# Patient Record
Sex: FEMALE | Race: WHITE | NOT HISPANIC OR LATINO | Employment: FULL TIME | URBAN - NONMETROPOLITAN AREA
[De-identification: names, ages, dates, MRNs, and addresses within clinical notes are randomized per-mention and may not be internally consistent; named-entity substitution may affect disease eponyms.]

---

## 2017-07-28 ENCOUNTER — HOSPITAL ENCOUNTER (EMERGENCY)
Facility: HOSPITAL | Age: 57
Discharge: HOME | End: 2017-07-28
Admitting: FAMILY MEDICINE

## 2017-07-28 ENCOUNTER — APPOINTMENT (OUTPATIENT)
Dept: GENERAL RADIOLOGY | Facility: HOSPITAL | Age: 57
End: 2017-07-28

## 2017-07-28 VITALS
WEIGHT: 170 LBS | BODY MASS INDEX: 26.68 KG/M2 | HEIGHT: 67 IN | RESPIRATION RATE: 16 BRPM | DIASTOLIC BLOOD PRESSURE: 81 MMHG | TEMPERATURE: 97.6 F | HEART RATE: 64 BPM | OXYGEN SATURATION: 99 % | SYSTOLIC BLOOD PRESSURE: 131 MMHG

## 2017-07-28 DIAGNOSIS — S93.402A SPRAIN OF LEFT ANKLE, UNSPECIFIED LIGAMENT, INITIAL ENCOUNTER: Primary | ICD-10-CM

## 2017-07-28 PROCEDURE — 99283 EMERGENCY DEPT VISIT LOW MDM: CPT

## 2017-07-28 PROCEDURE — 73610 X-RAY EXAM OF ANKLE: CPT

## 2017-07-28 RX ORDER — IBUPROFEN 600 MG/1
600 TABLET ORAL EVERY 8 HOURS PRN
Qty: 30 TABLET | Refills: 0 | Status: SHIPPED | OUTPATIENT
Start: 2017-07-28 | End: 2018-03-02

## 2017-07-28 RX ORDER — OXYCODONE AND ACETAMINOPHEN 7.5; 325 MG/1; MG/1
1 TABLET ORAL EVERY 4 HOURS PRN
Qty: 12 TABLET | Refills: 0 | Status: SHIPPED | OUTPATIENT
Start: 2017-07-28 | End: 2017-12-13

## 2017-11-03 ENCOUNTER — TRANSCRIBE ORDERS (OUTPATIENT)
Dept: ADMINISTRATIVE | Facility: HOSPITAL | Age: 57
End: 2017-11-03

## 2017-11-03 DIAGNOSIS — Z13.9 ENCOUNTER FOR SCREENING: Primary | ICD-10-CM

## 2017-11-06 ENCOUNTER — APPOINTMENT (OUTPATIENT)
Dept: CARDIOLOGY | Facility: HOSPITAL | Age: 57
End: 2017-11-06

## 2017-11-16 ENCOUNTER — HOSPITAL ENCOUNTER (OUTPATIENT)
Dept: CARDIOLOGY | Facility: HOSPITAL | Age: 57
Discharge: HOME | End: 2017-11-16

## 2017-11-16 DIAGNOSIS — Z13.9 ENCOUNTER FOR SCREENING: ICD-10-CM

## 2017-11-16 PROCEDURE — 93017 CV STRESS TEST TRACING ONLY: CPT

## 2017-11-16 PROCEDURE — 93018 CV STRESS TEST I&R ONLY: CPT | Performed by: INTERNAL MEDICINE

## 2017-11-17 LAB
BH CV STRESS BP STAGE 1: NORMAL
BH CV STRESS BP STAGE 2: NORMAL
BH CV STRESS BP STAGE 3: NORMAL
BH CV STRESS BP STAGE 4: NORMAL
BH CV STRESS DURATION MIN STAGE 1: 3
BH CV STRESS DURATION MIN STAGE 2: 3
BH CV STRESS DURATION MIN STAGE 3: 3
BH CV STRESS DURATION MIN STAGE 4: 1
BH CV STRESS DURATION SEC STAGE 1: 0
BH CV STRESS DURATION SEC STAGE 2: 0
BH CV STRESS DURATION SEC STAGE 3: 0
BH CV STRESS DURATION SEC STAGE 4: 20
BH CV STRESS GRADE STAGE 1: 10
BH CV STRESS GRADE STAGE 2: 12
BH CV STRESS GRADE STAGE 3: 14
BH CV STRESS GRADE STAGE 4: 16
BH CV STRESS HR STAGE 1: 78
BH CV STRESS HR STAGE 2: 97
BH CV STRESS HR STAGE 3: 116
BH CV STRESS HR STAGE 4: 144
BH CV STRESS METS STAGE 1: 5
BH CV STRESS METS STAGE 2: 7.5
BH CV STRESS METS STAGE 3: 10
BH CV STRESS METS STAGE 4: 13.5
BH CV STRESS PROTOCOL 1: NORMAL
BH CV STRESS RECOVERY BP: NORMAL MMHG
BH CV STRESS RECOVERY HR: 73 BPM
BH CV STRESS SPEED STAGE 1: 1.7
BH CV STRESS SPEED STAGE 2: 2.5
BH CV STRESS SPEED STAGE 3: 3.4
BH CV STRESS SPEED STAGE 4: 4.2
BH CV STRESS STAGE 1: 1
BH CV STRESS STAGE 2: 2
BH CV STRESS STAGE 3: 3
BH CV STRESS STAGE 4: 4
MAXIMAL PREDICTED HEART RATE: 166 BPM
PERCENT MAX PREDICTED HR: 86.75 %
STRESS BASELINE BP: NORMAL MMHG
STRESS BASELINE HR: 68 BPM
STRESS PERCENT HR: 102 %
STRESS POST ESTIMATED WORKLOAD: 13.5 METS
STRESS POST EXERCISE DUR MIN: 10 MIN
STRESS POST EXERCISE DUR SEC: 20 SEC
STRESS POST PEAK BP: NORMAL MMHG
STRESS POST PEAK HR: 144 BPM
STRESS TARGET HR: 141 BPM

## 2017-12-13 ENCOUNTER — OFFICE VISIT (OUTPATIENT)
Dept: OBSTETRICS AND GYNECOLOGY | Facility: CLINIC | Age: 57
End: 2017-12-13

## 2017-12-13 VITALS
SYSTOLIC BLOOD PRESSURE: 110 MMHG | HEIGHT: 67 IN | DIASTOLIC BLOOD PRESSURE: 70 MMHG | BODY MASS INDEX: 25.43 KG/M2 | WEIGHT: 162 LBS

## 2017-12-13 DIAGNOSIS — Z01.419 ENCOUNTER FOR GYNECOLOGICAL EXAMINATION WITHOUT ABNORMAL FINDING: Primary | ICD-10-CM

## 2017-12-13 PROCEDURE — 99396 PREV VISIT EST AGE 40-64: CPT | Performed by: NURSE PRACTITIONER

## 2017-12-13 PROCEDURE — 87624 HPV HI-RISK TYP POOLED RSLT: CPT | Performed by: NURSE PRACTITIONER

## 2017-12-13 PROCEDURE — G0123 SCREEN CERV/VAG THIN LAYER: HCPCS | Performed by: NURSE PRACTITIONER

## 2017-12-13 RX ORDER — AMLODIPINE BESYLATE 5 MG/1
5 TABLET ORAL DAILY
COMMUNITY
End: 2020-03-12

## 2017-12-13 RX ORDER — LISINOPRIL 10 MG/1
10 TABLET ORAL DAILY
COMMUNITY
End: 2020-03-12

## 2017-12-13 RX ORDER — PRAVASTATIN SODIUM 40 MG
40 TABLET ORAL DAILY
COMMUNITY
End: 2020-03-12

## 2017-12-13 NOTE — PROGRESS NOTES
"Subjective   Tiffanie Smith is a 56 y.o. female.     HPI Comments: Annual exam.       The following portions of the patient's history were reviewed and updated as appropriate: allergies, current medications, past family history, past medical history, past social history, past surgical history and problem list.    /70 (BP Location: Left arm, Patient Position: Sitting, Cuff Size: Adult)  Ht 170.2 cm (67\")  Wt 73.5 kg (162 lb)  BMI 25.37 kg/m2    Review of Systems   Constitutional: Negative for activity change, appetite change, fatigue and fever.   HENT: Negative for congestion, sore throat and trouble swallowing.    Eyes: Negative for pain, discharge and visual disturbance.   Respiratory: Negative for apnea, shortness of breath and wheezing.    Cardiovascular: Negative for chest pain, palpitations and leg swelling.   Gastrointestinal: Negative for abdominal pain, constipation and diarrhea.   Genitourinary: Negative for frequency, pelvic pain, urgency and vaginal discharge.   Musculoskeletal: Negative for back pain and gait problem.   Skin: Negative for color change and rash.   Neurological: Negative for dizziness, weakness and numbness.   Psychiatric/Behavioral: Negative for confusion and sleep disturbance.       Objective   Physical Exam   Constitutional: She is oriented to person, place, and time. She appears well-developed and well-nourished. No distress.   HENT:   Head: Normocephalic.   Right Ear: External ear normal.   Left Ear: External ear normal.   Nose: Nose normal.   Mouth/Throat: Oropharynx is clear and moist.   Eyes: Conjunctivae are normal. Right eye exhibits no discharge. Left eye exhibits no discharge. No scleral icterus.   Neck: Normal range of motion. Neck supple. Carotid bruit is not present. No tracheal deviation present. No thyromegaly present.   Cardiovascular: Normal rate, regular rhythm, normal heart sounds and intact distal pulses.    No murmur heard.  Pulmonary/Chest: Effort normal " and breath sounds normal. No respiratory distress. She has no wheezes. Right breast exhibits no inverted nipple, no mass, no nipple discharge, no skin change and no tenderness. Left breast exhibits no inverted nipple, no mass, no nipple discharge, no skin change and no tenderness. Breasts are symmetrical. There is no breast swelling.   Abdominal: Soft. She exhibits no distension and no mass. There is no tenderness. There is no guarding. No hernia. Hernia confirmed negative in the right inguinal area and confirmed negative in the left inguinal area.   Genitourinary: Rectum normal, vagina normal and uterus normal. Rectal exam shows no mass. No breast tenderness, discharge or bleeding. Pelvic exam was performed with patient supine. There is no rash, tenderness, lesion or injury on the right labia. There is no rash, tenderness, lesion or injury on the left labia. Uterus is not enlarged, not fixed and not tender. Cervix exhibits no motion tenderness, no discharge and no friability. Right adnexum displays no mass, no tenderness and no fullness. Left adnexum displays no mass, no tenderness and no fullness. No erythema, tenderness or bleeding in the vagina. No foreign body in the vagina. No signs of injury around the vagina. No vaginal discharge found.   Genitourinary Comments:   BSU normal  Urethral meatus  Normal  Perineum  Normal   Musculoskeletal: Normal range of motion. She exhibits no edema or tenderness.   Lymphadenopathy:        Head (right side): No submental, no submandibular, no tonsillar, no preauricular, no posterior auricular and no occipital adenopathy present.        Head (left side): No submental, no submandibular, no tonsillar, no preauricular, no posterior auricular and no occipital adenopathy present.     She has no cervical adenopathy.        Right cervical: No superficial cervical, no deep cervical and no posterior cervical adenopathy present.       Left cervical: No superficial cervical, no deep  cervical and no posterior cervical adenopathy present.     She has no axillary adenopathy.        Right: No inguinal adenopathy present.        Left: No inguinal adenopathy present.   Neurological: She is alert and oriented to person, place, and time. Coordination normal.   Skin: Skin is warm and dry. No bruising and no rash noted. She is not diaphoretic. No erythema.   Psychiatric: She has a normal mood and affect. Her behavior is normal. Judgment and thought content normal.   Nursing note and vitals reviewed.      Assessment/Plan    Well woman exam. Pap collected. Mammogram order sent to [facility] per pt request, scheduled for January.   Annual labs followed by Dr. Higginbotham.   RV annual exam/prn    Tiffanie was seen today for gynecologic exam.    Diagnoses and all orders for this visit:    Encounter for gynecological examination without abnormal finding  -     Liquid-based Pap Smear, Screening - ThinPrep Vial, Cervix

## 2017-12-21 LAB
GEN CATEG CVX/VAG CYTO-IMP: NORMAL
LAB AP CASE REPORT: NORMAL
LAB AP GYN ADDITIONAL INFORMATION: NORMAL
LAB AP GYN OTHER FINDINGS: NORMAL
Lab: NORMAL
PATH INTERP SPEC-IMP: NORMAL
STAT OF ADQ CVX/VAG CYTO-IMP: NORMAL

## 2018-02-01 ENCOUNTER — HOSPITAL ENCOUNTER (INPATIENT)
Facility: HOSPITAL | Age: 58
LOS: 3 days | Discharge: HOME | End: 2018-02-04
Attending: EMERGENCY MEDICINE | Admitting: FAMILY MEDICINE

## 2018-02-01 DIAGNOSIS — K85.90 ACUTE PANCREATITIS, UNSPECIFIED COMPLICATION STATUS, UNSPECIFIED PANCREATITIS TYPE: Primary | ICD-10-CM

## 2018-02-01 LAB
ALBUMIN SERPL-MCNC: 4.2 G/DL (ref 3.5–5)
ALBUMIN/GLOB SERPL: 1.4 G/DL (ref 1.1–2.5)
ALP SERPL-CCNC: 120 U/L (ref 24–120)
ALT SERPL W P-5'-P-CCNC: 34 U/L (ref 0–54)
ANION GAP SERPL CALCULATED.3IONS-SCNC: 11 MMOL/L (ref 4–13)
AST SERPL-CCNC: 29 U/L (ref 7–45)
BACTERIA UR QL AUTO: ABNORMAL /HPF
BASOPHILS # BLD AUTO: 0.05 10*3/MM3 (ref 0–0.2)
BASOPHILS NFR BLD AUTO: 0.5 % (ref 0–2)
BILIRUB CONJ SERPL-MCNC: 0 MG/DL (ref 0–0.3)
BILIRUB INDIRECT SERPL-MCNC: 0 MG/DL (ref 0–1.1)
BILIRUB SERPL-MCNC: 0.1 MG/DL (ref 0.1–1)
BILIRUB SERPL-MCNC: <0.1 MG/DL (ref 0.1–1)
BILIRUB UR QL STRIP: NEGATIVE
BUN BLD-MCNC: 13 MG/DL (ref 5–21)
BUN/CREAT SERPL: 16.5 (ref 7–25)
CALCIUM SPEC-SCNC: 9.6 MG/DL (ref 8.4–10.4)
CHLORIDE SERPL-SCNC: 102 MMOL/L (ref 98–110)
CLARITY UR: CLEAR
CO2 SERPL-SCNC: 28 MMOL/L (ref 24–31)
COLOR UR: YELLOW
CREAT BLD-MCNC: 0.79 MG/DL (ref 0.5–1.4)
DEPRECATED RDW RBC AUTO: 42 FL (ref 40–54)
EOSINOPHIL # BLD AUTO: 0.45 10*3/MM3 (ref 0–0.7)
EOSINOPHIL NFR BLD AUTO: 4.3 % (ref 0–4)
ERYTHROCYTE [DISTWIDTH] IN BLOOD BY AUTOMATED COUNT: 12.2 % (ref 12–15)
GFR SERPL CREATININE-BSD FRML MDRD: 76 ML/MIN/1.73
GGT SERPL-CCNC: 27 U/L (ref 0–62)
GLOBULIN UR ELPH-MCNC: 3.1 GM/DL
GLUCOSE BLD-MCNC: 119 MG/DL (ref 70–100)
GLUCOSE UR STRIP-MCNC: NEGATIVE MG/DL
HCT VFR BLD AUTO: 36.4 % (ref 37–47)
HGB BLD-MCNC: 12.5 G/DL (ref 12–16)
HGB UR QL STRIP.AUTO: NEGATIVE
HYALINE CASTS UR QL AUTO: ABNORMAL /LPF
IMM GRANULOCYTES # BLD: 0.02 10*3/MM3 (ref 0–0.03)
IMM GRANULOCYTES NFR BLD: 0.2 % (ref 0–5)
KETONES UR QL STRIP: NEGATIVE
LEUKOCYTE ESTERASE UR QL STRIP.AUTO: ABNORMAL
LIPASE SERPL-CCNC: 1184 U/L (ref 23–203)
LYMPHOCYTES # BLD AUTO: 3.46 10*3/MM3 (ref 0.72–4.86)
LYMPHOCYTES NFR BLD AUTO: 32.7 % (ref 15–45)
MCH RBC QN AUTO: 31.7 PG (ref 28–32)
MCHC RBC AUTO-ENTMCNC: 34.3 G/DL (ref 33–36)
MCV RBC AUTO: 92.4 FL (ref 82–98)
MONOCYTES # BLD AUTO: 0.66 10*3/MM3 (ref 0.19–1.3)
MONOCYTES NFR BLD AUTO: 6.2 % (ref 4–12)
NEUTROPHILS # BLD AUTO: 5.94 10*3/MM3 (ref 1.87–8.4)
NEUTROPHILS NFR BLD AUTO: 56.1 % (ref 39–78)
NITRITE UR QL STRIP: NEGATIVE
NRBC BLD MANUAL-RTO: 0 /100 WBC (ref 0–0)
PH UR STRIP.AUTO: 7 [PH] (ref 5–8)
PLATELET # BLD AUTO: 220 10*3/MM3 (ref 130–400)
PMV BLD AUTO: 9 FL (ref 6–12)
POTASSIUM BLD-SCNC: 3.8 MMOL/L (ref 3.5–5.3)
PROT SERPL-MCNC: 7.3 G/DL (ref 6.3–8.7)
PROT UR QL STRIP: NEGATIVE
RBC # BLD AUTO: 3.94 10*6/MM3 (ref 4.2–5.4)
RBC # UR: ABNORMAL /HPF
REF LAB TEST METHOD: ABNORMAL
SODIUM BLD-SCNC: 141 MMOL/L (ref 135–145)
SP GR UR STRIP: 1.01 (ref 1–1.03)
SQUAMOUS #/AREA URNS HPF: ABNORMAL /HPF
UROBILINOGEN UR QL STRIP: ABNORMAL
WBC NRBC COR # BLD: 10.58 10*3/MM3 (ref 4.8–10.8)
WBC UR QL AUTO: ABNORMAL /HPF

## 2018-02-01 PROCEDURE — 25010000002 ENOXAPARIN PER 10 MG: Performed by: FAMILY MEDICINE

## 2018-02-01 PROCEDURE — 93005 ELECTROCARDIOGRAM TRACING: CPT | Performed by: EMERGENCY MEDICINE

## 2018-02-01 PROCEDURE — 82977 ASSAY OF GGT: CPT | Performed by: FAMILY MEDICINE

## 2018-02-01 PROCEDURE — 25010000002 KETOROLAC TROMETHAMINE PER 15 MG: Performed by: FAMILY MEDICINE

## 2018-02-01 PROCEDURE — 99284 EMERGENCY DEPT VISIT MOD MDM: CPT

## 2018-02-01 PROCEDURE — 85025 COMPLETE CBC W/AUTO DIFF WBC: CPT | Performed by: EMERGENCY MEDICINE

## 2018-02-01 PROCEDURE — 83690 ASSAY OF LIPASE: CPT | Performed by: EMERGENCY MEDICINE

## 2018-02-01 PROCEDURE — 80053 COMPREHEN METABOLIC PANEL: CPT | Performed by: EMERGENCY MEDICINE

## 2018-02-01 PROCEDURE — 82247 BILIRUBIN TOTAL: CPT | Performed by: FAMILY MEDICINE

## 2018-02-01 PROCEDURE — 82248 BILIRUBIN DIRECT: CPT | Performed by: FAMILY MEDICINE

## 2018-02-01 PROCEDURE — 93010 ELECTROCARDIOGRAM REPORT: CPT | Performed by: INTERNAL MEDICINE

## 2018-02-01 PROCEDURE — 25010000002 FENTANYL CITRATE (PF) 100 MCG/2ML SOLUTION: Performed by: EMERGENCY MEDICINE

## 2018-02-01 PROCEDURE — 25010000002 ONDANSETRON PER 1 MG: Performed by: EMERGENCY MEDICINE

## 2018-02-01 PROCEDURE — 81001 URINALYSIS AUTO W/SCOPE: CPT | Performed by: EMERGENCY MEDICINE

## 2018-02-01 PROCEDURE — 87086 URINE CULTURE/COLONY COUNT: CPT | Performed by: EMERGENCY MEDICINE

## 2018-02-01 RX ORDER — DICYCLOMINE HYDROCHLORIDE 10 MG/1
10 CAPSULE ORAL 3 TIMES DAILY PRN
COMMUNITY
End: 2018-03-02

## 2018-02-01 RX ORDER — SODIUM CHLORIDE 0.9 % (FLUSH) 0.9 %
1-10 SYRINGE (ML) INJECTION AS NEEDED
Status: DISCONTINUED | OUTPATIENT
Start: 2018-02-01 | End: 2018-02-04 | Stop reason: HOSPADM

## 2018-02-01 RX ORDER — KETOROLAC TROMETHAMINE 30 MG/ML
30 INJECTION, SOLUTION INTRAMUSCULAR; INTRAVENOUS ONCE
Status: COMPLETED | OUTPATIENT
Start: 2018-02-01 | End: 2018-02-01

## 2018-02-01 RX ORDER — AMLODIPINE BESYLATE 5 MG/1
5 TABLET ORAL DAILY
Status: DISCONTINUED | OUTPATIENT
Start: 2018-02-02 | End: 2018-02-04 | Stop reason: HOSPADM

## 2018-02-01 RX ORDER — SODIUM CHLORIDE 9 MG/ML
1000 INJECTION, SOLUTION INTRAVENOUS ONCE
Status: COMPLETED | OUTPATIENT
Start: 2018-02-01 | End: 2018-02-01

## 2018-02-01 RX ORDER — LISINOPRIL 10 MG/1
10 TABLET ORAL DAILY
Status: DISCONTINUED | OUTPATIENT
Start: 2018-02-02 | End: 2018-02-04 | Stop reason: HOSPADM

## 2018-02-01 RX ORDER — ATORVASTATIN CALCIUM 10 MG/1
10 TABLET, FILM COATED ORAL NIGHTLY
Status: DISCONTINUED | OUTPATIENT
Start: 2018-02-01 | End: 2018-02-04 | Stop reason: HOSPADM

## 2018-02-01 RX ORDER — FENTANYL CITRATE 50 UG/ML
50 INJECTION, SOLUTION INTRAMUSCULAR; INTRAVENOUS ONCE
Status: COMPLETED | OUTPATIENT
Start: 2018-02-01 | End: 2018-02-01

## 2018-02-01 RX ORDER — SODIUM CHLORIDE 9 MG/ML
100 INJECTION, SOLUTION INTRAVENOUS CONTINUOUS
Status: DISCONTINUED | OUTPATIENT
Start: 2018-02-01 | End: 2018-02-03

## 2018-02-01 RX ORDER — FENTANYL CITRATE 50 UG/ML
25 INJECTION, SOLUTION INTRAMUSCULAR; INTRAVENOUS
Status: DISCONTINUED | OUTPATIENT
Start: 2018-02-01 | End: 2018-02-01

## 2018-02-01 RX ORDER — DICYCLOMINE HYDROCHLORIDE 10 MG/1
10 CAPSULE ORAL 3 TIMES DAILY
Status: DISCONTINUED | OUTPATIENT
Start: 2018-02-01 | End: 2018-02-04 | Stop reason: HOSPADM

## 2018-02-01 RX ORDER — ONDANSETRON 2 MG/ML
4 INJECTION INTRAMUSCULAR; INTRAVENOUS ONCE
Status: COMPLETED | OUTPATIENT
Start: 2018-02-01 | End: 2018-02-01

## 2018-02-01 RX ORDER — FAMOTIDINE 20 MG/1
40 TABLET, FILM COATED ORAL DAILY
Status: DISCONTINUED | OUTPATIENT
Start: 2018-02-02 | End: 2018-02-04 | Stop reason: HOSPADM

## 2018-02-01 RX ORDER — SODIUM CHLORIDE 0.9 % (FLUSH) 0.9 %
10 SYRINGE (ML) INJECTION AS NEEDED
Status: DISCONTINUED | OUTPATIENT
Start: 2018-02-01 | End: 2018-02-04 | Stop reason: HOSPADM

## 2018-02-01 RX ADMIN — ONDANSETRON 4 MG: 2 INJECTION, SOLUTION INTRAMUSCULAR; INTRAVENOUS at 20:39

## 2018-02-01 RX ADMIN — SODIUM CHLORIDE 1000 ML: 9 INJECTION, SOLUTION INTRAVENOUS at 20:39

## 2018-02-01 RX ADMIN — SODIUM CHLORIDE 100 ML/HR: 9 INJECTION, SOLUTION INTRAVENOUS at 23:10

## 2018-02-01 RX ADMIN — ENOXAPARIN SODIUM 40 MG: 40 INJECTION SUBCUTANEOUS at 23:28

## 2018-02-01 RX ADMIN — KETOROLAC TROMETHAMINE 30 MG: 30 INJECTION, SOLUTION INTRAMUSCULAR at 23:28

## 2018-02-01 RX ADMIN — FENTANYL CITRATE 50 MCG: 50 INJECTION, SOLUTION INTRAMUSCULAR; INTRAVENOUS at 20:39

## 2018-02-02 ENCOUNTER — APPOINTMENT (OUTPATIENT)
Dept: CT IMAGING | Facility: HOSPITAL | Age: 58
End: 2018-02-02

## 2018-02-02 ENCOUNTER — TELEPHONE (OUTPATIENT)
Dept: GASTROENTEROLOGY | Age: 58
End: 2018-02-02

## 2018-02-02 ENCOUNTER — APPOINTMENT (OUTPATIENT)
Dept: ULTRASOUND IMAGING | Facility: HOSPITAL | Age: 58
End: 2018-02-02

## 2018-02-02 LAB
ALBUMIN SERPL-MCNC: 3.5 G/DL (ref 3.5–5)
ALBUMIN/GLOB SERPL: 1.3 G/DL (ref 1.1–2.5)
ALP SERPL-CCNC: 90 U/L (ref 24–120)
ALT SERPL W P-5'-P-CCNC: 30 U/L (ref 0–54)
AMYLASE SERPL-CCNC: 109 U/L (ref 30–110)
ANION GAP SERPL CALCULATED.3IONS-SCNC: 7 MMOL/L (ref 4–13)
ARTICHOKE IGE QN: 92 MG/DL (ref 0–99)
AST SERPL-CCNC: 25 U/L (ref 7–45)
BASOPHILS # BLD AUTO: 0.06 10*3/MM3 (ref 0–0.2)
BASOPHILS NFR BLD AUTO: 0.8 % (ref 0–2)
BILIRUB SERPL-MCNC: 0.2 MG/DL (ref 0.1–1)
BUN BLD-MCNC: 9 MG/DL (ref 5–21)
BUN/CREAT SERPL: 12 (ref 7–25)
CALCIUM SPEC-SCNC: 8.9 MG/DL (ref 8.4–10.4)
CHLORIDE SERPL-SCNC: 108 MMOL/L (ref 98–110)
CHOLEST SERPL-MCNC: 153 MG/DL (ref 130–200)
CO2 SERPL-SCNC: 28 MMOL/L (ref 24–31)
CREAT BLD-MCNC: 0.75 MG/DL (ref 0.5–1.4)
D-LACTATE SERPL-SCNC: 0.8 MMOL/L (ref 0.5–2)
DEPRECATED RDW RBC AUTO: 40.8 FL (ref 40–54)
EOSINOPHIL # BLD AUTO: 0.46 10*3/MM3 (ref 0–0.7)
EOSINOPHIL NFR BLD AUTO: 5.8 % (ref 0–4)
ERYTHROCYTE [DISTWIDTH] IN BLOOD BY AUTOMATED COUNT: 12.1 % (ref 12–15)
GFR SERPL CREATININE-BSD FRML MDRD: 80 ML/MIN/1.73
GLOBULIN UR ELPH-MCNC: 2.8 GM/DL
GLUCOSE BLD-MCNC: 111 MG/DL (ref 70–100)
HCT VFR BLD AUTO: 36.6 % (ref 37–47)
HDLC SERPL-MCNC: 42 MG/DL
HGB BLD-MCNC: 12.3 G/DL (ref 12–16)
IMM GRANULOCYTES # BLD: 0.02 10*3/MM3 (ref 0–0.03)
IMM GRANULOCYTES NFR BLD: 0.3 % (ref 0–5)
LDLC/HDLC SERPL: 2.07 {RATIO}
LIPASE SERPL-CCNC: 693 U/L (ref 23–203)
LYMPHOCYTES # BLD AUTO: 3.18 10*3/MM3 (ref 0.72–4.86)
LYMPHOCYTES NFR BLD AUTO: 39.8 % (ref 15–45)
MAGNESIUM SERPL-MCNC: 2.1 MG/DL (ref 1.4–2.2)
MCH RBC QN AUTO: 30.9 PG (ref 28–32)
MCHC RBC AUTO-ENTMCNC: 33.6 G/DL (ref 33–36)
MCV RBC AUTO: 92 FL (ref 82–98)
MONOCYTES # BLD AUTO: 0.52 10*3/MM3 (ref 0.19–1.3)
MONOCYTES NFR BLD AUTO: 6.5 % (ref 4–12)
NEUTROPHILS # BLD AUTO: 3.76 10*3/MM3 (ref 1.87–8.4)
NEUTROPHILS NFR BLD AUTO: 46.8 % (ref 39–78)
NRBC BLD MANUAL-RTO: 0 /100 WBC (ref 0–0)
PHOSPHATE SERPL-MCNC: 3.6 MG/DL (ref 2.5–4.5)
PLATELET # BLD AUTO: 206 10*3/MM3 (ref 130–400)
PMV BLD AUTO: 9.2 FL (ref 6–12)
POTASSIUM BLD-SCNC: 4.6 MMOL/L (ref 3.5–5.3)
PROT SERPL-MCNC: 6.3 G/DL (ref 6.3–8.7)
RBC # BLD AUTO: 3.98 10*6/MM3 (ref 4.2–5.4)
SODIUM BLD-SCNC: 143 MMOL/L (ref 135–145)
TRIGL SERPL-MCNC: 120 MG/DL (ref 0–149)
TROPONIN I SERPL-MCNC: <0.012 NG/ML (ref 0–0.03)
WBC NRBC COR # BLD: 8 10*3/MM3 (ref 4.8–10.8)

## 2018-02-02 PROCEDURE — 83690 ASSAY OF LIPASE: CPT | Performed by: FAMILY MEDICINE

## 2018-02-02 PROCEDURE — 80061 LIPID PANEL: CPT | Performed by: NURSE PRACTITIONER

## 2018-02-02 PROCEDURE — 83735 ASSAY OF MAGNESIUM: CPT | Performed by: FAMILY MEDICINE

## 2018-02-02 PROCEDURE — 76700 US EXAM ABDOM COMPLETE: CPT

## 2018-02-02 PROCEDURE — 84100 ASSAY OF PHOSPHORUS: CPT | Performed by: FAMILY MEDICINE

## 2018-02-02 PROCEDURE — 25010000002 KETOROLAC TROMETHAMINE PER 15 MG: Performed by: NURSE PRACTITIONER

## 2018-02-02 PROCEDURE — 86301 IMMUNOASSAY TUMOR CA 19-9: CPT | Performed by: NURSE PRACTITIONER

## 2018-02-02 PROCEDURE — 83605 ASSAY OF LACTIC ACID: CPT | Performed by: FAMILY MEDICINE

## 2018-02-02 PROCEDURE — 0 IOHEXOL 300 MG/ML SOLUTION: Performed by: NURSE PRACTITIONER

## 2018-02-02 PROCEDURE — 82150 ASSAY OF AMYLASE: CPT | Performed by: FAMILY MEDICINE

## 2018-02-02 PROCEDURE — 74177 CT ABD & PELVIS W/CONTRAST: CPT

## 2018-02-02 PROCEDURE — 84484 ASSAY OF TROPONIN QUANT: CPT | Performed by: FAMILY MEDICINE

## 2018-02-02 PROCEDURE — 80053 COMPREHEN METABOLIC PANEL: CPT | Performed by: FAMILY MEDICINE

## 2018-02-02 PROCEDURE — 85025 COMPLETE CBC W/AUTO DIFF WBC: CPT | Performed by: FAMILY MEDICINE

## 2018-02-02 PROCEDURE — 0 IOPAMIDOL 61 % SOLUTION: Performed by: FAMILY MEDICINE

## 2018-02-02 RX ORDER — KETOROLAC TROMETHAMINE 30 MG/ML
30 INJECTION, SOLUTION INTRAMUSCULAR; INTRAVENOUS ONCE
Status: COMPLETED | OUTPATIENT
Start: 2018-02-02 | End: 2018-02-02

## 2018-02-02 RX ORDER — OXYCODONE HYDROCHLORIDE AND ACETAMINOPHEN 5; 325 MG/1; MG/1
1 TABLET ORAL EVERY 4 HOURS PRN
Status: DISCONTINUED | OUTPATIENT
Start: 2018-02-02 | End: 2018-02-04 | Stop reason: HOSPADM

## 2018-02-02 RX ORDER — KETOROLAC TROMETHAMINE 30 MG/ML
30 INJECTION, SOLUTION INTRAMUSCULAR; INTRAVENOUS EVERY 6 HOURS PRN
Status: DISCONTINUED | OUTPATIENT
Start: 2018-02-02 | End: 2018-02-04 | Stop reason: HOSPADM

## 2018-02-02 RX ADMIN — AMLODIPINE BESYLATE 5 MG: 5 TABLET ORAL at 10:20

## 2018-02-02 RX ADMIN — IOPAMIDOL 100 ML: 612 INJECTION, SOLUTION INTRAVENOUS at 10:00

## 2018-02-02 RX ADMIN — LISINOPRIL 10 MG: 10 TABLET ORAL at 10:20

## 2018-02-02 RX ADMIN — FAMOTIDINE 40 MG: 20 TABLET, FILM COATED ORAL at 10:20

## 2018-02-02 RX ADMIN — IOHEXOL 50 ML: 300 INJECTION, SOLUTION INTRAVENOUS at 07:01

## 2018-02-02 RX ADMIN — OXYCODONE HYDROCHLORIDE AND ACETAMINOPHEN 1 TABLET: 5; 325 TABLET ORAL at 18:59

## 2018-02-02 RX ADMIN — SODIUM CHLORIDE 100 ML/HR: 9 INJECTION, SOLUTION INTRAVENOUS at 10:22

## 2018-02-02 RX ADMIN — ATORVASTATIN CALCIUM 10 MG: 10 TABLET, FILM COATED ORAL at 21:16

## 2018-02-02 RX ADMIN — KETOROLAC TROMETHAMINE 30 MG: 30 INJECTION, SOLUTION INTRAMUSCULAR at 08:02

## 2018-02-02 RX ADMIN — DICYCLOMINE HYDROCHLORIDE 10 MG: 10 CAPSULE ORAL at 21:16

## 2018-02-02 NOTE — PAYOR COMM NOTE
"Evert Smith (57 y.o. Female) [id number]      [facility] phone    Fax          Date of Birth Social Security Number Address Home Phone MRN    [id number]  [id number] [place] [id number] [phone] [id number]    Voodoo Marital Status          None Single       Admission Date Admission Type Admitting Provider Attending Provider Department, Room/Bed    2/1/18 Emergency Maribell Desai MD Ruxer, Jena Thomas, MD [facility] 3C, 378/1    Discharge Date Discharge Disposition Discharge Destination                      Attending Provider: Maribell Desai MD     Allergies:  Codeine, Dilaudid [Hydromorphone Hcl], Morphine And Related, Neosporin [Neomycin-bacitracin Zn-polymyx], Sulfa Antibiotics, Loperamide Hcl    Isolation:  None   Infection:  None   Code Status:  FULL    Ht:  167.6 cm (66\")   Wt:  73.2 kg (161 lb 4.8 oz)    Admission Cmt:  None   Principal Problem:  None                Active Insurance as of 2/1/2018     Primary Coverage     Payor Plan Insurance Group Employer/Plan Group    [employer] BLUE CROSS [employer] BLUE CROSS BLUE SHIELD PPO [id number]     Payor Plan Address Payor Plan Phone Number Effective From Effective To    [address] [phone] 1/1/2017     [place] [address]       Subscriber Name Subscriber Birth Date Member ID       EVERT SMITH 1960 [id number]                 Emergency Contacts      (Rel.) Home Phone Work Phone Mobile Phone    Ankur Chairez (Son) -- -- [phone]               History & Physical      Romulo Peck MD at 2/1/2018  9:53 PM              [facility] Medicine Services  HISTORY AND PHYSICAL    Date of Admission: 2/1/2018  Primary Care Physician: Darian Higginbotham MD    Subjective     Chief Complaint: Abdominal tenderness    History of Present Illness       57-year-old female with past medical history of hypothyroidism, " hyperlipidemia and hypertension comes into the ER with abdominal pain.  Patient states she has intermittent pains on the upper bilateral region of her abdomen.  Patient had lab works done as outpatient which according to heart showed elevated liver enzymes.  Patient has a GI appointment in 2 weeks.  Today patient states her pain has been progressively getting worse and she is intolerance of food or water.  So she decided to come to the ER for further evaluation.  In the ER patient's lipase was noted to be 1184.  Patient will be admitted for acute pancreatitis as well as by mouth intolerance.  Patient denies any fever, chills, nausea, vomiting, diarrhea, urinary symptoms or any other associated symptoms.        Review of Systems     Otherwise complete ROS reviewed and negative except as mentioned in the HPI.    Past Medical History:   Past Medical History:   Diagnosis Date   • Disease of thyroid gland    • Hyperlipidemia    • Hypertension      Past Surgical History:  Past Surgical History:   Procedure Laterality Date   •  SECTION      x3   • CHOLECYSTECTOMY     • ELBOW DEBRIDEMENT Right    • HAND SURGERY Right    • HERNIA REPAIR      x3     Social History:  reports that she has been smoking Cigarettes.  She has a 10.00 pack-year smoking history. She has never used smokeless tobacco. She reports that she drinks alcohol. She reports that she does not use illicit drugs.    Family History: family history includes Brain cancer in her maternal aunt; Breast cancer in her maternal aunt; Diabetes in her maternal grandmother; Hypertension in her mother. There is no history of Ovarian cancer, Uterine cancer, or Colon cancer.      Allergies:  Allergies   Allergen Reactions   • Codeine Shortness Of Breath   • Dilaudid [Hydromorphone Hcl] Anaphylaxis   • Morphine And Related GI Intolerance   • Neosporin [Neomycin-Bacitracin Zn-Polymyx] Rash   • Sulfa Antibiotics Hives   • Loperamide Hcl Other (See Comments)     severe abd.  "pain     Medications:  Prior to Admission medications    Medication Sig Start Date End Date Taking? Authorizing Provider   amLODIPine (NORVASC) 5 MG tablet Take 5 mg by mouth Daily.   Yes Historical Provider, MD   dicyclomine (BENTYL) 10 MG capsule Take 10 mg by mouth 3 (Three) Times a Day As Needed.   Yes Historical Provider, MD   lisinopril (PRINIVIL,ZESTRIL) 10 MG tablet Take 10 mg by mouth Daily.   Yes Historical Provider, MD   pravastatin (PRAVACHOL) 40 MG tablet Take 40 mg by mouth Daily.   Yes Historical Provider, MD   ibuprofen (ADVIL,MOTRIN) 600 MG tablet Take 1 tablet by mouth Every 8 (Eight) Hours As Needed for Mild Pain (1-3). 7/28/17   DEMIAN Tobin     Objective     Vital Signs: /88  Pulse 71  Temp 98.5 °F (36.9 °C)  Resp 14  Ht 167.6 cm (66\")  Wt 71.2 kg (157 lb)  SpO2 100%  BMI 25.34 kg/m2  Physical Exam   Constitutional: She is oriented to person, place, and time. She appears well-developed and well-nourished.   HENT:   Head: Normocephalic and atraumatic.   Eyes: EOM are normal. Pupils are equal, round, and reactive to light.   Neck: Normal range of motion. Neck supple.   Cardiovascular: Normal rate, regular rhythm and normal heart sounds.    Pulmonary/Chest: Effort normal and breath sounds normal.   Abdominal: Soft. Bowel sounds are normal. She exhibits no distension and no mass. There is tenderness. There is no rebound and no guarding. No hernia.   Musculoskeletal: Normal range of motion.   Neurological: She is alert and oriented to person, place, and time.   Skin: Skin is warm.   Psychiatric: She has a normal mood and affect. Her behavior is normal. Judgment and thought content normal.             Results Reviewed:  Lab Results (last 24 hours)     Procedure Component Value Units Date/Time    CBC & Differential [[id number]] Collected:  02/01/18 2043    Specimen:  Blood Updated:  02/01/18 2052    Narrative:       The following orders were created for panel order CBC & " Differential.  Procedure                               Abnormality         Status                     ---------                               -----------         ------                     CBC Auto Differential[[id number]]         Abnormal            Final result                 Please view results for these tests on the individual orders.    CBC Auto Differential [[id number]]  (Abnormal) Collected:  02/01/18 2043    Specimen:  Blood Updated:  02/01/18 2052     WBC 10.58 10*3/mm3      RBC 3.94 (L) 10*6/mm3      Hemoglobin 12.5 g/dL      Hematocrit 36.4 (L) %      MCV 92.4 fL      MCH 31.7 pg      MCHC 34.3 g/dL      RDW 12.2 %      RDW-SD 42.0 fl      MPV 9.0 fL      Platelets 220 10*3/mm3      Neutrophil % 56.1 %      Lymphocyte % 32.7 %      Monocyte % 6.2 %      Eosinophil % 4.3 (H) %      Basophil % 0.5 %      Immature Grans % 0.2 %      Neutrophils, Absolute 5.94 10*3/mm3      Lymphocytes, Absolute 3.46 10*3/mm3      Monocytes, Absolute 0.66 10*3/mm3      Eosinophils, Absolute 0.45 10*3/mm3      Basophils, Absolute 0.05 10*3/mm3      Immature Grans, Absolute 0.02 10*3/mm3      nRBC 0.0 /100 WBC     Urine Culture - Urine, Urine, Clean Catch [[id number]] Collected:  02/01/18 2043    Specimen:  Urine from Urine, Clean Catch Updated:  02/01/18 2053    Urinalysis With / Culture If Indicated - Urine, Clean Catch [[id number]]  (Abnormal) Collected:  02/01/18 2043    Specimen:  Urine from Urine, Clean Catch Updated:  02/01/18 2057     Color, UA Yellow     Appearance, UA Clear     pH, UA 7.0     Specific Gravity, UA 1.012     Glucose, UA Negative     Ketones, UA Negative     Bilirubin, UA Negative     Blood, UA Negative     Protein, UA Negative     Leuk Esterase, UA Small (1+) (A)     Nitrite, UA Negative     Urobilinogen, UA 0.2 E.U./dL    Urinalysis, Microscopic Only - Urine, Clean Catch [[id number]]  (Abnormal) Collected:  02/01/18 2043    Specimen:  Urine from Urine, Clean Catch Updated:  02/01/18 2057     RBC, UA 0-2 (A)  /HPF      WBC, UA 0-2 (A) /HPF      Bacteria, UA None Seen /HPF      Squamous Epithelial Cells, UA 0-2 /HPF      Hyaline Casts, UA None Seen /LPF      Methodology Automated Microscopy    Comprehensive Metabolic Panel [[id number]]  (Abnormal) Collected:  02/01/18 2043    Specimen:  Blood Updated:  02/01/18 2101     Glucose 119 (H) mg/dL      BUN 13 mg/dL      Creatinine 0.79 mg/dL      Sodium 141 mmol/L      Potassium 3.8 mmol/L      Chloride 102 mmol/L      CO2 28.0 mmol/L      Calcium 9.6 mg/dL      Total Protein 7.3 g/dL      Albumin 4.20 g/dL      ALT (SGPT) 34 U/L      AST (SGOT) 29 U/L      Alkaline Phosphatase 120 U/L      Total Bilirubin <0.1 (L) mg/dL      eGFR Non African Amer 76 mL/min/1.73      Globulin 3.1 gm/dL      A/G Ratio 1.4 g/dL      BUN/Creatinine Ratio 16.5     Anion Gap 11.0 mmol/L     Lipase [[id number]]  (Abnormal) Collected:  02/01/18 2043    Specimen:  Blood Updated:  02/01/18 2101     Lipase 1184 (H) U/L         Imaging Results (last 24 hours)     ** No results found for the last 24 hours. **        I have personally reviewed and interpreted the radiology studies and ECG obtained at time of admission.     Assessment / Plan     Assessment:   Hospital Problem List     Acute pancreatitis        1.  Acute pancreatitis  2.  Intractable pain  3.  By mouth intolerance     Plan:      -Admit for further evaluation  -Monitor vitals  -Lipase noted to be elevated  -Monitor renal panel  -Monitor electrolytes  -WBC noted to be within normal range  -No indication for antibiotic at the moment  -Pain management  -Continue IV fluid  -Keep nothing by mouth for now  -Consider GI consult if indicated  -GI prophylaxis  -DVT prophylaxis        Code Status: Full Code     I discussed the patients findings and my recommendations with the Patient's RN     Estimated length of stay 2-3 days    Romulo Peck MD   02/01/18   9:53 PM               Electronically signed by Romulo Peck MD at 2/1/2018 10:01 PM            Emergency Department Notes      Krissy Sharma RN at 2/1/2018  8:15 PM          Dr. Mcintosh at bedside     Krissy Sharma RN  02/01/18 2015       Electronically signed by Krissy Sharma RN at 2/1/2018  8:15 PM      Shobha Mcintosh MD at 2/1/2018  8:21 PM          Subjective patient is a 57-year-old female who presents to the ER with abdominal pain.  Patient states she has been having sharp bilateral upper quadrant and epigastric abdominal pain for the last 4 weeks, progressively worsening.  Patient states she usually wakes up in the morning and has no pain and then after she eats breakfast the pain starts and persists throughout the entire day, worsening with any meal.  Patient saw her PCP and she was called at home today and told her liver enzymes were elevated.  Patient made a follow-up appointment with GI in 2 weeks.  Patient states the pain was just unbearable so she decided to come here for evaluation.  Patient denies any fever, chest pain, shortness of air, nausea vomiting diarrhea, urinary changes, neurological changes.    History provided by:  Patient   used: No        Review of Systems   Constitutional: Negative.    HENT: Negative.    Eyes: Negative.    Respiratory: Negative.    Cardiovascular: Negative.    Gastrointestinal: Positive for abdominal pain.   Endocrine: Negative.    Genitourinary: Negative.    Musculoskeletal: Negative.    Skin: Negative.    Allergic/Immunologic: Negative.    Neurological: Negative.    Hematological: Negative.    Psychiatric/Behavioral: Negative.    All other systems reviewed and are negative.      Past Medical History:   Diagnosis Date   • Disease of thyroid gland    • Hyperlipidemia    • Hypertension        Allergies   Allergen Reactions   • Codeine Shortness Of Breath   • Dilaudid [Hydromorphone Hcl] Anaphylaxis   • Morphine And Related GI Intolerance   • Neosporin [Neomycin-Bacitracin Zn-Polymyx] Rash   • Sulfa Antibiotics Hives   • Loperamide Hcl  Other (See Comments)     severe abd. pain       Past Surgical History:   Procedure Laterality Date   •  SECTION      x3   • CHOLECYSTECTOMY     • ELBOW DEBRIDEMENT Right    • HAND SURGERY Right    • HERNIA REPAIR      x3       Family History   Problem Relation Age of Onset   • Hypertension Mother    • Diabetes Maternal Grandmother    • Breast cancer Maternal Aunt    • Brain cancer Maternal Aunt    • Ovarian cancer Neg Hx    • Uterine cancer Neg Hx    • Colon cancer Neg Hx        Social History     Social History   • Marital status: Single     Spouse name: N/A   • Number of children: N/A   • Years of education: N/A     Social History Main Topics   • Smoking status: Current Every Day Smoker     Packs/day: 1.00     Years: 10.00     Types: Cigarettes   • Smokeless tobacco: Never Used   • Alcohol use Yes      Comment: rarely   • Drug use: No   • Sexual activity: No     Other Topics Concern   • None     Social History Narrative           Objective   Physical Exam   Constitutional: She is oriented to person, place, and time. She appears well-developed and well-nourished.   HENT:   Head: Normocephalic and atraumatic.   Eyes: Conjunctivae are normal. Pupils are equal, round, and reactive to light.   Neck: Normal range of motion.   Cardiovascular: Normal rate, regular rhythm and normal heart sounds.    Pulmonary/Chest: Effort normal and breath sounds normal.   Abdominal: Soft. There is tenderness in the right upper quadrant, epigastric area and left upper quadrant. There is no rigidity, no rebound, no guarding and no CVA tenderness.   Musculoskeletal: Normal range of motion. She exhibits no edema or deformity.   Neurological: She is alert and oriented to person, place, and time. She has normal strength.   Skin: Skin is warm.   Psychiatric: She has a normal mood and affect. Her behavior is normal.   Nursing note and vitals reviewed.      Procedures        ED Course  ED Course      Patient was given IV fluids, fentanyl  and Zofran.  ECG:Sinus bradycardia with a rate of 57, no acute ischemia or infarction    Lab Results (last 24 hours)     Procedure Component Value Units Date/Time    CBC & Differential [[id number]] Collected:  02/01/18 2043    Specimen:  Blood Updated:  02/01/18 2052    Narrative:       The following orders were created for panel order CBC & Differential.  Procedure                               Abnormality         Status                     ---------                               -----------         ------                     CBC Auto Differential[[id number]]         Abnormal            Final result                 Please view results for these tests on the individual orders.    Comprehensive Metabolic Panel [[id number]]  (Abnormal) Collected:  02/01/18 2043    Specimen:  Blood Updated:  02/01/18 2101     Glucose 119 (H) mg/dL      BUN 13 mg/dL      Creatinine 0.79 mg/dL      Sodium 141 mmol/L      Potassium 3.8 mmol/L      Chloride 102 mmol/L      CO2 28.0 mmol/L      Calcium 9.6 mg/dL      Total Protein 7.3 g/dL      Albumin 4.20 g/dL      ALT (SGPT) 34 U/L      AST (SGOT) 29 U/L      Alkaline Phosphatase 120 U/L      Total Bilirubin <0.1 (L) mg/dL      eGFR Non African Amer 76 mL/min/1.73      Globulin 3.1 gm/dL      A/G Ratio 1.4 g/dL      BUN/Creatinine Ratio 16.5     Anion Gap 11.0 mmol/L     Lipase [[id number]]  (Abnormal) Collected:  02/01/18 2043    Specimen:  Blood Updated:  02/01/18 2101     Lipase 1184 (H) U/L     Urinalysis With / Culture If Indicated - Urine, Clean Catch [[id number]]  (Abnormal) Collected:  02/01/18 2043    Specimen:  Urine from Urine, Clean Catch Updated:  02/01/18 2057     Color, UA Yellow     Appearance, UA Clear     pH, UA 7.0     Specific Gravity, UA 1.012     Glucose, UA Negative     Ketones, UA Negative     Bilirubin, UA Negative     Blood, UA Negative     Protein, UA Negative     Leuk Esterase, UA Small (1+) (A)     Nitrite, UA Negative     Urobilinogen, UA 0.2 E.U./dL    CBC Auto  Differential [[id number]]  (Abnormal) Collected:  02/01/18 2043    Specimen:  Blood Updated:  02/01/18 2052     WBC 10.58 10*3/mm3      RBC 3.94 (L) 10*6/mm3      Hemoglobin 12.5 g/dL      Hematocrit 36.4 (L) %      MCV 92.4 fL      MCH 31.7 pg      MCHC 34.3 g/dL      RDW 12.2 %      RDW-SD 42.0 fl      MPV 9.0 fL      Platelets 220 10*3/mm3      Neutrophil % 56.1 %      Lymphocyte % 32.7 %      Monocyte % 6.2 %      Eosinophil % 4.3 (H) %      Basophil % 0.5 %      Immature Grans % 0.2 %      Neutrophils, Absolute 5.94 10*3/mm3      Lymphocytes, Absolute 3.46 10*3/mm3      Monocytes, Absolute 0.66 10*3/mm3      Eosinophils, Absolute 0.45 10*3/mm3      Basophils, Absolute 0.05 10*3/mm3      Immature Grans, Absolute 0.02 10*3/mm3      nRBC 0.0 /100 WBC     Urine Culture - Urine, Urine, Clean Catch [[id number]] Collected:  02/01/18 2043    Specimen:  Urine from Urine, Clean Catch Updated:  02/01/18 2053    Urinalysis, Microscopic Only - Urine, Clean Catch [[id number]]  (Abnormal) Collected:  02/01/18 2043    Specimen:  Urine from Urine, Clean Catch Updated:  02/01/18 2057     RBC, UA 0-2 (A) /HPF      WBC, UA 0-2 (A) /HPF      Bacteria, UA None Seen /HPF      Squamous Epithelial Cells, UA 0-2 /HPF      Hyaline Casts, UA None Seen /LPF      Methodology Automated Microscopy        Labs showed an elevated lipase.  Workup consistent with acute pancreatitis.  Patient was then admitted to the hospitalist service by Dr. Peck for further workup and treatment.            MDM  Number of Diagnoses or Management Options      Final diagnoses:   Acute pancreatitis, unspecified complication status, unspecified pancreatitis type            Shobha Mcintosh MD  02/01/18 2129       Electronically signed by Shobha Mcintosh MD at 2/1/2018  9:29 PM        Romulo Peck MD Physician Signed Medicine H&P Date of Service: 2/1/2018  9:53 PM      Expand All Collapse All    []Hide copied text  []Hover for attribution information       [facility]  [facility] Medicine Services  HISTORY AND PHYSICAL     Date of Admission: 2018  Primary Care Physician: Darian Higginbotham MD     Subjective      Chief Complaint: Abdominal tenderness     History of Present Illness         57-year-old female with past medical history of hypothyroidism, hyperlipidemia and hypertension comes into the ER with abdominal pain.  Patient states she has intermittent pains on the upper bilateral region of her abdomen.  Patient had lab works done as outpatient which according to heart showed elevated liver enzymes.  Patient has a GI appointment in 2 weeks.  Today patient states her pain has been progressively getting worse and she is intolerance of food or water.  So she decided to come to the ER for further evaluation.  In the ER patient's lipase was noted to be 1184.  Patient will be admitted for acute pancreatitis as well as by mouth intolerance.  Patient denies any fever, chills, nausea, vomiting, diarrhea, urinary symptoms or any other associated symptoms.           Review of Systems      Otherwise complete ROS reviewed and negative except as mentioned in the HPI.     Past Medical History:    Medical History         Past Medical History:   Diagnosis Date   • Disease of thyroid gland     • Hyperlipidemia     • Hypertension           Past Surgical History:   Surgical History          Past Surgical History:   Procedure Laterality Date   •  SECTION         x3   • CHOLECYSTECTOMY       • ELBOW DEBRIDEMENT Right     • HAND SURGERY Right     • HERNIA REPAIR         x3         Social History:  reports that she has been smoking Cigarettes.  She has a 10.00 pack-year smoking history. She has never used smokeless tobacco. She reports that she drinks alcohol. She reports that she does not use illicit drugs.     Family History: family history includes Brain cancer in her maternal aunt; Breast cancer in her maternal aunt; Diabetes in her maternal grandmother;  "Hypertension in her mother. There is no history of Ovarian cancer, Uterine cancer, or Colon cancer.       Allergies:        Allergies   Allergen Reactions   • Codeine Shortness Of Breath   • Dilaudid [Hydromorphone Hcl] Anaphylaxis   • Morphine And Related GI Intolerance   • Neosporin [Neomycin-Bacitracin Zn-Polymyx] Rash   • Sulfa Antibiotics Hives   • Loperamide Hcl Other (See Comments)       severe abd. pain      Medications:          Prior to Admission medications    Medication Sig Start Date End Date Taking? Authorizing Provider   amLODIPine (NORVASC) 5 MG tablet Take 5 mg by mouth Daily.     Yes Historical Provider, MD   dicyclomine (BENTYL) 10 MG capsule Take 10 mg by mouth 3 (Three) Times a Day As Needed.     Yes Historical Provider, MD   lisinopril (PRINIVIL,ZESTRIL) 10 MG tablet Take 10 mg by mouth Daily.     Yes Historical Provider, MD   pravastatin (PRAVACHOL) 40 MG tablet Take 40 mg by mouth Daily.     Yes Historical Provider, MD   ibuprofen (ADVIL,MOTRIN) 600 MG tablet Take 1 tablet by mouth Every 8 (Eight) Hours As Needed for Mild Pain (1-3). 7/28/17     Dayan Kimball, APRN      Objective      Vital Signs: /88  Pulse 71  Temp 98.5 °F (36.9 °C)  Resp 14  Ht 167.6 cm (66\")  Wt 71.2 kg (157 lb)  SpO2 100%  BMI 25.34 kg/m2  Physical Exam   Constitutional: She is oriented to person, place, and time. She appears well-developed and well-nourished.   HENT:   Head: Normocephalic and atraumatic.   Eyes: EOM are normal. Pupils are equal, round, and reactive to light.   Neck: Normal range of motion. Neck supple.   Cardiovascular: Normal rate, regular rhythm and normal heart sounds.    Pulmonary/Chest: Effort normal and breath sounds normal.   Abdominal: Soft. Bowel sounds are normal. She exhibits no distension and no mass. There is tenderness. There is no rebound and no guarding. No hernia.   Musculoskeletal: Normal range of motion.   Neurological: She is alert and oriented to person, place, " and time.   Skin: Skin is warm.   Psychiatric: She has a normal mood and affect. Her behavior is normal. Judgment and thought content normal.                  Results Reviewed:  Lab Results (last 24 hours)     Procedure Component Value Units Date/Time             CBC & Differential [[id number]] Collected:  02/01/18 2043     Specimen:  Blood Updated:  02/01/18 2052     Narrative:        The following orders were created for panel order CBC & Differential.  Procedure                               Abnormality         Status                     ---------                               -----------         ------                     CBC Auto Differential[[id number]]         Abnormal            Final result                  Please view results for these tests on the individual orders.     CBC Auto Differential [[id number]]  (Abnormal) Collected:  02/01/18 2043     Specimen:  Blood Updated:  02/01/18 2052       WBC 10.58 10*3/mm3         RBC 3.94 (L) 10*6/mm3         Hemoglobin 12.5 g/dL         Hematocrit 36.4 (L) %         MCV 92.4 fL         MCH 31.7 pg         MCHC 34.3 g/dL         RDW 12.2 %         RDW-SD 42.0 fl         MPV 9.0 fL         Platelets 220 10*3/mm3         Neutrophil % 56.1 %         Lymphocyte % 32.7 %         Monocyte % 6.2 %         Eosinophil % 4.3 (H) %         Basophil % 0.5 %         Immature Grans % 0.2 %         Neutrophils, Absolute 5.94 10*3/mm3         Lymphocytes, Absolute 3.46 10*3/mm3         Monocytes, Absolute 0.66 10*3/mm3         Eosinophils, Absolute 0.45 10*3/mm3         Basophils, Absolute 0.05 10*3/mm3         Immature Grans, Absolute 0.02 10*3/mm3         nRBC 0.0 /100 WBC       Urine Culture - Urine, Urine, Clean Catch [[id number]] Collected:  02/01/18 2043     Specimen:  Urine from Urine, Clean Catch Updated:  02/01/18 2053     Urinalysis With / Culture If Indicated - Urine, Clean Catch [[id number]]  (Abnormal) Collected:  02/01/18 2043     Specimen:  Urine from Urine, Clean Catch  Updated:  02/01/18 2057       Color, UA Yellow       Appearance, UA Clear       pH, UA 7.0       Specific Gravity, UA 1.012       Glucose, UA Negative       Ketones, UA Negative       Bilirubin, UA Negative       Blood, UA Negative       Protein, UA Negative       Leuk Esterase, UA Small (1+) (A)       Nitrite, UA Negative       Urobilinogen, UA 0.2 E.U./dL     Urinalysis, Microscopic Only - Urine, Clean Catch [[id number]]  (Abnormal) Collected:  02/01/18 2043     Specimen:  Urine from Urine, Clean Catch Updated:  02/01/18 2057       RBC, UA 0-2 (A) /HPF         WBC, UA 0-2 (A) /HPF         Bacteria, UA None Seen /HPF         Squamous Epithelial Cells, UA 0-2 /HPF         Hyaline Casts, UA None Seen /LPF         Methodology Automated Microscopy     Comprehensive Metabolic Panel [[id number]]  (Abnormal) Collected:  02/01/18 2043     Specimen:  Blood Updated:  02/01/18 2101       Glucose 119 (H) mg/dL         BUN 13 mg/dL         Creatinine 0.79 mg/dL         Sodium 141 mmol/L         Potassium 3.8 mmol/L         Chloride 102 mmol/L         CO2 28.0 mmol/L         Calcium 9.6 mg/dL         Total Protein 7.3 g/dL         Albumin 4.20 g/dL         ALT (SGPT) 34 U/L         AST (SGOT) 29 U/L         Alkaline Phosphatase 120 U/L         Total Bilirubin <0.1 (L) mg/dL         eGFR Non African Amer 76 mL/min/1.73         Globulin 3.1 gm/dL         A/G Ratio 1.4 g/dL         BUN/Creatinine Ratio 16.5       Anion Gap 11.0 mmol/L       Lipase [[id number]]  (Abnormal) Collected:  02/01/18 2043     Specimen:  Blood Updated:  02/01/18 2101       Lipase 1184 (H) U/L               Imaging Results (last 24 hours)      ** No results found for the last 24 hours. **          I have personally reviewed and interpreted the radiology studies and ECG obtained at time of admission.      Assessment / Plan      Assessment:       Hospital Problem List      Acute pancreatitis          1.  Acute pancreatitis  2.  Intractable pain  3.  By mouth  intolerance     Plan:       -Admit for further evaluation  -Monitor vitals  -Lipase noted to be elevated  -Monitor renal panel  -Monitor electrolytes  -WBC noted to be within normal range  -No indication for antibiotic at the moment  -Pain management  -Continue IV fluid  -Keep nothing by mouth for now  -Consider GI consult if indicated  -GI prophylaxis  -DVT prophylaxis           Code Status: Full Code      I discussed the patients findings and my recommendations with the Patient's RN                 Estimated length of stay 2-3 days     Romulo Peck MD   02/01/18   9:53 PM                         Routing History       Date/Time From To Method     2/1/2018 10:01 PM MD Darian Brown MD Fax                   Juliana Reyes LPN Licensed Nurse Signed  Plan of Care Date of Service: 2/2/2018  4:25 AM         Problem: Patient Care Overview (Adult)  Goal: Plan of Care Review  Outcome: Ongoing (interventions implemented as appropriate)    02/01/18 2317 02/01/18 2328 02/02/18 0419   Coping/Psychosocial Response Interventions   Plan Of Care Reviewed With --  patient --    Patient Care Overview   Progress no change --  --    Outcome Evaluation   Outcome Summary/Follow up Plan --  --  Pt c/o pain x 1 given PRN Toradol x 1 ordered as one time dose to see how patient tolerated it due to multiple allergies. Patient tolerated it well with no reaction. IVF continues as ordered. Will continue to monitor.      Goal: Adult Individualization and Mutuality  Outcome: Ongoing (interventions implemented as appropriate)    02/01/18 2317   Individualization   Patient Specific Preferences NONE IDENTIFIED AT THIS TIME   Patient Specific Goals NONE IDENTIFIED AT THIS TIME   Patient Specific Interventions NONE IDENTIFIED AT THIS TIME   Mutuality/Individual Preferences   What Anxieties, Fears or Concerns Do You Have About Your Health or Care? NONE IDENTIFIED AT THIS TIME   What Questions Do You Have About Your Health  or Care? NONE IDENTIFIED AT THIS TIME   What Information Would Help Us Give You More Personalized Care? NONE IDENTIFIED AT THIS TIME      Goal: Discharge Needs Assessment  Outcome: Ongoing (interventions implemented as appropriate)    02/01/18 2300 02/01/18 2317   Discharge Needs Assessment   Concerns To Be Addressed --  no discharge needs identified   Concerns Comments --  NA   Readmission Within The Last 30 Days --  no previous admission in last 30 days   Community Agency Name(S) --  NA   Equipment Needed After Discharge --  none   Discharge Facility/Level Of Care Needs --  other (see comments)  (NA)   Current Discharge Risk --  other (see comments)  (NA)   Discharge Disposition --  still a patient   Discharge Planning Comments --  NA   Current Health   Outpatient/Agency/Support Group Needs --  other (see comments)  (NA)   Anticipated Changes Related to Illness --  none   Self-Care   Equipment Currently Used at Home --  none   Living Environment   Transportation Available car;family or friend will provide --          Problem: Pancreatitis, Acute/Chronic (Adult)  Goal: Signs and Symptoms of Listed Potential Problems Will be Absent or Manageable (Pancreatitis, Acute/Chronic)  Outcome: Ongoing (interventions implemented as appropriate)    02/01/18 2317   Pancreatitis, Acute/Chronic   Problems Assessed (Pancreatitis) all   Problems Present (Pancreatitis) pain                                  Hospital Medications (active)       Dose Frequency Start End    amLODIPine (NORVASC) tablet 5 mg 5 mg Daily 2/2/2018     Sig - Route: Take 1 tablet by mouth Daily. - Oral    atorvastatin (LIPITOR) tablet 10 mg 10 mg Nightly 2/1/2018     Sig - Route: Take 1 tablet by mouth Every Night. - Oral    dicyclomine (BENTYL) capsule 10 mg 10 mg 3 Times Daily 2/1/2018     Sig - Route: Take 1 capsule by mouth 3 (Three) Times a Day. - Oral    enoxaparin (LOVENOX) syringe 40 mg 40 mg Every 24 Hours 2/1/2018     Sig - Route: Inject 0.4 mL under  "the skin Daily. - Subcutaneous    famotidine (PEPCID) tablet 40 mg 40 mg Daily 2/2/2018     Sig - Route: Take 2 tablets by mouth Daily. - Oral    fentaNYL citrate (PF) (SUBLIMAZE) injection 50 mcg 50 mcg Once 2/1/2018 2/1/2018    Sig - Route: Infuse 1 mL into a venous catheter 1 (One) Time. - Intravenous    iohexol (OMNIPAQUE) 300 MG/ML injection 50 mL 50 mL Once in Imaging 2/2/2018 2/2/2018    Sig - Route: Infuse 50 mL into a venous catheter Once. - Intravenous    ketorolac (TORADOL) injection 30 mg 30 mg Once 2/1/2018 2/1/2018    Sig - Route: Infuse 30 mg into a venous catheter 1 (One) Time. - Intravenous    ketorolac (TORADOL) injection 30 mg 30 mg Once 2/2/2018 2/2/2018    Sig - Route: Infuse 30 mg into a venous catheter 1 (One) Time. - Intravenous    lisinopril (PRINIVIL,ZESTRIL) tablet 10 mg 10 mg Daily 2/2/2018     Sig - Route: Take 1 tablet by mouth Daily. - Oral    ondansetron (ZOFRAN) injection 4 mg 4 mg Once 2/1/2018 2/1/2018    Sig - Route: Infuse 2 mL into a venous catheter 1 (One) Time. - Intravenous    sodium chloride 0.9 % flush 1-10 mL 1-10 mL As Needed 2/1/2018     Sig - Route: Infuse 1-10 mL into a venous catheter As Needed for Line Care. - Intravenous    sodium chloride 0.9 % flush 10 mL 10 mL As Needed 2/1/2018     Sig - Route: Infuse 10 mL into a venous catheter As Needed for Line Care. - Intravenous    Linked Group 1:  \"And\" Linked Group Details        sodium chloride 0.9 % infusion 1,000 mL 1,000 mL Once 2/1/2018 2/1/2018    Sig - Route: Infuse 1,000 mL into a venous catheter 1 (One) Time. - Intravenous    sodium chloride 0.9 % infusion 100 mL/hr Continuous 2/1/2018     Sig - Route: Infuse 100 mL/hr into a venous catheter Continuous. - Intravenous    fentaNYL citrate (PF) (SUBLIMAZE) injection 25 mcg (Discontinued) 25 mcg Every 1 Hour PRN 2/1/2018 2/1/2018    Sig - Route: Infuse 0.5 mL into a venous catheter Every 1 (One) Hour As Needed for Severe Pain . - Intravenous          "

## 2018-02-02 NOTE — H&P
[facility] Medicine Services  HISTORY AND PHYSICAL    Date of Admission: 2018  Primary Care Physician: Darian Higginbotham MD    Subjective     Chief Complaint: Abdominal tenderness    History of Present Illness       57-year-old female with past medical history of hypothyroidism, hyperlipidemia and hypertension comes into the ER with abdominal pain.  Patient states she has intermittent pains on the upper bilateral region of her abdomen.  Patient had lab works done as outpatient which according to heart showed elevated liver enzymes.  Patient has a GI appointment in 2 weeks.  Today patient states her pain has been progressively getting worse and she is intolerance of food or water.  So she decided to come to the ER for further evaluation.  In the ER patient's lipase was noted to be 1184.  Patient will be admitted for acute pancreatitis as well as by mouth intolerance.  Patient denies any fever, chills, nausea, vomiting, diarrhea, urinary symptoms or any other associated symptoms.        Review of Systems     Otherwise complete ROS reviewed and negative except as mentioned in the HPI.    Past Medical History:   Past Medical History:   Diagnosis Date   • Disease of thyroid gland    • Hyperlipidemia    • Hypertension      Past Surgical History:  Past Surgical History:   Procedure Laterality Date   •  SECTION      x3   • CHOLECYSTECTOMY     • ELBOW DEBRIDEMENT Right    • HAND SURGERY Right    • HERNIA REPAIR      x3     Social History:  reports that she has been smoking Cigarettes.  She has a 10.00 pack-year smoking history. She has never used smokeless tobacco. She reports that she drinks alcohol. She reports that she does not use illicit drugs.    Family History: family history includes Brain cancer in her maternal aunt; Breast cancer in her maternal aunt; Diabetes in her maternal grandmother; Hypertension in her mother. There is no history of Ovarian cancer, Uterine  "cancer, or Colon cancer.      Allergies:  Allergies   Allergen Reactions   • Codeine Shortness Of Breath   • Dilaudid [Hydromorphone Hcl] Anaphylaxis   • Morphine And Related GI Intolerance   • Neosporin [Neomycin-Bacitracin Zn-Polymyx] Rash   • Sulfa Antibiotics Hives   • Loperamide Hcl Other (See Comments)     severe abd. pain     Medications:  Prior to Admission medications    Medication Sig Start Date End Date Taking? Authorizing Provider   amLODIPine (NORVASC) 5 MG tablet Take 5 mg by mouth Daily.   Yes Historical Provider, MD   dicyclomine (BENTYL) 10 MG capsule Take 10 mg by mouth 3 (Three) Times a Day As Needed.   Yes Historical Provider, MD   lisinopril (PRINIVIL,ZESTRIL) 10 MG tablet Take 10 mg by mouth Daily.   Yes Historical Provider, MD   pravastatin (PRAVACHOL) 40 MG tablet Take 40 mg by mouth Daily.   Yes Historical Provider, MD   ibuprofen (ADVIL,MOTRIN) 600 MG tablet Take 1 tablet by mouth Every 8 (Eight) Hours As Needed for Mild Pain (1-3). 7/28/17   DEMIAN Tobin     Objective     Vital Signs: /88  Pulse 71  Temp 98.5 °F (36.9 °C)  Resp 14  Ht 167.6 cm (66\")  Wt 71.2 kg (157 lb)  SpO2 100%  BMI 25.34 kg/m2  Physical Exam   Constitutional: She is oriented to person, place, and time. She appears well-developed and well-nourished.   HENT:   Head: Normocephalic and atraumatic.   Eyes: EOM are normal. Pupils are equal, round, and reactive to light.   Neck: Normal range of motion. Neck supple.   Cardiovascular: Normal rate, regular rhythm and normal heart sounds.    Pulmonary/Chest: Effort normal and breath sounds normal.   Abdominal: Soft. Bowel sounds are normal. She exhibits no distension and no mass. There is tenderness. There is no rebound and no guarding. No hernia.   Musculoskeletal: Normal range of motion.   Neurological: She is alert and oriented to person, place, and time.   Skin: Skin is warm.   Psychiatric: She has a normal mood and affect. Her behavior is normal. " Judgment and thought content normal.             Results Reviewed:  Lab Results (last 24 hours)     Procedure Component Value Units Date/Time    CBC & Differential [[id number]] Collected:  02/01/18 2043    Specimen:  Blood Updated:  02/01/18 2052    Narrative:       The following orders were created for panel order CBC & Differential.  Procedure                               Abnormality         Status                     ---------                               -----------         ------                     CBC Auto Differential[[id number]]         Abnormal            Final result                 Please view results for these tests on the individual orders.    CBC Auto Differential [[id number]]  (Abnormal) Collected:  02/01/18 2043    Specimen:  Blood Updated:  02/01/18 2052     WBC 10.58 10*3/mm3      RBC 3.94 (L) 10*6/mm3      Hemoglobin 12.5 g/dL      Hematocrit 36.4 (L) %      MCV 92.4 fL      MCH 31.7 pg      MCHC 34.3 g/dL      RDW 12.2 %      RDW-SD 42.0 fl      MPV 9.0 fL      Platelets 220 10*3/mm3      Neutrophil % 56.1 %      Lymphocyte % 32.7 %      Monocyte % 6.2 %      Eosinophil % 4.3 (H) %      Basophil % 0.5 %      Immature Grans % 0.2 %      Neutrophils, Absolute 5.94 10*3/mm3      Lymphocytes, Absolute 3.46 10*3/mm3      Monocytes, Absolute 0.66 10*3/mm3      Eosinophils, Absolute 0.45 10*3/mm3      Basophils, Absolute 0.05 10*3/mm3      Immature Grans, Absolute 0.02 10*3/mm3      nRBC 0.0 /100 WBC     Urine Culture - Urine, Urine, Clean Catch [[id number]] Collected:  02/01/18 2043    Specimen:  Urine from Urine, Clean Catch Updated:  02/01/18 2053    Urinalysis With / Culture If Indicated - Urine, Clean Catch [[id number]]  (Abnormal) Collected:  02/01/18 2043    Specimen:  Urine from Urine, Clean Catch Updated:  02/01/18 2057     Color, UA Yellow     Appearance, UA Clear     pH, UA 7.0     Specific Gravity, UA 1.012     Glucose, UA Negative     Ketones, UA Negative     Bilirubin, UA Negative     Blood, UA  Negative     Protein, UA Negative     Leuk Esterase, UA Small (1+) (A)     Nitrite, UA Negative     Urobilinogen, UA 0.2 E.U./dL    Urinalysis, Microscopic Only - Urine, Clean Catch [[id number]]  (Abnormal) Collected:  02/01/18 2043    Specimen:  Urine from Urine, Clean Catch Updated:  02/01/18 2057     RBC, UA 0-2 (A) /HPF      WBC, UA 0-2 (A) /HPF      Bacteria, UA None Seen /HPF      Squamous Epithelial Cells, UA 0-2 /HPF      Hyaline Casts, UA None Seen /LPF      Methodology Automated Microscopy    Comprehensive Metabolic Panel [[id number]]  (Abnormal) Collected:  02/01/18 2043    Specimen:  Blood Updated:  02/01/18 2101     Glucose 119 (H) mg/dL      BUN 13 mg/dL      Creatinine 0.79 mg/dL      Sodium 141 mmol/L      Potassium 3.8 mmol/L      Chloride 102 mmol/L      CO2 28.0 mmol/L      Calcium 9.6 mg/dL      Total Protein 7.3 g/dL      Albumin 4.20 g/dL      ALT (SGPT) 34 U/L      AST (SGOT) 29 U/L      Alkaline Phosphatase 120 U/L      Total Bilirubin <0.1 (L) mg/dL      eGFR Non African Amer 76 mL/min/1.73      Globulin 3.1 gm/dL      A/G Ratio 1.4 g/dL      BUN/Creatinine Ratio 16.5     Anion Gap 11.0 mmol/L     Lipase [[id number]]  (Abnormal) Collected:  02/01/18 2043    Specimen:  Blood Updated:  02/01/18 2101     Lipase 1184 (H) U/L         Imaging Results (last 24 hours)     ** No results found for the last 24 hours. **        I have personally reviewed and interpreted the radiology studies and ECG obtained at time of admission.     Assessment / Plan     Assessment:   Hospital Problem List     Acute pancreatitis        1.  Acute pancreatitis  2.  Intractable pain  3.  By mouth intolerance     Plan:      -Admit for further evaluation  -Monitor vitals  -Lipase noted to be elevated  -Monitor renal panel  -Monitor electrolytes  -WBC noted to be within normal range  -No indication for antibiotic at the moment  -Pain management  -Continue IV fluid  -Keep nothing by mouth for now  -Consider GI consult if  indicated  -GI prophylaxis  -DVT prophylaxis        Code Status: Full Code     I discussed the patients findings and my recommendations with the Patient's RN     Estimated length of stay 2-3 days    Romulo Peck MD   02/01/18   9:53 PM

## 2018-02-02 NOTE — PLAN OF CARE
Problem: Patient Care Overview (Adult)  Goal: Plan of Care Review  Outcome: Ongoing (interventions implemented as appropriate)   02/01/18 2317 02/01/18 2328 02/02/18 0419   Coping/Psychosocial Response Interventions   Plan Of Care Reviewed With --  patient --    Patient Care Overview   Progress no change --  --    Outcome Evaluation   Outcome Summary/Follow up Plan --  --  Pt c/o pain x 1 given PRN Toradol x 1 ordered as one time dose to see how patient tolerated it due to multiple allergies. Patient tolerated it well with no reaction. IVF continues as ordered. Will continue to monitor.     Goal: Adult Individualization and Mutuality  Outcome: Ongoing (interventions implemented as appropriate)   02/01/18 2317   Individualization   Patient Specific Preferences NONE IDENTIFIED AT THIS TIME   Patient Specific Goals NONE IDENTIFIED AT THIS TIME   Patient Specific Interventions NONE IDENTIFIED AT THIS TIME   Mutuality/Individual Preferences   What Anxieties, Fears or Concerns Do You Have About Your Health or Care? NONE IDENTIFIED AT THIS TIME   What Questions Do You Have About Your Health or Care? NONE IDENTIFIED AT THIS TIME   What Information Would Help Us Give You More Personalized Care? NONE IDENTIFIED AT THIS TIME     Goal: Discharge Needs Assessment  Outcome: Ongoing (interventions implemented as appropriate)   02/01/18 2300 02/01/18 2317   Discharge Needs Assessment   Concerns To Be Addressed --  no discharge needs identified   Concerns Comments --  NA   Readmission Within The Last 30 Days --  no previous admission in last 30 days   Community Agency Name(S) --  NA   Equipment Needed After Discharge --  none   Discharge Facility/Level Of Care Needs --  other (see comments)  (NA)   Current Discharge Risk --  other (see comments)  (NA)   Discharge Disposition --  still a patient   Discharge Planning Comments --  NA   Current Health   Outpatient/Agency/Support Group Needs --  other (see comments)  (NA)   Anticipated  Changes Related to Illness --  none   Self-Care   Equipment Currently Used at Home --  none   Living Environment   Transportation Available car;family or friend will provide --        Problem: Pancreatitis, Acute/Chronic (Adult)  Goal: Signs and Symptoms of Listed Potential Problems Will be Absent or Manageable (Pancreatitis, Acute/Chronic)  Outcome: Ongoing (interventions implemented as appropriate)   02/01/18 9074   Pancreatitis, Acute/Chronic   Problems Assessed (Pancreatitis) all   Problems Present (Pancreatitis) pain

## 2018-02-02 NOTE — PROGRESS NOTES
[facility] Medicine Services  INPATIENT PROGRESS NOTE    Length of Stay: 1  Date of Admission: 2/1/2018  Primary Care Physician: Darian Higginbotham MD    Subjective   Chief Complaint: follow up pancreatitis  HPI   Pt admitted over night with complaints of upper abdominal pain. Denies any vomiting. I ordered CT of the abdomen/pelvis which reveals a 16 mm mass at junction of head and body of pancreas. Pt has tolerated coffee. Would like to try clear liquid diet for supper. Denies any changes in her bowel habits. States she has had a colonoscopy in the past. Had an appointment later this month with Dr. Gill.     Review of Systems   All pertinent negatives and positives are as above. All other systems have been reviewed and are negative unless otherwise stated.     Objective    Temp:  [97.6 °F (36.4 °C)-98.5 °F (36.9 °C)] 97.9 °F (36.6 °C)  Heart Rate:  [54-87] 56  Resp:  [14-18] 16  BP: (109-152)/(69-88) 109/82  Physical Exam   Constitutional: She is oriented to person, place, and time. She appears well-developed and well-nourished.   HENT:   Head: Normocephalic and atraumatic.   Eyes: Conjunctivae and EOM are normal. Pupils are equal, round, and reactive to light.   Neck: Neck supple. No JVD present. No thyromegaly present.   Cardiovascular: Normal rate, regular rhythm, normal heart sounds and intact distal pulses.  Exam reveals no gallop and no friction rub.    No murmur heard.  Pulmonary/Chest: Effort normal and breath sounds normal. No respiratory distress. She has no wheezes. She has no rales. She exhibits no tenderness.   Abdominal: Soft. Bowel sounds are normal. She exhibits no distension. There is tenderness (epigastric bilateral upp quadrant tenderness). There is no rebound and no guarding.   Musculoskeletal: Normal range of motion. She exhibits no edema, tenderness or deformity.   Lymphadenopathy:     She has no cervical adenopathy.   Neurological: She is alert and  oriented to person, place, and time. She displays normal reflexes. No cranial nerve deficit. She exhibits normal muscle tone.   Skin: Skin is warm and dry. No rash noted.   Psychiatric: She has a normal mood and affect. Her behavior is normal. Judgment and thought content normal.     Results Review:  I have reviewed the labs, radiology results, and diagnostic studies.    Laboratory Data:     Results from last 7 days  Lab Units 02/02/18  0512 02/01/18  2043   WBC 10*3/mm3 8.00 10.58   HEMOGLOBIN g/dL 12.3 12.5   HEMATOCRIT % 36.6* 36.4*   PLATELETS 10*3/mm3 206 220         Results from last 7 days  Lab Units 02/02/18  0512 02/01/18  2043   SODIUM mmol/L 143 141   POTASSIUM mmol/L 4.6 3.8   CHLORIDE mmol/L 108 102   CO2 mmol/L 28.0 28.0   BUN mg/dL 9 13   CREATININE mg/dL 0.75 0.79   CALCIUM mg/dL 8.9 9.6   BILIRUBIN mg/dL 0.2 0.1  <0.1*   ALK PHOS U/L 90 120   ALT (SGPT) U/L 30 34   AST (SGOT) U/L 25 29   GLUCOSE mg/dL 111* 119*     Culture Data:   Urine Culture   Date Value Ref Range Status   02/01/2018 No growth at 24 hours  Preliminary       Radiology Data:   Imaging Results (last 24 hours)     Procedure Component Value Units Date/Time    CT Abdomen Pelvis With Contrast [[id number]] Collected:  02/02/18 0954     Updated:  02/02/18 1006    Narrative:       CT ABDOMEN PELVIS W CONTRAST- 2/2/2018 9:29 AM CST     HISTORY: Abd pain, fever, abscess suspected       COMPARISON: None.      DOSE LENGTH PRODUCT: 320 mGy cm. Automated exposure control was also  utilized to decrease patient radiation dose.     TECHNIQUE: Following the oral ingestion and intravenous administration  of contrast, helical CT tomographic images of the abdomen and pelvis  were acquired. Multiplanar reformatted images were provided for review.      FINDINGS:   The lung bases and base of the heart are unremarkable.      LIVER: No focal liver lesion. The hepatic vasculature is patent.      BILIARY SYSTEM: The gallbladder is surgically absent..       PANCREAS: A low-density lesions noted in the pancreas. This is between  the body and head of the pancreas. Correlation with endoscopic  ultrasound is suggested. This is best visualized on axial image 24 and  coronal image 20..      SPLEEN: Unremarkable.      KIDNEYS AND ADRENALS: Bilateral kidneys and adrenal glands are  unremarkable. The ureters are decompressed and normal in appearance.     RETROPERITONEUM: No mass, lymphadenopathy or hemorrhage.      GI TRACT: No evidence of obstruction or bowel wall thickening.    .     OTHER: There is no mesenteric mass, lymphadenopathy or fluid collection.  The abdominopelvic vasculature is patent. The osseous structures and  soft tissues demonstrate no worrisome lesions.          PELVIS: The uterus is visualized.. The urinary bladder is normal in  appearance.       Impression:       1. There is a 16 mm low-density lesion in the pancreas at the junction  of the head and body. Neoplasm should be excluded..         This report was finalized on 02/02/2018 10:03 by Dr. Bladimir Chavez MD.          I have reviewed the patient current medications.     Assessment/Plan   Assessment:  1. Acute Pancreatitis  2. 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  Recent diagnosis of elevated liver function tests.   4.  Tobacco abuse-ongoing  5.  Upper abdominal pain    Plan:  1.  Will check Ca19-9   2.  Discussed findings of CT with patient. She ultimately needs an endoscopic ultrasound with biopsies. Nursing will attempt to secure appointment some time next week  3. Clear liquids for supper.   4. Repeat labs in am.     Discharge Planning: I expect the patient to be discharged to home in ? days.  Discussed above with NP and agree with plan as outlined.  MD Deanna Stapleton, APRN   02/02/18   12:28 PM

## 2018-02-02 NOTE — PLAN OF CARE
Problem: Patient Care Overview (Adult)  Goal: Plan of Care Review  Outcome: Ongoing (interventions implemented as appropriate)   02/01/18 2317   Coping/Psychosocial Response Interventions   Plan Of Care Reviewed With patient   Patient Care Overview   Progress no change   Outcome Evaluation   Outcome Summary/Follow up Plan PT ADMITTED WITH PANCREATITIS. MULTIPLE DRUG ALLERGIES. LIPASE 1184. NPO.     Goal: Adult Individualization and Mutuality  Outcome: Ongoing (interventions implemented as appropriate)   02/01/18 2317   Individualization   Patient Specific Preferences NONE IDENTIFIED AT THIS TIME   Patient Specific Goals NONE IDENTIFIED AT THIS TIME   Patient Specific Interventions NONE IDENTIFIED AT THIS TIME   Mutuality/Individual Preferences   What Anxieties, Fears or Concerns Do You Have About Your Health or Care? NONE IDENTIFIED AT THIS TIME   What Questions Do You Have About Your Health or Care? NONE IDENTIFIED AT THIS TIME   What Information Would Help Us Give You More Personalized Care? NONE IDENTIFIED AT THIS TIME     Goal: Discharge Needs Assessment  Outcome: Ongoing (interventions implemented as appropriate)   02/01/18 2300 02/01/18 2317   Discharge Needs Assessment   Concerns To Be Addressed --  no discharge needs identified   Concerns Comments --  NA   Readmission Within The Last 30 Days --  no previous admission in last 30 days   Community Agency Name(S) --  NA   Equipment Needed After Discharge --  none   Discharge Facility/Level Of Care Needs --  other (see comments)  (NA)   Current Discharge Risk --  other (see comments)  (NA)   Discharge Disposition --  still a patient   Discharge Planning Comments --  NA   Current Health   Outpatient/Agency/Support Group Needs --  other (see comments)  (NA)   Anticipated Changes Related to Illness --  none   Self-Care   Equipment Currently Used at Home --  none   Living Environment   Transportation Available car;family or friend will provide --        Problem:  Pancreatitis, Acute/Chronic (Adult)  Goal: Signs and Symptoms of Listed Potential Problems Will be Absent or Manageable (Pancreatitis, Acute/Chronic)  Outcome: Ongoing (interventions implemented as appropriate)   02/01/18 2598   Pancreatitis, Acute/Chronic   Problems Assessed (Pancreatitis) all   Problems Present (Pancreatitis) pain

## 2018-02-02 NOTE — PLAN OF CARE
Problem: Patient Care Overview (Adult)  Goal: Plan of Care Review  Outcome: Ongoing (interventions implemented as appropriate)   02/02/18 1627   Coping/Psychosocial Response Interventions   Plan Of Care Reviewed With patient   Patient Care Overview   Progress no change   Outcome Evaluation   Outcome Summary/Follow up Plan Pt c/o abdominal pain x1 and medicated with one time dose toradol. No other c/o pain. Lipase 693 today. Ct showed 16mm low density lesion in pancreas. EUS and biopsies needed; Angelica JUSTIN notified and will call patient on Monday with appointment. VSS. Continue to monitor.      Goal: Adult Individualization and Mutuality  Outcome: Ongoing (interventions implemented as appropriate)    Goal: Discharge Needs Assessment  Outcome: Ongoing (interventions implemented as appropriate)      Problem: Pancreatitis, Acute/Chronic (Adult)  Goal: Signs and Symptoms of Listed Potential Problems Will be Absent or Manageable (Pancreatitis, Acute/Chronic)  Outcome: Ongoing (interventions implemented as appropriate)

## 2018-02-02 NOTE — ED PROVIDER NOTES
Subjective patient is a 57-year-old female who presents to the ER with abdominal pain.  Patient states she has been having sharp bilateral upper quadrant and epigastric abdominal pain for the last 4 weeks, progressively worsening.  Patient states she usually wakes up in the morning and has no pain and then after she eats breakfast the pain starts and persists throughout the entire day, worsening with any meal.  Patient saw her PCP and she was called at home today and told her liver enzymes were elevated.  Patient made a follow-up appointment with GI in 2 weeks.  Patient states the pain was just unbearable so she decided to come here for evaluation.  Patient denies any fever, chest pain, shortness of air, nausea vomiting diarrhea, urinary changes, neurological changes.    History provided by:  Patient   used: No        Review of Systems   Constitutional: Negative.    HENT: Negative.    Eyes: Negative.    Respiratory: Negative.    Cardiovascular: Negative.    Gastrointestinal: Positive for abdominal pain.   Endocrine: Negative.    Genitourinary: Negative.    Musculoskeletal: Negative.    Skin: Negative.    Allergic/Immunologic: Negative.    Neurological: Negative.    Hematological: Negative.    Psychiatric/Behavioral: Negative.    All other systems reviewed and are negative.      Past Medical History:   Diagnosis Date   • Disease of thyroid gland    • Hyperlipidemia    • Hypertension        Allergies   Allergen Reactions   • Codeine Shortness Of Breath   • Dilaudid [Hydromorphone Hcl] Anaphylaxis   • Morphine And Related GI Intolerance   • Neosporin [Neomycin-Bacitracin Zn-Polymyx] Rash   • Sulfa Antibiotics Hives   • Loperamide Hcl Other (See Comments)     severe abd. pain       Past Surgical History:   Procedure Laterality Date   •  SECTION      x3   • CHOLECYSTECTOMY     • ELBOW DEBRIDEMENT Right    • HAND SURGERY Right    • HERNIA REPAIR      x3       Family History   Problem Relation  Age of Onset   • Hypertension Mother    • Diabetes Maternal Grandmother    • Breast cancer Maternal Aunt    • Brain cancer Maternal Aunt    • Ovarian cancer Neg Hx    • Uterine cancer Neg Hx    • Colon cancer Neg Hx        Social History     Social History   • Marital status: Single     Spouse name: N/A   • Number of children: N/A   • Years of education: N/A     Social History Main Topics   • Smoking status: Current Every Day Smoker     Packs/day: 1.00     Years: 10.00     Types: Cigarettes   • Smokeless tobacco: Never Used   • Alcohol use Yes      Comment: rarely   • Drug use: No   • Sexual activity: No     Other Topics Concern   • None     Social History Narrative           Objective   Physical Exam   Constitutional: She is oriented to person, place, and time. She appears well-developed and well-nourished.   HENT:   Head: Normocephalic and atraumatic.   Eyes: Conjunctivae are normal. Pupils are equal, round, and reactive to light.   Neck: Normal range of motion.   Cardiovascular: Normal rate, regular rhythm and normal heart sounds.    Pulmonary/Chest: Effort normal and breath sounds normal.   Abdominal: Soft. There is tenderness in the right upper quadrant, epigastric area and left upper quadrant. There is no rigidity, no rebound, no guarding and no CVA tenderness.   Musculoskeletal: Normal range of motion. She exhibits no edema or deformity.   Neurological: She is alert and oriented to person, place, and time. She has normal strength.   Skin: Skin is warm.   Psychiatric: She has a normal mood and affect. Her behavior is normal.   Nursing note and vitals reviewed.      Procedures         ED Course  ED Course      Patient was given IV fluids, fentanyl and Zofran.  ECG:Sinus bradycardia with a rate of 57, no acute ischemia or infarction    Lab Results (last 24 hours)     Procedure Component Value Units Date/Time    CBC & Differential [[id number]] Collected:  02/01/18 2043    Specimen:  Blood Updated:  02/01/18 2052     Narrative:       The following orders were created for panel order CBC & Differential.  Procedure                               Abnormality         Status                     ---------                               -----------         ------                     CBC Auto Differential[[id number]]         Abnormal            Final result                 Please view results for these tests on the individual orders.    Comprehensive Metabolic Panel [[id number]]  (Abnormal) Collected:  02/01/18 2043    Specimen:  Blood Updated:  02/01/18 2101     Glucose 119 (H) mg/dL      BUN 13 mg/dL      Creatinine 0.79 mg/dL      Sodium 141 mmol/L      Potassium 3.8 mmol/L      Chloride 102 mmol/L      CO2 28.0 mmol/L      Calcium 9.6 mg/dL      Total Protein 7.3 g/dL      Albumin 4.20 g/dL      ALT (SGPT) 34 U/L      AST (SGOT) 29 U/L      Alkaline Phosphatase 120 U/L      Total Bilirubin <0.1 (L) mg/dL      eGFR Non African Amer 76 mL/min/1.73      Globulin 3.1 gm/dL      A/G Ratio 1.4 g/dL      BUN/Creatinine Ratio 16.5     Anion Gap 11.0 mmol/L     Lipase [[id number]]  (Abnormal) Collected:  02/01/18 2043    Specimen:  Blood Updated:  02/01/18 2101     Lipase 1184 (H) U/L     Urinalysis With / Culture If Indicated - Urine, Clean Catch [[id number]]  (Abnormal) Collected:  02/01/18 2043    Specimen:  Urine from Urine, Clean Catch Updated:  02/01/18 2057     Color, UA Yellow     Appearance, UA Clear     pH, UA 7.0     Specific Gravity, UA 1.012     Glucose, UA Negative     Ketones, UA Negative     Bilirubin, UA Negative     Blood, UA Negative     Protein, UA Negative     Leuk Esterase, UA Small (1+) (A)     Nitrite, UA Negative     Urobilinogen, UA 0.2 E.U./dL    CBC Auto Differential [[id number]]  (Abnormal) Collected:  02/01/18 2043    Specimen:  Blood Updated:  02/01/18 2052     WBC 10.58 10*3/mm3      RBC 3.94 (L) 10*6/mm3      Hemoglobin 12.5 g/dL      Hematocrit 36.4 (L) %      MCV 92.4 fL      MCH 31.7 pg      MCHC 34.3 g/dL      RDW  12.2 %      RDW-SD 42.0 fl      MPV 9.0 fL      Platelets 220 10*3/mm3      Neutrophil % 56.1 %      Lymphocyte % 32.7 %      Monocyte % 6.2 %      Eosinophil % 4.3 (H) %      Basophil % 0.5 %      Immature Grans % 0.2 %      Neutrophils, Absolute 5.94 10*3/mm3      Lymphocytes, Absolute 3.46 10*3/mm3      Monocytes, Absolute 0.66 10*3/mm3      Eosinophils, Absolute 0.45 10*3/mm3      Basophils, Absolute 0.05 10*3/mm3      Immature Grans, Absolute 0.02 10*3/mm3      nRBC 0.0 /100 WBC     Urine Culture - Urine, Urine, Clean Catch [[id number]] Collected:  02/01/18 2043    Specimen:  Urine from Urine, Clean Catch Updated:  02/01/18 2053    Urinalysis, Microscopic Only - Urine, Clean Catch [[id number]]  (Abnormal) Collected:  02/01/18 2043    Specimen:  Urine from Urine, Clean Catch Updated:  02/01/18 2057     RBC, UA 0-2 (A) /HPF      WBC, UA 0-2 (A) /HPF      Bacteria, UA None Seen /HPF      Squamous Epithelial Cells, UA 0-2 /HPF      Hyaline Casts, UA None Seen /LPF      Methodology Automated Microscopy        Labs showed an elevated lipase.  Workup consistent with acute pancreatitis.  Patient was then admitted to the hospitalist service by Dr. Peck for further workup and treatment.            MDM  Number of Diagnoses or Management Options      Final diagnoses:   Acute pancreatitis, unspecified complication status, unspecified pancreatitis type            Shobha Mcintosh MD  02/01/18 0841

## 2018-02-03 LAB
AMYLASE SERPL-CCNC: 70 U/L (ref 30–110)
BACTERIA SPEC AEROBE CULT: ABNORMAL
BACTERIA SPEC AEROBE CULT: ABNORMAL
CANCER AG19-9 SERPL-ACNC: 134 U/ML (ref 0–35)
CRP SERPL-MCNC: <0.5 MG/DL (ref 0–0.99)
LIPASE SERPL-CCNC: 359 U/L (ref 23–203)

## 2018-02-03 PROCEDURE — 83690 ASSAY OF LIPASE: CPT | Performed by: NURSE PRACTITIONER

## 2018-02-03 PROCEDURE — 25010000002 ENOXAPARIN PER 10 MG: Performed by: FAMILY MEDICINE

## 2018-02-03 PROCEDURE — 86140 C-REACTIVE PROTEIN: CPT | Performed by: NURSE PRACTITIONER

## 2018-02-03 PROCEDURE — 82150 ASSAY OF AMYLASE: CPT | Performed by: NURSE PRACTITIONER

## 2018-02-03 RX ADMIN — OXYCODONE HYDROCHLORIDE AND ACETAMINOPHEN 1 TABLET: 5; 325 TABLET ORAL at 00:21

## 2018-02-03 RX ADMIN — DICYCLOMINE HYDROCHLORIDE 10 MG: 10 CAPSULE ORAL at 21:13

## 2018-02-03 RX ADMIN — ENOXAPARIN SODIUM 40 MG: 40 INJECTION SUBCUTANEOUS at 00:21

## 2018-02-03 RX ADMIN — DICYCLOMINE HYDROCHLORIDE 10 MG: 10 CAPSULE ORAL at 16:19

## 2018-02-03 RX ADMIN — SODIUM CHLORIDE 100 ML/HR: 9 INJECTION, SOLUTION INTRAVENOUS at 00:21

## 2018-02-03 RX ADMIN — OXYCODONE HYDROCHLORIDE AND ACETAMINOPHEN 1 TABLET: 5; 325 TABLET ORAL at 18:53

## 2018-02-03 RX ADMIN — FAMOTIDINE 40 MG: 20 TABLET, FILM COATED ORAL at 08:50

## 2018-02-03 RX ADMIN — AMLODIPINE BESYLATE 5 MG: 5 TABLET ORAL at 12:25

## 2018-02-03 RX ADMIN — LISINOPRIL 10 MG: 10 TABLET ORAL at 12:24

## 2018-02-03 RX ADMIN — ATORVASTATIN CALCIUM 10 MG: 10 TABLET, FILM COATED ORAL at 21:13

## 2018-02-03 RX ADMIN — SODIUM CHLORIDE 100 ML/HR: 9 INJECTION, SOLUTION INTRAVENOUS at 12:23

## 2018-02-03 RX ADMIN — DICYCLOMINE HYDROCHLORIDE 10 MG: 10 CAPSULE ORAL at 08:50

## 2018-02-03 NOTE — PLAN OF CARE
Problem: Patient Care Overview (Adult)  Goal: Plan of Care Review  Outcome: Ongoing (interventions implemented as appropriate)   02/03/18 1347   Coping/Psychosocial Response Interventions   Plan Of Care Reviewed With patient   Patient Care Overview   Progress improving   Outcome Evaluation   Outcome Summary/Follow up Plan amylase 70, lipase 359. advanced to full liquids. up ad ross.      Goal: Adult Individualization and Mutuality  Outcome: Ongoing (interventions implemented as appropriate)    Goal: Discharge Needs Assessment  Outcome: Ongoing (interventions implemented as appropriate)   02/03/18 1347   Discharge Needs Assessment   Discharge Disposition still a patient       Problem: Pancreatitis, Acute/Chronic (Adult)  Goal: Signs and Symptoms of Listed Potential Problems Will be Absent or Manageable (Pancreatitis, Acute/Chronic)  Outcome: Ongoing (interventions implemented as appropriate)   02/03/18 1347   Pancreatitis, Acute/Chronic   Problems Assessed (Pancreatitis) all   Problems Present (Pancreatitis) none

## 2018-02-03 NOTE — PAYOR COMM NOTE
"2/3/18 CLINICAL UPDATE  [id number]  UR PHONE    [phone]    Evert Smith (57 y.o. Female)     Date of Birth Social Security Number Address Home Phone MRN    1960  [address] [place] [id number] [phone] [id number]    Zoroastrian Marital Status          None Single       Admission Date Admission Type Admitting Provider Attending Provider Department, Room/Bed    2/1/18 Emergency Bautista Yanez DO [place], Ali Janell, DO [facility] 3C, 378/1    Discharge Date Discharge Disposition Discharge Destination                      Attending Provider: Bautista Yanez DO     Allergies:  Codeine, Dilaudid [Hydromorphone Hcl], Morphine And Related, Neosporin [Neomycin-bacitracin Zn-polymyx], Sulfa Antibiotics, Loperamide Hcl    Isolation:  None   Infection:  None   Code Status:  FULL    Ht:  167.6 cm (66\")   Wt:  73.2 kg (161 lb 4.8 oz)    Admission Cmt:  None   Principal Problem:  None                Active Insurance as of 2/1/2018     Primary Coverage     Payor Plan Insurance Group Employer/Plan Group    [employer] BLUE [employer] [facility] [employer] BLUE SHIELD PPO [id number]     Payor Plan Address Payor Plan Phone Number Effective From Effective To    [address] [phone] 1/1/2017     [place] [address]       Subscriber Name Subscriber Birth Date Member ID       EVERT SMITH 1960 [id number]                 Emergency Contacts      (Rel.) Home Phone Work Phone Mobile Phone    Ankur Chairez (Son) -- -- [phone]            Vital Signs (last 24 hours)       02/02 0700  -  02/03 0659 02/03 0700  -  02/03 1343   Most Recent    Temp (°F) 97.6 -  98.4    97.7 -  98.2     97.7 (36.5)    Heart Rate (!)48 -  58    51 -  56     56    Resp 16 -  18      16     16    BP 95/54 -  140/77    105/67 -  120/100     120/100    SpO2 (%) 97 -  100    99 -  100     100          Intake & Output (last day)       02/02 0701 - 02/03 0700 02/03 0701 - 02/04 0700 "    P.O. 840     I.V. (mL/kg) 2066.4 (28.2) 1129 (15.4)    Total Intake(mL/kg) 2906.4 (39.7) 1129 (15.4)    Urine (mL/kg/hr) 200 (0.1)     Total Output 200      Net +2706.4 +1129          Unmeasured Urine Occurrence 3 x         Lines, Drains & Airways    Active LDAs     Name:   Placement date:   Placement time:   Site:   Days:    Peripheral IV Line - Single Lumen 02/01/18 2037 median cubital vein (antecubital fossa), left 20 gauge;1 in length  02/01/18 2037      1                Hospital Medications (active)       Dose Frequency Start End    amLODIPine (NORVASC) tablet 5 mg 5 mg Daily 2/2/2018     Sig - Route: Take 1 tablet by mouth Daily. - Oral    atorvastatin (LIPITOR) tablet 10 mg 10 mg Nightly 2/1/2018     Sig - Route: Take 1 tablet by mouth Every Night. - Oral    dicyclomine (BENTYL) capsule 10 mg 10 mg 3 Times Daily 2/1/2018     Sig - Route: Take 1 capsule by mouth 3 (Three) Times a Day. - Oral    enoxaparin (LOVENOX) syringe 40 mg 40 mg Every 24 Hours 2/1/2018     Sig - Route: Inject 0.4 mL under the skin Daily. - Subcutaneous    famotidine (PEPCID) tablet 40 mg 40 mg Daily 2/2/2018     Sig - Route: Take 2 tablets by mouth Daily. - Oral    ketorolac (TORADOL) injection 30 mg 30 mg Every 6 Hours PRN 2/2/2018 2/5/2018    Sig - Route: Infuse 30 mg into a venous catheter Every 6 (Six) Hours As Needed for Severe Pain . - Intravenous    lisinopril (PRINIVIL,ZESTRIL) tablet 10 mg 10 mg Daily 2/2/2018     Sig - Route: Take 1 tablet by mouth Daily. - Oral    oxyCODONE-acetaminophen (PERCOCET) 5-325 MG per tablet 1 tablet 1 tablet Every 4 Hours PRN 2/2/2018 2/12/2018    Sig - Route: Take 1 tablet by mouth Every 4 (Four) Hours As Needed for Moderate Pain . - Oral    sodium chloride 0.9 % flush 1-10 mL 1-10 mL As Needed 2/1/2018     Sig - Route: Infuse 1-10 mL into a venous catheter As Needed for Line Care. - Intravenous    sodium chloride 0.9 % flush 10 mL 10 mL As Needed 2/1/2018     Sig - Route: Infuse 10 mL into a  "venous catheter As Needed for Line Care. - Intravenous    Linked Group 1:  \"And\" Linked Group Details        sodium chloride 0.9 % infusion 100 mL/hr Continuous 2/1/2018     Sig - Route: Infuse 100 mL/hr into a venous catheter Continuous. - Intravenous          Lab Results (last 24 hours)     Procedure Component Value Units Date/Time    Cancer Antigen 19-9 [[id number]]  (Abnormal) Collected:  02/02/18 1422    Specimen:  Blood Updated:  02/03/18 0415     CA 19-9 134 (H) U/mL       Roche ECLIA methodology       Narrative:       Performed at:  01 - [address]  [id number]  : Ruben Zamorano PhD, Phone:  [phone]    Lipase [[id number]]  (Abnormal) Collected:  02/03/18 0607    Specimen:  Blood Updated:  02/03/18 0708     Lipase 359 (H) U/L     C-reactive Protein [[id number]]  (Normal) Collected:  02/03/18 0607    Specimen:  Blood Updated:  02/03/18 0708     C-Reactive Protein <0.50 mg/dL     Amylase [[id number]]  (Normal) Collected:  02/03/18 0607    Specimen:  Blood Updated:  02/03/18 0708     Amylase 70 U/L     Urine Culture - Urine, Urine, Clean Catch [[id number]]  (Abnormal) Collected:  02/01/18 2043    Specimen:  Urine from Urine, Clean Catch Updated:  02/03/18 1158     Urine Culture --      <10,000 CFU/mL Mixed Gram Positive Rhonda (A)    Narrative:       Probable contaminant.        Imaging Results (last 24 hours)     Procedure Component Value Units Date/Time    US Abdomen Complete [[id number]] Collected:  02/02/18 1445     Updated:  02/02/18 1452    Narrative:       ULTRASOUND ABDOMEN COMPLETE 2/2/2018 1:29 PM CST     REASON FOR EXAM: Acute pancreatitis; K85.90-Acute pancreatitis without  necrosis or infection, unspecified       COMPARISON: CT dated 02/02/2018      TECHNIQUE: Multiple longitudinal and transverse real-time sonographic  images of the abdomen are obtained.      FINDINGS:       LIVER: Normal in size, smooth in contour, and homogeneous in  echogenicity. No focal " lesion is seen.     BILIARY: The gallbladder has been removed. The common bile duct measures  0.6 cm in diameter. There is no evidence of intrahepatic ductal  dilatation.       KIDNEYS: The right and left kidneys are normal in size, shape,  orientation, and position measuring 10.3 x 4.9 x 4.9 cm and 11.4 x 4.3 x  4.6 cm, respectively. The corticomedullary differentiation is  maintained. There is no evidence of nephrolithiasis, hydronephrosis or  perinephric fluid collection. No renal mass is seen. No hydroureter is  seen.     PANCREAS: The pancreatic duct is dilated at 0.5 cm. The lesion seen on  CT is not visualized by transabdominal ultrasound.      SPLEEN: Normal in size and appearance.      OTHER: No ascites is present. The visualized IVC and aorta are normal in  appearance.       Impression:       1. Pancreatic ductal dilation. The mass seen on the previous CT would be  better evaluated with endoscopic ultrasound, which is performed by  gastroenterologist. This lesion is highly concerning for adenocarcinoma  of the pancreas based on the CT findings. Focal pancreatitis could also  cause this appearance.  2. GI consultation is recommended.     This report was finalized on 02/02/2018 14:49 by Dr. Eric Melgar MD.        Orders (last 24 hrs)     Start     Ordered    02/04/18 0600  Lipase  Morning Draw      02/03/18 1040    02/04/18 0600  Amylase  Morning Draw      02/03/18 1040    02/03/18 0950  Diet Full Liquid  Diet Effective Now      02/03/18 0950    02/03/18 0600  Lipase  Morning Draw      02/02/18 1248    02/03/18 0600  C-reactive Protein  Morning Draw      02/02/18 1248    02/03/18 0600  Amylase  Morning Draw      02/02/18 1248    02/02/18 1304  oxyCODONE-acetaminophen (PERCOCET) 5-325 MG per tablet 1 tablet  Every 4 Hours PRN      02/02/18 1304    02/02/18 1244  ketorolac (TORADOL) injection 30 mg  Every 6 Hours PRN      02/02/18 1244    02/02/18 0900  amLODIPine (NORVASC) tablet 5 mg  Daily      02/01/18  2249 02/02/18 0900  lisinopril (PRINIVIL,ZESTRIL) tablet 10 mg  Daily      02/01/18 2249 02/02/18 0900  famotidine (PEPCID) tablet 40 mg  Daily      02/01/18 2251 02/01/18 2330  dicyclomine (BENTYL) capsule 10 mg  3 Times Daily      02/01/18 2249 02/01/18 2330  enoxaparin (LOVENOX) syringe 40 mg  Every 24 Hours      02/01/18 2251 02/01/18 2330  sodium chloride 0.9 % infusion  Continuous      02/01/18 2251 02/01/18 2300  atorvastatin (LIPITOR) tablet 10 mg  Nightly      02/01/18 2249 02/01/18 2250  sodium chloride 0.9 % flush 1-10 mL  As Needed      02/01/18 2251 02/01/18 2020  sodium chloride 0.9 % flush 10 mL  As Needed      02/01/18 2021    --  dicyclomine (BENTYL) 10 MG capsule  3 Times Daily PRN      02/01/18 1902    --  SCANNED EKG      02/02/18 0000             Physician Progress Notes (last 24 hours) (Notes from 2/2/2018  1:43 PM through 2/3/2018  1:43 PM)      DEMIAN Shaw at 2/3/2018  9:51 AM  Version 1 of 1             [facility] Medicine Services  INPATIENT PROGRESS NOTE    Length of Stay: 2  Date of Admission: 2/1/2018  Primary Care Physician: Darian Higginbotham MD    Subjective   Chief Complaint: follow up abdominal pain/pancreatic lesion  HPI   Pt states norco is controlling pain. Has tolerated clear liquids. No bowel movement but is passing flatus. Denies any chest pain.     Review of Systems   All pertinent negatives and positives are as above. All other systems have been reviewed and are negative unless otherwise stated.     Objective    Temp:  [97.6 °F (36.4 °C)-98.4 °F (36.9 °C)] 98.2 °F (36.8 °C)  Heart Rate:  [48-58] 51  Resp:  [16-18] 16  BP: ()/(51-82) 105/67  Physical Exam   Constitutional: She is oriented to person, place, and time. She appears well-developed and well-nourished.   HENT:   Head: Normocephalic and atraumatic.   Eyes: Conjunctivae and EOM are normal. Pupils are equal, round, and reactive to light.    Neck: Neck supple. No JVD present. No thyromegaly present.   Cardiovascular: Normal rate, regular rhythm, normal heart sounds and intact distal pulses.  Exam reveals no gallop and no friction rub.    No murmur heard.  Pulmonary/Chest: Effort normal and breath sounds normal. No respiratory distress. She has no wheezes. She has no rales. She exhibits no tenderness.   Abdominal: Soft. Bowel sounds are normal. She exhibits no distension. There is tenderness (mild tenderness epigatric region. ). There is no rebound and no guarding.   Musculoskeletal: Normal range of motion. She exhibits no edema, tenderness or deformity.   Lymphadenopathy:     She has no cervical adenopathy.   Neurological: She is alert and oriented to person, place, and time. She displays normal reflexes. No cranial nerve deficit. She exhibits normal muscle tone.   Skin: Skin is warm and dry. No rash noted.   Psychiatric: She has a normal mood and affect. Her behavior is normal. Judgment and thought content normal.     Results Review:  I have reviewed the labs, radiology results, and diagnostic studies.    Laboratory Data:     Results from last 7 days  Lab Units 02/02/18  0512 02/01/18  2043   WBC 10*3/mm3 8.00 10.58   HEMOGLOBIN g/dL 12.3 12.5   HEMATOCRIT % 36.6* 36.4*   PLATELETS 10*3/mm3 206 220       Results from last 7 days  Lab Units 02/02/18  0512 02/01/18  2043   SODIUM mmol/L 143 141   POTASSIUM mmol/L 4.6 3.8   CHLORIDE mmol/L 108 102   CO2 mmol/L 28.0 28.0   BUN mg/dL 9 13   CREATININE mg/dL 0.75 0.79   CALCIUM mg/dL 8.9 9.6   BILIRUBIN mg/dL 0.2 0.1  <0.1*   ALK PHOS U/L 90 120   ALT (SGPT) U/L 30 34   AST (SGOT) U/L 25 29   GLUCOSE mg/dL 111* 119*     Culture Data:   Urine Culture   Date Value Ref Range Status   02/01/2018 No growth at 24 hours  Preliminary       Radiology Data:   Imaging Results (last 24 hours)     Procedure Component Value Units Date/Time    CT Abdomen Pelvis With Contrast [[id number]] Collected:  02/02/18 0954      Updated:  02/02/18 1006    Narrative:       CT ABDOMEN PELVIS W CONTRAST- 2/2/2018 9:29 AM CST     HISTORY: Abd pain, fever, abscess suspected       COMPARISON: None.      DOSE LENGTH PRODUCT: 320 mGy cm. Automated exposure control was also  utilized to decrease patient radiation dose.     TECHNIQUE: Following the oral ingestion and intravenous administration  of contrast, helical CT tomographic images of the abdomen and pelvis  were acquired. Multiplanar reformatted images were provided for review.      FINDINGS:   The lung bases and base of the heart are unremarkable.      LIVER: No focal liver lesion. The hepatic vasculature is patent.      BILIARY SYSTEM: The gallbladder is surgically absent..      PANCREAS: A low-density lesions noted in the pancreas. This is between  the body and head of the pancreas. Correlation with endoscopic  ultrasound is suggested. This is best visualized on axial image 24 and  coronal image 20..      SPLEEN: Unremarkable.      KIDNEYS AND ADRENALS: Bilateral kidneys and adrenal glands are  unremarkable. The ureters are decompressed and normal in appearance.     RETROPERITONEUM: No mass, lymphadenopathy or hemorrhage.      GI TRACT: No evidence of obstruction or bowel wall thickening.    .     OTHER: There is no mesenteric mass, lymphadenopathy or fluid collection.  The abdominopelvic vasculature is patent. The osseous structures and  soft tissues demonstrate no worrisome lesions.          PELVIS: The uterus is visualized.. The urinary bladder is normal in  appearance.       Impression:       1. There is a 16 mm low-density lesion in the pancreas at the junction  of the head and body. Neoplasm should be excluded..         This report was finalized on 02/02/2018 10:03 by Dr. Bladimir Chavez MD.    US Abdomen Complete [[id number]] Collected:  02/02/18 1445     Updated:  02/02/18 1452    Narrative:       ULTRASOUND ABDOMEN COMPLETE 2/2/2018 1:29 PM CST     REASON FOR EXAM: Acute pancreatitis;  K85.90-Acute pancreatitis without  necrosis or infection, unspecified       COMPARISON: CT dated 02/02/2018      TECHNIQUE: Multiple longitudinal and transverse real-time sonographic  images of the abdomen are obtained.      FINDINGS:       LIVER: Normal in size, smooth in contour, and homogeneous in  echogenicity. No focal lesion is seen.     BILIARY: The gallbladder has been removed. The common bile duct measures  0.6 cm in diameter. There is no evidence of intrahepatic ductal  dilatation.       KIDNEYS: The right and left kidneys are normal in size, shape,  orientation, and position measuring 10.3 x 4.9 x 4.9 cm and 11.4 x 4.3 x  4.6 cm, respectively. The corticomedullary differentiation is  maintained. There is no evidence of nephrolithiasis, hydronephrosis or  perinephric fluid collection. No renal mass is seen. No hydroureter is  seen.     PANCREAS: The pancreatic duct is dilated at 0.5 cm. The lesion seen on  CT is not visualized by transabdominal ultrasound.      SPLEEN: Normal in size and appearance.      OTHER: No ascites is present. The visualized IVC and aorta are normal in  appearance.       Impression:       1. Pancreatic ductal dilation. The mass seen on the previous CT would be  better evaluated with endoscopic ultrasound, which is performed by  gastroenterologist. This lesion is highly concerning for adenocarcinoma  of the pancreas based on the CT findings. Focal pancreatitis could also  cause this appearance.  2. GI consultation is recommended.     This report was finalized on 02/02/2018 14:49 by Dr. Eric Melgar MD.          I have reviewed the patient current medications.     Assessment/Plan   Assessment:  1. Acute Pancreatitis-likely secondary to mass  2. 16 mm mass at junction of head and body of pancreas with associated pancreatic duct dilation  3.  Recent diagnosis of elevated liver function tests.   4.  Tobacco abuse-ongoing  5.  Upper abdominal pain    Plan:  1. Advance to full liquids.    2. Pt tells me she has appointment on Wednesday 2/7/2018 for EUS.   3. If she tolerates full liquids will advance to low fat diet for supper.   4. Repeat labs in am.    Discharge Planning: I expect the patient to be discharged to home in 1 days.    DEMIAN Shaw   02/03/18   9:51 AM     Electronically signed by DEMIAN Shaw at 2/3/2018 10:39 AM        Consult Notes (last 24 hours) (Notes from 2/2/2018  1:43 PM through 2/3/2018  1:43 PM)     No notes of this type exist for this encounter.        Juliana Reyes LPN Licensed Nurse Signed  Plan of Care Date of Service: 2/3/2018  4:28 AM         Problem: Patient Care Overview (Adult)  Goal: Plan of Care Review  Outcome: Ongoing (interventions implemented as appropriate)    02/02/18 1627 02/02/18 2000 02/03/18 0426   Coping/Psychosocial Response Interventions   Plan Of Care Reviewed With --  patient --    Patient Care Overview   Progress no change --  --    Outcome Evaluation   Outcome Summary/Follow up Plan --  --  C/o pain x 1 given PRN pain meds x 1 as ordered. IVF continues as ordered. Up ad ross. Voiding. Will continue to monitor.      Goal: Adult Individualization and Mutuality  Outcome: Ongoing (interventions implemented as appropriate)    02/02/18 1627   Individualization   Patient Specific Preferences Prefers door closed   Patient Specific Goals To be discharged   Patient Specific Interventions PRN toradol/pain pill   Mutuality/Individual Preferences   What Anxieties, Fears or Concerns Do You Have About Your Health or Care? None   What Questions Do You Have About Your Health or Care? None   What Information Would Help Us Give You More Personalized Care? None      Goal: Discharge Needs Assessment  Outcome: Ongoing (interventions implemented as appropriate)    02/01/18 2300 02/01/18 2317   Discharge Needs Assessment   Concerns To Be Addressed --  no discharge needs identified   Concerns Comments --  NA   Readmission Within The  Last 30 Days --  no previous admission in last 30 days   Community Agency Name(S) --  NA   Equipment Needed After Discharge --  none   Discharge Facility/Level Of Care Needs --  other (see comments)  (NA)   Current Discharge Risk --  other (see comments)  (NA)   Discharge Disposition --  still a patient   Discharge Planning Comments --  NA   Current Health   Outpatient/Agency/Support Group Needs --  other (see comments)  (NA)   Anticipated Changes Related to Illness --  none   Self-Care   Equipment Currently Used at Home --  none   Living Environment   Transportation Available car;family or friend will provide --          Problem: Pancreatitis, Acute/Chronic (Adult)  Goal: Signs and Symptoms of Listed Potential Problems Will be Absent or Manageable (Pancreatitis, Acute/Chronic)  Outcome: Ongoing (interventions implemented as appropriate)    02/01/18 1464   Pancreatitis, Acute/Chronic   Problems Assessed (Pancreatitis) all   Problems Present (Pancreatitis) pain

## 2018-02-03 NOTE — PROGRESS NOTES
[facility] Medicine Services  INPATIENT PROGRESS NOTE    Length of Stay: 2  Date of Admission: 2/1/2018  Primary Care Physician: Darian Higginbotham MD    Subjective   Chief Complaint: follow up abdominal pain/pancreatic lesion  HPI   Pt states norco is controlling pain. Has tolerated clear liquids. No bowel movement but is passing flatus. Denies any chest pain.     Review of Systems   All pertinent negatives and positives are as above. All other systems have been reviewed and are negative unless otherwise stated.     Objective    Temp:  [97.6 °F (36.4 °C)-98.4 °F (36.9 °C)] 98.2 °F (36.8 °C)  Heart Rate:  [48-58] 51  Resp:  [16-18] 16  BP: ()/(51-82) 105/67  Physical Exam   Constitutional: She is oriented to person, place, and time. She appears well-developed and well-nourished.   HENT:   Head: Normocephalic and atraumatic.   Eyes: Conjunctivae and EOM are normal. Pupils are equal, round, and reactive to light.   Neck: Neck supple. No JVD present. No thyromegaly present.   Cardiovascular: Normal rate, regular rhythm, normal heart sounds and intact distal pulses.  Exam reveals no gallop and no friction rub.    No murmur heard.  Pulmonary/Chest: Effort normal and breath sounds normal. No respiratory distress. She has no wheezes. She has no rales. She exhibits no tenderness.   Abdominal: Soft. Bowel sounds are normal. She exhibits no distension. There is tenderness (mild tenderness epigatric region. ). There is no rebound and no guarding.   Musculoskeletal: Normal range of motion. She exhibits no edema, tenderness or deformity.   Lymphadenopathy:     She has no cervical adenopathy.   Neurological: She is alert and oriented to person, place, and time. She displays normal reflexes. No cranial nerve deficit. She exhibits normal muscle tone.   Skin: Skin is warm and dry. No rash noted.   Psychiatric: She has a normal mood and affect. Her behavior is normal. Judgment and thought  content normal.     Results Review:  I have reviewed the labs, radiology results, and diagnostic studies.    Laboratory Data:     Results from last 7 days  Lab Units 02/02/18  0512 02/01/18  2043   WBC 10*3/mm3 8.00 10.58   HEMOGLOBIN g/dL 12.3 12.5   HEMATOCRIT % 36.6* 36.4*   PLATELETS 10*3/mm3 206 220       Results from last 7 days  Lab Units 02/02/18  0512 02/01/18  2043   SODIUM mmol/L 143 141   POTASSIUM mmol/L 4.6 3.8   CHLORIDE mmol/L 108 102   CO2 mmol/L 28.0 28.0   BUN mg/dL 9 13   CREATININE mg/dL 0.75 0.79   CALCIUM mg/dL 8.9 9.6   BILIRUBIN mg/dL 0.2 0.1  <0.1*   ALK PHOS U/L 90 120   ALT (SGPT) U/L 30 34   AST (SGOT) U/L 25 29   GLUCOSE mg/dL 111* 119*     Culture Data:   Urine Culture   Date Value Ref Range Status   02/01/2018 No growth at 24 hours  Preliminary       Radiology Data:   Imaging Results (last 24 hours)     Procedure Component Value Units Date/Time    CT Abdomen Pelvis With Contrast [[id number]] Collected:  02/02/18 0954     Updated:  02/02/18 1006    Narrative:       CT ABDOMEN PELVIS W CONTRAST- 2/2/2018 9:29 AM CST     HISTORY: Abd pain, fever, abscess suspected       COMPARISON: None.      DOSE LENGTH PRODUCT: 320 mGy cm. Automated exposure control was also  utilized to decrease patient radiation dose.     TECHNIQUE: Following the oral ingestion and intravenous administration  of contrast, helical CT tomographic images of the abdomen and pelvis  were acquired. Multiplanar reformatted images were provided for review.      FINDINGS:   The lung bases and base of the heart are unremarkable.      LIVER: No focal liver lesion. The hepatic vasculature is patent.      BILIARY SYSTEM: The gallbladder is surgically absent..      PANCREAS: A low-density lesions noted in the pancreas. This is between  the body and head of the pancreas. Correlation with endoscopic  ultrasound is suggested. This is best visualized on axial image 24 and  coronal image 20..      SPLEEN: Unremarkable.      KIDNEYS AND  ADRENALS: Bilateral kidneys and adrenal glands are  unremarkable. The ureters are decompressed and normal in appearance.     RETROPERITONEUM: No mass, lymphadenopathy or hemorrhage.      GI TRACT: No evidence of obstruction or bowel wall thickening.    .     OTHER: There is no mesenteric mass, lymphadenopathy or fluid collection.  The abdominopelvic vasculature is patent. The osseous structures and  soft tissues demonstrate no worrisome lesions.          PELVIS: The uterus is visualized.. The urinary bladder is normal in  appearance.       Impression:       1. There is a 16 mm low-density lesion in the pancreas at the junction  of the head and body. Neoplasm should be excluded..         This report was finalized on 02/02/2018 10:03 by Dr. Bladimir Chavez MD.    US Abdomen Complete [[id number]] Collected:  02/02/18 1445     Updated:  02/02/18 1452    Narrative:       ULTRASOUND ABDOMEN COMPLETE 2/2/2018 1:29 PM CST     REASON FOR EXAM: Acute pancreatitis; K85.90-Acute pancreatitis without  necrosis or infection, unspecified       COMPARISON: CT dated 02/02/2018      TECHNIQUE: Multiple longitudinal and transverse real-time sonographic  images of the abdomen are obtained.      FINDINGS:       LIVER: Normal in size, smooth in contour, and homogeneous in  echogenicity. No focal lesion is seen.     BILIARY: The gallbladder has been removed. The common bile duct measures  0.6 cm in diameter. There is no evidence of intrahepatic ductal  dilatation.       KIDNEYS: The right and left kidneys are normal in size, shape,  orientation, and position measuring 10.3 x 4.9 x 4.9 cm and 11.4 x 4.3 x  4.6 cm, respectively. The corticomedullary differentiation is  maintained. There is no evidence of nephrolithiasis, hydronephrosis or  perinephric fluid collection. No renal mass is seen. No hydroureter is  seen.     PANCREAS: The pancreatic duct is dilated at 0.5 cm. The lesion seen on  CT is not visualized by transabdominal ultrasound.       SPLEEN: Normal in size and appearance.      OTHER: No ascites is present. The visualized IVC and aorta are normal in  appearance.       Impression:       1. Pancreatic ductal dilation. The mass seen on the previous CT would be  better evaluated with endoscopic ultrasound, which is performed by  gastroenterologist. This lesion is highly concerning for adenocarcinoma  of the pancreas based on the CT findings. Focal pancreatitis could also  cause this appearance.  2. GI consultation is recommended.     This report was finalized on 02/02/2018 14:49 by Dr. Eric Melgar MD.          I have reviewed the patient current medications.     Assessment/Plan   Assessment:  1. Acute Pancreatitis-likely secondary to mass  2. 16 mm mass at junction of head and body of pancreas with associated pancreatic duct dilation  3.  Recent diagnosis of elevated liver function tests.   4.  Tobacco abuse-ongoing  5.  Upper abdominal pain    Plan:  1. Advance to full liquids.   2. Pt tells me she has appointment on Wednesday 2/7/2018 for EUS.   3. If she tolerates full liquids will advance to low fat diet for supper.   4. Repeat labs in am.    Discharge Planning: I expect the patient to be discharged to home in 1 days.    Patient seen and examined. Minimal abdominal pain. Wants to try and eat more advanced food. EUS scheduled for Wednesday. Hopeful to DC patient tomorrow if tolerating diet with close OP follow up.    DEMIAN Shaw   02/03/18   9:51 AM

## 2018-02-03 NOTE — PLAN OF CARE
Problem: Patient Care Overview (Adult)  Goal: Plan of Care Review  Outcome: Ongoing (interventions implemented as appropriate)   02/02/18 1627 02/02/18 2000 02/03/18 0426   Coping/Psychosocial Response Interventions   Plan Of Care Reviewed With --  patient --    Patient Care Overview   Progress no change --  --    Outcome Evaluation   Outcome Summary/Follow up Plan --  --  C/o pain x 1 given PRN pain meds x 1 as ordered. IVF continues as ordered. Up ad ross. Voiding. Will continue to monitor.     Goal: Adult Individualization and Mutuality  Outcome: Ongoing (interventions implemented as appropriate)   02/02/18 1627   Individualization   Patient Specific Preferences Prefers door closed   Patient Specific Goals To be discharged   Patient Specific Interventions PRN toradol/pain pill   Mutuality/Individual Preferences   What Anxieties, Fears or Concerns Do You Have About Your Health or Care? None   What Questions Do You Have About Your Health or Care? None   What Information Would Help Us Give You More Personalized Care? None     Goal: Discharge Needs Assessment  Outcome: Ongoing (interventions implemented as appropriate)   02/01/18 2300 02/01/18 2317   Discharge Needs Assessment   Concerns To Be Addressed --  no discharge needs identified   Concerns Comments --  NA   Readmission Within The Last 30 Days --  no previous admission in last 30 days   Community Agency Name(S) --  NA   Equipment Needed After Discharge --  none   Discharge Facility/Level Of Care Needs --  other (see comments)  (NA)   Current Discharge Risk --  other (see comments)  (NA)   Discharge Disposition --  still a patient   Discharge Planning Comments --  NA   Current Health   Outpatient/Agency/Support Group Needs --  other (see comments)  (NA)   Anticipated Changes Related to Illness --  none   Self-Care   Equipment Currently Used at Home --  none   Living Environment   Transportation Available car;family or friend will provide --        Problem:  Pancreatitis, Acute/Chronic (Adult)  Goal: Signs and Symptoms of Listed Potential Problems Will be Absent or Manageable (Pancreatitis, Acute/Chronic)  Outcome: Ongoing (interventions implemented as appropriate)   02/01/18 1713   Pancreatitis, Acute/Chronic   Problems Assessed (Pancreatitis) all   Problems Present (Pancreatitis) pain

## 2018-02-04 VITALS
SYSTOLIC BLOOD PRESSURE: 135 MMHG | WEIGHT: 161.3 LBS | OXYGEN SATURATION: 99 % | RESPIRATION RATE: 16 BRPM | HEART RATE: 60 BPM | HEIGHT: 66 IN | BODY MASS INDEX: 25.92 KG/M2 | DIASTOLIC BLOOD PRESSURE: 89 MMHG | TEMPERATURE: 97.9 F

## 2018-02-04 LAB
AMYLASE SERPL-CCNC: 114 U/L (ref 30–110)
LIPASE SERPL-CCNC: 695 U/L (ref 23–203)

## 2018-02-04 PROCEDURE — 25010000002 ENOXAPARIN PER 10 MG: Performed by: FAMILY MEDICINE

## 2018-02-04 PROCEDURE — 82150 ASSAY OF AMYLASE: CPT | Performed by: NURSE PRACTITIONER

## 2018-02-04 PROCEDURE — 83690 ASSAY OF LIPASE: CPT | Performed by: NURSE PRACTITIONER

## 2018-02-04 RX ORDER — OXYCODONE HYDROCHLORIDE AND ACETAMINOPHEN 5; 325 MG/1; MG/1
1 TABLET ORAL EVERY 6 HOURS PRN
Qty: 12 TABLET | Refills: 0 | Status: SHIPPED | OUTPATIENT
Start: 2018-02-04 | End: 2018-02-12

## 2018-02-04 RX ADMIN — FAMOTIDINE 40 MG: 20 TABLET, FILM COATED ORAL at 08:24

## 2018-02-04 RX ADMIN — DICYCLOMINE HYDROCHLORIDE 10 MG: 10 CAPSULE ORAL at 08:24

## 2018-02-04 RX ADMIN — ENOXAPARIN SODIUM 40 MG: 40 INJECTION SUBCUTANEOUS at 00:03

## 2018-02-04 RX ADMIN — AMLODIPINE BESYLATE 5 MG: 5 TABLET ORAL at 08:24

## 2018-02-04 RX ADMIN — LISINOPRIL 10 MG: 10 TABLET ORAL at 08:24

## 2018-02-04 NOTE — DISCHARGE SUMMARY
[facility] Medicine Services  DISCHARGE SUMMARY       Date of Admission: 2/1/2018  Date of Discharge:  2/4/2018  Primary Care Physician: Darian Higginbotham MD    Presenting Problem/History of Present Illness:  Abdominal pain    Final Discharge Diagnoses:  1. Acute Pancreatitis-likely secondary to mass  2. 16 mm mass at junction of head and body of pancreas with associated pancreatic duct dilation  3.  Recent diagnosis of elevated liver function tests.   4.  Tobacco abuse-ongoing  5.  Upper abdominal pain    Consults:   1. None    Procedures Performed: none    Pertinent Test Results:   Imaging Results (last 7 days)     Procedure Component Value Units Date/Time    CT Abdomen Pelvis With Contrast [[id number]] Collected:  02/02/18 0954     Updated:  02/02/18 1006    Narrative:       CT ABDOMEN PELVIS W CONTRAST- 2/2/2018 9:29 AM CST     HISTORY: Abd pain, fever, abscess suspected       COMPARISON: None.      DOSE LENGTH PRODUCT: 320 mGy cm. Automated exposure control was also  utilized to decrease patient radiation dose.     TECHNIQUE: Following the oral ingestion and intravenous administration  of contrast, helical CT tomographic images of the abdomen and pelvis  were acquired. Multiplanar reformatted images were provided for review.      FINDINGS:   The lung bases and base of the heart are unremarkable.      LIVER: No focal liver lesion. The hepatic vasculature is patent.      BILIARY SYSTEM: The gallbladder is surgically absent..      PANCREAS: A low-density lesions noted in the pancreas. This is between  the body and head of the pancreas. Correlation with endoscopic  ultrasound is suggested. This is best visualized on axial image 24 and  coronal image 20..      SPLEEN: Unremarkable.      KIDNEYS AND ADRENALS: Bilateral kidneys and adrenal glands are  unremarkable. The ureters are decompressed and normal in appearance.     RETROPERITONEUM: No mass, lymphadenopathy or hemorrhage.       GI TRACT: No evidence of obstruction or bowel wall thickening.    .     OTHER: There is no mesenteric mass, lymphadenopathy or fluid collection.  The abdominopelvic vasculature is patent. The osseous structures and  soft tissues demonstrate no worrisome lesions.          PELVIS: The uterus is visualized.. The urinary bladder is normal in  appearance.       Impression:       1. There is a 16 mm low-density lesion in the pancreas at the junction  of the head and body. Neoplasm should be excluded..         This report was finalized on 02/02/2018 10:03 by Dr. Bladimir Chavez MD.    US Abdomen Complete [[id number]] Collected:  02/02/18 1445     Updated:  02/02/18 1452    Narrative:       ULTRASOUND ABDOMEN COMPLETE 2/2/2018 1:29 PM CST     REASON FOR EXAM: Acute pancreatitis; K85.90-Acute pancreatitis without  necrosis or infection, unspecified       COMPARISON: CT dated 02/02/2018      TECHNIQUE: Multiple longitudinal and transverse real-time sonographic  images of the abdomen are obtained.      FINDINGS:       LIVER: Normal in size, smooth in contour, and homogeneous in  echogenicity. No focal lesion is seen.     BILIARY: The gallbladder has been removed. The common bile duct measures  0.6 cm in diameter. There is no evidence of intrahepatic ductal  dilatation.       KIDNEYS: The right and left kidneys are normal in size, shape,  orientation, and position measuring 10.3 x 4.9 x 4.9 cm and 11.4 x 4.3 x  4.6 cm, respectively. The corticomedullary differentiation is  maintained. There is no evidence of nephrolithiasis, hydronephrosis or  perinephric fluid collection. No renal mass is seen. No hydroureter is  seen.     PANCREAS: The pancreatic duct is dilated at 0.5 cm. The lesion seen on  CT is not visualized by transabdominal ultrasound.      SPLEEN: Normal in size and appearance.      OTHER: No ascites is present. The visualized IVC and aorta are normal in  appearance.       Impression:       1. Pancreatic ductal  dilation. The mass seen on the previous CT would be  better evaluated with endoscopic ultrasound, which is performed by  gastroenterologist. This lesion is highly concerning for adenocarcinoma  of the pancreas based on the CT findings. Focal pancreatitis could also  cause this appearance.  2. GI consultation is recommended.     This report was finalized on 02/02/2018 14:49 by Dr. Eric Melgar MD.        Lab Results (last 7 days)     Procedure Component Value Units Date/Time    CBC & Differential [[id number]] Collected:  02/01/18 2043    Specimen:  Blood Updated:  02/01/18 2052    Narrative:       The following orders were created for panel order CBC & Differential.  Procedure                               Abnormality         Status                     ---------                               -----------         ------                     CBC Auto Differential[[id number]]         Abnormal            Final result                 Please view results for these tests on the individual orders.    CBC Auto Differential [[id number]]  (Abnormal) Collected:  02/01/18 2043    Specimen:  Blood Updated:  02/01/18 2052     WBC 10.58 10*3/mm3      RBC 3.94 (L) 10*6/mm3      Hemoglobin 12.5 g/dL      Hematocrit 36.4 (L) %      MCV 92.4 fL      MCH 31.7 pg      MCHC 34.3 g/dL      RDW 12.2 %      RDW-SD 42.0 fl      MPV 9.0 fL      Platelets 220 10*3/mm3      Neutrophil % 56.1 %      Lymphocyte % 32.7 %      Monocyte % 6.2 %      Eosinophil % 4.3 (H) %      Basophil % 0.5 %      Immature Grans % 0.2 %      Neutrophils, Absolute 5.94 10*3/mm3      Lymphocytes, Absolute 3.46 10*3/mm3      Monocytes, Absolute 0.66 10*3/mm3      Eosinophils, Absolute 0.45 10*3/mm3      Basophils, Absolute 0.05 10*3/mm3      Immature Grans, Absolute 0.02 10*3/mm3      nRBC 0.0 /100 WBC     Urinalysis With / Culture If Indicated - Urine, Clean Catch [[id number]]  (Abnormal) Collected:  02/01/18 2043    Specimen:  Urine from Urine, Clean Catch Updated:  02/01/18  2057     Color, UA Yellow     Appearance, UA Clear     pH, UA 7.0     Specific Gravity, UA 1.012     Glucose, UA Negative     Ketones, UA Negative     Bilirubin, UA Negative     Blood, UA Negative     Protein, UA Negative     Leuk Esterase, UA Small (1+) (A)     Nitrite, UA Negative     Urobilinogen, UA 0.2 E.U./dL    Urinalysis, Microscopic Only - Urine, Clean Catch [[id number]]  (Abnormal) Collected:  02/01/18 2043    Specimen:  Urine from Urine, Clean Catch Updated:  02/01/18 2057     RBC, UA 0-2 (A) /HPF      WBC, UA 0-2 (A) /HPF      Bacteria, UA None Seen /HPF      Squamous Epithelial Cells, UA 0-2 /HPF      Hyaline Casts, UA None Seen /LPF      Methodology Automated Microscopy    Comprehensive Metabolic Panel [[id number]]  (Abnormal) Collected:  02/01/18 2043    Specimen:  Blood Updated:  02/01/18 2101     Glucose 119 (H) mg/dL      BUN 13 mg/dL      Creatinine 0.79 mg/dL      Sodium 141 mmol/L      Potassium 3.8 mmol/L      Chloride 102 mmol/L      CO2 28.0 mmol/L      Calcium 9.6 mg/dL      Total Protein 7.3 g/dL      Albumin 4.20 g/dL      ALT (SGPT) 34 U/L      AST (SGOT) 29 U/L      Alkaline Phosphatase 120 U/L      Total Bilirubin <0.1 (L) mg/dL      eGFR Non African Amer 76 mL/min/1.73      Globulin 3.1 gm/dL      A/G Ratio 1.4 g/dL      BUN/Creatinine Ratio 16.5     Anion Gap 11.0 mmol/L     Lipase [[id number]]  (Abnormal) Collected:  02/01/18 2043    Specimen:  Blood Updated:  02/01/18 2101     Lipase 1184 (H) U/L     Gamma GT [[id number]]  (Normal) Collected:  02/01/18 2043    Specimen:  Blood Updated:  02/01/18 2305     GGT 27 U/L     Bilirubin, Total & Direct [[id number]]  (Normal) Collected:  02/01/18 2043    Specimen:  Blood Updated:  02/01/18 2312     Total Bilirubin 0.1 mg/dL      Bilirubin, Direct 0.0 mg/dL      Bilirubin, Indirect 0.0 mg/dL     CBC Auto Differential [[id number]]  (Abnormal) Collected:  02/02/18 0512    Specimen:  Blood Updated:  02/02/18 0609     WBC 8.00 10*3/mm3      RBC 3.98 (L)  10*6/mm3      Hemoglobin 12.3 g/dL      Hematocrit 36.6 (L) %      MCV 92.0 fL      MCH 30.9 pg      MCHC 33.6 g/dL      RDW 12.1 %      RDW-SD 40.8 fl      MPV 9.2 fL      Platelets 206 10*3/mm3      Neutrophil % 46.8 %      Lymphocyte % 39.8 %      Monocyte % 6.5 %      Eosinophil % 5.8 (H) %      Basophil % 0.8 %      Immature Grans % 0.3 %      Neutrophils, Absolute 3.76 10*3/mm3      Lymphocytes, Absolute 3.18 10*3/mm3      Monocytes, Absolute 0.52 10*3/mm3      Eosinophils, Absolute 0.46 10*3/mm3      Basophils, Absolute 0.06 10*3/mm3      Immature Grans, Absolute 0.02 10*3/mm3      nRBC 0.0 /100 WBC     Comprehensive Metabolic Panel [[id number]]  (Abnormal) Collected:  02/02/18 0512    Specimen:  Blood Updated:  02/02/18 0629     Glucose 111 (H) mg/dL      BUN 9 mg/dL      Creatinine 0.75 mg/dL      Sodium 143 mmol/L      Potassium 4.6 mmol/L      Chloride 108 mmol/L      CO2 28.0 mmol/L      Calcium 8.9 mg/dL      Total Protein 6.3 g/dL      Albumin 3.50 g/dL      ALT (SGPT) 30 U/L      AST (SGOT) 25 U/L      Alkaline Phosphatase 90 U/L      Total Bilirubin 0.2 mg/dL      eGFR Non African Amer 80 mL/min/1.73      Globulin 2.8 gm/dL      A/G Ratio 1.3 g/dL      BUN/Creatinine Ratio 12.0     Anion Gap 7.0 mmol/L     Magnesium [[id number]]  (Normal) Collected:  02/02/18 0512    Specimen:  Blood Updated:  02/02/18 0629     Magnesium 2.1 mg/dL     Phosphorus [[id number]]  (Normal) Collected:  02/02/18 0512    Specimen:  Blood Updated:  02/02/18 0629     Phosphorus 3.6 mg/dL     Lipase [[id number]]  (Abnormal) Collected:  02/02/18 0512    Specimen:  Blood Updated:  02/02/18 0629     Lipase 693 (H) U/L     Amylase [[id number]]  (Normal) Collected:  02/02/18 0512    Specimen:  Blood Updated:  02/02/18 0629     Amylase 109 U/L     Troponin [[id number]]  (Normal) Collected:  02/02/18 0512    Specimen:  Blood Updated:  02/02/18 0634     Troponin I <0.012 ng/mL     Lactic Acid, Plasma [[id number]]  (Normal) Collected:   02/02/18 0626    Specimen:  Blood Updated:  02/02/18 0704     Lactate 0.8 mmol/L     Lipid Panel [[id number]]  (Abnormal) Collected:  02/02/18 0626    Specimen:  Blood Updated:  02/02/18 0714     Total Cholesterol 153 mg/dL      Triglycerides 120 mg/dL      HDL Cholesterol 42 (L) mg/dL      LDL Cholesterol  92 mg/dL      LDL/HDL Ratio 2.07    Cancer Antigen 19-9 [[id number]]  (Abnormal) Collected:  02/02/18 1422    Specimen:  Blood Updated:  02/03/18 0415     CA 19-9 134 (H) U/mL       Roche ECLIA methodology       Narrative:       Performed at:  [address] [place] [address]  [id number]  : Ruben Zamorano PhD, Phone:  [phone]    Lipase [[id number]]  (Abnormal) Collected:  02/03/18 0607    Specimen:  Blood Updated:  02/03/18 0708     Lipase 359 (H) U/L     C-reactive Protein [[id number]]  (Normal) Collected:  02/03/18 0607    Specimen:  Blood Updated:  02/03/18 0708     C-Reactive Protein <0.50 mg/dL     Amylase [[id number]]  (Normal) Collected:  02/03/18 0607    Specimen:  Blood Updated:  02/03/18 0708     Amylase 70 U/L     Urine Culture - Urine, Urine, Clean Catch [[id number]]  (Abnormal) Collected:  02/01/18 2043    Specimen:  Urine from Urine, Clean Catch Updated:  02/03/18 1158     Urine Culture --      <10,000 CFU/mL Mixed Gram Positive Rhonda (A)    Narrative:       Probable contaminant.    Lipase [[id number]]  (Abnormal) Collected:  02/04/18 0728    Specimen:  Blood Updated:  02/04/18 0809     Lipase 695 (H) U/L     Amylase [[id number]]  (Abnormal) Collected:  02/04/18 0728    Specimen:  Blood Updated:  02/04/18 0809     Amylase 114 (H) U/L         Hospital Course:  The patient is a 57 y.o. female who presented to [facility] with complaints of abdominal pain. States she has had progressive abdominal pain for about a month. Always  Worse post prandially. In the emergency department, she was found to have an elevated lipase of 1184. She was admitted to the hospitalist  "service for further evaluation and management. I first saw her on 2/2 and ordered CT of the abdomen which revealed a 16 mm mass at the junction of the body and head of the pancreas. She was given IV fluids and toradol for pain given her multiple allergies to pain medications. Her lipase decreased and her diet has been advanced to low fat. She has tolerated it with some pain but it taking minimal pain medications. I have secured her an appointment iwht Dr. Villa Singh at [facility] for endoscopy ultrasound with biopsies. We did a trial of percocet 5/325mg. She has tolerated this and I will give her 3 days of pain medications post discharge. Ultrasound of the abdomen did reveal pancreatic duct dilation. She may need ERCP but will leave that determination to Dr. Singh at time of EUS. Her pain has overall improved but not resolved. She is stable for discharge to have EUS on Wednesday by Dr. Villa Singh at [facility]. CA 19-9 was 134    Physical Exam on Discharge:  /89 (BP Location: Right arm, Patient Position: Sitting)  Pulse 60  Temp 97.9 °F (36.6 °C) (Oral)   Resp 16  Ht 167.6 cm (66\")  Wt 73.2 kg (161 lb 4.8 oz)  SpO2 99%  BMI 26.03 kg/m2  Physical Exam   Constitutional: She is oriented to person, place, and time. She appears well-developed and well-nourished.   HENT:   Head: Normocephalic and atraumatic.   Eyes: Conjunctivae and EOM are normal. Pupils are equal, round, and reactive to light.   Neck: Neck supple. No JVD present. No thyromegaly present.   Cardiovascular: Normal rate, regular rhythm, normal heart sounds and intact distal pulses.  Exam reveals no gallop and no friction rub.    No murmur heard.  Pulmonary/Chest: Effort normal and breath sounds normal. No respiratory distress. She has no wheezes. She has no rales. She exhibits no tenderness.   Abdominal: Soft. Bowel sounds are normal. She exhibits no distension. There is tenderness (epigatric tenderness). There is no rebound and no guarding. "   Musculoskeletal: Normal range of motion. She exhibits no edema, tenderness or deformity.   Lymphadenopathy:     She has no cervical adenopathy.   Neurological: She is alert and oriented to person, place, and time. She displays normal reflexes. No cranial nerve deficit. She exhibits normal muscle tone.   Skin: Skin is warm and dry. No rash noted.   Psychiatric: She has a normal mood and affect. Her behavior is normal. Judgment and thought content normal.     Condition on Discharge: stable    Discharge Disposition:  Home or Self Care    Discharge Medications:   Tiffanie Smith   Home Medication Instructions COCO:[id number]    Printed on:02/04/18 0843   Medication Information                      amLODIPine (NORVASC) 5 MG tablet  Take 5 mg by mouth Daily.             dicyclomine (BENTYL) 10 MG capsule  Take 10 mg by mouth 3 (Three) Times a Day As Needed.             ibuprofen (ADVIL,MOTRIN) 600 MG tablet  Take 1 tablet by mouth Every 8 (Eight) Hours As Needed for Mild Pain (1-3).             lisinopril (PRINIVIL,ZESTRIL) 10 MG tablet  Take 10 mg by mouth Daily.             oxyCODONE-acetaminophen (PERCOCET) 5-325 MG per tablet  Take 1 tablet by mouth Every 6 (Six) Hours As Needed for Moderate Pain  for up to 8 days.             pravastatin (PRAVACHOL) 40 MG tablet  Take 40 mg by mouth Daily.               Discharge Diet:   Diet Instructions     Diet: Cardiac; Thin       Discharge Diet:  Cardiac   Fluid Consistency:  Thin               Activity at Discharge:   Activity Instructions     Activity as Tolerated                   Follow-up Appointments:   Future Appointments  Date Time Provider Department Center   2/16/2018 8:45 AM DEMIAN Jalloh [facility] None   1. Dr. Villa [id number] at 1200      Test Results Pending at Discharge: none    DEMIAN Shaw  02/04/18  8:43 AM    Time: 25 minutes.     Patient seen and examined. Anxious about going home. Long discussion about plan and follow up.  Home with PO pain medications and OP follow up with EUS.

## 2018-02-04 NOTE — PLAN OF CARE
Problem: Patient Care Overview (Adult)  Goal: Plan of Care Review  Outcome: Ongoing (interventions implemented as appropriate)   02/04/18 0257   Coping/Psychosocial Response Interventions   Plan Of Care Reviewed With patient   Patient Care Overview   Progress no change   Outcome Evaluation   Outcome Summary/Follow up Plan Pt. has had no additional c/o pain so far this shift since medicated right before shift change; up ad ross; voiding; IID x1; will continue to monitor.     Goal: Adult Individualization and Mutuality  Outcome: Ongoing (interventions implemented as appropriate)    Goal: Discharge Needs Assessment  Outcome: Ongoing (interventions implemented as appropriate)      Problem: Pancreatitis, Acute/Chronic (Adult)  Goal: Signs and Symptoms of Listed Potential Problems Will be Absent or Manageable (Pancreatitis, Acute/Chronic)  Outcome: Ongoing (interventions implemented as appropriate)

## 2018-02-05 NOTE — PAYOR COMM NOTE
"[place] HOME 2-4-18  [id number]  UR PHONE    [phone]    Evert Smith (57 y.o. Female)     Date of Birth Social Security Number Address Home Phone MRN    1960  [address] [place] [id number] [phone] [id number]    Oriental orthodox Marital Status          None Single       Admission Date Admission Type Admitting Provider Attending Provider Department, Room/Bed    2/1/18 Emergency Bautista Yanez DO  [facility] 3C, 378/1    Discharge Date Discharge Disposition Discharge Destination        2/4/2018 Home or Self Care             Attending Provider: (none)    Allergies:  Codeine, Dilaudid [Hydromorphone Hcl], Morphine And Related, Neosporin [Neomycin-bacitracin Zn-polymyx], Sulfa Antibiotics, Loperamide Hcl    Isolation:  None   Infection:  None   Code Status:  Prior    Ht:  167.6 cm (66\")   Wt:  73.2 kg (161 lb 4.8 oz)    Admission Cmt:  None   Principal Problem:  None                Active Insurance as of 2/1/2018     Primary Coverage     Payor Plan Insurance Group Employer/Plan Group    [employer]HE[employer] BLUE CROSS AN[employer]M BLUE CROSS BLUE SHIELD PPO [id number]     Payor Plan Address Payor Plan Phone Number Effective From Effective To    [phone] 1/1/2017     [place] [address]       Subscriber Name Subscriber Birth Date Member ID       EVERT SMITH 1960 [id number]                 Emergency Contacts      (Rel.) Home Phone Work Phone Mobile Phone    Ankur Chairez (Son) -- -- [phone]               Discharge Summary      Bautista Yanez DO at 2/4/2018  8:43 AM                [facility] Medicine Services  DISCHARGE SUMMARY       Date of Admission: 2/1/2018  Date of Discharge:  2/4/2018  Primary Care Physician: Darian Higginbotham MD    Presenting Problem/History of Present Illness:  Abdominal pain    Final Discharge Diagnoses:  1. Acute Pancreatitis-likely secondary to mass  2. 16 mm mass at " junction of head and body of pancreas with associated pancreatic duct dilation  3.  Recent diagnosis of elevated liver function tests.   4.  Tobacco abuse-ongoing  5.  Upper abdominal pain    Consults:   1. None    Procedures Performed: none    Pertinent Test Results:   Imaging Results (last 7 days)     Procedure Component Value Units Date/Time    CT Abdomen Pelvis With Contrast [[id number]] Collected:  02/02/18 0954     Updated:  02/02/18 1006    Narrative:       CT ABDOMEN PELVIS W CONTRAST- 2/2/2018 9:29 AM CST     HISTORY: Abd pain, fever, abscess suspected       COMPARISON: None.      DOSE LENGTH PRODUCT: 320 mGy cm. Automated exposure control was also  utilized to decrease patient radiation dose.     TECHNIQUE: Following the oral ingestion and intravenous administration  of contrast, helical CT tomographic images of the abdomen and pelvis  were acquired. Multiplanar reformatted images were provided for review.      FINDINGS:   The lung bases and base of the heart are unremarkable.      LIVER: No focal liver lesion. The hepatic vasculature is patent.      BILIARY SYSTEM: The gallbladder is surgically absent..      PANCREAS: A low-density lesions noted in the pancreas. This is between  the body and head of the pancreas. Correlation with endoscopic  ultrasound is suggested. This is best visualized on axial image 24 and  coronal image 20..      SPLEEN: Unremarkable.      KIDNEYS AND ADRENALS: Bilateral kidneys and adrenal glands are  unremarkable. The ureters are decompressed and normal in appearance.     RETROPERITONEUM: No mass, lymphadenopathy or hemorrhage.      GI TRACT: No evidence of obstruction or bowel wall thickening.    .     OTHER: There is no mesenteric mass, lymphadenopathy or fluid collection.  The abdominopelvic vasculature is patent. The osseous structures and  soft tissues demonstrate no worrisome lesions.          PELVIS: The uterus is visualized.. The urinary bladder is normal in  appearance.        Impression:       1. There is a 16 mm low-density lesion in the pancreas at the junction  of the head and body. Neoplasm should be excluded..         This report was finalized on 02/02/2018 10:03 by Dr. Bladimir Chavez MD.    US Abdomen Complete [[id number]] Collected:  02/02/18 1445     Updated:  02/02/18 1452    Narrative:       ULTRASOUND ABDOMEN COMPLETE 2/2/2018 1:29 PM CST     REASON FOR EXAM: Acute pancreatitis; K85.90-Acute pancreatitis without  necrosis or infection, unspecified       COMPARISON: CT dated 02/02/2018      TECHNIQUE: Multiple longitudinal and transverse real-time sonographic  images of the abdomen are obtained.      FINDINGS:       LIVER: Normal in size, smooth in contour, and homogeneous in  echogenicity. No focal lesion is seen.     BILIARY: The gallbladder has been removed. The common bile duct measures  0.6 cm in diameter. There is no evidence of intrahepatic ductal  dilatation.       KIDNEYS: The right and left kidneys are normal in size, shape,  orientation, and position measuring 10.3 x 4.9 x 4.9 cm and 11.4 x 4.3 x  4.6 cm, respectively. The corticomedullary differentiation is  maintained. There is no evidence of nephrolithiasis, hydronephrosis or  perinephric fluid collection. No renal mass is seen. No hydroureter is  seen.     PANCREAS: The pancreatic duct is dilated at 0.5 cm. The lesion seen on  CT is not visualized by transabdominal ultrasound.      SPLEEN: Normal in size and appearance.      OTHER: No ascites is present. The visualized IVC and aorta are normal in  appearance.       Impression:       1. Pancreatic ductal dilation. The mass seen on the previous CT would be  better evaluated with endoscopic ultrasound, which is performed by  gastroenterologist. This lesion is highly concerning for adenocarcinoma  of the pancreas based on the CT findings. Focal pancreatitis could also  cause this appearance.  2. GI consultation is recommended.     This report was finalized on  02/02/2018 14:49 by Dr. Eric Melgar MD.        Lab Results (last 7 days)     Procedure Component Value Units Date/Time    CBC & Differential [[id number]] Collected:  02/01/18 2043    Specimen:  Blood Updated:  02/01/18 2052    Narrative:       The following orders were created for panel order CBC & Differential.  Procedure                               Abnormality         Status                     ---------                               -----------         ------                     CBC Auto Differential[[id number]]         Abnormal            Final result                 Please view results for these tests on the individual orders.    CBC Auto Differential [[id number]]  (Abnormal) Collected:  02/01/18 2043    Specimen:  Blood Updated:  02/01/18 2052     WBC 10.58 10*3/mm3      RBC 3.94 (L) 10*6/mm3      Hemoglobin 12.5 g/dL      Hematocrit 36.4 (L) %      MCV 92.4 fL      MCH 31.7 pg      MCHC 34.3 g/dL      RDW 12.2 %      RDW-SD 42.0 fl      MPV 9.0 fL      Platelets 220 10*3/mm3      Neutrophil % 56.1 %      Lymphocyte % 32.7 %      Monocyte % 6.2 %      Eosinophil % 4.3 (H) %      Basophil % 0.5 %      Immature Grans % 0.2 %      Neutrophils, Absolute 5.94 10*3/mm3      Lymphocytes, Absolute 3.46 10*3/mm3      Monocytes, Absolute 0.66 10*3/mm3      Eosinophils, Absolute 0.45 10*3/mm3      Basophils, Absolute 0.05 10*3/mm3      Immature Grans, Absolute 0.02 10*3/mm3      nRBC 0.0 /100 WBC     Urinalysis With / Culture If Indicated - Urine, Clean Catch [[id number]]  (Abnormal) Collected:  02/01/18 2043    Specimen:  Urine from Urine, Clean Catch Updated:  02/01/18 2057     Color, UA Yellow     Appearance, UA Clear     pH, UA 7.0     Specific Gravity, UA 1.012     Glucose, UA Negative     Ketones, UA Negative     Bilirubin, UA Negative     Blood, UA Negative     Protein, UA Negative     Leuk Esterase, UA Small (1+) (A)     Nitrite, UA Negative     Urobilinogen, UA 0.2 E.U./dL    Urinalysis, Microscopic Only - Urine,  Clean Catch [[id number]]  (Abnormal) Collected:  02/01/18 2043    Specimen:  Urine from Urine, Clean Catch Updated:  02/01/18 2057     RBC, UA 0-2 (A) /HPF      WBC, UA 0-2 (A) /HPF      Bacteria, UA None Seen /HPF      Squamous Epithelial Cells, UA 0-2 /HPF      Hyaline Casts, UA None Seen /LPF      Methodology Automated Microscopy    Comprehensive Metabolic Panel [[id number]]  (Abnormal) Collected:  02/01/18 2043    Specimen:  Blood Updated:  02/01/18 2101     Glucose 119 (H) mg/dL      BUN 13 mg/dL      Creatinine 0.79 mg/dL      Sodium 141 mmol/L      Potassium 3.8 mmol/L      Chloride 102 mmol/L      CO2 28.0 mmol/L      Calcium 9.6 mg/dL      Total Protein 7.3 g/dL      Albumin 4.20 g/dL      ALT (SGPT) 34 U/L      AST (SGOT) 29 U/L      Alkaline Phosphatase 120 U/L      Total Bilirubin <0.1 (L) mg/dL      eGFR Non African Amer 76 mL/min/1.73      Globulin 3.1 gm/dL      A/G Ratio 1.4 g/dL      BUN/Creatinine Ratio 16.5     Anion Gap 11.0 mmol/L     Lipase [[id number]]  (Abnormal) Collected:  02/01/18 2043    Specimen:  Blood Updated:  02/01/18 2101     Lipase 1184 (H) U/L     Gamma GT [[id number]]  (Normal) Collected:  02/01/18 2043    Specimen:  Blood Updated:  02/01/18 2305     GGT 27 U/L     Bilirubin, Total & Direct [[id number]]  (Normal) Collected:  02/01/18 2043    Specimen:  Blood Updated:  02/01/18 2312     Total Bilirubin 0.1 mg/dL      Bilirubin, Direct 0.0 mg/dL      Bilirubin, Indirect 0.0 mg/dL     CBC Auto Differential [[id number]]  (Abnormal) Collected:  02/02/18 0512    Specimen:  Blood Updated:  02/02/18 0609     WBC 8.00 10*3/mm3      RBC 3.98 (L) 10*6/mm3      Hemoglobin 12.3 g/dL      Hematocrit 36.6 (L) %      MCV 92.0 fL      MCH 30.9 pg      MCHC 33.6 g/dL      RDW 12.1 %      RDW-SD 40.8 fl      MPV 9.2 fL      Platelets 206 10*3/mm3      Neutrophil % 46.8 %      Lymphocyte % 39.8 %      Monocyte % 6.5 %      Eosinophil % 5.8 (H) %      Basophil % 0.8 %      Immature Grans % 0.3 %       Neutrophils, Absolute 3.76 10*3/mm3      Lymphocytes, Absolute 3.18 10*3/mm3      Monocytes, Absolute 0.52 10*3/mm3      Eosinophils, Absolute 0.46 10*3/mm3      Basophils, Absolute 0.06 10*3/mm3      Immature Grans, Absolute 0.02 10*3/mm3      nRBC 0.0 /100 WBC     Comprehensive Metabolic Panel [[id number]]  (Abnormal) Collected:  02/02/18 0512    Specimen:  Blood Updated:  02/02/18 0629     Glucose 111 (H) mg/dL      BUN 9 mg/dL      Creatinine 0.75 mg/dL      Sodium 143 mmol/L      Potassium 4.6 mmol/L      Chloride 108 mmol/L      CO2 28.0 mmol/L      Calcium 8.9 mg/dL      Total Protein 6.3 g/dL      Albumin 3.50 g/dL      ALT (SGPT) 30 U/L      AST (SGOT) 25 U/L      Alkaline Phosphatase 90 U/L      Total Bilirubin 0.2 mg/dL      eGFR Non African Amer 80 mL/min/1.73      Globulin 2.8 gm/dL      A/G Ratio 1.3 g/dL      BUN/Creatinine Ratio 12.0     Anion Gap 7.0 mmol/L     Magnesium [[id number]]  (Normal) Collected:  02/02/18 0512    Specimen:  Blood Updated:  02/02/18 0629     Magnesium 2.1 mg/dL     Phosphorus [[id number]]  (Normal) Collected:  02/02/18 0512    Specimen:  Blood Updated:  02/02/18 0629     Phosphorus 3.6 mg/dL     Lipase [[id number]]  (Abnormal) Collected:  02/02/18 0512    Specimen:  Blood Updated:  02/02/18 0629     Lipase 693 (H) U/L     Amylase [[id number]]  (Normal) Collected:  02/02/18 0512    Specimen:  Blood Updated:  02/02/18 0629     Amylase 109 U/L     Troponin [[id number]]  (Normal) Collected:  02/02/18 0512    Specimen:  Blood Updated:  02/02/18 0634     Troponin I <0.012 ng/mL     Lactic Acid, Plasma [[id number]]  (Normal) Collected:  02/02/18 0626    Specimen:  Blood Updated:  02/02/18 0704     Lactate 0.8 mmol/L     Lipid Panel [[id number]]  (Abnormal) Collected:  02/02/18 0626    Specimen:  Blood Updated:  02/02/18 0714     Total Cholesterol 153 mg/dL      Triglycerides 120 mg/dL      HDL Cholesterol 42 (L) mg/dL      LDL Cholesterol  92 mg/dL      LDL/HDL Ratio 2.07    Cancer Antigen  19-9 [[id number]]  (Abnormal) Collected:  02/02/18 1422    Specimen:  Blood Updated:  02/03/18 0415     CA 19-9 134 (H) U/mL       Roche ECLIA methodology       Narrative:       Performed at:  [address] [place] [address]  [id number]  : Ruben Zamoarno PhD, Phone:  [phone]    Lipase [[id number]]  (Abnormal) Collected:  02/03/18 0607    Specimen:  Blood Updated:  02/03/18 0708     Lipase 359 (H) U/L     C-reactive Protein [[id number]]  (Normal) Collected:  02/03/18 0607    Specimen:  Blood Updated:  02/03/18 0708     C-Reactive Protein <0.50 mg/dL     Amylase [[id number]]  (Normal) Collected:  02/03/18 0607    Specimen:  Blood Updated:  02/03/18 0708     Amylase 70 U/L     Urine Culture - Urine, Urine, Clean Catch [[id number]]  (Abnormal) Collected:  02/01/18 2043    Specimen:  Urine from Urine, Clean Catch Updated:  02/03/18 1158     Urine Culture --      <10,000 CFU/mL Mixed Gram Positive Rhonda (A)    Narrative:       Probable contaminant.    Lipase [[id number]]  (Abnormal) Collected:  02/04/18 0728    Specimen:  Blood Updated:  02/04/18 0809     Lipase 695 (H) U/L     Amylase [[id number]]  (Abnormal) Collected:  02/04/18 0728    Specimen:  Blood Updated:  02/04/18 0809     Amylase 114 (H) U/L         Hospital Course:  The patient is a 57 y.o. female who presented to [facility] with complaints of abdominal pain. States she has had progressive abdominal pain for about a month. Always  Worse post prandially. In the emergency department, she was found to have an elevated lipase of 1184. She was admitted to the hospitalist service for further evaluation and management. I first saw her on 2/2 and ordered CT of the abdomen which revealed a 16 mm mass at the junction of the body and head of the pancreas. She was given IV fluids and toradol for pain given her multiple allergies to pain medications. Her lipase decreased and her diet has been advanced to low fat. She has tolerated it  "with some pain but it taking minimal pain medications. I have secured her an appointment iwht Dr. Villa Singh at [facility] for endoscopy ultrasound with biopsies. We did a trial of percocet 5/325mg. She has tolerated this and I will give her 3 days of pain medications post discharge. Ultrasound of the abdomen did reveal pancreatic duct dilation. She may need ERCP but will leave that determination to Dr. Sinhg at time of EUS. Her pain has overall improved but not resolved. She is stable for discharge to have EUS on Wednesday by Dr. Villa Singh at [facility]. CA 19-9 was 134    Physical Exam on Discharge:  /89 (BP Location: Right arm, Patient Position: Sitting)  Pulse 60  Temp 97.9 °F (36.6 °C) (Oral)   Resp 16  Ht 167.6 cm (66\")  Wt 73.2 kg (161 lb 4.8 oz)  SpO2 99%  BMI 26.03 kg/m2  Physical Exam   Constitutional: She is oriented to person, place, and time. She appears well-developed and well-nourished.   HENT:   Head: Normocephalic and atraumatic.   Eyes: Conjunctivae and EOM are normal. Pupils are equal, round, and reactive to light.   Neck: Neck supple. No JVD present. No thyromegaly present.   Cardiovascular: Normal rate, regular rhythm, normal heart sounds and intact distal pulses.  Exam reveals no gallop and no friction rub.    No murmur heard.  Pulmonary/Chest: Effort normal and breath sounds normal. No respiratory distress. She has no wheezes. She has no rales. She exhibits no tenderness.   Abdominal: Soft. Bowel sounds are normal. She exhibits no distension. There is tenderness (epigatric tenderness). There is no rebound and no guarding.   Musculoskeletal: Normal range of motion. She exhibits no edema, tenderness or deformity.   Lymphadenopathy:     She has no cervical adenopathy.   Neurological: She is alert and oriented to person, place, and time. She displays normal reflexes. No cranial nerve deficit. She exhibits normal muscle tone.   Skin: Skin is warm and dry. No rash noted.   Psychiatric: She " has a normal mood and affect. Her behavior is normal. Judgment and thought content normal.     Condition on Discharge: stable    Discharge Disposition:  Home or Self Care    Discharge Medications:   Tifafnie Smith   Home Medication Instructions COCO:[id number]    Printed on:02/04/18 0843   Medication Information                      amLODIPine (NORVASC) 5 MG tablet  Take 5 mg by mouth Daily.             dicyclomine (BENTYL) 10 MG capsule  Take 10 mg by mouth 3 (Three) Times a Day As Needed.             ibuprofen (ADVIL,MOTRIN) 600 MG tablet  Take 1 tablet by mouth Every 8 (Eight) Hours As Needed for Mild Pain (1-3).             lisinopril (PRINIVIL,ZESTRIL) 10 MG tablet  Take 10 mg by mouth Daily.             oxyCODONE-acetaminophen (PERCOCET) 5-325 MG per tablet  Take 1 tablet by mouth Every 6 (Six) Hours As Needed for Moderate Pain  for up to 8 days.             pravastatin (PRAVACHOL) 40 MG tablet  Take 40 mg by mouth Daily.               Discharge Diet:   Diet Instructions     Diet: Cardiac; Thin       Discharge Diet:  Cardiac   Fluid Consistency:  Thin               Activity at Discharge:   Activity Instructions     Activity as Tolerated                   Follow-up Appointments:   Future Appointments  Date Time Provider Department Center   2/16/2018 8:45 AM DEMIAN Jalloh [facility] PAD None   1. Dr. Villa [id number] at 1200      Test Results Pending at Discharge: none    DEMIAN Shaw  02/04/18  8:43 AM    Time: 25 minutes.     Patient seen and examined. Anxious about going home. Long discussion about plan and follow up. Home with PO pain medications and OP follow up with EUS.      Electronically signed by Bautista Yanez DO at 2/4/2018  4:27 PM

## 2018-02-06 ENCOUNTER — ANESTHESIA EVENT (OUTPATIENT)
Dept: ENDOSCOPY | Age: 58
End: 2018-02-06
Payer: COMMERCIAL

## 2018-02-07 ENCOUNTER — HOSPITAL ENCOUNTER (OUTPATIENT)
Age: 58
Setting detail: OUTPATIENT SURGERY
Discharge: HOME | End: 2018-02-07
Attending: INTERNAL MEDICINE | Admitting: INTERNAL MEDICINE
Payer: COMMERCIAL

## 2018-02-07 ENCOUNTER — ANESTHESIA (OUTPATIENT)
Dept: ENDOSCOPY | Age: 58
End: 2018-02-07
Payer: COMMERCIAL

## 2018-02-07 VITALS
DIASTOLIC BLOOD PRESSURE: 78 MMHG | RESPIRATION RATE: 16 BRPM | WEIGHT: 154 LBS | HEIGHT: 66 IN | BODY MASS INDEX: 24.75 KG/M2 | HEART RATE: 61 BPM | TEMPERATURE: 98 F | OXYGEN SATURATION: 100 % | SYSTOLIC BLOOD PRESSURE: 128 MMHG

## 2018-02-07 VITALS
RESPIRATION RATE: 21 BRPM | OXYGEN SATURATION: 97 % | DIASTOLIC BLOOD PRESSURE: 75 MMHG | SYSTOLIC BLOOD PRESSURE: 134 MMHG

## 2018-02-07 LAB
CA 19-9: 150 U/ML (ref 0–35)
HCG(URINE) PREGNANCY TEST: NEGATIVE

## 2018-02-07 PROCEDURE — 3700000001 HC ADD 15 MINUTES (ANESTHESIA): Performed by: INTERNAL MEDICINE

## 2018-02-07 PROCEDURE — 2580000003 HC RX 258: Performed by: INTERNAL MEDICINE

## 2018-02-07 PROCEDURE — 81025 URINE PREGNANCY TEST: CPT

## 2018-02-07 PROCEDURE — 3609018500 HC EGD US SCOPE W/ADJACENT STRUCTURES: Performed by: INTERNAL MEDICINE

## 2018-02-07 PROCEDURE — 6370000000 HC RX 637 (ALT 250 FOR IP): Performed by: INTERNAL MEDICINE

## 2018-02-07 PROCEDURE — 82787 IGG 1 2 3 OR 4 EACH: CPT

## 2018-02-07 PROCEDURE — 88172 CYTP DX EVAL FNA 1ST EA SITE: CPT

## 2018-02-07 PROCEDURE — 2720000010 HC SURG SUPPLY STERILE: Performed by: INTERNAL MEDICINE

## 2018-02-07 PROCEDURE — 88177 CYTP FNA EVAL EA ADDL: CPT

## 2018-02-07 PROCEDURE — 7100000010 HC PHASE II RECOVERY - FIRST 15 MIN: Performed by: INTERNAL MEDICINE

## 2018-02-07 PROCEDURE — 86301 IMMUNOASSAY TUMOR CA 19-9: CPT

## 2018-02-07 PROCEDURE — 2720000001 HC MISC SURG SUPPLY STERILE $51-500: Performed by: INTERNAL MEDICINE

## 2018-02-07 PROCEDURE — 2500000003 HC RX 250 WO HCPCS: Performed by: INTERNAL MEDICINE

## 2018-02-07 PROCEDURE — 7100000011 HC PHASE II RECOVERY - ADDTL 15 MIN: Performed by: INTERNAL MEDICINE

## 2018-02-07 PROCEDURE — 43242 EGD US FINE NEEDLE BX/ASPIR: CPT | Performed by: INTERNAL MEDICINE

## 2018-02-07 PROCEDURE — 3700000000 HC ANESTHESIA ATTENDED CARE: Performed by: INTERNAL MEDICINE

## 2018-02-07 PROCEDURE — 6360000002 HC RX W HCPCS: Performed by: NURSE ANESTHETIST, CERTIFIED REGISTERED

## 2018-02-07 PROCEDURE — 88173 CYTOPATH EVAL FNA REPORT: CPT

## 2018-02-07 PROCEDURE — 36415 COLL VENOUS BLD VENIPUNCTURE: CPT

## 2018-02-07 PROCEDURE — 2500000003 HC RX 250 WO HCPCS: Performed by: NURSE ANESTHETIST, CERTIFIED REGISTERED

## 2018-02-07 RX ORDER — OXYCODONE HYDROCHLORIDE AND ACETAMINOPHEN 5; 325 MG/1; MG/1
1 TABLET ORAL ONCE
Status: COMPLETED | OUTPATIENT
Start: 2018-02-07 | End: 2018-02-07

## 2018-02-07 RX ORDER — LIDOCAINE HYDROCHLORIDE 10 MG/ML
INJECTION, SOLUTION EPIDURAL; INFILTRATION; INTRACAUDAL; PERINEURAL PRN
Status: DISCONTINUED | OUTPATIENT
Start: 2018-02-07 | End: 2018-02-07 | Stop reason: SDUPTHER

## 2018-02-07 RX ORDER — PRAVASTATIN SODIUM 40 MG
40 TABLET ORAL DAILY
COMMUNITY
End: 2018-06-08 | Stop reason: ALTCHOICE

## 2018-02-07 RX ORDER — OMEPRAZOLE 20 MG/1
20 CAPSULE, DELAYED RELEASE ORAL DAILY PRN
Status: ON HOLD | COMMUNITY
End: 2018-03-06 | Stop reason: ALTCHOICE

## 2018-02-07 RX ORDER — MIDAZOLAM HYDROCHLORIDE 1 MG/ML
INJECTION INTRAMUSCULAR; INTRAVENOUS PRN
Status: DISCONTINUED | OUTPATIENT
Start: 2018-02-07 | End: 2018-02-07 | Stop reason: SDUPTHER

## 2018-02-07 RX ORDER — OXYCODONE HYDROCHLORIDE AND ACETAMINOPHEN 5; 325 MG/1; MG/1
1 TABLET ORAL EVERY 4 HOURS PRN
COMMUNITY
End: 2018-06-08 | Stop reason: ALTCHOICE

## 2018-02-07 RX ORDER — SODIUM CHLORIDE, SODIUM LACTATE, POTASSIUM CHLORIDE, CALCIUM CHLORIDE 600; 310; 30; 20 MG/100ML; MG/100ML; MG/100ML; MG/100ML
INJECTION, SOLUTION INTRAVENOUS CONTINUOUS
Status: DISCONTINUED | OUTPATIENT
Start: 2018-02-07 | End: 2018-02-07 | Stop reason: HOSPADM

## 2018-02-07 RX ORDER — CIPROFLOXACIN 500 MG/1
500 TABLET, FILM COATED ORAL 2 TIMES DAILY
Qty: 6 TABLET | Refills: 0 | Status: SHIPPED | OUTPATIENT
Start: 2018-02-07 | End: 2018-02-10

## 2018-02-07 RX ORDER — AMLODIPINE BESYLATE 5 MG/1
5 TABLET ORAL DAILY
COMMUNITY
End: 2018-06-08 | Stop reason: ALTCHOICE

## 2018-02-07 RX ORDER — LISINOPRIL 10 MG/1
10 TABLET ORAL DAILY
COMMUNITY
End: 2018-06-08 | Stop reason: ALTCHOICE

## 2018-02-07 RX ORDER — PROPOFOL 10 MG/ML
INJECTION, EMULSION INTRAVENOUS PRN
Status: DISCONTINUED | OUTPATIENT
Start: 2018-02-07 | End: 2018-02-07 | Stop reason: SDUPTHER

## 2018-02-07 RX ORDER — FENTANYL CITRATE 50 UG/ML
INJECTION, SOLUTION INTRAMUSCULAR; INTRAVENOUS PRN
Status: DISCONTINUED | OUTPATIENT
Start: 2018-02-07 | End: 2018-02-07 | Stop reason: SDUPTHER

## 2018-02-07 RX ORDER — LIDOCAINE HYDROCHLORIDE 10 MG/ML
1 INJECTION, SOLUTION EPIDURAL; INFILTRATION; INTRACAUDAL; PERINEURAL ONCE
Status: COMPLETED | OUTPATIENT
Start: 2018-02-07 | End: 2018-02-07

## 2018-02-07 RX ADMIN — MIDAZOLAM HYDROCHLORIDE 2 MG: 1 INJECTION, SOLUTION INTRAMUSCULAR; INTRAVENOUS at 13:08

## 2018-02-07 RX ADMIN — SODIUM CHLORIDE, POTASSIUM CHLORIDE, SODIUM LACTATE AND CALCIUM CHLORIDE: 600; 310; 30; 20 INJECTION, SOLUTION INTRAVENOUS at 12:12

## 2018-02-07 RX ADMIN — PROPOFOL 540 MG: 10 INJECTION, EMULSION INTRAVENOUS at 13:08

## 2018-02-07 RX ADMIN — OXYCODONE HYDROCHLORIDE AND ACETAMINOPHEN 1 TABLET: 5; 325 TABLET ORAL at 15:44

## 2018-02-07 RX ADMIN — LIDOCAINE HYDROCHLORIDE 1 ML: 10 INJECTION, SOLUTION EPIDURAL; INFILTRATION; INTRACAUDAL; PERINEURAL at 12:13

## 2018-02-07 RX ADMIN — LIDOCAINE HYDROCHLORIDE 5 ML: 10 INJECTION, SOLUTION EPIDURAL; INFILTRATION; INTRACAUDAL; PERINEURAL at 13:08

## 2018-02-07 RX ADMIN — FENTANYL CITRATE 50 MCG: 50 INJECTION, SOLUTION INTRAMUSCULAR; INTRAVENOUS at 13:08

## 2018-02-07 ASSESSMENT — PAIN SCALES - GENERAL
PAINLEVEL_OUTOF10: 2
PAINLEVEL_OUTOF10: 2
PAINLEVEL_OUTOF10: 0
PAINLEVEL_OUTOF10: 0

## 2018-02-07 ASSESSMENT — PAIN - FUNCTIONAL ASSESSMENT: PAIN_FUNCTIONAL_ASSESSMENT: 0-10

## 2018-02-07 NOTE — ANESTHESIA POSTPROCEDURE EVALUATION
Department of Anesthesiology  Postprocedure Note    Patient: Antony Olivera  MRN: [id number]  YOB: 1960  Date of evaluation: 2/7/2018  Time:  1:58 PM     Procedure Summary     Date:  02/07/18 Room / Location:  [facility] ENDO 10 / [facility] Endoscopy    Anesthesia Start:  1244 Anesthesia Stop:      Procedure:  EGD ESOPHAGOGASTRODUODENOSCOPY ULTRASOUND  - LINEAR (N/A ) Diagnosis:  (pancreatic mass)    Surgeon:  Vaibhav Pierce MD Responsible Provider:  Hiro Vitale CRNA    Anesthesia Type:  general ASA Status:  2          Anesthesia Type: general    Nilsa Phase I:      Nilsa Phase II:      Last vitals: Reviewed and per EMR flowsheets.        Anesthesia Post Evaluation    Patient location during evaluation: bedside  Patient participation: complete - patient participated  Level of consciousness: sleepy but conscious  Pain score: 0  Airway patency: patent  Nausea & Vomiting: no nausea and no vomiting  Complications: no  Cardiovascular status: hemodynamically stable and blood pressure returned to baseline  Respiratory status: acceptable and nasal cannula  Hydration status: stable

## 2018-02-07 NOTE — ANESTHESIA PRE PROCEDURE
200 06/01/2012       CMP:   Lab Results   Component Value Date     06/01/2012    K 3.8 06/01/2012     06/01/2012    CO2 31 06/01/2012    BUN 18 06/01/2012    CREATININE 1.0 06/01/2012    LABGLOM 63 06/01/2012    GLUCOSE 138 06/01/2012    PROT 6.9 06/01/2012    CALCIUM 9.9 06/01/2012    ALKPHOS 97 06/01/2012    AST 20 06/01/2012    ALT 16 06/01/2012       POC Tests: No results for input(s): POCGLU, POCNA, POCK, POCCL, POCBUN, POCHEMO, POCHCT in the last 72 hours. Coags:   Lab Results   Component Value Date    PROTIME 11.9 06/01/2012    INR 0.91 06/01/2012       HCG (If Applicable): No results found for: PREGTESTUR, PREGSERUM, HCG, HCGQUANT     ABGs: No results found for: PHART, PO2ART, NTR3OOM, DUY9UZT, BEART, Y2JDXJDK     Type & Screen (If Applicable):  No results found for: LABABO, [address]    Anesthesia Evaluation  Patient summary reviewed and Nursing notes reviewed no history of anesthetic complications:   Airway: Mallampati: II  TM distance: >3 FB   Neck ROM: full  Mouth opening: > = 3 FB Dental: normal exam         Pulmonary:Negative Pulmonary ROS and normal exam                               Cardiovascular:    (+) hypertension: moderate, valvular problems/murmurs: MVP, dysrhythmias:,                   Neuro/Psych:   Negative Neuro/Psych ROS              GI/Hepatic/Renal:   (+) GERD: well controlled,           Endo/Other: Negative Endo/Other ROS             Pt had no PAT visit       Abdominal:           Vascular: negative vascular ROS. Anesthesia Plan      general     ASA 2       Induction: intravenous. Anesthetic plan and risks discussed with patient.                       Margot Avery CRNA   2/7/2018

## 2018-02-07 NOTE — H&P
esophagus     GERD (gastroesophageal reflux disease)     with Barretts    Hashimoto's thyroiditis     Heart murmur     Hypertension     Low blood sugar     h/o when overweight in the past    MVP (mitral valve prolapse)     Myalgia     Right flank pain     RUQ pain        Past Surgical History:  Past Surgical History:   Procedure Laterality Date     SECTION      x 3    CHOLECYSTECTOMY  1997    COLONOSCOPY  years ago    Dheeraj Lloyd per patient    COLONOSCOPY  14    Dr Tigre Ivan      right    HERNIA REPAIR      hiatal    HERNIA REPAIR      umbilical    UPPER GASTROINTESTINAL ENDOSCOPY  years ago    Dheeraj Baker    UPPER GASTROINTESTINAL ENDOSCOPY      Zuniga       Social History:  Social History   Substance Use Topics    Smoking status: Current Every Day Smoker     Packs/day: 1.00     Years: 10.00    Smokeless tobacco: Never Used    Alcohol use Yes       Vital Signs:   Vitals:    18 1201   Temp: 97.7 °F (36.5 °C)        Physical Exam:  Cardiac:  [x]WNL  []Comments:  Pulmonary:  [x]WNL   []Comments:  Neuro/Mental Status:  [x]WNL  []Comments:  Abdominal:  [x]WNL    []Comments:  Other:   []WNL  []Comments:    Informed Consent:  The risks and benefits of the procedure have been discussed with either the patient or if they cannot consent, their representative. Assessment:  Patient examined and appropriate for planned sedation and procedure. Plan:  Proceed with planned sedation and procedure as above.     Nisreen Lee MD

## 2018-02-09 LAB — IGG 4: 24 MG/DL (ref 1–123)

## 2018-02-18 ENCOUNTER — TELEPHONE (OUTPATIENT)
Dept: GASTROENTEROLOGY | Facility: CLINIC | Age: 58
End: 2018-02-18

## 2018-02-19 NOTE — TELEPHONE ENCOUNTER
Please let pt know that I received records from Dr. PUJA Singh that were concerning for pancreas.  I need to know if Dr. Singh has been in touch with her re: his plan.    Chelle Gill MD

## 2018-02-19 NOTE — TELEPHONE ENCOUNTER
Patient states that Dr. Singh told her he had to present everything to a panel of Dr to see what the next step needed to be. He said probably another CT and BX, but she had not heard back anything for sure. She said he wanted her to push back her next appt with you till he finished what he needed to do.

## 2018-02-19 NOTE — TELEPHONE ENCOUNTER
Agreed.  She does not even really need an appointment with me.  Her current problem is out of my area of expertise.  Dr. Singh is the correct doctor for this and he is who I would have sent her to.  If there are other GI issues unrelated to this one in the future, I would be more than happy to see her back.    Chelle Gill MD

## 2018-02-22 ENCOUNTER — TELEPHONE (OUTPATIENT)
Dept: GASTROENTEROLOGY | Age: 58
End: 2018-02-22

## 2018-02-22 DIAGNOSIS — Z87.19 HISTORY OF PANCREATITIS: ICD-10-CM

## 2018-02-22 DIAGNOSIS — R10.9 ABDOMINAL PAIN, UNSPECIFIED ABDOMINAL LOCATION: Primary | ICD-10-CM

## 2018-02-22 NOTE — TELEPHONE ENCOUNTER
Called patient to schedule her for a repeat EUS for a repeat biopsy due to previous biopsy coming back as inconclusive. She is scheduled for Wed. March 21 (worked best for her schedule)  While on the phone she said she is still having abd pain, (couldn't specify area, just abd pain)  She said that every time she eats it becomes more sever and is the same pain she had right before the EUS that was done on 2/7/18. The pain worsened after the EUS for several days and would worsen even more when she eats. She had to take a pain pill last night, from the pain being so bad after eating.   She has been on Chicken broth all day today

## 2018-02-22 NOTE — TELEPHONE ENCOUNTER
After discussing with Dr Anjelica Parekh, I contacted patient and asked that she complete a CMP/CBC/Lipase today. She lives in [place], [facility]. I faxed the orders and got confirmation. She is to call me if she has issues getting the labs completed today. I also stated that she may need a CT w/pancreas protocol next week. Also, her repeat procedure may be moved up to the week Dr Anjelica Parekh comes back to work (week of March 5th). Patient voiced understanding and appreciation. I will look for her labs tomorrow and advise Dr Anjelica Parekh since he won't be in the office.

## 2018-02-23 DIAGNOSIS — Z87.19 HISTORY OF PANCREATITIS: ICD-10-CM

## 2018-02-23 DIAGNOSIS — R10.9 ABDOMINAL PAIN, UNSPECIFIED ABDOMINAL LOCATION: Primary | ICD-10-CM

## 2018-02-23 DIAGNOSIS — R10.9 ABDOMINAL PAIN, UNSPECIFIED ABDOMINAL LOCATION: ICD-10-CM

## 2018-02-28 ENCOUNTER — HOSPITAL ENCOUNTER (OUTPATIENT)
Dept: CT IMAGING | Age: 58
Discharge: HOME | End: 2018-02-28
Payer: COMMERCIAL

## 2018-02-28 DIAGNOSIS — Z87.19 HISTORY OF PANCREATITIS: ICD-10-CM

## 2018-02-28 DIAGNOSIS — R10.9 ABDOMINAL PAIN, UNSPECIFIED ABDOMINAL LOCATION: ICD-10-CM

## 2018-02-28 LAB
GFR NON-AFRICAN AMERICAN: >60
PERFORMED ON: NORMAL
POC CREATININE: 0.9 MG/DL (ref 0.3–1.3)
POC SAMPLE TYPE: NORMAL

## 2018-02-28 PROCEDURE — 82565 ASSAY OF CREATININE: CPT

## 2018-02-28 PROCEDURE — 74177 CT ABD & PELVIS W/CONTRAST: CPT

## 2018-02-28 PROCEDURE — 6360000004 HC RX CONTRAST MEDICATION: Performed by: INTERNAL MEDICINE

## 2018-02-28 RX ADMIN — IOPAMIDOL 90 ML: 755 INJECTION, SOLUTION INTRAVENOUS at 10:22

## 2018-03-02 ENCOUNTER — OFFICE VISIT (OUTPATIENT)
Dept: GASTROENTEROLOGY | Facility: CLINIC | Age: 58
End: 2018-03-02

## 2018-03-02 VITALS
HEART RATE: 62 BPM | BODY MASS INDEX: 24.59 KG/M2 | HEIGHT: 66 IN | SYSTOLIC BLOOD PRESSURE: 112 MMHG | DIASTOLIC BLOOD PRESSURE: 68 MMHG | OXYGEN SATURATION: 99 % | WEIGHT: 153 LBS | TEMPERATURE: 96.9 F

## 2018-03-02 DIAGNOSIS — K86.89 PANCREATIC MASS: Primary | ICD-10-CM

## 2018-03-02 DIAGNOSIS — R10.84 GENERALIZED ABDOMINAL PAIN: ICD-10-CM

## 2018-03-02 DIAGNOSIS — R74.8 ELEVATED LIVER ENZYMES: ICD-10-CM

## 2018-03-02 DIAGNOSIS — Z87.19 HISTORY OF ACUTE PANCREATITIS: ICD-10-CM

## 2018-03-02 DIAGNOSIS — R93.89 ABNORMAL FINDING ON IMAGING: ICD-10-CM

## 2018-03-02 PROBLEM — R10.9 ABDOMINAL PAIN: Status: ACTIVE | Noted: 2018-03-02

## 2018-03-02 PROBLEM — K85.90 PANCREATITIS: Status: ACTIVE | Noted: 2018-03-02

## 2018-03-02 PROCEDURE — 99214 OFFICE O/P EST MOD 30 MIN: CPT | Performed by: NURSE PRACTITIONER

## 2018-03-02 NOTE — PATIENT INSTRUCTIONS
Low-Fat Diet for Pancreatitis or Gallbladder Conditions  A low-fat diet can be helpful if you have pancreatitis or a gallbladder condition. With these conditions, your pancreas and gallbladder have trouble digesting fats. A healthy eating plan with less fat will help rest your pancreas and gallbladder and reduce your symptoms.  What do I need to know about this diet?  · Eat a low-fat diet.  ¨ Reduce your fat intake to less than 20-30% of your total daily calories. This is less than 50-60 g of fat per day.  ¨ Remember that you need some fat in your diet. Ask your dietician what your daily goal should be.  ¨ Choose nonfat and low-fat healthy foods. Look for the words “nonfat,” “low fat,” or “fat free.”  ¨ As a guide, look on the label and choose foods with less than 3 g of fat per serving. Eat only one serving.  · Avoid alcohol.  · Do not smoke. If you need help quitting, talk with your health care provider.  · Eat small frequent meals instead of three large heavy meals.  What foods can I eat?  Grains   Include healthy grains and starches such as potatoes, wheat bread, fiber-rich cereal, and brown rice. Choose whole grain options whenever possible. In adults, whole grains should account for 45-65% of your daily calories.  Fruits and Vegetables   Eat plenty of fruits and vegetables. Fresh fruits and vegetables add fiber to your diet.  Meats and Other Protein Sources   Eat lean meat such as chicken and pork. Trim any fat off of meat before cooking it. Eggs, fish, and beans are other sources of protein. In adults, these foods should account for 10-35% of your daily calories.  Dairy   Choose low-fat milk and dairy options. Dairy includes fat and protein, as well as calcium.  Fats and Oils   Limit high-fat foods such as fried foods, sweets, baked goods, sugary drinks.  Other   Creamy sauces and condiments, such as mayonnaise, can add extra fat. Think about whether or not you need to use them, or use smaller amounts or low  fat options.  What foods are not recommended?  · High fat foods, such as:  ¨ Baked goods.  ¨ Ice cream.  ¨ Japanese toast.  ¨ Sweet rolls.  ¨ Pizza.  ¨ Cheese bread.  ¨ Foods covered with batter, butter, creamy sauces, or cheese.  ¨ Fried foods.  ¨ Sugary drinks and desserts.  · Foods that cause gas or bloating  This information is not intended to replace advice given to you by your health care provider. Make sure you discuss any questions you have with your health care provider.  Document Released: 12/23/2014 Document Revised: 05/25/2017 Document Reviewed: 12/01/2014  Elsevier Interactive Patient Education © 2017 Elsevier Inc.

## 2018-03-02 NOTE — PROGRESS NOTES
Chief Complaint:   Chief Complaint   Patient presents with   • Pancreatitis     Patient was in the hospital for acute pancreatitis. Dr. Villa Singh has done EGD and next Tuesday she has to go for another one. She also had elevated liver enzymes.         Patient ID: Tiffanie Smith is a 57 y.o. female     History of Present Illness: This is a very pleasant 57-year-old female who was admitted to [facility] on 2/1/18 with abdominal pain.  The patient was found to have acute pancreatitis likely secondary to mass according to records.  16 mm mass at junction of head and body of pancreas with associated pancreatic duct dilation.  The patient also had a recent diagnosis of elevated liver function tests at the time of hospitalization. The patient was referred to Dr. Villa Singh at [facility] for an EUS.  CA 19-9 was 134.      The patient was seen in the past on 3/1/16 for complaints of abdominal pain.  A previous EGD performed on 3/2/15 revealed minimal gastritis, minimal esophagitis done by Dr. Serrato path was negative for Zhao's.  The patient was scheduled for colonoscopy performed by Dr. Chelle Gill on 3/10/16 that was found to be normal.  She tells me that she had been seen in the past, several years ago she states by Dr. Gill for elevated liver enzymes however I cannot find those records at this time.    The patient underwent an EUS with needle aspiration biopsy per Dr. Villa Singh at [facility] on 2/7/18 with biopsies taken.  Cipro twice a day ×3 days was given status post procedure.  Labs for IgG4 was 24 and CEA 19-9 was rechecked and found to be 150.  Biopsy results noted below.    The patient placed a phone call to our office and stated that she was told by Dr. Singh to push back her appointment at our office and she was told that Dr. Gill agreed with that as Dr. Singh was going to place further orders.  The patient states that she has been somewhat confused and thought she was to be  followed up today for elevated liver enzymes. The patient underwent a CT of the abdomen and pelvis on 18 at [facility] noted below.According to [facility] records the patient is scheduled on 3/6/18 with Dr. Villa Singh for a repeat EUS.    She denies any nausea, vomiting, dysphagia, epigastric pain or hematemesis.  She states that she has generalized abdominal pain.  She denies any fever or chills.  She denies any melena or hematochezia.  She states that she has been trying to eat but her appetite has been decreased.  She states that she does feel quite a bit of fatigue as well.    [place], [place]  Result Narrative                                        [address] [place] [address]  Department of Pathology  FINAL CYTOLOGY REPORT  Patient Name:  EVERT LAND            Accession No:  [id number]   Age Sex:   1960   57 Y F          Pt Type: O     KLEND                                              Location:  Account No:    [id number]                  Collected:     2018  Med Rec No:    [id number]                     Received:      2018  Attend Phys:   VILLA SINGH                   Completed:     02/10/2018  Perform Phys:  VILLA SINGH            FINAL DIAGNOSIS:   Pancreatic head mass, endoscopic ultrasound-guided fine-needle  aspiration, smears 6) and ThinPrep preparation (1):  1.  Groups of markedly atypical glandular cells present, strongly  suspicious for adenocarcinoma.  2.  Groups of benign glandular cells and benign pancreatic acinar tissue.  3.  Blood.         CT Dual Phase Pancreatic Protocol2018  [facility], [place]  Result Impression   A solid appearing poorly enhancing nodule in the head of  the pancreas with partial obstruction to the pancreatic duct. This may  represent a pancreatic neoplasm.  A predominantly cystic nodule in the tail of the pancreas  which  displaces the pancreatic duct may represent a cyst or a pseudocyst of  the pancreas. The possibility of a cystic tumor (I P M N ) of pancreas  is not excluded.  A mild pancreatic duct dilatation.  Signed by Dr Brenda Santos on 2018 10:52 AM       Past Medical History:   Diagnosis Date   • Disease of thyroid gland    • Hyperlipidemia    • Hypertension        Past Surgical History:   Procedure Laterality Date   •  SECTION      x3   • CHOLECYSTECTOMY     • COLONOSCOPY  03/10/2016   • ELBOW DEBRIDEMENT Right    • HAND SURGERY Right    • HERNIA REPAIR      x3   • UPPER GASTROINTESTINAL ENDOSCOPY  2015         Current Outpatient Prescriptions:   •  amLODIPine (NORVASC) 5 MG tablet, Take 5 mg by mouth Daily., Disp: , Rfl:   •  lisinopril (PRINIVIL,ZESTRIL) 10 MG tablet, Take 10 mg by mouth Daily., Disp: , Rfl:   •  pravastatin (PRAVACHOL) 40 MG tablet, Take 40 mg by mouth Daily., Disp: , Rfl:     Allergies   Allergen Reactions   • Codeine Shortness Of Breath   • Dilaudid [Hydromorphone Hcl] Anaphylaxis   • Morphine And Related GI Intolerance   • Neosporin [Neomycin-Bacitracin Zn-Polymyx] Rash   • Sulfa Antibiotics Hives   • Loperamide Hcl Other (See Comments)     severe abd. pain       Social History     Social History   • Marital status: Single     Spouse name: N/A   • Number of children: N/A   • Years of education: N/A     Occupational History   • Not on file.     Social History Main Topics   • Smoking status: Current Every Day Smoker     Packs/day: 1.00     Years: 10.00     Types: Cigarettes   • Smokeless tobacco: Never Used   • Alcohol use Yes      Comment: rarely   • Drug use: No   • Sexual activity: No     Other Topics Concern   • Not on file     Social History Narrative       Family History   Problem Relation Age of Onset   • Hypertension Mother    • Diabetes Maternal Grandmother    • Breast cancer Maternal Aunt    • Brain cancer Maternal Aunt    • Ovarian cancer Neg Hx    • Uterine cancer  "Neg Hx    • Colon cancer Neg Hx        Vitals:    03/02/18 1324   BP: 112/68   Pulse: 62   Temp: 96.9 °F (36.1 °C)   SpO2: 99%   Weight: 69.4 kg (153 lb)   Height: 167.6 cm (66\")       Review of Systems:    General:    Present -feeling well   Skin:    Not Present-Rash   HEENT:     Not Present-Acute visual changes or Acute hearing changes   Neck :    Not Present- swollen glands   Genitourinary:      Not Present- burning, frequency, urgency hematuria, dysuria,   Cardiovascular:   Not Present-chest pain, palpitations, or pressure   Respiratory:   Not Present- shortness of breath or cough   Gastrointestinal:  Musculoskeletal:  Neurological:  Psychiatric:   Present as mentioned in the HP    Not Present. Recent gait disturbances.    Not Present-Seizures and weakness in extremities.    Not Present- Anxiety or Depression.       Physical Exam:    General Appearance:    Alert, cooperative, in no acute distress   Psych:    Mood appropriate    Eyes:          conjunctivae and sclerae normal, no   icterus, no pallor   ENMT:    Ears appear intact with no abnormalities noted oral mucosa moist   Neck:   No adenopathy, supple, trachea midline, no thyromegaly, no   carotid bruit, no JVD    Cardiovascular:    Regular rhythm and normal rate, normal S1 and S2, no            murmur, no gallop, no rub, no click   Gastrointestinal:     Inspection normal.  Normal bowel sounds, no masses, no organomegaly, soft round non-tender, non-distended, no guarding, no rebound or tenderness. No hepatosplenomegaly.   Skin:   No bleeding, bruising or rash   Neurologic:   nonfocal       Lab Results   Component Value Date    WBC 8.00 02/02/2018    WBC 10.58 02/01/2018    WBC 8.45 11/18/2016    HGB 12.3 02/02/2018    HGB 12.5 02/01/2018    HGB 13.9 11/18/2016    HCT 36.6 (L) 02/02/2018    HCT 36.4 (L) 02/01/2018    HCT 40.7 11/18/2016     02/02/2018     02/01/2018     11/18/2016        Lab Results   Component Value Date     " 02/02/2018     02/01/2018     11/18/2016    K 4.6 02/02/2018    K 3.8 02/01/2018    K 4.1 11/18/2016     02/02/2018     02/01/2018     11/18/2016    CO2 28.0 02/02/2018    CO2 28.0 02/01/2018    CO2 27.0 11/18/2016    BUN 9 02/02/2018    BUN 13 02/01/2018    BUN 14 11/18/2016    CREATININE 0.75 02/02/2018    CREATININE 0.79 02/01/2018    CREATININE 0.95 11/18/2016    BILITOT 0.2 02/02/2018    BILITOT 0.1 02/01/2018    BILITOT <0.1 (L) 02/01/2018    ALKPHOS 90 02/02/2018    ALKPHOS 120 02/01/2018    ALKPHOS 130 (H) 11/18/2016    ALT 30 02/02/2018    ALT 34 02/01/2018    ALT 61 (H) 11/18/2016    AST 25 02/02/2018    AST 29 02/01/2018    AST 50 (H) 11/18/2016    GLUCOSE 111 (H) 02/02/2018    GLUCOSE 119 (H) 02/01/2018    GLUCOSE 101 (H) 11/18/2016       No results found for: INR    Body mass index is 24.69 kg/(m^2).    Assessment and Plan:    Tiffanie was seen today for pancreatitis.    Diagnoses and all orders for this visit:    Pancreatic mass  Records from Dr. Singh were reviewed.  It is concerning that she has suspicious cells for adenocarcinoma.  She also has an additional lesion seen on CT imaging done 228 2018.  Results as follows below:  Result Impression   A solid appearing poorly enhancing nodule in the head of  the pancreas with partial obstruction to the pancreatic duct. This may  represent a pancreatic neoplasm.  A predominantly cystic nodule in the tail of the pancreas which  displaces the pancreatic duct may represent a cyst or a pseudocyst of  the pancreas. The possibility of a cystic tumor (I P M N ) of pancreas  is not excluded.  A mild pancreatic duct dilatation.  Signed by Dr Brenda Santos on 2/28/2018 10:52 AM     The importance of following up with Dr. Singh was emphasized and today's encounter.  I discussed this with Dr. Gill as well.  Records from [facility] was reviewed.  She does have an appointment set up for repeat endoscopic ultrasound on  03/06/2018.    Generalized abdominal pain  Likely related to pancreatic abnormalities.    Abnormal finding on imaging  See above regarding pancreatic imaging.    History of acute pancreatitis    Elevated liver enzymes    I have discussed the patient's case with Dr. Gill.  She has reviewed all of the findings and records from [facility] as well as Dr. Villa Singh.  She was advised by Dr. Gill to follow up with Dr. Singh since her current problems were out of her area of expertise.  Her elevated liver enzymes are most likely due to the obstruction of the pancreatic duct by the mass found on CT imaging.  Dr. Singh is the correct doctor for this and he is who the hospitalist sent the patient to for the EUS and further evaluation of the abnormal findings on the first CT scan.  If there are other GI issues unrelated to this one in the future we will be more than happy to see the patient back.         Patient Instructions   Low-Fat Diet for Pancreatitis or Gallbladder Conditions  A low-fat diet can be helpful if you have pancreatitis or a gallbladder condition. With these conditions, your pancreas and gallbladder have trouble digesting fats. A healthy eating plan with less fat will help rest your pancreas and gallbladder and reduce your symptoms.  What do I need to know about this diet?  · Eat a low-fat diet.  ¨ Reduce your fat intake to less than 20-30% of your total daily calories. This is less than 50-60 g of fat per day.  ¨ Remember that you need some fat in your diet. Ask your dietician what your daily goal should be.  ¨ Choose nonfat and low-fat healthy foods. Look for the words “nonfat,” “low fat,” or “fat free.”  ¨ As a guide, look on the label and choose foods with less than 3 g of fat per serving. Eat only one serving.  · Avoid alcohol.  · Do not smoke. If you need help quitting, talk with your health care provider.  · Eat small frequent meals instead of three large heavy meals.  What foods can I  eat?  Grains   Include healthy grains and starches such as potatoes, wheat bread, fiber-rich cereal, and brown rice. Choose whole grain options whenever possible. In adults, whole grains should account for 45-65% of your daily calories.  Fruits and Vegetables   Eat plenty of fruits and vegetables. Fresh fruits and vegetables add fiber to your diet.  Meats and Other Protein Sources   Eat lean meat such as chicken and pork. Trim any fat off of meat before cooking it. Eggs, fish, and beans are other sources of protein. In adults, these foods should account for 10-35% of your daily calories.  Dairy   Choose low-fat milk and dairy options. Dairy includes fat and protein, as well as calcium.  Fats and Oils   Limit high-fat foods such as fried foods, sweets, baked goods, sugary drinks.  Other   Creamy sauces and condiments, such as mayonnaise, can add extra fat. Think about whether or not you need to use them, or use smaller amounts or low fat options.  What foods are not recommended?  · High fat foods, such as:  ¨ Baked goods.  ¨ Ice cream.  ¨ Estonian toast.  ¨ Sweet rolls.  ¨ Pizza.  ¨ Cheese bread.  ¨ Foods covered with batter, butter, creamy sauces, or cheese.  ¨ Fried foods.  ¨ Sugary drinks and desserts.  · Foods that cause gas or bloating  This information is not intended to replace advice given to you by your health care provider. Make sure you discuss any questions you have with your health care provider.  Document Released: 12/23/2014 Document Revised: 05/25/2017 Document Reviewed: 12/01/2014  Elsevier Interactive Patient Education © 2017 Elsevier Inc.        Next follow-up appointment      EMR Dragon/Transcription disclaimer:  Much of this encounter note is an electronic transcription/translation of spoken language to printed text. The electronic translation of spoken language may permit erroneous, or at times, nonsensical words or phrases to be inadvertently transcribed; although I have reviewed the note for  such errors, some may still exist.

## 2018-03-05 ENCOUNTER — ANESTHESIA EVENT (OUTPATIENT)
Dept: ENDOSCOPY | Age: 58
End: 2018-03-05
Payer: COMMERCIAL

## 2018-03-06 ENCOUNTER — ANESTHESIA (OUTPATIENT)
Dept: ENDOSCOPY | Age: 58
End: 2018-03-06
Payer: COMMERCIAL

## 2018-03-06 ENCOUNTER — HOSPITAL ENCOUNTER (OUTPATIENT)
Age: 58
Setting detail: OUTPATIENT SURGERY
Discharge: HOME | End: 2018-03-06
Attending: INTERNAL MEDICINE | Admitting: INTERNAL MEDICINE
Payer: COMMERCIAL

## 2018-03-06 VITALS
BODY MASS INDEX: 23.07 KG/M2 | HEIGHT: 67 IN | OXYGEN SATURATION: 100 % | SYSTOLIC BLOOD PRESSURE: 111 MMHG | TEMPERATURE: 97.8 F | HEART RATE: 61 BPM | WEIGHT: 147 LBS | DIASTOLIC BLOOD PRESSURE: 68 MMHG | RESPIRATION RATE: 18 BRPM

## 2018-03-06 VITALS
SYSTOLIC BLOOD PRESSURE: 127 MMHG | DIASTOLIC BLOOD PRESSURE: 77 MMHG | OXYGEN SATURATION: 97 % | RESPIRATION RATE: 19 BRPM

## 2018-03-06 LAB — HCG(URINE) PREGNANCY TEST: NEGATIVE

## 2018-03-06 PROCEDURE — 43242 EGD US FINE NEEDLE BX/ASPIR: CPT | Performed by: INTERNAL MEDICINE

## 2018-03-06 PROCEDURE — 88177 CYTP FNA EVAL EA ADDL: CPT

## 2018-03-06 PROCEDURE — 7100000010 HC PHASE II RECOVERY - FIRST 15 MIN: Performed by: INTERNAL MEDICINE

## 2018-03-06 PROCEDURE — 2720000010 HC SURG SUPPLY STERILE: Performed by: INTERNAL MEDICINE

## 2018-03-06 PROCEDURE — 3700000001 HC ADD 15 MINUTES (ANESTHESIA): Performed by: INTERNAL MEDICINE

## 2018-03-06 PROCEDURE — 6360000002 HC RX W HCPCS: Performed by: NURSE ANESTHETIST, CERTIFIED REGISTERED

## 2018-03-06 PROCEDURE — 2500000003 HC RX 250 WO HCPCS: Performed by: NURSE ANESTHETIST, CERTIFIED REGISTERED

## 2018-03-06 PROCEDURE — 3609018500 HC EGD US SCOPE W/ADJACENT STRUCTURES: Performed by: INTERNAL MEDICINE

## 2018-03-06 PROCEDURE — 2580000003 HC RX 258: Performed by: INTERNAL MEDICINE

## 2018-03-06 PROCEDURE — 88173 CYTOPATH EVAL FNA REPORT: CPT

## 2018-03-06 PROCEDURE — 81025 URINE PREGNANCY TEST: CPT

## 2018-03-06 PROCEDURE — 88172 CYTP DX EVAL FNA 1ST EA SITE: CPT

## 2018-03-06 PROCEDURE — 3700000000 HC ANESTHESIA ATTENDED CARE: Performed by: INTERNAL MEDICINE

## 2018-03-06 PROCEDURE — 7100000011 HC PHASE II RECOVERY - ADDTL 15 MIN: Performed by: INTERNAL MEDICINE

## 2018-03-06 PROCEDURE — 6360000002 HC RX W HCPCS: Performed by: INTERNAL MEDICINE

## 2018-03-06 RX ORDER — MEPERIDINE HYDROCHLORIDE 50 MG/ML
25 INJECTION INTRAMUSCULAR; INTRAVENOUS; SUBCUTANEOUS ONCE
Status: COMPLETED | OUTPATIENT
Start: 2018-03-06 | End: 2018-03-06

## 2018-03-06 RX ORDER — SODIUM CHLORIDE, SODIUM LACTATE, POTASSIUM CHLORIDE, CALCIUM CHLORIDE 600; 310; 30; 20 MG/100ML; MG/100ML; MG/100ML; MG/100ML
INJECTION, SOLUTION INTRAVENOUS CONTINUOUS
Status: DISCONTINUED | OUTPATIENT
Start: 2018-03-06 | End: 2018-03-06 | Stop reason: HOSPADM

## 2018-03-06 RX ORDER — PROMETHAZINE HYDROCHLORIDE 25 MG/ML
6.25 INJECTION, SOLUTION INTRAMUSCULAR; INTRAVENOUS EVERY 6 HOURS PRN
Status: DISCONTINUED | OUTPATIENT
Start: 2018-03-06 | End: 2018-03-06

## 2018-03-06 RX ORDER — GLYCOPYRROLATE 0.2 MG/ML
INJECTION INTRAMUSCULAR; INTRAVENOUS PRN
Status: DISCONTINUED | OUTPATIENT
Start: 2018-03-06 | End: 2018-03-06 | Stop reason: SDUPTHER

## 2018-03-06 RX ORDER — MIDAZOLAM HYDROCHLORIDE 1 MG/ML
INJECTION INTRAMUSCULAR; INTRAVENOUS PRN
Status: DISCONTINUED | OUTPATIENT
Start: 2018-03-06 | End: 2018-03-06 | Stop reason: SDUPTHER

## 2018-03-06 RX ORDER — LIDOCAINE HYDROCHLORIDE 10 MG/ML
1 INJECTION, SOLUTION INFILTRATION; PERINEURAL ONCE
Status: DISCONTINUED | OUTPATIENT
Start: 2018-03-06 | End: 2018-03-06 | Stop reason: HOSPADM

## 2018-03-06 RX ORDER — CIPROFLOXACIN 500 MG/1
500 TABLET, FILM COATED ORAL 2 TIMES DAILY
Qty: 6 TABLET | Refills: 0 | Status: SHIPPED | OUTPATIENT
Start: 2018-03-06 | End: 2018-03-09

## 2018-03-06 RX ORDER — LIDOCAINE HYDROCHLORIDE 10 MG/ML
1 INJECTION, SOLUTION EPIDURAL; INFILTRATION; INTRACAUDAL; PERINEURAL ONCE
Status: DISCONTINUED | OUTPATIENT
Start: 2018-03-06 | End: 2018-03-06 | Stop reason: HOSPADM

## 2018-03-06 RX ORDER — FLUCONAZOLE 100 MG/1
100 TABLET ORAL DAILY
Qty: 5 TABLET | Refills: 0 | Status: SHIPPED | OUTPATIENT
Start: 2018-03-06 | End: 2018-03-11

## 2018-03-06 RX ORDER — PROPOFOL 10 MG/ML
INJECTION, EMULSION INTRAVENOUS CONTINUOUS PRN
Status: DISCONTINUED | OUTPATIENT
Start: 2018-03-06 | End: 2018-03-06 | Stop reason: SDUPTHER

## 2018-03-06 RX ORDER — LIDOCAINE HYDROCHLORIDE 10 MG/ML
INJECTION, SOLUTION INFILTRATION; PERINEURAL PRN
Status: DISCONTINUED | OUTPATIENT
Start: 2018-03-06 | End: 2018-03-06 | Stop reason: SDUPTHER

## 2018-03-06 RX ORDER — PROMETHAZINE HYDROCHLORIDE 25 MG/ML
6.25 INJECTION, SOLUTION INTRAMUSCULAR; INTRAVENOUS ONCE
Status: COMPLETED | OUTPATIENT
Start: 2018-03-06 | End: 2018-03-06

## 2018-03-06 RX ORDER — FENTANYL CITRATE 50 UG/ML
INJECTION, SOLUTION INTRAMUSCULAR; INTRAVENOUS PRN
Status: DISCONTINUED | OUTPATIENT
Start: 2018-03-06 | End: 2018-03-06 | Stop reason: SDUPTHER

## 2018-03-06 RX ADMIN — PROPOFOL 100 MCG/KG/MIN: 10 INJECTION, EMULSION INTRAVENOUS at 13:54

## 2018-03-06 RX ADMIN — MIDAZOLAM HYDROCHLORIDE 2 MG: 1 INJECTION, SOLUTION INTRAMUSCULAR; INTRAVENOUS at 13:54

## 2018-03-06 RX ADMIN — FENTANYL CITRATE 50 MCG: 50 INJECTION, SOLUTION INTRAMUSCULAR; INTRAVENOUS at 14:09

## 2018-03-06 RX ADMIN — SODIUM CHLORIDE, POTASSIUM CHLORIDE, SODIUM LACTATE AND CALCIUM CHLORIDE: 600; 310; 30; 20 INJECTION, SOLUTION INTRAVENOUS at 12:55

## 2018-03-06 RX ADMIN — GLYCOPYRROLATE 0.2 MG: 0.2 INJECTION, SOLUTION INTRAMUSCULAR; INTRAVENOUS at 13:54

## 2018-03-06 RX ADMIN — MEPERIDINE HYDROCHLORIDE 25 MG: 50 INJECTION INTRAMUSCULAR; INTRAVENOUS; SUBCUTANEOUS at 15:34

## 2018-03-06 RX ADMIN — FENTANYL CITRATE 50 MCG: 50 INJECTION, SOLUTION INTRAMUSCULAR; INTRAVENOUS at 13:54

## 2018-03-06 RX ADMIN — LIDOCAINE HYDROCHLORIDE 50 MG: 10 INJECTION, SOLUTION INFILTRATION; PERINEURAL at 13:54

## 2018-03-06 RX ADMIN — PROMETHAZINE HYDROCHLORIDE 6.25 MG: 25 INJECTION INTRAMUSCULAR; INTRAVENOUS at 15:29

## 2018-03-06 ASSESSMENT — LIFESTYLE VARIABLES: SMOKING_STATUS: 1

## 2018-03-06 ASSESSMENT — PAIN SCALES - GENERAL
PAINLEVEL_OUTOF10: 10
PAINLEVEL_OUTOF10: 0
PAINLEVEL_OUTOF10: 10
PAINLEVEL_OUTOF10: 0
PAINLEVEL_OUTOF10: 0

## 2018-03-06 ASSESSMENT — PAIN - FUNCTIONAL ASSESSMENT: PAIN_FUNCTIONAL_ASSESSMENT: 0-10

## 2018-03-06 NOTE — H&P
with either the patient or if they cannot consent, their representative. Assessment:  Patient examined and appropriate for planned sedation and procedure. Plan:  Proceed with planned sedation and procedure as above.     Yoanna Snyder MD

## 2018-03-09 DIAGNOSIS — C25.0 MALIGNANT NEOPLASM OF HEAD OF PANCREAS (HCC): Primary | ICD-10-CM

## 2018-03-12 NOTE — OP NOTE
Referring/Primary Care Provider: Dr. Naga Jarvis M.D., MD Yoni Fairbanks APRN    Date of Procedure: 03/06/18    Procedure:   1. EGD with Endoscopic Ultrasound with FNA of pancreatic mass    Indications:   55 yo famile with recent inpatient stay at [facility] for AP. Imaging at that time showed a questionable pancreatic mass. EUS FNA on 2/7/18 of pancreatic head mass \"strongly suspicious for malignancy\". Repeat imaging on 2/28/18 with CT pancreas confirmed suspicion for malignancy. Patient presents for plan repeat EUS and fine-needle aspiration of pancreatic head. Notably she does have an elevated CA-19-9 at 150    Anesthesia:  Sedation was administered by anesthesia who monitored the patient during the procedure. Procedure:   After reviewing the patient's chart, H&P, medications, obtaining informed consent, and discussing risks benefits and alternatives to the procedure the patient was placed in the left lateral decubitus position. A oblique viewing Olympus 140 Linear EUS scope was lubricated and inserted through the mouth into the oropharynx. Under indirect visualization, the upper esophagus was intubated. The scope was advanced to the level of the third portion of duodenum with limited views of the esophageal mucosa. Findings and maneuvers are listed in impression below. The patient tolerated the procedure well. There were no immediate complications. Findings:   Endoscopic Finding: Endoscopic evaluation of the esophagus, stomach and duodenum were largely unremarkable    Endosonographic Findings:  - The celiac axis and associated vascular structures was identified and examined. No concerning or malignant lymphadenopathy was identified.     - Limited views of the left lobe of the liver revealed no evidence of biliary dilatation or hepatic mass. No evidence of metastatic disease by EUS    - The EUS scope was advanced to the duodenal bulb.  From this position the pancreatic head was visualized. The pancreatic duct not appear dilated at the most distal point as it entered into the ampulla. However there was some pancreatic ductal dilatation up to the point of the area of the mass of concern. In the pancreatic head very irregular diffuse parenchymal changes were noted. This was hypoechoic in nature compared to rest the pancreatic tissue. This corresponded to area in the CT scan. Due to the continued residual changes of resolving pancreatitis, a well-defined mass was difficult to identify - however the area of hypoechoic changes was very irregular and measured 2.1 x 2.5 cm. A more focal lesion likely in the center of this was seen measuring smaller. It did appear to abut the portal vein, however did not appear to invade the portal vein. - Pancreas: From the gastric station the pancreatic parenchyma was identified. There was evidence of pancreatic ductal dilatation. It measured near 5 mm in its maximum diameter entering into the area of the mass. In the tail the pancreas an anechoic well formed cystic area was seen. It measured 1.68 x 1.50 cm in greatest dimension. This area appeared to be cystic and was new from her previous exam. Given her history this is likely consistent with a pseudocyst from recent pancreatitis. From the duodenal station. FNA was pursued of the pancreatic head lesion. Stylet was used as well as slow pull capillary suction technique. Total of 2 passes were performed with a 22-gauge Σ[place]ίδ[place] 233 acquire needle. Doppler imaging was used throughout the procedure to ensure lack of vascular structures in the needle path. A bedside cytologist was present. Initial findings were suspicious for malignancy, with final report still pending. Estimated Blood Loss: minimal    IMPRESSION:  1. Pancreatic Head Mass suspicious for pancreatic adenocarcinoma, staged [id number] by EUS. Given size on CT scan, this may likely be a T1 lesion.     2. Pancreatic pseudocyst in the tail the pancreas    RECOMMENDATIONS:   1. Await pathology results  2. Referral to Medical Oncology for discussions regarding Surgical referral or consideration of neoadjuvant chemotherapy/radiation. The results were discussed with the patient and family. A copy of the images obtained were given to the patient.      Tracy Multani MD  3/6/18

## 2018-03-16 ENCOUNTER — HOSPITAL ENCOUNTER (OUTPATIENT)
Dept: CT IMAGING | Age: 58
Discharge: HOME | End: 2018-03-16
Payer: COMMERCIAL

## 2018-03-16 DIAGNOSIS — C25.0 MALIGNANT NEOPLASM OF HEAD OF PANCREAS (HCC): ICD-10-CM

## 2018-03-16 PROCEDURE — 71260 CT THORAX DX C+: CPT

## 2018-03-16 PROCEDURE — 6360000004 HC RX CONTRAST MEDICATION: Performed by: INTERNAL MEDICINE

## 2018-03-16 RX ADMIN — IOPAMIDOL 90 ML: 755 INJECTION, SOLUTION INTRAVENOUS at 11:14

## 2018-03-30 PROBLEM — C25.9 PANCREATIC CANCER (HCC): Status: ACTIVE | Noted: 2018-03-30

## 2018-04-02 ENCOUNTER — APPOINTMENT (OUTPATIENT)
Dept: GENERAL RADIOLOGY | Facility: HOSPITAL | Age: 58
End: 2018-04-02

## 2018-04-02 ENCOUNTER — HOSPITAL ENCOUNTER (OUTPATIENT)
Facility: HOSPITAL | Age: 58
Setting detail: HOSPITAL OUTPATIENT SURGERY
Discharge: HOME | End: 2018-04-02
Attending: SPECIALIST | Admitting: SPECIALIST

## 2018-04-02 ENCOUNTER — ANESTHESIA (OUTPATIENT)
Dept: PERIOP | Facility: HOSPITAL | Age: 58
End: 2018-04-02

## 2018-04-02 ENCOUNTER — ANESTHESIA EVENT (OUTPATIENT)
Dept: PERIOP | Facility: HOSPITAL | Age: 58
End: 2018-04-02

## 2018-04-02 ENCOUNTER — HOSPITAL ENCOUNTER (OUTPATIENT)
Dept: GENERAL RADIOLOGY | Facility: HOSPITAL | Age: 58
Setting detail: HOSPITAL OUTPATIENT SURGERY
Discharge: HOME | End: 2018-04-02

## 2018-04-02 VITALS
TEMPERATURE: 97.7 F | OXYGEN SATURATION: 98 % | DIASTOLIC BLOOD PRESSURE: 68 MMHG | RESPIRATION RATE: 18 BRPM | WEIGHT: 145.5 LBS | HEART RATE: 57 BPM | SYSTOLIC BLOOD PRESSURE: 127 MMHG | HEIGHT: 67 IN | BODY MASS INDEX: 22.84 KG/M2

## 2018-04-02 DIAGNOSIS — C25.9 MALIGNANT NEOPLASM OF PANCREAS, UNSPECIFIED LOCATION OF MALIGNANCY (HCC): Primary | ICD-10-CM

## 2018-04-02 LAB
ALBUMIN SERPL-MCNC: 4.4 G/DL (ref 3.5–5)
ALBUMIN/GLOB SERPL: 1.4 G/DL (ref 1.1–2.5)
ALP SERPL-CCNC: 344 U/L (ref 24–120)
ALT SERPL W P-5'-P-CCNC: 615 U/L (ref 0–54)
ANION GAP SERPL CALCULATED.3IONS-SCNC: 11 MMOL/L (ref 4–13)
AST SERPL-CCNC: 315 U/L (ref 7–45)
BASOPHILS # BLD AUTO: 0.04 10*3/MM3 (ref 0–0.2)
BASOPHILS NFR BLD AUTO: 0.5 % (ref 0–2)
BILIRUB SERPL-MCNC: 0.5 MG/DL (ref 0.1–1)
BUN BLD-MCNC: 13 MG/DL (ref 5–21)
BUN/CREAT SERPL: 18.8 (ref 7–25)
CALCIUM SPEC-SCNC: 10 MG/DL (ref 8.4–10.4)
CHLORIDE SERPL-SCNC: 104 MMOL/L (ref 98–110)
CO2 SERPL-SCNC: 28 MMOL/L (ref 24–31)
CREAT BLD-MCNC: 0.69 MG/DL (ref 0.5–1.4)
DEPRECATED RDW RBC AUTO: 43.3 FL (ref 40–54)
EOSINOPHIL # BLD AUTO: 0.28 10*3/MM3 (ref 0–0.7)
EOSINOPHIL NFR BLD AUTO: 3.4 % (ref 0–4)
ERYTHROCYTE [DISTWIDTH] IN BLOOD BY AUTOMATED COUNT: 13.2 % (ref 12–15)
GFR SERPL CREATININE-BSD FRML MDRD: 88 ML/MIN/1.73
GLOBULIN UR ELPH-MCNC: 3.1 GM/DL
GLUCOSE BLD-MCNC: 99 MG/DL (ref 70–100)
HCG SERPL QL: NEGATIVE
HCT VFR BLD AUTO: 37.7 % (ref 37–47)
HGB BLD-MCNC: 12.9 G/DL (ref 12–16)
IMM GRANULOCYTES # BLD: 0.04 10*3/MM3 (ref 0–0.03)
IMM GRANULOCYTES NFR BLD: 0.5 % (ref 0–5)
LYMPHOCYTES # BLD AUTO: 2.03 10*3/MM3 (ref 0.72–4.86)
LYMPHOCYTES NFR BLD AUTO: 24.4 % (ref 15–45)
MCH RBC QN AUTO: 31.2 PG (ref 28–32)
MCHC RBC AUTO-ENTMCNC: 34.2 G/DL (ref 33–36)
MCV RBC AUTO: 91.1 FL (ref 82–98)
MONOCYTES # BLD AUTO: 0.56 10*3/MM3 (ref 0.19–1.3)
MONOCYTES NFR BLD AUTO: 6.7 % (ref 4–12)
NEUTROPHILS # BLD AUTO: 5.38 10*3/MM3 (ref 1.87–8.4)
NEUTROPHILS NFR BLD AUTO: 64.5 % (ref 39–78)
NRBC BLD MANUAL-RTO: 0 /100 WBC (ref 0–0)
PLATELET # BLD AUTO: 163 10*3/MM3 (ref 130–400)
PMV BLD AUTO: 9.5 FL (ref 6–12)
POTASSIUM BLD-SCNC: 4.5 MMOL/L (ref 3.5–5.3)
PROT SERPL-MCNC: 7.5 G/DL (ref 6.3–8.7)
RBC # BLD AUTO: 4.14 10*6/MM3 (ref 4.2–5.4)
SODIUM BLD-SCNC: 143 MMOL/L (ref 135–145)
WBC NRBC COR # BLD: 8.33 10*3/MM3 (ref 4.8–10.8)

## 2018-04-02 PROCEDURE — 25010000002 FENTANYL CITRATE (PF) 100 MCG/2ML SOLUTION: Performed by: NURSE ANESTHETIST, CERTIFIED REGISTERED

## 2018-04-02 PROCEDURE — 25010000002 VANCOMYCIN PER 500 MG: Performed by: SPECIALIST

## 2018-04-02 PROCEDURE — C1788 PORT, INDWELLING, IMP: HCPCS | Performed by: SPECIALIST

## 2018-04-02 PROCEDURE — 71045 X-RAY EXAM CHEST 1 VIEW: CPT

## 2018-04-02 PROCEDURE — 76000 FLUOROSCOPY <1 HR PHYS/QHP: CPT

## 2018-04-02 PROCEDURE — 25010000002 ONDANSETRON PER 1 MG: Performed by: NURSE ANESTHETIST, CERTIFIED REGISTERED

## 2018-04-02 PROCEDURE — 25010000002 MIDAZOLAM PER 1 MG: Performed by: NURSE ANESTHETIST, CERTIFIED REGISTERED

## 2018-04-02 PROCEDURE — 84703 CHORIONIC GONADOTROPIN ASSAY: CPT | Performed by: ANESTHESIOLOGY

## 2018-04-02 PROCEDURE — 25010000002 MIDAZOLAM PER 1 MG: Performed by: ANESTHESIOLOGY

## 2018-04-02 PROCEDURE — 80053 COMPREHEN METABOLIC PANEL: CPT | Performed by: SPECIALIST

## 2018-04-02 PROCEDURE — 25010000002 HEPARIN FLUSH (PORCINE) 100 UNIT/ML SOLUTION: Performed by: SPECIALIST

## 2018-04-02 PROCEDURE — 25010000002 PROPOFOL 10 MG/ML EMULSION: Performed by: NURSE ANESTHETIST, CERTIFIED REGISTERED

## 2018-04-02 PROCEDURE — 85025 COMPLETE CBC W/AUTO DIFF WBC: CPT | Performed by: SPECIALIST

## 2018-04-02 DEVICE — POWERPORT M.R.I. IMPLANTABLE PORT WITH ATTACHABLE 9.6F OPEN-ENDED SINGLE-LUMEN VENOUS CATHETER INTERMEDIATE KIT (WITH SUTURE PLUGS)
Type: IMPLANTABLE DEVICE | Status: FUNCTIONAL
Brand: POWERPORT M.R.I.

## 2018-04-02 RX ORDER — ONDANSETRON 2 MG/ML
INJECTION INTRAMUSCULAR; INTRAVENOUS AS NEEDED
Status: DISCONTINUED | OUTPATIENT
Start: 2018-04-02 | End: 2018-04-02 | Stop reason: SURG

## 2018-04-02 RX ORDER — IPRATROPIUM BROMIDE AND ALBUTEROL SULFATE 2.5; .5 MG/3ML; MG/3ML
3 SOLUTION RESPIRATORY (INHALATION) ONCE AS NEEDED
Status: CANCELLED | OUTPATIENT
Start: 2018-04-02

## 2018-04-02 RX ORDER — FENTANYL CITRATE 50 UG/ML
INJECTION, SOLUTION INTRAMUSCULAR; INTRAVENOUS AS NEEDED
Status: DISCONTINUED | OUTPATIENT
Start: 2018-04-02 | End: 2018-04-02 | Stop reason: SURG

## 2018-04-02 RX ORDER — MIDAZOLAM HYDROCHLORIDE 1 MG/ML
2 INJECTION INTRAMUSCULAR; INTRAVENOUS
Status: DISCONTINUED | OUTPATIENT
Start: 2018-04-02 | End: 2018-04-02 | Stop reason: HOSPADM

## 2018-04-02 RX ORDER — SODIUM CHLORIDE 0.9 % (FLUSH) 0.9 %
3 SYRINGE (ML) INJECTION AS NEEDED
Status: DISCONTINUED | OUTPATIENT
Start: 2018-04-02 | End: 2018-04-02 | Stop reason: HOSPADM

## 2018-04-02 RX ORDER — PROPOFOL 10 MG/ML
VIAL (ML) INTRAVENOUS AS NEEDED
Status: DISCONTINUED | OUTPATIENT
Start: 2018-04-02 | End: 2018-04-02 | Stop reason: SURG

## 2018-04-02 RX ORDER — MIDAZOLAM HYDROCHLORIDE 1 MG/ML
INJECTION INTRAMUSCULAR; INTRAVENOUS AS NEEDED
Status: DISCONTINUED | OUTPATIENT
Start: 2018-04-02 | End: 2018-04-02 | Stop reason: SURG

## 2018-04-02 RX ORDER — SODIUM CHLORIDE, SODIUM LACTATE, POTASSIUM CHLORIDE, CALCIUM CHLORIDE 600; 310; 30; 20 MG/100ML; MG/100ML; MG/100ML; MG/100ML
9 INJECTION, SOLUTION INTRAVENOUS CONTINUOUS
Status: DISCONTINUED | OUTPATIENT
Start: 2018-04-02 | End: 2018-04-02 | Stop reason: HOSPADM

## 2018-04-02 RX ORDER — OXYCODONE HYDROCHLORIDE AND ACETAMINOPHEN 5; 325 MG/1; MG/1
1 TABLET ORAL ONCE AS NEEDED
Status: DISCONTINUED | OUTPATIENT
Start: 2018-04-02 | End: 2018-04-02 | Stop reason: HOSPADM

## 2018-04-02 RX ORDER — NALOXONE HCL 0.4 MG/ML
0.4 VIAL (ML) INJECTION AS NEEDED
Status: CANCELLED | OUTPATIENT
Start: 2018-04-02

## 2018-04-02 RX ORDER — MIDAZOLAM HYDROCHLORIDE 1 MG/ML
1 INJECTION INTRAMUSCULAR; INTRAVENOUS
Status: DISCONTINUED | OUTPATIENT
Start: 2018-04-02 | End: 2018-04-02 | Stop reason: HOSPADM

## 2018-04-02 RX ORDER — SODIUM CHLORIDE 0.9 % (FLUSH) 0.9 %
1-10 SYRINGE (ML) INJECTION AS NEEDED
Status: DISCONTINUED | OUTPATIENT
Start: 2018-04-02 | End: 2018-04-02 | Stop reason: HOSPADM

## 2018-04-02 RX ORDER — FENTANYL CITRATE 50 UG/ML
25 INJECTION, SOLUTION INTRAMUSCULAR; INTRAVENOUS
Status: DISCONTINUED | OUTPATIENT
Start: 2018-04-02 | End: 2018-04-02 | Stop reason: HOSPADM

## 2018-04-02 RX ORDER — METOPROLOL TARTRATE 5 MG/5ML
2.5 INJECTION INTRAVENOUS
Status: CANCELLED | OUTPATIENT
Start: 2018-04-02

## 2018-04-02 RX ORDER — SODIUM CHLORIDE, SODIUM LACTATE, POTASSIUM CHLORIDE, CALCIUM CHLORIDE 600; 310; 30; 20 MG/100ML; MG/100ML; MG/100ML; MG/100ML
1000 INJECTION, SOLUTION INTRAVENOUS CONTINUOUS PRN
Status: DISCONTINUED | OUTPATIENT
Start: 2018-04-02 | End: 2018-04-02 | Stop reason: HOSPADM

## 2018-04-02 RX ORDER — OXYCODONE HYDROCHLORIDE AND ACETAMINOPHEN 5; 325 MG/1; MG/1
1-2 TABLET ORAL EVERY 4 HOURS PRN
Qty: 15 TABLET | Refills: 0 | Status: SHIPPED | OUTPATIENT
Start: 2018-04-02 | End: 2020-03-12

## 2018-04-02 RX ORDER — LABETALOL HYDROCHLORIDE 5 MG/ML
5 INJECTION, SOLUTION INTRAVENOUS
Status: CANCELLED | OUTPATIENT
Start: 2018-04-02

## 2018-04-02 RX ORDER — DEXTROSE MONOHYDRATE 25 G/50ML
12.5 INJECTION, SOLUTION INTRAVENOUS AS NEEDED
Status: DISCONTINUED | OUTPATIENT
Start: 2018-04-02 | End: 2018-04-02 | Stop reason: HOSPADM

## 2018-04-02 RX ORDER — DIPHENHYDRAMINE HYDROCHLORIDE 50 MG/ML
12.5 INJECTION INTRAMUSCULAR; INTRAVENOUS
Status: CANCELLED | OUTPATIENT
Start: 2018-04-02

## 2018-04-02 RX ORDER — LIDOCAINE HYDROCHLORIDE 10 MG/ML
INJECTION, SOLUTION INFILTRATION; PERINEURAL AS NEEDED
Status: DISCONTINUED | OUTPATIENT
Start: 2018-04-02 | End: 2018-04-02 | Stop reason: HOSPADM

## 2018-04-02 RX ORDER — HYDRALAZINE HYDROCHLORIDE 20 MG/ML
5 INJECTION INTRAMUSCULAR; INTRAVENOUS
Status: CANCELLED | OUTPATIENT
Start: 2018-04-02

## 2018-04-02 RX ORDER — ONDANSETRON 2 MG/ML
4 INJECTION INTRAMUSCULAR; INTRAVENOUS ONCE AS NEEDED
Status: CANCELLED | OUTPATIENT
Start: 2018-04-02

## 2018-04-02 RX ADMIN — ONDANSETRON HYDROCHLORIDE 4 MG: 2 SOLUTION INTRAMUSCULAR; INTRAVENOUS at 10:42

## 2018-04-02 RX ADMIN — FENTANYL CITRATE 25 MCG: 50 INJECTION, SOLUTION INTRAMUSCULAR; INTRAVENOUS at 10:30

## 2018-04-02 RX ADMIN — FENTANYL CITRATE 25 MCG: 50 INJECTION, SOLUTION INTRAMUSCULAR; INTRAVENOUS at 10:35

## 2018-04-02 RX ADMIN — MIDAZOLAM HYDROCHLORIDE 1 MG: 1 INJECTION, SOLUTION INTRAMUSCULAR; INTRAVENOUS at 10:23

## 2018-04-02 RX ADMIN — SODIUM CHLORIDE, POTASSIUM CHLORIDE, SODIUM LACTATE AND CALCIUM CHLORIDE 1000 ML: 600; 310; 30; 20 INJECTION, SOLUTION INTRAVENOUS at 09:41

## 2018-04-02 RX ADMIN — PROPOFOL 200 MG: 10 INJECTION, EMULSION INTRAVENOUS at 10:27

## 2018-04-02 RX ADMIN — FENTANYL CITRATE 25 MCG: 50 INJECTION, SOLUTION INTRAMUSCULAR; INTRAVENOUS at 10:25

## 2018-04-02 RX ADMIN — FENTANYL CITRATE 25 MCG: 50 INJECTION, SOLUTION INTRAMUSCULAR; INTRAVENOUS at 10:40

## 2018-04-02 RX ADMIN — Medication 1 G: at 10:32

## 2018-04-02 RX ADMIN — MIDAZOLAM HYDROCHLORIDE 2 MG: 1 INJECTION, SOLUTION INTRAMUSCULAR; INTRAVENOUS at 10:21

## 2018-04-02 NOTE — DISCHARGE INSTRUCTIONS
Moderate Conscious Sedation, Adult, Care After  Refer to this sheet in the next few weeks. These instructions provide you with information on caring for yourself after your procedure. Your health care provider may also give you more specific instructions. Your treatment has been planned according to current medical practices, but problems sometimes occur. Call your health care provider if you have any problems or questions after your procedure.  WHAT TO EXPECT AFTER THE PROCEDURE    After your procedure:  · You may feel sleepy, clumsy, and have poor balance for several hours.  · Vomiting may occur if you eat too soon after the procedure.  HOME CARE INSTRUCTIONS  · Do not participate in any activities where you could become injured for at least 24 hours. Do not:  ¨ Drive.  ¨ Swim.  ¨ Ride a bicycle.  ¨ Operate heavy machinery.  ¨ Cook.  ¨ Use power tools.  ¨ Climb ladders.  ¨ Work from a high place.  · Do not make important decisions or sign legal documents until you are improved.  · If you vomit, drink water, juice, or soup when you can drink without vomiting. Make sure you have little or no nausea before eating solid foods.  · Only take over-the-counter or prescription medicines for pain, discomfort, or fever as directed by your health care provider.  · Make sure you and your family fully understand everything about the medicines given to you, including what side effects may occur.  · You should not drink alcohol, take sleeping pills, or take medicines that cause drowsiness for at least 24 hours.  · If you smoke, do not smoke without supervision.  · If you are feeling better, you may resume normal activities 24 hours after you were sedated.  · Keep all appointments with your health care provider.  SEEK MEDICAL CARE IF:  · Your skin is pale or bluish in color.  · You continue to feel nauseous or vomit.  · Your pain is getting worse and is not helped by medicine.  · You have bleeding or swelling.  · You are still  sleepy or feeling clumsy after 24 hours.  SEEK IMMEDIATE MEDICAL CARE IF:  · You develop a rash.  · You have difficulty breathing.  · You develop any type of allergic problem.  · You have a fever.  MAKE SURE YOU:  · Understand these instructions.  · Will watch your condition.  · Will get help right away if you are not doing well or get worse.     This information is not intended to replace advice given to you by your health care provider. Make sure you discuss any questions you have with your health care provider.     Document Released: 10/08/2014 Document Revised: 01/08/2016 Document Reviewed: 10/08/2014  [employer] Interactive Patient Education ©2016 [employer]         CALL YOUR PHYSICIAN IF YOU EXPERIENCE  INCREASED PAIN NOT HELPED BY YOUR PAIN MEDICATION.        Fall Prevention in the Home      Falls can cause injuries. They can happen to people of all ages. There are many things you can do to make your home safe and to help prevent falls.    WHAT CAN I DO ON THE OUTSIDE OF MY HOME?  · Regularly fix the edges of walkways and driveways and fix any cracks.  · Remove anything that might make you trip as you walk through a door, such as a raised step or threshold.  · Trim any bushes or trees on the path to your home.  · Use bright outdoor lighting.  · Clear any walking paths of anything that might make someone trip, such as rocks or tools.  · Regularly check to see if handrails are loose or broken. Make sure that both sides of any steps have handrails.  · Any raised decks and porches should have guardrails on the edges.  · Have any leaves, snow, or ice cleared regularly.  · Use sand or salt on walking paths during winter.  · Clean up any spills in your garage right away. This includes oil or grease spills.  WHAT CAN I DO IN THE BATHROOM?    · Use night lights.  · Install grab bars by the toilet and in the tub and shower. Do not use towel bars as grab bars.  · Use non-skid mats or decals in the tub or shower.  · If  you need to sit down in the shower, use a plastic, non-slip stool.  · Keep the floor dry. Clean up any water that spills on the floor as soon as it happens.  · Remove soap buildup in the tub or shower regularly.  · Attach bath mats securely with double-sided non-slip rug tape.  · Do not have throw rugs and other things on the floor that can make you trip.  WHAT CAN I DO IN THE BEDROOM?  · Use night lights.  · Make sure that you have a light by your bed that is easy to reach.  · Do not use any sheets or blankets that are too big for your bed. They should not hang down onto the floor.  · Have a firm chair that has side arms. You can use this for support while you get dressed.  · Do not have throw rugs and other things on the floor that can make you trip.  WHAT CAN I DO IN THE KITCHEN?  · Clean up any spills right away.  · Avoid walking on wet floors.  · Keep items that you use a lot in easy-to-reach places.  · If you need to reach something above you, use a strong step stool that has a grab bar.  · Keep electrical cords out of the way.  · Do not use floor polish or wax that makes floors slippery. If you must use wax, use non-skid floor wax.  · Do not have throw rugs and other things on the floor that can make you trip.  WHAT CAN I DO WITH MY STAIRS?  · Do not leave any items on the stairs.  · Make sure that there are handrails on both sides of the stairs and use them. Fix handrails that are broken or loose. Make sure that handrails are as long as the stairways.  · Check any carpeting to make sure that it is firmly attached to the stairs. Fix any carpet that is loose or worn.  · Avoid having throw rugs at the top or bottom of the stairs. If you do have throw rugs, attach them to the floor with carpet tape.  · Make sure that you have a light switch at the top of the stairs and the bottom of the stairs. If you do not have them, ask someone to add them for you.  WHAT ELSE CAN I DO TO HELP PREVENT FALLS?  · Wear shoes  that:  ¨ Do not have high heels.  ¨ Have rubber bottoms.  ¨ Are comfortable and fit you well.  ¨ Are closed at the toe. Do not wear sandals.  · If you use a stepladder:  ¨ Make sure that it is fully opened. Do not climb a closed stepladder.  ¨ Make sure that both sides of the stepladder are locked into place.  ¨ Ask someone to hold it for you, if possible.  · Clearly anabel and make sure that you can see:  ¨ Any grab bars or handrails.  ¨ First and last steps.  ¨ Where the edge of each step is.  · Use tools that help you move around (mobility aids) if they are needed. These include:  ¨ Canes.  ¨ Walkers.  ¨ Scooters.  ¨ Crutches.  · Turn on the lights when you go into a dark area. Replace any light bulbs as soon as they burn out.  · Set up your furniture so you have a clear path. Avoid moving your furniture around.  · If any of your floors are uneven, fix them.  · If there are any pets around you, be aware of where they are.  · Review your medicines with your doctor. Some medicines can make you feel dizzy. This can increase your chance of falling.  Ask your doctor what other things that you can do to help prevent falls.     This information is not intended to replace advice given to you by your health care provider. Make sure you discuss any questions you have with your health care provider.     Document Released: 10/14/2010 Document Revised: 05/03/2016 Document Reviewed: 01/22/2016  [employer] Interactive Patient Education ©2016 [employer]     PATIENT/FAMILY/RESPONSIBLE PARTY VERBALIZES UNDERSTANDING OF ABOVE EDUCATION.  COPY OF PAIN SCALE GIVEN AND REVIEWED WITH VERBALIZED UNDERSTANDING.

## 2018-04-02 NOTE — OP NOTE
INSERTION VENOUS ACCESS DEVICE  Procedure Note    Tiffanie Smith  4/2/2018    Pre-op Diagnosis:   * No pre-op diagnosis entered *    Post-op Diagnosis:     same    Procedure/CPT® Codes:      Procedure(s):  INSERTION VENOUS ACCESS DEVICE    Surgeon(s):  Maggie Li MD    Anesthesia: Monitor Anesthesia Care    Staff:   Circulator: Jeff Camargo RN  Scrub Person: Saranya Live  Assistant: Antonieta Brewster    Estimated Blood Loss: minimal    Specimens:                None      Drains:      Findings:     Complications: none          Date: 4/2/2018  Time: 11:14 AM  The patient was brought to the operating room and placed in the supine position. After IV sedation and infusion of IV antibiotics, the patient was prepped and draped in the usual sterile fashion. Lidocaine 1% was used for local anesthesia. The vein was accessed, the wire passed. Fluoroscopy revealed it to be in good position. The pocket was incised, developed. The catheter was tunneled, cut to size, secured to the port, and the port sutured in place with 0 Prolene. Sheath passed, catheter was fed. Fluoroscopy revealed it to be in good position. Good flush and flow obtained. The wound was closed with 3-0 and 4-0 Vicryl sutures. Dressing was placed. The patient was awakened and transferred to the recovery room in stable condition, tolerated the procedure well. At the end of the procedure, all counts were correct.       Maggie Li MD

## 2018-04-02 NOTE — ANESTHESIA PREPROCEDURE EVALUATION
Anesthesia Evaluation     Patient summary reviewed   no history of anesthetic complications:  NPO Solid Status: > 8 hours             Airway   Mallampati: II  TM distance: >3 FB  Neck ROM: full  Dental      Pulmonary    (+) a smoker,   (-) COPD, asthma, sleep apnea  Cardiovascular   Exercise tolerance: good (4-7 METS)    (+) hypertension, hyperlipidemia,   (-) pacemaker, past MI, angina, cardiac stents      Neuro/Psych  (-) seizures, TIA, CVA  GI/Hepatic/Renal/Endo    (-) GERD, liver disease, no renal disease, diabetes    Musculoskeletal     Abdominal    Substance History      OB/GYN    (-)  Pregnant        Other      history of cancer (pancreatic)                    Anesthesia Plan    ASA 3     MAC     intravenous induction   Anesthetic plan and risks discussed with patient.

## 2018-04-02 NOTE — ANESTHESIA POSTPROCEDURE EVALUATION
"Patient: Tiffanie Smith    Procedure Summary     Date:  04/02/18 Room / Location:   [facility] /  [facility]    Anesthesia Start:  1023 Anesthesia Stop:      Procedure:  INSERTION VENOUS ACCESS DEVICE (N/A Chin to Nipples) Diagnosis:      Surgeon:  Maggie Li MD Provider:  Fatou Saeed CRNA    Anesthesia Type:  MAC ASA Status:  3          Anesthesia Type: MAC  Last vitals  BP   90/55 (04/02/18 1104)   Temp   97.7 °F (36.5 °C) (04/02/18 0916)   Pulse   58 (04/02/18 1100)   Resp   13 (04/02/18 1104)     SpO2   99 % (04/02/18 1100)     Post Anesthesia Care and Evaluation    PONV Status: none  Comments: Patient d/c from PACU prior to anes eval based on Brigid score.  Please see RN notes for details of d/c criteria.    Blood pressure 90/55, pulse 58, temperature 97.7 °F (36.5 °C), temperature source Temporal Artery , resp. rate 13, height 170 cm (66.93\"), weight 66 kg (145 lb 8.1 oz), SpO2 99 %.          "

## 2018-04-03 ENCOUNTER — HOSPITAL ENCOUNTER (OUTPATIENT)
Dept: INFUSION THERAPY | Age: 58
Setting detail: INFUSION SERIES
Discharge: HOME | End: 2018-04-03
Payer: COMMERCIAL

## 2018-04-03 DIAGNOSIS — C25.9 MALIGNANT NEOPLASM OF PANCREAS, UNSPECIFIED LOCATION OF MALIGNANCY (HCC): ICD-10-CM

## 2018-04-03 PROCEDURE — 2580000003 HC RX 258: Performed by: INTERNAL MEDICINE

## 2018-04-03 PROCEDURE — 96411 CHEMO IV PUSH ADDL DRUG: CPT

## 2018-04-03 PROCEDURE — G0498 CHEMO EXTEND IV INFUS W/PUMP: HCPCS

## 2018-04-03 PROCEDURE — 2580000003 HC RX 258: Performed by: NURSE PRACTITIONER

## 2018-04-03 PROCEDURE — 6360000002 HC RX W HCPCS: Performed by: NURSE PRACTITIONER

## 2018-04-03 PROCEDURE — 96366 THER/PROPH/DIAG IV INF ADDON: CPT

## 2018-04-03 PROCEDURE — 96417 CHEMO IV INFUS EACH ADDL SEQ: CPT

## 2018-04-03 PROCEDURE — 96415 CHEMO IV INFUSION ADDL HR: CPT

## 2018-04-03 PROCEDURE — 96413 CHEMO IV INFUSION 1 HR: CPT

## 2018-04-03 RX ORDER — DEXAMETHASONE SODIUM PHOSPHATE 10 MG/ML
10 INJECTION, SOLUTION INTRAMUSCULAR; INTRAVENOUS ONCE
Status: COMPLETED | OUTPATIENT
Start: 2018-04-03 | End: 2018-04-03

## 2018-04-03 RX ORDER — METHYLPREDNISOLONE SODIUM SUCCINATE 125 MG/2ML
125 INJECTION, POWDER, LYOPHILIZED, FOR SOLUTION INTRAMUSCULAR; INTRAVENOUS
Status: ACTIVE | OUTPATIENT
Start: 2018-04-03 | End: 2018-04-03

## 2018-04-03 RX ORDER — ATROPINE SULFATE 1 MG/ML
0.5 INJECTION, SOLUTION INTRAMUSCULAR; INTRAVENOUS; SUBCUTANEOUS ONCE
Status: COMPLETED | OUTPATIENT
Start: 2018-04-03 | End: 2018-04-03

## 2018-04-03 RX ORDER — ATROPINE SULFATE 0.4 MG/ML
0.5 AMPUL (ML) INJECTION ONCE
Status: COMPLETED | OUTPATIENT
Start: 2018-04-03 | End: 2018-04-03

## 2018-04-03 RX ORDER — SODIUM CHLORIDE 9 MG/ML
INJECTION, SOLUTION INTRAVENOUS CONTINUOUS
Status: DISCONTINUED | OUTPATIENT
Start: 2018-04-03 | End: 2018-04-05 | Stop reason: HOSPADM

## 2018-04-03 RX ORDER — FLUOROURACIL 50 MG/ML
720 INJECTION, SOLUTION INTRAVENOUS ONCE
Status: COMPLETED | OUTPATIENT
Start: 2018-04-03 | End: 2018-04-03

## 2018-04-03 RX ORDER — DIPHENHYDRAMINE HYDROCHLORIDE 50 MG/ML
50 INJECTION INTRAMUSCULAR; INTRAVENOUS
Status: ACTIVE | OUTPATIENT
Start: 2018-04-03 | End: 2018-04-03

## 2018-04-03 RX ORDER — PALONOSETRON 0.05 MG/ML
0.25 INJECTION, SOLUTION INTRAVENOUS ONCE
Status: COMPLETED | OUTPATIENT
Start: 2018-04-03 | End: 2018-04-03

## 2018-04-03 RX ADMIN — FLUOROURACIL 720 MG: 50 INJECTION, SOLUTION INTRAVENOUS at 17:05

## 2018-04-03 RX ADMIN — IRINOTECAN HYDROCHLORIDE 320 MG: 100 INJECTION, SOLUTION INTRAVENOUS at 13:47

## 2018-04-03 RX ADMIN — SODIUM CHLORIDE 150 MG: 900 INJECTION, SOLUTION INTRAVENOUS at 10:15

## 2018-04-03 RX ADMIN — OXALIPLATIN 150 MG: 5 INJECTION, SOLUTION INTRAVENOUS at 11:14

## 2018-04-03 RX ADMIN — DEXAMETHASONE SODIUM PHOSPHATE 10 MG: 10 INJECTION, SOLUTION INTRAMUSCULAR; INTRAVENOUS at 10:59

## 2018-04-03 RX ADMIN — ATROPINE SULFATE 0.5 MG: 1 INJECTION, SOLUTION INTRAMUSCULAR; INTRAVENOUS; SUBCUTANEOUS at 13:37

## 2018-04-03 RX ADMIN — SODIUM CHLORIDE: 9 INJECTION, SOLUTION INTRAVENOUS at 10:15

## 2018-04-03 RX ADMIN — ATROPINE SULFATE 0.5 MG: 0.4 INJECTION, SOLUTION INTRAMUSCULAR; INTRAVENOUS; SUBCUTANEOUS at 15:38

## 2018-04-03 RX ADMIN — FLUOROURACIL 4300 MG: 50 INJECTION, SOLUTION INTRAVENOUS at 17:06

## 2018-04-03 RX ADMIN — PALONOSETRON HYDROCHLORIDE 0.25 MG: 0.25 INJECTION INTRAVENOUS at 10:59

## 2018-04-03 RX ADMIN — LEUCOVORIN CALCIUM 700 MG: 350 INJECTION, POWDER, LYOPHILIZED, FOR SOLUTION INTRAMUSCULAR; INTRAVENOUS at 15:35

## 2018-04-05 ENCOUNTER — HOSPITAL ENCOUNTER (OUTPATIENT)
Dept: INFUSION THERAPY | Age: 58
Setting detail: INFUSION SERIES
Discharge: HOME | End: 2018-04-05
Payer: COMMERCIAL

## 2018-04-05 VITALS
SYSTOLIC BLOOD PRESSURE: 113 MMHG | TEMPERATURE: 98.4 F | DIASTOLIC BLOOD PRESSURE: 71 MMHG | HEART RATE: 60 BPM | RESPIRATION RATE: 16 BRPM

## 2018-04-05 DIAGNOSIS — C25.9 MALIGNANT NEOPLASM OF PANCREAS, UNSPECIFIED LOCATION OF MALIGNANCY (HCC): ICD-10-CM

## 2018-04-05 PROCEDURE — 6360000002 HC RX W HCPCS: Performed by: NURSE PRACTITIONER

## 2018-04-05 PROCEDURE — 96523 IRRIG DRUG DELIVERY DEVICE: CPT

## 2018-04-05 PROCEDURE — 96377 APPLICATON ON-BODY INJECTOR: CPT

## 2018-04-05 PROCEDURE — 2580000003 HC RX 258: Performed by: NURSE PRACTITIONER

## 2018-04-05 RX ORDER — SODIUM CHLORIDE 0.9 % (FLUSH) 0.9 %
20 SYRINGE (ML) INJECTION PRN
Status: DISCONTINUED | OUTPATIENT
Start: 2018-04-05 | End: 2018-04-07 | Stop reason: HOSPADM

## 2018-04-05 RX ORDER — HEPARIN SODIUM (PORCINE) LOCK FLUSH IV SOLN 100 UNIT/ML 100 UNIT/ML
300 SOLUTION INTRAVENOUS PRN
Status: DISCONTINUED | OUTPATIENT
Start: 2018-04-05 | End: 2018-04-07 | Stop reason: HOSPADM

## 2018-04-05 RX ADMIN — PEGFILGRASTIM 6 MG: KIT SUBCUTANEOUS at 15:02

## 2018-04-05 RX ADMIN — Medication 20 ML: at 15:02

## 2018-04-05 RX ADMIN — SODIUM CHLORIDE, PRESERVATIVE FREE 300 UNITS: 5 INJECTION INTRAVENOUS at 15:02

## 2018-04-10 ENCOUNTER — HOSPITAL ENCOUNTER (OUTPATIENT)
Dept: ULTRASOUND IMAGING | Age: 58
Discharge: HOME | End: 2018-04-10
Payer: COMMERCIAL

## 2018-04-10 ENCOUNTER — TELEPHONE (OUTPATIENT)
Dept: GASTROENTEROLOGY | Age: 58
End: 2018-04-10

## 2018-04-10 ENCOUNTER — HOSPITAL ENCOUNTER (OUTPATIENT)
Dept: LAB | Age: 58
Discharge: HOME | End: 2018-04-10
Payer: COMMERCIAL

## 2018-04-10 DIAGNOSIS — C25.0 ADENOCARCINOMA OF HEAD OF PANCREAS (HCC): ICD-10-CM

## 2018-04-10 DIAGNOSIS — K82.0 CONTRACTION, GALLBLADDER: ICD-10-CM

## 2018-04-10 LAB
ALBUMIN SERPL-MCNC: 4.3 G/DL (ref 3.5–5.2)
ALP BLD-CCNC: 291 U/L (ref 35–104)
ALT SERPL-CCNC: 116 U/L (ref 5–33)
ANION GAP SERPL CALCULATED.3IONS-SCNC: 10 MMOL/L (ref 7–19)
AST SERPL-CCNC: 23 U/L (ref 5–32)
BILIRUB SERPL-MCNC: 0.4 MG/DL (ref 0.2–1.2)
BUN BLDV-MCNC: 14 MG/DL (ref 6–20)
CALCIUM SERPL-MCNC: 9.5 MG/DL (ref 8.6–10)
CHLORIDE BLD-SCNC: 100 MMOL/L (ref 98–111)
CO2: 29 MMOL/L (ref 22–29)
CREAT SERPL-MCNC: 0.6 MG/DL (ref 0.5–0.9)
GFR NON-AFRICAN AMERICAN: >60
GLUCOSE BLD-MCNC: 108 MG/DL (ref 74–109)
POTASSIUM SERPL-SCNC: 4.8 MMOL/L (ref 3.5–5)
SODIUM BLD-SCNC: 139 MMOL/L (ref 136–145)
TOTAL PROTEIN: 7.1 G/DL (ref 6.6–8.7)

## 2018-04-10 PROCEDURE — 76705 ECHO EXAM OF ABDOMEN: CPT

## 2018-04-10 PROCEDURE — 36415 COLL VENOUS BLD VENIPUNCTURE: CPT

## 2018-04-10 PROCEDURE — 80053 COMPREHEN METABOLIC PANEL: CPT

## 2018-04-11 ENCOUNTER — HOSPITAL ENCOUNTER (OUTPATIENT)
Age: 58
Setting detail: OUTPATIENT SURGERY
Discharge: HOME | End: 2018-04-11
Attending: INTERNAL MEDICINE | Admitting: INTERNAL MEDICINE
Payer: COMMERCIAL

## 2018-04-11 ENCOUNTER — APPOINTMENT (OUTPATIENT)
Dept: GENERAL RADIOLOGY | Age: 58
End: 2018-04-11
Attending: INTERNAL MEDICINE
Payer: COMMERCIAL

## 2018-04-11 ENCOUNTER — ANESTHESIA (OUTPATIENT)
Dept: ENDOSCOPY | Age: 58
End: 2018-04-11
Payer: COMMERCIAL

## 2018-04-11 ENCOUNTER — ANESTHESIA EVENT (OUTPATIENT)
Dept: ENDOSCOPY | Age: 58
End: 2018-04-11
Payer: COMMERCIAL

## 2018-04-11 VITALS
OXYGEN SATURATION: 100 % | WEIGHT: 140 LBS | BODY MASS INDEX: 21.97 KG/M2 | RESPIRATION RATE: 18 BRPM | TEMPERATURE: 98.2 F | HEIGHT: 67 IN | SYSTOLIC BLOOD PRESSURE: 144 MMHG | HEART RATE: 62 BPM | DIASTOLIC BLOOD PRESSURE: 74 MMHG

## 2018-04-11 VITALS
OXYGEN SATURATION: 100 % | SYSTOLIC BLOOD PRESSURE: 139 MMHG | DIASTOLIC BLOOD PRESSURE: 100 MMHG | RESPIRATION RATE: 5 BRPM

## 2018-04-11 PROCEDURE — 6370000000 HC RX 637 (ALT 250 FOR IP)

## 2018-04-11 PROCEDURE — 6360000002 HC RX W HCPCS: Performed by: NURSE ANESTHETIST, CERTIFIED REGISTERED

## 2018-04-11 PROCEDURE — C1874 STENT, COATED/COV W/DEL SYS: HCPCS | Performed by: INTERNAL MEDICINE

## 2018-04-11 PROCEDURE — C1769 GUIDE WIRE: HCPCS | Performed by: INTERNAL MEDICINE

## 2018-04-11 PROCEDURE — 7100000001 HC PACU RECOVERY - ADDTL 15 MIN: Performed by: INTERNAL MEDICINE

## 2018-04-11 PROCEDURE — 3209999900 FLUORO FOR SURGICAL PROCEDURES

## 2018-04-11 PROCEDURE — 7100000000 HC PACU RECOVERY - FIRST 15 MIN: Performed by: INTERNAL MEDICINE

## 2018-04-11 PROCEDURE — 2720000010 HC SURG SUPPLY STERILE: Performed by: INTERNAL MEDICINE

## 2018-04-11 PROCEDURE — 74328 X-RAY BILE DUCT ENDOSCOPY: CPT

## 2018-04-11 PROCEDURE — 7100000011 HC PHASE II RECOVERY - ADDTL 15 MIN: Performed by: INTERNAL MEDICINE

## 2018-04-11 PROCEDURE — 3609014400 HC ERCP DIAGNOSTIC: Performed by: INTERNAL MEDICINE

## 2018-04-11 PROCEDURE — 3700000000 HC ANESTHESIA ATTENDED CARE: Performed by: INTERNAL MEDICINE

## 2018-04-11 PROCEDURE — 2580000003 HC RX 258: Performed by: INTERNAL MEDICINE

## 2018-04-11 PROCEDURE — 6360000002 HC RX W HCPCS: Performed by: INTERNAL MEDICINE

## 2018-04-11 PROCEDURE — 3700000001 HC ADD 15 MINUTES (ANESTHESIA): Performed by: INTERNAL MEDICINE

## 2018-04-11 PROCEDURE — 2500000003 HC RX 250 WO HCPCS: Performed by: NURSE ANESTHETIST, CERTIFIED REGISTERED

## 2018-04-11 PROCEDURE — 6370000000 HC RX 637 (ALT 250 FOR IP): Performed by: INTERNAL MEDICINE

## 2018-04-11 PROCEDURE — 7100000010 HC PHASE II RECOVERY - FIRST 15 MIN: Performed by: INTERNAL MEDICINE

## 2018-04-11 DEVICE — STENT SYSTEM RMV
Type: IMPLANTABLE DEVICE | Status: FUNCTIONAL
Brand: WALLFLEX BILIARY

## 2018-04-11 RX ORDER — ENALAPRILAT 2.5 MG/2ML
1.25 INJECTION INTRAVENOUS
Status: DISCONTINUED | OUTPATIENT
Start: 2018-04-11 | End: 2018-04-11 | Stop reason: HOSPADM

## 2018-04-11 RX ORDER — MIDAZOLAM HYDROCHLORIDE 1 MG/ML
INJECTION INTRAMUSCULAR; INTRAVENOUS PRN
Status: DISCONTINUED | OUTPATIENT
Start: 2018-04-11 | End: 2018-04-11 | Stop reason: SDUPTHER

## 2018-04-11 RX ORDER — LABETALOL HYDROCHLORIDE 5 MG/ML
5 INJECTION, SOLUTION INTRAVENOUS EVERY 10 MIN PRN
Status: DISCONTINUED | OUTPATIENT
Start: 2018-04-11 | End: 2018-04-11 | Stop reason: HOSPADM

## 2018-04-11 RX ORDER — ROCURONIUM BROMIDE 10 MG/ML
INJECTION, SOLUTION INTRAVENOUS PRN
Status: DISCONTINUED | OUTPATIENT
Start: 2018-04-11 | End: 2018-04-11 | Stop reason: SDUPTHER

## 2018-04-11 RX ORDER — SODIUM CHLORIDE, SODIUM LACTATE, POTASSIUM CHLORIDE, CALCIUM CHLORIDE 600; 310; 30; 20 MG/100ML; MG/100ML; MG/100ML; MG/100ML
INJECTION, SOLUTION INTRAVENOUS CONTINUOUS
Status: DISCONTINUED | OUTPATIENT
Start: 2018-04-11 | End: 2018-04-11 | Stop reason: HOSPADM

## 2018-04-11 RX ORDER — ONDANSETRON 2 MG/ML
INJECTION INTRAMUSCULAR; INTRAVENOUS PRN
Status: DISCONTINUED | OUTPATIENT
Start: 2018-04-11 | End: 2018-04-11 | Stop reason: SDUPTHER

## 2018-04-11 RX ORDER — MEPERIDINE HYDROCHLORIDE 50 MG/ML
25 INJECTION INTRAMUSCULAR; INTRAVENOUS; SUBCUTANEOUS
Status: DISCONTINUED | OUTPATIENT
Start: 2018-04-11 | End: 2018-04-11 | Stop reason: HOSPADM

## 2018-04-11 RX ORDER — OXYCODONE HYDROCHLORIDE AND ACETAMINOPHEN 5; 325 MG/1; MG/1
1 TABLET ORAL ONCE
Status: COMPLETED | OUTPATIENT
Start: 2018-04-11 | End: 2018-04-11

## 2018-04-11 RX ORDER — HYDRALAZINE HYDROCHLORIDE 20 MG/ML
5 INJECTION INTRAMUSCULAR; INTRAVENOUS EVERY 10 MIN PRN
Status: DISCONTINUED | OUTPATIENT
Start: 2018-04-11 | End: 2018-04-11 | Stop reason: HOSPADM

## 2018-04-11 RX ORDER — OXYCODONE HYDROCHLORIDE AND ACETAMINOPHEN 5; 325 MG/1; MG/1
TABLET ORAL
Status: COMPLETED
Start: 2018-04-11 | End: 2018-04-11

## 2018-04-11 RX ORDER — PROPOFOL 10 MG/ML
INJECTION, EMULSION INTRAVENOUS PRN
Status: DISCONTINUED | OUTPATIENT
Start: 2018-04-11 | End: 2018-04-11 | Stop reason: SDUPTHER

## 2018-04-11 RX ORDER — PROMETHAZINE HYDROCHLORIDE 25 MG/ML
25 INJECTION, SOLUTION INTRAMUSCULAR; INTRAVENOUS EVERY 6 HOURS PRN
Status: DISCONTINUED | OUTPATIENT
Start: 2018-04-11 | End: 2018-04-11 | Stop reason: HOSPADM

## 2018-04-11 RX ORDER — DIPHENHYDRAMINE HYDROCHLORIDE 50 MG/ML
12.5 INJECTION INTRAMUSCULAR; INTRAVENOUS
Status: DISCONTINUED | OUTPATIENT
Start: 2018-04-11 | End: 2018-04-11 | Stop reason: HOSPADM

## 2018-04-11 RX ORDER — MEPERIDINE HYDROCHLORIDE 50 MG/ML
12.5 INJECTION INTRAMUSCULAR; INTRAVENOUS; SUBCUTANEOUS EVERY 5 MIN PRN
Status: DISCONTINUED | OUTPATIENT
Start: 2018-04-11 | End: 2018-04-11 | Stop reason: HOSPADM

## 2018-04-11 RX ORDER — LIDOCAINE HYDROCHLORIDE 10 MG/ML
INJECTION, SOLUTION INFILTRATION; PERINEURAL PRN
Status: DISCONTINUED | OUTPATIENT
Start: 2018-04-11 | End: 2018-04-11 | Stop reason: SDUPTHER

## 2018-04-11 RX ORDER — LIDOCAINE HYDROCHLORIDE 10 MG/ML
1 INJECTION, SOLUTION INFILTRATION; PERINEURAL ONCE
Status: DISCONTINUED | OUTPATIENT
Start: 2018-04-11 | End: 2018-04-11 | Stop reason: HOSPADM

## 2018-04-11 RX ORDER — METOCLOPRAMIDE HYDROCHLORIDE 5 MG/ML
10 INJECTION INTRAMUSCULAR; INTRAVENOUS
Status: DISCONTINUED | OUTPATIENT
Start: 2018-04-11 | End: 2018-04-11 | Stop reason: HOSPADM

## 2018-04-11 RX ADMIN — MEPERIDINE HYDROCHLORIDE 25 MG: 50 INJECTION INTRAMUSCULAR; INTRAVENOUS; SUBCUTANEOUS at 10:43

## 2018-04-11 RX ADMIN — SUGAMMADEX 127 MG: 100 INJECTION, SOLUTION INTRAVENOUS at 08:59

## 2018-04-11 RX ADMIN — SODIUM CHLORIDE, SODIUM LACTATE, POTASSIUM CHLORIDE, AND CALCIUM CHLORIDE: 600; 310; 30; 20 INJECTION, SOLUTION INTRAVENOUS at 07:59

## 2018-04-11 RX ADMIN — MEPERIDINE HYDROCHLORIDE 12.5 MG: 50 INJECTION INTRAMUSCULAR; INTRAVENOUS; SUBCUTANEOUS at 09:39

## 2018-04-11 RX ADMIN — PROMETHAZINE HYDROCHLORIDE 12.5 MG: 25 INJECTION INTRAMUSCULAR; INTRAVENOUS at 10:40

## 2018-04-11 RX ADMIN — OXYCODONE HYDROCHLORIDE AND ACETAMINOPHEN 1 TABLET: 5; 325 TABLET ORAL at 12:41

## 2018-04-11 RX ADMIN — SODIUM CHLORIDE, SODIUM LACTATE, POTASSIUM CHLORIDE, AND CALCIUM CHLORIDE: 600; 310; 30; 20 INJECTION, SOLUTION INTRAVENOUS at 10:00

## 2018-04-11 RX ADMIN — ROCURONIUM BROMIDE 40 MG: 10 INJECTION INTRAVENOUS at 08:29

## 2018-04-11 RX ADMIN — MIDAZOLAM HYDROCHLORIDE 2 MG: 1 INJECTION, SOLUTION INTRAMUSCULAR; INTRAVENOUS at 08:19

## 2018-04-11 RX ADMIN — ONDANSETRON HYDROCHLORIDE 4 MG: 2 SOLUTION INTRAMUSCULAR; INTRAVENOUS at 08:48

## 2018-04-11 RX ADMIN — PROPOFOL 150 MG: 10 INJECTION, EMULSION INTRAVENOUS at 08:25

## 2018-04-11 RX ADMIN — LIDOCAINE HYDROCHLORIDE 50 MG: 10 INJECTION, SOLUTION INFILTRATION; PERINEURAL at 08:25

## 2018-04-11 ASSESSMENT — LIFESTYLE VARIABLES: SMOKING_STATUS: 1

## 2018-04-11 ASSESSMENT — PAIN DESCRIPTION - PROGRESSION
CLINICAL_PROGRESSION: GRADUALLY WORSENING
CLINICAL_PROGRESSION: GRADUALLY IMPROVING

## 2018-04-11 ASSESSMENT — PAIN SCALES - GENERAL
PAINLEVEL_OUTOF10: 8
PAINLEVEL_OUTOF10: 0
PAINLEVEL_OUTOF10: 8
PAINLEVEL_OUTOF10: 4
PAINLEVEL_OUTOF10: 8
PAINLEVEL_OUTOF10: 3
PAINLEVEL_OUTOF10: 0
PAINLEVEL_OUTOF10: 8

## 2018-04-11 ASSESSMENT — PAIN DESCRIPTION - LOCATION
LOCATION: ABDOMEN
LOCATION: ABDOMEN

## 2018-04-11 ASSESSMENT — PAIN DESCRIPTION - PAIN TYPE
TYPE: SURGICAL PAIN

## 2018-04-11 ASSESSMENT — PAIN DESCRIPTION - ONSET
ONSET: GRADUAL
ONSET: ON-GOING

## 2018-04-11 ASSESSMENT — PAIN DESCRIPTION - ORIENTATION
ORIENTATION: MID
ORIENTATION: MID

## 2018-04-11 ASSESSMENT — PAIN DESCRIPTION - DESCRIPTORS
DESCRIPTORS: DISCOMFORT
DESCRIPTORS: CONSTANT;DISCOMFORT

## 2018-04-11 ASSESSMENT — PAIN DESCRIPTION - FREQUENCY
FREQUENCY: CONTINUOUS
FREQUENCY: INTERMITTENT

## 2018-04-11 ASSESSMENT — PAIN - FUNCTIONAL ASSESSMENT: PAIN_FUNCTIONAL_ASSESSMENT: 0-10

## 2018-04-12 RX ORDER — BENZONATATE 100 MG/1
100 CAPSULE ORAL 3 TIMES DAILY PRN
Qty: 30 CAPSULE | Refills: 1 | Status: SHIPPED | OUTPATIENT
Start: 2018-04-12 | End: 2018-04-26

## 2018-04-17 ENCOUNTER — HOSPITAL ENCOUNTER (OUTPATIENT)
Dept: INFUSION THERAPY | Age: 58
Setting detail: INFUSION SERIES
Discharge: HOME | End: 2018-04-17
Payer: COMMERCIAL

## 2018-04-17 VITALS
SYSTOLIC BLOOD PRESSURE: 122 MMHG | TEMPERATURE: 97.6 F | DIASTOLIC BLOOD PRESSURE: 75 MMHG | HEART RATE: 55 BPM | RESPIRATION RATE: 17 BRPM

## 2018-04-17 VITALS
OXYGEN SATURATION: 98 % | WEIGHT: 146 LBS | DIASTOLIC BLOOD PRESSURE: 74 MMHG | TEMPERATURE: 97.8 F | RESPIRATION RATE: 17 BRPM | HEART RATE: 67 BPM | HEIGHT: 67 IN | SYSTOLIC BLOOD PRESSURE: 115 MMHG | BODY MASS INDEX: 22.91 KG/M2

## 2018-04-17 DIAGNOSIS — C25.9 MALIGNANT NEOPLASM OF PANCREAS, UNSPECIFIED LOCATION OF MALIGNANCY (HCC): ICD-10-CM

## 2018-04-17 PROCEDURE — G0498 CHEMO EXTEND IV INFUS W/PUMP: HCPCS

## 2018-04-17 PROCEDURE — 2580000003 HC RX 258: Performed by: INTERNAL MEDICINE

## 2018-04-17 PROCEDURE — 2580000003 HC RX 258: Performed by: NURSE PRACTITIONER

## 2018-04-17 PROCEDURE — 96415 CHEMO IV INFUSION ADDL HR: CPT

## 2018-04-17 PROCEDURE — 96417 CHEMO IV INFUS EACH ADDL SEQ: CPT

## 2018-04-17 PROCEDURE — 6360000002 HC RX W HCPCS: Performed by: INTERNAL MEDICINE

## 2018-04-17 PROCEDURE — 96366 THER/PROPH/DIAG IV INF ADDON: CPT

## 2018-04-17 PROCEDURE — 96413 CHEMO IV INFUSION 1 HR: CPT

## 2018-04-17 PROCEDURE — 96411 CHEMO IV PUSH ADDL DRUG: CPT

## 2018-04-17 RX ORDER — METHYLPREDNISOLONE SODIUM SUCCINATE 125 MG/2ML
125 INJECTION, POWDER, LYOPHILIZED, FOR SOLUTION INTRAMUSCULAR; INTRAVENOUS PRN
Status: DISCONTINUED | OUTPATIENT
Start: 2018-04-17 | End: 2018-04-19 | Stop reason: HOSPADM

## 2018-04-17 RX ORDER — FLUOROURACIL 50 MG/ML
720 INJECTION, SOLUTION INTRAVENOUS ONCE
Status: COMPLETED | OUTPATIENT
Start: 2018-04-17 | End: 2018-04-17

## 2018-04-17 RX ORDER — ATROPINE SULFATE 0.4 MG/ML
0.5 AMPUL (ML) INJECTION ONCE
Status: DISCONTINUED | OUTPATIENT
Start: 2018-04-17 | End: 2018-04-17

## 2018-04-17 RX ORDER — DEXAMETHASONE SODIUM PHOSPHATE 10 MG/ML
10 INJECTION, SOLUTION INTRAMUSCULAR; INTRAVENOUS ONCE
Status: COMPLETED | OUTPATIENT
Start: 2018-04-17 | End: 2018-04-17

## 2018-04-17 RX ORDER — DIPHENHYDRAMINE HYDROCHLORIDE 50 MG/ML
50 INJECTION INTRAMUSCULAR; INTRAVENOUS PRN
Status: DISCONTINUED | OUTPATIENT
Start: 2018-04-17 | End: 2018-04-19 | Stop reason: HOSPADM

## 2018-04-17 RX ORDER — PALONOSETRON 0.05 MG/ML
0.25 INJECTION, SOLUTION INTRAVENOUS ONCE
Status: COMPLETED | OUTPATIENT
Start: 2018-04-17 | End: 2018-04-17

## 2018-04-17 RX ORDER — ATROPINE SULFATE 1 MG/ML
0.5 INJECTION, SOLUTION INTRAMUSCULAR; INTRAVENOUS; SUBCUTANEOUS ONCE
Status: COMPLETED | OUTPATIENT
Start: 2018-04-17 | End: 2018-04-17

## 2018-04-17 RX ORDER — DEXTROSE MONOHYDRATE 50 MG/ML
1000 INJECTION, SOLUTION INTRAVENOUS CONTINUOUS
Status: ACTIVE | OUTPATIENT
Start: 2018-04-17 | End: 2018-04-17

## 2018-04-17 RX ORDER — 0.9 % SODIUM CHLORIDE 0.9 %
250 INTRAVENOUS SOLUTION INTRAVENOUS ONCE
Status: COMPLETED | OUTPATIENT
Start: 2018-04-17 | End: 2018-04-17

## 2018-04-17 RX ORDER — ATROPINE SULFATE 1 MG/ML
0.5 INJECTION, SOLUTION INTRAMUSCULAR; INTRAVENOUS; SUBCUTANEOUS ONCE
Status: DISCONTINUED | OUTPATIENT
Start: 2018-04-17 | End: 2018-04-17

## 2018-04-17 RX ADMIN — OXALIPLATIN 150 MG: 5 INJECTION, SOLUTION INTRAVENOUS at 11:11

## 2018-04-17 RX ADMIN — Medication 4296 MG: at 15:06

## 2018-04-17 RX ADMIN — LEUCOVORIN CALCIUM 700 MG: 350 INJECTION, POWDER, LYOPHILIZED, FOR SOLUTION INTRAMUSCULAR; INTRAVENOUS at 13:22

## 2018-04-17 RX ADMIN — ATROPINE SULFATE 0.5 MG: 1 INJECTION, SOLUTION INTRAMUSCULAR; INTRAVENOUS; SUBCUTANEOUS at 10:43

## 2018-04-17 RX ADMIN — DEXAMETHASONE SODIUM PHOSPHATE 10 MG: 10 INJECTION, SOLUTION INTRAMUSCULAR; INTRAVENOUS at 08:51

## 2018-04-17 RX ADMIN — DEXTROSE MONOHYDRATE 320 MG: 50 INJECTION, SOLUTION INTRAVENOUS at 09:04

## 2018-04-17 RX ADMIN — FLUOROURACIL 720 MG: 50 INJECTION, SOLUTION INTRAVENOUS at 15:05

## 2018-04-17 RX ADMIN — SODIUM CHLORIDE 250 ML: 9 INJECTION, SOLUTION INTRAVENOUS at 08:12

## 2018-04-17 RX ADMIN — ATROPINE SULFATE 0.5 MG: 1 INJECTION, SOLUTION INTRAMUSCULAR; INTRAVENOUS; SUBCUTANEOUS at 08:51

## 2018-04-17 RX ADMIN — PALONOSETRON HYDROCHLORIDE 0.25 MG: 0.25 INJECTION INTRAVENOUS at 08:50

## 2018-04-17 RX ADMIN — SODIUM CHLORIDE 150 MG: 900 INJECTION, SOLUTION INTRAVENOUS at 08:12

## 2018-04-17 RX ADMIN — DEXTROSE MONOHYDRATE 500 ML: 50 INJECTION, SOLUTION INTRAVENOUS at 10:59

## 2018-04-19 ENCOUNTER — HOSPITAL ENCOUNTER (OUTPATIENT)
Dept: INFUSION THERAPY | Age: 58
Setting detail: INFUSION SERIES
Discharge: HOME | End: 2018-04-19
Payer: COMMERCIAL

## 2018-04-19 VITALS
TEMPERATURE: 97.6 F | HEART RATE: 59 BPM | RESPIRATION RATE: 17 BRPM | SYSTOLIC BLOOD PRESSURE: 125 MMHG | DIASTOLIC BLOOD PRESSURE: 71 MMHG

## 2018-04-19 DIAGNOSIS — C25.9 MALIGNANT NEOPLASM OF PANCREAS, UNSPECIFIED LOCATION OF MALIGNANCY (HCC): ICD-10-CM

## 2018-04-19 PROCEDURE — 6360000002 HC RX W HCPCS: Performed by: INTERNAL MEDICINE

## 2018-04-19 PROCEDURE — 96523 IRRIG DRUG DELIVERY DEVICE: CPT

## 2018-04-19 PROCEDURE — 96377 APPLICATON ON-BODY INJECTOR: CPT

## 2018-04-19 PROCEDURE — 2580000003 HC RX 258: Performed by: INTERNAL MEDICINE

## 2018-04-19 RX ORDER — HEPARIN SODIUM (PORCINE) LOCK FLUSH IV SOLN 100 UNIT/ML 100 UNIT/ML
300 SOLUTION INTRAVENOUS PRN
Status: DISCONTINUED | OUTPATIENT
Start: 2018-04-19 | End: 2018-04-21 | Stop reason: HOSPADM

## 2018-04-19 RX ORDER — SODIUM CHLORIDE 0.9 % (FLUSH) 0.9 %
20 SYRINGE (ML) INJECTION PRN
Status: DISCONTINUED | OUTPATIENT
Start: 2018-04-19 | End: 2018-04-21 | Stop reason: HOSPADM

## 2018-04-19 RX ADMIN — PEGFILGRASTIM 6 MG: KIT SUBCUTANEOUS at 14:22

## 2018-04-19 RX ADMIN — SODIUM CHLORIDE, PRESERVATIVE FREE 300 UNITS: 5 INJECTION INTRAVENOUS at 13:35

## 2018-04-19 RX ADMIN — Medication 20 ML: at 13:35

## 2018-05-01 ENCOUNTER — HOSPITAL ENCOUNTER (OUTPATIENT)
Dept: INFUSION THERAPY | Age: 58
Setting detail: INFUSION SERIES
Discharge: HOME | End: 2018-05-01
Payer: COMMERCIAL

## 2018-05-01 VITALS
RESPIRATION RATE: 17 BRPM | TEMPERATURE: 97.2 F | SYSTOLIC BLOOD PRESSURE: 111 MMHG | HEART RATE: 61 BPM | DIASTOLIC BLOOD PRESSURE: 73 MMHG

## 2018-05-01 DIAGNOSIS — C25.9 MALIGNANT NEOPLASM OF PANCREAS, UNSPECIFIED LOCATION OF MALIGNANCY (HCC): ICD-10-CM

## 2018-05-01 PROCEDURE — G0498 CHEMO EXTEND IV INFUS W/PUMP: HCPCS

## 2018-05-01 PROCEDURE — 96413 CHEMO IV INFUSION 1 HR: CPT

## 2018-05-01 PROCEDURE — 6360000002 HC RX W HCPCS: Performed by: INTERNAL MEDICINE

## 2018-05-01 PROCEDURE — 96415 CHEMO IV INFUSION ADDL HR: CPT

## 2018-05-01 PROCEDURE — 96411 CHEMO IV PUSH ADDL DRUG: CPT

## 2018-05-01 PROCEDURE — 96417 CHEMO IV INFUS EACH ADDL SEQ: CPT

## 2018-05-01 PROCEDURE — 2580000003 HC RX 258: Performed by: INTERNAL MEDICINE

## 2018-05-01 PROCEDURE — 96366 THER/PROPH/DIAG IV INF ADDON: CPT

## 2018-05-01 RX ORDER — ATROPINE SULFATE 0.4 MG/ML
0.5 AMPUL (ML) INJECTION ONCE
Status: DISCONTINUED | OUTPATIENT
Start: 2018-05-01 | End: 2018-05-01

## 2018-05-01 RX ORDER — FLUOROURACIL 50 MG/ML
400 INJECTION, SOLUTION INTRAVENOUS ONCE
Status: COMPLETED | OUTPATIENT
Start: 2018-05-01 | End: 2018-05-01

## 2018-05-01 RX ORDER — DEXTROSE MONOHYDRATE 50 MG/ML
INJECTION, SOLUTION INTRAVENOUS CONTINUOUS
Status: DISCONTINUED | OUTPATIENT
Start: 2018-05-01 | End: 2018-05-03 | Stop reason: HOSPADM

## 2018-05-01 RX ORDER — DIPHENHYDRAMINE HYDROCHLORIDE 50 MG/ML
50 INJECTION INTRAMUSCULAR; INTRAVENOUS PRN
Status: DISCONTINUED | OUTPATIENT
Start: 2018-05-01 | End: 2018-05-03 | Stop reason: HOSPADM

## 2018-05-01 RX ORDER — PALONOSETRON 0.05 MG/ML
0.25 INJECTION, SOLUTION INTRAVENOUS ONCE
Status: COMPLETED | OUTPATIENT
Start: 2018-05-01 | End: 2018-05-01

## 2018-05-01 RX ORDER — DEXAMETHASONE SODIUM PHOSPHATE 10 MG/ML
10 INJECTION, SOLUTION INTRAMUSCULAR; INTRAVENOUS ONCE
Status: COMPLETED | OUTPATIENT
Start: 2018-05-01 | End: 2018-05-01

## 2018-05-01 RX ORDER — ATROPINE SULFATE 1 MG/ML
0.5 INJECTION, SOLUTION INTRAMUSCULAR; INTRAVENOUS; SUBCUTANEOUS ONCE
Status: COMPLETED | OUTPATIENT
Start: 2018-05-01 | End: 2018-05-01

## 2018-05-01 RX ORDER — METHYLPREDNISOLONE SODIUM SUCCINATE 125 MG/2ML
125 INJECTION, POWDER, LYOPHILIZED, FOR SOLUTION INTRAMUSCULAR; INTRAVENOUS PRN
Status: DISCONTINUED | OUTPATIENT
Start: 2018-05-01 | End: 2018-05-03 | Stop reason: HOSPADM

## 2018-05-01 RX ADMIN — SODIUM CHLORIDE 150 MG: 900 INJECTION, SOLUTION INTRAVENOUS at 08:04

## 2018-05-01 RX ADMIN — DEXTROSE MONOHYDRATE: 50 INJECTION, SOLUTION INTRAVENOUS at 08:40

## 2018-05-01 RX ADMIN — ATROPINE SULFATE 0.5 MG: 1 INJECTION, SOLUTION INTRAMUSCULAR; INTRAVENOUS; SUBCUTANEOUS at 08:57

## 2018-05-01 RX ADMIN — PALONOSETRON HYDROCHLORIDE 0.25 MG: 0.25 INJECTION INTRAVENOUS at 08:36

## 2018-05-01 RX ADMIN — DEXAMETHASONE SODIUM PHOSPHATE 10 MG: 10 INJECTION, SOLUTION INTRAMUSCULAR; INTRAVENOUS at 08:36

## 2018-05-01 RX ADMIN — OXALIPLATIN 150 MG: 5 INJECTION, SOLUTION INTRAVENOUS at 10:52

## 2018-05-01 RX ADMIN — Medication 4296 MG: at 15:09

## 2018-05-01 RX ADMIN — FLUOROURACIL 716 MG: 50 INJECTION, SOLUTION INTRAVENOUS at 14:58

## 2018-05-01 RX ADMIN — IRINOTECAN HYDROCHLORIDE 320 MG: 100 INJECTION, SOLUTION INTRAVENOUS at 09:00

## 2018-05-01 RX ADMIN — ATROPINE SULFATE 0.5 MG: 1 INJECTION, SOLUTION INTRAMUSCULAR; INTRAVENOUS; SUBCUTANEOUS at 10:39

## 2018-05-01 RX ADMIN — LEUCOVORIN CALCIUM 700 MG: 350 INJECTION, POWDER, LYOPHILIZED, FOR SOLUTION INTRAMUSCULAR; INTRAVENOUS at 13:15

## 2018-05-03 ENCOUNTER — HOSPITAL ENCOUNTER (OUTPATIENT)
Dept: INFUSION THERAPY | Age: 58
Setting detail: INFUSION SERIES
Discharge: HOME | End: 2018-05-03
Payer: COMMERCIAL

## 2018-05-03 DIAGNOSIS — C25.9 MALIGNANT NEOPLASM OF PANCREAS, UNSPECIFIED LOCATION OF MALIGNANCY (HCC): ICD-10-CM

## 2018-05-03 PROCEDURE — 96377 APPLICATON ON-BODY INJECTOR: CPT

## 2018-05-03 PROCEDURE — 6360000002 HC RX W HCPCS: Performed by: INTERNAL MEDICINE

## 2018-05-03 PROCEDURE — 96523 IRRIG DRUG DELIVERY DEVICE: CPT

## 2018-05-03 PROCEDURE — 2580000003 HC RX 258: Performed by: INTERNAL MEDICINE

## 2018-05-03 RX ORDER — SODIUM CHLORIDE 0.9 % (FLUSH) 0.9 %
20 SYRINGE (ML) INJECTION PRN
Status: DISCONTINUED | OUTPATIENT
Start: 2018-05-03 | End: 2018-05-05 | Stop reason: HOSPADM

## 2018-05-03 RX ORDER — HEPARIN SODIUM (PORCINE) LOCK FLUSH IV SOLN 100 UNIT/ML 100 UNIT/ML
300 SOLUTION INTRAVENOUS PRN
Status: DISCONTINUED | OUTPATIENT
Start: 2018-05-03 | End: 2018-05-05 | Stop reason: HOSPADM

## 2018-05-03 RX ADMIN — Medication 20 ML: at 13:04

## 2018-05-03 RX ADMIN — HEPARIN 300 UNITS: 100 SYRINGE at 13:04

## 2018-05-03 RX ADMIN — PEGFILGRASTIM 6 MG: KIT SUBCUTANEOUS at 13:46

## 2018-05-15 ENCOUNTER — HOSPITAL ENCOUNTER (OUTPATIENT)
Dept: INFUSION THERAPY | Age: 58
Setting detail: SPECIMEN
Discharge: HOME | End: 2018-05-15
Payer: COMMERCIAL

## 2018-05-15 VITALS
DIASTOLIC BLOOD PRESSURE: 72 MMHG | HEART RATE: 57 BPM | RESPIRATION RATE: 18 BRPM | SYSTOLIC BLOOD PRESSURE: 107 MMHG | TEMPERATURE: 97.5 F

## 2018-05-15 DIAGNOSIS — C25.9 MALIGNANT NEOPLASM OF PANCREAS, UNSPECIFIED LOCATION OF MALIGNANCY (HCC): ICD-10-CM

## 2018-05-15 PROCEDURE — 96367 TX/PROPH/DG ADDL SEQ IV INF: CPT

## 2018-05-15 PROCEDURE — 96375 TX/PRO/DX INJ NEW DRUG ADDON: CPT

## 2018-05-15 PROCEDURE — 96376 TX/PRO/DX INJ SAME DRUG ADON: CPT

## 2018-05-15 PROCEDURE — 2580000003 HC RX 258: Performed by: NURSE PRACTITIONER

## 2018-05-15 PROCEDURE — 6360000002 HC RX W HCPCS: Performed by: NURSE PRACTITIONER

## 2018-05-15 PROCEDURE — G0498 CHEMO EXTEND IV INFUS W/PUMP: HCPCS

## 2018-05-15 PROCEDURE — 96413 CHEMO IV INFUSION 1 HR: CPT

## 2018-05-15 PROCEDURE — 96415 CHEMO IV INFUSION ADDL HR: CPT

## 2018-05-15 PROCEDURE — 96416 CHEMO PROLONG INFUSE W/PUMP: CPT

## 2018-05-15 PROCEDURE — 96411 CHEMO IV PUSH ADDL DRUG: CPT

## 2018-05-15 PROCEDURE — 96409 CHEMO IV PUSH SNGL DRUG: CPT

## 2018-05-15 PROCEDURE — 96366 THER/PROPH/DIAG IV INF ADDON: CPT

## 2018-05-15 PROCEDURE — 96417 CHEMO IV INFUS EACH ADDL SEQ: CPT

## 2018-05-15 RX ORDER — SODIUM CHLORIDE 0.9 % (FLUSH) 0.9 %
20 SYRINGE (ML) INJECTION PRN
Status: DISCONTINUED | OUTPATIENT
Start: 2018-05-15 | End: 2018-05-17 | Stop reason: HOSPADM

## 2018-05-15 RX ORDER — DIPHENHYDRAMINE HYDROCHLORIDE 50 MG/ML
50 INJECTION INTRAMUSCULAR; INTRAVENOUS PRN
Status: DISCONTINUED | OUTPATIENT
Start: 2018-05-15 | End: 2018-05-17 | Stop reason: HOSPADM

## 2018-05-15 RX ORDER — ATROPINE SULFATE 0.4 MG/ML
0.5 AMPUL (ML) INJECTION ONCE
Status: DISCONTINUED | OUTPATIENT
Start: 2018-05-15 | End: 2018-05-15

## 2018-05-15 RX ORDER — PALONOSETRON 0.05 MG/ML
0.25 INJECTION, SOLUTION INTRAVENOUS ONCE
Status: COMPLETED | OUTPATIENT
Start: 2018-05-15 | End: 2018-05-15

## 2018-05-15 RX ORDER — ATROPINE SULFATE 1 MG/ML
0.5 INJECTION, SOLUTION INTRAMUSCULAR; INTRAVENOUS; SUBCUTANEOUS ONCE
Status: COMPLETED | OUTPATIENT
Start: 2018-05-15 | End: 2018-05-15

## 2018-05-15 RX ORDER — FLUOROURACIL 50 MG/ML
720 INJECTION, SOLUTION INTRAVENOUS ONCE
Status: COMPLETED | OUTPATIENT
Start: 2018-05-15 | End: 2018-05-15

## 2018-05-15 RX ORDER — METHYLPREDNISOLONE SODIUM SUCCINATE 125 MG/2ML
125 INJECTION, POWDER, LYOPHILIZED, FOR SOLUTION INTRAMUSCULAR; INTRAVENOUS PRN
Status: DISCONTINUED | OUTPATIENT
Start: 2018-05-15 | End: 2018-05-17 | Stop reason: HOSPADM

## 2018-05-15 RX ORDER — DEXAMETHASONE SODIUM PHOSPHATE 10 MG/ML
10 INJECTION, SOLUTION INTRAMUSCULAR; INTRAVENOUS ONCE
Status: COMPLETED | OUTPATIENT
Start: 2018-05-15 | End: 2018-05-15

## 2018-05-15 RX ADMIN — SODIUM CHLORIDE 150 MG: 900 INJECTION, SOLUTION INTRAVENOUS at 08:25

## 2018-05-15 RX ADMIN — OXALIPLATIN 150 MG: 5 INJECTION, SOLUTION INTRAVENOUS at 09:56

## 2018-05-15 RX ADMIN — LEUCOVORIN CALCIUM 700 MG: 350 INJECTION, POWDER, LYOPHILIZED, FOR SOLUTION INTRAMUSCULAR; INTRAVENOUS at 14:22

## 2018-05-15 RX ADMIN — IRINOTECAN HYDROCHLORIDE 320 MG: 20 INJECTION INTRAVENOUS at 12:09

## 2018-05-15 RX ADMIN — DEXAMETHASONE SODIUM PHOSPHATE 10 MG: 10 INJECTION, SOLUTION INTRAMUSCULAR; INTRAVENOUS at 09:45

## 2018-05-15 RX ADMIN — PALONOSETRON HYDROCHLORIDE 0.25 MG: 0.25 INJECTION INTRAVENOUS at 09:45

## 2018-05-15 RX ADMIN — ATROPINE SULFATE 0.5 MG: 1 INJECTION, SOLUTION INTRAMUSCULAR; INTRAVENOUS; SUBCUTANEOUS at 14:12

## 2018-05-15 RX ADMIN — FLUOROURACIL 720 MG: 50 INJECTION, SOLUTION INTRAVENOUS at 16:08

## 2018-05-15 RX ADMIN — Medication 4300 MG: at 16:15

## 2018-05-15 RX ADMIN — ATROPINE SULFATE 0.5 MG: 1 INJECTION, SOLUTION INTRAMUSCULAR; INTRAVENOUS; SUBCUTANEOUS at 12:09

## 2018-05-17 ENCOUNTER — HOSPITAL ENCOUNTER (OUTPATIENT)
Dept: INFUSION THERAPY | Age: 58
Setting detail: INFUSION SERIES
Discharge: HOME | End: 2018-05-17
Payer: COMMERCIAL

## 2018-05-17 DIAGNOSIS — C25.9 MALIGNANT NEOPLASM OF PANCREAS, UNSPECIFIED LOCATION OF MALIGNANCY (HCC): ICD-10-CM

## 2018-05-17 PROCEDURE — 2580000003 HC RX 258: Performed by: NURSE PRACTITIONER

## 2018-05-17 PROCEDURE — 6360000002 HC RX W HCPCS: Performed by: NURSE PRACTITIONER

## 2018-05-17 PROCEDURE — 96377 APPLICATON ON-BODY INJECTOR: CPT

## 2018-05-17 PROCEDURE — 96523 IRRIG DRUG DELIVERY DEVICE: CPT

## 2018-05-17 RX ORDER — HEPARIN SODIUM (PORCINE) LOCK FLUSH IV SOLN 100 UNIT/ML 100 UNIT/ML
300 SOLUTION INTRAVENOUS PRN
Status: DISCONTINUED | OUTPATIENT
Start: 2018-05-17 | End: 2018-05-19 | Stop reason: HOSPADM

## 2018-05-17 RX ORDER — SODIUM CHLORIDE 0.9 % (FLUSH) 0.9 %
20 SYRINGE (ML) INJECTION PRN
Status: DISCONTINUED | OUTPATIENT
Start: 2018-05-17 | End: 2018-05-19 | Stop reason: HOSPADM

## 2018-05-17 RX ADMIN — Medication 20 ML: at 13:51

## 2018-05-17 RX ADMIN — HEPARIN 300 UNITS: 100 SYRINGE at 13:51

## 2018-05-17 RX ADMIN — PEGFILGRASTIM 6 MG: KIT SUBCUTANEOUS at 14:18

## 2018-05-30 ENCOUNTER — HOSPITAL ENCOUNTER (OUTPATIENT)
Dept: INFUSION THERAPY | Age: 58
Setting detail: INFUSION SERIES
End: 2018-05-30
Payer: COMMERCIAL

## 2018-05-31 ENCOUNTER — APPOINTMENT (OUTPATIENT)
Dept: INFUSION THERAPY | Age: 58
End: 2018-05-31
Payer: COMMERCIAL

## 2018-06-01 ENCOUNTER — HOSPITAL ENCOUNTER (OUTPATIENT)
Dept: INFUSION THERAPY | Age: 58
Setting detail: INFUSION SERIES
Discharge: HOME | End: 2018-06-01
Payer: COMMERCIAL

## 2018-06-01 VITALS
OXYGEN SATURATION: 99 % | DIASTOLIC BLOOD PRESSURE: 67 MMHG | RESPIRATION RATE: 17 BRPM | HEART RATE: 76 BPM | SYSTOLIC BLOOD PRESSURE: 101 MMHG | TEMPERATURE: 97.4 F

## 2018-06-01 DIAGNOSIS — C25.9 MALIGNANT NEOPLASM OF PANCREAS, UNSPECIFIED LOCATION OF MALIGNANCY (HCC): ICD-10-CM

## 2018-06-01 PROCEDURE — 2580000003 HC RX 258: Performed by: NURSE PRACTITIONER

## 2018-06-01 PROCEDURE — 96417 CHEMO IV INFUS EACH ADDL SEQ: CPT

## 2018-06-01 PROCEDURE — 6360000002 HC RX W HCPCS: Performed by: NURSE PRACTITIONER

## 2018-06-01 PROCEDURE — 96411 CHEMO IV PUSH ADDL DRUG: CPT

## 2018-06-01 PROCEDURE — 96366 THER/PROPH/DIAG IV INF ADDON: CPT

## 2018-06-01 PROCEDURE — 96415 CHEMO IV INFUSION ADDL HR: CPT

## 2018-06-01 PROCEDURE — 96413 CHEMO IV INFUSION 1 HR: CPT

## 2018-06-01 PROCEDURE — 96367 TX/PROPH/DG ADDL SEQ IV INF: CPT

## 2018-06-01 PROCEDURE — 96376 TX/PRO/DX INJ SAME DRUG ADON: CPT

## 2018-06-01 PROCEDURE — G0498 CHEMO EXTEND IV INFUS W/PUMP: HCPCS

## 2018-06-01 PROCEDURE — 96375 TX/PRO/DX INJ NEW DRUG ADDON: CPT

## 2018-06-01 PROCEDURE — 96409 CHEMO IV PUSH SNGL DRUG: CPT

## 2018-06-01 RX ORDER — ATROPINE SULFATE 0.4 MG/ML
0.5 AMPUL (ML) INJECTION ONCE
Status: DISCONTINUED | OUTPATIENT
Start: 2018-06-01 | End: 2018-06-01

## 2018-06-01 RX ORDER — ATROPINE SULFATE 1 MG/ML
0.5 INJECTION, SOLUTION INTRAMUSCULAR; INTRAVENOUS; SUBCUTANEOUS ONCE
Status: COMPLETED | OUTPATIENT
Start: 2018-06-01 | End: 2018-06-01

## 2018-06-01 RX ORDER — FLUOROURACIL 50 MG/ML
400 INJECTION, SOLUTION INTRAVENOUS ONCE
Status: COMPLETED | OUTPATIENT
Start: 2018-06-01 | End: 2018-06-01

## 2018-06-01 RX ORDER — DEXAMETHASONE SODIUM PHOSPHATE 10 MG/ML
10 INJECTION, SOLUTION INTRAMUSCULAR; INTRAVENOUS ONCE
Status: COMPLETED | OUTPATIENT
Start: 2018-06-01 | End: 2018-06-01

## 2018-06-01 RX ORDER — DIPHENHYDRAMINE HYDROCHLORIDE 50 MG/ML
50 INJECTION INTRAMUSCULAR; INTRAVENOUS PRN
Status: DISCONTINUED | OUTPATIENT
Start: 2018-06-01 | End: 2018-06-03 | Stop reason: HOSPADM

## 2018-06-01 RX ORDER — METHYLPREDNISOLONE SODIUM SUCCINATE 125 MG/2ML
125 INJECTION, POWDER, LYOPHILIZED, FOR SOLUTION INTRAMUSCULAR; INTRAVENOUS PRN
Status: DISCONTINUED | OUTPATIENT
Start: 2018-06-01 | End: 2018-06-03 | Stop reason: HOSPADM

## 2018-06-01 RX ORDER — DEXTROSE MONOHYDRATE 50 MG/ML
INJECTION, SOLUTION INTRAVENOUS CONTINUOUS
Status: DISCONTINUED | OUTPATIENT
Start: 2018-06-01 | End: 2018-06-03 | Stop reason: HOSPADM

## 2018-06-01 RX ORDER — PALONOSETRON 0.05 MG/ML
0.25 INJECTION, SOLUTION INTRAVENOUS ONCE
Status: COMPLETED | OUTPATIENT
Start: 2018-06-01 | End: 2018-06-01

## 2018-06-01 RX ADMIN — DEXTROSE MONOHYDRATE: 50 INJECTION, SOLUTION INTRAVENOUS at 07:45

## 2018-06-01 RX ADMIN — DEXTROSE MONOHYDRATE 320 MG: 50 INJECTION, SOLUTION INTRAVENOUS at 10:55

## 2018-06-01 RX ADMIN — PALONOSETRON HYDROCHLORIDE 0.25 MG: 0.25 INJECTION INTRAVENOUS at 08:27

## 2018-06-01 RX ADMIN — DEXAMETHASONE SODIUM PHOSPHATE 10 MG: 10 INJECTION, SOLUTION INTRAMUSCULAR; INTRAVENOUS at 08:27

## 2018-06-01 RX ADMIN — LEUCOVORIN CALCIUM 700 MG: 350 INJECTION, POWDER, LYOPHILIZED, FOR SOLUTION INTRAMUSCULAR; INTRAVENOUS at 13:14

## 2018-06-01 RX ADMIN — ATROPINE SULFATE 0.5 MG: 1 INJECTION, SOLUTION INTRAMUSCULAR; INTRAVENOUS; SUBCUTANEOUS at 13:12

## 2018-06-01 RX ADMIN — FLUOROURACIL 716 MG: 50 INJECTION, SOLUTION INTRAVENOUS at 15:04

## 2018-06-01 RX ADMIN — ATROPINE SULFATE 0.5 MG: 1 INJECTION, SOLUTION INTRAMUSCULAR; INTRAVENOUS; SUBCUTANEOUS at 10:50

## 2018-06-01 RX ADMIN — SODIUM CHLORIDE 150 MG: 900 INJECTION, SOLUTION INTRAVENOUS at 07:45

## 2018-06-01 RX ADMIN — OXALIPLATIN 150 MG: 5 INJECTION, SOLUTION INTRAVENOUS at 08:33

## 2018-06-01 RX ADMIN — Medication 4300 MG: at 15:15

## 2018-06-04 ENCOUNTER — HOSPITAL ENCOUNTER (OUTPATIENT)
Dept: ULTRASOUND IMAGING | Age: 58
Discharge: HOME | End: 2018-06-04
Payer: COMMERCIAL

## 2018-06-04 ENCOUNTER — HOSPITAL ENCOUNTER (OUTPATIENT)
Dept: INFUSION THERAPY | Age: 58
Setting detail: INFUSION SERIES
Discharge: HOME | End: 2018-06-04
Payer: COMMERCIAL

## 2018-06-04 VITALS — HEART RATE: 54 BPM | RESPIRATION RATE: 18 BRPM | DIASTOLIC BLOOD PRESSURE: 70 MMHG | SYSTOLIC BLOOD PRESSURE: 120 MMHG

## 2018-06-04 DIAGNOSIS — C25.9 MALIGNANT NEOPLASM OF PANCREAS, UNSPECIFIED LOCATION OF MALIGNANCY (HCC): ICD-10-CM

## 2018-06-04 DIAGNOSIS — C25.0 MALIGNANT NEOPLASM OF HEAD OF PANCREAS (HCC): ICD-10-CM

## 2018-06-04 PROCEDURE — 6360000002 HC RX W HCPCS: Performed by: INTERNAL MEDICINE

## 2018-06-04 PROCEDURE — 96523 IRRIG DRUG DELIVERY DEVICE: CPT

## 2018-06-04 PROCEDURE — 96377 APPLICATON ON-BODY INJECTOR: CPT

## 2018-06-04 PROCEDURE — 2580000003 HC RX 258: Performed by: INTERNAL MEDICINE

## 2018-06-04 PROCEDURE — 76705 ECHO EXAM OF ABDOMEN: CPT

## 2018-06-04 RX ORDER — HEPARIN SODIUM (PORCINE) LOCK FLUSH IV SOLN 100 UNIT/ML 100 UNIT/ML
300 SOLUTION INTRAVENOUS PRN
Status: DISCONTINUED | OUTPATIENT
Start: 2018-06-04 | End: 2018-06-06 | Stop reason: HOSPADM

## 2018-06-04 RX ORDER — SODIUM CHLORIDE 0.9 % (FLUSH) 0.9 %
20 SYRINGE (ML) INJECTION PRN
Status: DISCONTINUED | OUTPATIENT
Start: 2018-06-04 | End: 2018-06-06 | Stop reason: HOSPADM

## 2018-06-04 RX ADMIN — PEGFILGRASTIM 6 MG: KIT SUBCUTANEOUS at 07:08

## 2018-06-04 RX ADMIN — SODIUM CHLORIDE, PRESERVATIVE FREE 300 UNITS: 5 INJECTION INTRAVENOUS at 07:08

## 2018-06-04 RX ADMIN — Medication 20 ML: at 07:08

## 2018-06-04 NOTE — FLOWSHEET NOTE
Pt herre for pump disconeect. punp disconnected , then port flushed with 20 cc saline, 300 u heparin flush, with good blood return. bandaid applied. Pt released with daughter in stable cond.

## 2018-06-08 ENCOUNTER — TELEPHONE (OUTPATIENT)
Dept: CARDIOLOGY | Age: 58
End: 2018-06-08

## 2018-06-08 ENCOUNTER — OFFICE VISIT (OUTPATIENT)
Dept: CARDIOLOGY | Age: 58
End: 2018-06-08
Payer: COMMERCIAL

## 2018-06-08 VITALS
HEIGHT: 67 IN | BODY MASS INDEX: 22.35 KG/M2 | SYSTOLIC BLOOD PRESSURE: 130 MMHG | WEIGHT: 142.4 LBS | DIASTOLIC BLOOD PRESSURE: 90 MMHG | HEART RATE: 73 BPM

## 2018-06-08 DIAGNOSIS — E78.5 DYSLIPIDEMIA: ICD-10-CM

## 2018-06-08 DIAGNOSIS — Z72.0 TOBACCO ABUSE: ICD-10-CM

## 2018-06-08 DIAGNOSIS — I10 ESSENTIAL HYPERTENSION: ICD-10-CM

## 2018-06-08 DIAGNOSIS — R00.2 PALPITATION: Primary | ICD-10-CM

## 2018-06-08 PROCEDURE — 93000 ELECTROCARDIOGRAM COMPLETE: CPT | Performed by: INTERNAL MEDICINE

## 2018-06-08 PROCEDURE — 99202 OFFICE O/P NEW SF 15 MIN: CPT | Performed by: INTERNAL MEDICINE

## 2018-06-08 RX ORDER — DEXTROMETHORPHAN HYDROBROMIDE AND PROMETHAZINE HYDROCHLORIDE 15; 6.25 MG/5ML; MG/5ML
5 SYRUP ORAL DAILY
COMMUNITY
Start: 2018-04-13 | End: 2018-06-08 | Stop reason: CLARIF

## 2018-06-08 RX ORDER — LISINOPRIL 10 MG/1
10 TABLET ORAL DAILY
COMMUNITY
End: 2018-09-24 | Stop reason: SDUPTHER

## 2018-06-08 RX ORDER — AMLODIPINE BESYLATE 5 MG/1
5 TABLET ORAL DAILY
COMMUNITY
End: 2018-09-24 | Stop reason: SDUPTHER

## 2018-06-08 RX ORDER — PRAVASTATIN SODIUM 40 MG
40 TABLET ORAL DAILY
COMMUNITY
End: 2018-09-24 | Stop reason: SDUPTHER

## 2018-06-08 ASSESSMENT — ENCOUNTER SYMPTOMS
CHOKING: 0
BACK PAIN: 0
ABDOMINAL DISTENTION: 0
CHEST TIGHTNESS: 0
STRIDOR: 0
SHORTNESS OF BREATH: 0
NAUSEA: 0
APNEA: 0
BLOOD IN STOOL: 0
WHEEZING: 0

## 2018-06-12 ENCOUNTER — HOSPITAL ENCOUNTER (OUTPATIENT)
Dept: INFUSION THERAPY | Age: 58
Setting detail: INFUSION SERIES
End: 2018-06-12
Payer: COMMERCIAL

## 2018-06-22 ENCOUNTER — HOSPITAL ENCOUNTER (OUTPATIENT)
Dept: INFUSION THERAPY | Age: 58
Setting detail: INFUSION SERIES
End: 2018-06-22
Payer: COMMERCIAL

## 2018-09-24 DIAGNOSIS — I10 ESSENTIAL HYPERTENSION: Primary | ICD-10-CM

## 2018-09-24 DIAGNOSIS — I25.118 CORONARY ARTERY DISEASE OF NATIVE HEART WITH STABLE ANGINA PECTORIS, UNSPECIFIED VESSEL OR LESION TYPE (HCC): ICD-10-CM

## 2018-09-25 RX ORDER — PRAVASTATIN SODIUM 40 MG
40 TABLET ORAL DAILY
Qty: 30 TABLET | Refills: 5 | Status: SHIPPED | OUTPATIENT
Start: 2018-09-25 | End: 2018-10-03

## 2018-09-25 RX ORDER — LISINOPRIL 10 MG/1
10 TABLET ORAL DAILY
Qty: 30 TABLET | Refills: 5 | Status: SHIPPED | OUTPATIENT
Start: 2018-09-25 | End: 2018-10-03

## 2018-09-25 RX ORDER — AMLODIPINE BESYLATE 5 MG/1
5 TABLET ORAL DAILY
Qty: 30 TABLET | Refills: 5 | Status: SHIPPED | OUTPATIENT
Start: 2018-09-25 | End: 2018-10-03

## 2018-10-03 ENCOUNTER — HOSPITAL ENCOUNTER (OUTPATIENT)
Dept: INFUSION THERAPY | Age: 58
Setting detail: INFUSION SERIES
Discharge: HOME | End: 2018-10-03
Payer: COMMERCIAL

## 2018-10-03 VITALS
BODY MASS INDEX: 21.97 KG/M2 | WEIGHT: 140 LBS | TEMPERATURE: 97.6 F | HEIGHT: 67 IN | DIASTOLIC BLOOD PRESSURE: 81 MMHG | SYSTOLIC BLOOD PRESSURE: 123 MMHG | HEART RATE: 66 BPM | RESPIRATION RATE: 17 BRPM

## 2018-10-03 DIAGNOSIS — C25.9 MALIGNANT NEOPLASM OF PANCREAS, UNSPECIFIED LOCATION OF MALIGNANCY (HCC): ICD-10-CM

## 2018-10-03 PROCEDURE — 96413 CHEMO IV INFUSION 1 HR: CPT

## 2018-10-03 PROCEDURE — 96417 CHEMO IV INFUS EACH ADDL SEQ: CPT

## 2018-10-03 PROCEDURE — G0498 CHEMO EXTEND IV INFUS W/PUMP: HCPCS

## 2018-10-03 PROCEDURE — 6360000002 HC RX W HCPCS: Performed by: INTERNAL MEDICINE

## 2018-10-03 PROCEDURE — 96415 CHEMO IV INFUSION ADDL HR: CPT

## 2018-10-03 PROCEDURE — 2580000003 HC RX 258: Performed by: INTERNAL MEDICINE

## 2018-10-03 RX ORDER — ATROPINE SULFATE 1 MG/ML
0.5 INJECTION, SOLUTION INTRAMUSCULAR; INTRAVENOUS; SUBCUTANEOUS ONCE
Status: COMPLETED | OUTPATIENT
Start: 2018-10-03 | End: 2018-10-03

## 2018-10-03 RX ORDER — METOCLOPRAMIDE 5 MG/1
5 TABLET ORAL 3 TIMES DAILY
COMMUNITY
End: 2019-09-03

## 2018-10-03 RX ORDER — HEPARIN SODIUM (PORCINE) LOCK FLUSH IV SOLN 100 UNIT/ML 100 UNIT/ML
500 SOLUTION INTRAVENOUS PRN
Status: ACTIVE | OUTPATIENT
Start: 2018-10-03 | End: 2018-10-04

## 2018-10-03 RX ORDER — TRAMADOL HYDROCHLORIDE 50 MG/1
50 TABLET ORAL EVERY 6 HOURS PRN
COMMUNITY
End: 2019-09-03

## 2018-10-03 RX ORDER — EPINEPHRINE 1 MG/ML
0.3 INJECTION, SOLUTION, CONCENTRATE INTRAVENOUS PRN
Status: DISCONTINUED | OUTPATIENT
Start: 2018-10-03 | End: 2018-10-05 | Stop reason: HOSPADM

## 2018-10-03 RX ORDER — METHYLPREDNISOLONE SODIUM SUCCINATE 125 MG/2ML
125 INJECTION, POWDER, LYOPHILIZED, FOR SOLUTION INTRAMUSCULAR; INTRAVENOUS PRN
Status: DISCONTINUED | OUTPATIENT
Start: 2018-10-03 | End: 2018-10-05 | Stop reason: HOSPADM

## 2018-10-03 RX ORDER — DIPHENHYDRAMINE HYDROCHLORIDE 50 MG/ML
50 INJECTION INTRAMUSCULAR; INTRAVENOUS PRN
Status: DISCONTINUED | OUTPATIENT
Start: 2018-10-03 | End: 2018-10-05 | Stop reason: HOSPADM

## 2018-10-03 RX ORDER — SODIUM CHLORIDE 0.9 % (FLUSH) 0.9 %
5 SYRINGE (ML) INJECTION PRN
Status: ACTIVE | OUTPATIENT
Start: 2018-10-03 | End: 2018-10-04

## 2018-10-03 RX ORDER — SODIUM CHLORIDE 9 MG/ML
INJECTION, SOLUTION INTRAVENOUS CONTINUOUS
Status: DISCONTINUED | OUTPATIENT
Start: 2018-10-03 | End: 2018-10-05 | Stop reason: HOSPADM

## 2018-10-03 RX ORDER — SODIUM CHLORIDE 0.9 % (FLUSH) 0.9 %
10 SYRINGE (ML) INJECTION PRN
Status: ACTIVE | OUTPATIENT
Start: 2018-10-03 | End: 2018-10-04

## 2018-10-03 RX ORDER — LANOLIN ALCOHOL/MO/W.PET/CERES
1000 CREAM (GRAM) TOPICAL DAILY
Status: ON HOLD | COMMUNITY
End: 2019-10-31

## 2018-10-03 RX ORDER — DEXAMETHASONE SODIUM PHOSPHATE 10 MG/ML
10 INJECTION, SOLUTION INTRAMUSCULAR; INTRAVENOUS ONCE
Status: COMPLETED | OUTPATIENT
Start: 2018-10-03 | End: 2018-10-03

## 2018-10-03 RX ORDER — SODIUM CHLORIDE 9 MG/ML
INJECTION, SOLUTION INTRAVENOUS ONCE
Status: DISCONTINUED | OUTPATIENT
Start: 2018-10-03 | End: 2018-10-05 | Stop reason: HOSPADM

## 2018-10-03 RX ORDER — PALONOSETRON 0.05 MG/ML
0.25 INJECTION, SOLUTION INTRAVENOUS ONCE
Status: COMPLETED | OUTPATIENT
Start: 2018-10-03 | End: 2018-10-03

## 2018-10-03 RX ORDER — DEXTROSE MONOHYDRATE 50 MG/ML
INJECTION, SOLUTION INTRAVENOUS ONCE
Status: COMPLETED | OUTPATIENT
Start: 2018-10-03 | End: 2018-10-03

## 2018-10-03 RX ADMIN — FLUOROURACIL 3130 MG: 50 INJECTION, SOLUTION INTRAVENOUS at 13:53

## 2018-10-03 RX ADMIN — ATROPINE SULFATE 0.5 MG: 1 INJECTION, SOLUTION INTRAMUSCULAR; INTRAVENOUS; SUBCUTANEOUS at 13:52

## 2018-10-03 RX ADMIN — PALONOSETRON HYDROCHLORIDE 0.25 MG: 0.25 INJECTION INTRAVENOUS at 09:34

## 2018-10-03 RX ADMIN — ATROPINE SULFATE 0.5 MG: 1 INJECTION, SOLUTION INTRAMUSCULAR; INTRAVENOUS; SUBCUTANEOUS at 11:43

## 2018-10-03 RX ADMIN — DEXTROSE MONOHYDRATE 240 MG: 50 INJECTION, SOLUTION INTRAVENOUS at 12:00

## 2018-10-03 RX ADMIN — SODIUM CHLORIDE 150 MG: 900 INJECTION, SOLUTION INTRAVENOUS at 08:40

## 2018-10-03 RX ADMIN — OXALIPLATIN 110 MG: 5 INJECTION, SOLUTION INTRAVENOUS at 09:36

## 2018-10-03 RX ADMIN — DEXTROSE MONOHYDRATE: 50 INJECTION, SOLUTION INTRAVENOUS at 08:40

## 2018-10-03 RX ADMIN — DEXAMETHASONE SODIUM PHOSPHATE 10 MG: 10 INJECTION, SOLUTION INTRAMUSCULAR; INTRAVENOUS at 09:34

## 2018-10-05 ENCOUNTER — HOSPITAL ENCOUNTER (OUTPATIENT)
Dept: INFUSION THERAPY | Age: 58
Setting detail: INFUSION SERIES
Discharge: HOME | End: 2018-10-05
Payer: COMMERCIAL

## 2018-10-05 PROCEDURE — 96523 IRRIG DRUG DELIVERY DEVICE: CPT

## 2018-10-05 PROCEDURE — 2580000003 HC RX 258: Performed by: INTERNAL MEDICINE

## 2018-10-05 PROCEDURE — 6360000002 HC RX W HCPCS: Performed by: INTERNAL MEDICINE

## 2018-10-05 RX ORDER — SODIUM CHLORIDE 0.9 % (FLUSH) 0.9 %
20 SYRINGE (ML) INJECTION PRN
Status: DISCONTINUED | OUTPATIENT
Start: 2018-10-05 | End: 2018-10-07 | Stop reason: HOSPADM

## 2018-10-05 RX ADMIN — SODIUM CHLORIDE, PRESERVATIVE FREE 300 UNITS: 5 INJECTION INTRAVENOUS at 11:52

## 2018-10-05 RX ADMIN — Medication 20 ML: at 11:52

## 2018-10-17 ENCOUNTER — HOSPITAL ENCOUNTER (OUTPATIENT)
Dept: INFUSION THERAPY | Age: 58
Setting detail: INFUSION SERIES
Discharge: HOME | End: 2018-10-17
Payer: COMMERCIAL

## 2018-10-17 VITALS
SYSTOLIC BLOOD PRESSURE: 123 MMHG | BODY MASS INDEX: 20.72 KG/M2 | RESPIRATION RATE: 18 BRPM | WEIGHT: 132 LBS | HEART RATE: 73 BPM | DIASTOLIC BLOOD PRESSURE: 84 MMHG | HEIGHT: 67 IN | TEMPERATURE: 97.7 F

## 2018-10-17 DIAGNOSIS — C25.9 MALIGNANT NEOPLASM OF PANCREAS, UNSPECIFIED LOCATION OF MALIGNANCY (HCC): ICD-10-CM

## 2018-10-17 PROCEDURE — 96417 CHEMO IV INFUS EACH ADDL SEQ: CPT

## 2018-10-17 PROCEDURE — 6360000002 HC RX W HCPCS: Performed by: INTERNAL MEDICINE

## 2018-10-17 PROCEDURE — 96415 CHEMO IV INFUSION ADDL HR: CPT

## 2018-10-17 PROCEDURE — 2580000003 HC RX 258: Performed by: INTERNAL MEDICINE

## 2018-10-17 PROCEDURE — 96413 CHEMO IV INFUSION 1 HR: CPT

## 2018-10-17 PROCEDURE — G0498 CHEMO EXTEND IV INFUS W/PUMP: HCPCS

## 2018-10-17 RX ORDER — ATROPINE SULFATE 1 MG/ML
0.5 INJECTION, SOLUTION INTRAMUSCULAR; INTRAVENOUS; SUBCUTANEOUS ONCE
Status: COMPLETED | OUTPATIENT
Start: 2018-10-21 | End: 2018-10-17

## 2018-10-17 RX ORDER — DIPHENHYDRAMINE HYDROCHLORIDE 50 MG/ML
50 INJECTION INTRAMUSCULAR; INTRAVENOUS PRN
Status: DISCONTINUED | OUTPATIENT
Start: 2018-10-17 | End: 2018-10-19 | Stop reason: HOSPADM

## 2018-10-17 RX ORDER — ATROPINE SULFATE 1 MG/ML
0.5 INJECTION, SOLUTION INTRAMUSCULAR; INTRAVENOUS; SUBCUTANEOUS ONCE
Status: COMPLETED | OUTPATIENT
Start: 2018-10-17 | End: 2018-10-17

## 2018-10-17 RX ORDER — METHYLPREDNISOLONE SODIUM SUCCINATE 125 MG/2ML
125 INJECTION, POWDER, LYOPHILIZED, FOR SOLUTION INTRAMUSCULAR; INTRAVENOUS PRN
Status: DISCONTINUED | OUTPATIENT
Start: 2018-10-17 | End: 2018-10-19 | Stop reason: HOSPADM

## 2018-10-17 RX ORDER — PALONOSETRON 0.05 MG/ML
0.25 INJECTION, SOLUTION INTRAVENOUS ONCE
Status: COMPLETED | OUTPATIENT
Start: 2018-10-17 | End: 2018-10-17

## 2018-10-17 RX ORDER — EPINEPHRINE 1 MG/ML
0.3 INJECTION, SOLUTION, CONCENTRATE INTRAVENOUS PRN
Status: DISCONTINUED | OUTPATIENT
Start: 2018-10-17 | End: 2018-10-19 | Stop reason: HOSPADM

## 2018-10-17 RX ORDER — DEXAMETHASONE SODIUM PHOSPHATE 10 MG/ML
10 INJECTION, SOLUTION INTRAMUSCULAR; INTRAVENOUS ONCE
Status: COMPLETED | OUTPATIENT
Start: 2018-10-17 | End: 2018-10-17

## 2018-10-17 RX ADMIN — SODIUM CHLORIDE 150 MG: 900 INJECTION, SOLUTION INTRAVENOUS at 08:16

## 2018-10-17 RX ADMIN — PALONOSETRON HYDROCHLORIDE 0.25 MG: 0.25 INJECTION INTRAVENOUS at 09:03

## 2018-10-17 RX ADMIN — DEXTROSE MONOHYDRATE 240 MG: 50 INJECTION, SOLUTION INTRAVENOUS at 11:14

## 2018-10-17 RX ADMIN — FLUOROURACIL 3130 MG: 5 INJECTION, SOLUTION INTRAVENOUS at 13:23

## 2018-10-17 RX ADMIN — OXALIPLATIN 110 MG: 5 INJECTION, SOLUTION INTRAVENOUS at 09:10

## 2018-10-17 RX ADMIN — ATROPINE SULFATE: 1 INJECTION, SOLUTION INTRAMUSCULAR; INTRAVENOUS; SUBCUTANEOUS at 11:00

## 2018-10-17 RX ADMIN — DEXAMETHASONE SODIUM PHOSPHATE 10 MG: 10 INJECTION, SOLUTION INTRAMUSCULAR; INTRAVENOUS at 09:03

## 2018-10-17 RX ADMIN — ATROPINE SULFATE 1 MG: 1 INJECTION, SOLUTION INTRAMUSCULAR; INTRAVENOUS; SUBCUTANEOUS at 13:14

## 2018-10-19 ENCOUNTER — HOSPITAL ENCOUNTER (OUTPATIENT)
Dept: INFUSION THERAPY | Age: 58
Setting detail: INFUSION SERIES
Discharge: HOME | End: 2018-10-19
Payer: COMMERCIAL

## 2018-10-19 PROCEDURE — 6360000002 HC RX W HCPCS: Performed by: INTERNAL MEDICINE

## 2018-10-19 PROCEDURE — 2580000003 HC RX 258: Performed by: INTERNAL MEDICINE

## 2018-10-19 PROCEDURE — 96523 IRRIG DRUG DELIVERY DEVICE: CPT

## 2018-10-19 RX ORDER — SODIUM CHLORIDE 0.9 % (FLUSH) 0.9 %
20 SYRINGE (ML) INJECTION ONCE
Status: COMPLETED | OUTPATIENT
Start: 2018-10-19 | End: 2018-10-19

## 2018-10-19 RX ORDER — HEPARIN SODIUM (PORCINE) LOCK FLUSH IV SOLN 100 UNIT/ML 100 UNIT/ML
300 SOLUTION INTRAVENOUS ONCE
Status: COMPLETED | OUTPATIENT
Start: 2018-10-19 | End: 2018-10-19

## 2018-10-19 RX ADMIN — Medication 20 ML: at 11:20

## 2018-10-19 RX ADMIN — SODIUM CHLORIDE, PRESERVATIVE FREE 300 UNITS: 5 INJECTION INTRAVENOUS at 11:20

## 2018-11-06 ENCOUNTER — HOSPITAL ENCOUNTER (OUTPATIENT)
Dept: INFUSION THERAPY | Age: 58
Setting detail: INFUSION SERIES
Discharge: HOME | End: 2018-11-06
Payer: COMMERCIAL

## 2018-11-06 VITALS
BODY MASS INDEX: 19.78 KG/M2 | WEIGHT: 126 LBS | HEIGHT: 67 IN | TEMPERATURE: 98.1 F | DIASTOLIC BLOOD PRESSURE: 83 MMHG | HEART RATE: 69 BPM | RESPIRATION RATE: 18 BRPM | SYSTOLIC BLOOD PRESSURE: 124 MMHG

## 2018-11-06 DIAGNOSIS — C25.9 MALIGNANT NEOPLASM OF PANCREAS, UNSPECIFIED LOCATION OF MALIGNANCY (HCC): ICD-10-CM

## 2018-11-06 PROCEDURE — 96415 CHEMO IV INFUSION ADDL HR: CPT

## 2018-11-06 PROCEDURE — 6360000002 HC RX W HCPCS: Performed by: INTERNAL MEDICINE

## 2018-11-06 PROCEDURE — G0498 CHEMO EXTEND IV INFUS W/PUMP: HCPCS

## 2018-11-06 PROCEDURE — 6370000000 HC RX 637 (ALT 250 FOR IP): Performed by: INTERNAL MEDICINE

## 2018-11-06 PROCEDURE — 2580000003 HC RX 258: Performed by: INTERNAL MEDICINE

## 2018-11-06 PROCEDURE — 96417 CHEMO IV INFUS EACH ADDL SEQ: CPT

## 2018-11-06 PROCEDURE — 96413 CHEMO IV INFUSION 1 HR: CPT

## 2018-11-06 RX ORDER — EPINEPHRINE 1 MG/ML
0.3 INJECTION, SOLUTION, CONCENTRATE INTRAVENOUS PRN
Status: DISCONTINUED | OUTPATIENT
Start: 2018-11-06 | End: 2018-11-08 | Stop reason: HOSPADM

## 2018-11-06 RX ORDER — DIPHENHYDRAMINE HYDROCHLORIDE 50 MG/ML
50 INJECTION INTRAMUSCULAR; INTRAVENOUS PRN
Status: DISCONTINUED | OUTPATIENT
Start: 2018-11-06 | End: 2018-11-08 | Stop reason: HOSPADM

## 2018-11-06 RX ORDER — ATROPINE SULFATE 1 MG/ML
0.5 INJECTION, SOLUTION INTRAMUSCULAR; INTRAVENOUS; SUBCUTANEOUS ONCE
Status: COMPLETED | OUTPATIENT
Start: 2018-11-06 | End: 2018-11-06

## 2018-11-06 RX ORDER — ONDANSETRON 2 MG/ML
8 INJECTION INTRAMUSCULAR; INTRAVENOUS EVERY 6 HOURS PRN
Status: DISCONTINUED | OUTPATIENT
Start: 2018-11-06 | End: 2018-11-08 | Stop reason: HOSPADM

## 2018-11-06 RX ORDER — SODIUM CHLORIDE 0.9 % (FLUSH) 0.9 %
5 SYRINGE (ML) INJECTION PRN
Status: ACTIVE | OUTPATIENT
Start: 2018-11-06 | End: 2018-11-07

## 2018-11-06 RX ORDER — HEPARIN SODIUM (PORCINE) LOCK FLUSH IV SOLN 100 UNIT/ML 100 UNIT/ML
300 SOLUTION INTRAVENOUS PRN
Status: ACTIVE | OUTPATIENT
Start: 2018-11-06 | End: 2018-11-07

## 2018-11-06 RX ORDER — METHYLPREDNISOLONE SODIUM SUCCINATE 125 MG/2ML
125 INJECTION, POWDER, LYOPHILIZED, FOR SOLUTION INTRAMUSCULAR; INTRAVENOUS PRN
Status: DISCONTINUED | OUTPATIENT
Start: 2018-11-06 | End: 2018-11-08 | Stop reason: HOSPADM

## 2018-11-06 RX ORDER — SODIUM CHLORIDE 0.9 % (FLUSH) 0.9 %
10 SYRINGE (ML) INJECTION PRN
Status: ACTIVE | OUTPATIENT
Start: 2018-11-06 | End: 2018-11-07

## 2018-11-06 RX ORDER — DEXTROSE MONOHYDRATE 50 MG/ML
INJECTION, SOLUTION INTRAVENOUS ONCE
Status: COMPLETED | OUTPATIENT
Start: 2018-11-06 | End: 2018-11-06

## 2018-11-06 RX ORDER — PALONOSETRON 0.05 MG/ML
0.25 INJECTION, SOLUTION INTRAVENOUS ONCE
Status: COMPLETED | OUTPATIENT
Start: 2018-11-06 | End: 2018-11-06

## 2018-11-06 RX ORDER — SODIUM CHLORIDE 9 MG/ML
INJECTION, SOLUTION INTRAVENOUS ONCE
Status: DISCONTINUED | OUTPATIENT
Start: 2018-11-06 | End: 2018-11-08 | Stop reason: HOSPADM

## 2018-11-06 RX ORDER — DEXAMETHASONE SODIUM PHOSPHATE 10 MG/ML
10 INJECTION, SOLUTION INTRAMUSCULAR; INTRAVENOUS ONCE
Status: COMPLETED | OUTPATIENT
Start: 2018-11-06 | End: 2018-11-06

## 2018-11-06 RX ORDER — ACETAMINOPHEN 500 MG
500 TABLET ORAL EVERY 4 HOURS PRN
Status: DISCONTINUED | OUTPATIENT
Start: 2018-11-06 | End: 2018-11-08 | Stop reason: HOSPADM

## 2018-11-06 RX ORDER — SODIUM CHLORIDE 9 MG/ML
INJECTION, SOLUTION INTRAVENOUS CONTINUOUS
Status: DISCONTINUED | OUTPATIENT
Start: 2018-11-06 | End: 2018-11-08 | Stop reason: HOSPADM

## 2018-11-06 RX ADMIN — ATROPINE SULFATE 0.5 MG: 1 INJECTION, SOLUTION INTRAMUSCULAR; INTRAVENOUS; SUBCUTANEOUS at 10:37

## 2018-11-06 RX ADMIN — IRINOTECAN HYDROCHLORIDE 240 MG: 20 INJECTION INTRAVENOUS at 10:56

## 2018-11-06 RX ADMIN — ATROPINE SULFATE 0.5 MG: 1 INJECTION, SOLUTION INTRAMUSCULAR; INTRAVENOUS; SUBCUTANEOUS at 13:00

## 2018-11-06 RX ADMIN — ONDANSETRON 8 MG: 2 INJECTION INTRAMUSCULAR; INTRAVENOUS at 12:11

## 2018-11-06 RX ADMIN — ACETAMINOPHEN 500 MG: 500 TABLET, FILM COATED ORAL at 12:10

## 2018-11-06 RX ADMIN — DEXTROSE MONOHYDRATE: 50 INJECTION, SOLUTION INTRAVENOUS at 07:37

## 2018-11-06 RX ADMIN — FLUOROURACIL 3130 MG: 50 INJECTION, SOLUTION INTRAVENOUS at 13:01

## 2018-11-06 RX ADMIN — SODIUM CHLORIDE 150 MG: 900 INJECTION, SOLUTION INTRAVENOUS at 07:37

## 2018-11-06 RX ADMIN — OXALIPLATIN 110 MG: 5 INJECTION, SOLUTION INTRAVENOUS at 08:24

## 2018-11-06 RX ADMIN — DEXAMETHASONE SODIUM PHOSPHATE 10 MG: 10 INJECTION, SOLUTION INTRAMUSCULAR; INTRAVENOUS at 08:12

## 2018-11-06 RX ADMIN — PALONOSETRON 0.25 MG: 0.05 INJECTION, SOLUTION INTRAVENOUS at 08:12

## 2018-11-06 ASSESSMENT — PAIN SCALES - GENERAL: PAINLEVEL_OUTOF10: 7

## 2018-11-08 ENCOUNTER — HOSPITAL ENCOUNTER (OUTPATIENT)
Dept: INFUSION THERAPY | Age: 58
Setting detail: INFUSION SERIES
Discharge: HOME | End: 2018-11-08
Payer: COMMERCIAL

## 2018-11-08 VITALS
OXYGEN SATURATION: 96 % | SYSTOLIC BLOOD PRESSURE: 138 MMHG | HEART RATE: 69 BPM | DIASTOLIC BLOOD PRESSURE: 87 MMHG | RESPIRATION RATE: 17 BRPM | TEMPERATURE: 98.8 F

## 2018-11-08 PROCEDURE — 2580000003 HC RX 258: Performed by: INTERNAL MEDICINE

## 2018-11-08 PROCEDURE — 6360000002 HC RX W HCPCS: Performed by: INTERNAL MEDICINE

## 2018-11-08 PROCEDURE — 96523 IRRIG DRUG DELIVERY DEVICE: CPT

## 2018-11-08 RX ORDER — SODIUM CHLORIDE 0.9 % (FLUSH) 0.9 %
20 SYRINGE (ML) INJECTION PRN
Status: DISCONTINUED | OUTPATIENT
Start: 2018-11-08 | End: 2018-11-10 | Stop reason: HOSPADM

## 2018-11-08 RX ADMIN — SODIUM CHLORIDE, PRESERVATIVE FREE 300 UNITS: 5 INJECTION INTRAVENOUS at 11:17

## 2018-11-08 RX ADMIN — Medication 20 ML: at 11:17

## 2018-11-27 ENCOUNTER — HOSPITAL ENCOUNTER (OUTPATIENT)
Dept: INFUSION THERAPY | Age: 58
Setting detail: INFUSION SERIES
End: 2018-11-27
Payer: COMMERCIAL

## 2018-12-04 ENCOUNTER — HOSPITAL ENCOUNTER (OUTPATIENT)
Dept: INFUSION THERAPY | Age: 58
Setting detail: INFUSION SERIES
Discharge: HOME | End: 2018-12-04
Payer: COMMERCIAL

## 2018-12-04 VITALS
TEMPERATURE: 98.2 F | SYSTOLIC BLOOD PRESSURE: 125 MMHG | DIASTOLIC BLOOD PRESSURE: 75 MMHG | BODY MASS INDEX: 18.79 KG/M2 | RESPIRATION RATE: 16 BRPM | HEART RATE: 60 BPM | WEIGHT: 120 LBS

## 2018-12-04 DIAGNOSIS — C25.9 MALIGNANT NEOPLASM OF PANCREAS, UNSPECIFIED LOCATION OF MALIGNANCY (HCC): ICD-10-CM

## 2018-12-04 PROCEDURE — 96417 CHEMO IV INFUS EACH ADDL SEQ: CPT

## 2018-12-04 PROCEDURE — 96415 CHEMO IV INFUSION ADDL HR: CPT

## 2018-12-04 PROCEDURE — 6360000002 HC RX W HCPCS: Performed by: INTERNAL MEDICINE

## 2018-12-04 PROCEDURE — 96413 CHEMO IV INFUSION 1 HR: CPT

## 2018-12-04 PROCEDURE — G0498 CHEMO EXTEND IV INFUS W/PUMP: HCPCS

## 2018-12-04 PROCEDURE — 2580000003 HC RX 258: Performed by: INTERNAL MEDICINE

## 2018-12-04 RX ORDER — DEXAMETHASONE SODIUM PHOSPHATE 10 MG/ML
10 INJECTION, SOLUTION INTRAMUSCULAR; INTRAVENOUS ONCE
Status: COMPLETED | OUTPATIENT
Start: 2018-12-04 | End: 2018-12-04

## 2018-12-04 RX ORDER — SODIUM CHLORIDE 0.9 % (FLUSH) 0.9 %
10 SYRINGE (ML) INJECTION PRN
Status: ACTIVE | OUTPATIENT
Start: 2018-12-04 | End: 2018-12-05

## 2018-12-04 RX ORDER — EPINEPHRINE 1 MG/ML
0.3 INJECTION, SOLUTION, CONCENTRATE INTRAVENOUS PRN
Status: DISCONTINUED | OUTPATIENT
Start: 2018-12-04 | End: 2018-12-06 | Stop reason: HOSPADM

## 2018-12-04 RX ORDER — PROMETHAZINE HYDROCHLORIDE 25 MG/1
25 TABLET ORAL EVERY 6 HOURS PRN
COMMUNITY
End: 2020-04-14 | Stop reason: SDUPTHER

## 2018-12-04 RX ORDER — ATROPINE SULFATE 1 MG/ML
0.5 INJECTION, SOLUTION INTRAMUSCULAR; INTRAVENOUS; SUBCUTANEOUS ONCE
Status: COMPLETED | OUTPATIENT
Start: 2018-12-04 | End: 2018-12-04

## 2018-12-04 RX ORDER — DIPHENHYDRAMINE HYDROCHLORIDE 50 MG/ML
50 INJECTION INTRAMUSCULAR; INTRAVENOUS PRN
Status: DISCONTINUED | OUTPATIENT
Start: 2018-12-04 | End: 2018-12-06 | Stop reason: HOSPADM

## 2018-12-04 RX ORDER — DEXTROSE MONOHYDRATE 50 MG/ML
INJECTION, SOLUTION INTRAVENOUS ONCE
Status: COMPLETED | OUTPATIENT
Start: 2018-12-04 | End: 2018-12-04

## 2018-12-04 RX ORDER — SODIUM CHLORIDE 0.9 % (FLUSH) 0.9 %
5 SYRINGE (ML) INJECTION PRN
Status: ACTIVE | OUTPATIENT
Start: 2018-12-04 | End: 2018-12-05

## 2018-12-04 RX ORDER — PALONOSETRON 0.05 MG/ML
0.25 INJECTION, SOLUTION INTRAVENOUS ONCE
Status: COMPLETED | OUTPATIENT
Start: 2018-12-04 | End: 2018-12-04

## 2018-12-04 RX ORDER — SODIUM CHLORIDE 9 MG/ML
INJECTION, SOLUTION INTRAVENOUS CONTINUOUS
Status: DISCONTINUED | OUTPATIENT
Start: 2018-12-04 | End: 2018-12-06 | Stop reason: HOSPADM

## 2018-12-04 RX ORDER — METHYLPREDNISOLONE SODIUM SUCCINATE 125 MG/2ML
125 INJECTION, POWDER, LYOPHILIZED, FOR SOLUTION INTRAMUSCULAR; INTRAVENOUS PRN
Status: DISCONTINUED | OUTPATIENT
Start: 2018-12-04 | End: 2018-12-06 | Stop reason: HOSPADM

## 2018-12-04 RX ORDER — HEPARIN SODIUM (PORCINE) LOCK FLUSH IV SOLN 100 UNIT/ML 100 UNIT/ML
500 SOLUTION INTRAVENOUS PRN
Status: ACTIVE | OUTPATIENT
Start: 2018-12-04 | End: 2018-12-05

## 2018-12-04 RX ORDER — SODIUM CHLORIDE 9 MG/ML
INJECTION, SOLUTION INTRAVENOUS ONCE
Status: COMPLETED | OUTPATIENT
Start: 2018-12-04 | End: 2018-12-04

## 2018-12-04 RX ADMIN — SODIUM CHLORIDE: 9 INJECTION, SOLUTION INTRAVENOUS at 07:41

## 2018-12-04 RX ADMIN — ATROPINE SULFATE 0.5 MG: 1 INJECTION, SOLUTION INTRAMUSCULAR; INTRAVENOUS; SUBCUTANEOUS at 12:27

## 2018-12-04 RX ADMIN — DEXTROSE MONOHYDRATE 240 MG: 50 INJECTION, SOLUTION INTRAVENOUS at 10:55

## 2018-12-04 RX ADMIN — DEXAMETHASONE SODIUM PHOSPHATE 10 MG: 10 INJECTION, SOLUTION INTRAMUSCULAR; INTRAVENOUS at 08:16

## 2018-12-04 RX ADMIN — ATROPINE SULFATE 0.5 MG: 1 INJECTION, SOLUTION INTRAMUSCULAR; INTRAVENOUS; SUBCUTANEOUS at 10:47

## 2018-12-04 RX ADMIN — OXALIPLATIN 88 MG: 5 INJECTION, SOLUTION INTRAVENOUS at 08:25

## 2018-12-04 RX ADMIN — SODIUM CHLORIDE 150 MG: 900 INJECTION, SOLUTION INTRAVENOUS at 07:41

## 2018-12-04 RX ADMIN — FLUOROURACIL 3130 MG: 50 INJECTION, SOLUTION INTRAVENOUS at 12:39

## 2018-12-04 RX ADMIN — PALONOSETRON 0.25 MG: 0.05 INJECTION, SOLUTION INTRAVENOUS at 08:16

## 2018-12-04 RX ADMIN — DEXTROSE MONOHYDRATE: 50 INJECTION, SOLUTION INTRAVENOUS at 08:24

## 2018-12-06 ENCOUNTER — HOSPITAL ENCOUNTER (OUTPATIENT)
Dept: INFUSION THERAPY | Age: 58
Setting detail: INFUSION SERIES
Discharge: HOME | End: 2018-12-06
Payer: COMMERCIAL

## 2018-12-06 PROCEDURE — 96523 IRRIG DRUG DELIVERY DEVICE: CPT

## 2018-12-06 PROCEDURE — 6360000002 HC RX W HCPCS: Performed by: INTERNAL MEDICINE

## 2018-12-06 PROCEDURE — 2580000003 HC RX 258: Performed by: INTERNAL MEDICINE

## 2018-12-06 RX ORDER — SODIUM CHLORIDE 0.9 % (FLUSH) 0.9 %
20 SYRINGE (ML) INJECTION PRN
Status: DISCONTINUED | OUTPATIENT
Start: 2018-12-06 | End: 2018-12-08 | Stop reason: HOSPADM

## 2018-12-06 RX ADMIN — Medication 20 ML: at 10:09

## 2018-12-06 RX ADMIN — SODIUM CHLORIDE, PRESERVATIVE FREE 300 UNITS: 5 INJECTION INTRAVENOUS at 10:09

## 2018-12-07 ENCOUNTER — OFFICE VISIT (OUTPATIENT)
Dept: CARDIOLOGY | Age: 58
End: 2018-12-07
Payer: COMMERCIAL

## 2018-12-07 VITALS
SYSTOLIC BLOOD PRESSURE: 142 MMHG | WEIGHT: 119.6 LBS | DIASTOLIC BLOOD PRESSURE: 90 MMHG | OXYGEN SATURATION: 98 % | BODY MASS INDEX: 18.77 KG/M2 | HEIGHT: 67 IN | HEART RATE: 65 BPM

## 2018-12-07 DIAGNOSIS — I10 ESSENTIAL HYPERTENSION: Primary | ICD-10-CM

## 2018-12-07 PROCEDURE — 99212 OFFICE O/P EST SF 10 MIN: CPT | Performed by: INTERNAL MEDICINE

## 2018-12-07 NOTE — PROGRESS NOTES
Mrs. Valeri Peraza returns for routine follow-up visit with history of dyslipidemia, hypertension, and pancreatic cancer. She has undergone previous partial pancreatectomy and has one more round of chemotherapy upcoming. Tells me she has lost 77 pounds since this ordeal began. Needless to say she has profound fatigue but denies chest discomfort. Her pressures have been acceptable, multiple occasions has been checked over the past few months. On exam she carries 119 pounds on a 5 foot 7 inch frame. Pressure is 142/90 pulse 65. Bitemporal wasting. No elevation Central venous pressure at 45° normal carotid upstrokes. Clear chest normal heart sounds. Painfully thin with no lower extremity edema. No EKG obtained. Based on multiple normal pressures it was seen that this value is out of the ordinary.   She can have her pressure checked several times over the next several weeks to confirm that

## 2018-12-18 ENCOUNTER — HOSPITAL ENCOUNTER (OUTPATIENT)
Dept: INFUSION THERAPY | Age: 58
Setting detail: INFUSION SERIES
End: 2018-12-18
Payer: COMMERCIAL

## 2018-12-26 ENCOUNTER — HOSPITAL ENCOUNTER (OUTPATIENT)
Dept: INFUSION THERAPY | Age: 58
Setting detail: INFUSION SERIES
Discharge: HOME | End: 2018-12-26
Payer: COMMERCIAL

## 2018-12-26 VITALS
WEIGHT: 112 LBS | SYSTOLIC BLOOD PRESSURE: 124 MMHG | HEART RATE: 60 BPM | BODY MASS INDEX: 17.58 KG/M2 | HEIGHT: 67 IN | RESPIRATION RATE: 17 BRPM | DIASTOLIC BLOOD PRESSURE: 79 MMHG | TEMPERATURE: 96.9 F

## 2018-12-26 DIAGNOSIS — C25.9 MALIGNANT NEOPLASM OF PANCREAS, UNSPECIFIED LOCATION OF MALIGNANCY (HCC): ICD-10-CM

## 2018-12-26 PROCEDURE — 6360000002 HC RX W HCPCS: Performed by: INTERNAL MEDICINE

## 2018-12-26 PROCEDURE — 96415 CHEMO IV INFUSION ADDL HR: CPT

## 2018-12-26 PROCEDURE — 96417 CHEMO IV INFUS EACH ADDL SEQ: CPT

## 2018-12-26 PROCEDURE — 96413 CHEMO IV INFUSION 1 HR: CPT

## 2018-12-26 PROCEDURE — G0498 CHEMO EXTEND IV INFUS W/PUMP: HCPCS

## 2018-12-26 PROCEDURE — 2580000003 HC RX 258: Performed by: INTERNAL MEDICINE

## 2018-12-26 RX ORDER — METHYLPREDNISOLONE SODIUM SUCCINATE 125 MG/2ML
125 INJECTION, POWDER, LYOPHILIZED, FOR SOLUTION INTRAMUSCULAR; INTRAVENOUS PRN
Status: DISCONTINUED | OUTPATIENT
Start: 2018-12-26 | End: 2018-12-28 | Stop reason: HOSPADM

## 2018-12-26 RX ORDER — DIPHENHYDRAMINE HYDROCHLORIDE 50 MG/ML
50 INJECTION INTRAMUSCULAR; INTRAVENOUS PRN
Status: DISCONTINUED | OUTPATIENT
Start: 2018-12-26 | End: 2018-12-28 | Stop reason: HOSPADM

## 2018-12-26 RX ORDER — ATROPINE SULFATE 1 MG/ML
0.5 INJECTION, SOLUTION INTRAMUSCULAR; INTRAVENOUS; SUBCUTANEOUS ONCE
Status: COMPLETED | OUTPATIENT
Start: 2018-12-26 | End: 2018-12-26

## 2018-12-26 RX ORDER — EPINEPHRINE 1 MG/ML
0.3 INJECTION, SOLUTION, CONCENTRATE INTRAVENOUS PRN
Status: DISCONTINUED | OUTPATIENT
Start: 2018-12-26 | End: 2018-12-28 | Stop reason: HOSPADM

## 2018-12-26 RX ORDER — DEXTROSE MONOHYDRATE 50 MG/ML
1000 INJECTION, SOLUTION INTRAVENOUS CONTINUOUS
Status: ACTIVE | OUTPATIENT
Start: 2018-12-26 | End: 2018-12-26

## 2018-12-26 RX ORDER — PALONOSETRON 0.05 MG/ML
0.25 INJECTION, SOLUTION INTRAVENOUS ONCE
Status: COMPLETED | OUTPATIENT
Start: 2018-12-26 | End: 2018-12-26

## 2018-12-26 RX ORDER — DEXAMETHASONE SODIUM PHOSPHATE 10 MG/ML
10 INJECTION, SOLUTION INTRAMUSCULAR; INTRAVENOUS ONCE
Status: COMPLETED | OUTPATIENT
Start: 2018-12-26 | End: 2018-12-26

## 2018-12-26 RX ADMIN — DEXTROSE MONOHYDRATE 240 MG: 50 INJECTION, SOLUTION INTRAVENOUS at 10:45

## 2018-12-26 RX ADMIN — PALONOSETRON 0.25 MG: 0.05 INJECTION, SOLUTION INTRAVENOUS at 08:03

## 2018-12-26 RX ADMIN — OXALIPLATIN 88 MG: 5 INJECTION, SOLUTION INTRAVENOUS at 08:15

## 2018-12-26 RX ADMIN — ATROPINE SULFATE 0.5 MG: 1 INJECTION, SOLUTION INTRAMUSCULAR; INTRAVENOUS; SUBCUTANEOUS at 10:23

## 2018-12-26 RX ADMIN — SODIUM CHLORIDE 150 MG: 900 INJECTION, SOLUTION INTRAVENOUS at 07:26

## 2018-12-26 RX ADMIN — FLUOROURACIL 3130 MG: 50 INJECTION, SOLUTION INTRAVENOUS at 12:42

## 2018-12-26 RX ADMIN — DEXAMETHASONE SODIUM PHOSPHATE 10 MG: 10 INJECTION, SOLUTION INTRAMUSCULAR; INTRAVENOUS at 08:03

## 2018-12-26 RX ADMIN — DEXTROSE MONOHYDRATE 250 ML: 50 INJECTION, SOLUTION INTRAVENOUS at 07:26

## 2018-12-26 RX ADMIN — ATROPINE SULFATE 0.5 MG: 1 INJECTION, SOLUTION INTRAMUSCULAR; INTRAVENOUS; SUBCUTANEOUS at 12:38

## 2018-12-28 ENCOUNTER — HOSPITAL ENCOUNTER (OUTPATIENT)
Dept: INFUSION THERAPY | Age: 58
Setting detail: INFUSION SERIES
Discharge: HOME | End: 2018-12-28
Payer: COMMERCIAL

## 2018-12-28 PROCEDURE — 96523 IRRIG DRUG DELIVERY DEVICE: CPT

## 2018-12-28 PROCEDURE — 6360000002 HC RX W HCPCS: Performed by: INTERNAL MEDICINE

## 2018-12-28 PROCEDURE — 2580000003 HC RX 258: Performed by: INTERNAL MEDICINE

## 2018-12-28 RX ORDER — HEPARIN SODIUM (PORCINE) LOCK FLUSH IV SOLN 100 UNIT/ML 100 UNIT/ML
300 SOLUTION INTRAVENOUS PRN
Status: DISCONTINUED | OUTPATIENT
Start: 2018-12-28 | End: 2018-12-30 | Stop reason: HOSPADM

## 2018-12-28 RX ORDER — SODIUM CHLORIDE 0.9 % (FLUSH) 0.9 %
20 SYRINGE (ML) INJECTION PRN
Status: DISCONTINUED | OUTPATIENT
Start: 2018-12-28 | End: 2018-12-30 | Stop reason: HOSPADM

## 2018-12-28 RX ADMIN — SODIUM CHLORIDE, PRESERVATIVE FREE 300 UNITS: 5 INJECTION INTRAVENOUS at 10:35

## 2018-12-28 RX ADMIN — Medication 20 ML: at 10:35

## 2019-02-13 ENCOUNTER — OFFICE VISIT (OUTPATIENT)
Dept: OBSTETRICS AND GYNECOLOGY | Facility: CLINIC | Age: 59
End: 2019-02-13

## 2019-02-13 VITALS
DIASTOLIC BLOOD PRESSURE: 90 MMHG | HEIGHT: 66 IN | SYSTOLIC BLOOD PRESSURE: 150 MMHG | BODY MASS INDEX: 18 KG/M2 | WEIGHT: 112 LBS

## 2019-02-13 DIAGNOSIS — Z01.419 ENCOUNTER FOR GYNECOLOGICAL EXAMINATION WITHOUT ABNORMAL FINDING: Primary | ICD-10-CM

## 2019-02-13 DIAGNOSIS — Z78.9 NON-SMOKER: ICD-10-CM

## 2019-02-13 PROBLEM — I10 HYPERTENSIVE DISORDER: Status: ACTIVE | Noted: 2017-05-22

## 2019-02-13 PROBLEM — A77.0 ROCKY MOUNTAIN SPOTTED FEVER: Status: ACTIVE | Noted: 2019-02-13

## 2019-02-13 PROBLEM — Z01.818 ENCOUNTER FOR OTHER PREPROCEDURAL EXAMINATION: Status: ACTIVE | Noted: 2018-06-20

## 2019-02-13 PROBLEM — Z98.891 HISTORY OF CESAREAN SECTION: Status: ACTIVE | Noted: 2019-02-13

## 2019-02-13 PROBLEM — E78.2 MIXED HYPERLIPIDEMIA: Status: ACTIVE | Noted: 2018-06-20

## 2019-02-13 PROBLEM — C25.9 PANCREATIC CANCER (HCC): Status: ACTIVE | Noted: 2018-03-30

## 2019-02-13 PROBLEM — Z72.0 TOBACCO ABUSE: Status: ACTIVE | Noted: 2018-06-08

## 2019-02-13 PROBLEM — E78.5 DYSLIPIDEMIA: Status: ACTIVE | Noted: 2018-06-08

## 2019-02-13 PROBLEM — E43 SEVERE PROTEIN-CALORIE MALNUTRITION (HCC): Status: ACTIVE | Noted: 2018-09-13

## 2019-02-13 PROBLEM — S59.909A ELBOW INJURY: Status: ACTIVE | Noted: 2019-02-13

## 2019-02-13 PROBLEM — Q79.2 EXOMPHALOS: Status: ACTIVE | Noted: 2019-02-13

## 2019-02-13 PROBLEM — K44.9 HIATAL HERNIA: Status: ACTIVE | Noted: 2019-02-13

## 2019-02-13 PROBLEM — T14.8XXA FRACTURE OF BONE: Status: ACTIVE | Noted: 2019-02-13

## 2019-02-13 PROCEDURE — G0123 SCREEN CERV/VAG THIN LAYER: HCPCS | Performed by: NURSE PRACTITIONER

## 2019-02-13 PROCEDURE — 87624 HPV HI-RISK TYP POOLED RSLT: CPT | Performed by: NURSE PRACTITIONER

## 2019-02-13 PROCEDURE — 99396 PREV VISIT EST AGE 40-64: CPT | Performed by: NURSE PRACTITIONER

## 2019-02-13 RX ORDER — METOCLOPRAMIDE 5 MG/1
5 TABLET ORAL
COMMUNITY

## 2019-02-13 RX ORDER — LANOLIN ALCOHOL/MO/W.PET/CERES
CREAM (GRAM) TOPICAL
COMMUNITY

## 2019-02-13 RX ORDER — EPINEPHRINE 0.3 MG/.3ML
INJECTION SUBCUTANEOUS
COMMUNITY

## 2019-02-13 NOTE — PROGRESS NOTES
"Subjective   Tiffanie Smith is a 58 y.o. female.     Annual exam.         The following portions of the patient's history were reviewed and updated as appropriate: allergies, current medications, past family history, past medical history, past social history, past surgical history and problem list.    /90 (BP Location: Right arm, Patient Position: Sitting, Cuff Size: Adult)   Ht 167.6 cm (66\")   Wt 50.8 kg (112 lb)   BMI 18.08 kg/m²     Review of Systems   Constitutional: Positive for fatigue. Negative for activity change, appetite change and fever.   HENT: Negative for congestion, sore throat and trouble swallowing.    Eyes: Negative for pain, discharge and visual disturbance.   Respiratory: Negative for apnea, shortness of breath and wheezing.    Cardiovascular: Negative for chest pain, palpitations and leg swelling.   Gastrointestinal: Positive for constipation (decreasing). Negative for abdominal pain and diarrhea.   Genitourinary: Negative for frequency, pelvic pain, urgency and vaginal discharge.   Musculoskeletal: Negative for back pain and gait problem.   Skin: Negative for color change and rash.   Neurological: Negative for dizziness, weakness and numbness.   Psychiatric/Behavioral: Negative for confusion and sleep disturbance.       Objective   Physical Exam   Constitutional: She is oriented to person, place, and time. She appears well-developed and well-nourished. No distress.   HENT:   Head: Normocephalic.   Right Ear: External ear normal.   Left Ear: External ear normal.   Nose: Nose normal.   Mouth/Throat: Oropharynx is clear and moist.   Eyes: Conjunctivae are normal. Right eye exhibits no discharge. Left eye exhibits no discharge. No scleral icterus.   Neck: Normal range of motion. Neck supple. Carotid bruit is not present. No tracheal deviation present. No thyromegaly present.   Cardiovascular: Normal rate, regular rhythm, normal heart sounds and intact distal pulses.   No murmur " heard.  Pulmonary/Chest: Effort normal and breath sounds normal. No respiratory distress. She has no wheezes. Right breast exhibits no inverted nipple, no mass, no nipple discharge, no skin change and no tenderness. Left breast exhibits no inverted nipple, no mass, no nipple discharge, no skin change and no tenderness. Breasts are symmetrical. There is no breast swelling.   Abdominal: Soft. She exhibits no distension and no mass. There is no tenderness. There is no guarding. No hernia. Hernia confirmed negative in the right inguinal area and confirmed negative in the left inguinal area.   Genitourinary: Vagina normal and uterus normal. Rectal exam shows external hemorrhoid (small, no erythema noted). Rectal exam shows no mass. No breast tenderness, discharge or bleeding. Pelvic exam was performed with patient supine. There is no rash, tenderness, lesion or injury on the right labia. There is no rash, tenderness, lesion or injury on the left labia. Uterus is not enlarged, not fixed and not tender. Cervix exhibits no motion tenderness, no discharge and no friability. Right adnexum displays no mass, no tenderness and no fullness. Left adnexum displays no mass, no tenderness and no fullness. No erythema, tenderness or bleeding in the vagina. No foreign body in the vagina. No signs of injury around the vagina. No vaginal discharge found.   Genitourinary Comments:   BSU normal  Urethral meatus  Normal  Perineum  Normal   Musculoskeletal: Normal range of motion. She exhibits no edema or tenderness.   Lymphadenopathy:        Head (right side): No submental, no submandibular, no tonsillar, no preauricular, no posterior auricular and no occipital adenopathy present.        Head (left side): No submental, no submandibular, no tonsillar, no preauricular, no posterior auricular and no occipital adenopathy present.     She has no cervical adenopathy.        Right cervical: No superficial cervical, no deep cervical and no  posterior cervical adenopathy present.       Left cervical: No superficial cervical, no deep cervical and no posterior cervical adenopathy present.     She has no axillary adenopathy.        Right: No inguinal adenopathy present.        Left: No inguinal adenopathy present.   Neurological: She is alert and oriented to person, place, and time. Coordination normal.   Skin: Skin is warm and dry. No bruising and no rash noted. She is not diaphoretic. No erythema.   Psychiatric: She has a normal mood and affect. Her behavior is normal. Judgment and thought content normal.   Nursing note and vitals reviewed.      Assessment/Plan    Well woman exam. Pap collected.   Mammogram normal 2/6/2019.    Discussed pt hemorrhoids and OTC products.   Anusol sent in for pt to use for flares.     Patient's Body mass index is 18.08 kg/m². BMI is below normal parameters. Recommendations include: pt is recovering from cancer and is slowly gaining wt. .    RV annual exam/prn.   Tiffanie was seen today for gynecologic exam.    Diagnoses and all orders for this visit:    Encounter for gynecological examination without abnormal finding  -     Liquid-based Pap Smear, Screening  -     HPV DNA Probe, Direct - ThinPrep Vial, Vagina; Future  -     Cancel: HPV DNA Probe, Direct - ThinPrep Vial, Cervix  -     HPV DNA Probe, Direct - ThinPrep Vial, Vagina; Future  -     HPV DNA Probe, Direct - ThinPrep Vial, Vagina    Body mass index (BMI) less than 19    Non-smoker    Other orders  -     hydrocortisone (ANUSOL-HC) 2.5 % rectal cream; Apply rectally 2 times daily

## 2019-02-18 LAB
GEN CATEG CVX/VAG CYTO-IMP: NORMAL
HPV I/H RISK 4 DNA CVX QL PROBE+SIG AMP: NOT DETECTED
LAB AP CASE REPORT: NORMAL
LAB AP GYN ADDITIONAL INFORMATION: NORMAL
LAB AP GYN OTHER FINDINGS: NORMAL
PATH INTERP SPEC-IMP: NORMAL
STAT OF ADQ CVX/VAG CYTO-IMP: NORMAL

## 2019-02-18 NOTE — PATIENT INSTRUCTIONS

## 2019-06-12 ENCOUNTER — HOSPITAL ENCOUNTER (OUTPATIENT)
Dept: INFUSION THERAPY | Age: 59
Discharge: HOME | End: 2019-06-12
Payer: COMMERCIAL

## 2019-06-12 PROCEDURE — 85025 COMPLETE CBC W/AUTO DIFF WBC: CPT

## 2019-06-12 PROCEDURE — 86301 IMMUNOASSAY TUMOR CA 19-9: CPT

## 2019-07-03 ENCOUNTER — HOSPITAL ENCOUNTER (OUTPATIENT)
Dept: INFUSION THERAPY | Age: 59
Discharge: HOME | End: 2019-07-03
Payer: COMMERCIAL

## 2019-07-03 PROCEDURE — 85025 COMPLETE CBC W/AUTO DIFF WBC: CPT

## 2019-07-09 ENCOUNTER — HOSPITAL ENCOUNTER (OUTPATIENT)
Dept: INFUSION THERAPY | Age: 59
Discharge: HOME | End: 2019-07-09
Payer: COMMERCIAL

## 2019-07-09 DIAGNOSIS — C25.9 MALIGNANT NEOPLASM OF PANCREAS, UNSPECIFIED LOCATION OF MALIGNANCY (HCC): Primary | ICD-10-CM

## 2019-07-09 PROCEDURE — 86301 IMMUNOASSAY TUMOR CA 19-9: CPT

## 2019-07-09 PROCEDURE — 96413 CHEMO IV INFUSION 1 HR: CPT

## 2019-07-09 PROCEDURE — 2580000003 HC RX 258: Performed by: INTERNAL MEDICINE

## 2019-07-09 PROCEDURE — 6360000002 HC RX W HCPCS: Performed by: INTERNAL MEDICINE

## 2019-07-09 PROCEDURE — 80053 COMPREHEN METABOLIC PANEL: CPT

## 2019-07-09 PROCEDURE — 85025 COMPLETE CBC W/AUTO DIFF WBC: CPT

## 2019-07-09 PROCEDURE — 96375 TX/PRO/DX INJ NEW DRUG ADDON: CPT

## 2019-07-09 PROCEDURE — 96417 CHEMO IV INFUS EACH ADDL SEQ: CPT

## 2019-07-09 RX ORDER — PACLITAXEL 100 MG/20ML
195 INJECTION, POWDER, LYOPHILIZED, FOR SUSPENSION INTRAVENOUS ONCE
Status: DISCONTINUED | OUTPATIENT
Start: 2019-07-09 | End: 2019-07-11 | Stop reason: HOSPADM

## 2019-07-09 RX ORDER — DIPHENHYDRAMINE HYDROCHLORIDE 50 MG/ML
50 INJECTION INTRAMUSCULAR; INTRAVENOUS PRN
Status: DISCONTINUED | OUTPATIENT
Start: 2019-07-09 | End: 2019-07-11 | Stop reason: HOSPADM

## 2019-07-09 RX ORDER — DEXAMETHASONE SODIUM PHOSPHATE 10 MG/ML
10 INJECTION, SOLUTION INTRAMUSCULAR; INTRAVENOUS ONCE
Status: DISCONTINUED | OUTPATIENT
Start: 2019-07-09 | End: 2019-07-11 | Stop reason: HOSPADM

## 2019-07-09 RX ORDER — PALONOSETRON 0.05 MG/ML
0.25 INJECTION, SOLUTION INTRAVENOUS ONCE
Status: DISCONTINUED | OUTPATIENT
Start: 2019-07-09 | End: 2019-07-11 | Stop reason: HOSPADM

## 2019-07-09 RX ORDER — METHYLPREDNISOLONE SODIUM SUCCINATE 125 MG/2ML
125 INJECTION, POWDER, LYOPHILIZED, FOR SOLUTION INTRAMUSCULAR; INTRAVENOUS PRN
Status: DISCONTINUED | OUTPATIENT
Start: 2019-07-09 | End: 2019-07-11 | Stop reason: HOSPADM

## 2019-07-09 RX ORDER — EPINEPHRINE 1 MG/ML
0.3 INJECTION, SOLUTION, CONCENTRATE INTRAVENOUS PRN
Status: DISCONTINUED | OUTPATIENT
Start: 2019-07-09 | End: 2019-07-11 | Stop reason: HOSPADM

## 2019-07-20 VITALS
DIASTOLIC BLOOD PRESSURE: 84 MMHG | HEIGHT: 67 IN | WEIGHT: 110 LBS | BODY MASS INDEX: 17.27 KG/M2 | HEART RATE: 63 BPM | SYSTOLIC BLOOD PRESSURE: 130 MMHG

## 2019-07-20 VITALS
WEIGHT: 109 LBS | HEART RATE: 75 BPM | BODY MASS INDEX: 14.76 KG/M2 | DIASTOLIC BLOOD PRESSURE: 70 MMHG | SYSTOLIC BLOOD PRESSURE: 124 MMHG | HEIGHT: 72 IN

## 2019-07-20 DIAGNOSIS — D64.9 ANEMIA, UNSPECIFIED TYPE: ICD-10-CM

## 2019-07-20 DIAGNOSIS — K82.0 OBSTRUCTION OF GALLBLADDER: ICD-10-CM

## 2019-07-20 RX ORDER — ONDANSETRON HYDROCHLORIDE 8 MG/1
8 TABLET, FILM COATED ORAL EVERY 8 HOURS PRN
COMMUNITY
End: 2019-12-16

## 2019-07-23 ENCOUNTER — HOSPITAL ENCOUNTER (OUTPATIENT)
Dept: INFUSION THERAPY | Age: 59
Discharge: HOME | End: 2019-07-23
Payer: COMMERCIAL

## 2019-07-23 ENCOUNTER — HOSPITAL ENCOUNTER (OUTPATIENT)
Dept: INFUSION THERAPY | Age: 59
Setting detail: INFUSION SERIES
Discharge: HOME | End: 2019-07-23
Payer: COMMERCIAL

## 2019-07-23 VITALS
BODY MASS INDEX: 17.11 KG/M2 | RESPIRATION RATE: 17 BRPM | HEART RATE: 45 BPM | DIASTOLIC BLOOD PRESSURE: 77 MMHG | TEMPERATURE: 98.6 F | SYSTOLIC BLOOD PRESSURE: 156 MMHG | WEIGHT: 109 LBS | HEIGHT: 67 IN

## 2019-07-23 DIAGNOSIS — C25.9 MALIGNANT NEOPLASM OF PANCREAS, UNSPECIFIED LOCATION OF MALIGNANCY (HCC): Primary | ICD-10-CM

## 2019-07-23 PROCEDURE — 96417 CHEMO IV INFUS EACH ADDL SEQ: CPT

## 2019-07-23 PROCEDURE — 2580000003 HC RX 258: Performed by: INTERNAL MEDICINE

## 2019-07-23 PROCEDURE — 6360000002 HC RX W HCPCS: Performed by: INTERNAL MEDICINE

## 2019-07-23 PROCEDURE — 85025 COMPLETE CBC W/AUTO DIFF WBC: CPT

## 2019-07-23 PROCEDURE — 96375 TX/PRO/DX INJ NEW DRUG ADDON: CPT

## 2019-07-23 PROCEDURE — 96413 CHEMO IV INFUSION 1 HR: CPT

## 2019-07-23 RX ORDER — PALONOSETRON 0.05 MG/ML
0.25 INJECTION, SOLUTION INTRAVENOUS ONCE
Status: COMPLETED | OUTPATIENT
Start: 2019-07-23 | End: 2019-07-23

## 2019-07-23 RX ORDER — PACLITAXEL 100 MG/20ML
195 INJECTION, POWDER, LYOPHILIZED, FOR SUSPENSION INTRAVENOUS ONCE
Status: COMPLETED | OUTPATIENT
Start: 2019-07-23 | End: 2019-07-23

## 2019-07-23 RX ORDER — METHYLPREDNISOLONE SODIUM SUCCINATE 125 MG/2ML
125 INJECTION, POWDER, LYOPHILIZED, FOR SOLUTION INTRAMUSCULAR; INTRAVENOUS PRN
Status: DISCONTINUED | OUTPATIENT
Start: 2019-07-23 | End: 2019-07-25 | Stop reason: HOSPADM

## 2019-07-23 RX ORDER — EPINEPHRINE 1 MG/ML
0.3 INJECTION, SOLUTION, CONCENTRATE INTRAVENOUS
Status: ACTIVE | OUTPATIENT
Start: 2019-07-23 | End: 2019-07-23

## 2019-07-23 RX ORDER — 0.9 % SODIUM CHLORIDE 0.9 %
250 INTRAVENOUS SOLUTION INTRAVENOUS ONCE
Status: COMPLETED | OUTPATIENT
Start: 2019-07-23 | End: 2019-07-23

## 2019-07-23 RX ORDER — PALONOSETRON 0.05 MG/ML
0.25 INJECTION, SOLUTION INTRAVENOUS ONCE
Status: CANCELLED
Start: 2019-07-23

## 2019-07-23 RX ORDER — DIPHENHYDRAMINE HYDROCHLORIDE 50 MG/ML
50 INJECTION INTRAMUSCULAR; INTRAVENOUS PRN
Status: DISCONTINUED | OUTPATIENT
Start: 2019-07-23 | End: 2019-07-25 | Stop reason: HOSPADM

## 2019-07-23 RX ORDER — DEXAMETHASONE SODIUM PHOSPHATE 10 MG/ML
10 INJECTION, SOLUTION INTRAMUSCULAR; INTRAVENOUS ONCE
Status: CANCELLED | OUTPATIENT
Start: 2019-07-23

## 2019-07-23 RX ORDER — DEXAMETHASONE SODIUM PHOSPHATE 10 MG/ML
10 INJECTION, SOLUTION INTRAMUSCULAR; INTRAVENOUS ONCE
Status: COMPLETED | OUTPATIENT
Start: 2019-07-23 | End: 2019-07-23

## 2019-07-23 RX ORDER — METHYLPREDNISOLONE SODIUM SUCCINATE 125 MG/2ML
125 INJECTION, POWDER, LYOPHILIZED, FOR SOLUTION INTRAMUSCULAR; INTRAVENOUS PRN
Status: CANCELLED | OUTPATIENT
Start: 2019-07-23

## 2019-07-23 RX ORDER — DIPHENHYDRAMINE HYDROCHLORIDE 50 MG/ML
50 INJECTION INTRAMUSCULAR; INTRAVENOUS PRN
Status: CANCELLED | OUTPATIENT
Start: 2019-07-23

## 2019-07-23 RX ORDER — EPINEPHRINE 1 MG/ML
0.3 INJECTION, SOLUTION, CONCENTRATE INTRAVENOUS PRN
Status: CANCELLED | OUTPATIENT
Start: 2019-07-23

## 2019-07-23 RX ORDER — PACLITAXEL 100 MG/20ML
195 INJECTION, POWDER, LYOPHILIZED, FOR SUSPENSION INTRAVENOUS ONCE
Status: CANCELLED | OUTPATIENT
Start: 2019-07-23

## 2019-07-23 RX ADMIN — PACLITAXEL 195 MG: 100 INJECTION, POWDER, LYOPHILIZED, FOR SUSPENSION INTRAVENOUS at 11:16

## 2019-07-23 RX ADMIN — DEXAMETHASONE SODIUM PHOSPHATE 10 MG: 10 INJECTION, SOLUTION INTRAMUSCULAR; INTRAVENOUS at 10:53

## 2019-07-23 RX ADMIN — SODIUM CHLORIDE 250 ML: 9 INJECTION, SOLUTION INTRAVENOUS at 10:55

## 2019-07-23 RX ADMIN — PALONOSETRON 0.25 MG: 0.05 INJECTION, SOLUTION INTRAVENOUS at 10:53

## 2019-07-23 RX ADMIN — SODIUM CHLORIDE, PRESERVATIVE FREE 300 UNITS: 5 INJECTION INTRAVENOUS at 12:50

## 2019-07-23 RX ADMIN — GEMCITABINE 940 MG: 1 INJECTION, POWDER, LYOPHILIZED, FOR SOLUTION INTRAVENOUS at 12:01

## 2019-08-06 ENCOUNTER — OFFICE VISIT (OUTPATIENT)
Dept: HEMATOLOGY | Age: 59
End: 2019-08-06
Payer: COMMERCIAL

## 2019-08-06 ENCOUNTER — HOSPITAL ENCOUNTER (OUTPATIENT)
Dept: INFUSION THERAPY | Age: 59
Discharge: HOME | End: 2019-08-06
Payer: COMMERCIAL

## 2019-08-06 VITALS
OXYGEN SATURATION: 99 % | SYSTOLIC BLOOD PRESSURE: 162 MMHG | HEART RATE: 50 BPM | BODY MASS INDEX: 17.4 KG/M2 | HEIGHT: 67 IN | TEMPERATURE: 97.5 F | DIASTOLIC BLOOD PRESSURE: 94 MMHG | WEIGHT: 110.9 LBS

## 2019-08-06 DIAGNOSIS — R53.0 NEOPLASTIC MALIGNANT RELATED FATIGUE: ICD-10-CM

## 2019-08-06 DIAGNOSIS — C25.9 MALIGNANT NEOPLASM OF PANCREAS, UNSPECIFIED LOCATION OF MALIGNANCY (HCC): ICD-10-CM

## 2019-08-06 DIAGNOSIS — R19.7 DIARRHEA FOLLOWING GASTROINTESTINAL SURGERY: ICD-10-CM

## 2019-08-06 DIAGNOSIS — T45.1X5A CHEMOTHERAPY-INDUCED THROMBOCYTOPENIA: ICD-10-CM

## 2019-08-06 DIAGNOSIS — T45.1X5A ANEMIA ASSOCIATED WITH CHEMOTHERAPY: Primary | ICD-10-CM

## 2019-08-06 DIAGNOSIS — G62.0 CHEMOTHERAPY-INDUCED PERIPHERAL NEUROPATHY (HCC): ICD-10-CM

## 2019-08-06 DIAGNOSIS — Z98.890 DIARRHEA FOLLOWING GASTROINTESTINAL SURGERY: ICD-10-CM

## 2019-08-06 DIAGNOSIS — C25.9 MALIGNANT NEOPLASM OF PANCREAS, UNSPECIFIED LOCATION OF MALIGNANCY (HCC): Primary | ICD-10-CM

## 2019-08-06 DIAGNOSIS — D64.81 ANEMIA ASSOCIATED WITH CHEMOTHERAPY: Primary | ICD-10-CM

## 2019-08-06 DIAGNOSIS — D69.59 CHEMOTHERAPY-INDUCED THROMBOCYTOPENIA: ICD-10-CM

## 2019-08-06 DIAGNOSIS — T45.1X5A CHEMOTHERAPY-INDUCED PERIPHERAL NEUROPATHY (HCC): ICD-10-CM

## 2019-08-06 PROBLEM — Q79.2 EXOMPHALOS: Status: ACTIVE | Noted: 2019-02-13

## 2019-08-06 PROBLEM — Z87.19 HISTORY OF ACUTE PANCREATITIS: Status: ACTIVE | Noted: 2018-03-02

## 2019-08-06 PROBLEM — A77.0 ROCKY MOUNTAIN SPOTTED FEVER: Status: ACTIVE | Noted: 2019-02-13

## 2019-08-06 PROBLEM — K44.9 HIATAL HERNIA: Status: ACTIVE | Noted: 2019-02-13

## 2019-08-06 PROBLEM — E78.2 MIXED HYPERLIPIDEMIA: Status: ACTIVE | Noted: 2018-06-20

## 2019-08-06 PROBLEM — E43 SEVERE PROTEIN-CALORIE MALNUTRITION (HCC): Status: ACTIVE | Noted: 2018-09-13

## 2019-08-06 PROBLEM — I10 HYPERTENSION: Status: ACTIVE | Noted: 2017-05-22

## 2019-08-06 PROBLEM — K82.0 OBSTRUCTION OF GALLBLADDER: Status: RESOLVED | Noted: 2019-07-20 | Resolved: 2019-08-06

## 2019-08-06 PROBLEM — D64.9 ANEMIA, UNSPECIFIED: Status: RESOLVED | Noted: 2019-07-20 | Resolved: 2019-08-06

## 2019-08-06 PROCEDURE — 96367 TX/PROPH/DG ADDL SEQ IV INF: CPT

## 2019-08-06 PROCEDURE — 96417 CHEMO IV INFUS EACH ADDL SEQ: CPT

## 2019-08-06 PROCEDURE — 6360000002 HC RX W HCPCS: Performed by: INTERNAL MEDICINE

## 2019-08-06 PROCEDURE — 99214 OFFICE O/P EST MOD 30 MIN: CPT | Performed by: NURSE PRACTITIONER

## 2019-08-06 PROCEDURE — 80053 COMPREHEN METABOLIC PANEL: CPT

## 2019-08-06 PROCEDURE — 2580000003 HC RX 258: Performed by: INTERNAL MEDICINE

## 2019-08-06 PROCEDURE — 96375 TX/PRO/DX INJ NEW DRUG ADDON: CPT

## 2019-08-06 PROCEDURE — 86301 IMMUNOASSAY TUMOR CA 19-9: CPT

## 2019-08-06 PROCEDURE — 85025 COMPLETE CBC W/AUTO DIFF WBC: CPT

## 2019-08-06 PROCEDURE — 96413 CHEMO IV INFUSION 1 HR: CPT

## 2019-08-06 RX ORDER — PACLITAXEL 100 MG/20ML
195 INJECTION, POWDER, LYOPHILIZED, FOR SUSPENSION INTRAVENOUS ONCE
Status: DISCONTINUED | OUTPATIENT
Start: 2019-08-06 | End: 2019-08-08 | Stop reason: HOSPADM

## 2019-08-06 RX ORDER — EPINEPHRINE 1 MG/ML
0.3 INJECTION, SOLUTION, CONCENTRATE INTRAVENOUS PRN
Status: DISCONTINUED | OUTPATIENT
Start: 2019-08-06 | End: 2019-08-08 | Stop reason: HOSPADM

## 2019-08-06 RX ORDER — METHYLPREDNISOLONE SODIUM SUCCINATE 125 MG/2ML
125 INJECTION, POWDER, LYOPHILIZED, FOR SOLUTION INTRAMUSCULAR; INTRAVENOUS PRN
Status: DISCONTINUED | OUTPATIENT
Start: 2019-08-06 | End: 2019-08-08 | Stop reason: HOSPADM

## 2019-08-06 RX ORDER — HEPARIN SODIUM (PORCINE) LOCK FLUSH IV SOLN 100 UNIT/ML 100 UNIT/ML
500 SOLUTION INTRAVENOUS ONCE
Status: DISCONTINUED | OUTPATIENT
Start: 2019-08-06 | End: 2019-08-08 | Stop reason: HOSPADM

## 2019-08-06 RX ORDER — PALONOSETRON 0.05 MG/ML
0.25 INJECTION, SOLUTION INTRAVENOUS ONCE
Status: DISCONTINUED | OUTPATIENT
Start: 2019-08-06 | End: 2019-08-08 | Stop reason: HOSPADM

## 2019-08-06 RX ORDER — DEXAMETHASONE SODIUM PHOSPHATE 10 MG/ML
10 INJECTION, SOLUTION INTRAMUSCULAR; INTRAVENOUS ONCE
Status: DISCONTINUED | OUTPATIENT
Start: 2019-08-06 | End: 2019-08-08 | Stop reason: HOSPADM

## 2019-08-06 RX ORDER — DIPHENHYDRAMINE HYDROCHLORIDE 50 MG/ML
50 INJECTION INTRAMUSCULAR; INTRAVENOUS PRN
Status: DISCONTINUED | OUTPATIENT
Start: 2019-08-06 | End: 2019-08-08 | Stop reason: HOSPADM

## 2019-08-06 ASSESSMENT — ENCOUNTER SYMPTOMS
EYE PAIN: 0
EYE DISCHARGE: 0
VOMITING: 0
SORE THROAT: 0
SHORTNESS OF BREATH: 0
BLOOD IN STOOL: 0
ABDOMINAL PAIN: 0
NAUSEA: 1
DIARRHEA: 0
CONSTIPATION: 0
WHEEZING: 0
BACK PAIN: 0
RESPIRATORY NEGATIVE: 1
COUGH: 0
EYES NEGATIVE: 1
EYE REDNESS: 0

## 2019-08-06 NOTE — PROGRESS NOTES
distant disease. Referral to Surgical oncology. CT chest to complete staging. CA-19-9 430.   3/16/2018-CT of the chest was unremarkable for intrathoracic metastatic disease   Surgery consultation at [facility] March 2018- with Dr Ander Luciano. She was not a surgical candidate upfront. Recommended neoadjuvant chemotherapy. 4/3/2018-started on neoadjuvant chemotherapy with FOLFORINOX.   4/11/2018-she developed CBD obstruction had a CBD stent placed by Dr. Rafia Sunshine. 4/3/2018 through 6/4/2018 Neoadjuvant chemotherapy FOLFORINOX ×5 Biweekly cycles   August 2018- XRT 30 Gy/Xeloda   08/30/3018- Whipple procedure at [facility] by Dr. Hernandez Orf   Recommended another 7 biweekly cycles of modified FOLFORINOX.   9/5/2018-CT of the chest abdomen pelvis was unremarkable for metastatic disease. 1/14/2019-CT abdomen and pelvis at [facility] showed no evidence of metastasis in the chest abdomen pelvis. 5/21/2019-[facility] showed no evidence of metastatic disease   5/21/20190124-WG-06-9 = 42   06/12/2019- CA-19-9 = 154.   7/1/2019-CT chest, abdomen, pelvis showed a soft tissue mass measuring 1.4 x 1.1 cm, not present on prior scan from January 2019, concerning for recurrence. Stable hypodense in the liver, too small to characterize. Subcentimeter ill-defined area of low attenuation liver may represent an early metastasis. 7/3/2019-recommended palliative chemotherapy with nab paclitaxel 125mg/m² IV over 30 minutes on day 1 and gemcitabine 600 mg/m² IV over 10mg/m2/min (fixed dose rate) on day 1  7/9/2019-CA 19-9= 225    Genetic assessment- [facility] on 7/3/2019 was negative with no pathogenic sequence variance or deletion/duplications identified.     Past Medical History:    Past Medical History:   Diagnosis Date    Acute pancreatitis     Adult BMI <19 kg/sq m     Anemia     Cat esophagus     Biliary obstruction     GERD (gastroesophageal reflux disease)     with Barretts    Hashimoto's thyroiditis     Heart murmur     Hypertension     Low blood sugar     h/o when overweight in the past    MVP (mitral valve prolapse)     Myalgia     Pancreatic adenocarcinoma ([facility] [address] 2018    Dr Rock Edge Pancreatic cancer ([facility] [address] 3/30/2018    Right flank pain     RUQ pain      Past Surgical History:    Past Surgical History:   Procedure Laterality Date    CARDIAC SURGERY       SECTION      x 3    CHOLECYSTECTOMY  1997    COLONOSCOPY  years ago    Steve-Dr Snehal Young per patient    COLONOSCOPY  14    Dr Cat Odell  03/10/2016    Dr Mcfarland-10 yr recall    ENDOMETRIAL ABLATION      HAND SURGERY Right     HERNIA REPAIR      hiatal    HERNIA REPAIR      umbilical    HERNIA REPAIR      PANCREAS SURGERY  2018    Whipple procedure-Dr Robert Tomas MD EGD [facility] NEEDLE ASPIR/BIOP ALTERED ANATOMY N/A 2018    Dr Mahi Hopson w/fna-Dilation of main pancreatic duct with diffuse change in the pancreatitis noted, area of concern in the neck of the pancreas-strongly suspicious for adenocarcinoma     MD EGD INTRMURAL NEEDLE ASPIR/BIOP ALTERED ANATOMY N/A 3/6/2018    Dr KVNG Duncan-Pancreatic cancer-Ductal adenocarcinoma-staged [id number] by EUS, pancreatic pseudocyst in the tail the pancreas    MD ERCP DX COLLECTION SPECIMEN BRUSHING/WASHING N/A 2018    ERCP-Dr KVNG Duncan-placement of a self-expanding fully covered 10 mm biliary stent    UPPER GASTROINTESTINAL ENDOSCOPY  years ago    Steve-Dr Landry [facility]    UPPER GASTROINTESTINAL ENDOSCOPY      Zuniga     Current Medications:    Current Outpatient Medications   Medication Sig Dispense Refill    ondansetron (ZOFRAN) 8 MG tablet Take 8 mg by mouth every 8 hours as needed for Nausea or Vomiting      granisetron (SANCUSO) 3.1 MG/24HR PT[facility] Place 3.1 mg onto the skin once a week      promethazine (PHENERGAN) 25 MG tablet Take 25 mg by mouth every 6 hours as needed for Nausea      lipase-protease-amylase ([facility]) [id number] person, place, and time. She appears well-developed. She appears cachectic. She appears ill. No distress. HENT:   Head: Normocephalic and atraumatic. Mouth/Throat: Oropharynx is clear and moist.   Eyes: Pupils are equal, round, and reactive to light. Conjunctivae and EOM are normal. Right eye exhibits no discharge. Left eye exhibits no discharge. No scleral icterus. Neck: Normal range of motion. Neck supple. No JVD present. No tracheal deviation present. Cardiovascular: Normal rate, regular rhythm, normal heart sounds and intact distal pulses. Exam reveals no gallop and no friction rub. No murmur heard. Pulmonary/Chest: Effort normal and breath sounds normal. No respiratory distress. She has no wheezes. She has no rales. She exhibits no tenderness. Abdominal: Soft. Bowel sounds are normal. She exhibits no distension and no mass. There is no tenderness. There is no rebound and no guarding. No hernia. Musculoskeletal: Normal range of motion. She exhibits no edema, tenderness or deformity. Neurological: She is alert and oriented to person, place, and time. No cranial nerve deficit. Coordination normal.   Skin: Skin is warm. No rash noted. She is not diaphoretic. No erythema. No pallor. Psychiatric: She has a normal mood and affect. Her behavior is normal. Thought content normal.   Vitals reviewed. BP (!) 162/94   Pulse 50   Temp 97.5 °F (36.4 °C)   Ht 5' 7\" (1.702 m)   Wt 110 lb 14.4 oz (50.3 kg)   SpO2 99%   BMI 17.37 kg/m²     Labs:  CBC today documented WBC 4.91, ANC 2.45, hemoglobin 10.8, .2 and a platelet count of 206,807      ASSESSMENT/PLAN:      1. Recurrent pancreatic adenocarcinoma, presently receiving palliative treatment with Gemzar and Abraxane. Etta Laboy is tolerating treatment overall fairly well with the exception of fatigue and occasional nausea. Genetic testing by [facility] was negative for any genetic variants.   - Cycle #3 day 1 of Gemzar and Abraxane delivered  - CBC Auto Differential  - Cancer Antigen 19-9; Future  - Comprehensive Metabolic Panel; Future    2. Anemia associated with chemotherapy. She denies any shortness of air, chest pain or bleeding tendencies. Hemoglobin has slowly began trending down. Will evaluate iron substrates upon return.  -Hemoglobin 10.8  -.2    3. Chemotherapy-induced thrombocytopenia. She denies any active bleeding. Count is stable for treatment. We will continue to monitor closely.  -Platelet count of 683,848    4. Neoplastic malignant related fatigue, Jade Leach indicates that overall she feels she is tolerating treatment well although she has noted some increased fatigue with each cycle. 5.Post Whipple diarrhea and cachectic-  She is presently not taking Creon as prescribed due to difficulty. Recommendation is for her to have a a dietitian consult at [facility]. Her weight is overall stable. 6.  Chemotherapy induced peripheral neuropathy, grade 1, secondary to oxaliplatin. Jade Leach reports that the neuropathy is overall stable and she denies any balance or fine motor skill difficulty. Follow-up:  1. Keep follow-up appointment at [facility] on 9/11/2019  2. Return in 2 weeks for cycle #3 day 15  3. Follow-up appointment given for 9/3/2019, cycle #4 day 1    I have seen, examined and reviewed this patient medication list, appropriate labs and imaging studies. I reviewed relevant medical records and others physicians notes. I discussed the plans of care with the patient.  I answered all the questions to the patients satisfaction      JORDYN Alva  4:25 PM  8/6/2019

## 2019-08-20 ENCOUNTER — HOSPITAL ENCOUNTER (OUTPATIENT)
Dept: INFUSION THERAPY | Age: 59
Discharge: HOME | End: 2019-08-20
Payer: COMMERCIAL

## 2019-08-20 DIAGNOSIS — C25.9 MALIGNANT NEOPLASM OF PANCREAS, UNSPECIFIED LOCATION OF MALIGNANCY (HCC): Primary | ICD-10-CM

## 2019-08-20 DIAGNOSIS — T45.1X5A ANEMIA ASSOCIATED WITH CHEMOTHERAPY: ICD-10-CM

## 2019-08-20 DIAGNOSIS — D64.81 ANEMIA ASSOCIATED WITH CHEMOTHERAPY: ICD-10-CM

## 2019-08-20 PROCEDURE — 80053 COMPREHEN METABOLIC PANEL: CPT

## 2019-08-20 PROCEDURE — 6360000002 HC RX W HCPCS: Performed by: PHYSICIAN ASSISTANT

## 2019-08-20 PROCEDURE — 96417 CHEMO IV INFUS EACH ADDL SEQ: CPT

## 2019-08-20 PROCEDURE — 2580000003 HC RX 258: Performed by: PHYSICIAN ASSISTANT

## 2019-08-20 PROCEDURE — 85025 COMPLETE CBC W/AUTO DIFF WBC: CPT

## 2019-08-20 PROCEDURE — 96413 CHEMO IV INFUSION 1 HR: CPT

## 2019-08-20 PROCEDURE — 96375 TX/PRO/DX INJ NEW DRUG ADDON: CPT

## 2019-08-20 RX ORDER — DIPHENHYDRAMINE HYDROCHLORIDE 50 MG/ML
50 INJECTION INTRAMUSCULAR; INTRAVENOUS PRN
Status: DISCONTINUED | OUTPATIENT
Start: 2019-08-20 | End: 2019-08-22 | Stop reason: HOSPADM

## 2019-08-20 RX ORDER — METHYLPREDNISOLONE SODIUM SUCCINATE 125 MG/2ML
125 INJECTION, POWDER, LYOPHILIZED, FOR SOLUTION INTRAMUSCULAR; INTRAVENOUS PRN
Status: DISCONTINUED | OUTPATIENT
Start: 2019-08-20 | End: 2019-08-22 | Stop reason: HOSPADM

## 2019-08-20 RX ORDER — PALONOSETRON 0.05 MG/ML
0.25 INJECTION, SOLUTION INTRAVENOUS ONCE
Status: DISCONTINUED | OUTPATIENT
Start: 2019-08-20 | End: 2019-08-22 | Stop reason: HOSPADM

## 2019-08-20 RX ORDER — PACLITAXEL 100 MG/20ML
195 INJECTION, POWDER, LYOPHILIZED, FOR SUSPENSION INTRAVENOUS ONCE
Status: DISCONTINUED | OUTPATIENT
Start: 2019-08-20 | End: 2019-08-22 | Stop reason: HOSPADM

## 2019-08-20 RX ORDER — EPINEPHRINE 1 MG/ML
0.3 INJECTION, SOLUTION, CONCENTRATE INTRAVENOUS PRN
Status: DISCONTINUED | OUTPATIENT
Start: 2019-08-20 | End: 2019-08-22 | Stop reason: HOSPADM

## 2019-08-20 RX ORDER — DEXAMETHASONE SODIUM PHOSPHATE 10 MG/ML
10 INJECTION, SOLUTION INTRAMUSCULAR; INTRAVENOUS ONCE
Status: DISCONTINUED | OUTPATIENT
Start: 2019-08-20 | End: 2019-08-22 | Stop reason: HOSPADM

## 2019-09-03 ENCOUNTER — OFFICE VISIT (OUTPATIENT)
Dept: HEMATOLOGY | Age: 59
End: 2019-09-03
Payer: COMMERCIAL

## 2019-09-03 ENCOUNTER — HOSPITAL ENCOUNTER (OUTPATIENT)
Dept: INFUSION THERAPY | Age: 59
Discharge: HOME | End: 2019-09-03
Payer: COMMERCIAL

## 2019-09-03 VITALS
SYSTOLIC BLOOD PRESSURE: 140 MMHG | BODY MASS INDEX: 17.25 KG/M2 | DIASTOLIC BLOOD PRESSURE: 90 MMHG | OXYGEN SATURATION: 98 % | WEIGHT: 109.9 LBS | HEIGHT: 67 IN | HEART RATE: 57 BPM

## 2019-09-03 DIAGNOSIS — Z51.11 CHEMOTHERAPY MANAGEMENT, ENCOUNTER FOR: ICD-10-CM

## 2019-09-03 DIAGNOSIS — C25.9 MALIGNANT NEOPLASM OF PANCREAS, UNSPECIFIED LOCATION OF MALIGNANCY (HCC): Primary | ICD-10-CM

## 2019-09-03 DIAGNOSIS — T45.1X5D ADVERSE EFFECT OF CHEMOTHERAPY, SUBSEQUENT ENCOUNTER: ICD-10-CM

## 2019-09-03 DIAGNOSIS — Z71.89 CARE PLAN DISCUSSED WITH PATIENT: ICD-10-CM

## 2019-09-03 PROCEDURE — 99214 OFFICE O/P EST MOD 30 MIN: CPT | Performed by: INTERNAL MEDICINE

## 2019-09-03 PROCEDURE — 96413 CHEMO IV INFUSION 1 HR: CPT

## 2019-09-03 PROCEDURE — 85025 COMPLETE CBC W/AUTO DIFF WBC: CPT

## 2019-09-03 PROCEDURE — 80053 COMPREHEN METABOLIC PANEL: CPT

## 2019-09-03 PROCEDURE — 86301 IMMUNOASSAY TUMOR CA 19-9: CPT

## 2019-09-03 PROCEDURE — 6360000002 HC RX W HCPCS: Performed by: INTERNAL MEDICINE

## 2019-09-03 PROCEDURE — 96375 TX/PRO/DX INJ NEW DRUG ADDON: CPT

## 2019-09-03 PROCEDURE — 2580000003 HC RX 258: Performed by: INTERNAL MEDICINE

## 2019-09-03 PROCEDURE — 96417 CHEMO IV INFUS EACH ADDL SEQ: CPT

## 2019-09-03 RX ORDER — HEPARIN SODIUM (PORCINE) LOCK FLUSH IV SOLN 100 UNIT/ML 100 UNIT/ML
500 SOLUTION INTRAVENOUS ONCE
Status: DISCONTINUED | OUTPATIENT
Start: 2019-09-03 | End: 2019-09-05 | Stop reason: HOSPADM

## 2019-09-03 RX ORDER — PACLITAXEL 100 MG/20ML
195 INJECTION, POWDER, LYOPHILIZED, FOR SUSPENSION INTRAVENOUS ONCE
Status: DISCONTINUED | OUTPATIENT
Start: 2019-09-03 | End: 2019-09-05 | Stop reason: HOSPADM

## 2019-09-03 RX ORDER — EPINEPHRINE 1 MG/ML
0.3 INJECTION, SOLUTION, CONCENTRATE INTRAVENOUS PRN
Status: DISCONTINUED | OUTPATIENT
Start: 2019-09-03 | End: 2019-09-05 | Stop reason: HOSPADM

## 2019-09-03 RX ORDER — PALONOSETRON 0.05 MG/ML
0.25 INJECTION, SOLUTION INTRAVENOUS ONCE
Status: DISCONTINUED | OUTPATIENT
Start: 2019-09-03 | End: 2019-09-05 | Stop reason: HOSPADM

## 2019-09-03 RX ORDER — METHYLPREDNISOLONE SODIUM SUCCINATE 125 MG/2ML
125 INJECTION, POWDER, LYOPHILIZED, FOR SOLUTION INTRAMUSCULAR; INTRAVENOUS PRN
Status: DISCONTINUED | OUTPATIENT
Start: 2019-09-03 | End: 2019-09-05 | Stop reason: HOSPADM

## 2019-09-03 RX ORDER — DEXAMETHASONE SODIUM PHOSPHATE 10 MG/ML
10 INJECTION, SOLUTION INTRAMUSCULAR; INTRAVENOUS ONCE
Status: DISCONTINUED | OUTPATIENT
Start: 2019-09-03 | End: 2019-09-05 | Stop reason: HOSPADM

## 2019-09-03 RX ORDER — DIPHENHYDRAMINE HYDROCHLORIDE 50 MG/ML
50 INJECTION INTRAMUSCULAR; INTRAVENOUS PRN
Status: DISCONTINUED | OUTPATIENT
Start: 2019-09-03 | End: 2019-09-05 | Stop reason: HOSPADM

## 2019-09-03 NOTE — PROGRESS NOTES
Bib Jones   1960  9/3/2019     Chief Complaint   Patient presents with    Pancreatic Cancer        INTERVAL HISTORY/HISTORY OF PRESENT ILLNESS:  Diagnosis  Pancreatic adenocarcinoma, January 2018   [id number]  7/6/2019-extended genetic panel-negative for a deleterious mutation  Treatment summary  March 2018-Surgical consultation at [facility] operable   4/3/2018 through 6/4/2018 Neoadjuvant chemotherapy FOLFORINOX ×5 Biweekly cycles   4/11/2018-Biliary stent   August 2018- XRT 30 Gy/Xeloda   08/30/3018- Whipple procedure @ [facility]   Recommended another 5 biweekly cycles of modified FOLFORINOX completed 12/26/2018 7/9/2019-Gemzar/Abraxane 125 mg/m2 q 14 days  Tumor marker  3/12/5361-ML-67-9->430->370.   4/30/2018 - CA 19- 9 of 157   5/25/20185462-MX-80-9->32 after 4 cycles. 08/02/2018- -> 8   10/01/2018- CA 19-9 -5   10/29/2018-CA 19-9- 7   12/3/4127-AB-27-9-7   04/11/2019-CA-19-9- 12   05/21/2019- CA 19-9- 41   7/2/2019-CA 19 9 = 114  7/9/2019- CA-19-9 = 225  8/6/2019- CA-19-9 = 171  9/3/4305-TK-42-9 = 198  Interval history  Ms Allie Osorio is s/p adjuvant FOLFORINOX 5 biweekly cycles followed by XRT/Xeloda and whipple procedure at [facility]. Unfortunately she was found to have intra-abdominal disease recurrence and was recommended second line treatment with Gemzar/Abraxane. She has been tolerating treatment well. She denies abdominal pain. Her weight has been stable at about 109 pounds. She denies any abdominal pain nausea vomiting. She denies any diarrhea. She is a scheduled for a consultation/scans at [facility] next week. Cancer history  Ms Rafael Cm was first seen by me on 3/12/2018 referred by Dr. Abby Neumann for a diagnosis of pancreatic adenocarcinoma. She was initially evaluated for acute pancreatitis at [facility] on February 2018. Further imaging revealed a pancreatic head mass. Lipase was elevated at 1184 and CA-19-9 elevated 134.  The symptoms were going on for of care. Counseling included but was not limited to time spent reviewing labs, imaging studies/ treatment plan and answering questions.

## 2019-09-17 ENCOUNTER — OFFICE VISIT (OUTPATIENT)
Dept: HEMATOLOGY | Age: 59
End: 2019-09-17
Payer: COMMERCIAL

## 2019-09-17 ENCOUNTER — HOSPITAL ENCOUNTER (OUTPATIENT)
Dept: INFUSION THERAPY | Age: 59
Discharge: HOME | End: 2019-09-17
Payer: COMMERCIAL

## 2019-09-17 VITALS
BODY MASS INDEX: 17.51 KG/M2 | TEMPERATURE: 96.3 F | HEART RATE: 54 BPM | OXYGEN SATURATION: 99 % | WEIGHT: 111.8 LBS | DIASTOLIC BLOOD PRESSURE: 76 MMHG | SYSTOLIC BLOOD PRESSURE: 138 MMHG

## 2019-09-17 DIAGNOSIS — G62.0 CHEMOTHERAPY-INDUCED PERIPHERAL NEUROPATHY (HCC): ICD-10-CM

## 2019-09-17 DIAGNOSIS — C25.9 MALIGNANT NEOPLASM OF PANCREAS, UNSPECIFIED LOCATION OF MALIGNANCY (HCC): Primary | ICD-10-CM

## 2019-09-17 DIAGNOSIS — T45.1X5A CHEMOTHERAPY-INDUCED PERIPHERAL NEUROPATHY (HCC): ICD-10-CM

## 2019-09-17 DIAGNOSIS — T45.1X5D ADVERSE EFFECT OF CHEMOTHERAPY, SUBSEQUENT ENCOUNTER: ICD-10-CM

## 2019-09-17 DIAGNOSIS — Z51.11 CHEMOTHERAPY MANAGEMENT, ENCOUNTER FOR: ICD-10-CM

## 2019-09-17 DIAGNOSIS — Z71.89 CARE PLAN DISCUSSED WITH PATIENT: ICD-10-CM

## 2019-09-17 PROCEDURE — 6360000002 HC RX W HCPCS: Performed by: INTERNAL MEDICINE

## 2019-09-17 PROCEDURE — 99214 OFFICE O/P EST MOD 30 MIN: CPT | Performed by: INTERNAL MEDICINE

## 2019-09-17 PROCEDURE — 85025 COMPLETE CBC W/AUTO DIFF WBC: CPT

## 2019-09-17 PROCEDURE — 96367 TX/PROPH/DG ADDL SEQ IV INF: CPT

## 2019-09-17 PROCEDURE — 96417 CHEMO IV INFUS EACH ADDL SEQ: CPT

## 2019-09-17 PROCEDURE — 96413 CHEMO IV INFUSION 1 HR: CPT

## 2019-09-17 PROCEDURE — 2580000003 HC RX 258: Performed by: INTERNAL MEDICINE

## 2019-09-17 RX ORDER — METHYLPREDNISOLONE SODIUM SUCCINATE 125 MG/2ML
125 INJECTION, POWDER, LYOPHILIZED, FOR SOLUTION INTRAMUSCULAR; INTRAVENOUS PRN
Status: DISCONTINUED | OUTPATIENT
Start: 2019-09-17 | End: 2019-09-19 | Stop reason: HOSPADM

## 2019-09-17 RX ORDER — EPINEPHRINE 1 MG/ML
0.3 INJECTION, SOLUTION, CONCENTRATE INTRAVENOUS PRN
Status: DISCONTINUED | OUTPATIENT
Start: 2019-09-17 | End: 2019-09-19 | Stop reason: HOSPADM

## 2019-09-17 RX ORDER — DIPHENHYDRAMINE HYDROCHLORIDE 50 MG/ML
50 INJECTION INTRAMUSCULAR; INTRAVENOUS PRN
Status: DISCONTINUED | OUTPATIENT
Start: 2019-09-17 | End: 2019-09-19 | Stop reason: HOSPADM

## 2019-09-17 RX ORDER — PACLITAXEL 100 MG/20ML
195 INJECTION, POWDER, LYOPHILIZED, FOR SUSPENSION INTRAVENOUS ONCE
Status: DISCONTINUED | OUTPATIENT
Start: 2019-09-17 | End: 2019-09-19 | Stop reason: HOSPADM

## 2019-09-17 RX ORDER — DEXAMETHASONE SODIUM PHOSPHATE 10 MG/ML
10 INJECTION, SOLUTION INTRAMUSCULAR; INTRAVENOUS ONCE
Status: DISCONTINUED | OUTPATIENT
Start: 2019-09-17 | End: 2019-09-19 | Stop reason: HOSPADM

## 2019-09-17 RX ORDER — PALONOSETRON 0.05 MG/ML
0.25 INJECTION, SOLUTION INTRAVENOUS ONCE
Status: DISCONTINUED | OUTPATIENT
Start: 2019-09-17 | End: 2019-09-19 | Stop reason: HOSPADM

## 2019-09-17 NOTE — PROGRESS NOTES
every 6 hours as needed for Nausea      vitamin B-12 (CYANOCOBALAMIN) 1000 MCG tablet Take 1,000 mcg by mouth daily       No current facility-administered medications for this visit. Facility-Administered Medications Ordered in Other Visits   Medication Dose Route Frequency Provider Last Rate Last Dose    palonosetron (ALOXI) injection 0.25 mg  0.25 mg Intravenous Once Susan Hernandez MD        dexamethasone (PF) (DECADRON) injection 10 mg  10 mg Intravenous Once Susan Hernandez MD        PACLitaxel-protein bound (ABRAXANE) chemo IVPB 195 mg  195 mg Intravenous Once Susan Hernadnez MD        gemcitabine (GEMZAR) 940 mg in sodium chloride 0.9 % 250 mL chemo IVPB  940 mg Intravenous Once Susan Hernandez MD        EPINEPHrine PF 1 MG/ML injection 0.3 mg  0.3 mg Intramuscular PRN Susan Hernandez MD        diphenhydrAMINE (BENADRYL) injection 50 mg  50 mg Intravenous PRN Susan Hernandez MD        hydrocortisone sodium succinate PF (SOLU-CORTEF) injection 100 mg  100 mg Intravenous PRN Susan Hernandez MD        methylPREDNISolone sodium (SOLU-MEDROL) injection 125 mg  125 mg Intravenous PRN Susan Hernandez MD            REVIEW OF SYSTEMS:    Constitutional: no fever, no night sweats, fatigue;   HEENT: no blurring of vision, no double vision, no hearing difficulty, no tinnitus,no ulceration, no dental caries, no dysphagia  Lungs: no cough, no shortness of breath, no wheeze;   CVS: no palpitation, no chest pain, no shortness of breath;  GI: no abdominal pain, no nausea , no vomiting, no constipation;   VIKRAM: no dysuria, frequency and urgency, no hematuria, no kidney stones;   Musculoskeletal: no joint pain, swelling , stiffness;   Endocrine: no polyuria, polydypsia, no cold or heat intolerence; Hematology/lymphatic: no easy brusing or bleeding, no hx of clotting disorder; no peripheral adenopathy.   Dermatology: no skin rash, no eczema, no pruritis;   Psychiatry: no Occurrences:   1     Standing Expiration Date:   9/17/2020    Comprehensive Metabolic Panel     Standing Status:   Future     Number of Occurrences:   1     Standing Expiration Date:   9/17/2020    CBC Auto Differential     5 part     Standing Status:   Future     Standing Expiration Date:   9/17/2020      Diagnoses and all orders for this visit:    Malignant neoplasm of pancreas, unspecified location of malignancy ([facility] [address]  -     Cancel: Comprehensive Metabolic Panel; Future  -     Cancer Antigen 19-9; Future  -     Comprehensive Metabolic Panel; Future  -     CBC Auto Differential; Future    Care plan discussed with patient    Chemotherapy-induced peripheral neuropathy [facility])    Chemotherapy management, encounter for    Adverse effect of chemotherapy, subsequent encounter         Pancreatic adenocarcinoma-recurrence  Last visit at [facility]. The scans consistent with interval response to treatment. CA-19-9 = 98  She was recommended to continue second line palliative chemotherapy Gemzar 600 mg/m² IV over 10mg/m2/min on day 1 q 2 weeks for another 2-month and follow-up [facility] for repeat scans. Will repeat CA-19-9 and CMP during next visit. Thrombocytopenia- 2.2 chemotherapy. Last CBC and platelet counts = 384,048. Post Whipple diarrhea- she is taking Creon 3 times a week only. She has significant difficulty and has requested a change the dose. I have recommended a dietitian consult at [facility]. during her consultation next week. Side effects- neuropathy grade 1. Improving., fatigue grade 1,   Cachexia-she was seen by a dietitian at [facility]. Encourage increased p.o. intake. Neuropathy-secondary to oxaliplatin. Genetic assessment- [facility] extended panel showed no deleterious mutation. Summary of plan:  1. RTC 4 weeks MD   2. CBC,CMP, CA-19-9 next visit    I have seen, examined and reviewed this patient medication list, appropriate labs and imaging studies.  I reviewed relevant

## 2019-09-29 ENCOUNTER — HOSPITAL ENCOUNTER (EMERGENCY)
Age: 59
Discharge: HOME | End: 2019-09-29
Attending: EMERGENCY MEDICINE
Payer: COMMERCIAL

## 2019-09-29 ENCOUNTER — APPOINTMENT (OUTPATIENT)
Dept: GENERAL RADIOLOGY | Age: 59
End: 2019-09-29
Payer: COMMERCIAL

## 2019-09-29 VITALS
WEIGHT: 110 LBS | RESPIRATION RATE: 19 BRPM | HEART RATE: 75 BPM | TEMPERATURE: 97.6 F | HEIGHT: 65 IN | BODY MASS INDEX: 18.33 KG/M2 | OXYGEN SATURATION: 100 % | DIASTOLIC BLOOD PRESSURE: 72 MMHG | SYSTOLIC BLOOD PRESSURE: 166 MMHG

## 2019-09-29 DIAGNOSIS — S91.112A LACERATION OF LEFT GREAT TOE WITHOUT FOREIGN BODY PRESENT OR DAMAGE TO NAIL, INITIAL ENCOUNTER: ICD-10-CM

## 2019-09-29 DIAGNOSIS — D84.821 IMMUNOSUPPRESSED DUE TO CHEMOTHERAPY (HCC): ICD-10-CM

## 2019-09-29 DIAGNOSIS — Z79.899 IMMUNOSUPPRESSED DUE TO CHEMOTHERAPY (HCC): ICD-10-CM

## 2019-09-29 DIAGNOSIS — T45.1X5A IMMUNOSUPPRESSED DUE TO CHEMOTHERAPY (HCC): ICD-10-CM

## 2019-09-29 DIAGNOSIS — S97.112A CRUSHING INJURY OF LEFT GREAT TOE, INITIAL ENCOUNTER: Primary | ICD-10-CM

## 2019-09-29 PROCEDURE — 99283 EMERGENCY DEPT VISIT LOW MDM: CPT

## 2019-09-29 PROCEDURE — 99999 PR OFFICE/OUTPT VISIT,PROCEDURE ONLY: CPT | Performed by: EMERGENCY MEDICINE

## 2019-09-29 PROCEDURE — 73630 X-RAY EXAM OF FOOT: CPT

## 2019-09-29 RX ORDER — CEPHALEXIN 500 MG/1
500 CAPSULE ORAL 3 TIMES DAILY
Qty: 21 CAPSULE | Refills: 0 | Status: SHIPPED | OUTPATIENT
Start: 2019-09-29 | End: 2019-10-06

## 2019-09-29 ASSESSMENT — PAIN SCALES - GENERAL: PAINLEVEL_OUTOF10: 5

## 2019-09-29 ASSESSMENT — ENCOUNTER SYMPTOMS
ABDOMINAL PAIN: 0
RHINORRHEA: 0
DIARRHEA: 0
COUGH: 0
EYE PAIN: 0
EYE REDNESS: 0
VOICE CHANGE: 0
VOMITING: 0
SHORTNESS OF BREATH: 0

## 2019-09-29 ASSESSMENT — PAIN DESCRIPTION - LOCATION: LOCATION: FOOT

## 2019-10-01 ENCOUNTER — HOSPITAL ENCOUNTER (OUTPATIENT)
Dept: INFUSION THERAPY | Age: 59
Discharge: HOME | End: 2019-10-01
Payer: COMMERCIAL

## 2019-10-01 DIAGNOSIS — C25.9 MALIGNANT NEOPLASM OF PANCREAS, UNSPECIFIED LOCATION OF MALIGNANCY (HCC): Primary | ICD-10-CM

## 2019-10-01 PROCEDURE — 96417 CHEMO IV INFUS EACH ADDL SEQ: CPT

## 2019-10-01 PROCEDURE — 6360000002 HC RX W HCPCS: Performed by: INTERNAL MEDICINE

## 2019-10-01 PROCEDURE — 85025 COMPLETE CBC W/AUTO DIFF WBC: CPT

## 2019-10-01 PROCEDURE — 2580000003 HC RX 258: Performed by: INTERNAL MEDICINE

## 2019-10-01 PROCEDURE — 96413 CHEMO IV INFUSION 1 HR: CPT

## 2019-10-01 PROCEDURE — 96375 TX/PRO/DX INJ NEW DRUG ADDON: CPT

## 2019-10-01 RX ORDER — PALONOSETRON 0.05 MG/ML
0.25 INJECTION, SOLUTION INTRAVENOUS ONCE
Status: DISCONTINUED | OUTPATIENT
Start: 2019-10-01 | End: 2019-10-03 | Stop reason: HOSPADM

## 2019-10-01 RX ORDER — METHYLPREDNISOLONE SODIUM SUCCINATE 125 MG/2ML
125 INJECTION, POWDER, LYOPHILIZED, FOR SOLUTION INTRAMUSCULAR; INTRAVENOUS PRN
Status: DISCONTINUED | OUTPATIENT
Start: 2019-10-01 | End: 2019-10-03 | Stop reason: HOSPADM

## 2019-10-01 RX ORDER — DIPHENHYDRAMINE HYDROCHLORIDE 50 MG/ML
50 INJECTION INTRAMUSCULAR; INTRAVENOUS PRN
Status: DISCONTINUED | OUTPATIENT
Start: 2019-10-01 | End: 2019-10-03 | Stop reason: HOSPADM

## 2019-10-01 RX ORDER — EPINEPHRINE 1 MG/ML
0.3 INJECTION, SOLUTION, CONCENTRATE INTRAVENOUS PRN
Status: DISCONTINUED | OUTPATIENT
Start: 2019-10-01 | End: 2019-10-03 | Stop reason: HOSPADM

## 2019-10-01 RX ORDER — PACLITAXEL 100 MG/20ML
195 INJECTION, POWDER, LYOPHILIZED, FOR SUSPENSION INTRAVENOUS ONCE
Status: DISCONTINUED | OUTPATIENT
Start: 2019-10-01 | End: 2019-10-03 | Stop reason: HOSPADM

## 2019-10-01 RX ORDER — DEXAMETHASONE SODIUM PHOSPHATE 10 MG/ML
10 INJECTION, SOLUTION INTRAMUSCULAR; INTRAVENOUS ONCE
Status: DISCONTINUED | OUTPATIENT
Start: 2019-10-01 | End: 2019-10-03 | Stop reason: HOSPADM

## 2019-10-15 ENCOUNTER — HOSPITAL ENCOUNTER (OUTPATIENT)
Dept: INFUSION THERAPY | Age: 59
Discharge: HOME | End: 2019-10-15
Payer: COMMERCIAL

## 2019-10-15 DIAGNOSIS — C25.9 MALIGNANT NEOPLASM OF PANCREAS, UNSPECIFIED LOCATION OF MALIGNANCY (HCC): Primary | ICD-10-CM

## 2019-10-15 PROCEDURE — 96413 CHEMO IV INFUSION 1 HR: CPT

## 2019-10-15 PROCEDURE — 2580000003 HC RX 258: Performed by: INTERNAL MEDICINE

## 2019-10-15 PROCEDURE — 96375 TX/PRO/DX INJ NEW DRUG ADDON: CPT

## 2019-10-15 PROCEDURE — 85025 COMPLETE CBC W/AUTO DIFF WBC: CPT

## 2019-10-15 PROCEDURE — 96417 CHEMO IV INFUS EACH ADDL SEQ: CPT

## 2019-10-15 PROCEDURE — 6360000002 HC RX W HCPCS: Performed by: INTERNAL MEDICINE

## 2019-10-15 RX ORDER — DEXAMETHASONE SODIUM PHOSPHATE 10 MG/ML
10 INJECTION, SOLUTION INTRAMUSCULAR; INTRAVENOUS ONCE
Status: DISCONTINUED | OUTPATIENT
Start: 2019-10-15 | End: 2019-10-17 | Stop reason: HOSPADM

## 2019-10-15 RX ORDER — PALONOSETRON 0.05 MG/ML
0.25 INJECTION, SOLUTION INTRAVENOUS ONCE
Status: DISCONTINUED | OUTPATIENT
Start: 2019-10-15 | End: 2019-10-17 | Stop reason: HOSPADM

## 2019-10-15 RX ORDER — PACLITAXEL 100 MG/20ML
195 INJECTION, POWDER, LYOPHILIZED, FOR SUSPENSION INTRAVENOUS ONCE
Status: DISCONTINUED | OUTPATIENT
Start: 2019-10-15 | End: 2019-10-17 | Stop reason: HOSPADM

## 2019-10-28 RX ORDER — METHYLPREDNISOLONE SODIUM SUCCINATE 125 MG/2ML
125 INJECTION, POWDER, LYOPHILIZED, FOR SOLUTION INTRAMUSCULAR; INTRAVENOUS PRN
Status: CANCELLED | OUTPATIENT
Start: 2019-10-29

## 2019-10-28 RX ORDER — EPINEPHRINE 1 MG/ML
0.3 INJECTION, SOLUTION, CONCENTRATE INTRAVENOUS PRN
Status: CANCELLED | OUTPATIENT
Start: 2019-10-29

## 2019-10-28 RX ORDER — DIPHENHYDRAMINE HYDROCHLORIDE 50 MG/ML
50 INJECTION INTRAMUSCULAR; INTRAVENOUS PRN
Status: CANCELLED | OUTPATIENT
Start: 2019-10-29

## 2019-10-28 RX ORDER — PALONOSETRON 0.05 MG/ML
0.25 INJECTION, SOLUTION INTRAVENOUS ONCE
Status: CANCELLED | OUTPATIENT
Start: 2019-10-29

## 2019-10-28 RX ORDER — PACLITAXEL 100 MG/20ML
195 INJECTION, POWDER, LYOPHILIZED, FOR SUSPENSION INTRAVENOUS ONCE
Status: CANCELLED | OUTPATIENT
Start: 2019-10-29

## 2019-10-29 ENCOUNTER — HOSPITAL ENCOUNTER (OUTPATIENT)
Dept: INFUSION THERAPY | Age: 59
Discharge: HOME | End: 2019-10-29
Payer: COMMERCIAL

## 2019-10-29 VITALS
TEMPERATURE: 98 F | RESPIRATION RATE: 18 BRPM | HEIGHT: 65 IN | WEIGHT: 109.3 LBS | BODY MASS INDEX: 18.21 KG/M2 | SYSTOLIC BLOOD PRESSURE: 122 MMHG | OXYGEN SATURATION: 99 % | DIASTOLIC BLOOD PRESSURE: 76 MMHG | HEART RATE: 63 BPM

## 2019-10-29 DIAGNOSIS — C25.9 MALIGNANT NEOPLASM OF PANCREAS, UNSPECIFIED LOCATION OF MALIGNANCY (HCC): Primary | ICD-10-CM

## 2019-10-29 PROCEDURE — 86301 IMMUNOASSAY TUMOR CA 19-9: CPT

## 2019-10-29 PROCEDURE — 96413 CHEMO IV INFUSION 1 HR: CPT

## 2019-10-29 PROCEDURE — 6360000002 HC RX W HCPCS: Performed by: INTERNAL MEDICINE

## 2019-10-29 PROCEDURE — 2580000003 HC RX 258: Performed by: INTERNAL MEDICINE

## 2019-10-29 PROCEDURE — 85025 COMPLETE CBC W/AUTO DIFF WBC: CPT

## 2019-10-29 PROCEDURE — 96417 CHEMO IV INFUS EACH ADDL SEQ: CPT

## 2019-10-29 PROCEDURE — 96375 TX/PRO/DX INJ NEW DRUG ADDON: CPT

## 2019-10-29 RX ORDER — PACLITAXEL 100 MG/20ML
195 INJECTION, POWDER, LYOPHILIZED, FOR SUSPENSION INTRAVENOUS ONCE
Status: COMPLETED | OUTPATIENT
Start: 2019-10-29 | End: 2019-10-29

## 2019-10-29 RX ORDER — PALONOSETRON 0.05 MG/ML
0.25 INJECTION, SOLUTION INTRAVENOUS ONCE
Status: COMPLETED | OUTPATIENT
Start: 2019-10-29 | End: 2019-10-29

## 2019-10-29 RX ORDER — DEXAMETHASONE SODIUM PHOSPHATE 10 MG/ML
10 INJECTION, SOLUTION INTRAMUSCULAR; INTRAVENOUS ONCE
Status: COMPLETED | OUTPATIENT
Start: 2019-10-29 | End: 2019-10-29

## 2019-10-29 RX ADMIN — PALONOSETRON HYDROCHLORIDE 0.25 MG: 0.25 INJECTION, SOLUTION INTRAVENOUS at 08:50

## 2019-10-29 RX ADMIN — DEXAMETHASONE SODIUM PHOSPHATE 10 MG: 10 INJECTION, SOLUTION INTRAMUSCULAR; INTRAVENOUS at 08:50

## 2019-10-29 RX ADMIN — GEMCITABINE HYDROCHLORIDE 940 MG: 1 INJECTION, SOLUTION INTRAVENOUS at 09:16

## 2019-10-29 RX ADMIN — PACLITAXEL 195 MG: 100 INJECTION, POWDER, LYOPHILIZED, FOR SUSPENSION INTRAVENOUS at 09:53

## 2019-10-29 ASSESSMENT — PAIN SCALES - GENERAL: PAINLEVEL_OUTOF10: 0

## 2019-10-31 ENCOUNTER — HOSPITAL ENCOUNTER (OUTPATIENT)
Age: 59
Setting detail: OBSERVATION
Discharge: HOME | End: 2019-11-01
Attending: EMERGENCY MEDICINE | Admitting: FAMILY MEDICINE
Payer: COMMERCIAL

## 2019-10-31 DIAGNOSIS — K92.2 GASTROINTESTINAL HEMORRHAGE, UNSPECIFIED GASTROINTESTINAL HEMORRHAGE TYPE: Primary | ICD-10-CM

## 2019-10-31 PROBLEM — K92.1 MELENA: Status: ACTIVE | Noted: 2019-10-31

## 2019-10-31 LAB
ABO/RH: NORMAL
ALBUMIN SERPL-MCNC: 4 G/DL (ref 3.5–5.2)
ALP BLD-CCNC: 105 U/L (ref 35–104)
ALT SERPL-CCNC: 56 U/L (ref 5–33)
ANION GAP SERPL CALCULATED.3IONS-SCNC: 11 MMOL/L (ref 7–19)
ANTIBODY SCREEN: NORMAL
APTT: 24.2 SEC (ref 26–36.2)
AST SERPL-CCNC: 41 U/L (ref 5–32)
BASOPHILS ABSOLUTE: 0 K/UL (ref 0–0.2)
BASOPHILS RELATIVE PERCENT: 0.6 % (ref 0–1)
BILIRUB SERPL-MCNC: 0.7 MG/DL (ref 0.2–1.2)
BUN BLDV-MCNC: 13 MG/DL (ref 6–20)
CALCIUM SERPL-MCNC: 9 MG/DL (ref 8.6–10)
CHLORIDE BLD-SCNC: 101 MMOL/L (ref 98–111)
CO2: 26 MMOL/L (ref 22–29)
CREAT SERPL-MCNC: 0.6 MG/DL (ref 0.5–0.9)
EOSINOPHILS ABSOLUTE: 0.1 K/UL (ref 0–0.6)
EOSINOPHILS RELATIVE PERCENT: 1.1 % (ref 0–5)
GFR NON-AFRICAN AMERICAN: >60
GLUCOSE BLD-MCNC: 123 MG/DL (ref 74–109)
HCT VFR BLD CALC: 23.8 % (ref 37–47)
HCT VFR BLD CALC: 24.8 % (ref 37–47)
HCT VFR BLD CALC: 29.7 % (ref 37–47)
HEMOGLOBIN: 7.5 G/DL (ref 12–16)
HEMOGLOBIN: 7.9 G/DL (ref 12–16)
HEMOGLOBIN: 9.5 G/DL (ref 12–16)
IMMATURE GRANULOCYTES #: 0 K/UL
INR BLD: 1.01 (ref 0.88–1.18)
LIPASE: 4 U/L (ref 13–60)
LYMPHOCYTES ABSOLUTE: 0.9 K/UL (ref 1.1–4.5)
LYMPHOCYTES RELATIVE PERCENT: 19 % (ref 20–40)
MCH RBC QN AUTO: 32.6 PG (ref 27–31)
MCH RBC QN AUTO: 32.8 PG (ref 27–31)
MCH RBC QN AUTO: 33.1 PG (ref 27–31)
MCHC RBC AUTO-ENTMCNC: 31.5 G/DL (ref 33–37)
MCHC RBC AUTO-ENTMCNC: 31.9 G/DL (ref 33–37)
MCHC RBC AUTO-ENTMCNC: 32 G/DL (ref 33–37)
MCV RBC AUTO: 102.5 FL (ref 81–99)
MCV RBC AUTO: 103.5 FL (ref 81–99)
MCV RBC AUTO: 103.9 FL (ref 81–99)
MONOCYTES ABSOLUTE: 0.2 K/UL (ref 0–0.9)
MONOCYTES RELATIVE PERCENT: 3.8 % (ref 0–10)
NEUTROPHILS ABSOLUTE: 3.5 K/UL (ref 1.5–7.5)
NEUTROPHILS RELATIVE PERCENT: 74.9 % (ref 50–65)
PDW BLD-RTO: 15.6 % (ref 11.5–14.5)
PDW BLD-RTO: 15.6 % (ref 11.5–14.5)
PDW BLD-RTO: 15.7 % (ref 11.5–14.5)
PLATELET # BLD: 114 K/UL (ref 130–400)
PLATELET # BLD: 124 K/UL (ref 130–400)
PLATELET # BLD: 143 K/UL (ref 130–400)
PMV BLD AUTO: 10.7 FL (ref 9.4–12.3)
PMV BLD AUTO: 10.9 FL (ref 9.4–12.3)
PMV BLD AUTO: 9.8 FL (ref 9.4–12.3)
POTASSIUM SERPL-SCNC: 4.1 MMOL/L (ref 3.5–5)
PROTHROMBIN TIME: 12.7 SEC (ref 12–14.6)
RBC # BLD: 2.29 M/UL (ref 4.2–5.4)
RBC # BLD: 2.42 M/UL (ref 4.2–5.4)
RBC # BLD: 2.87 M/UL (ref 4.2–5.4)
SODIUM BLD-SCNC: 138 MMOL/L (ref 136–145)
TOTAL PROTEIN: 6.7 G/DL (ref 6.6–8.7)
WBC # BLD: 3.3 K/UL (ref 4.8–10.8)
WBC # BLD: 4.2 K/UL (ref 4.8–10.8)
WBC # BLD: 4.7 K/UL (ref 4.8–10.8)

## 2019-10-31 PROCEDURE — G0378 HOSPITAL OBSERVATION PER HR: HCPCS

## 2019-10-31 PROCEDURE — 99243 OFF/OP CNSLTJ NEW/EST LOW 30: CPT | Performed by: INTERNAL MEDICINE

## 2019-10-31 PROCEDURE — 86850 RBC ANTIBODY SCREEN: CPT

## 2019-10-31 PROCEDURE — C9113 INJ PANTOPRAZOLE SODIUM, VIA: HCPCS | Performed by: PHYSICIAN ASSISTANT

## 2019-10-31 PROCEDURE — 85025 COMPLETE CBC W/AUTO DIFF WBC: CPT

## 2019-10-31 PROCEDURE — 85730 THROMBOPLASTIN TIME PARTIAL: CPT

## 2019-10-31 PROCEDURE — 83690 ASSAY OF LIPASE: CPT

## 2019-10-31 PROCEDURE — 2580000003 HC RX 258: Performed by: FAMILY MEDICINE

## 2019-10-31 PROCEDURE — 6360000002 HC RX W HCPCS: Performed by: PHYSICIAN ASSISTANT

## 2019-10-31 PROCEDURE — 86901 BLOOD TYPING SEROLOGIC RH(D): CPT

## 2019-10-31 PROCEDURE — 85610 PROTHROMBIN TIME: CPT

## 2019-10-31 PROCEDURE — 86900 BLOOD TYPING SEROLOGIC ABO: CPT

## 2019-10-31 PROCEDURE — 6360000002 HC RX W HCPCS: Performed by: EMERGENCY MEDICINE

## 2019-10-31 PROCEDURE — 80053 COMPREHEN METABOLIC PANEL: CPT

## 2019-10-31 PROCEDURE — 96374 THER/PROPH/DIAG INJ IV PUSH: CPT

## 2019-10-31 PROCEDURE — 36415 COLL VENOUS BLD VENIPUNCTURE: CPT

## 2019-10-31 PROCEDURE — 85027 COMPLETE CBC AUTOMATED: CPT

## 2019-10-31 PROCEDURE — C9113 INJ PANTOPRAZOLE SODIUM, VIA: HCPCS | Performed by: EMERGENCY MEDICINE

## 2019-10-31 PROCEDURE — 96376 TX/PRO/DX INJ SAME DRUG ADON: CPT

## 2019-10-31 PROCEDURE — 2580000003 HC RX 258: Performed by: PHYSICIAN ASSISTANT

## 2019-10-31 PROCEDURE — 99284 EMERGENCY DEPT VISIT MOD MDM: CPT

## 2019-10-31 RX ORDER — SODIUM CHLORIDE 0.9 % (FLUSH) 0.9 %
10 SYRINGE (ML) INJECTION PRN
Status: DISCONTINUED | OUTPATIENT
Start: 2019-10-31 | End: 2019-11-01 | Stop reason: HOSPADM

## 2019-10-31 RX ORDER — SODIUM CHLORIDE 9 MG/ML
10 INJECTION INTRAVENOUS EVERY 12 HOURS
Status: DISCONTINUED | OUTPATIENT
Start: 2019-10-31 | End: 2019-11-01 | Stop reason: HOSPADM

## 2019-10-31 RX ORDER — PANTOPRAZOLE SODIUM 40 MG/10ML
40 INJECTION, POWDER, LYOPHILIZED, FOR SOLUTION INTRAVENOUS EVERY 12 HOURS
Status: DISCONTINUED | OUTPATIENT
Start: 2019-10-31 | End: 2019-11-01 | Stop reason: HOSPADM

## 2019-10-31 RX ORDER — PANTOPRAZOLE SODIUM 40 MG/10ML
40 INJECTION, POWDER, LYOPHILIZED, FOR SOLUTION INTRAVENOUS ONCE
Status: COMPLETED | OUTPATIENT
Start: 2019-10-31 | End: 2019-10-31

## 2019-10-31 RX ORDER — ACETAMINOPHEN 325 MG/1
650 TABLET ORAL EVERY 4 HOURS PRN
Status: DISCONTINUED | OUTPATIENT
Start: 2019-10-31 | End: 2019-11-01 | Stop reason: HOSPADM

## 2019-10-31 RX ORDER — SODIUM CHLORIDE 9 MG/ML
INJECTION, SOLUTION INTRAVENOUS CONTINUOUS
Status: DISCONTINUED | OUTPATIENT
Start: 2019-10-31 | End: 2019-11-01 | Stop reason: HOSPADM

## 2019-10-31 RX ORDER — SODIUM CHLORIDE 0.9 % (FLUSH) 0.9 %
10 SYRINGE (ML) INJECTION EVERY 12 HOURS SCHEDULED
Status: DISCONTINUED | OUTPATIENT
Start: 2019-10-31 | End: 2019-11-01 | Stop reason: HOSPADM

## 2019-10-31 RX ORDER — M-VIT,TX,IRON,MINS/CALC/FOLIC 27MG-0.4MG
1 TABLET ORAL DAILY
COMMUNITY
End: 2021-03-03 | Stop reason: ALTCHOICE

## 2019-10-31 RX ORDER — ONDANSETRON 2 MG/ML
4 INJECTION INTRAMUSCULAR; INTRAVENOUS EVERY 6 HOURS PRN
Status: DISCONTINUED | OUTPATIENT
Start: 2019-10-31 | End: 2019-11-01 | Stop reason: HOSPADM

## 2019-10-31 RX ORDER — POLYETHYLENE GLYCOL 3350 17 G/17G
17 POWDER, FOR SOLUTION ORAL DAILY PRN
COMMUNITY
End: 2020-04-15

## 2019-10-31 RX ADMIN — SODIUM CHLORIDE: 9 INJECTION, SOLUTION INTRAVENOUS at 15:13

## 2019-10-31 RX ADMIN — PANTOPRAZOLE SODIUM 40 MG: 40 INJECTION, POWDER, FOR SOLUTION INTRAVENOUS at 15:13

## 2019-10-31 RX ADMIN — Medication 10 ML: at 15:13

## 2019-10-31 RX ADMIN — Medication 10 ML: at 22:18

## 2019-10-31 RX ADMIN — PANTOPRAZOLE SODIUM 40 MG: 40 INJECTION, POWDER, FOR SOLUTION INTRAVENOUS at 09:56

## 2019-10-31 ASSESSMENT — ENCOUNTER SYMPTOMS
BLOOD IN STOOL: 1
VOMITING: 0
SORE THROAT: 0
BACK PAIN: 0
COUGH: 0
NAUSEA: 0
SHORTNESS OF BREATH: 0
RHINORRHEA: 0
DIARRHEA: 0
ABDOMINAL PAIN: 0

## 2019-10-31 ASSESSMENT — PAIN SCALES - GENERAL: PAINLEVEL_OUTOF10: 0

## 2019-11-01 ENCOUNTER — TELEPHONE (OUTPATIENT)
Dept: GASTROENTEROLOGY | Age: 59
End: 2019-11-01

## 2019-11-01 ENCOUNTER — ANESTHESIA (OUTPATIENT)
Dept: ENDOSCOPY | Age: 59
End: 2019-11-01
Payer: COMMERCIAL

## 2019-11-01 ENCOUNTER — ANESTHESIA EVENT (OUTPATIENT)
Dept: ENDOSCOPY | Age: 59
End: 2019-11-01
Payer: COMMERCIAL

## 2019-11-01 VITALS
OXYGEN SATURATION: 99 % | SYSTOLIC BLOOD PRESSURE: 98 MMHG | DIASTOLIC BLOOD PRESSURE: 62 MMHG | RESPIRATION RATE: 26 BRPM

## 2019-11-01 VITALS
OXYGEN SATURATION: 94 % | TEMPERATURE: 98.3 F | BODY MASS INDEX: 16.17 KG/M2 | HEIGHT: 67 IN | HEART RATE: 59 BPM | SYSTOLIC BLOOD PRESSURE: 99 MMHG | WEIGHT: 103 LBS | DIASTOLIC BLOOD PRESSURE: 56 MMHG | RESPIRATION RATE: 20 BRPM

## 2019-11-01 LAB
ANION GAP SERPL CALCULATED.3IONS-SCNC: 8 MMOL/L (ref 7–19)
BUN BLDV-MCNC: 10 MG/DL (ref 6–20)
CALCIUM SERPL-MCNC: 8.4 MG/DL (ref 8.6–10)
CHLORIDE BLD-SCNC: 109 MMOL/L (ref 98–111)
CO2: 25 MMOL/L (ref 22–29)
CREAT SERPL-MCNC: 0.6 MG/DL (ref 0.5–0.9)
GFR NON-AFRICAN AMERICAN: >60
GLUCOSE BLD-MCNC: 94 MG/DL (ref 74–109)
HCT VFR BLD CALC: 23.9 % (ref 37–47)
HCT VFR BLD CALC: 24 % (ref 37–47)
HEMOGLOBIN: 7.6 G/DL (ref 12–16)
HEMOGLOBIN: 7.7 G/DL (ref 12–16)
MCH RBC QN AUTO: 33.3 PG (ref 27–31)
MCH RBC QN AUTO: 33.5 PG (ref 27–31)
MCHC RBC AUTO-ENTMCNC: 31.8 G/DL (ref 33–37)
MCHC RBC AUTO-ENTMCNC: 32.1 G/DL (ref 33–37)
MCV RBC AUTO: 103.9 FL (ref 81–99)
MCV RBC AUTO: 105.3 FL (ref 81–99)
PDW BLD-RTO: 15.8 % (ref 11.5–14.5)
PDW BLD-RTO: 15.8 % (ref 11.5–14.5)
PLATELET # BLD: 116 K/UL (ref 130–400)
PLATELET # BLD: 116 K/UL (ref 130–400)
PMV BLD AUTO: 10.7 FL (ref 9.4–12.3)
PMV BLD AUTO: 10.8 FL (ref 9.4–12.3)
POTASSIUM REFLEX MAGNESIUM: 4.4 MMOL/L (ref 3.5–5)
RBC # BLD: 2.27 M/UL (ref 4.2–5.4)
RBC # BLD: 2.31 M/UL (ref 4.2–5.4)
SODIUM BLD-SCNC: 142 MMOL/L (ref 136–145)
WBC # BLD: 3 K/UL (ref 4.8–10.8)
WBC # BLD: 3.1 K/UL (ref 4.8–10.8)

## 2019-11-01 PROCEDURE — 3609012400 HC EGD TRANSORAL BIOPSY SINGLE/MULTIPLE: Performed by: INTERNAL MEDICINE

## 2019-11-01 PROCEDURE — 2580000003 HC RX 258: Performed by: INTERNAL MEDICINE

## 2019-11-01 PROCEDURE — G0378 HOSPITAL OBSERVATION PER HR: HCPCS

## 2019-11-01 PROCEDURE — 7100000001 HC PACU RECOVERY - ADDTL 15 MIN: Performed by: INTERNAL MEDICINE

## 2019-11-01 PROCEDURE — 6360000002 HC RX W HCPCS: Performed by: INTERNAL MEDICINE

## 2019-11-01 PROCEDURE — 85027 COMPLETE CBC AUTOMATED: CPT

## 2019-11-01 PROCEDURE — 3700000000 HC ANESTHESIA ATTENDED CARE: Performed by: INTERNAL MEDICINE

## 2019-11-01 PROCEDURE — 6360000002 HC RX W HCPCS: Performed by: PHYSICIAN ASSISTANT

## 2019-11-01 PROCEDURE — 6360000002 HC RX W HCPCS: Performed by: NURSE ANESTHETIST, CERTIFIED REGISTERED

## 2019-11-01 PROCEDURE — C9113 INJ PANTOPRAZOLE SODIUM, VIA: HCPCS | Performed by: PHYSICIAN ASSISTANT

## 2019-11-01 PROCEDURE — 7100000000 HC PACU RECOVERY - FIRST 15 MIN: Performed by: INTERNAL MEDICINE

## 2019-11-01 PROCEDURE — 36415 COLL VENOUS BLD VENIPUNCTURE: CPT

## 2019-11-01 PROCEDURE — 2580000003 HC RX 258: Performed by: PHYSICIAN ASSISTANT

## 2019-11-01 PROCEDURE — 43235 EGD DIAGNOSTIC BRUSH WASH: CPT | Performed by: INTERNAL MEDICINE

## 2019-11-01 PROCEDURE — C9113 INJ PANTOPRAZOLE SODIUM, VIA: HCPCS | Performed by: INTERNAL MEDICINE

## 2019-11-01 PROCEDURE — 80048 BASIC METABOLIC PNL TOTAL CA: CPT

## 2019-11-01 PROCEDURE — 3700000001 HC ADD 15 MINUTES (ANESTHESIA): Performed by: INTERNAL MEDICINE

## 2019-11-01 PROCEDURE — 96376 TX/PRO/DX INJ SAME DRUG ADON: CPT

## 2019-11-01 PROCEDURE — 2500000003 HC RX 250 WO HCPCS: Performed by: NURSE ANESTHETIST, CERTIFIED REGISTERED

## 2019-11-01 RX ORDER — SUCRALFATE 1 G/1
1 TABLET ORAL 4 TIMES DAILY
Qty: 120 TABLET | Refills: 3 | Status: SHIPPED | OUTPATIENT
Start: 2019-11-01 | End: 2020-01-20 | Stop reason: SDUPTHER

## 2019-11-01 RX ORDER — PANTOPRAZOLE SODIUM 40 MG/1
40 TABLET, DELAYED RELEASE ORAL
Qty: 90 TABLET | Refills: 1 | Status: SHIPPED | OUTPATIENT
Start: 2019-11-01 | End: 2020-01-20 | Stop reason: SDUPTHER

## 2019-11-01 RX ORDER — SODIUM CHLORIDE, SODIUM LACTATE, POTASSIUM CHLORIDE, CALCIUM CHLORIDE 600; 310; 30; 20 MG/100ML; MG/100ML; MG/100ML; MG/100ML
INJECTION, SOLUTION INTRAVENOUS CONTINUOUS
Status: DISCONTINUED | OUTPATIENT
Start: 2019-11-01 | End: 2019-11-01

## 2019-11-01 RX ORDER — LIDOCAINE HYDROCHLORIDE 20 MG/ML
INJECTION, SOLUTION INFILTRATION; PERINEURAL PRN
Status: DISCONTINUED | OUTPATIENT
Start: 2019-11-01 | End: 2019-11-01 | Stop reason: SDUPTHER

## 2019-11-01 RX ORDER — PROPOFOL 10 MG/ML
INJECTION, EMULSION INTRAVENOUS PRN
Status: DISCONTINUED | OUTPATIENT
Start: 2019-11-01 | End: 2019-11-01 | Stop reason: SDUPTHER

## 2019-11-01 RX ADMIN — PANTOPRAZOLE SODIUM 40 MG: 40 INJECTION, POWDER, FOR SOLUTION INTRAVENOUS at 14:15

## 2019-11-01 RX ADMIN — PROPOFOL 150 MG: 10 INJECTION, EMULSION INTRAVENOUS at 12:33

## 2019-11-01 RX ADMIN — SODIUM CHLORIDE, POTASSIUM CHLORIDE, SODIUM LACTATE AND CALCIUM CHLORIDE: 600; 310; 30; 20 INJECTION, SOLUTION INTRAVENOUS at 12:08

## 2019-11-01 RX ADMIN — LIDOCAINE HYDROCHLORIDE 40 MG: 20 INJECTION, SOLUTION INFILTRATION; PERINEURAL at 12:33

## 2019-11-01 RX ADMIN — Medication 10 ML: at 14:15

## 2019-11-01 RX ADMIN — Medication 10 ML: at 01:27

## 2019-11-01 RX ADMIN — PANTOPRAZOLE SODIUM 40 MG: 40 INJECTION, POWDER, FOR SOLUTION INTRAVENOUS at 01:27

## 2019-11-01 ASSESSMENT — PAIN SCALES - GENERAL: PAINLEVEL_OUTOF10: 0

## 2019-11-01 ASSESSMENT — LIFESTYLE VARIABLES: SMOKING_STATUS: 0

## 2019-11-01 NOTE — TELEPHONE ENCOUNTER
Margy Hernández called to schedule a 2 wk HFU with possible CLN. Please be advised that the best time to call her to accommodate their needs is Anytime. Thank you.

## 2019-11-01 NOTE — TELEPHONE ENCOUNTER
Patient has been scheduled for HFU appt. Patient will be notified by mail of appt and all instructions prior to appt.

## 2019-11-12 ENCOUNTER — HOSPITAL ENCOUNTER (OUTPATIENT)
Dept: INFUSION THERAPY | Age: 59
Discharge: HOME | End: 2019-11-12
Payer: COMMERCIAL

## 2019-11-12 ENCOUNTER — OFFICE VISIT (OUTPATIENT)
Dept: HEMATOLOGY | Age: 59
End: 2019-11-12
Payer: COMMERCIAL

## 2019-11-12 VITALS
HEART RATE: 58 BPM | WEIGHT: 105.2 LBS | DIASTOLIC BLOOD PRESSURE: 78 MMHG | SYSTOLIC BLOOD PRESSURE: 158 MMHG | TEMPERATURE: 96.7 F | BODY MASS INDEX: 16.48 KG/M2

## 2019-11-12 DIAGNOSIS — Z51.11 CHEMOTHERAPY MANAGEMENT, ENCOUNTER FOR: ICD-10-CM

## 2019-11-12 DIAGNOSIS — R53.0 NEOPLASTIC MALIGNANT RELATED FATIGUE: ICD-10-CM

## 2019-11-12 DIAGNOSIS — C25.9 MALIGNANT NEOPLASM OF PANCREAS, UNSPECIFIED LOCATION OF MALIGNANCY (HCC): Primary | ICD-10-CM

## 2019-11-12 DIAGNOSIS — D69.59 CHEMOTHERAPY-INDUCED THROMBOCYTOPENIA: ICD-10-CM

## 2019-11-12 DIAGNOSIS — R19.7 DIARRHEA FOLLOWING GASTROINTESTINAL SURGERY: ICD-10-CM

## 2019-11-12 DIAGNOSIS — G62.0 CHEMOTHERAPY-INDUCED PERIPHERAL NEUROPATHY (HCC): ICD-10-CM

## 2019-11-12 DIAGNOSIS — D64.81 ANEMIA ASSOCIATED WITH CHEMOTHERAPY: ICD-10-CM

## 2019-11-12 DIAGNOSIS — T45.1X5A ANEMIA ASSOCIATED WITH CHEMOTHERAPY: ICD-10-CM

## 2019-11-12 DIAGNOSIS — Z98.890 DIARRHEA FOLLOWING GASTROINTESTINAL SURGERY: ICD-10-CM

## 2019-11-12 DIAGNOSIS — T45.1X5A CHEMOTHERAPY-INDUCED THROMBOCYTOPENIA: ICD-10-CM

## 2019-11-12 DIAGNOSIS — T45.1X5A CHEMOTHERAPY-INDUCED PERIPHERAL NEUROPATHY (HCC): ICD-10-CM

## 2019-11-12 PROCEDURE — 96367 TX/PROPH/DG ADDL SEQ IV INF: CPT

## 2019-11-12 PROCEDURE — 96365 THER/PROPH/DIAG IV INF INIT: CPT

## 2019-11-12 PROCEDURE — 85025 COMPLETE CBC W/AUTO DIFF WBC: CPT

## 2019-11-12 PROCEDURE — 2580000003 HC RX 258: Performed by: NURSE PRACTITIONER

## 2019-11-12 PROCEDURE — 96374 THER/PROPH/DIAG INJ IV PUSH: CPT

## 2019-11-12 PROCEDURE — 99214 OFFICE O/P EST MOD 30 MIN: CPT | Performed by: NURSE PRACTITIONER

## 2019-11-12 PROCEDURE — 96375 TX/PRO/DX INJ NEW DRUG ADDON: CPT

## 2019-11-12 PROCEDURE — 96417 CHEMO IV INFUS EACH ADDL SEQ: CPT

## 2019-11-12 PROCEDURE — 6360000002 HC RX W HCPCS: Performed by: NURSE PRACTITIONER

## 2019-11-12 PROCEDURE — 80053 COMPREHEN METABOLIC PANEL: CPT

## 2019-11-12 PROCEDURE — 96413 CHEMO IV INFUSION 1 HR: CPT

## 2019-11-12 RX ORDER — DIPHENHYDRAMINE HYDROCHLORIDE 50 MG/ML
50 INJECTION INTRAMUSCULAR; INTRAVENOUS PRN
Status: CANCELLED | OUTPATIENT
Start: 2019-11-12

## 2019-11-12 RX ORDER — PACLITAXEL 100 MG/20ML
195 INJECTION, POWDER, LYOPHILIZED, FOR SUSPENSION INTRAVENOUS ONCE
Status: COMPLETED | OUTPATIENT
Start: 2019-11-12 | End: 2019-11-12

## 2019-11-12 RX ORDER — EPINEPHRINE 1 MG/ML
0.3 INJECTION, SOLUTION, CONCENTRATE INTRAVENOUS PRN
Status: CANCELLED | OUTPATIENT
Start: 2019-11-12

## 2019-11-12 RX ORDER — PACLITAXEL 100 MG/20ML
195 INJECTION, POWDER, LYOPHILIZED, FOR SUSPENSION INTRAVENOUS ONCE
Status: CANCELLED | OUTPATIENT
Start: 2019-11-12

## 2019-11-12 RX ORDER — METHYLPREDNISOLONE SODIUM SUCCINATE 125 MG/2ML
125 INJECTION, POWDER, LYOPHILIZED, FOR SOLUTION INTRAMUSCULAR; INTRAVENOUS PRN
Status: CANCELLED | OUTPATIENT
Start: 2019-11-12

## 2019-11-12 RX ORDER — PALONOSETRON 0.05 MG/ML
0.25 INJECTION, SOLUTION INTRAVENOUS ONCE
Status: CANCELLED | OUTPATIENT
Start: 2019-11-12

## 2019-11-12 RX ORDER — PALONOSETRON 0.05 MG/ML
0.25 INJECTION, SOLUTION INTRAVENOUS ONCE
Status: COMPLETED | OUTPATIENT
Start: 2019-11-12 | End: 2019-11-12

## 2019-11-12 RX ORDER — DEXAMETHASONE SODIUM PHOSPHATE 10 MG/ML
10 INJECTION, SOLUTION INTRAMUSCULAR; INTRAVENOUS ONCE
Status: COMPLETED | OUTPATIENT
Start: 2019-11-12 | End: 2019-11-12

## 2019-11-12 RX ADMIN — DEXAMETHASONE SODIUM PHOSPHATE 10 MG: 10 INJECTION, SOLUTION INTRAMUSCULAR; INTRAVENOUS at 11:11

## 2019-11-12 RX ADMIN — PALONOSETRON HYDROCHLORIDE 0.25 MG: 0.25 INJECTION, SOLUTION INTRAVENOUS at 11:11

## 2019-11-12 RX ADMIN — PACLITAXEL 195 MG: 100 INJECTION, POWDER, LYOPHILIZED, FOR SUSPENSION INTRAVENOUS at 12:05

## 2019-11-12 RX ADMIN — GEMCITABINE HYDROCHLORIDE 940 MG: 1 INJECTION, SOLUTION INTRAVENOUS at 11:32

## 2019-11-15 ENCOUNTER — OFFICE VISIT (OUTPATIENT)
Dept: GASTROENTEROLOGY | Age: 59
End: 2019-11-15
Payer: COMMERCIAL

## 2019-11-15 ENCOUNTER — HOSPITAL ENCOUNTER (OUTPATIENT)
Dept: INFUSION THERAPY | Age: 59
Discharge: HOME | End: 2019-11-15
Payer: COMMERCIAL

## 2019-11-15 VITALS
HEART RATE: 72 BPM | HEIGHT: 67 IN | DIASTOLIC BLOOD PRESSURE: 70 MMHG | SYSTOLIC BLOOD PRESSURE: 136 MMHG | WEIGHT: 107.2 LBS | BODY MASS INDEX: 16.83 KG/M2

## 2019-11-15 DIAGNOSIS — D64.81 ANEMIA ASSOCIATED WITH CHEMOTHERAPY: ICD-10-CM

## 2019-11-15 DIAGNOSIS — C25.9 MALIGNANT NEOPLASM OF PANCREAS, UNSPECIFIED LOCATION OF MALIGNANCY (HCC): Primary | ICD-10-CM

## 2019-11-15 DIAGNOSIS — T45.1X5A ANEMIA ASSOCIATED WITH CHEMOTHERAPY: ICD-10-CM

## 2019-11-15 DIAGNOSIS — K62.5 RECTAL BLEEDING: Primary | ICD-10-CM

## 2019-11-15 DIAGNOSIS — C25.9 MALIGNANT NEOPLASM OF PANCREAS, UNSPECIFIED LOCATION OF MALIGNANCY (HCC): ICD-10-CM

## 2019-11-15 PROCEDURE — 85025 COMPLETE CBC W/AUTO DIFF WBC: CPT

## 2019-11-15 PROCEDURE — 99204 OFFICE O/P NEW MOD 45 MIN: CPT | Performed by: NURSE PRACTITIONER

## 2019-11-15 ASSESSMENT — ENCOUNTER SYMPTOMS
CONSTIPATION: 0
VOMITING: 0
DIARRHEA: 0
ANAL BLEEDING: 1
ABDOMINAL PAIN: 0
TROUBLE SWALLOWING: 0
COUGH: 0
BLOOD IN STOOL: 1
ABDOMINAL DISTENTION: 0
SHORTNESS OF BREATH: 0
RECTAL PAIN: 0
CHOKING: 0
NAUSEA: 0

## 2019-11-21 ENCOUNTER — HOSPITAL ENCOUNTER (OUTPATIENT)
Dept: INFUSION THERAPY | Age: 59
Setting detail: INFUSION SERIES
Discharge: HOME | End: 2019-11-21
Payer: COMMERCIAL

## 2019-11-21 ENCOUNTER — HOSPITAL ENCOUNTER (OUTPATIENT)
Dept: INFUSION THERAPY | Age: 59
Discharge: HOME | End: 2019-11-21
Payer: COMMERCIAL

## 2019-11-21 VITALS
DIASTOLIC BLOOD PRESSURE: 67 MMHG | RESPIRATION RATE: 18 BRPM | HEART RATE: 61 BPM | SYSTOLIC BLOOD PRESSURE: 111 MMHG | OXYGEN SATURATION: 100 % | TEMPERATURE: 98.9 F

## 2019-11-21 VITALS
DIASTOLIC BLOOD PRESSURE: 75 MMHG | TEMPERATURE: 97.7 F | HEART RATE: 50 BPM | RESPIRATION RATE: 17 BRPM | SYSTOLIC BLOOD PRESSURE: 137 MMHG | OXYGEN SATURATION: 97 %

## 2019-11-21 DIAGNOSIS — D64.81 ANEMIA ASSOCIATED WITH CHEMOTHERAPY: ICD-10-CM

## 2019-11-21 DIAGNOSIS — C25.9 MALIGNANT NEOPLASM OF PANCREAS, UNSPECIFIED LOCATION OF MALIGNANCY (HCC): Primary | ICD-10-CM

## 2019-11-21 DIAGNOSIS — C25.9 MALIGNANT NEOPLASM OF PANCREAS, UNSPECIFIED LOCATION OF MALIGNANCY (HCC): ICD-10-CM

## 2019-11-21 DIAGNOSIS — T45.1X5A ANEMIA ASSOCIATED WITH CHEMOTHERAPY: Primary | ICD-10-CM

## 2019-11-21 DIAGNOSIS — D64.81 ANEMIA ASSOCIATED WITH CHEMOTHERAPY: Primary | ICD-10-CM

## 2019-11-21 DIAGNOSIS — T45.1X5A ANEMIA ASSOCIATED WITH CHEMOTHERAPY: ICD-10-CM

## 2019-11-21 LAB
ABO/RH: NORMAL
ANTIBODY SCREEN: NORMAL
BLOOD BANK DISPENSE STATUS: NORMAL
BLOOD BANK DISPENSE STATUS: NORMAL
BLOOD BANK PRODUCT CODE: NORMAL
BLOOD BANK PRODUCT CODE: NORMAL
BPU ID: NORMAL
BPU ID: NORMAL
DESCRIPTION BLOOD BANK: NORMAL
DESCRIPTION BLOOD BANK: NORMAL

## 2019-11-21 PROCEDURE — 2580000003 HC RX 258: Performed by: INTERNAL MEDICINE

## 2019-11-21 PROCEDURE — 96375 TX/PRO/DX INJ NEW DRUG ADDON: CPT

## 2019-11-21 PROCEDURE — 6370000000 HC RX 637 (ALT 250 FOR IP): Performed by: INTERNAL MEDICINE

## 2019-11-21 PROCEDURE — 86923 COMPATIBILITY TEST ELECTRIC: CPT

## 2019-11-21 PROCEDURE — 86850 RBC ANTIBODY SCREEN: CPT

## 2019-11-21 PROCEDURE — 86901 BLOOD TYPING SEROLOGIC RH(D): CPT

## 2019-11-21 PROCEDURE — 85025 COMPLETE CBC W/AUTO DIFF WBC: CPT

## 2019-11-21 PROCEDURE — 6360000002 HC RX W HCPCS

## 2019-11-21 PROCEDURE — 6360000002 HC RX W HCPCS: Performed by: INTERNAL MEDICINE

## 2019-11-21 PROCEDURE — 86900 BLOOD TYPING SEROLOGIC ABO: CPT

## 2019-11-21 PROCEDURE — P9016 RBC LEUKOCYTES REDUCED: HCPCS

## 2019-11-21 PROCEDURE — 36430 TRANSFUSION BLD/BLD COMPNT: CPT

## 2019-11-21 PROCEDURE — 96374 THER/PROPH/DIAG INJ IV PUSH: CPT

## 2019-11-21 RX ORDER — 0.9 % SODIUM CHLORIDE 0.9 %
250 INTRAVENOUS SOLUTION INTRAVENOUS ONCE
Status: COMPLETED | OUTPATIENT
Start: 2019-11-21 | End: 2019-11-21

## 2019-11-21 RX ORDER — 0.9 % SODIUM CHLORIDE 0.9 %
250 INTRAVENOUS SOLUTION INTRAVENOUS ONCE
Status: CANCELLED | OUTPATIENT
Start: 2019-11-21

## 2019-11-21 RX ORDER — DIPHENHYDRAMINE HCL 25 MG
25 TABLET ORAL ONCE
Status: COMPLETED | OUTPATIENT
Start: 2019-11-21 | End: 2019-11-21

## 2019-11-21 RX ORDER — DIPHENHYDRAMINE HCL 25 MG
25 TABLET ORAL ONCE
Status: CANCELLED | OUTPATIENT
Start: 2019-11-21

## 2019-11-21 RX ORDER — HEPARIN SODIUM (PORCINE) LOCK FLUSH IV SOLN 100 UNIT/ML 100 UNIT/ML
300 SOLUTION INTRAVENOUS PRN
Status: DISCONTINUED | OUTPATIENT
Start: 2019-11-21 | End: 2019-11-23 | Stop reason: HOSPADM

## 2019-11-21 RX ORDER — SODIUM CHLORIDE 9 MG/ML
INJECTION, SOLUTION INTRAVENOUS CONTINUOUS
Status: CANCELLED | OUTPATIENT
Start: 2019-11-21

## 2019-11-21 RX ORDER — EPINEPHRINE 1 MG/ML
0.3 INJECTION, SOLUTION, CONCENTRATE INTRAVENOUS PRN
Status: CANCELLED | OUTPATIENT
Start: 2019-11-21

## 2019-11-21 RX ORDER — METHYLPREDNISOLONE SODIUM SUCCINATE 125 MG/2ML
125 INJECTION, POWDER, LYOPHILIZED, FOR SOLUTION INTRAMUSCULAR; INTRAVENOUS ONCE
Status: CANCELLED | OUTPATIENT
Start: 2019-11-21

## 2019-11-21 RX ORDER — ACETAMINOPHEN 325 MG/1
650 TABLET ORAL ONCE
Status: CANCELLED | OUTPATIENT
Start: 2019-11-21

## 2019-11-21 RX ORDER — FUROSEMIDE 10 MG/ML
20 INJECTION INTRAMUSCULAR; INTRAVENOUS ONCE
Status: CANCELLED | OUTPATIENT
Start: 2019-11-21

## 2019-11-21 RX ORDER — DIPHENHYDRAMINE HYDROCHLORIDE 50 MG/ML
50 INJECTION INTRAMUSCULAR; INTRAVENOUS ONCE
Status: CANCELLED | OUTPATIENT
Start: 2019-11-21

## 2019-11-21 RX ORDER — SODIUM CHLORIDE 0.9 % (FLUSH) 0.9 %
20 SYRINGE (ML) INJECTION PRN
Status: CANCELLED | OUTPATIENT
Start: 2019-11-21

## 2019-11-21 RX ORDER — ACETAMINOPHEN 325 MG/1
650 TABLET ORAL ONCE
Status: COMPLETED | OUTPATIENT
Start: 2019-11-21 | End: 2019-11-21

## 2019-11-21 RX ORDER — FUROSEMIDE 10 MG/ML
20 INJECTION INTRAMUSCULAR; INTRAVENOUS ONCE
Status: DISCONTINUED | OUTPATIENT
Start: 2019-11-21 | End: 2019-11-23 | Stop reason: HOSPADM

## 2019-11-21 RX ORDER — SODIUM CHLORIDE 0.9 % (FLUSH) 0.9 %
20 SYRINGE (ML) INJECTION PRN
Status: DISCONTINUED | OUTPATIENT
Start: 2019-11-21 | End: 2019-11-23 | Stop reason: HOSPADM

## 2019-11-21 RX ADMIN — HEPARIN SODIUM (PORCINE) LOCK FLUSH IV SOLN 100 UNIT/ML 300 UNITS: 100 SOLUTION at 15:52

## 2019-11-21 RX ADMIN — SODIUM CHLORIDE 250 ML: 9 INJECTION, SOLUTION INTRAVENOUS at 12:13

## 2019-11-21 RX ADMIN — DIPHENHYDRAMINE HCL 25 MG: 25 TABLET ORAL at 12:10

## 2019-11-21 RX ADMIN — HYDROCORTISONE SODIUM SUCCINATE 100 MG: 100 INJECTION, POWDER, FOR SOLUTION INTRAMUSCULAR; INTRAVENOUS at 12:10

## 2019-11-21 RX ADMIN — HEPARIN 300 UNITS: 100 SYRINGE at 15:52

## 2019-11-21 RX ADMIN — ACETAMINOPHEN 650 MG: 325 TABLET ORAL at 12:10

## 2019-11-21 ASSESSMENT — PAIN SCALES - GENERAL: PAINLEVEL_OUTOF10: 0

## 2019-11-22 RX ORDER — PROMETHAZINE HYDROCHLORIDE 25 MG/1
25 TABLET ORAL EVERY 6 HOURS PRN
Qty: 30 TABLET | Refills: 3 | Status: SHIPPED | OUTPATIENT
Start: 2019-11-22 | End: 2019-11-29

## 2019-11-22 RX ORDER — ONDANSETRON HYDROCHLORIDE 8 MG/1
8 TABLET, FILM COATED ORAL EVERY 8 HOURS PRN
Qty: 30 TABLET | Refills: 3 | Status: SHIPPED | OUTPATIENT
Start: 2019-11-22 | End: 2020-12-23 | Stop reason: SDUPTHER

## 2019-11-25 ENCOUNTER — HOSPITAL ENCOUNTER (OUTPATIENT)
Dept: INFUSION THERAPY | Age: 59
Discharge: HOME | End: 2019-11-25
Payer: COMMERCIAL

## 2019-11-25 ENCOUNTER — OFFICE VISIT (OUTPATIENT)
Dept: HEMATOLOGY | Age: 59
End: 2019-11-25
Payer: COMMERCIAL

## 2019-11-25 VITALS
HEART RATE: 55 BPM | WEIGHT: 112.1 LBS | HEIGHT: 67 IN | OXYGEN SATURATION: 99 % | SYSTOLIC BLOOD PRESSURE: 138 MMHG | BODY MASS INDEX: 17.6 KG/M2 | DIASTOLIC BLOOD PRESSURE: 82 MMHG

## 2019-11-25 DIAGNOSIS — T50.905S TOXICITY, LATE EFFECT, DUE TO DRUG, MEDICINE, OR BIOLOGICAL SUBSTANCE: ICD-10-CM

## 2019-11-25 DIAGNOSIS — T45.1X5D ADVERSE EFFECT OF CHEMOTHERAPY, SUBSEQUENT ENCOUNTER: ICD-10-CM

## 2019-11-25 DIAGNOSIS — Z71.89 CARE PLAN DISCUSSED WITH PATIENT: ICD-10-CM

## 2019-11-25 DIAGNOSIS — C25.9 MALIGNANT NEOPLASM OF PANCREAS, UNSPECIFIED LOCATION OF MALIGNANCY (HCC): Primary | ICD-10-CM

## 2019-11-25 DIAGNOSIS — Z51.11 CHEMOTHERAPY MANAGEMENT, ENCOUNTER FOR: ICD-10-CM

## 2019-11-25 DIAGNOSIS — C25.9 MALIGNANT NEOPLASM OF PANCREAS, UNSPECIFIED LOCATION OF MALIGNANCY (HCC): ICD-10-CM

## 2019-11-25 PROCEDURE — 99214 OFFICE O/P EST MOD 30 MIN: CPT | Performed by: INTERNAL MEDICINE

## 2019-11-25 PROCEDURE — 99211 OFF/OP EST MAY X REQ PHY/QHP: CPT

## 2019-11-25 PROCEDURE — 85025 COMPLETE CBC W/AUTO DIFF WBC: CPT

## 2019-11-25 RX ORDER — HEPARIN SODIUM (PORCINE) LOCK FLUSH IV SOLN 100 UNIT/ML 100 UNIT/ML
500 SOLUTION INTRAVENOUS PRN
Status: CANCELLED | OUTPATIENT
Start: 2019-11-26

## 2019-11-25 RX ORDER — SODIUM CHLORIDE 0.9 % (FLUSH) 0.9 %
10 SYRINGE (ML) INJECTION PRN
Status: CANCELLED | OUTPATIENT
Start: 2019-12-10

## 2019-11-25 RX ORDER — SODIUM CHLORIDE 0.9 % (FLUSH) 0.9 %
10 SYRINGE (ML) INJECTION PRN
Status: CANCELLED | OUTPATIENT
Start: 2019-11-26

## 2019-11-25 RX ORDER — METHYLPREDNISOLONE SODIUM SUCCINATE 125 MG/2ML
125 INJECTION, POWDER, LYOPHILIZED, FOR SOLUTION INTRAMUSCULAR; INTRAVENOUS ONCE
Status: CANCELLED | OUTPATIENT
Start: 2019-11-26

## 2019-11-25 RX ORDER — EPINEPHRINE 1 MG/ML
0.3 INJECTION, SOLUTION, CONCENTRATE INTRAVENOUS PRN
Status: CANCELLED | OUTPATIENT
Start: 2019-12-10

## 2019-11-25 RX ORDER — SODIUM CHLORIDE 0.9 % (FLUSH) 0.9 %
5 SYRINGE (ML) INJECTION PRN
Status: CANCELLED | OUTPATIENT
Start: 2019-12-10

## 2019-11-25 RX ORDER — PALONOSETRON 0.05 MG/ML
0.25 INJECTION, SOLUTION INTRAVENOUS ONCE
Status: CANCELLED
Start: 2019-12-10

## 2019-11-25 RX ORDER — SODIUM CHLORIDE 0.9 % (FLUSH) 0.9 %
5 SYRINGE (ML) INJECTION PRN
Status: CANCELLED | OUTPATIENT
Start: 2019-11-26

## 2019-11-25 RX ORDER — SODIUM CHLORIDE 9 MG/ML
20 INJECTION, SOLUTION INTRAVENOUS ONCE
Status: CANCELLED | OUTPATIENT
Start: 2019-12-10

## 2019-11-25 RX ORDER — PALONOSETRON 0.05 MG/ML
0.25 INJECTION, SOLUTION INTRAVENOUS ONCE
Status: CANCELLED
Start: 2019-11-26

## 2019-11-25 RX ORDER — SODIUM CHLORIDE 9 MG/ML
20 INJECTION, SOLUTION INTRAVENOUS ONCE
Status: CANCELLED | OUTPATIENT
Start: 2019-11-26

## 2019-11-25 RX ORDER — PACLITAXEL 100 MG/20ML
195 INJECTION, POWDER, LYOPHILIZED, FOR SUSPENSION INTRAVENOUS ONCE
Status: CANCELLED | OUTPATIENT
Start: 2019-12-10

## 2019-11-25 RX ORDER — EPINEPHRINE 1 MG/ML
0.3 INJECTION, SOLUTION, CONCENTRATE INTRAVENOUS PRN
Status: CANCELLED | OUTPATIENT
Start: 2019-11-26

## 2019-11-25 RX ORDER — SODIUM CHLORIDE 9 MG/ML
INJECTION, SOLUTION INTRAVENOUS CONTINUOUS
Status: CANCELLED | OUTPATIENT
Start: 2019-11-26

## 2019-11-25 RX ORDER — HEPARIN SODIUM (PORCINE) LOCK FLUSH IV SOLN 100 UNIT/ML 100 UNIT/ML
500 SOLUTION INTRAVENOUS PRN
Status: CANCELLED | OUTPATIENT
Start: 2019-12-10

## 2019-11-25 RX ORDER — METHYLPREDNISOLONE SODIUM SUCCINATE 125 MG/2ML
125 INJECTION, POWDER, LYOPHILIZED, FOR SOLUTION INTRAMUSCULAR; INTRAVENOUS ONCE
Status: CANCELLED | OUTPATIENT
Start: 2019-12-10

## 2019-11-25 RX ORDER — PACLITAXEL 100 MG/20ML
195 INJECTION, POWDER, LYOPHILIZED, FOR SUSPENSION INTRAVENOUS ONCE
Status: CANCELLED | OUTPATIENT
Start: 2019-11-26

## 2019-11-25 RX ORDER — SODIUM CHLORIDE 9 MG/ML
INJECTION, SOLUTION INTRAVENOUS CONTINUOUS
Status: CANCELLED | OUTPATIENT
Start: 2019-12-10

## 2019-11-25 RX ORDER — DIPHENHYDRAMINE HYDROCHLORIDE 50 MG/ML
50 INJECTION INTRAMUSCULAR; INTRAVENOUS ONCE
Status: CANCELLED | OUTPATIENT
Start: 2019-11-26

## 2019-11-25 RX ORDER — DIPHENHYDRAMINE HYDROCHLORIDE 50 MG/ML
50 INJECTION INTRAMUSCULAR; INTRAVENOUS ONCE
Status: CANCELLED | OUTPATIENT
Start: 2019-12-10

## 2019-11-26 ENCOUNTER — HOSPITAL ENCOUNTER (OUTPATIENT)
Dept: INFUSION THERAPY | Age: 59
Discharge: HOME | End: 2019-11-26
Payer: COMMERCIAL

## 2019-11-26 VITALS
OXYGEN SATURATION: 99 % | WEIGHT: 112 LBS | SYSTOLIC BLOOD PRESSURE: 112 MMHG | TEMPERATURE: 97.3 F | HEIGHT: 67 IN | DIASTOLIC BLOOD PRESSURE: 82 MMHG | HEART RATE: 54 BPM | BODY MASS INDEX: 17.58 KG/M2

## 2019-11-26 DIAGNOSIS — C25.9 MALIGNANT NEOPLASM OF PANCREAS, UNSPECIFIED LOCATION OF MALIGNANCY (HCC): Primary | ICD-10-CM

## 2019-11-26 PROCEDURE — 2580000003 HC RX 258: Performed by: INTERNAL MEDICINE

## 2019-11-26 PROCEDURE — 96413 CHEMO IV INFUSION 1 HR: CPT

## 2019-11-26 PROCEDURE — 96375 TX/PRO/DX INJ NEW DRUG ADDON: CPT

## 2019-11-26 PROCEDURE — 85025 COMPLETE CBC W/AUTO DIFF WBC: CPT

## 2019-11-26 PROCEDURE — 96417 CHEMO IV INFUS EACH ADDL SEQ: CPT

## 2019-11-26 PROCEDURE — 6360000002 HC RX W HCPCS: Performed by: INTERNAL MEDICINE

## 2019-11-26 RX ORDER — PACLITAXEL 100 MG/20ML
195 INJECTION, POWDER, LYOPHILIZED, FOR SUSPENSION INTRAVENOUS ONCE
Status: COMPLETED | OUTPATIENT
Start: 2019-11-26 | End: 2019-11-26

## 2019-11-26 RX ORDER — DEXAMETHASONE SODIUM PHOSPHATE 10 MG/ML
10 INJECTION, SOLUTION INTRAMUSCULAR; INTRAVENOUS ONCE
Status: COMPLETED | OUTPATIENT
Start: 2019-11-26 | End: 2019-11-26

## 2019-11-26 RX ORDER — SODIUM CHLORIDE 0.9 % (FLUSH) 0.9 %
5 SYRINGE (ML) INJECTION PRN
Status: DISCONTINUED | OUTPATIENT
Start: 2019-11-26 | End: 2019-11-27 | Stop reason: HOSPADM

## 2019-11-26 RX ORDER — HEPARIN SODIUM (PORCINE) LOCK FLUSH IV SOLN 100 UNIT/ML 100 UNIT/ML
500 SOLUTION INTRAVENOUS PRN
Status: DISCONTINUED | OUTPATIENT
Start: 2019-11-26 | End: 2019-11-27 | Stop reason: HOSPADM

## 2019-11-26 RX ORDER — PALONOSETRON 0.05 MG/ML
0.25 INJECTION, SOLUTION INTRAVENOUS ONCE
Status: COMPLETED | OUTPATIENT
Start: 2019-11-26 | End: 2019-11-26

## 2019-11-26 RX ORDER — SODIUM CHLORIDE 0.9 % (FLUSH) 0.9 %
10 SYRINGE (ML) INJECTION PRN
Status: DISCONTINUED | OUTPATIENT
Start: 2019-11-26 | End: 2019-11-27 | Stop reason: HOSPADM

## 2019-11-26 RX ADMIN — PALONOSETRON HYDROCHLORIDE 0.25 MG: 0.25 INJECTION, SOLUTION INTRAVENOUS at 10:26

## 2019-11-26 RX ADMIN — DEXAMETHASONE SODIUM PHOSPHATE 10 MG: 10 INJECTION, SOLUTION INTRAMUSCULAR; INTRAVENOUS at 10:26

## 2019-11-26 RX ADMIN — PACLITAXEL 195 MG: 100 INJECTION, POWDER, LYOPHILIZED, FOR SUSPENSION INTRAVENOUS at 11:29

## 2019-11-26 RX ADMIN — HEPARIN 500 UNITS: 100 SYRINGE at 12:11

## 2019-11-26 RX ADMIN — Medication 10 ML: at 12:11

## 2019-11-26 RX ADMIN — GEMCITABINE HYDROCHLORIDE 940 MG: 1 INJECTION, SOLUTION INTRAVENOUS at 10:54

## 2019-11-26 ASSESSMENT — PAIN SCALES - GENERAL: PAINLEVEL_OUTOF10: 0

## 2019-12-05 ENCOUNTER — HOSPITAL ENCOUNTER (OUTPATIENT)
Dept: INFUSION THERAPY | Age: 59
Discharge: HOME | End: 2019-12-05
Payer: COMMERCIAL

## 2019-12-05 VITALS
OXYGEN SATURATION: 99 % | HEIGHT: 67 IN | SYSTOLIC BLOOD PRESSURE: 142 MMHG | BODY MASS INDEX: 17.55 KG/M2 | DIASTOLIC BLOOD PRESSURE: 84 MMHG | WEIGHT: 111.8 LBS | HEART RATE: 70 BPM

## 2019-12-05 DIAGNOSIS — C25.9 MALIGNANT NEOPLASM OF PANCREAS, UNSPECIFIED LOCATION OF MALIGNANCY (HCC): ICD-10-CM

## 2019-12-05 DIAGNOSIS — C25.9 MALIGNANT NEOPLASM OF PANCREAS, UNSPECIFIED LOCATION OF MALIGNANCY (HCC): Primary | ICD-10-CM

## 2019-12-05 PROCEDURE — 85025 COMPLETE CBC W/AUTO DIFF WBC: CPT

## 2019-12-05 NOTE — PROGRESS NOTES
Pt called c/o dark blood from rectum. Her CBC is ok. She has an esophageal ulcer and has been prescribed Protonix and Carafate per Dr. Mayra Huber. She has not taken this this week because she was at her sisters for Thanksgiving and forgot. She has a c-scope scheduled for 12/9/19 and a f/u appt to see him 12/13/19. Dr. Oneal Atkinson is aware and she will go to the ER if needed before the c-scope. She will restart the Protonix and Carafate today.

## 2019-12-09 ENCOUNTER — ANESTHESIA EVENT (OUTPATIENT)
Dept: OPERATING ROOM | Age: 59
End: 2019-12-09

## 2019-12-09 ENCOUNTER — APPOINTMENT (OUTPATIENT)
Dept: OPERATING ROOM | Age: 59
End: 2019-12-09

## 2019-12-09 ENCOUNTER — ANESTHESIA (OUTPATIENT)
Dept: OPERATING ROOM | Age: 59
End: 2019-12-09

## 2019-12-09 ENCOUNTER — HOSPITAL ENCOUNTER (OUTPATIENT)
Age: 59
Setting detail: OUTPATIENT SURGERY
Discharge: HOME | End: 2019-12-09
Attending: INTERNAL MEDICINE | Admitting: INTERNAL MEDICINE
Payer: COMMERCIAL

## 2019-12-09 VITALS
BODY MASS INDEX: 16.79 KG/M2 | OXYGEN SATURATION: 100 % | WEIGHT: 107 LBS | RESPIRATION RATE: 18 BRPM | HEART RATE: 50 BPM | HEIGHT: 67 IN | DIASTOLIC BLOOD PRESSURE: 60 MMHG | SYSTOLIC BLOOD PRESSURE: 109 MMHG

## 2019-12-09 VITALS — DIASTOLIC BLOOD PRESSURE: 73 MMHG | OXYGEN SATURATION: 99 % | SYSTOLIC BLOOD PRESSURE: 121 MMHG

## 2019-12-09 PROCEDURE — 45378 DIAGNOSTIC COLONOSCOPY: CPT

## 2019-12-09 PROCEDURE — G8907 PT DOC NO EVENTS ON DISCHARG: HCPCS

## 2019-12-09 PROCEDURE — G8918 PT W/O PREOP ORDER IV AB PRO: HCPCS

## 2019-12-09 PROCEDURE — 45378 DIAGNOSTIC COLONOSCOPY: CPT | Performed by: INTERNAL MEDICINE

## 2019-12-09 RX ORDER — LIDOCAINE HYDROCHLORIDE 10 MG/ML
INJECTION, SOLUTION INFILTRATION; PERINEURAL PRN
Status: DISCONTINUED | OUTPATIENT
Start: 2019-12-09 | End: 2019-12-09 | Stop reason: SDUPTHER

## 2019-12-09 RX ORDER — SODIUM CHLORIDE 9 MG/ML
INJECTION, SOLUTION INTRAVENOUS CONTINUOUS
Status: DISCONTINUED | OUTPATIENT
Start: 2019-12-09 | End: 2019-12-09 | Stop reason: HOSPADM

## 2019-12-09 RX ORDER — SODIUM CHLORIDE 9 MG/ML
INJECTION, SOLUTION INTRAVENOUS CONTINUOUS
Status: CANCELLED | OUTPATIENT
Start: 2019-12-09

## 2019-12-09 RX ORDER — ONDANSETRON 2 MG/ML
4 INJECTION INTRAMUSCULAR; INTRAVENOUS
Status: DISCONTINUED | OUTPATIENT
Start: 2019-12-09 | End: 2019-12-09 | Stop reason: HOSPADM

## 2019-12-09 RX ORDER — DIPHENHYDRAMINE HYDROCHLORIDE 50 MG/ML
12.5 INJECTION INTRAMUSCULAR; INTRAVENOUS
Status: DISCONTINUED | OUTPATIENT
Start: 2019-12-09 | End: 2019-12-09 | Stop reason: HOSPADM

## 2019-12-09 RX ORDER — PROPOFOL 10 MG/ML
INJECTION, EMULSION INTRAVENOUS PRN
Status: DISCONTINUED | OUTPATIENT
Start: 2019-12-09 | End: 2019-12-09 | Stop reason: SDUPTHER

## 2019-12-09 RX ADMIN — SODIUM CHLORIDE: 9 INJECTION, SOLUTION INTRAVENOUS at 09:52

## 2019-12-09 RX ADMIN — LIDOCAINE HYDROCHLORIDE 5 ML: 10 INJECTION, SOLUTION INFILTRATION; PERINEURAL at 10:00

## 2019-12-09 RX ADMIN — PROPOFOL 20 MG: 10 INJECTION, EMULSION INTRAVENOUS at 10:00

## 2019-12-09 RX ADMIN — PROPOFOL 30 MG: 10 INJECTION, EMULSION INTRAVENOUS at 10:06

## 2019-12-09 RX ADMIN — PROPOFOL 20 MG: 10 INJECTION, EMULSION INTRAVENOUS at 10:12

## 2019-12-09 RX ADMIN — PROPOFOL 30 MG: 10 INJECTION, EMULSION INTRAVENOUS at 10:02

## 2019-12-09 ASSESSMENT — LIFESTYLE VARIABLES: SMOKING_STATUS: 0

## 2019-12-10 ENCOUNTER — OFFICE VISIT (OUTPATIENT)
Dept: HEMATOLOGY | Age: 59
End: 2019-12-10
Payer: COMMERCIAL

## 2019-12-10 ENCOUNTER — HOSPITAL ENCOUNTER (OUTPATIENT)
Dept: INFUSION THERAPY | Age: 59
Discharge: HOME | End: 2019-12-10
Payer: COMMERCIAL

## 2019-12-10 VITALS
TEMPERATURE: 97.9 F | SYSTOLIC BLOOD PRESSURE: 138 MMHG | BODY MASS INDEX: 16.76 KG/M2 | DIASTOLIC BLOOD PRESSURE: 70 MMHG | RESPIRATION RATE: 16 BRPM | HEART RATE: 54 BPM | WEIGHT: 107 LBS

## 2019-12-10 DIAGNOSIS — D64.81 ANEMIA ASSOCIATED WITH CHEMOTHERAPY: ICD-10-CM

## 2019-12-10 DIAGNOSIS — C25.9 MALIGNANT NEOPLASM OF PANCREAS, UNSPECIFIED LOCATION OF MALIGNANCY (HCC): Primary | ICD-10-CM

## 2019-12-10 DIAGNOSIS — T50.905S TOXICITY, LATE EFFECT, DUE TO DRUG, MEDICINE, OR BIOLOGICAL SUBSTANCE: ICD-10-CM

## 2019-12-10 DIAGNOSIS — T45.1X5A ANEMIA ASSOCIATED WITH CHEMOTHERAPY: ICD-10-CM

## 2019-12-10 DIAGNOSIS — Z71.89 CARE PLAN DISCUSSED WITH PATIENT: ICD-10-CM

## 2019-12-10 DIAGNOSIS — G62.0 CHEMOTHERAPY-INDUCED PERIPHERAL NEUROPATHY (HCC): ICD-10-CM

## 2019-12-10 DIAGNOSIS — Z51.11 CHEMOTHERAPY MANAGEMENT, ENCOUNTER FOR: ICD-10-CM

## 2019-12-10 DIAGNOSIS — T45.1X5D ADVERSE EFFECT OF CHEMOTHERAPY, SUBSEQUENT ENCOUNTER: ICD-10-CM

## 2019-12-10 DIAGNOSIS — T45.1X5A CHEMOTHERAPY-INDUCED PERIPHERAL NEUROPATHY (HCC): ICD-10-CM

## 2019-12-10 PROCEDURE — 96375 TX/PRO/DX INJ NEW DRUG ADDON: CPT

## 2019-12-10 PROCEDURE — 80053 COMPREHEN METABOLIC PANEL: CPT

## 2019-12-10 PROCEDURE — 36415 COLL VENOUS BLD VENIPUNCTURE: CPT

## 2019-12-10 PROCEDURE — 86301 IMMUNOASSAY TUMOR CA 19-9: CPT

## 2019-12-10 PROCEDURE — 2580000003 HC RX 258: Performed by: INTERNAL MEDICINE

## 2019-12-10 PROCEDURE — 96417 CHEMO IV INFUS EACH ADDL SEQ: CPT

## 2019-12-10 PROCEDURE — 96372 THER/PROPH/DIAG INJ SC/IM: CPT

## 2019-12-10 PROCEDURE — 85025 COMPLETE CBC W/AUTO DIFF WBC: CPT

## 2019-12-10 PROCEDURE — 99214 OFFICE O/P EST MOD 30 MIN: CPT | Performed by: INTERNAL MEDICINE

## 2019-12-10 PROCEDURE — 96413 CHEMO IV INFUSION 1 HR: CPT

## 2019-12-10 PROCEDURE — 6360000002 HC RX W HCPCS: Performed by: INTERNAL MEDICINE

## 2019-12-10 RX ORDER — SODIUM CHLORIDE 0.9 % (FLUSH) 0.9 %
5 SYRINGE (ML) INJECTION PRN
Status: DISCONTINUED | OUTPATIENT
Start: 2019-12-10 | End: 2019-12-11 | Stop reason: HOSPADM

## 2019-12-10 RX ORDER — SODIUM CHLORIDE 9 MG/ML
20 INJECTION, SOLUTION INTRAVENOUS ONCE
Status: COMPLETED | OUTPATIENT
Start: 2019-12-10 | End: 2019-12-11

## 2019-12-10 RX ORDER — PACLITAXEL 100 MG/20ML
195 INJECTION, POWDER, LYOPHILIZED, FOR SUSPENSION INTRAVENOUS ONCE
Status: COMPLETED | OUTPATIENT
Start: 2019-12-10 | End: 2019-12-10

## 2019-12-10 RX ORDER — HEPARIN SODIUM (PORCINE) LOCK FLUSH IV SOLN 100 UNIT/ML 100 UNIT/ML
500 SOLUTION INTRAVENOUS PRN
Status: DISCONTINUED | OUTPATIENT
Start: 2019-12-10 | End: 2019-12-11 | Stop reason: HOSPADM

## 2019-12-10 RX ORDER — DEXAMETHASONE SODIUM PHOSPHATE 10 MG/ML
10 INJECTION, SOLUTION INTRAMUSCULAR; INTRAVENOUS ONCE
Status: COMPLETED | OUTPATIENT
Start: 2019-12-10 | End: 2019-12-10

## 2019-12-10 RX ORDER — PALONOSETRON 0.05 MG/ML
0.25 INJECTION, SOLUTION INTRAVENOUS ONCE
Status: COMPLETED | OUTPATIENT
Start: 2019-12-10 | End: 2019-12-10

## 2019-12-10 RX ORDER — SODIUM CHLORIDE 0.9 % (FLUSH) 0.9 %
10 SYRINGE (ML) INJECTION PRN
Status: DISCONTINUED | OUTPATIENT
Start: 2019-12-10 | End: 2019-12-11 | Stop reason: HOSPADM

## 2019-12-10 RX ADMIN — SODIUM CHLORIDE 20 ML/HR: 9 INJECTION, SOLUTION INTRAVENOUS at 08:44

## 2019-12-10 RX ADMIN — PALONOSETRON HYDROCHLORIDE 0.25 MG: 0.25 INJECTION, SOLUTION INTRAVENOUS at 08:45

## 2019-12-10 RX ADMIN — Medication 10 ML: at 11:13

## 2019-12-10 RX ADMIN — DEXAMETHASONE SODIUM PHOSPHATE 10 MG: 10 INJECTION, SOLUTION INTRAMUSCULAR; INTRAVENOUS at 08:50

## 2019-12-10 RX ADMIN — HEPARIN 500 UNITS: 100 SYRINGE at 11:13

## 2019-12-10 RX ADMIN — PACLITAXEL 195 MG: 100 INJECTION, POWDER, LYOPHILIZED, FOR SUSPENSION INTRAVENOUS at 10:20

## 2019-12-10 RX ADMIN — GEMCITABINE HYDROCHLORIDE 940 MG: 1 INJECTION, SOLUTION INTRAVENOUS at 09:11

## 2019-12-11 ENCOUNTER — OFFICE VISIT (OUTPATIENT)
Dept: CARDIOLOGY | Age: 59
End: 2019-12-11
Payer: COMMERCIAL

## 2019-12-11 VITALS
DIASTOLIC BLOOD PRESSURE: 68 MMHG | WEIGHT: 112 LBS | BODY MASS INDEX: 17.58 KG/M2 | HEIGHT: 67 IN | HEART RATE: 62 BPM | SYSTOLIC BLOOD PRESSURE: 106 MMHG

## 2019-12-11 DIAGNOSIS — I10 ESSENTIAL HYPERTENSION: Primary | ICD-10-CM

## 2019-12-11 PROCEDURE — 93000 ELECTROCARDIOGRAM COMPLETE: CPT | Performed by: INTERNAL MEDICINE

## 2019-12-11 PROCEDURE — 99212 OFFICE O/P EST SF 10 MIN: CPT | Performed by: INTERNAL MEDICINE

## 2019-12-16 ENCOUNTER — OFFICE VISIT (OUTPATIENT)
Dept: GASTROENTEROLOGY | Age: 59
End: 2019-12-16
Payer: COMMERCIAL

## 2019-12-16 ENCOUNTER — HOSPITAL ENCOUNTER (OUTPATIENT)
Dept: INFUSION THERAPY | Age: 59
Discharge: HOME | End: 2019-12-16
Payer: COMMERCIAL

## 2019-12-16 ENCOUNTER — ANESTHESIA EVENT (OUTPATIENT)
Dept: ENDOSCOPY | Age: 59
End: 2019-12-16
Payer: COMMERCIAL

## 2019-12-16 VITALS
SYSTOLIC BLOOD PRESSURE: 130 MMHG | DIASTOLIC BLOOD PRESSURE: 80 MMHG | WEIGHT: 110 LBS | OXYGEN SATURATION: 99 % | HEART RATE: 58 BPM | BODY MASS INDEX: 17.27 KG/M2 | HEIGHT: 67 IN

## 2019-12-16 DIAGNOSIS — D64.9 ANEMIA, UNSPECIFIED TYPE: Primary | ICD-10-CM

## 2019-12-16 DIAGNOSIS — K25.9 GASTRIC ULCER, UNSPECIFIED CHRONICITY, UNSPECIFIED WHETHER GASTRIC ULCER HEMORRHAGE OR PERFORATION PRESENT: ICD-10-CM

## 2019-12-16 DIAGNOSIS — C25.9 MALIGNANT NEOPLASM OF PANCREAS, UNSPECIFIED LOCATION OF MALIGNANCY (HCC): ICD-10-CM

## 2019-12-16 DIAGNOSIS — C25.9 MALIGNANT NEOPLASM OF PANCREAS, UNSPECIFIED LOCATION OF MALIGNANCY (HCC): Primary | ICD-10-CM

## 2019-12-16 PROCEDURE — 99214 OFFICE O/P EST MOD 30 MIN: CPT | Performed by: NURSE PRACTITIONER

## 2019-12-16 PROCEDURE — 85025 COMPLETE CBC W/AUTO DIFF WBC: CPT

## 2019-12-16 ASSESSMENT — ENCOUNTER SYMPTOMS
ANAL BLEEDING: 0
ABDOMINAL DISTENTION: 0
CONSTIPATION: 0
COUGH: 0
CHOKING: 0
NAUSEA: 0
BLOOD IN STOOL: 0
DIARRHEA: 0
SHORTNESS OF BREATH: 0
ABDOMINAL PAIN: 0
RECTAL PAIN: 0
TROUBLE SWALLOWING: 0
VOMITING: 0

## 2019-12-17 ENCOUNTER — ANESTHESIA (OUTPATIENT)
Dept: ENDOSCOPY | Age: 59
End: 2019-12-17
Payer: COMMERCIAL

## 2019-12-17 ENCOUNTER — HOSPITAL ENCOUNTER (OUTPATIENT)
Age: 59
Setting detail: OUTPATIENT SURGERY
Discharge: HOME | End: 2019-12-17
Attending: INTERNAL MEDICINE | Admitting: INTERNAL MEDICINE
Payer: COMMERCIAL

## 2019-12-17 VITALS
SYSTOLIC BLOOD PRESSURE: 136 MMHG | DIASTOLIC BLOOD PRESSURE: 73 MMHG | OXYGEN SATURATION: 100 % | RESPIRATION RATE: 22 BRPM

## 2019-12-17 VITALS
BODY MASS INDEX: 16.48 KG/M2 | TEMPERATURE: 97.8 F | SYSTOLIC BLOOD PRESSURE: 124 MMHG | HEIGHT: 67 IN | HEART RATE: 53 BPM | DIASTOLIC BLOOD PRESSURE: 67 MMHG | WEIGHT: 105 LBS | RESPIRATION RATE: 18 BRPM | OXYGEN SATURATION: 100 %

## 2019-12-17 PROBLEM — Z51.11 CHEMOTHERAPY MANAGEMENT, ENCOUNTER FOR: Status: ACTIVE | Noted: 2019-12-17

## 2019-12-17 PROCEDURE — 2709999900 HC NON-CHARGEABLE SUPPLY: Performed by: INTERNAL MEDICINE

## 2019-12-17 PROCEDURE — 88342 IMHCHEM/IMCYTCHM 1ST ANTB: CPT

## 2019-12-17 PROCEDURE — 7100000010 HC PHASE II RECOVERY - FIRST 15 MIN: Performed by: INTERNAL MEDICINE

## 2019-12-17 PROCEDURE — 7100000011 HC PHASE II RECOVERY - ADDTL 15 MIN: Performed by: INTERNAL MEDICINE

## 2019-12-17 PROCEDURE — 6360000002 HC RX W HCPCS: Performed by: NURSE ANESTHETIST, CERTIFIED REGISTERED

## 2019-12-17 PROCEDURE — 2500000003 HC RX 250 WO HCPCS: Performed by: NURSE ANESTHETIST, CERTIFIED REGISTERED

## 2019-12-17 PROCEDURE — 88305 TISSUE EXAM BY PATHOLOGIST: CPT

## 2019-12-17 PROCEDURE — 3609012400 HC EGD TRANSORAL BIOPSY SINGLE/MULTIPLE: Performed by: INTERNAL MEDICINE

## 2019-12-17 PROCEDURE — 43239 EGD BIOPSY SINGLE/MULTIPLE: CPT | Performed by: INTERNAL MEDICINE

## 2019-12-17 PROCEDURE — 3700000000 HC ANESTHESIA ATTENDED CARE: Performed by: INTERNAL MEDICINE

## 2019-12-17 PROCEDURE — 3700000001 HC ADD 15 MINUTES (ANESTHESIA): Performed by: INTERNAL MEDICINE

## 2019-12-17 PROCEDURE — 2580000003 HC RX 258: Performed by: INTERNAL MEDICINE

## 2019-12-17 RX ORDER — SODIUM CHLORIDE, SODIUM LACTATE, POTASSIUM CHLORIDE, CALCIUM CHLORIDE 600; 310; 30; 20 MG/100ML; MG/100ML; MG/100ML; MG/100ML
INJECTION, SOLUTION INTRAVENOUS CONTINUOUS
Status: DISCONTINUED | OUTPATIENT
Start: 2019-12-17 | End: 2019-12-17 | Stop reason: HOSPADM

## 2019-12-17 RX ORDER — VITAMIN E 268 MG
400 CAPSULE ORAL DAILY
COMMUNITY
End: 2021-03-03 | Stop reason: ALTCHOICE

## 2019-12-17 RX ORDER — PROPOFOL 10 MG/ML
INJECTION, EMULSION INTRAVENOUS PRN
Status: DISCONTINUED | OUTPATIENT
Start: 2019-12-17 | End: 2019-12-17 | Stop reason: SDUPTHER

## 2019-12-17 RX ORDER — LIDOCAINE HYDROCHLORIDE 20 MG/ML
INJECTION, SOLUTION INFILTRATION; PERINEURAL PRN
Status: DISCONTINUED | OUTPATIENT
Start: 2019-12-17 | End: 2019-12-17 | Stop reason: SDUPTHER

## 2019-12-17 RX ADMIN — SODIUM CHLORIDE, POTASSIUM CHLORIDE, SODIUM LACTATE AND CALCIUM CHLORIDE: 600; 310; 30; 20 INJECTION, SOLUTION INTRAVENOUS at 11:28

## 2019-12-17 RX ADMIN — LIDOCAINE HYDROCHLORIDE 20 MG: 20 INJECTION, SOLUTION INFILTRATION; PERINEURAL at 11:55

## 2019-12-17 RX ADMIN — PROPOFOL 115 MG: 10 INJECTION, EMULSION INTRAVENOUS at 11:55

## 2019-12-17 ASSESSMENT — LIFESTYLE VARIABLES: SMOKING_STATUS: 0

## 2019-12-17 ASSESSMENT — PAIN - FUNCTIONAL ASSESSMENT: PAIN_FUNCTIONAL_ASSESSMENT: 0-10

## 2019-12-17 ASSESSMENT — ENCOUNTER SYMPTOMS
EYE REDNESS: 0
VOMITING: 0
NAUSEA: 0
CONSTIPATION: 0
DIARRHEA: 0
WHEEZING: 0
SORE THROAT: 0
SHORTNESS OF BREATH: 0
EYE DISCHARGE: 0
EYE PAIN: 0
COUGH: 0
BLOOD IN STOOL: 0
BACK PAIN: 0
EYES NEGATIVE: 1
RESPIRATORY NEGATIVE: 1
ABDOMINAL PAIN: 0
GASTROINTESTINAL NEGATIVE: 1

## 2019-12-23 DIAGNOSIS — C25.9 MALIGNANT NEOPLASM OF PANCREAS, UNSPECIFIED LOCATION OF MALIGNANCY (HCC): Primary | ICD-10-CM

## 2019-12-27 ENCOUNTER — HOSPITAL ENCOUNTER (OUTPATIENT)
Dept: INFUSION THERAPY | Age: 59
Discharge: HOME | End: 2019-12-27
Payer: COMMERCIAL

## 2019-12-27 VITALS
BODY MASS INDEX: 18.21 KG/M2 | SYSTOLIC BLOOD PRESSURE: 130 MMHG | HEART RATE: 53 BPM | HEIGHT: 67 IN | TEMPERATURE: 97.9 F | WEIGHT: 116 LBS | OXYGEN SATURATION: 100 % | DIASTOLIC BLOOD PRESSURE: 82 MMHG

## 2019-12-27 DIAGNOSIS — C25.9 MALIGNANT NEOPLASM OF PANCREAS, UNSPECIFIED LOCATION OF MALIGNANCY (HCC): Primary | ICD-10-CM

## 2019-12-27 PROCEDURE — 2580000003 HC RX 258: Performed by: INTERNAL MEDICINE

## 2019-12-27 PROCEDURE — 96375 TX/PRO/DX INJ NEW DRUG ADDON: CPT

## 2019-12-27 PROCEDURE — 96413 CHEMO IV INFUSION 1 HR: CPT

## 2019-12-27 PROCEDURE — 6360000002 HC RX W HCPCS: Performed by: INTERNAL MEDICINE

## 2019-12-27 PROCEDURE — 85025 COMPLETE CBC W/AUTO DIFF WBC: CPT

## 2019-12-27 PROCEDURE — 96417 CHEMO IV INFUS EACH ADDL SEQ: CPT

## 2019-12-27 RX ORDER — HEPARIN SODIUM (PORCINE) LOCK FLUSH IV SOLN 100 UNIT/ML 100 UNIT/ML
500 SOLUTION INTRAVENOUS PRN
Status: CANCELLED | OUTPATIENT
Start: 2019-12-27

## 2019-12-27 RX ORDER — METHYLPREDNISOLONE SODIUM SUCCINATE 125 MG/2ML
125 INJECTION, POWDER, LYOPHILIZED, FOR SOLUTION INTRAMUSCULAR; INTRAVENOUS ONCE
Status: CANCELLED | OUTPATIENT
Start: 2019-12-27

## 2019-12-27 RX ORDER — HEPARIN SODIUM (PORCINE) LOCK FLUSH IV SOLN 100 UNIT/ML 100 UNIT/ML
500 SOLUTION INTRAVENOUS PRN
Status: CANCELLED | OUTPATIENT
Start: 2020-01-09

## 2019-12-27 RX ORDER — PACLITAXEL 100 MG/20ML
195 INJECTION, POWDER, LYOPHILIZED, FOR SUSPENSION INTRAVENOUS ONCE
Status: CANCELLED | OUTPATIENT
Start: 2019-12-27

## 2019-12-27 RX ORDER — SODIUM CHLORIDE 9 MG/ML
INJECTION, SOLUTION INTRAVENOUS CONTINUOUS
Status: CANCELLED | OUTPATIENT
Start: 2019-12-27

## 2019-12-27 RX ORDER — SODIUM CHLORIDE 9 MG/ML
INJECTION, SOLUTION INTRAVENOUS CONTINUOUS
Status: CANCELLED | OUTPATIENT
Start: 2020-01-09

## 2019-12-27 RX ORDER — DIPHENHYDRAMINE HYDROCHLORIDE 50 MG/ML
50 INJECTION INTRAMUSCULAR; INTRAVENOUS ONCE
Status: CANCELLED | OUTPATIENT
Start: 2019-12-27

## 2019-12-27 RX ORDER — SODIUM CHLORIDE 9 MG/ML
20 INJECTION, SOLUTION INTRAVENOUS ONCE
Status: CANCELLED | OUTPATIENT
Start: 2020-01-09

## 2019-12-27 RX ORDER — EPINEPHRINE 1 MG/ML
0.3 INJECTION, SOLUTION, CONCENTRATE INTRAVENOUS PRN
Status: CANCELLED | OUTPATIENT
Start: 2019-12-27

## 2019-12-27 RX ORDER — DIPHENHYDRAMINE HYDROCHLORIDE 50 MG/ML
50 INJECTION INTRAMUSCULAR; INTRAVENOUS ONCE
Status: CANCELLED | OUTPATIENT
Start: 2020-01-09

## 2019-12-27 RX ORDER — PALONOSETRON 0.05 MG/ML
0.25 INJECTION, SOLUTION INTRAVENOUS ONCE
Status: CANCELLED
Start: 2019-12-27

## 2019-12-27 RX ORDER — PALONOSETRON 0.05 MG/ML
0.25 INJECTION, SOLUTION INTRAVENOUS ONCE
Status: COMPLETED | OUTPATIENT
Start: 2019-12-27 | End: 2019-12-27

## 2019-12-27 RX ORDER — EPINEPHRINE 1 MG/ML
0.3 INJECTION, SOLUTION, CONCENTRATE INTRAVENOUS PRN
Status: CANCELLED | OUTPATIENT
Start: 2020-01-09

## 2019-12-27 RX ORDER — HEPARIN SODIUM (PORCINE) LOCK FLUSH IV SOLN 100 UNIT/ML 100 UNIT/ML
300 SOLUTION INTRAVENOUS PRN
Status: DISCONTINUED | OUTPATIENT
Start: 2019-12-27 | End: 2019-12-28 | Stop reason: HOSPADM

## 2019-12-27 RX ORDER — SODIUM CHLORIDE 0.9 % (FLUSH) 0.9 %
5 SYRINGE (ML) INJECTION PRN
Status: CANCELLED | OUTPATIENT
Start: 2020-01-09

## 2019-12-27 RX ORDER — METHYLPREDNISOLONE SODIUM SUCCINATE 125 MG/2ML
125 INJECTION, POWDER, LYOPHILIZED, FOR SOLUTION INTRAMUSCULAR; INTRAVENOUS ONCE
Status: CANCELLED | OUTPATIENT
Start: 2020-01-09

## 2019-12-27 RX ORDER — SODIUM CHLORIDE 0.9 % (FLUSH) 0.9 %
10 SYRINGE (ML) INJECTION PRN
Status: DISCONTINUED | OUTPATIENT
Start: 2019-12-27 | End: 2019-12-28 | Stop reason: HOSPADM

## 2019-12-27 RX ORDER — PACLITAXEL 100 MG/20ML
195 INJECTION, POWDER, LYOPHILIZED, FOR SUSPENSION INTRAVENOUS ONCE
Status: CANCELLED | OUTPATIENT
Start: 2020-01-09

## 2019-12-27 RX ORDER — PALONOSETRON 0.05 MG/ML
0.25 INJECTION, SOLUTION INTRAVENOUS ONCE
Status: CANCELLED
Start: 2020-01-09

## 2019-12-27 RX ORDER — PACLITAXEL 100 MG/20ML
195 INJECTION, POWDER, LYOPHILIZED, FOR SUSPENSION INTRAVENOUS ONCE
Status: COMPLETED | OUTPATIENT
Start: 2019-12-27 | End: 2019-12-27

## 2019-12-27 RX ORDER — DEXAMETHASONE SODIUM PHOSPHATE 10 MG/ML
10 INJECTION, SOLUTION INTRAMUSCULAR; INTRAVENOUS ONCE
Status: COMPLETED | OUTPATIENT
Start: 2019-12-27 | End: 2019-12-27

## 2019-12-27 RX ORDER — SODIUM CHLORIDE 0.9 % (FLUSH) 0.9 %
10 SYRINGE (ML) INJECTION PRN
Status: CANCELLED | OUTPATIENT
Start: 2020-01-09

## 2019-12-27 RX ORDER — SODIUM CHLORIDE 0.9 % (FLUSH) 0.9 %
5 SYRINGE (ML) INJECTION PRN
Status: CANCELLED | OUTPATIENT
Start: 2019-12-27

## 2019-12-27 RX ORDER — SODIUM CHLORIDE 0.9 % (FLUSH) 0.9 %
10 SYRINGE (ML) INJECTION PRN
Status: CANCELLED | OUTPATIENT
Start: 2019-12-27

## 2019-12-27 RX ORDER — SODIUM CHLORIDE 9 MG/ML
20 INJECTION, SOLUTION INTRAVENOUS ONCE
Status: CANCELLED | OUTPATIENT
Start: 2019-12-27

## 2019-12-27 RX ADMIN — DEXAMETHASONE SODIUM PHOSPHATE 10 MG: 10 INJECTION, SOLUTION INTRAMUSCULAR; INTRAVENOUS at 08:47

## 2019-12-27 RX ADMIN — PACLITAXEL 195 MG: 100 INJECTION, POWDER, LYOPHILIZED, FOR SUSPENSION INTRAVENOUS at 09:18

## 2019-12-27 RX ADMIN — GEMCITABINE HYDROCHLORIDE 940 MG: 1 INJECTION, SOLUTION INTRAVENOUS at 09:50

## 2019-12-27 RX ADMIN — PALONOSETRON HYDROCHLORIDE 0.25 MG: 0.25 INJECTION, SOLUTION INTRAVENOUS at 08:47

## 2019-12-27 RX ADMIN — Medication 10 ML: at 10:51

## 2019-12-27 RX ADMIN — HEPARIN 300 UNITS: 100 SYRINGE at 10:50

## 2019-12-27 ASSESSMENT — PAIN SCALES - GENERAL: PAINLEVEL_OUTOF10: 0

## 2020-01-08 ENCOUNTER — HOSPITAL ENCOUNTER (OUTPATIENT)
Dept: INFUSION THERAPY | Age: 60
Discharge: HOME | End: 2020-01-08
Payer: COMMERCIAL

## 2020-01-08 VITALS — BODY MASS INDEX: 17.81 KG/M2 | WEIGHT: 113.7 LBS

## 2020-01-08 DIAGNOSIS — C25.9 MALIGNANT NEOPLASM OF PANCREAS, UNSPECIFIED LOCATION OF MALIGNANCY (HCC): Primary | ICD-10-CM

## 2020-01-08 PROCEDURE — 96417 CHEMO IV INFUS EACH ADDL SEQ: CPT

## 2020-01-08 PROCEDURE — 96413 CHEMO IV INFUSION 1 HR: CPT

## 2020-01-08 PROCEDURE — 80053 COMPREHEN METABOLIC PANEL: CPT

## 2020-01-08 PROCEDURE — 85025 COMPLETE CBC W/AUTO DIFF WBC: CPT

## 2020-01-08 PROCEDURE — 2580000003 HC RX 258: Performed by: INTERNAL MEDICINE

## 2020-01-08 PROCEDURE — 6360000002 HC RX W HCPCS: Performed by: INTERNAL MEDICINE

## 2020-01-08 PROCEDURE — 36415 COLL VENOUS BLD VENIPUNCTURE: CPT

## 2020-01-08 PROCEDURE — 96375 TX/PRO/DX INJ NEW DRUG ADDON: CPT

## 2020-01-08 RX ORDER — DEXAMETHASONE SODIUM PHOSPHATE 10 MG/ML
10 INJECTION, SOLUTION INTRAMUSCULAR; INTRAVENOUS ONCE
Status: COMPLETED | OUTPATIENT
Start: 2020-01-08 | End: 2020-01-08

## 2020-01-08 RX ORDER — HEPARIN SODIUM (PORCINE) LOCK FLUSH IV SOLN 100 UNIT/ML 100 UNIT/ML
500 SOLUTION INTRAVENOUS PRN
Status: DISCONTINUED | OUTPATIENT
Start: 2020-01-08 | End: 2020-01-09 | Stop reason: HOSPADM

## 2020-01-08 RX ORDER — SODIUM CHLORIDE 0.9 % (FLUSH) 0.9 %
10 SYRINGE (ML) INJECTION PRN
Status: DISCONTINUED | OUTPATIENT
Start: 2020-01-08 | End: 2020-01-09 | Stop reason: HOSPADM

## 2020-01-08 RX ORDER — PALONOSETRON 0.05 MG/ML
0.25 INJECTION, SOLUTION INTRAVENOUS ONCE
Status: COMPLETED | OUTPATIENT
Start: 2020-01-08 | End: 2020-01-08

## 2020-01-08 RX ORDER — SODIUM CHLORIDE 0.9 % (FLUSH) 0.9 %
5 SYRINGE (ML) INJECTION PRN
Status: DISCONTINUED | OUTPATIENT
Start: 2020-01-08 | End: 2020-01-09 | Stop reason: HOSPADM

## 2020-01-08 RX ORDER — PACLITAXEL 100 MG/20ML
195 INJECTION, POWDER, LYOPHILIZED, FOR SUSPENSION INTRAVENOUS ONCE
Status: COMPLETED | OUTPATIENT
Start: 2020-01-08 | End: 2020-01-08

## 2020-01-08 RX ADMIN — PALONOSETRON HYDROCHLORIDE 0.25 MG: 0.25 INJECTION, SOLUTION INTRAVENOUS at 09:27

## 2020-01-08 RX ADMIN — DEXAMETHASONE SODIUM PHOSPHATE 10 MG: 10 INJECTION, SOLUTION INTRAMUSCULAR; INTRAVENOUS at 09:28

## 2020-01-08 RX ADMIN — GEMCITABINE HYDROCHLORIDE 940 MG: 1 INJECTION, SOLUTION INTRAVENOUS at 10:20

## 2020-01-08 RX ADMIN — SODIUM CHLORIDE, PRESERVATIVE FREE 10 ML: 5 INJECTION INTRAVENOUS at 11:31

## 2020-01-08 RX ADMIN — HEPARIN 500 UNITS: 100 SYRINGE at 11:31

## 2020-01-08 RX ADMIN — PACLITAXEL 195 MG: 100 INJECTION, POWDER, LYOPHILIZED, FOR SUSPENSION INTRAVENOUS at 10:54

## 2020-01-08 NOTE — PROGRESS NOTES
Per Dr Romy Galaviz, proceed with tx as planned. Patient reports new Rx Prednisone 5mg tabs (2 tabs BID x 5 days) for allergy flareup per PCP. Today is day 2. Will get premed as ordered per DR Flores.

## 2020-01-20 ENCOUNTER — HOSPITAL ENCOUNTER (OUTPATIENT)
Dept: INFUSION THERAPY | Age: 60
Setting detail: INFUSION SERIES
Discharge: HOME | End: 2020-01-20
Payer: COMMERCIAL

## 2020-01-20 ENCOUNTER — HOSPITAL ENCOUNTER (OUTPATIENT)
Dept: INFUSION THERAPY | Age: 60
Discharge: HOME | End: 2020-01-20
Payer: COMMERCIAL

## 2020-01-20 VITALS
RESPIRATION RATE: 18 BRPM | TEMPERATURE: 98.4 F | HEART RATE: 66 BPM | SYSTOLIC BLOOD PRESSURE: 119 MMHG | DIASTOLIC BLOOD PRESSURE: 79 MMHG

## 2020-01-20 DIAGNOSIS — D64.81 ANEMIA ASSOCIATED WITH CHEMOTHERAPY: Primary | ICD-10-CM

## 2020-01-20 DIAGNOSIS — C25.9 MALIGNANT NEOPLASM OF PANCREAS, UNSPECIFIED LOCATION OF MALIGNANCY (HCC): ICD-10-CM

## 2020-01-20 DIAGNOSIS — T45.1X5A ANEMIA ASSOCIATED WITH CHEMOTHERAPY: Primary | ICD-10-CM

## 2020-01-20 LAB
ABO/RH: NORMAL
ANTIBODY SCREEN: NORMAL
BLOOD BANK DISPENSE STATUS: NORMAL
BLOOD BANK PRODUCT CODE: NORMAL
BPU ID: NORMAL
DESCRIPTION BLOOD BANK: NORMAL

## 2020-01-20 PROCEDURE — 86850 RBC ANTIBODY SCREEN: CPT

## 2020-01-20 PROCEDURE — 85025 COMPLETE CBC W/AUTO DIFF WBC: CPT

## 2020-01-20 PROCEDURE — P9016 RBC LEUKOCYTES REDUCED: HCPCS

## 2020-01-20 PROCEDURE — 86900 BLOOD TYPING SEROLOGIC ABO: CPT

## 2020-01-20 PROCEDURE — 6360000002 HC RX W HCPCS: Performed by: INTERNAL MEDICINE

## 2020-01-20 PROCEDURE — 86901 BLOOD TYPING SEROLOGIC RH(D): CPT

## 2020-01-20 PROCEDURE — 86923 COMPATIBILITY TEST ELECTRIC: CPT

## 2020-01-20 PROCEDURE — 2580000003 HC RX 258: Performed by: INTERNAL MEDICINE

## 2020-01-20 PROCEDURE — 36430 TRANSFUSION BLD/BLD COMPNT: CPT

## 2020-01-20 RX ORDER — SODIUM CHLORIDE 0.9 % (FLUSH) 0.9 %
20 SYRINGE (ML) INJECTION PRN
Status: DISCONTINUED | OUTPATIENT
Start: 2020-01-20 | End: 2020-01-22 | Stop reason: HOSPADM

## 2020-01-20 RX ORDER — SODIUM CHLORIDE 0.9 % (FLUSH) 0.9 %
20 SYRINGE (ML) INJECTION PRN
Status: CANCELLED | OUTPATIENT
Start: 2020-01-20

## 2020-01-20 RX ORDER — SODIUM CHLORIDE 9 MG/ML
INJECTION, SOLUTION INTRAVENOUS CONTINUOUS
Status: CANCELLED | OUTPATIENT
Start: 2020-01-20

## 2020-01-20 RX ORDER — FUROSEMIDE 10 MG/ML
20 INJECTION INTRAMUSCULAR; INTRAVENOUS ONCE
Status: CANCELLED | OUTPATIENT
Start: 2020-01-20

## 2020-01-20 RX ORDER — 0.9 % SODIUM CHLORIDE 0.9 %
250 INTRAVENOUS SOLUTION INTRAVENOUS ONCE
Status: COMPLETED | OUTPATIENT
Start: 2020-01-20 | End: 2020-01-20

## 2020-01-20 RX ORDER — PANTOPRAZOLE SODIUM 40 MG/1
40 TABLET, DELAYED RELEASE ORAL
Qty: 90 TABLET | Refills: 1 | Status: ON HOLD | OUTPATIENT
Start: 2020-01-20 | End: 2020-02-27 | Stop reason: SDUPTHER

## 2020-01-20 RX ORDER — DIPHENHYDRAMINE HYDROCHLORIDE 50 MG/ML
50 INJECTION INTRAMUSCULAR; INTRAVENOUS ONCE
Status: CANCELLED | OUTPATIENT
Start: 2020-01-20

## 2020-01-20 RX ORDER — 0.9 % SODIUM CHLORIDE 0.9 %
20 INTRAVENOUS SOLUTION INTRAVENOUS ONCE
Status: DISCONTINUED | OUTPATIENT
Start: 2020-01-20 | End: 2020-01-20

## 2020-01-20 RX ORDER — METHYLPREDNISOLONE SODIUM SUCCINATE 125 MG/2ML
125 INJECTION, POWDER, LYOPHILIZED, FOR SOLUTION INTRAMUSCULAR; INTRAVENOUS ONCE
Status: CANCELLED | OUTPATIENT
Start: 2020-01-20

## 2020-01-20 RX ORDER — FUROSEMIDE 10 MG/ML
20 INJECTION INTRAMUSCULAR; INTRAVENOUS ONCE
Status: DISCONTINUED | OUTPATIENT
Start: 2020-01-20 | End: 2020-01-22 | Stop reason: HOSPADM

## 2020-01-20 RX ORDER — 0.9 % SODIUM CHLORIDE 0.9 %
250 INTRAVENOUS SOLUTION INTRAVENOUS ONCE
Status: CANCELLED | OUTPATIENT
Start: 2020-01-20

## 2020-01-20 RX ORDER — SUCRALFATE 1 G/1
1 TABLET ORAL 4 TIMES DAILY
Qty: 360 TABLET | Refills: 0 | Status: SHIPPED | OUTPATIENT
Start: 2020-01-20 | End: 2020-04-13 | Stop reason: SDUPTHER

## 2020-01-20 RX ORDER — EPINEPHRINE 1 MG/ML
0.3 INJECTION, SOLUTION, CONCENTRATE INTRAVENOUS PRN
Status: CANCELLED | OUTPATIENT
Start: 2020-01-20

## 2020-01-20 RX ORDER — HEPARIN SODIUM (PORCINE) LOCK FLUSH IV SOLN 100 UNIT/ML 100 UNIT/ML
300 SOLUTION INTRAVENOUS PRN
Status: DISCONTINUED | OUTPATIENT
Start: 2020-01-20 | End: 2020-01-22 | Stop reason: HOSPADM

## 2020-01-20 RX ADMIN — Medication 20 ML: at 13:05

## 2020-01-20 RX ADMIN — SODIUM CHLORIDE 250 ML: 9 INJECTION, SOLUTION INTRAVENOUS at 11:20

## 2020-01-20 RX ADMIN — HEPARIN 300 UNITS: 100 SYRINGE at 13:05

## 2020-01-21 NOTE — PROGRESS NOTES
Gentry Head   1960 1/22/2020     Chief Complaint   Patient presents with    Pancreatic Cancer        INTERVAL HISTORY/HISTORY OF PRESENT ILLNESS:  Diagnosis  Pancreatic adenocarcinoma, January 2018   [id number]  7/6/2019-extended genetic panel-negative for a deleterious mutation  Treatment summary  March 2018-Surgical consultation at [facility] operable   4/3/2018 through 6/4/2018 Neoadjuvant chemotherapy FOLFORINOX ×5 Biweekly cycles   4/11/2018-Biliary stent   August 2018- XRT 30 Gy/Xeloda   08/30/3018- Whipple procedure @ [facility]   Recommended another 5 biweekly cycles of modified FOLFORINOX completed 12/26/2018 7/9/2019-Gemzar/Abraxane 125 mg/m2 q 14 days  Tumor marker  3/12/9603-QT-35-9->430->370.   4/30/2018 - CA 19- 9 of 157   5/25/20183194-KO-48-9->32 after 4 cycles. 08/02/2018- -> 8   10/01/2018- CA 19-9 -5   10/29/2018-CA 19-9- 7   12/3/4477-UI-01-9-7   04/11/2019-CA-19-9- 12   05/21/2019- CA 19-9- 41   7/2/2019-CA 19 9 = 114  7/9/2019- CA-19-9 = 225  8/6/2019- CA-19-9 = 171  9/3/8880-FF-72-9 = 198  9/13/20192919-NO-15-9 = 98 (at [facility])  10/29/1976-BW-77-9 =317  11/21/2019-CA-19-9 = 183  1/23/20201323-FV-45-9 = 520  Interval history  Ms Ifeoma Queen is s/p adjuvant FOLFORINOX 5 biweekly cycles followed by XRT/Xeloda and whipple procedure at [facility] for what was thought to be a localized pancreatic cancer. Unfortunately, she was found to have intra-abdominal disease recurrence and was recommended second line treatment with Gemzar/Abraxane. 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 She has been tolerating Gemzar/Abraxane with mild/moderate hematologic toxicity, fatigue and neuropathy. She has also had recurrent GI bleed secondary to ulcer at the anastomotic site of her prior Whipple procedure. She has required frequent transfusional support to correct her anemia. Overall, she feels well and quite optimistic about her chemotherapy treatment.   She has pelvis at [facility] showed a soft tissue thickening in the pancreatic bed with persistent narrowing of the portal SMV confluence. Superimposed tumor remains a possibility. The new low-density lesion in the periphery of the liver seen on the prior exam is no longer appreciated and likely represents treatment effect. Nodular enhancement may represent perfusion change in the region on image 187.  2018- she was recommended to continue current treatment with Gemzar/Abraxane  2019- she was hospitalized with GI bleed. 2019-upper endoscopy showed ulceration at the efferent loop of the Whipple's procedure  10/29/2003-IU-02-9 =317  2019-CA-19-9 = 183  2019- CT chest abdomen pelvis showed no evidence of gross disease progression. Improvement of the caliber of what may be a middle colic vein that drains back to the SMV. There is still persistent narrowing of the of the upper SMV at the juncture with the portal vein.  No definite evidence of liver metastases at this time. No definite evidence of pulmonary metastases.  However, question of slight increase in prominence of subtle soft tissue thickening within the right paracolic gutter could be indicative of metastatic disease to peritoneum.   2019- colonoscopy by GI was unremarkable. This was performed for complaints of maroon-colored stools.   20205203-NC-13-9 = 520    ECO    Treatment related toxicity: Fatigue, residual neuropathy, hematologic toxicity      PAST MEDICAL HISTORY:   Past Medical History:   Diagnosis Date    Acute pancreatitis     Adult BMI <19 kg/sq m     Anemia     Cat esophagus     Biliary obstruction     GERD (gastroesophageal reflux disease)     with Barretts    Hashimoto's thyroiditis     denies takes no med for    Heart murmur     Hypertension     pt denies nor longer takes med for    Low blood sugar     h/o when overweight in the past    MVP (mitral valve prolapse)     Myalgia     Pancreatic adenocarcinoma ([facility] [address] 2018    Dr Fidel Lim    Pancreatic cancer [facility]) 3/30/2018    Right flank pain     RUQ pain           PAST SURGICAL HISTORY:  Past Surgical History:   Procedure Laterality Date    CARDIAC SURGERY      heart cath     SECTION      x 3    CHOLECYSTECTOMY  1997    COLONOSCOPY  years ago    [facility]-Dr Agnes Lynos per patient    COLONOSCOPY  2014    Dr Sara Rey- Connie Rivas recall    COLONOSCOPY  03/10/2016    Dr Mcfarland-10 yr recall    COLONOSCOPY N/A 2019    Dr Teressa Padilla, 5 yr recall    ELBOW SURGERY Right     ENDOMETRIAL ABLATION      HAND SURGERY Right     HERNIA REPAIR      hiatal    HERNIA REPAIR      umbilical    HERNIA REPAIR      PANCREAS SURGERY  2018    Whipple procedure-Dr Suzanne Lemus [place] EGD [facility] NEEDLE ASPIR/BIOP ALTERED ANATOMY N/A 2018    Dr Ashley Meier w/fna-Dilation of main pancreatic duct with diffuse change in the pancreatitis noted, area of concern in the neck of the pancreas-strongly suspicious for adenocarcinoma     [place] EGD INTRMURAL NEEDLE ASPIR/BIOP ALTERED ANATOMY N/A 3/6/2018    Dr KVNG Duncan-Pancreatic cancer-Ductal adenocarcinoma-staged [id number] by EUS, pancreatic pseudocyst in the tail the pancreas    [place] ERCP DX COLLECTION SPECIMEN BRUSHING/WASHING N/A 2018    ERCP-Dr KVNG Duncan-placement of a self-expanding fully covered 10 mm biliary stent    UPPER GASTROINTESTINAL ENDOSCOPY  years ago    [facility]-Dr Jc Warner    UPPER GASTROINTESTINAL ENDOSCOPY      [facility]    UPPER GASTROINTESTINAL ENDOSCOPY N/A 2019    Dr Roxi Zhong post Whipple's procedure with efferent loop ulceration    UPPER GASTROINTESTINAL ENDOSCOPY  2019    Dr Andrea Duncan-Patent G-J Anastomosis, apparent healing ulceration        SOCIAL HISTORY:  Social History     Socioeconomic History    Marital status: Single     Spouse name: Not on file    Number of children: Not on file    Years of education: Not on file    Highest education plastic wrap one hour prior to use. 1 Tube 1    sucralfate (CARAFATE) 1 GM tablet Take 1 tablet by mouth 4 times daily 360 tablet 0    pantoprazole (PROTONIX) 40 MG tablet Take 1 tablet by mouth every morning (before breakfast) 90 tablet 1    lipase-protease-amylase (ZENPEP) 5000-83653 units CPEP delayed release capsule Take 1 capsule by mouth 4 times daily (with meals and nightly) 360 capsule 0    vitamin E 400 UNIT capsule Take 400 Units by mouth daily      NONFORMULARY daily Tumeric otc      ondansetron (ZOFRAN) 8 MG tablet Take 1 tablet by mouth every 8 hours as needed for Nausea or Vomiting 30 tablet 3    polyethylene glycol (GLYCOLAX) powder Take 17 g by mouth daily as needed      Multiple Vitamins-Minerals (THERAPEUTIC MULTIVITAMIN-MINERALS) tablet Take 1 tablet by mouth daily      lipase-protease-amylase (ZENPEP) 5000 units delayed release capsule Take 1 capsule by mouth 4 times daily (with meals and nightly) Take with meals and snacks for a total of 8 daily 720 capsule 3    promethazine (PHENERGAN) 25 MG tablet Take 25 mg by mouth every 6 hours as needed for Nausea       No current facility-administered medications for this visit. REVIEW OF SYSTEMS:    Constitutional: no fever, no night sweats, fatigue;   HEENT: no blurring of vision, no double vision, no hearing difficulty, no tinnitus,no ulceration, no dental caries, no dysphagia  Lungs: no cough, no shortness of breath, no wheeze;   CVS: no palpitation, no chest pain, no shortness of breath;  GI: no abdominal pain, no nausea , no vomiting, no constipation; GI bleed  VIKRAM: no dysuria, frequency and urgency, no hematuria, no kidney stones;   Musculoskeletal: no joint pain, swelling , stiffness;   Endocrine: no polyuria, polydypsia, no cold or heat intolerence; Hematology/lymphatic: no easy brusing or bleeding, no hx of clotting disorder; no peripheral adenopathy.   Dermatology: no skin rash, no eczema, no pruritis;   Psychiatry: no list, appropriate labs and imaging studies. I reviewed relevant medical records and others physicians notes. I discussed the plans of care with the patient. I answered all the questions to the patients satisfaction    Follow Up:     Return in about 3 weeks (around 2/12/2020) for Treatment Visit and see Dr. Sg Ackerman in [address] [place] [address]. Data Pankaj Bergman, am scribing for Rosanna Jones MD. Electronically signed by Toña Bergman on 2/2/2020 at 8:16 AM.   I, Dr Cherise Siddiqi, personally performed the services described in this documentation as scribed by Toña Bergman MA in my presence and is both accurate and complete. Over 50% of the total visit time of 25 minutes in face to face encounter with the patient, out of which more than 50% of the time was spent in counseling patient or family and coordination of care. Counseling included but was not limited to time spent reviewing labs, imaging studies/ treatment plan and answering questions.

## 2020-01-22 ENCOUNTER — HOSPITAL ENCOUNTER (OUTPATIENT)
Dept: INFUSION THERAPY | Age: 60
Discharge: HOME | End: 2020-01-22
Payer: COMMERCIAL

## 2020-01-22 ENCOUNTER — OFFICE VISIT (OUTPATIENT)
Dept: HEMATOLOGY | Age: 60
End: 2020-01-22
Payer: COMMERCIAL

## 2020-01-22 VITALS
DIASTOLIC BLOOD PRESSURE: 73 MMHG | TEMPERATURE: 97.7 F | RESPIRATION RATE: 18 BRPM | HEIGHT: 67 IN | BODY MASS INDEX: 18.52 KG/M2 | HEART RATE: 59 BPM | WEIGHT: 118 LBS | SYSTOLIC BLOOD PRESSURE: 123 MMHG

## 2020-01-22 DIAGNOSIS — C25.9 MALIGNANT NEOPLASM OF PANCREAS, UNSPECIFIED LOCATION OF MALIGNANCY (HCC): Primary | ICD-10-CM

## 2020-01-22 PROCEDURE — 96375 TX/PRO/DX INJ NEW DRUG ADDON: CPT

## 2020-01-22 PROCEDURE — 2580000003 HC RX 258: Performed by: INTERNAL MEDICINE

## 2020-01-22 PROCEDURE — 96417 CHEMO IV INFUS EACH ADDL SEQ: CPT

## 2020-01-22 PROCEDURE — 99214 OFFICE O/P EST MOD 30 MIN: CPT | Performed by: INTERNAL MEDICINE

## 2020-01-22 PROCEDURE — 85025 COMPLETE CBC W/AUTO DIFF WBC: CPT

## 2020-01-22 PROCEDURE — 86301 IMMUNOASSAY TUMOR CA 19-9: CPT

## 2020-01-22 PROCEDURE — 6360000002 HC RX W HCPCS: Performed by: INTERNAL MEDICINE

## 2020-01-22 PROCEDURE — 80053 COMPREHEN METABOLIC PANEL: CPT

## 2020-01-22 PROCEDURE — 36415 COLL VENOUS BLD VENIPUNCTURE: CPT

## 2020-01-22 PROCEDURE — 96413 CHEMO IV INFUSION 1 HR: CPT

## 2020-01-22 RX ORDER — SODIUM CHLORIDE 0.9 % (FLUSH) 0.9 %
10 SYRINGE (ML) INJECTION PRN
Status: CANCELLED | OUTPATIENT
Start: 2020-02-04

## 2020-01-22 RX ORDER — DIPHENHYDRAMINE HYDROCHLORIDE 50 MG/ML
50 INJECTION INTRAMUSCULAR; INTRAVENOUS ONCE
Status: CANCELLED | OUTPATIENT
Start: 2020-02-04

## 2020-01-22 RX ORDER — PALONOSETRON 0.05 MG/ML
0.25 INJECTION, SOLUTION INTRAVENOUS ONCE
Status: COMPLETED | OUTPATIENT
Start: 2020-01-22 | End: 2020-01-22

## 2020-01-22 RX ORDER — SODIUM CHLORIDE 9 MG/ML
INJECTION, SOLUTION INTRAVENOUS CONTINUOUS
Status: CANCELLED | OUTPATIENT
Start: 2020-01-22

## 2020-01-22 RX ORDER — DIPHENHYDRAMINE HYDROCHLORIDE 50 MG/ML
50 INJECTION INTRAMUSCULAR; INTRAVENOUS ONCE
Status: CANCELLED | OUTPATIENT
Start: 2020-01-22

## 2020-01-22 RX ORDER — HEPARIN SODIUM (PORCINE) LOCK FLUSH IV SOLN 100 UNIT/ML 100 UNIT/ML
300 SOLUTION INTRAVENOUS PRN
Status: DISCONTINUED | OUTPATIENT
Start: 2020-01-22 | End: 2020-01-23 | Stop reason: HOSPADM

## 2020-01-22 RX ORDER — PACLITAXEL 100 MG/20ML
195 INJECTION, POWDER, LYOPHILIZED, FOR SUSPENSION INTRAVENOUS ONCE
Status: COMPLETED | OUTPATIENT
Start: 2020-01-22 | End: 2020-01-22

## 2020-01-22 RX ORDER — HEPARIN SODIUM (PORCINE) LOCK FLUSH IV SOLN 100 UNIT/ML 100 UNIT/ML
300 SOLUTION INTRAVENOUS PRN
Status: CANCELLED | OUTPATIENT
Start: 2020-01-22

## 2020-01-22 RX ORDER — PALONOSETRON 0.05 MG/ML
0.25 INJECTION, SOLUTION INTRAVENOUS ONCE
Status: CANCELLED
Start: 2020-02-04

## 2020-01-22 RX ORDER — SODIUM CHLORIDE 0.9 % (FLUSH) 0.9 %
5 SYRINGE (ML) INJECTION PRN
Status: CANCELLED | OUTPATIENT
Start: 2020-01-22

## 2020-01-22 RX ORDER — SODIUM CHLORIDE 0.9 % (FLUSH) 0.9 %
5 SYRINGE (ML) INJECTION PRN
Status: CANCELLED | OUTPATIENT
Start: 2020-02-04

## 2020-01-22 RX ORDER — EPINEPHRINE 1 MG/ML
0.3 INJECTION, SOLUTION, CONCENTRATE INTRAVENOUS PRN
Status: CANCELLED | OUTPATIENT
Start: 2020-01-22

## 2020-01-22 RX ORDER — METHYLPREDNISOLONE SODIUM SUCCINATE 125 MG/2ML
125 INJECTION, POWDER, LYOPHILIZED, FOR SOLUTION INTRAMUSCULAR; INTRAVENOUS ONCE
Status: CANCELLED | OUTPATIENT
Start: 2020-02-04

## 2020-01-22 RX ORDER — PALONOSETRON 0.05 MG/ML
0.25 INJECTION, SOLUTION INTRAVENOUS ONCE
Status: CANCELLED
Start: 2020-01-22

## 2020-01-22 RX ORDER — HEPARIN SODIUM (PORCINE) LOCK FLUSH IV SOLN 100 UNIT/ML 100 UNIT/ML
300 SOLUTION INTRAVENOUS PRN
Status: CANCELLED | OUTPATIENT
Start: 2020-02-04

## 2020-01-22 RX ORDER — SODIUM CHLORIDE 9 MG/ML
20 INJECTION, SOLUTION INTRAVENOUS ONCE
Status: CANCELLED | OUTPATIENT
Start: 2020-02-04

## 2020-01-22 RX ORDER — DIPHENHYDRAMINE HYDROCHLORIDE 50 MG/ML
50 INJECTION INTRAMUSCULAR; INTRAVENOUS ONCE
Status: CANCELLED | OUTPATIENT
Start: 2020-01-24

## 2020-01-22 RX ORDER — EPINEPHRINE 1 MG/ML
0.3 INJECTION, SOLUTION, CONCENTRATE INTRAVENOUS PRN
Status: CANCELLED | OUTPATIENT
Start: 2020-01-24

## 2020-01-22 RX ORDER — METHYLPREDNISOLONE SODIUM SUCCINATE 125 MG/2ML
125 INJECTION, POWDER, LYOPHILIZED, FOR SOLUTION INTRAMUSCULAR; INTRAVENOUS ONCE
Status: CANCELLED | OUTPATIENT
Start: 2020-01-22

## 2020-01-22 RX ORDER — PACLITAXEL 100 MG/20ML
195 INJECTION, POWDER, LYOPHILIZED, FOR SUSPENSION INTRAVENOUS ONCE
Status: CANCELLED | OUTPATIENT
Start: 2020-01-22

## 2020-01-22 RX ORDER — PACLITAXEL 100 MG/20ML
195 INJECTION, POWDER, LYOPHILIZED, FOR SUSPENSION INTRAVENOUS ONCE
Status: CANCELLED | OUTPATIENT
Start: 2020-02-04

## 2020-01-22 RX ORDER — 0.9 % SODIUM CHLORIDE 0.9 %
250 INTRAVENOUS SOLUTION INTRAVENOUS ONCE
Status: CANCELLED | OUTPATIENT
Start: 2020-01-24

## 2020-01-22 RX ORDER — LIDOCAINE AND PRILOCAINE 25; 25 MG/G; MG/G
CREAM TOPICAL
Qty: 1 TUBE | Refills: 1 | Status: SHIPPED | OUTPATIENT
Start: 2020-01-22 | End: 2021-03-03 | Stop reason: ALTCHOICE

## 2020-01-22 RX ORDER — SODIUM CHLORIDE 0.9 % (FLUSH) 0.9 %
10 SYRINGE (ML) INJECTION PRN
Status: DISCONTINUED | OUTPATIENT
Start: 2020-01-22 | End: 2020-01-23 | Stop reason: HOSPADM

## 2020-01-22 RX ORDER — SODIUM CHLORIDE 0.9 % (FLUSH) 0.9 %
20 SYRINGE (ML) INJECTION PRN
Status: CANCELLED | OUTPATIENT
Start: 2020-01-24

## 2020-01-22 RX ORDER — SODIUM CHLORIDE 9 MG/ML
INJECTION, SOLUTION INTRAVENOUS CONTINUOUS
Status: CANCELLED | OUTPATIENT
Start: 2020-01-24

## 2020-01-22 RX ORDER — FUROSEMIDE 10 MG/ML
20 INJECTION INTRAMUSCULAR; INTRAVENOUS ONCE
Status: CANCELLED | OUTPATIENT
Start: 2020-01-24

## 2020-01-22 RX ORDER — SODIUM CHLORIDE 9 MG/ML
INJECTION, SOLUTION INTRAVENOUS CONTINUOUS
Status: CANCELLED | OUTPATIENT
Start: 2020-02-04

## 2020-01-22 RX ORDER — METHYLPREDNISOLONE SODIUM SUCCINATE 125 MG/2ML
125 INJECTION, POWDER, LYOPHILIZED, FOR SOLUTION INTRAMUSCULAR; INTRAVENOUS ONCE
Status: CANCELLED | OUTPATIENT
Start: 2020-01-24

## 2020-01-22 RX ORDER — SODIUM CHLORIDE 9 MG/ML
20 INJECTION, SOLUTION INTRAVENOUS ONCE
Status: CANCELLED | OUTPATIENT
Start: 2020-01-22

## 2020-01-22 RX ORDER — EPINEPHRINE 1 MG/ML
0.3 INJECTION, SOLUTION, CONCENTRATE INTRAVENOUS PRN
Status: CANCELLED | OUTPATIENT
Start: 2020-02-04

## 2020-01-22 RX ORDER — DEXAMETHASONE SODIUM PHOSPHATE 10 MG/ML
10 INJECTION, SOLUTION INTRAMUSCULAR; INTRAVENOUS ONCE
Status: COMPLETED | OUTPATIENT
Start: 2020-01-22 | End: 2020-01-22

## 2020-01-22 RX ORDER — SODIUM CHLORIDE 0.9 % (FLUSH) 0.9 %
10 SYRINGE (ML) INJECTION PRN
Status: CANCELLED | OUTPATIENT
Start: 2020-01-22

## 2020-01-22 RX ADMIN — PACLITAXEL 195 MG: 100 INJECTION, POWDER, LYOPHILIZED, FOR SUSPENSION INTRAVENOUS at 09:50

## 2020-01-22 RX ADMIN — HEPARIN 300 UNITS: 100 SYRINGE at 11:12

## 2020-01-22 RX ADMIN — PALONOSETRON 0.25 MG: 0.05 INJECTION, SOLUTION INTRAVENOUS at 09:02

## 2020-01-22 RX ADMIN — Medication 10 ML: at 11:12

## 2020-01-22 RX ADMIN — GEMCITABINE HYDROCHLORIDE 940 MG: 1 INJECTION, SOLUTION INTRAVENOUS at 10:40

## 2020-01-22 RX ADMIN — DEXAMETHASONE SODIUM PHOSPHATE 10 MG: 10 INJECTION, SOLUTION INTRAMUSCULAR; INTRAVENOUS at 09:02

## 2020-01-24 ENCOUNTER — HOSPITAL ENCOUNTER (OUTPATIENT)
Dept: INFUSION THERAPY | Age: 60
Setting detail: INFUSION SERIES
Discharge: HOME | End: 2020-01-24
Payer: COMMERCIAL

## 2020-01-24 VITALS
SYSTOLIC BLOOD PRESSURE: 121 MMHG | TEMPERATURE: 99 F | DIASTOLIC BLOOD PRESSURE: 73 MMHG | HEART RATE: 57 BPM | RESPIRATION RATE: 16 BRPM | OXYGEN SATURATION: 100 %

## 2020-01-24 DIAGNOSIS — D64.81 ANEMIA ASSOCIATED WITH CHEMOTHERAPY: Primary | ICD-10-CM

## 2020-01-24 DIAGNOSIS — T45.1X5A ANEMIA ASSOCIATED WITH CHEMOTHERAPY: Primary | ICD-10-CM

## 2020-01-24 PROCEDURE — 2580000003 HC RX 258

## 2020-01-24 PROCEDURE — 86901 BLOOD TYPING SEROLOGIC RH(D): CPT

## 2020-01-24 PROCEDURE — 36430 TRANSFUSION BLD/BLD COMPNT: CPT

## 2020-01-24 PROCEDURE — P9016 RBC LEUKOCYTES REDUCED: HCPCS

## 2020-01-24 PROCEDURE — 6360000002 HC RX W HCPCS: Performed by: INTERNAL MEDICINE

## 2020-01-24 PROCEDURE — 86900 BLOOD TYPING SEROLOGIC ABO: CPT

## 2020-01-24 PROCEDURE — 86923 COMPATIBILITY TEST ELECTRIC: CPT

## 2020-01-24 PROCEDURE — 86850 RBC ANTIBODY SCREEN: CPT

## 2020-01-24 RX ORDER — EPINEPHRINE 1 MG/ML
0.3 INJECTION, SOLUTION, CONCENTRATE INTRAVENOUS PRN
Status: CANCELLED | OUTPATIENT
Start: 2020-01-24

## 2020-01-24 RX ORDER — SODIUM CHLORIDE 0.9 % (FLUSH) 0.9 %
20 SYRINGE (ML) INJECTION PRN
Status: DISCONTINUED | OUTPATIENT
Start: 2020-01-24 | End: 2020-01-26 | Stop reason: HOSPADM

## 2020-01-24 RX ORDER — DIPHENHYDRAMINE HYDROCHLORIDE 50 MG/ML
50 INJECTION INTRAMUSCULAR; INTRAVENOUS ONCE
Status: CANCELLED | OUTPATIENT
Start: 2020-01-24

## 2020-01-24 RX ORDER — HEPARIN SODIUM (PORCINE) LOCK FLUSH IV SOLN 100 UNIT/ML 100 UNIT/ML
SOLUTION INTRAVENOUS
Status: DISCONTINUED
Start: 2020-01-24 | End: 2020-01-24

## 2020-01-24 RX ORDER — METHYLPREDNISOLONE SODIUM SUCCINATE 125 MG/2ML
125 INJECTION, POWDER, LYOPHILIZED, FOR SOLUTION INTRAMUSCULAR; INTRAVENOUS ONCE
Status: CANCELLED | OUTPATIENT
Start: 2020-01-24

## 2020-01-24 RX ORDER — HEPARIN SODIUM (PORCINE) LOCK FLUSH IV SOLN 100 UNIT/ML 100 UNIT/ML
300 SOLUTION INTRAVENOUS PRN
Status: DISCONTINUED | OUTPATIENT
Start: 2020-01-24 | End: 2020-01-26 | Stop reason: HOSPADM

## 2020-01-24 RX ORDER — 0.9 % SODIUM CHLORIDE 0.9 %
250 INTRAVENOUS SOLUTION INTRAVENOUS ONCE
Status: CANCELLED | OUTPATIENT
Start: 2020-01-24

## 2020-01-24 RX ORDER — SODIUM CHLORIDE 9 MG/ML
INJECTION, SOLUTION INTRAVENOUS CONTINUOUS
Status: CANCELLED | OUTPATIENT
Start: 2020-01-24

## 2020-01-24 RX ORDER — HEPARIN SODIUM (PORCINE) LOCK FLUSH IV SOLN 100 UNIT/ML 100 UNIT/ML
100 SOLUTION INTRAVENOUS PRN
Status: DISCONTINUED | OUTPATIENT
Start: 2020-01-24 | End: 2020-01-26 | Stop reason: HOSPADM

## 2020-01-24 RX ORDER — 0.9 % SODIUM CHLORIDE 0.9 %
250 INTRAVENOUS SOLUTION INTRAVENOUS ONCE
Status: COMPLETED | OUTPATIENT
Start: 2020-01-24 | End: 2020-01-24

## 2020-01-24 RX ORDER — SODIUM CHLORIDE 0.9 % (FLUSH) 0.9 %
20 SYRINGE (ML) INJECTION PRN
Status: CANCELLED | OUTPATIENT
Start: 2020-01-24

## 2020-01-24 RX ORDER — FUROSEMIDE 10 MG/ML
20 INJECTION INTRAMUSCULAR; INTRAVENOUS ONCE
Status: DISCONTINUED | OUTPATIENT
Start: 2020-01-24 | End: 2020-01-26 | Stop reason: HOSPADM

## 2020-01-24 RX ORDER — FUROSEMIDE 10 MG/ML
20 INJECTION INTRAMUSCULAR; INTRAVENOUS ONCE
Status: CANCELLED | OUTPATIENT
Start: 2020-01-24

## 2020-01-24 RX ADMIN — HEPARIN 300 UNITS: 100 SYRINGE at 11:11

## 2020-01-24 RX ADMIN — SODIUM CHLORIDE 250 ML: 9 INJECTION, SOLUTION INTRAVENOUS at 08:53

## 2020-01-24 RX ADMIN — Medication 20 ML: at 11:11

## 2020-02-04 ENCOUNTER — HOSPITAL ENCOUNTER (OUTPATIENT)
Dept: INFUSION THERAPY | Age: 60
End: 2020-02-04
Payer: COMMERCIAL

## 2020-02-11 NOTE — PROGRESS NOTES
was consistent with pancreatic adenocarcinoma. 3/12/2018-she was first seen by me. No evidence of distant disease. Referral to Surgical oncology. CT chest to complete staging. CA-19-9 430.   3/16/2018-CT of the chest was unremarkable for intrathoracic metastatic disease   Surgery consultation at [place] March 2018- with Dr Beryle Masker. She was not a surgical candidate upfront. Recommended neoadjuvant chemotherapy. 4/3/2018-started on neoadjuvant chemotherapy with FOLFORINOX.   4/11/2018-she developed CBD obstruction had a CBD stent placed by Dr. De Leon Asa. 4/3/2018 through 6/4/2018 Neoadjuvant chemotherapy FOLFORINOX ×5 Biweekly cycles   August 2018- XRT 30 Gy/Xeloda   08/30/3018- Whipple procedure at [facility] by Dr. Shiloh Valera   Recommended another 7 biweekly cycles of modified FOLFORINOX.   9/5/2018-CT of the chest abdomen pelvis was unremarkable for metastatic disease. 1/14/2019-CT abdomen and pelvis at [facility] showed no evidence of metastasis in the chest abdomen pelvis. 5/21/2019-CT chest, abdomen, pelvis at [facility] showed no evidence of metastatic disease   5/21/20196909-SY-12-9 = 42   06/12/2019- CA-19-9 = 154. Discussed with the patient. We'll also discuss with [facility]. 7/1/2019-CT chest, abdomen, pelvis showed a soft tissue mass measuring 1.4 x 1.1 cm, not present on prior scan from January 2019, concerning for recurrence. Stable hypodense in the liver, too small to characterize. Subcentimeter ill-defined area of low attenuation liver may represent an early metastasis.    4/0/3999-SOWYGCUQLLH palliative chemotherapy with nab paclitaxel 125mg/m² IV over 30 minutes on day 1 and gemcitabine 600 mg/m² IV over 10mg/m2/min (fixed dose rate) on day 1  7/2/2019-CA 19 9 = 114  7/6/2019- extended genetic panel negative for a deleterious mutation (invitae)  7/9/2019- CA-19-9 = 225  8/6/2019- CA-19-9 = 171  9/3/0296-XD-56-9 = 198   9/13/20196345-WE-86-9 = 98 at [facility]  9/13/2019-CT chest abdomen ulceration    UPPER GASTROINTESTINAL ENDOSCOPY  2019    Dr Cirilo Duncan-Patent G-J Anastomosis, apparent healing ulceration        SOCIAL HISTORY:  Social History     Socioeconomic History    Marital status: Single     Spouse name: Not on file    Number of children: Not on file    Years of education: Not on file    Highest education level: Not on file   Occupational History    Not on file   Social Needs    Financial resource strain: Not on file    Food insecurity:     Worry: Not on file     Inability: Not on file    Transportation needs:     Medical: Not on file     Non-medical: Not on file   Tobacco Use    Smoking status: Former Smoker     Packs/day: 0.50     Years: 10.00     Pack years: 5.00     Types: Cigarettes     Last attempt to quit: 2019     Years since quittin.2    Smokeless tobacco: Never Used   Substance and Sexual Activity    Alcohol use: Not Currently    Drug use: No    Sexual activity: Not on file   Lifestyle    Physical activity:     Days per week: Not on file     Minutes per session: Not on file    Stress: Not on file   Relationships    Social connections:     Talks on phone: Not on file     Gets together: Not on file     Attends Holiness service: Not on file     Active member of club or organization: Not on file     Attends meetings of clubs or organizations: Not on file     Relationship status: Not on file    Intimate partner violence:     Fear of current or ex partner: Not on file     Emotionally abused: Not on file     Physically abused: Not on file     Forced sexual activity: Not on file   Other Topics Concern    Not on file   Social History Narrative    Not on file        FAMILY HISTORY:  Family History   Problem Relation Age of Onset    Heart Attack Mother 46        x 2-had bypass    Hypertension Mother     COPD Father     Liver Cancer Paternal Aunt     Lung Cancer Paternal Aunt     Breast Cancer Maternal Aunt         but  of brain cancer    Diabetes Maternal Uncle     Diabetes Maternal Grandmother     Colon Cancer Neg Hx     Colon Polyps Neg Hx     Esophageal Cancer Neg Hx     Rectal Cancer Neg Hx     Stomach Cancer Neg Hx         Current Outpatient Medications   Medication Sig Dispense Refill    cyanocobalamin 1000 MCG tablet Take 1,000 mcg by mouth daily      sucralfate (CARAFATE) 1 GM tablet Take 1 tablet by mouth 4 times daily 360 tablet 0    pantoprazole (PROTONIX) 40 MG tablet Take 1 tablet by mouth every morning (before breakfast) 90 tablet 1    lipase-protease-amylase (ZENPEP) 5000-47747 units CPEP delayed release capsule Take 1 capsule by mouth 4 times daily (with meals and nightly) 360 capsule 0    vitamin E 400 UNIT capsule Take 400 Units by mouth daily      NONFORMULARY daily Tumeric otc      ondansetron (ZOFRAN) 8 MG tablet Take 1 tablet by mouth every 8 hours as needed for Nausea or Vomiting 30 tablet 3    Multiple Vitamins-Minerals (THERAPEUTIC MULTIVITAMIN-MINERALS) tablet Take 1 tablet by mouth daily      promethazine (PHENERGAN) 25 MG tablet Take 25 mg by mouth every 6 hours as needed for Nausea      lidocaine-prilocaine (EMLA) 2.5-2.5 % cream Apply to port area and cover with plastic wrap one hour prior to use. (Patient not taking: Reported on 2/12/2020) 1 Tube 1    polyethylene glycol (GLYCOLAX) powder Take 17 g by mouth daily as needed      lipase-protease-amylase (ZENPEP) 5000 units delayed release capsule Take 1 capsule by mouth 4 times daily (with meals and nightly) Take with meals and snacks for a total of 8 daily 720 capsule 3     No current facility-administered medications for this visit.          REVIEW OF SYSTEMS:    Constitutional: no fever, no night sweats, fatigue;   HEENT: no blurring of vision, no double vision, no hearing difficulty, no tinnitus,no ulceration, no dental caries, no dysphagia  Lungs: no cough, no shortness of breath, no wheeze;   CVS: no palpitation, no chest pain, no shortness of breath;  GI:

## 2020-02-12 ENCOUNTER — HOSPITAL ENCOUNTER (OUTPATIENT)
Dept: INFUSION THERAPY | Age: 60
Discharge: HOME | End: 2020-02-12
Payer: COMMERCIAL

## 2020-02-12 ENCOUNTER — OFFICE VISIT (OUTPATIENT)
Dept: HEMATOLOGY | Age: 60
End: 2020-02-12
Payer: COMMERCIAL

## 2020-02-12 VITALS
HEIGHT: 67 IN | OXYGEN SATURATION: 98 % | SYSTOLIC BLOOD PRESSURE: 138 MMHG | WEIGHT: 114.9 LBS | DIASTOLIC BLOOD PRESSURE: 82 MMHG | HEART RATE: 68 BPM | BODY MASS INDEX: 18.03 KG/M2

## 2020-02-12 DIAGNOSIS — C25.9 MALIGNANT NEOPLASM OF PANCREAS, UNSPECIFIED LOCATION OF MALIGNANCY (HCC): ICD-10-CM

## 2020-02-12 PROCEDURE — 99214 OFFICE O/P EST MOD 30 MIN: CPT | Performed by: INTERNAL MEDICINE

## 2020-02-12 PROCEDURE — 85025 COMPLETE CBC W/AUTO DIFF WBC: CPT

## 2020-02-12 PROCEDURE — 99212 OFFICE O/P EST SF 10 MIN: CPT

## 2020-02-24 ENCOUNTER — APPOINTMENT (OUTPATIENT)
Dept: CT IMAGING | Age: 60
DRG: 378 | End: 2020-02-24
Payer: COMMERCIAL

## 2020-02-24 ENCOUNTER — HOSPITAL ENCOUNTER (INPATIENT)
Age: 60
LOS: 3 days | Discharge: HOME | DRG: 378 | End: 2020-02-27
Attending: EMERGENCY MEDICINE | Admitting: HOSPITALIST
Payer: COMMERCIAL

## 2020-02-24 PROBLEM — K92.2 ACUTE GI BLEEDING: Status: ACTIVE | Noted: 2020-02-24

## 2020-02-24 LAB
ABO/RH: NORMAL
ALBUMIN SERPL-MCNC: 3 G/DL (ref 3.5–5.2)
ALP BLD-CCNC: 96 U/L (ref 35–104)
ALT SERPL-CCNC: 27 U/L (ref 5–33)
ANION GAP SERPL CALCULATED.3IONS-SCNC: 9 MMOL/L (ref 7–19)
ANTIBODY SCREEN: NORMAL
APTT: 36.9 SEC (ref 26–36.2)
AST SERPL-CCNC: 33 U/L (ref 5–32)
BASOPHILS ABSOLUTE: 0 K/UL (ref 0–0.2)
BASOPHILS RELATIVE PERCENT: 0.5 % (ref 0–1)
BILIRUB SERPL-MCNC: <0.2 MG/DL (ref 0.2–1.2)
BUN BLDV-MCNC: 8 MG/DL (ref 6–20)
CALCIUM SERPL-MCNC: 7.9 MG/DL (ref 8.6–10)
CHLORIDE BLD-SCNC: 106 MMOL/L (ref 98–111)
CO2: 22 MMOL/L (ref 22–29)
CREAT SERPL-MCNC: 0.7 MG/DL (ref 0.5–0.9)
EOSINOPHILS ABSOLUTE: 0.4 K/UL (ref 0–0.6)
EOSINOPHILS RELATIVE PERCENT: 4 % (ref 0–5)
GFR NON-AFRICAN AMERICAN: >60
GLUCOSE BLD-MCNC: 187 MG/DL (ref 74–109)
HCT VFR BLD CALC: 20.7 % (ref 37–47)
HEMOGLOBIN: 6.3 G/DL (ref 12–16)
IMMATURE GRANULOCYTES #: 0.1 K/UL
INR BLD: 1.12 (ref 0.88–1.18)
LIPASE: 4 U/L (ref 13–60)
LYMPHOCYTES ABSOLUTE: 2.2 K/UL (ref 1.1–4.5)
LYMPHOCYTES RELATIVE PERCENT: 24.7 % (ref 20–40)
MCH RBC QN AUTO: 30.1 PG (ref 27–31)
MCHC RBC AUTO-ENTMCNC: 30.4 G/DL (ref 33–37)
MCV RBC AUTO: 99 FL (ref 81–99)
MONOCYTES ABSOLUTE: 0.8 K/UL (ref 0–0.9)
MONOCYTES RELATIVE PERCENT: 9 % (ref 0–10)
NEUTROPHILS ABSOLUTE: 5.4 K/UL (ref 1.5–7.5)
NEUTROPHILS RELATIVE PERCENT: 61.2 % (ref 50–65)
PDW BLD-RTO: 17.8 % (ref 11.5–14.5)
PLATELET # BLD: 156 K/UL (ref 130–400)
PMV BLD AUTO: 10.6 FL (ref 9.4–12.3)
POTASSIUM SERPL-SCNC: 4.2 MMOL/L (ref 3.5–5)
PROTHROMBIN TIME: 14.4 SEC (ref 12–14.6)
RBC # BLD: 2.09 M/UL (ref 4.2–5.4)
SODIUM BLD-SCNC: 137 MMOL/L (ref 136–145)
TOTAL PROTEIN: 4.8 G/DL (ref 6.6–8.7)
WBC # BLD: 8.8 K/UL (ref 4.8–10.8)

## 2020-02-24 PROCEDURE — 86901 BLOOD TYPING SEROLOGIC RH(D): CPT

## 2020-02-24 PROCEDURE — C9113 INJ PANTOPRAZOLE SODIUM, VIA: HCPCS | Performed by: EMERGENCY MEDICINE

## 2020-02-24 PROCEDURE — 2000000000 HC ICU R&B

## 2020-02-24 PROCEDURE — 6360000002 HC RX W HCPCS: Performed by: EMERGENCY MEDICINE

## 2020-02-24 PROCEDURE — 85025 COMPLETE CBC W/AUTO DIFF WBC: CPT

## 2020-02-24 PROCEDURE — 99285 EMERGENCY DEPT VISIT HI MDM: CPT

## 2020-02-24 PROCEDURE — 6360000004 HC RX CONTRAST MEDICATION: Performed by: EMERGENCY MEDICINE

## 2020-02-24 PROCEDURE — 80053 COMPREHEN METABOLIC PANEL: CPT

## 2020-02-24 PROCEDURE — 74177 CT ABD & PELVIS W/CONTRAST: CPT

## 2020-02-24 PROCEDURE — 36430 TRANSFUSION BLD/BLD COMPNT: CPT

## 2020-02-24 PROCEDURE — 86850 RBC ANTIBODY SCREEN: CPT

## 2020-02-24 PROCEDURE — 85730 THROMBOPLASTIN TIME PARTIAL: CPT

## 2020-02-24 PROCEDURE — 83690 ASSAY OF LIPASE: CPT

## 2020-02-24 PROCEDURE — 2580000003 HC RX 258: Performed by: EMERGENCY MEDICINE

## 2020-02-24 PROCEDURE — 36415 COLL VENOUS BLD VENIPUNCTURE: CPT

## 2020-02-24 PROCEDURE — 85610 PROTHROMBIN TIME: CPT

## 2020-02-24 PROCEDURE — 86923 COMPATIBILITY TEST ELECTRIC: CPT

## 2020-02-24 PROCEDURE — 86900 BLOOD TYPING SEROLOGIC ABO: CPT

## 2020-02-24 PROCEDURE — P9016 RBC LEUKOCYTES REDUCED: HCPCS

## 2020-02-24 PROCEDURE — 96374 THER/PROPH/DIAG INJ IV PUSH: CPT

## 2020-02-24 RX ORDER — ACETAMINOPHEN 325 MG/1
650 TABLET ORAL EVERY 6 HOURS PRN
Status: DISCONTINUED | OUTPATIENT
Start: 2020-02-24 | End: 2020-02-27 | Stop reason: HOSPADM

## 2020-02-24 RX ORDER — 0.9 % SODIUM CHLORIDE 0.9 %
20 INTRAVENOUS SOLUTION INTRAVENOUS ONCE
Status: DISCONTINUED | OUTPATIENT
Start: 2020-02-24 | End: 2020-02-27 | Stop reason: HOSPADM

## 2020-02-24 RX ORDER — PROMETHAZINE HYDROCHLORIDE 25 MG/1
12.5 TABLET ORAL EVERY 6 HOURS PRN
Status: DISCONTINUED | OUTPATIENT
Start: 2020-02-24 | End: 2020-02-27 | Stop reason: HOSPADM

## 2020-02-24 RX ORDER — BISACODYL 10 MG
10 SUPPOSITORY, RECTAL RECTAL DAILY PRN
Status: DISCONTINUED | OUTPATIENT
Start: 2020-02-24 | End: 2020-02-27 | Stop reason: HOSPADM

## 2020-02-24 RX ORDER — ACETAMINOPHEN 650 MG/1
650 SUPPOSITORY RECTAL EVERY 6 HOURS PRN
Status: DISCONTINUED | OUTPATIENT
Start: 2020-02-24 | End: 2020-02-27 | Stop reason: HOSPADM

## 2020-02-24 RX ORDER — ONDANSETRON 2 MG/ML
4 INJECTION INTRAMUSCULAR; INTRAVENOUS EVERY 6 HOURS PRN
Status: DISCONTINUED | OUTPATIENT
Start: 2020-02-24 | End: 2020-02-27 | Stop reason: HOSPADM

## 2020-02-24 RX ORDER — 0.9 % SODIUM CHLORIDE 0.9 %
1000 INTRAVENOUS SOLUTION INTRAVENOUS ONCE
Status: COMPLETED | OUTPATIENT
Start: 2020-02-24 | End: 2020-02-24

## 2020-02-24 RX ORDER — SODIUM CHLORIDE 0.9 % (FLUSH) 0.9 %
10 SYRINGE (ML) INJECTION PRN
Status: DISCONTINUED | OUTPATIENT
Start: 2020-02-24 | End: 2020-02-26 | Stop reason: SDUPTHER

## 2020-02-24 RX ORDER — ONDANSETRON 2 MG/ML
INJECTION INTRAMUSCULAR; INTRAVENOUS
Status: DISPENSED
Start: 2020-02-24 | End: 2020-02-25

## 2020-02-24 RX ORDER — SODIUM CHLORIDE 0.9 % (FLUSH) 0.9 %
10 SYRINGE (ML) INJECTION EVERY 12 HOURS SCHEDULED
Status: DISCONTINUED | OUTPATIENT
Start: 2020-02-24 | End: 2020-02-26 | Stop reason: SDUPTHER

## 2020-02-24 RX ORDER — OCTREOTIDE ACETATE 50 UG/ML
50 INJECTION, SOLUTION INTRAVENOUS; SUBCUTANEOUS ONCE
Status: DISCONTINUED | OUTPATIENT
Start: 2020-02-24 | End: 2020-02-27

## 2020-02-24 RX ORDER — PANTOPRAZOLE SODIUM 40 MG/10ML
80 INJECTION, POWDER, LYOPHILIZED, FOR SOLUTION INTRAVENOUS ONCE
Status: COMPLETED | OUTPATIENT
Start: 2020-02-24 | End: 2020-02-24

## 2020-02-24 RX ADMIN — IOPAMIDOL 90 ML: 755 INJECTION, SOLUTION INTRAVENOUS at 22:02

## 2020-02-24 RX ADMIN — SODIUM CHLORIDE 8 MG/HR: 9 INJECTION, SOLUTION INTRAVENOUS at 21:47

## 2020-02-24 RX ADMIN — OCTREOTIDE ACETATE 50 MCG/HR: 500 INJECTION, SOLUTION INTRAVENOUS; SUBCUTANEOUS at 23:14

## 2020-02-24 RX ADMIN — SODIUM CHLORIDE 1000 ML: 9 INJECTION, SOLUTION INTRAVENOUS at 21:35

## 2020-02-24 RX ADMIN — PANTOPRAZOLE SODIUM 80 MG: 40 INJECTION, POWDER, FOR SOLUTION INTRAVENOUS at 21:36

## 2020-02-24 ASSESSMENT — ENCOUNTER SYMPTOMS
NAUSEA: 1
BACK PAIN: 0
DIARRHEA: 0
VOMITING: 1
ABDOMINAL PAIN: 1
SORE THROAT: 0
SHORTNESS OF BREATH: 0
BLOOD IN STOOL: 0
COUGH: 0
RHINORRHEA: 0

## 2020-02-24 ASSESSMENT — PAIN DESCRIPTION - LOCATION: LOCATION: ABDOMEN

## 2020-02-24 ASSESSMENT — PAIN SCALES - GENERAL: PAINLEVEL_OUTOF10: 7

## 2020-02-25 ENCOUNTER — ANESTHESIA (OUTPATIENT)
Dept: ENDOSCOPY | Age: 60
DRG: 378 | End: 2020-02-25
Payer: COMMERCIAL

## 2020-02-25 ENCOUNTER — ANESTHESIA EVENT (OUTPATIENT)
Dept: ENDOSCOPY | Age: 60
DRG: 378 | End: 2020-02-25
Payer: COMMERCIAL

## 2020-02-25 VITALS
RESPIRATION RATE: 16 BRPM | DIASTOLIC BLOOD PRESSURE: 59 MMHG | OXYGEN SATURATION: 97 % | SYSTOLIC BLOOD PRESSURE: 100 MMHG

## 2020-02-25 LAB
ANION GAP SERPL CALCULATED.3IONS-SCNC: 7 MMOL/L (ref 7–19)
BLOOD BANK DISPENSE STATUS: NORMAL
BLOOD BANK DISPENSE STATUS: NORMAL
BLOOD BANK PRODUCT CODE: NORMAL
BLOOD BANK PRODUCT CODE: NORMAL
BPU ID: NORMAL
BPU ID: NORMAL
BUN BLDV-MCNC: 8 MG/DL (ref 6–20)
CALCIUM SERPL-MCNC: 7.7 MG/DL (ref 8.6–10)
CHLORIDE BLD-SCNC: 108 MMOL/L (ref 98–111)
CO2: 25 MMOL/L (ref 22–29)
CREAT SERPL-MCNC: 0.6 MG/DL (ref 0.5–0.9)
DESCRIPTION BLOOD BANK: NORMAL
DESCRIPTION BLOOD BANK: NORMAL
GFR NON-AFRICAN AMERICAN: >60
GLUCOSE BLD-MCNC: 97 MG/DL (ref 74–109)
HCT VFR BLD CALC: 24.1 % (ref 37–47)
HCT VFR BLD CALC: 24.3 % (ref 37–47)
HCT VFR BLD CALC: 26.3 % (ref 37–47)
HEMOGLOBIN: 7.8 G/DL (ref 12–16)
HEMOGLOBIN: 7.9 G/DL (ref 12–16)
HEMOGLOBIN: 8.5 G/DL (ref 12–16)
POTASSIUM REFLEX MAGNESIUM: 4.3 MMOL/L (ref 3.5–5)
SODIUM BLD-SCNC: 140 MMOL/L (ref 136–145)

## 2020-02-25 PROCEDURE — 99254 IP/OBS CNSLTJ NEW/EST MOD 60: CPT | Performed by: INTERNAL MEDICINE

## 2020-02-25 PROCEDURE — P9016 RBC LEUKOCYTES REDUCED: HCPCS

## 2020-02-25 PROCEDURE — 36591 DRAW BLOOD OFF VENOUS DEVICE: CPT

## 2020-02-25 PROCEDURE — 2580000003 HC RX 258: Performed by: INTERNAL MEDICINE

## 2020-02-25 PROCEDURE — 2000000000 HC ICU R&B

## 2020-02-25 PROCEDURE — C9113 INJ PANTOPRAZOLE SODIUM, VIA: HCPCS | Performed by: EMERGENCY MEDICINE

## 2020-02-25 PROCEDURE — 36430 TRANSFUSION BLD/BLD COMPNT: CPT

## 2020-02-25 PROCEDURE — 85018 HEMOGLOBIN: CPT

## 2020-02-25 PROCEDURE — 85014 HEMATOCRIT: CPT

## 2020-02-25 PROCEDURE — 3700000000 HC ANESTHESIA ATTENDED CARE: Performed by: INTERNAL MEDICINE

## 2020-02-25 PROCEDURE — 2580000003 HC RX 258: Performed by: EMERGENCY MEDICINE

## 2020-02-25 PROCEDURE — 2580000003 HC RX 258: Performed by: HOSPITALIST

## 2020-02-25 PROCEDURE — 6360000002 HC RX W HCPCS: Performed by: EMERGENCY MEDICINE

## 2020-02-25 PROCEDURE — 80048 BASIC METABOLIC PNL TOTAL CA: CPT

## 2020-02-25 PROCEDURE — 6360000002 HC RX W HCPCS: Performed by: INTERNAL MEDICINE

## 2020-02-25 PROCEDURE — 2500000003 HC RX 250 WO HCPCS: Performed by: NURSE ANESTHETIST, CERTIFIED REGISTERED

## 2020-02-25 PROCEDURE — 6360000002 HC RX W HCPCS: Performed by: NURSE ANESTHETIST, CERTIFIED REGISTERED

## 2020-02-25 PROCEDURE — 3609017100 HC EGD: Performed by: INTERNAL MEDICINE

## 2020-02-25 PROCEDURE — 0DJ08ZZ INSPECTION OF UPPER INTESTINAL TRACT, VIA NATURAL OR ARTIFICIAL OPENING ENDOSCOPIC: ICD-10-PCS | Performed by: INTERNAL MEDICINE

## 2020-02-25 PROCEDURE — 87081 CULTURE SCREEN ONLY: CPT

## 2020-02-25 PROCEDURE — 99222 1ST HOSP IP/OBS MODERATE 55: CPT | Performed by: INTERNAL MEDICINE

## 2020-02-25 RX ORDER — SODIUM CHLORIDE 0.9 % (FLUSH) 0.9 %
10 SYRINGE (ML) INJECTION EVERY 12 HOURS SCHEDULED
Status: DISCONTINUED | OUTPATIENT
Start: 2020-02-25 | End: 2020-02-26 | Stop reason: SDUPTHER

## 2020-02-25 RX ORDER — PROPOFOL 10 MG/ML
INJECTION, EMULSION INTRAVENOUS PRN
Status: DISCONTINUED | OUTPATIENT
Start: 2020-02-25 | End: 2020-02-25 | Stop reason: SDUPTHER

## 2020-02-25 RX ORDER — LIDOCAINE HYDROCHLORIDE 20 MG/ML
INJECTION, SOLUTION INFILTRATION; PERINEURAL PRN
Status: DISCONTINUED | OUTPATIENT
Start: 2020-02-25 | End: 2020-02-25 | Stop reason: SDUPTHER

## 2020-02-25 RX ORDER — SODIUM CHLORIDE 9 MG/ML
INJECTION, SOLUTION INTRAVENOUS CONTINUOUS
Status: DISCONTINUED | OUTPATIENT
Start: 2020-02-25 | End: 2020-02-27 | Stop reason: HOSPADM

## 2020-02-25 RX ORDER — SODIUM CHLORIDE 0.9 % (FLUSH) 0.9 %
10 SYRINGE (ML) INJECTION PRN
Status: DISCONTINUED | OUTPATIENT
Start: 2020-02-25 | End: 2020-02-26 | Stop reason: SDUPTHER

## 2020-02-25 RX ADMIN — PROPOFOL 80 MG: 10 INJECTION, EMULSION INTRAVENOUS at 14:00

## 2020-02-25 RX ADMIN — OCTREOTIDE ACETATE 50 MCG/HR: 500 INJECTION, SOLUTION INTRAVENOUS; SUBCUTANEOUS at 23:42

## 2020-02-25 RX ADMIN — SODIUM CHLORIDE 8 MG/HR: 9 INJECTION, SOLUTION INTRAVENOUS at 19:52

## 2020-02-25 RX ADMIN — SODIUM CHLORIDE, PRESERVATIVE FREE 10 ML: 5 INJECTION INTRAVENOUS at 20:34

## 2020-02-25 RX ADMIN — SODIUM CHLORIDE: 9 INJECTION, SOLUTION INTRAVENOUS at 20:34

## 2020-02-25 RX ADMIN — SODIUM CHLORIDE: 9 INJECTION, SOLUTION INTRAVENOUS at 01:25

## 2020-02-25 RX ADMIN — LIDOCAINE HYDROCHLORIDE 40 MG: 20 INJECTION, SOLUTION INFILTRATION; PERINEURAL at 14:00

## 2020-02-25 RX ADMIN — SODIUM CHLORIDE 8 MG/HR: 9 INJECTION, SOLUTION INTRAVENOUS at 05:58

## 2020-02-25 RX ADMIN — SODIUM CHLORIDE: 9 INJECTION, SOLUTION INTRAVENOUS at 11:24

## 2020-02-25 ASSESSMENT — ENCOUNTER SYMPTOMS
DIARRHEA: 0
BACK PAIN: 0
CONSTIPATION: 0
NAUSEA: 1
VOMITING: 1
SHORTNESS OF BREATH: 0
COUGH: 0

## 2020-02-25 ASSESSMENT — PAIN SCALES - GENERAL
PAINLEVEL_OUTOF10: 0
PAINLEVEL_OUTOF10: 0

## 2020-02-25 ASSESSMENT — LIFESTYLE VARIABLES: SMOKING_STATUS: 0

## 2020-02-25 NOTE — PROGRESS NOTES
Consent form obtained for EGD if needed. Signed by patient and witnessed by nursing staff. In patient's chart. Pt also NPO at this time in case of procedure.      Electronically signed by Blake Jenkins RN on 2/25/2020 at 2:01 AM

## 2020-02-25 NOTE — PROGRESS NOTES
Dr Nicole Liao came assessed the patient and ordered EGD for this am.  Consent signed and on chart. Patient has been npo. Family at bedside. Patient states she has had no vomiting since admit to ICU. Vitals stable.

## 2020-02-25 NOTE — PROGRESS NOTES
Pt had no complaints of N/V throughout the night. VSS. Dr. Amina Wolfe called and updated on patient's status this morning.      Electronically signed by Gypsy Will RN on 2/25/2020 at 6:46 AM

## 2020-02-25 NOTE — OP NOTE
Food retention  2. Hiatal hernia     RECOMMENDATIONS:    1. Pt had a significant amount of food retention in her stomach. The content was down in color with surrounding bile. Would recommend observation and repeat EGD in 1-2 days to allow clearance of material as patient did have an ulcer found during an EGD in 12/2019  2. [id number] for water today  3. Trend H/H and monitor closely for signs of bleeding  4. Continue Protonix and octreotide drip at this time      The results were discussed with the patient and/or family.      Quincy Palm DO  2/25/2020  2:04 PM

## 2020-02-25 NOTE — H&P
Frequency  0.5 packs/day for 10 years (5 pk yrs) Smoking Tobacco Type  Cigarettes    Smokeless Tobacco Use  Never Used          Alcohol History     Alcohol Use Status  Not Currently          Drug Use     Drug Use Status  No          Sexual Activity     Sexually Active  Not Asked                Family History     Family History   Problem Relation Age of Onset    Heart Attack Mother 46        x 2-had bypass    Hypertension Mother     COPD Father    [facility] Liver Cancer Paternal Aunt     Lung Cancer Paternal Aunt     Breast Cancer Maternal Aunt         but  of brain cancer    Diabetes Maternal Uncle     Diabetes Maternal Grandmother     Colon Cancer Neg Hx     Colon Polyps Neg Hx     Esophageal Cancer Neg Hx     Rectal Cancer Neg Hx     Stomach Cancer Neg Hx        Review of Systems   Review of Systems   Patient denies any headaches no dizziness no blurry vision no ear pain no sore throat no cough no congestion no fever no chills no abdominal pain she does report having nausea reports vomiting reports vomiting blood possibly denies any constipation no diarrhea no dysuria no joint pain no frequency or urgency. Physical Exam   BP (!) 104/50   Pulse 56   Temp 97.6 °F (36.4 °C) (Oral)   Resp 16   Wt 107 lb (48.5 kg)   SpO2 99%   BMI 16.76 kg/m²     Constitutional:       Appearance: She is underweight. She is ill-appearing. She is not diaphoretic. Comments: Chronically ill appearing , patient appears cachectic. HENT:      Head: Normocephalic and atraumatic. Throat: clear     Right Ear: External ear normal.      Left Ear: External ear normal.   Eyes:      Conjunctiva/sclera: Conjunctivae normal.   Neck:      Musculoskeletal: Normal range of motion. Trachea: No tracheal deviation. No cervical lymphadenopathy palpated  Cardiovascular:      Rate and Rhythm: Normal rate and regular rhythm. Heart sounds: Normal heart sounds. No murmur.    Pulmonary:      Effort: No respiratory distress. Breath sounds: Normal breath sounds. No wheezing or rales. Patient has a Mediport in place it does not look infected  Abdominal:      Palpations: Abdomen is soft. There is no mass. Tenderness: There is no abdominal tenderness. There is no right CVA tenderness, left CVA tenderness, guarding or rebound. Musculoskeletal: Normal range of motion. Skin:     General: Skin is warm and dry. Neurological:      Mental Status: She is alert and oriented to person, place, and time.      Labs      Recent Results (from the past 24 hour(s))   APTT    Collection Time: 02/24/20  9:30 PM   Result Value Ref Range    aPTT 36.9 (H) 26.0 - 36.2 sec   CBC Auto Differential    Collection Time: 02/24/20  9:30 PM   Result Value Ref Range    WBC 8.8 4.8 - 10.8 K/uL    RBC 2.09 (L) 4.20 - 5.40 M/uL    Hemoglobin 6.3 (L) 12.0 - 16.0 g/dL    Hematocrit 20.7 (L) 37.0 - 47.0 %    MCV 99.0 81.0 - 99.0 fL    MCH 30.1 27.0 - 31.0 pg    MCHC 30.4 (L) 33.0 - 37.0 g/dL    RDW 17.8 (H) 11.5 - 14.5 %    Platelets 439 370 - 053 K/uL    MPV 10.6 9.4 - 12.3 fL    Neutrophils % 61.2 50.0 - 65.0 %    Lymphocytes % 24.7 20.0 - 40.0 %    Monocytes % 9.0 0.0 - 10.0 %    Eosinophils % 4.0 0.0 - 5.0 %    Basophils % 0.5 0.0 - 1.0 %    Neutrophils Absolute 5.4 1.5 - 7.5 K/uL    Immature Granulocytes # 0.1 K/uL    Lymphocytes Absolute 2.2 1.1 - 4.5 K/uL    Monocytes Absolute 0.80 0.00 - 0.90 K/uL    Eosinophils Absolute 0.40 0.00 - 0.60 K/uL    Basophils Absolute 0.00 0.00 - 0.20 K/uL   Comprehensive Metabolic Panel    Collection Time: 02/24/20  9:30 PM   Result Value Ref Range    Sodium 137 136 - 145 mmol/L    Potassium 4.2 3.5 - 5.0 mmol/L    Chloride 106 98 - 111 mmol/L    CO2 22 22 - 29 mmol/L    Anion Gap 9 7 - 19 mmol/L    Glucose 187 (H) 74 - 109 mg/dL    BUN 8 6 - 20 mg/dL    CREATININE 0.7 0.5 - 0.9 mg/dL    GFR Non-African American >60 >60    Calcium 7.9 (L) 8.6 - 10.0 mg/dL    Total Protein 4.8 (L) 6.6 - 8.7 g/dL    Alb 3.0 (L)

## 2020-02-25 NOTE — ANESTHESIA POSTPROCEDURE EVALUATION
Department of Anesthesiology  Postprocedure Note    Patient: Tereso Agee  MRN: [id number]  [place]: 1960  Date of evaluation: 2/25/2020  Time:  2:10 PM     Procedure Summary     Date:  02/25/20 Room / Location:  [address]    Anesthesia Start:  1355 Anesthesia Stop:      Procedure:  EGD DIAGNOSTIC ONLY 2-25-20 Dr. Hayes Situ Coffee ground emesis, anemia (N/A ) Diagnosis:  (Coffee ground emesis, anemia)    Surgeon:  Kay Red DO Responsible Provider:  JORDYN Wisdom CRNA    Anesthesia Type:  general ASA Status:  3          Anesthesia Type: general    Nilsa Phase I:      Nilsa Phase II:      Last vitals: Reviewed and per EMR flowsheets.        Anesthesia Post Evaluation    Patient location during evaluation: bedside  Patient participation: complete - patient participated  Level of consciousness: sleepy but conscious  Pain score: 0  Airway patency: patent  Nausea & Vomiting: no nausea and no vomiting  Complications: no  Cardiovascular status: hemodynamically stable and blood pressure returned to baseline  Respiratory status: acceptable and nasal cannula  Hydration status: stable
IV intact/x2

## 2020-02-25 NOTE — PROGRESS NOTES
Hospitalist Progress Note    Patient:  Segundo Pablo  YOB: 1960  Date of Service: 2/25/2020  MRN: [id number]   Acct: [de-identified]   Primary Care Physician: Paulette Villatoro MD  Advance Directive: Full Code  Admit Date: 2/24/2020       Hospital Day: 1  Referring Provider: Slade Anand DO    Patient Seen, Chart, Consults, Notes, Labs, Radiology studies reviewed. Subjective:  Segundo Pablo is a 64 y.o. female  whom we are following for nausea, vomiting, history of pancreatic carcinoma. She is feeling better this morning. No further vomiting. She was transfused overnight. Tentatively scheduled for EGD later on today. Allergies:  Codeine; Morphine; Morphine and related; Sulfa antibiotics; Neomycin-bacitracin zn-polymyx; Clarithromycin; Dilaudid [hydromorphone hcl];  Hydromorphone; Loperamide hcl; Loperamide hcl; and Neosporin [neomycin-polymyx-gramicid]    Medicines:  Current Facility-Administered Medications   Medication Dose Route Frequency Provider Last Rate Last Dose    0.9 % sodium chloride infusion   Intravenous Continuous Álvaro Wheeler  mL/hr at 02/25/20 1124      sodium chloride flush 0.9 % injection 10 mL  10 mL Intravenous 2 times per day Mark Hutchinson, DO        sodium chloride flush 0.9 % injection 10 mL  10 mL Intravenous PRN Cb Jung DO        pantoprazole (PROTONIX) 80 mg in sodium chloride 0.9 % 100 mL infusion  8 mg/hr Intravenous Continuous Cele Almanzar MD 10 mL/hr at 02/25/20 0558 8 mg/hr at 02/25/20 0558    0.9 % sodium chloride bolus  20 mL Intravenous Once Cele Almanzar MD        octreotide (SANDOSTATIN) injection 50 mcg  50 mcg Intravenous Once Cele Almanzar MD        octreotide (SANDOSTATIN) 500 mcg in sodium chloride 0.9 % 100 mL infusion  50 mcg/hr Intravenous Continuous Cele Almanzar MD 10 mL/hr at 02/25/20 0530 50 mcg/hr at 02/25/20 0530    sodium chloride flush 0.9 % injection 10 mL  10 mL Intravenous 2 times per day MD Silvestre Prieto quittin.3    Smokeless tobacco: Never Used   Substance and Sexual Activity    Alcohol use: Not Currently    Drug use: No    Sexual activity: Not on file   Lifestyle    Physical activity:     Days per week: Not on file     Minutes per session: Not on file    Stress: Not on file   Relationships    Social connections:     Talks on phone: Not on file     Gets together: Not on file     Attends Taoism service: Not on file     Active member of club or organization: Not on file     Attends meetings of clubs or organizations: Not on file     Relationship status: Not on file    Intimate partner violence:     Fear of current or ex partner: Not on file     Emotionally abused: Not on file     Physically abused: Not on file     Forced sexual activity: Not on file   Other Topics Concern    Not on file   Social History Narrative    Not on file         Review of Systems:    Review of Systems   Constitutional: Positive for activity change. Negative for fatigue. Respiratory: Negative for cough and shortness of breath. Cardiovascular: Negative for chest pain and leg swelling. Gastrointestinal: Positive for nausea and vomiting. Negative for constipation and diarrhea. Genitourinary: Negative for difficulty urinating and dysuria. Musculoskeletal: Negative for arthralgias and back pain. Neurological: Positive for weakness. Negative for dizziness and headaches. Objective:  Blood pressure 106/62, pulse 54, temperature 98.8 °F (37.1 °C), temperature source Temporal, resp. rate 14, height 5' 7\" (1.702 m), weight 112 lb 8 oz (51 kg), SpO2 97 %. Intake/Output Summary (Last 24 hours) at 2020 1235  Last data filed at 2020 0800  Gross per 24 hour   Intake 2236.33 ml   Output 400 ml   Net 1836.33 ml       Physical Exam  Vitals signs reviewed. Constitutional:       Appearance: She is well-developed. She is ill-appearing. HENT:      Head: Normocephalic and atraumatic.    Eyes: Conjunctiva/sclera: Conjunctivae normal.      Pupils: Pupils are equal, round, and reactive to light. Cardiovascular:      Rate and Rhythm: Normal rate and regular rhythm. Heart sounds: Normal heart sounds. Pulmonary:      Effort: Pulmonary effort is normal.      Breath sounds: Normal breath sounds. Abdominal:      General: Abdomen is flat. Palpations: Abdomen is soft. Musculoskeletal: Normal range of motion. Skin:     General: Skin is warm and dry. Neurological:      Mental Status: She is alert and oriented to person, place, and time. Labs:  BMP:   Recent Labs     02/24/20 2130 02/25/20  0605    140   K 4.2 4.3    108   CO2 22 25   BUN 8 8   CREATININE 0.7 0.6   CALCIUM 7.9* 7.7*     CBC:   Recent Labs     02/24/20 2130 02/25/20  0605   WBC 8.8  --    HGB 6.3* 8.5*   HCT 20.7* 26.3*   MCV 99.0  --      --      LIVER PROFILE:   Recent Labs     02/24/20 2130   AST 33*   ALT 27   LIPASE 4*   BILITOT <0.2   ALKPHOS 96     PT/INR:   Recent Labs     02/24/20 2130   PROTIME 14.4   INR 1.12     APTT:   Recent Labs     02/24/20 2130   APTT 36.9*     BNP:  No results for input(s): BNP in the last 72 hours. Ionized Calcium:No results for input(s): IONCA in the last 72 hours. Magnesium:No results for input(s): MG in the last 72 hours. Phosphorus:No results for input(s): PHOS in the last 72 hours. HgbA1C: No results for input(s): LABA1C in the last 72 hours. Hepatic:   Recent Labs     02/24/20 2130   ALKPHOS 96   ALT 27   AST 33*   PROT 4.8*   BILITOT <0.2   LABALBU 3.0*     Lactic Acid: No results for input(s): LACTA in the last 72 hours. Troponin: No results for input(s): CKTOTAL, CKMB, TROPONINT in the last 72 hours. ABGs: No results for input(s): PH, PCO2, PO2, HCO3, O2SAT in the last 72 hours. CRP:  No results for input(s): CRP in the last 72 hours. Sed Rate:  No results for input(s): SEDRATE in the last 72 hours.     Cultures:   No results for input(s): CULTURE in the last 72 hours. No results for input(s): BC, [place] Callow in the last 72 hours. No results for input(s): CXSURG in the last 72 hours. Radiology reports as per the Radiologist  Radiology: Ct Abdomen Pelvis W Iv Contrast Additional Contrast? None    Result Date: 2/25/2020  Examination. CT ABDOMEN PELVIS W IV CONTRAST 2/24/2020 8:45 PM History: Abdominal pain and hematemesis. DLP: 764 mGycm. The CT scan of the abdomen and pelvis is performed after intravenous contrast enhancement. The images are acquired in axial plane with subsequent reconstruction in coronal and sagittal planes. The comparison is made with the previous study dated 2/28/2018. The lung bases included in the study appears unremarkable. The limited visualized cardiomediastinal structures are normal. There is evidence of pneumobilia suggesting reflux from incompetent sphincter on a prior surgical diversion. There is geographic fatty infiltration more pronounced in the lower arterial. There is a small area of focal enhancement located anteriorly in the dome of the liver, image #9 and 10 in axial plane and image #14 and 15 in coronal plane. The etiology is not certain. The spleen appears normal. The left of the pancreas is visualized without evidence of ductal dilatation. The body and head are not optimally visualized due to due to previous surgical resection (history of pancreaticoduodenectomy/Whipple procedure.) Anterior glands bilaterally appear normal. Kidneys bilaterally are normal. The ureters are not optimally visualized due to diffuse infiltration of the peritoneum. The urinary bladder is decompressed and may not be optimally evaluated. No intrinsic abnormality. Normal size uterus is seen. There are tortuous dilated pelvic vessels around the uterus with prominent bilateral median veins. No finding to suggest obstruction.  The stomach is distended with fluid and ingested material. The small bowel is nondistended but appears moderately hyperemic thickening of the walls. No finding to suggest appendicitis. Moderate gas and stool are seen in the colon. No finding to suggest obstruction. There is small amount of fluid in the lower abdomen and pelvis. There is diffuse infiltration of the mesentery/omentum suggesting edema. There is a tiny fluid in the paracolic gutter bilaterally. Atheromatous changes of the abdominal aorta and iliac arteries are seen. No aneurysmal dilatation. There is a filling defect in the right common iliac vein which may represent a thrombus. There is poor opacification of the inferior vena cava which is decompressed and flattened. There is poor opacification of the superior mesenteric vein. The portal vein and branches appears normal. There is no evidence of abdominal or pelvic lymphadenopathy. Chronic degenerative changes of the lumbar spine are seen with a mild levoscoliosis. No focal bony lesion or bone destruction. Postsurgical changes of the pancreas, the proximal duodenum and the common bile duct (pancreaticoduodenectomy/Whipple procedure). Diffuse infiltration of the mesentery/omentum and a small amount of fluid in the paracolic gutter, the abdomen and pelvis probably represent small ascites. Pneumobilia. Moderate diffuse edema along the course of the small bowel may suggest enteritis. Suspected thrombosis of the common iliac vein. This may also represent a flow phenomena. Further follow-up may be obtained. A small ill-defined area of focal enhancement in the dome of the liver may represent a small hemangioma. The above study was initially reviewed and reported by stat rads. No critical discrepancies was noted. A significant discrepancy mentioned above was reported to the care provider immediately. Signed by Dr Ema Sweeney on 2/25/2020 8:59 AM       Assessment     Coffee-ground emesis. EGD later today. Blood loss anemia. Status post transfusion. History of pancreatic carcinoma.   Status post Whipple

## 2020-02-25 NOTE — CONSULTS
MEDICAL ONCOLOGY CONSULTATION    Pt Name: Dalia Staton  MRN: [id number]  YOB: 1960  Date of evaluation: 2/25/2020    REASON FOR CONSULTATION: Pancreatic cancer and GI bleed  REQUESTING PHYSICIAN: Hospitalist    History Obtained From:    patient, electronic medical record    HISTORY OF PRESENT ILLNESS:      The patient is a pleasant 64years old female who is established in clinic with us. She has a diagnosis of pancreatic adenocarcinoma. She initially underwent neoadjuvant chemotherapy followed by surgery at [facility]. She has been receiving palliative chemotherapy for recurrent disease. The patient has had issues with GI bleed due to an ulcer in the anastomosis site. She has been seen by Dr. Cecily Colon and had upper EGDs on several occasions. She presented yesterday to the emergency department with complaints of fatigue and hematemesis. Apparently, she also has lower GI bleed. She had several episodes of coffee-ground emesis. GI has been consulted. Last EGD on 12/17/2019 showed healing ulceration at the G-J anastomosis.        INTERVAL HISTORY/HISTORY OF PRESENT ILLNESS:  Diagnosis  · Pancreatic adenocarcinoma, January 2018   · [id number]  · 7/6/2019-extended genetic panel-negative for a deleterious mutation  Treatment summary  · March 2018-Surgical consultation at [address] [place] [address] Road operable   · 4/3/2018 through 6/4/2018 Neoadjuvant chemotherapy FOLFORINOX ×5 Biweekly cycles   · 4/11/2018-Biliary stent   · August 2018- XRT 30 Gy/Xeloda   · 08/30/3018- Whipple procedure @ [facility]   · Recommended another 5 biweekly cycles of modified FOLFORINOX completed 12/26/2018   · 7/9/2019-Gemzar/Abraxane 125 mg/m2 q 14 days  Tumor marker  · 3/12/9544-NN-54-9->430->370. · 4/30/2018 - CA 19- 9 of 157   · 5/25/20185548-UK-99-9->32 after 4 cycles.    · 08/02/2018- -> 8   · 10/01/2018- CA 19-9 -5   · 10/29/2018-CA 19-9- 7   · 12/3/6155-GM-48-9-7   · 04/11/2019-CA-19-9- 12   · 05/21/2019- CA 19-9- 41   · 7/2/2019-CA 19 9 = 114  · 7/9/2019- CA-19-9 = 225  · 8/6/2019- CA-19-9 = 171  · 9/3/6727-SW-16-9 = 198  · 9/13/20193098-ZM-40-9 = 98 (at [facility])  · 10/29/5721-DC-77-9 =317  · 11/21/2019-CA-19-9 = 183  · 1/23/20206281-WE-56-9 = 520        Interval history  Ms Flores Cancer is s/p adjuvant FOLFORINOX 5 biweekly cycles followed by XRT/Xeloda and whipple procedure at [facility] for what was thought to be a localized pancreatic cancer. Unfortunately, she was found to have intra-abdominal disease recurrence and was recommended second line treatment with Gemzar/Abraxane. 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 She has been tolerating Gemzar/Abraxane with mild/moderate hematologic toxicity, fatigue and neuropathy. She has also had recurrent GI bleed secondary to ulcer at the anastomotic site of her prior Whipple procedure. She was seen at [facility] recently and had CT scans that showed disease progression. At [facility] they are looking at a possibility of clinical trials. The patient overall feels well. Her weight has been stable. She denies any pain. She denies any new episodes of melena or hematochezia. Her hemoglobin has stabilized at 10.6. Her last blood transfusion was 3-4 weeks ago.        Cancer history  Ms Lilly Rodriguez was first seen by me on 3/12/2018 referred by Dr. Tim Chu for a diagnosis of pancreatic adenocarcinoma. She was initially evaluated for acute pancreatitis at [facility] on February 2018. Further imaging revealed a pancreatic head mass. Lipase was elevated at 1184 and CA-19-9 elevated 134. The symptoms were going on for several weeks. Weight loss of 10-12 pounds over the last 6 months. · October 2017-CT abdomen without contrast was unremarkable. · 2/2/2018-ultrasound of abdomen showed a pancreatic duct dilation   · 2/02/2018-CT abdomen showed 16 mm low-density lesion in the pancreas at the junction of the head of the body.  No and pelvis at [facility] showed no evidence of metastasis in the chest abdomen pelvis. · 5/21/2019-CT chest, abdomen, pelvis at [facility] showed no evidence of metastatic disease   · 5/21/20195685-KF-41-9 = 42   · 06/12/2019- CA-19-9 = 154. Discussed with the patient. We'll also discuss with [facility]. · 7/1/2019-CT chest, abdomen, pelvis showed a soft tissue mass measuring 1.4 x 1.1 cm, not present on prior scan from January 2019, concerning for recurrence. Stable hypodense in the liver, too small to characterize. Subcentimeter ill-defined area of low attenuation liver may represent an early metastasis. · 7/3/2019-recommended palliative chemotherapy with nab paclitaxel 125mg/m² IV over 30 minutes on day 1 and gemcitabine 600 mg/m² IV over 10mg/m2/min (fixed dose rate) on day 1  · 7/2/2019-CA 19 9 = 114  · 7/6/2019- extended genetic panel negative for a deleterious mutation (invitae)  · 7/9/2019- CA-19-9 = 225  · 8/6/2019- CA-19-9 = 171  · 9/3/3660-XR-74-9 = 198   · 9/13/20198639-AR-57-9 = 98 at [facility]  · 9/13/2019-CT chest abdomen pelvis at [facility] showed a soft tissue thickening in the pancreatic bed with persistent narrowing of the portal SMV confluence. Superimposed tumor remains a possibility. The new low-density lesion in the periphery of the liver seen on the prior exam is no longer appreciated and likely represents treatment effect. Nodular enhancement may represent perfusion change in the region on image 187. · 9/13/2018- she was recommended to continue current treatment with Gemzar/Abraxane  · 11/1/2019- she was hospitalized with GI bleed. · 11/1/2019-upper endoscopy showed ulceration at the efferent loop of the Whipple's procedure  · 10/29/4363-VD-86-9 =317  · 11/21/2019-CA-19-9 = 183  · 11/19/2019- CT chest abdomen pelvis showed no evidence of gross disease progression. Improvement of the caliber of what may be a middle colic vein that drains back to the SMV.  There is still Right     HERNIA REPAIR      hiatal    HERNIA REPAIR      umbilical    HERNIA REPAIR      PANCREAS SURGERY  2018    Whipple procedure-Dr Fanta Coats [place] EGD [facility] NEEDLE ASPIR/BIOP ALTERED ANATOMY N/A 2018    Dr Cj Villafana w/fna-Dilation of main pancreatic duct with diffuse change in the pancreatitis noted, area of concern in the neck of the pancreas-strongly suspicious for adenocarcinoma     [place] EGD INTRM[place] NEEDLE ASPIR/BIOP ALTERED ANATOMY N/A 3/6/2018    Dr KVNG Duncan-Pancreatic cancer-Ductal adenocarcinoma-staged [id number] by EUS, pancreatic pseudocyst in the tail the pancreas    [place] ERCP DX COLLECTION SPECIMEN BRUSHING/WASHING N/A 2018    ERCP-Dr KVNG Duncan-placement of a self-expanding fully covered 10 mm biliary stent    UPPER GASTROINTESTINAL ENDOSCOPY  years ago    [facility]-Dr Donell Epley    UPPER GASTROINTESTINAL ENDOSCOPY      Zuniga    UPPER GASTROINTESTINAL ENDOSCOPY N/A 2019    Dr Danny Edwards post Whipple's procedure with efferent loop ulceration    UPPER GASTROINTESTINAL ENDOSCOPY  2019    Dr Mariama Duncan-Patent G-J Anastomosis, apparent healing ulceration       Social History:    Social History     Socioeconomic History    Marital status: Single     Spouse name: Not on file    Number of children: Not on file    Years of education: Not on file    Highest education level: Not on file   Occupational History    Not on file   Social Needs    Financial resource strain: Not on file    Food insecurity:     Worry: Not on file     Inability: Not on file    Transportation needs:     Medical: Not on file     Non-medical: Not on file   Tobacco Use    Smoking status: Former Smoker     Packs/day: 0.50     Years: 10.00     Pack years: 5.00     Types: Cigarettes     Last attempt to quit: 2019     Years since quittin.3    Smokeless tobacco: Never Used   Substance and Sexual Activity    Alcohol use: Not Currently    Drug use: No    Sexual activity: Not on file Lifestyle    Physical activity:     Days per week: Not on file     Minutes per session: Not on file    Stress: Not on file   Relationships    Social connections:     Talks on phone: Not on file     Gets together: Not on file     Attends Jew service: Not on file     Active member of club or organization: Not on file     Attends meetings of clubs or organizations: Not on file     Relationship status: Not on file    Intimate partner violence:     Fear of current or ex partner: Not on file     Emotionally abused: Not on file     Physically abused: Not on file     Forced sexual activity: Not on file   Other Topics Concern    Not on file   Social History Narrative    Not on file       Family History:   Family History   Problem Relation Age of Onset    Heart Attack Mother 46        x 2-had bypass    Hypertension Mother     COPD Father     Liver Cancer Paternal Aunt     Lung Cancer Paternal Aunt     Breast Cancer Maternal Aunt         but  of brain cancer    Diabetes Maternal Uncle     Diabetes Maternal Grandmother     Colon Cancer Neg Hx     Colon Polyps Neg Hx     Esophageal Cancer Neg Hx     Rectal Cancer Neg Hx     Stomach Cancer Neg Hx        Current Hospital Medications:    Current Facility-Administered Medications: 0.9 % sodium chloride infusion, , Intravenous, Continuous  ondansetron (ZOFRAN) 4 MG/2ML injection, , ,   pantoprazole (PROTONIX) 80 mg in sodium chloride 0.9 % 100 mL infusion, 8 mg/hr, Intravenous, Continuous  0.9 % sodium chloride bolus, 20 mL, Intravenous, Once  octreotide (SANDOSTATIN) injection 50 mcg, 50 mcg, Intravenous, Once  octreotide (SANDOSTATIN) 500 mcg in sodium chloride 0.9 % 100 mL infusion, 50 mcg/hr, Intravenous, Continuous  sodium chloride flush 0.9 % injection 10 mL, 10 mL, Intravenous, 2 times per day  sodium chloride flush 0.9 % injection 10 mL, 10 mL, Intravenous, PRN  acetaminophen (TYLENOL) tablet 650 mg, 650 mg, Oral, Q6H PRN  acetaminophen care  Continue H/H monitoring  Transfuse for hemoglobin below 7    I have seen, examined and reviewed this patient medication list, appropriate labs and imaging studies. I reviewed relevant medical records and others physicians notes. I discussed the plans of care with the patient. I answered all the questions to the patients satisfaction.     (Please note that portions of this note were completed with a voice recognition program. Efforts were made to edit the dictations but occasionally words are mis-transcribed.)        Tyree Wolfe MD    02/25/20  7:02 AM

## 2020-02-25 NOTE — ED PROVIDER NOTES
 Adult BMI <19 kg/sq m     Anemia     Cat esophagus     Biliary obstruction     GERD (gastroesophageal reflux disease)     with Barretts    Hashimoto's thyroiditis     denies takes no med for    Heart murmur     Hypertension     pt denies nor longer takes med for    Low blood sugar     h/o when overweight in the past    MVP (mitral valve prolapse)     Myalgia     Pancreatic adenocarcinoma ([address] 2018    Dr Tiana Jansen    Pancreatic cancer ([facility] [address] 3/30/2018    Right flank pain     RUQ pain          SURGICAL HISTORY       Past Surgical History:   Procedure Laterality Date    CARDIAC SURGERY      heart cath     SECTION      x 3    CHOLECYSTECTOMY  1997    COLONOSCOPY  years ago    [facility]-Dr Max Villalobos per patient    COLONOSCOPY  2014    Dr Dez Diane- Santino Duncan recall    COLONOSCOPY  03/10/2016    Dr Mcfarland-10 yr recall    COLONOSCOPY N/A 2019    Dr Chu Melendez, 5 yr recall    ELBOW SURGERY Right     ENDOMETRIAL ABLATION      HAND SURGERY Right     HERNIA REPAIR      hiatal    HERNIA REPAIR      umbilical    HERNIA REPAIR      PANCREAS SURGERY  2018    Whipple procedure-Dr Je Abarca [place] EGD [facility] NEEDLE ASPIR/BIOP ALTERED ANATOMY N/A 2018    Dr Amber Narayanan w/fna-Dilation of main pancreatic duct with diffuse change in the pancreatitis noted, area of concern in the neck of the pancreas-strongly suspicious for adenocarcinoma     [place] EGD INTRMURAL NEEDLE ASPIR/BIOP ALTERED ANATOMY N/A 3/6/2018    Dr KVNG Duncan-Pancreatic cancer-Ductal adenocarcinoma-staged [id number] by EUS, pancreatic pseudocyst in the tail the pancreas    [place] ERCP DX COLLECTION SPECIMEN BRUSHING/WASHING N/A 2018    ERCP-Dr KVNG Duncan-placement of a self-expanding fully covered 10 mm biliary stent    UPPER GASTROINTESTINAL ENDOSCOPY  years ago    [facility] ENDOSCOPY  2013    Zuniga    UPPER GASTROINTESTINAL ENDOSCOPY N/A 2019     16 100 % -- 107 lb (48.5 kg)       Physical Exam  Vitals signs reviewed. Constitutional:       Appearance: She is underweight. She is ill-appearing. She is not diaphoretic. Comments: Chronically ill appearing   HENT:      Head: Normocephalic and atraumatic. Right Ear: External ear normal.      Left Ear: External ear normal.   Eyes:      Conjunctiva/sclera: Conjunctivae normal.   Neck:      Musculoskeletal: Normal range of motion. Trachea: No tracheal deviation. Cardiovascular:      Rate and Rhythm: Normal rate and regular rhythm. Heart sounds: Normal heart sounds. No murmur. Pulmonary:      Effort: No respiratory distress. Breath sounds: Normal breath sounds. No wheezing or rales. Abdominal:      Palpations: Abdomen is soft. There is no mass. Tenderness: There is no abdominal tenderness. There is no right CVA tenderness, left CVA tenderness, guarding or rebound. Musculoskeletal: Normal range of motion. Skin:     General: Skin is warm and dry. Neurological:      Mental Status: She is alert and oriented to person, place, and time. GCS: GCS eye subscore is 4. GCS verbal subscore is 5. GCS motor subscore is 6.          DIAGNOSTIC RESULTS         RADIOLOGY:  Non-plain film images such as CT, Ultrasound and MRI are read by the radiologist. Plain radiographic images are visualized and preliminarily interpreted bythe emergency physician with the below findings:      CT ABDOMEN PELVIS W IV CONTRAST Additional Contrast? None    (Results Pending)           LABS:  Labs Reviewed   APTT - Abnormal; Notable for the following components:       Result Value    aPTT 36.9 (*)     All other components within normal limits   CBC WITH AUTO DIFFERENTIAL - Abnormal; Notable for the following components:    RBC 2.09 (*)     Hemoglobin 6.3 (*)     Hematocrit 20.7 (*)     MCHC 30.4 (*)     RDW 17.8 (*)     All other components within normal limits   COMPREHENSIVE METABOLIC PANEL - Abnormal; Notable for the following components:    Glucose 187 (*)     Calcium 7.9 (*)     Total Protein 4.8 (*)     Alb 3.0 (*)     AST 33 (*)     All other components within normal limits   LIPASE - Abnormal; Notable for the following components:    Lipase 4 (*)     All other components within normal limits   PROTIME-INR   BASIC METABOLIC PANEL W/ REFLEX TO MG FOR LOW K   HEMOGLOBIN AND HEMATOCRIT, BLOOD   HEMOGLOBIN AND HEMATOCRIT, BLOOD   TYPE AND SCREEN   PREPARE RBC (CROSSMATCH)       All other labs were within normal range or not returned as of this dictation.     EMERGENCY DEPARTMENT COURSE and DIFFERENTIAL DIAGNOSIS/MDM:   Vitals:    Vitals:    02/24/20 2324 02/24/20 2330 02/24/20 2336 02/25/20 0000   BP: (!) 96/54 110/67 107/67 102/62   Pulse: 62 52 52 61   Resp: 16 17 15 12   Temp: 97.6 °F (36.4 °C)   97.4 °F (36.3 °C)   TempSrc:    Temporal   SpO2: 99%   94%   Weight:    112 lb 8 oz (51 kg)   Height:    5' 7\" (1.702 m)       MDM  Number of Diagnoses or Management Options  Acute blood loss anemia:   Acute GI bleeding:   Malignant neoplasm of pancreas, unspecified location of malignancy [facility]):      Amount and/or Complexity of Data Reviewed  Clinical lab tests: ordered and reviewed  Tests in the radiology section of CPT®: ordered and reviewed  Discuss the patient with other providers: yes  Independent visualization of images, tracings, or specimens: yes    Critical Care  Total time providing critical care: 30-74 minutes      Coffee ground and bright red emesis at home, gcs 15 here, hx of pancreatic CA, followed by Sandra last chemo early January, hx of barrets and ulcers, no hx of varices that she or family are aware of, large episode of coffee ground emesis here filled blue bag 3/4 full, vitals are stable but she appears ill, full code, have placed on PPI gtt, drop in hgb, due to likely somewhat active bleed likely has not fully equilibrated so have ordered to transfuse 2 units prbc, d/w Dr. Yolis Baker, d/w  Beck for admission to ICU    CRITICAL CARE TIME   Total Critical Care time was 40 minutes, excluding separately reportable procedures. There was a high probability of clinically significant/life threatening deterioration in the patient's condition which required my urgent intervention. CONSULTS:  IP CONSULT TO GI    PROCEDURES:  Unless otherwise noted below, none     Procedures    FINAL IMPRESSION      1. Acute GI bleeding    2. Acute blood loss anemia    3.  Malignant neoplasm of pancreas, unspecified location of malignancy [facility])          DISPOSITION/PLAN   DISPOSITION        PATIENT REFERRED TO:  Andrea Moncada MD  [address] [place] [address] [phone]            DISCHARGE MEDICATIONS:  Current Discharge Medication List             (Please note that portions of this note were completed with a voice recognition program.  Efforts were made to edit thedictations but occasionally words are mis-transcribed.)    Ashley Noble MD (electronically signed)  Attending Emergency Physician        Manjula Cruz MD  02/25/20 [id number]

## 2020-02-25 NOTE — PROGRESS NOTES
4 Eyes Skin Assessment    Dalia Staton is being assessed upon: Admission    I agree that Billy Lane, along with Isaias Jhaveri RN (either 2 RN's or 1 LPN and 1 RN) have performed a thorough Head to Toe Skin Assessment on the patient. ALL assessment sites listed below have been assessed. Areas assessed by both nurses:     [x]   Head, Face, and Ears   [x]   Shoulders, Back, and Chest  [x]   Arms, Elbows, and Hands   [x]   Coccyx, Sacrum, and Ischium  [x]   Legs, Feet, and Heels    Does the Patient have Skin Breakdown?  No    Chris Prevention initiated: NA  Wound Care Orders initiated: NA    [facility] nurse consulted for Pressure Injury (Stage 3,4, Unstageable, DTI, NWPT, and Complex wounds) and New or Established Ostomies: NA        Primary Nurse eSignature: Aurelia Muller RN on 2/25/2020 at 1:40 AM      Co-Signer eSignature: Electronically signed by Isaias Jhaveri RN on 2/25/20 at 1:41 AM

## 2020-02-25 NOTE — CONSULTS
GI Consult Note    Pt Name: Daniella Baumann  MRN: [id number]  [id number]  YOB: 1960  Admit Date: 2020  8:57 PM  Date of evaluation: 2020  Primary Care Physician: Antoinette Vega MD   [id number]       CC: Coffee ground emesis, evaluation for GI bleed    HPI: 59yr female with PMHx pancreatitis, Cat's esophagus, GERD, HTN, pancreatic adenocarcinoma (s/p whipple), and PUD (last EGD in 2019) presented to the hospital with coffee ground emesis and a low Hgb. Prior to coming to [facility], she had intractable vomiting that did not improve and she noticed the coffee ground material, though never gross blood. She denies hematemesis, melena, BRBPR, and hematochezia. In 2019, she did have an EGD performed in which a healing ulcer was present. She is currently working undergoing  chemotherapy for her pancreatic cancer which may be a contributing factor in her lowered Hgb. . Pt seen in  this morning after receiving PRBC transfusion and she did not have any obvious signs of bleeding overnight. Her BM was watery without melena.        Past Medical History:        Diagnosis Date    Acute pancreatitis     Adult BMI <19 kg/sq m     Anemia     Cat esophagus     Biliary obstruction     GERD (gastroesophageal reflux disease)     with Barretts    Hashimoto's thyroiditis     denies takes no med for    Heart murmur     Hypertension     pt denies nor longer takes med for    Low blood sugar     h/o when overweight in the past    MVP (mitral valve prolapse)     Myalgia     Pancreatic adenocarcinoma ([facility] [address] 2018    Dr Wang Sanchez    Pancreatic cancer ([facility] [address] 3/30/2018    Right flank pain     RUQ pain      Past Surgical History:        Procedure Laterality Date    CARDIAC SURGERY      heart cath     SECTION      x 3    CHOLECYSTECTOMY      COLONOSCOPY  years ago    [facility]-Dr Lilian Aguilar per patient    COLONOSCOPY  2014    Dr Jacobo Cough- Normal 10yr recall    COLONOSCOPY  03/10/2016    Dr Mcfarland-10 yr recall    COLONOSCOPY N/A 2019    Dr Roselia Schaefer, 5 yr recall    ELBOW SURGERY Right     ENDOMETRIAL ABLATION      HAND SURGERY Right     HERNIA REPAIR      hiatal    HERNIA REPAIR      umbilical    HERNIA REPAIR      PANCREAS SURGERY  2018    Whipple procedure-Dr Jl Lemus DC EGD [facility] NEEDLE ASPIR/BIOP ALTERED ANATOMY N/A 2018    Dr Vi Win w/fna-Dilation of main pancreatic duct with diffuse change in the pancreatitis noted, area of concern in the neck of the pancreas-strongly suspicious for adenocarcinoma     DC EGD INTRMURAL NEEDLE ASPIR/BIOP ALTERED ANATOMY N/A 3/6/2018    Dr Ketan Whitaker cancer-Ductal adenocarcinoma-staged [id number] by EUS, pancreatic pseudocyst in the tail the pancreas    DC ERCP DX COLLECTION SPECIMEN BRUSHING/WASHING N/A 2018    ERCP-Dr KVNG Duncan-placement of a self-expanding fully covered 10 mm biliary stent    UPPER GASTROINTESTINAL ENDOSCOPY  years ago    [place]-Dr Jewels Diaz    UPPER GASTROINTESTINAL ENDOSCOPY      Zuniga    UPPER GASTROINTESTINAL ENDOSCOPY N/A 2019    Dr Adolph Love post Whipple's procedure with efferent loop ulceration    UPPER GASTROINTESTINAL ENDOSCOPY  2019    Dr Rafael Cushing Jones-Patent G-J Anastomosis, apparent healing ulceration     Social History:   Social History     Tobacco Use    Smoking status: Former Smoker     Packs/day: 0.50     Years: 10.00     Pack years: 5.00     Types: Cigarettes     Last attempt to quit: 2019     Years since quittin.3    Smokeless tobacco: Never Used   Substance Use Topics    Alcohol use: Not Currently     Family History:   Family History   Problem Relation Age of Onset    Heart Attack Mother 46        x 2-had bypass    Hypertension Mother     COPD Father     Liver Cancer Paternal Aunt     Lung Cancer Paternal Aunt     Breast Cancer Maternal Aunt         but  of brain cancer    Diabetes Maternal Uncle     Diabetes zn-polymyx; Clarithromycin; Dilaudid [hydromorphone hcl];  Hydromorphone; Loperamide hcl; Loperamide hcl; and Neosporin [neomycin-polymyx-gramicid]      Current Meds:      sodium chloride flush  10 mL Intravenous 2 times per day    sodium chloride  20 mL Intravenous Once    octreotide  50 mcg Intravenous Once    sodium chloride flush  10 mL Intravenous 2 times per day        sodium chloride 100 mL/hr at 02/25/20 0125    pantoprozole (PROTONIX) infusion 8 mg/hr (02/25/20 0558)    octreotide (SANDOSTATIN) infusion 50 mcg/hr (02/25/20 0530)       PRN Meds:  sodium chloride flush, sodium chloride flush, acetaminophen, acetaminophen, promethazine, ondansetron, bisacodyl        ROS:  ROS NEGATIVE EXCEPT THOSE MARKED WITH AN \"X\"    GENERAL: [] Fevers, [] chills, [x] generalized weakness, [] weight loss, []weight gain, [] anorexia  Skin/Breast: [] jaundice, [] new rashes, [] itching   Eyes/Ears/Nose/Mouth/Throat: [] change in vision, [] double vision, [] light headiness, [] vertigo  CARDIOVASCULAR: [] chest pain, [] palpitations, [] syncope, [] dyspnea on exertion, [] orthopnea  RESPIRATORY: [] SOB, [] cough, [] wheezing, [] hemoptysis  GI: [] dark stools, [] bloody stools, [] BRBPR, [] abdominal pain, [x] GERD like symptoms, [x] nausea, [x] vomiting, [] hematemesis, [] jaundice, [] constipation, [] diarrhea, [] hemorrhoids, [] change in bowel habits, [] bowel incontinence  : [] Dysuria, [] urgency, [] frequency, [] change in urine color, [] discharge  MUSCULOSKELETAL: [] muscle pain, [] muscle swelling, [] joint pain, [] muscle weakness  Neurological/Psychiatric: [] Sensory disturbances, [] motor disturbances, [] difficulty with speech, [] paresthesias, [] paralysis, [] depression, [] anxiety   Allergy/Immunological/Lymphatic/Endocrine: [x] anemia, [] rashes, [] polyuria, [] polydypsia      Physical Exam:  Vitals:    02/25/20 0530 02/25/20 0700 02/25/20 0730 02/25/20 0800   BP: 119/64 101/60 99/60 106/62   Pulse: 65 55 55 54   Resp: 20 14 14 14   Temp:       TempSrc:       SpO2: 98% 98% 98% 97%   Weight:       Height:           Constitutional: [x] NAD, [x] of stated age, [x] well nourished  Eyes: [x] conjunctiva clear, [x] Non inflamed irises, [x] no scleral icterus   ENT/Mouth: [x]  Nares patent with pink mucosa, [x] oropharynx clear without exudates or erythema, [x] hearing grossly normal  Head/Neck: [x] symmetrical, [x] supple  Lungs: [x] respirations non labored with good effort, [x] CTA bilaterally, [x] no respiratory distress  Heart: [x] Bradycardia, [x] pedal pulses preserved 2/4 bilaterally, [x] no LE edema  Abdomen: [x] +BSx4, [x] NTND, [x] soft, [x] no guarding  Muscuoskeletal:  [x]  Normal nails bilaterally, [x] normal digits bilaterally, [x] no muscle atrophy  Skin/SubQ: [x] No jaundice, [x] warm, dry skin, [x] no rashes on inspection   Neurologic: [x]  Sensation grossly intact, [x] no slurred speech, [x]  No focal deficits  Psychiatric: [x]  Orientated to person, place, and time; [x] mood and affect unremarkable, [x] memory recent and remote intact      Labs:     Recent Labs     02/24/20 2130 02/25/20  0605   WBC 8.8  --    RBC 2.09*  --    HGB 6.3* 8.5*   HCT 20.7* 26.3*   MCV 99.0  --    MCH 30.1  --    MCHC 30.4*  --      --      Recent Labs     02/24/20 2130 02/25/20  0605    140   K 4.2 4.3   ANIONGAP 9 7    108   CO2 22 25   BUN 8 8   CREATININE 0.7 0.6   GLUCOSE 187* 97   CALCIUM 7.9* 7.7*     No results for input(s): MG, PHOS in the last 72 hours. Recent Labs     02/24/20 2130   AST 33*   ALT 27   BILITOT <0.2   ALKPHOS 96     HgBA1c:  No components found for: HGBA1C  FLP:  No results found for: TRIG, HDL, LDLCALC, LDLDIRECT, LABVLDL  TSH:  No results found for: TSH  Troponin T: No results for input(s): TROPONINI in the last 72 hours.   INR:   Recent Labs     02/24/20  2130   INR 1.12       Recent Labs     02/24/20  2130   LIPASE 4*       Radiology:  Ct Abdomen Pelvis W Iv Contrast

## 2020-02-25 NOTE — ANESTHESIA PRE PROCEDURE
Provider, MD       Current medications:    No current facility-administered medications for this visit. No current outpatient medications on file. Facility-Administered Medications Ordered in Other Visits   Medication Dose Route Frequency Provider Last Rate Last Dose    0.9 % sodium chloride infusion   Intravenous Continuous Alex Interiano  mL/hr at 02/25/20 1124      sodium chloride flush 0.9 % injection 10 mL  10 mL Intravenous 2 times per day Annie Shelby, DO        sodium chloride flush 0.9 % injection 10 mL  10 mL Intravenous PRN Arianna Jung, DO        pantoprazole (PROTONIX) 80 mg in sodium chloride 0.9 % 100 mL infusion  8 mg/hr Intravenous Continuous Evonne Gordon MD 10 mL/hr at 02/25/20 0558 8 mg/hr at 02/25/20 0558    0.9 % sodium chloride bolus  20 mL Intravenous Once Evonne Gordon MD        octreotide (SANDOSTATIN) injection 50 mcg  50 mcg Intravenous Once Evonne Gordon MD        octreotide (SANDOSTATIN) 500 mcg in sodium chloride 0.9 % 100 mL infusion  50 mcg/hr Intravenous Continuous Evonne Gordon MD 10 mL/hr at 02/25/20 0530 50 mcg/hr at 02/25/20 0530    sodium chloride flush 0.9 % injection 10 mL  10 mL Intravenous 2 times per day Alex Interiano MD        sodium chloride flush 0.9 % injection 10 mL  10 mL Intravenous PRN Alex Interiano MD        acetaminophen (TYLENOL) tablet 650 mg  650 mg Oral Q6H PRN Alex Interiano MD        acetaminophen (TYLENOL) suppository 650 mg  650 mg Rectal Q6H PRN Alex Interiano MD        promethazine (PHENERGAN) tablet 12.5 mg  12.5 mg Oral Q6H PRN Alex Interiano MD        ondansetron [facility]) injection 4 mg  4 mg Intravenous Q6H PRN Alex Interiano MD        bisacodyl (DULCOLAX) suppository 10 mg  10 mg Rectal Daily PRN Alex Interiano MD           Allergies:     Allergies   Allergen Reactions    Codeine Shortness Of Breath     SOB and rash    Morphine Other (See Comments)     Other reaction(s): abd pain  Severe abd. pain    Morphine And Related Nausea Only    Sulfa Antibiotics Shortness Of Breath and Hives     SOB, rash and itching    Neomycin-Bacitracin Zn-Polymyx Rash    Clarithromycin     Dilaudid [Hydromorphone Hcl] Other (See Comments)     \"completely shut me down\"    Hydromorphone     Loperamide Hcl Other (See Comments)     severe abd. pain    Loperamide Hcl Hives     severe abd. pain    Neosporin [Neomycin-Polymyx-Gramicid] Other (See Comments)     Causes boil       Problem List:    Patient Active Problem List   Diagnosis Code    Nausea R11.0    Right sided abdominal pain R10.9    Malignant neoplasm of pancreas (HCC) C25.9    Tobacco abuse Z72.0    Hypertension I10    Dyslipidemia E78.5    Anemia associated with chemotherapy D64.81, T45.1X5A    Chemotherapy-induced thrombocytopenia D69.59, T45.1X5A    Neoplastic malignant related fatigue R53.0    Chemotherapy-induced peripheral neuropathy (HCC) G62.0, T45.1X5A    Diarrhea following gastrointestinal surgery R19.7, Z98.890    Exomphalos Q79.2    Hiatal hernia K44.9    History of acute pancreatitis Z87.19    Mixed hyperlipidemia E78.2    [place] spotted fever A77.0    Severe protein-calorie malnutrition (HCC) E43    Melena K92.1    Pancreatic cancer ([facility] [address] C25.9    Chemotherapy management, encounter for Z51.11    Acute GI bleeding K92.2       Past Medical History:        Diagnosis Date    Acute pancreatitis     Adult BMI <19 kg/sq m     Anemia     Cat esophagus     Biliary obstruction     GERD (gastroesophageal reflux disease)     with Barretts    Hashimoto's thyroiditis     denies takes no med for    Heart murmur     Hypertension     pt denies nor longer takes med for    Low blood sugar     h/o when overweight in the past    MVP (mitral valve prolapse)     Myalgia     Pancreatic adenocarcinoma ([facility] [address] 01/2018    Dr Fritz Oro    Pancreatic cancer ([facility] [address] 3/30/2018    Right flank pain     RUQ pain        Past Surgical History: Procedure Laterality Date    CARDIAC SURGERY      heart cath     SECTION      x 3    CHOLECYSTECTOMY  1997    COLONOSCOPY  years ago    [facility]-Dr Richi Maki per patient    COLONOSCOPY  2014    Dr Laly Grossman- Elena Irene recall    COLONOSCOPY  03/10/2016    Dr Mcfarland-10 yr recall    COLONOSCOPY N/A 2019    Dr Niru Mueller, 5 yr recall    ELBOW SURGERY Right     ENDOMETRIAL ABLATION      HAND SURGERY Right     HERNIA REPAIR      hiatal    HERNIA REPAIR      umbilical    HERNIA REPAIR      PANCREAS SURGERY  2018    Whipple procedure-Dr Sari Chew [place] EGD [facility] NEEDLE ASPIR/BIOP ALTERED ANATOMY N/A 2018    Dr Ana Maria Allen w/fna-Dilation of main pancreatic duct with diffuse change in the pancreatitis noted, area of concern in the neck of the pancreas-strongly suspicious for adenocarcinoma     [place] EGD INTRMURAL NEEDLE ASPIR/BIOP ALTERED ANATOMY N/A 3/6/2018    Dr KVNG Duncan-Pancreatic cancer-Ductal adenocarcinoma-staged [id number] by EUS, pancreatic pseudocyst in the tail the pancreas    [place] ERCP DX COLLECTION SPECIMEN BRUSHING/WASHING N/A 2018    ERCP-Dr KVNG Duncan-placement of a self-expanding fully covered 10 mm biliary stent    UPPER GASTROINTESTINAL ENDOSCOPY  years ago    [facility]-Dr Paul Woo    UPPER GASTROINTESTINAL ENDOSCOPY  2013    Zuniga    UPPER GASTROINTESTINAL ENDOSCOPY N/A 2019    Dr Meme Simms post Whipple's procedure with efferent loop ulceration    UPPER GASTROINTESTINAL ENDOSCOPY  2019    Dr Soraya Duncan-Patent G-J Anastomosis, apparent healing ulceration       Social History:    Social History     Tobacco Use    Smoking status: Former Smoker     Packs/day: 0.50     Years: 10.00     Pack years: 5.00     Types: Cigarettes     Last attempt to quit: 2019     Years since quittin.3    Smokeless tobacco: Never Used   Substance Use Topics    Alcohol use: Not Currently                                Counseling given: Not Answered      Vital Signs (Current): There were no vitals filed for this visit. BP Readings from Last 3 Encounters:   02/25/20 106/62   02/12/20 138/82   01/24/20 121/73       NPO Status:                                                                                 BMI:   Wt Readings from Last 3 Encounters:   02/25/20 112 lb 8 oz (51 kg)   02/12/20 114 lb 14.4 oz (52.1 kg)   01/22/20 118 lb (53.5 kg)     There is no height or weight on file to calculate BMI.    CBC:   Lab Results   Component Value Date    WBC 8.8 02/24/2020    RBC 2.09 02/24/2020    HGB 7.9 02/25/2020    HCT 24.3 02/25/2020    HCT 40.0 06/01/2012    MCV 99.0 02/24/2020    RDW 17.8 02/24/2020     02/24/2020     06/01/2012       CMP:   Lab Results   Component Value Date     02/25/2020     06/01/2012    K 4.3 02/25/2020    K 3.8 06/01/2012     02/25/2020     06/01/2012    CO2 25 02/25/2020    BUN 8 02/25/2020    CREATININE 0.6 02/25/2020    CREATININE 1.0 06/01/2012    GFRAA 98 11/21/2019    AGRATIO 2.3 11/21/2019    LABGLOM >60 02/25/2020    GLUCOSE 97 02/25/2020    PROT 4.8 02/24/2020    PROT 6.9 06/01/2012    CALCIUM 7.7 02/25/2020    BILITOT <0.2 02/24/2020    ALKPHOS 96 02/24/2020    ALKPHOS 97 06/01/2012    AST 33 02/24/2020    ALT 27 02/24/2020       POC Tests: No results for input(s): POCGLU, POCNA, POCK, POCCL, POCBUN, POCHEMO, POCHCT in the last 72 hours.     Coags:   Lab Results   Component Value Date    PROTIME 14.4 02/24/2020    PROTIME 11.9 06/01/2012    INR 1.12 02/24/2020    APTT 36.9 02/24/2020       HCG (If Applicable):   Lab Results   Component Value Date    PREGTESTUR Negative 03/06/2018        ABGs: No results found for: PHART, PO2ART, VEQ4DEU, XPH7TYR, BEART, E7XTNFPN     Type & Screen (If Applicable):  No results found for: CHAPIN [facility]    Anesthesia Evaluation  Patient summary reviewed and Nursing notes reviewed no history of anesthetic complications:   Airway: Mallampati:

## 2020-02-26 LAB
HCT VFR BLD CALC: 24.2 % (ref 37–47)
HCT VFR BLD CALC: 24.5 % (ref 37–47)
HCT VFR BLD CALC: 24.7 % (ref 37–47)
HEMOGLOBIN: 7.8 G/DL (ref 12–16)
HEMOGLOBIN: 7.9 G/DL (ref 12–16)
HEMOGLOBIN: 8 G/DL (ref 12–16)
MRSA CULTURE ONLY: NORMAL

## 2020-02-26 PROCEDURE — 2580000003 HC RX 258: Performed by: INTERNAL MEDICINE

## 2020-02-26 PROCEDURE — C9113 INJ PANTOPRAZOLE SODIUM, VIA: HCPCS | Performed by: INTERNAL MEDICINE

## 2020-02-26 PROCEDURE — 85014 HEMATOCRIT: CPT

## 2020-02-26 PROCEDURE — 99232 SBSQ HOSP IP/OBS MODERATE 35: CPT | Performed by: INTERNAL MEDICINE

## 2020-02-26 PROCEDURE — 6360000002 HC RX W HCPCS: Performed by: INTERNAL MEDICINE

## 2020-02-26 PROCEDURE — 85018 HEMOGLOBIN: CPT

## 2020-02-26 PROCEDURE — 1210000000 HC MED SURG R&B

## 2020-02-26 PROCEDURE — 99231 SBSQ HOSP IP/OBS SF/LOW 25: CPT | Performed by: INTERNAL MEDICINE

## 2020-02-26 RX ORDER — SODIUM CHLORIDE 0.9 % (FLUSH) 0.9 %
10 SYRINGE (ML) INJECTION EVERY 12 HOURS SCHEDULED
Status: DISCONTINUED | OUTPATIENT
Start: 2020-02-26 | End: 2020-02-27 | Stop reason: HOSPADM

## 2020-02-26 RX ORDER — SODIUM CHLORIDE 0.9 % (FLUSH) 0.9 %
10 SYRINGE (ML) INJECTION PRN
Status: DISCONTINUED | OUTPATIENT
Start: 2020-02-26 | End: 2020-02-27 | Stop reason: HOSPADM

## 2020-02-26 RX ADMIN — SODIUM CHLORIDE: 9 INJECTION, SOLUTION INTRAVENOUS at 08:24

## 2020-02-26 RX ADMIN — OCTREOTIDE ACETATE 50 MCG/HR: 500 INJECTION, SOLUTION INTRAVENOUS; SUBCUTANEOUS at 22:57

## 2020-02-26 RX ADMIN — SODIUM CHLORIDE 8 MG/HR: 9 INJECTION, SOLUTION INTRAVENOUS at 04:04

## 2020-02-26 RX ADMIN — OCTREOTIDE ACETATE 50 MCG/HR: 500 INJECTION, SOLUTION INTRAVENOUS; SUBCUTANEOUS at 10:28

## 2020-02-26 RX ADMIN — SODIUM CHLORIDE 8 MG/HR: 9 INJECTION, SOLUTION INTRAVENOUS at 13:43

## 2020-02-26 RX ADMIN — SODIUM CHLORIDE 8 MG/HR: 9 INJECTION, SOLUTION INTRAVENOUS at 23:08

## 2020-02-26 RX ADMIN — SODIUM CHLORIDE, PRESERVATIVE FREE 10 ML: 5 INJECTION INTRAVENOUS at 20:16

## 2020-02-26 ASSESSMENT — ENCOUNTER SYMPTOMS
VOMITING: 1
COUGH: 0
SHORTNESS OF BREATH: 0
DIARRHEA: 0
BACK PAIN: 0
CONSTIPATION: 0
NAUSEA: 1

## 2020-02-26 ASSESSMENT — PAIN SCALES - GENERAL: PAINLEVEL_OUTOF10: 0

## 2020-02-26 NOTE — PROGRESS NOTES
°C), temperature source Temporal, resp. rate 14, height 5' 7\" (1.702 m), weight 120 lb 12.8 oz (54.8 kg), SpO2 97 %. Intake/Output Summary (Last 24 hours) at 2/26/2020 0844  Last data filed at 2/26/2020 0500  Gross per 24 hour   Intake 2470 ml   Output --   Net 2470 ml       Physical Exam  Vitals signs reviewed. Constitutional:       Appearance: She is well-developed. She is ill-appearing. HENT:      Head: Normocephalic and atraumatic. Eyes:      Conjunctiva/sclera: Conjunctivae normal.      Pupils: Pupils are equal, round, and reactive to light. Cardiovascular:      Rate and Rhythm: Normal rate and regular rhythm. Heart sounds: Normal heart sounds. Pulmonary:      Effort: Pulmonary effort is normal.      Breath sounds: Normal breath sounds. Abdominal:      General: Abdomen is flat. Palpations: Abdomen is soft. Musculoskeletal: Normal range of motion. Skin:     General: Skin is warm and dry. Neurological:      Mental Status: She is alert and oriented to person, place, and time. Labs:  BMP:   Recent Labs     02/24/20 2130 02/25/20  0605    140   K 4.2 4.3    108   CO2 22 25   BUN 8 8   CREATININE 0.7 0.6   CALCIUM 7.9* 7.7*     CBC:   Recent Labs     02/24/20 2130 02/25/20  1255 02/25/20  1830 02/26/20  0210   WBC 8.8  --   --   --   --    HGB 6.3*   < > 7.9* 7.8* 7.9*   HCT 20.7*   < > 24.3* 24.1* 24.7*   MCV 99.0  --   --   --   --      --   --   --   --     < > = values in this interval not displayed. LIVER PROFILE:   Recent Labs     02/24/20 2130   AST 33*   ALT 27   LIPASE 4*   BILITOT <0.2   ALKPHOS 96     PT/INR:   Recent Labs     02/24/20 2130   PROTIME 14.4   INR 1.12     APTT:   Recent Labs     02/24/20 2130   APTT 36.9*     BNP:  No results for input(s): BNP in the last 72 hours. Ionized Calcium:No results for input(s): IONCA in the last 72 hours. Magnesium:No results for input(s): MG in the last 72 hours.   Phosphorus:No results for input(s): PHOS in the last 72 hours. HgbA1C: No results for input(s): LABA1C in the last 72 hours. Hepatic:   Recent Labs     02/24/20  2130   ALKPHOS 96   ALT 27   AST 33*   PROT 4.8*   BILITOT <0.2   LABALBU 3.0*     Lactic Acid: No results for input(s): LACTA in the last 72 hours. Troponin: No results for input(s): CKTOTAL, CKMB, TROPONINT in the last 72 hours. ABGs: No results for input(s): PH, PCO2, PO2, HCO3, O2SAT in the last 72 hours. CRP:  No results for input(s): CRP in the last 72 hours. Sed Rate:  No results for input(s): SEDRATE in the last 72 hours. Cultures:   No results for input(s): CULTURE in the last 72 hours. No results for input(s): BC, Raymona Blind in the last 72 hours. No results for input(s): CXSURG in the last 72 hours. Radiology reports as per the Radiologist  Radiology: Ct Abdomen Pelvis W Iv Contrast Additional Contrast? None    Result Date: 2/25/2020  Examination. CT ABDOMEN PELVIS W IV CONTRAST 2/24/2020 8:45 PM History: Abdominal pain and hematemesis. DLP: 764 mGycm. The CT scan of the abdomen and pelvis is performed after intravenous contrast enhancement. The images are acquired in axial plane with subsequent reconstruction in coronal and sagittal planes. The comparison is made with the previous study dated 2/28/2018. The lung bases included in the study appears unremarkable. The limited visualized cardiomediastinal structures are normal. There is evidence of pneumobilia suggesting reflux from incompetent sphincter on a prior surgical diversion. There is geographic fatty infiltration more pronounced in the lower arterial. There is a small area of focal enhancement located anteriorly in the dome of the liver, image #9 and 10 in axial plane and image #14 and 15 in coronal plane. The etiology is not certain. The spleen appears normal. The left of the pancreas is visualized without evidence of ductal dilatation.  The body and head are not optimally visualized due to due to the common iliac vein. This may also represent a flow phenomena. Further follow-up may be obtained. A small ill-defined area of focal enhancement in the dome of the liver may represent a small hemangioma. The above study was initially reviewed and reported by stat rad[facility]. No critical discrepancies was noted. A significant discrepancy mentioned above was reported to the care provider immediately. Signed by Dr Sam Barakat on 2/25/2020 8:59 AM       Assessment     Coffee-ground emesis. EGD noted will repeat remains on protonix gtt and octreaotide     Blood loss anemia. Status post transfusion, stable     History of pancreatic carcinoma.   Status post Whipple procedure, oncology on board     Transfer to floor if ok with GI     SCDs  Fani Wynn MD

## 2020-02-26 NOTE — PROGRESS NOTES
114  · 7/9/2019- CA-19-9 = 225  · 8/6/2019- CA-19-9 = 171  · 9/3/3960-FA-85-9 = 198  · 9/13/20190667-FL-72-9 = 98 (at [facility])  · 10/29/2033-QH-53-9 =317  · 11/21/2019-CA-19-9 = 183  · 1/23/20208058-WD-35-9 = 520        Interval history  Ms Monae Pierre is s/p adjuvant FOLFORINOX 5 biweekly cycles followed by XRT/Xeloda and whipple procedure at [facility] for what was thought to be a localized pancreatic cancer.  Unfortunately, she was found to have intra-abdominal disease recurrence and was recommended second line treatment with Gemzar/Abraxane. 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  She has been tolerating Gemzar/Abraxane with mild/moderate hematologic toxicity, fatigue and neuropathy.  She has also had recurrent GI bleed secondary to ulcer at the anastomotic site of her prior Whipple procedure.  She was seen at [facility] recently and had CT scans that showed disease progression.  At [facility] they are looking at a possibility of clinical trials. The patient overall feels well. Brittany Price weight has been stable.  She denies any pain.  She denies any new episodes of melena or hematochezia.  Her hemoglobin has stabilized at 10.6. Brittany Price last blood transfusion was 3-4 weeks ago.        Cancer history  Ms Chrissie Griffiths was first seen by me on 3/12/2018 referred by Dr. Denisha Garcia for a diagnosis of pancreatic adenocarcinoma. She was initially evaluated for acute pancreatitis at [facility] on February 2018. Further imaging revealed a pancreatic head mass. Lipase was elevated at 1184 and CA-19-9 elevated 134. The symptoms were going on for several weeks. Weight loss of 10-12 pounds over the last 6 months. · October 2017-CT abdomen without contrast was unremarkable. · 2/2/2018-ultrasound of abdomen showed a pancreatic duct dilation   · 2/02/2018-CT abdomen showed 16 mm low-density lesion in the pancreas at the junction of the head of the body. No intra-abdominal adenopathy.  No liver metastasis. · 2/7/2018-the patient underwent EGD/EUS and FNA biopsy of a pancreatic mass by Dr. Chaim Dodge at [facility] . EUS showed inflammatory changes consistent with a recent history of pancreatitis. Main pancreatic duct was dilated. No concerning peripancreatic adenopathy. No definite mass was seen by a more diffuse hypoechoic area measuring 1.8 x 2.2 cm in the head of the pancreas. Pathology was consistent with a group of markedly atypical cells strongly suspicious for adenocarcinoma. · 2/28/2018-CT pancreatic protocol showed a poorly defined area of decreased enhancement located in the head of the pancreas measuring 1.8 x 1.4 x 1.7 cm with moderate meditation of the pancreatic duct. Well defined sharply marginated low density nodule located in the tail of the pancreas measuring 1.5 x 1.3 x 1.5 cm (IPMN?). · 3/6/2018-CA 19-9 was elevated at 150. Repeat EGD was performed by Dr. Kinza Acevedo due to the inconclusive FNA resulted on 2/7/2018. Pathology FNA was consistent with pancreatic adenocarcinoma. · 3/12/2018-she was first seen by me. No evidence of distant disease. Referral to Surgical oncology. CT chest to complete staging. CA-19-9 430. · 3/16/2018-CT of the chest was unremarkable for intrathoracic metastatic disease   · Surgery consultation at [facility] March 2018- with Dr Saadia Pantoja. She was not a surgical candidate upfront. Recommended neoadjuvant chemotherapy. · 4/3/2018-started on neoadjuvant chemotherapy with FOLFORINOX. · 4/11/2018-she developed CBD obstruction had a CBD stent placed by Dr. Chaim Dodge. · 4/3/2018 through 6/4/2018 Neoadjuvant chemotherapy FOLFORINOX ×5 Biweekly cycles   · August 2018- XRT 30 Gy/Xeloda   · 08/30/3018- Whipple procedure at [facility] [place] [place] Dr. Lorretta Hatchet  · Recommended another 7 biweekly cycles of modified FOLFORINOX. · 9/5/2018-CT of the chest abdomen pelvis was unremarkable for metastatic disease.    · 1/14/2019-CT abdomen and pelvis at [facility] showed no evidence of metastasis in the chest abdomen pelvis. · 5/21/2019-CT chest, abdomen, pelvis at [facility] showed no evidence of metastatic disease   · 5/21/20195996-ZL-14-9 = 42   · 06/12/2019- CA-19-9 = 154. Discussed with the patient. We'll also discuss with [facility]. · 7/1/2019-CT chest, abdomen, pelvis showed a soft tissue mass measuring 1.4 x 1.1 cm, not present on prior scan from January 2019, concerning for recurrence. Stable hypodense in the liver, too small to characterize. Subcentimeter ill-defined area of low attenuation liver may represent an early metastasis. · 7/3/2019-recommended palliative chemotherapy with nab paclitaxel 125mg/m² IV over 30 minutes on day 1 and gemcitabine 600 mg/m² IV over 10mg/m2/min (fixed dose rate) on day 1  · 7/2/2019-CA 19 9 = 114  · 7/6/2019- extended genetic panel negative for a deleterious mutation (invitae)  · 7/9/2019- CA-19-9 = 225  · 8/6/2019- CA-19-9 = 171  · 9/3/8721-MR-81-9 = 198   · 9/13/20199475-AR-43-9 = 98 at [facility]  · 9/13/2019-CT chest abdomen pelvis at [facility] showed a soft tissue thickening in the pancreatic bed with persistent narrowing of the portal SMV confluence.  Superimposed tumor remains a possibility.  The new low-density lesion in the periphery of the liver seen on the prior exam is no longer appreciated and likely represents treatment effect.  Nodular enhancement may represent perfusion change in the region on image 187. · 9/13/2018- she was recommended to continue current treatment with Gemzar/Abraxane  · 11/1/2019- she was hospitalized with GI bleed. · 11/1/2019-upper endoscopy showed ulceration at the efferent loop of the Whipple's procedure  · 10/29/0295-FA-92-9 =317  · 11/21/2019-CA-19-9 = 183  · 11/19/2019- CT chest abdomen pelvis showed no evidence of gross disease progression. Improvement of the caliber of what may be a middle colic vein that drains back to the SMV.  There is still persistent narrowing of the of the upper SMV kg/m²     PHYSICAL EXAM:  CONSTITUTIONAL: Alert, appropriate, no acute distress  EYES: Non icteric, EOM intact, pupils equal round   ENT: Mucus membranes moist, no oral pharyngeal lesions, external inspection of ears and nose are normal  NECK: Supple, no masses. No palpable thyroid mass  CHEST/LUNGS: CTA bilaterally, normal respiratory effort   CARDIOVASCULAR: RRR, no murmurs. No lower extremity edema  ABDOMEN: soft non-tender, active bowel sounds, no HSM. No palpable masses  EXTREMITIES: warm, full ROM in all 4 extremities, no focal weakness. SKIN: warm, dry with no rashes or lesions  LYMPH: No cervical, clavicular, axillary, or inguinal lymphadenopathy  NEUROLOGIC: follows commands, non focal   PSYCH: mood and affect appropriate. Alert and oriented to time, place, person        LABORATORY RESULTS REVIEWED BY ME:  Recent Labs     02/26/20  0210 02/25/20  1830 02/25/20  1255  02/24/20  2130   WBC  --   --   --   --  8.8   HGB 7.9* 7.8* 7.9*   < > 6.3*   HCT 24.7* 24.1* 24.3*   < > 20.7*   MCV  --   --   --   --  99.0   PLT  --   --   --   --  156    < > = values in this interval not displayed. Lab Results   Component Value Date     02/25/2020    K 4.3 02/25/2020     02/25/2020    CO2 25 02/25/2020    BUN 8 02/25/2020    CREATININE 0.6 02/25/2020    GLUCOSE 97 02/25/2020    CALCIUM 7.7 (L) 02/25/2020    PROT 4.8 (L) 02/24/2020    LABALBU 3.0 (L) 02/24/2020    BILITOT <0.2 02/24/2020    ALKPHOS 96 02/24/2020    AST 33 (H) 02/24/2020    ALT 27 02/24/2020    LABGLOM >60 02/25/2020    GFRAA 98 11/21/2019    AGRATIO 2.3 (H) 11/21/2019    GLOB 1.7 11/21/2019       Lab Results   Component Value Date    INR 1.12 02/24/2020    INR 1.01 10/31/2019    INR 0.91 06/01/2012    PROTIME 14.4 02/24/2020    PROTIME 12.7 10/31/2019    PROTIME 11.9 06/01/2012       RADIOLOGY STUDIES REVIEWED BY ME:  Ct Abdomen Pelvis W Iv Contrast Additional Contrast? None    Result Date: 2/25/2020  Examination.  CT ABDOMEN PELVIS paracolic gutter bilaterally. Atheromatous changes of the abdominal aorta and iliac arteries are seen. No aneurysmal dilatation. There is a filling defect in the right common iliac vein which may represent a thrombus. There is poor opacification of the inferior vena cava which is decompressed and flattened. There is poor opacification of the superior mesenteric vein. The portal vein and branches appears normal. There is no evidence of abdominal or pelvic lymphadenopathy. Chronic degenerative changes of the lumbar spine are seen with a mild levoscoliosis. No focal bony lesion or bone destruction. Postsurgical changes of the pancreas, the proximal duodenum and the common bile duct (pancreaticoduodenectomy/Whipple procedure). Diffuse infiltration of the mesentery/omentum and a small amount of fluid in the paracolic gutter, the abdomen and pelvis probably represent small ascites. Pneumobilia. Moderate diffuse edema along the course of the small bowel may suggest enteritis. Suspected thrombosis of the common iliac vein. This may also represent a flow phenomena. Further follow-up may be obtained. A small ill-defined area of focal enhancement in the dome of the liver may represent a small hemangioma. The above study was initially reviewed and reported by stat rads. No critical discrepancies was noted. A significant discrepancy mentioned above was reported to the care provider immediately. Signed by Dr Kera Miranda on 2/25/2020 8:59 AM       ASSESSMENT:  #Acute anemia- secondary to GI bleed. She had a baseline anemia secondary to chemotherapy as well. Hemoglobin 6.5 at admission. She received 2 units PRBCs. Hemoglobin 8.9 this morning. Unsuccessful EGD yesterday due to residual food in the stomach. Planning repeat EGD 24-48 hours    #Pancreatic adenocarcinoma- status post progression of 2 chemotherapy lines.   She was recently seen at [facility] and recommended 5-FU/liposomal irinotecan  #GI bleed- no ulceration in the efferent limb of the G-J anastomosis. Last EGD December 2019.     PLAN:  Awaiting repeat EGD  Continue supportive care  Continue H/H monitoring  Transfuse for hemoglobin below 7     I have seen, examined and reviewed this patient medication list, appropriate labs and imaging studies. I reviewed relevant medical records and others physicians notes. I discussed the plans of care with the patient.  I answered all the questions to the patients satisfaction.     (Please note that portions of this note were completed with a voice recognition program. Efforts were made to edit the dictations but occasionally words are mis-transcribed.)    Veronica Quintanilla MD    02/26/20  6:58 AM

## 2020-02-26 NOTE — PROGRESS NOTES
GI  - PROGRESS NOTE    Subjective:   Admit Date: 2/24/2020  PCP: Cora Hernandez MD    CC: Coffee ground emesis, evaluation for GI bleed    Pt seen and examined. Pt is hemodynamically stable in the ICU without melena, BRBPR, and abdominal pain. Medications:  Scheduled Meds:   sodium chloride flush  10 mL Intravenous 2 times per day    sodium chloride flush  10 mL Intravenous 2 times per day    sodium chloride  20 mL Intravenous Once    octreotide  50 mcg Intravenous Once    sodium chloride flush  10 mL Intravenous 2 times per day       Continuous Infusions:   sodium chloride 100 mL/hr at 02/26/20 0824    pantoprozole (PROTONIX) infusion 8 mg/hr (02/26/20 1343)    octreotide (SANDOSTATIN) infusion 50 mcg/hr (02/26/20 1028)       PRN Meds:.sodium chloride flush, sodium chloride flush, sodium chloride flush, acetaminophen, acetaminophen, promethazine, ondansetron, bisacodyl    Allergies: Codeine; Morphine; Morphine and related; Sulfa antibiotics; Neomycin-bacitracin zn-polymyx; Clarithromycin; Dilaudid [hydromorphone hcl]; Hydromorphone; Loperamide hcl; Loperamide hcl; and Neosporin [neomycin-polymyx-gramicid]    Labs:     Recent Labs     02/24/20 2130 02/25/20  1830 02/26/20  0210 02/26/20  1235   WBC 8.8  --   --   --   --    RBC 2.09*  --   --   --   --    HGB 6.3*   < > 7.8* 7.9* 7.8*   HCT 20.7*   < > 24.1* 24.7* 24.2*   MCV 99.0  --   --   --   --    MCH 30.1  --   --   --   --    MCHC 30.4*  --   --   --   --      --   --   --   --     < > = values in this interval not displayed. Recent Labs     02/24/20 2130 02/25/20  0605    140   K 4.2 4.3   ANIONGAP 9 7    108   CO2 22 25   BUN 8 8   CREATININE 0.7 0.6   GLUCOSE 187* 97   CALCIUM 7.9* 7.7*     No results for input(s): MG, PHOS in the last 72 hours.   Recent Labs     02/24/20  2130   AST 33*   ALT 27   BILITOT <0.2   ALKPHOS 96     HgBA1c:  No components found for: HGBA1C  FLP:  No results found for: TRIG, HDL, LDLCALC, LDLDIRECT, LABVLDL  TSH:  No results found for: TSH  Troponin T: No results for input(s): TROPONINI in the last 72 hours. INR:   Recent Labs     02/24/20 2130   INR 1.12       Recent Labs     02/24/20 2130   LIPASE 4*     -----------------------------------------------------------------  RAD:   Ct Abdomen Pelvis W Iv Contrast Additional Contrast? None    Result Date: 2/25/2020  Examination. CT ABDOMEN PELVIS W IV CONTRAST 2/24/2020 8:45 PM History: Abdominal pain and hematemesis. DLP: 764 mGycm. The CT scan of the abdomen and pelvis is performed after intravenous contrast enhancement. The images are acquired in axial plane with subsequent reconstruction in coronal and sagittal planes. The comparison is made with the previous study dated 2/28/2018. The lung bases included in the study appears unremarkable. The limited visualized cardiomediastinal structures are normal. There is evidence of pneumobilia suggesting reflux from incompetent sphincter on a prior surgical diversion. There is geographic fatty infiltration more pronounced in the lower arterial. There is a small area of focal enhancement located anteriorly in the dome of the liver, image #9 and 10 in axial plane and image #14 and 15 in coronal plane. The etiology is not certain. The spleen appears normal. The left of the pancreas is visualized without evidence of ductal dilatation. The body and head are not optimally visualized due to due to previous surgical resection (history of pancreaticoduodenectomy/Whipple procedure.) Anterior glands bilaterally appear normal. Kidneys bilaterally are normal. The ureters are not optimally visualized due to diffuse infiltration of the peritoneum. The urinary bladder is decompressed and may not be optimally evaluated. No intrinsic abnormality. Normal size uterus is seen. There are tortuous dilated pelvic vessels around the uterus with prominent bilateral median veins. No finding to suggest obstruction.  The stomach is distended with fluid and ingested material. The small bowel is nondistended but appears moderately hyperemic thickening of the walls. No finding to suggest appendicitis. Moderate gas and stool are seen in the colon. No finding to suggest obstruction. There is small amount of fluid in the lower abdomen and pelvis. There is diffuse infiltration of the mesentery/omentum suggesting edema. There is a tiny fluid in the paracolic gutter bilaterally. Atheromatous changes of the abdominal aorta and iliac arteries are seen. No aneurysmal dilatation. There is a filling defect in the right common iliac vein which may represent a thrombus. There is poor opacification of the inferior vena cava which is decompressed and flattened. There is poor opacification of the superior mesenteric vein. The portal vein and branches appears normal. There is no evidence of abdominal or pelvic lymphadenopathy. Chronic degenerative changes of the lumbar spine are seen with a mild levoscoliosis. No focal bony lesion or bone destruction. Postsurgical changes of the pancreas, the proximal duodenum and the common bile duct (pancreaticoduodenectomy/Whipple procedure). Diffuse infiltration of the mesentery/omentum and a small amount of fluid in the paracolic gutter, the abdomen and pelvis probably represent small ascites. Pneumobilia. Moderate diffuse edema along the course of the small bowel may suggest enteritis. Suspected thrombosis of the common iliac vein. This may also represent a flow phenomena. Further follow-up may be obtained. A small ill-defined area of focal enhancement in the dome of the liver may represent a small hemangioma. The above study was initially reviewed and reported by stat rads. No critical discrepancies was noted. A significant discrepancy mentioned above was reported to the care provider immediately.  Signed by Dr Vanessa Smith on 2/25/2020 8:59 AM      Physical Exam:     Vitals:    02/26/20 1100 02/26/20 1200

## 2020-02-27 ENCOUNTER — ANESTHESIA EVENT (OUTPATIENT)
Dept: ENDOSCOPY | Age: 60
DRG: 378 | End: 2020-02-27
Payer: COMMERCIAL

## 2020-02-27 ENCOUNTER — ANESTHESIA (OUTPATIENT)
Dept: ENDOSCOPY | Age: 60
DRG: 378 | End: 2020-02-27
Payer: COMMERCIAL

## 2020-02-27 ENCOUNTER — CLINICAL DOCUMENTATION (OUTPATIENT)
Dept: HEMATOLOGY | Age: 60
End: 2020-02-27

## 2020-02-27 VITALS
SYSTOLIC BLOOD PRESSURE: 127 MMHG | HEART RATE: 47 BPM | TEMPERATURE: 98 F | RESPIRATION RATE: 14 BRPM | DIASTOLIC BLOOD PRESSURE: 72 MMHG | WEIGHT: 120.8 LBS | HEIGHT: 67 IN | BODY MASS INDEX: 18.96 KG/M2 | OXYGEN SATURATION: 98 %

## 2020-02-27 VITALS
RESPIRATION RATE: 18 BRPM | DIASTOLIC BLOOD PRESSURE: 53 MMHG | SYSTOLIC BLOOD PRESSURE: 103 MMHG | OXYGEN SATURATION: 100 %

## 2020-02-27 LAB
HCT VFR BLD CALC: 23.7 % (ref 37–47)
HCT VFR BLD CALC: 25.1 % (ref 37–47)
HEMOGLOBIN: 7.6 G/DL (ref 12–16)
HEMOGLOBIN: 8.2 G/DL (ref 12–16)

## 2020-02-27 PROCEDURE — 3609017100 HC EGD: Performed by: INTERNAL MEDICINE

## 2020-02-27 PROCEDURE — 85014 HEMATOCRIT: CPT

## 2020-02-27 PROCEDURE — 6360000002 HC RX W HCPCS: Performed by: NURSE ANESTHETIST, CERTIFIED REGISTERED

## 2020-02-27 PROCEDURE — 43235 EGD DIAGNOSTIC BRUSH WASH: CPT | Performed by: INTERNAL MEDICINE

## 2020-02-27 PROCEDURE — 0DJ08ZZ INSPECTION OF UPPER INTESTINAL TRACT, VIA NATURAL OR ARTIFICIAL OPENING ENDOSCOPIC: ICD-10-PCS | Performed by: INTERNAL MEDICINE

## 2020-02-27 PROCEDURE — 85018 HEMOGLOBIN: CPT

## 2020-02-27 PROCEDURE — 3700000000 HC ANESTHESIA ATTENDED CARE: Performed by: INTERNAL MEDICINE

## 2020-02-27 PROCEDURE — 99231 SBSQ HOSP IP/OBS SF/LOW 25: CPT | Performed by: INTERNAL MEDICINE

## 2020-02-27 PROCEDURE — 2580000003 HC RX 258: Performed by: ANESTHESIOLOGY

## 2020-02-27 PROCEDURE — 3700000001 HC ADD 15 MINUTES (ANESTHESIA): Performed by: INTERNAL MEDICINE

## 2020-02-27 PROCEDURE — 2580000003 HC RX 258: Performed by: INTERNAL MEDICINE

## 2020-02-27 PROCEDURE — C9113 INJ PANTOPRAZOLE SODIUM, VIA: HCPCS | Performed by: INTERNAL MEDICINE

## 2020-02-27 PROCEDURE — 6370000000 HC RX 637 (ALT 250 FOR IP): Performed by: INTERNAL MEDICINE

## 2020-02-27 PROCEDURE — 6360000002 HC RX W HCPCS: Performed by: HOSPITALIST

## 2020-02-27 PROCEDURE — 7100000000 HC PACU RECOVERY - FIRST 15 MIN: Performed by: INTERNAL MEDICINE

## 2020-02-27 PROCEDURE — 7100000001 HC PACU RECOVERY - ADDTL 15 MIN: Performed by: INTERNAL MEDICINE

## 2020-02-27 PROCEDURE — 6360000002 HC RX W HCPCS: Performed by: INTERNAL MEDICINE

## 2020-02-27 PROCEDURE — 2500000003 HC RX 250 WO HCPCS: Performed by: NURSE ANESTHETIST, CERTIFIED REGISTERED

## 2020-02-27 RX ORDER — SUCRALFATE 1 G/1
1 TABLET ORAL EVERY 6 HOURS SCHEDULED
Status: DISCONTINUED | OUTPATIENT
Start: 2020-02-27 | End: 2020-02-27 | Stop reason: HOSPADM

## 2020-02-27 RX ORDER — HEPARIN SODIUM (PORCINE) LOCK FLUSH IV SOLN 100 UNIT/ML 100 UNIT/ML
300 SOLUTION INTRAVENOUS ONCE
Status: COMPLETED | OUTPATIENT
Start: 2020-02-27 | End: 2020-02-27

## 2020-02-27 RX ORDER — LIDOCAINE HYDROCHLORIDE 20 MG/ML
INJECTION, SOLUTION INFILTRATION; PERINEURAL PRN
Status: DISCONTINUED | OUTPATIENT
Start: 2020-02-27 | End: 2020-02-27 | Stop reason: SDUPTHER

## 2020-02-27 RX ORDER — SODIUM CHLORIDE, SODIUM LACTATE, POTASSIUM CHLORIDE, CALCIUM CHLORIDE 600; 310; 30; 20 MG/100ML; MG/100ML; MG/100ML; MG/100ML
INJECTION, SOLUTION INTRAVENOUS CONTINUOUS
Status: DISCONTINUED | OUTPATIENT
Start: 2020-02-27 | End: 2020-02-27 | Stop reason: HOSPADM

## 2020-02-27 RX ORDER — PANTOPRAZOLE SODIUM 40 MG/1
40 TABLET, DELAYED RELEASE ORAL 2 TIMES DAILY
Qty: 60 TABLET | Refills: 3 | Status: SHIPPED | OUTPATIENT
Start: 2020-02-27 | End: 2020-04-22 | Stop reason: SDUPTHER

## 2020-02-27 RX ORDER — PROPOFOL 10 MG/ML
INJECTION, EMULSION INTRAVENOUS PRN
Status: DISCONTINUED | OUTPATIENT
Start: 2020-02-27 | End: 2020-02-27 | Stop reason: SDUPTHER

## 2020-02-27 RX ADMIN — PROPOFOL 100 MG: 10 INJECTION, EMULSION INTRAVENOUS at 13:00

## 2020-02-27 RX ADMIN — SODIUM CHLORIDE, POTASSIUM CHLORIDE, SODIUM LACTATE AND CALCIUM CHLORIDE: 600; 310; 30; 20 INJECTION, SOLUTION INTRAVENOUS at 12:13

## 2020-02-27 RX ADMIN — OCTREOTIDE ACETATE 50 MCG/HR: 500 INJECTION, SOLUTION INTRAVENOUS; SUBCUTANEOUS at 09:19

## 2020-02-27 RX ADMIN — HEPARIN 300 UNITS: 100 SYRINGE at 17:00

## 2020-02-27 RX ADMIN — SODIUM CHLORIDE 8 MG/HR: 9 INJECTION, SOLUTION INTRAVENOUS at 09:19

## 2020-02-27 RX ADMIN — SUCRALFATE 1 G: 1 TABLET ORAL at 14:36

## 2020-02-27 RX ADMIN — SODIUM CHLORIDE, PRESERVATIVE FREE 10 ML: 5 INJECTION INTRAVENOUS at 08:27

## 2020-02-27 RX ADMIN — LIDOCAINE HYDROCHLORIDE 40 MG: 20 INJECTION, SOLUTION INFILTRATION; PERINEURAL at 13:00

## 2020-02-27 ASSESSMENT — ENCOUNTER SYMPTOMS
COUGH: 0
VOMITING: 1
NAUSEA: 1
BACK PAIN: 0
CONSTIPATION: 0
DIARRHEA: 0
SHORTNESS OF BREATH: 0

## 2020-02-27 ASSESSMENT — LIFESTYLE VARIABLES: SMOKING_STATUS: 0

## 2020-02-27 NOTE — PROGRESS NOTES
24 hour chart check complete    Electronically signed by Bruno Talamantes RN on 2/26/2020 at 11:30 PM

## 2020-02-27 NOTE — PROGRESS NOTES
PROGRESS NOTE    Pt Name: Segundo Pablo  MRN: [id number]  YOB: 1960  Date of evaluation: 2/27/2020    Subjective  Denies any further episodes of hematemesis. Up and ambulating in nguyen. Anticipating repeat EGD today. HISTORY OF PRESENT ILLNESS:       The patient is a pleasant 64years old female who is established in clinic with us. She has a diagnosis of pancreatic adenocarcinoma. She initially underwent neoadjuvant chemotherapy followed by surgery at [facility]. She has been receiving palliative chemotherapy for recurrent disease. The patient has had issues with GI bleed due to an ulcer in the anastomosis site. She has been seen by Dr. Tarah Jarrett and had upper EGDs on several occasions. She presented yesterday to the emergency department with complaints of fatigue and hematemesis. Apparently, she also has lower GI bleed. She had several episodes of coffee-ground emesis. GI has been consulted. Last EGD on 12/17/2019 showed healing ulceration at the G-J anastomosis.       INTERVAL HISTORY/HISTORY OF PRESENT ILLNESS:  Diagnosis  · Pancreatic adenocarcinoma, January 2018   · [id number]  · 7/6/2019-extended genetic panel-negative for a deleterious mutation  Treatment summary  · March 2018-Surgical consultation at [address] [place] [address] Road operable   · 4/3/2018 through 6/4/2018 Neoadjuvant chemotherapy FOLFORINOX ×5 Biweekly cycles   · 4/11/2018-Biliary stent   · August 2018- XRT 30 Gy/Xeloda   · 08/30/3018- [facility]ipple procedure @ [facility]   · Recommended another 5 biweekly cycles of modified FOLFORINOX completed 12/26/2018   · 7/9/2019-Gemzar/Abraxane 125 mg/m2 q 14 days  Tumor marker  · 3/12/7083-JS-27-9->430->370. · 4/30/2018 - CA 19- 9 of 157   · 5/25/20180786-MA-34-9->32 after 4 cycles.    · 08/02/2018- -> 8   · 10/01/2018- CA 19-9 -5   · 10/29/2018-CA 19-9- 7   · 12/3/4545-XU-13-9-7   · 04/11/2019-CA-19-9- 12   · 05/21/2019- CA 19-9- 41   · 7/2/2019-CA 19 9 = 114  · 7/9/2019- CA-19-9 = 225  · 8/6/2019- CA-19-9 = 171  · 9/3/2768-YB-25-9 = 198  · 9/13/20191810-DD-89-9 = 98 (at [facility])  · 10/29/9125-GV-68-9 =317  · 11/21/2019-CA-19-9 = 183  · 1/23/20205475-WW-24-9 = 520        Interval history  Ms Sterling Wolff is s/p adjuvant FOLFORINOX 5 biweekly cycles followed by XRT/Xeloda and whipple procedure at [facility] for what was thought to be a localized pancreatic cancer.  Unfortunately, she was found to have intra-abdominal disease recurrence and was recommended second line treatment with Gemzar/Abraxane. Claudia Agosto has significant neuropathy from prior FOLFIRINOX regimen.  She has been tolerating Gemzar/Abraxane with mild/moderate hematologic toxicity, fatigue and neuropathy.  She has also had recurrent GI bleed secondary to ulcer at the anastomotic site of her prior Whipple procedure.  She was seen at [facility] recently and had CT scans that showed disease progression.  At [facility] they are looking at a possibility of clinical trials. The patient overall feels well. Diallo Scott weight has been stable.  She denies any pain.  She denies any new episodes of melena or hematochezia.  Her hemoglobin has stabilized at 10.6. Diallo Scott last blood transfusion was 3-4 weeks ago.        Cancer history  Ms Cesar Tran was first seen by me on 3/12/2018 referred by Dr. Alena Woody for a diagnosis of pancreatic adenocarcinoma. She was initially evaluated for acute pancreatitis at [facility] on February 2018. Further imaging revealed a pancreatic head mass. Lipase was elevated at 1184 and CA-19-9 elevated 134. The symptoms were going on for several weeks. Weight loss of 10-12 pounds over the last 6 months. · October 2017-CT abdomen without contrast was unremarkable. · 2/2/2018-ultrasound of abdomen showed a pancreatic duct dilation   · 2/02/2018-CT abdomen showed 16 mm low-density lesion in the pancreas at the junction of the head of the body. No intra-abdominal adenopathy. No liver metastasis.    · 2/7/2018-the portal vein.  No definite evidence of liver metastases at this time. No definite evidence of pulmonary metastases.  However, question of slight increase in prominence of subtle soft tissue thickening within the right paracolic gutter could be indicative of metastatic disease to peritoneum.   · 12/9/2019- colonoscopy by GI was unremarkable.  This was performed for complaints of maroon-colored stools.   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  This is consistent with disease progression.         REVIEW OF SYSTEMS:   CONSTITUTIONAL: no fever, no night sweats, no fatigue;  HEENT: no blurring of vision, no double vision, no hearing difficulty, no tinnitus, no ulceration, no dysplasia, no epistaxis;  LUNGS: no cough, no hemoptysis, no wheeze,  no shortness of breath;  CARDIOVASCULAR: no palpitation, no chest pain, no shortness of breath;  GI: no abdominal pain, no nausea, no vomiting, no diarrhea, no constipation;  VIKRAM: no dysuria, no hematuria, no frequency or urgency, no nephrolithiasis;  MUSCULOSKELETAL: no joint pain, no swelling, no stiffness;  ENDOCRINE: no polyuria, no polydipsia, no cold or heat intolerance;  HEMATOLOGY: no easy bruising or bleeding, no history of clotting disorder;  DERMATOLOGY: no skin rash, no eczema, no pruritus;  PSYCHIATRY: no depression, no anxiety, no panic attacks, no suicidal ideation, no homicidal ideation;  NEUROLOGY: no syncope, no seizures, no numbness or tingling of hands, no numbness or tingling of feet, no paresis;    Objective   BP (!) 96/52   Pulse 54   Temp 99 °F (37.2 °C) (Temporal)   Resp 14   Ht 5' 7\" (1.702 m)   Wt 120 lb 12.8 oz (54.8 kg)   SpO2 95%   BMI 18.92 kg/m²     PHYSICAL

## 2020-02-27 NOTE — ANESTHESIA PRE PROCEDURE
Provider, MD       Current medications:    No current facility-administered medications for this visit. No current outpatient medications on file. Facility-Administered Medications Ordered in Other Visits   Medication Dose Route Frequency Provider Last Rate Last Dose    sodium chloride flush 0.9 % injection 10 mL  10 mL Intravenous 2 times per day Yu Savannah, DO   10 mL at 02/27/20 0827    sodium chloride flush 0.9 % injection 10 mL  10 mL Intravenous PRN Luretha Dine Jung, DO        0.9 % sodium chloride infusion   Intravenous Continuous Luretha Dine Jung,  mL/hr at 02/26/20 8320      pantoprazole (PROTONIX) 80 mg in sodium chloride 0.9 % 100 mL infusion  8 mg/hr Intravenous Continuous Luretha Dine Jung, DO 10 mL/hr at 02/27/20 0919 8 mg/hr at 02/27/20 0919    0.9 % sodium chloride bolus  20 mL Intravenous Once Luretha Dine Jung, DO        octreotide (SANDOSTATIN) injection 50 mcg  50 mcg Intravenous Once Luretha Dine Jung, DO        octreotide (SANDOSTATIN) 500 mcg in sodium chloride 0.9 % 100 mL infusion  50 mcg/hr Intravenous Continuous Luretha Dine Jung, DO 10 mL/hr at 02/27/20 0919 50 mcg/hr at 02/27/20 0919    acetaminophen (TYLENOL) tablet 650 mg  650 mg Oral Q6H PRN Yu Savannah, DO        acetaminophen (TYLENOL) suppository 650 mg  650 mg Rectal Q6H PRN Yu Savannah, DO        promethazine (PHENERGAN) tablet 12.5 mg  12.5 mg Oral Q6H PRN Luretha Dine Jung, DO        [facility]dansetron [facility] injection 4 mg  4 mg Intravenous Q6H PRN Luretha Dine Jung, DO        bisacodyl (DULCOLAX) suppository 10 mg  10 mg Rectal Daily PRN Yu Savannah, DO           Allergies:     Allergies   Allergen Reactions    Codeine Shortness Of Breath     SOB and rash    Morphine Other (See Comments)     Other reaction(s): abd pain  Severe abd. pain    Morphine And Related Nausea Only    Sulfa Antibiotics Shortness Of Breath and Hives     SOB, rash and itching    Neomycin-Bacitracin Zn-Polymyx Rash    Clarithromycin     Dilaudid [Hydromorphone Hcl] Other (See Comments)     \"completely shut me down\"    Hydromorphone     Loperamide Hcl Other (See Comments)     severe abd. pain    Loperamide Hcl Hives     severe abd. pain    Neosporin [Neomycin-Polymyx-Gramicid] Other (See Comments)     Causes boil       Problem List:    Patient Active Problem List   Diagnosis Code    Nausea R11.0    Right sided abdominal pain R10.9    Malignant neoplasm of pancreas ([facility] [address] C25.9    Tobacco abuse Z72.0    Hypertension I10    Dyslipidemia E78.5    Anemia associated with chemotherapy D64.81, T45.1X5A    Chemotherapy-induced thrombocytopenia D69.59, T45.1X5A    Neoplastic malignant related fatigue R53.0    Chemotherapy-induced peripheral neuropathy (HCC) G62.0, T45.1X5A    Diarrhea following gastrointestinal surgery R19.7, Z98.890    Exomphalos Q79.2    Hiatal hernia K44.9    History of acute pancreatitis Z87.19    Mixed hyperlipidemia E78.2    [place] spotted fever A77.0    Severe protein-calorie malnutrition ([facility] [address] E43    Melena K92.1    Pancreatic cancer ([facility] [address] C25.9    Chemotherapy management, encounter for Z51.11    Acute GI bleeding K92.2       Past Medical History:        Diagnosis Date    Acute pancreatitis     Adult BMI <19 kg/sq m     Anemia     Cat esophagus     Biliary obstruction     GERD (gastroesophageal reflux disease)     with Barretts    Hashimoto's thyroiditis     denies takes no med for    Heart murmur     Hypertension     pt denies nor longer takes med for    Low blood sugar     h/o when overweight in the past    MVP (mitral valve prolapse)     Myalgia     Pancreatic adenocarcinoma ([facility] [address] 2018    Dr Kristi Tan    Pancreatic cancer ([facility] [address] 3/30/2018    Right flank pain     RUQ pain        Past Surgical History:        Procedure Laterality Date    CARDIAC SURGERY      heart cath     SECTION      x 3    CHOLECYSTECTOMY      COLONOSCOPY  years ago    Steve- Ezequiel-normal per patient    COLONOSCOPY  2014    Dr Matthew Wilkins- [facility] recall    COLONOSCOPY  03/10/2016    Dr Mcfarland-10 yr recall    COLONOSCOPY N/A 2019    Dr Anshu Comer, 5 yr recall    ELBOW SURGERY Right     ENDOMETRIAL ABLATION      HAND SURGERY Right     HERNIA REPAIR      hiatal    HERNIA REPAIR      umbilical    HERNIA REPAIR      PANCREAS SURGERY  2018    Whipple procedure-Dr Fanta Coats [place] EGD [facility] NEEDLE ASPIR/BIOP ALTERED ANATOMY N/A 2018    Dr Cj Villafana w/fna-Dilation of main pancreatic duct with diffuse change in the pancreatitis noted, area of concern in the neck of the pancreas-strongly suspicious for adenocarcinoma     [place] EGD INTRMURAL NEEDLE ASPIR/BIOP ALTERED ANATOMY N/A 3/6/2018    Dr KVNG Duncan-Pancreatic cancer-Ductal adenocarcinoma-staged [id number] by EUS, pancreatic pseudocyst in the tail the pancreas    [place] ERCP DX COLLECTION SPECIMEN BRUSHING/WASHING N/A 2018    ERCP-Dr KVNG Duncan-placement of a self-expanding fully covered 10 mm biliary stent    UPPER GASTROINTESTINAL ENDOSCOPY  years ago    Steve-Dr Donell Epley    UPPER GASTROINTESTINAL ENDOSCOPY      Zuniga    UPPER GASTROINTESTINAL ENDOSCOPY N/A 2019    Dr Danny Edwards post Whipple's procedure with efferent loop ulceration    UPPER GASTROINTESTINAL ENDOSCOPY  2019    Dr Mariama Duncan-Patent G-J Anastomosis, apparent healing ulceration    UPPER GASTROINTESTINAL ENDOSCOPY N/A 2020    Dr Sami Shaw amount of food retention in the stomach with bile, hiatal hernia, will need repeat       Social History:    Social History     Tobacco Use    Smoking status: Former Smoker     Packs/day: 0.50     Years: 10.00     Pack years: 5.00     Types: Cigarettes     Last attempt to quit: 2019     Years since quittin.3    Smokeless tobacco: Never Used   Substance Use Topics    Alcohol use: Not Currently                                Counseling given: Not Answered      Vital Signs Mallampati: III  TM distance: >3 FB   Neck ROM: full  Mouth opening: < 3 FB Dental: normal exam         Pulmonary:Negative Pulmonary ROS and normal exam        (-) not a current smoker                           Cardiovascular:    (+) hypertension: mild, valvular problems/murmurs: MVP, hyperlipidemia        Rhythm: regular             Beta Blocker:  Not on Beta Blocker         Neuro/Psych:      (-) seizures and CVA            ROS comment: Peripheral neuropathy GI/Hepatic/Renal:   (+) hiatal hernia, GERD: well controlled,          ROS comment: Pancreatic cancer s/p Whipple 2018. Endo/Other:    (+) hypothyroidism, blood dyscrasia: anemia:., malignancy/cancer (pancreatic. On Chemo since June). (-) diabetes mellitus        Pt had PAT visit. Abdominal:       Abdomen: soft. Vascular: negative vascular ROS. Anesthesia Plan      general     ASA 3       Induction: intravenous. Anesthetic plan and risks discussed with patient.                       Hilda Navarro MD   2/27/2020

## 2020-02-27 NOTE — OP NOTE
Endoscopic Procedure Note    Patient: Max Zuniga : 1960  Med Rec#: [id number] Acc#: [id number]     Primary Care Provider Clarita Evangelista MD  Referring Provider: Nathaly Lenz MD    Endoscopist: Abad Leiva DO    Date of Procedure:  2020    Procedure:   1. EGD     Indications:   1. GI bleed    Anesthesia:  Sedation was administered by anesthesia who monitored the patient during the procedure. Estimated Blood Loss: none    Informed consent: Informed consent was obtained from patient/patient's proxy after risk benefits were explained to patient/patient's proxy including but not limited to bleeding, infection, perforation, and need for surgery. Patient/patient's proxy was given opportunity to ask questions and elected to proceed with the procedure. Description of procedure: Patient was monitored continuously on oximetry, blood pressure, pulse monitoring; the above medications were given for sedation; prior to sedation, a timeout was taken to identify patient's name, procedure site and ID badge. Universal precaution measures were followed throughout procedure. Patient was placed in left lateral position; a bite block was placed between the incisors; Gastroscope was then passed through and advanced to the second portion of the duodenum. Hypopharynx and pharynx were grossly normal. The esophagus was grossly normal. GE at 34cm antonio. Careful examination of the stomach revealed post whipple surgical changes. There was an area of erosion and ulceration at the suture site consistent with previous EGD findings in 2019. No visible vessel or active bleeding. At that point we slowly and carefully withdrew the gastroscope. Findings:   Esophagus:   GE at 34cm    Stomach:    Post surgical changes consistent with whipple  Linear erosion/ulceration at suture line. No visible vessel, no active bleeding      Small bowel:   Grossly normal mucosa    IMPRESSION:  1.  Linear erosion/ulceration at the suture line  2. Post whipple surgical changes     RECOMMENDATIONS:    1. Due to the lack of active bleeding or visible vessel, no intervention indicated. Suspect patient had previous bleeding from that site. Recommend Carafate QID. Protonix 40mg PO BID x 8 weeks  2. If any new evidence of bleeding, then recommend return to the hospital. Since the patient is undergoing treatment for her cancer, she will remain at risk for further complications and will have to manage symptoms as they occur. 3. Full liquid diet today and tomorrow. Then soft diet for 5 days  4. Ok to discharge today from GI stand point    The results were discussed with the patient and/or family.      Mark Hutchinson DO  2/27/2020  1:07 PM

## 2020-02-27 NOTE — DISCHARGE SUMMARY
Hospitalist   Discharge Summary    Jean-Pierre Garcia  :  1960  MRN:  [id number]    Admit date:  2020  Discharge date:  2020    Admitting Physician:  Jose Huddleston MD    Advance Directive: Full Code    Consults: GI, oncology     Primary Care Physician:  Carin Queen MD    Discharge Diagnoses: Active Problems:    Acute GI bleeding  Resolved Problems:    * No resolved hospital problems. *      Significant Diagnostic Studies:   Ct Abdomen Pelvis W Iv Contrast Additional Contrast? None    Result Date: 2020  Examination. CT ABDOMEN PELVIS W IV CONTRAST 2020 8:45 PM History: Abdominal pain and hematemesis. DLP: 764 mGycm. The CT scan of the abdomen and pelvis is performed after intravenous contrast enhancement. The images are acquired in axial plane with subsequent reconstruction in coronal and sagittal planes. The comparison is made with the previous study dated 2018. The lung bases included in the study appears unremarkable. The limited visualized cardiomediastinal structures are normal. There is evidence of pneumobilia suggesting reflux from incompetent sphincter on a prior surgical diversion. There is geographic fatty infiltration more pronounced in the lower arterial. There is a small area of focal enhancement located anteriorly in the dome of the liver, image #9 and 10 in axial plane and image #14 and 15 in coronal plane. The etiology is not certain. The spleen appears normal. The left of the pancreas is visualized without evidence of ductal dilatation. The body and head are not optimally visualized due to due to previous surgical resection (history of pancreaticoduodenectomy/Whipple procedure.) Anterior glands bilaterally appear normal. Kidneys bilaterally are normal. The ureters are not optimally visualized due to diffuse infiltration of the peritoneum. The urinary bladder is decompressed and may not be optimally evaluated. No intrinsic abnormality. Normal size uterus is seen. care provider immediately. Signed by Dr Vanessa Smith on 2/25/2020 8:59 AM      Labs:    CBC:   Recent Labs     02/24/20 2130 02/26/20  1745 02/27/20  0245 02/27/20  0934   WBC 8.8  --   --   --   --    HGB 6.3*   < > 8.0* 7.6* 8.2*     --   --   --   --     < > = values in this interval not displayed. BMP:    Recent Labs     02/24/20 2130 02/25/20  0605    140   K 4.2 4.3    108   CO2 22 25   BUN 8 8   CREATININE 0.7 0.6   GLUCOSE 187* 97     Hepatic:   Recent Labs     02/24/20 2130   AST 33*   ALT 27   BILITOT <0.2   ALKPHOS 96     Troponin: No results for input(s): TROPONINI in the last 72 hours. BNP: No results for input(s): BNP in the last 72 hours. Lipids: No results for input(s): CHOL, HDL in the last 72 hours. Invalid input(s): LDLCALCU  INR:   Recent Labs     02/24/20 2130   INR 1.12     ABG: No results for input(s): PHART, LER8ZPS, PO2ART, RUA1GTA, B6RWWGHH, BEART in the last 72 hours. 30 Day lookback of cultures:    Blood Culture Recent: No results for input(s): BC in the last 720 hours. Gram Stain Recent: No results for input(s): LABGRAM in the last 720 hours. Resp Culture Recent: No results for input(s): CULTRESP in the last 720 hours. Body Fluid Recent : No results for input(s): BFCX in the last 720 hours. MRSA Recent :   Recent Labs     02/25/20  0100   MRSAC No MRSA detected on culture      Urine Culture Recent : No results for input(s): LABURIN in the last 720 hours. Organism Recent : No results for input(s): ORG in the last 720 hours. Hospital Course:   Kandis Mayfield is a 64 y.o. female  who was admitted with hematemesis  nausea, vomiting, history of pancreatic carcinoma currently on chemo , HB stable around 7.9, after transfusion, EGD impression was food retention and hiatal hernia,per GI  repeat EGD Protonix and octreotide drip , oncology on board   today 2/27 revealed post whipple surgical changes.  There was an area of erosion and ulceration at (GLYCOLAX) powder  Take 17 g by mouth daily as needed             promethazine (PHENERGAN) 25 MG tablet  Take 25 mg by mouth every 6 hours as needed for Nausea             sucralfate (CARAFATE) 1 GM tablet  Take 1 tablet by mouth 4 times daily             vitamin E 400 UNIT capsule  Take 400 Units by mouth daily                 Discharge Instructions: Follow up with Rey Mcgowan MD in 7 days. Take medications as directed. Resume activity as tolerated. Diet: Full liquid diet today and tomorrow. Then soft diet for 5 days    Disposition: Patient is medically stable and will be discharged home     Time spent on discharge 45  minutes. Signed:   Consuelo Elizondo MD  Hospitalist service  2/27/2020  1:26 PM

## 2020-02-27 NOTE — PROGRESS NOTES
(TYLENOL) tablet 650 mg  650 mg Oral Q6H PRN Mallissa Silvius, DO        acetaminophen (TYLENOL) suppository 650 mg  650 mg Rectal Q6H PRN Mallissa Silvius, DO        promethazine (PHENERGAN) tablet 12.5 mg  12.5 mg Oral Q6H PRN Mallissa Silvius, DO        ondansetron [facility]) injection 4 mg  4 mg Intravenous Q6H PRN Mallissa Silvius, DO        bisacodyl (DULCOLAX) suppository 10 mg  10 mg Rectal Daily PRN Mallissa Silvius, DO           Past Medical History:  Past Medical History:   Diagnosis Date    Acute pancreatitis     Adult BMI <19 kg/sq m     Anemia     Cat esophagus     Biliary obstruction     GERD (gastroesophageal reflux disease)     with Barretts    Hashimoto's thyroiditis     denies takes no med for    Heart murmur     Hypertension     pt denies nor longer takes med for    Low blood sugar     h/o when overweight in the past    MVP (mitral valve prolapse)     Myalgia     Pancreatic adenocarcinoma ([address] 2018    Dr Darleen Munoz    Pancreatic cancer ([facility] [address] 3/30/2018    Right flank pain     RUQ pain        Past Surgical History:  Past Surgical History:   Procedure Laterality Date    CARDIAC SURGERY      heart cath     SECTION      x 3    CHOLECYSTECTOMY      COLONOSCOPY  years ago    Steve-Dr Melani Pena per patient    COLONOSCOPY  2014    Dr Bereket Magaña- [facility] recall    COLONOSCOPY  03/10/2016    Dr Mcfarland-10 yr recall    COLONOSCOPY N/A 2019    Dr Anjelica Davis, 5 yr recall    ELBOW SURGERY Right     ENDOMETRIAL ABLATION      HAND SURGERY Right     HERNIA REPAIR      hiatal    HERNIA REPAIR      umbilical    HERNIA REPAIR      PANCREAS SURGERY  2018    Whipple procedure-Dr Latrell Law [place] EGD [facility] NEEDLE ASPIR/BIOP ALTERED ANATOMY N/A 2018    Dr San Males w/fna-Dilation of main pancreatic duct with diffuse change in the pancreatitis noted, area of concern in the neck of the pancreas-strongly suspicious for adenocarcinoma     [place] EGD Last attempt to quit: 2019     Years since quittin.3    Smokeless tobacco: Never Used   Substance and Sexual Activity    Alcohol use: Not Currently    Drug use: No    Sexual activity: Not on file   Lifestyle    Physical activity:     Days per week: Not on file     Minutes per session: Not on file    Stress: Not on file   Relationships    Social connections:     Talks on phone: Not on file     Gets together: Not on file     Attends [employer] service: Not on file     Active member of club or organization: Not on file     Attends meetings of clubs or organizations: Not on file     Relationship status: Not on file    Intimate partner violence:     Fear of current or ex partner: Not on file     Emotionally abused: Not on file     Physically abused: Not on file     Forced sexual activity: Not on file   Other Topics Concern    Not on file   Social History Narrative    Not on file         Review of Systems:    Review of Systems   Constitutional: Positive for activity change. Negative for fatigue. Respiratory: Negative for cough and shortness of breath. Cardiovascular: Negative for chest pain and leg swelling. Gastrointestinal: Positive for nausea and vomiting. Negative for constipation and diarrhea. Genitourinary: Negative for difficulty urinating and dysuria. Musculoskeletal: Negative for arthralgias and back pain. Neurological: Positive for weakness. Negative for dizziness and headaches. Objective:  Blood pressure (!) 96/52, pulse 54, temperature 99 °F (37.2 °C), temperature source Temporal, resp. rate 14, height 5' 7\" (1.702 m), weight 120 lb 12.8 oz (54.8 kg), SpO2 95 %. Intake/Output Summary (Last 24 hours) at 2020 0825  Last data filed at 2020 1906  Gross per 24 hour   Intake 1910 ml   Output 1200 ml   Net 710 ml       Physical Exam  Vitals signs reviewed. Constitutional:       Appearance: She is well-developed. She is ill-appearing.    HENT:      Head: HCO3, O2SAT in the last 72 hours. CRP:  No results for input(s): CRP in the last 72 hours. Sed Rate:  No results for input(s): SEDRATE in the last 72 hours. Cultures:   No results for input(s): CULTURE in the last 72 hours. No results for input(s): BC, Mihir Deckeron in the last 72 hours. No results for input(s): CXSURG in the last 72 hours. Radiology reports as per the Radiologist  Radiology: Ct Abdomen Pelvis W Iv Contrast Additional Contrast? None    Result Date: 2/25/2020  Examination. CT ABDOMEN PELVIS W IV CONTRAST 2/24/2020 8:45 PM History: Abdominal pain and hematemesis. DLP: 764 mGycm. The CT scan of the abdomen and pelvis is performed after intravenous contrast enhancement. The images are acquired in axial plane with subsequent reconstruction in coronal and sagittal planes. The comparison is made with the previous study dated 2/28/2018. The lung bases included in the study appears unremarkable. The limited visualized cardiomediastinal structures are normal. There is evidence of pneumobilia suggesting reflux from incompetent sphincter on a prior surgical diversion. There is geographic fatty infiltration more pronounced in the lower arterial. There is a small area of focal enhancement located anteriorly in the dome of the liver, image #9 and 10 in axial plane and image #14 and 15 in coronal plane. The etiology is not certain. The spleen appears normal. The left of the pancreas is visualized without evidence of ductal dilatation. The body and head are not optimally visualized due to due to previous surgical resection (history of pancreaticoduodenectomy/Whipple procedure.) Anterior glands bilaterally appear normal. Kidneys bilaterally are normal. The ureters are not optimally visualized due to diffuse infiltration of the peritoneum. The urinary bladder is decompressed and may not be optimally evaluated. No intrinsic abnormality. Normal size uterus is seen.  There are tortuous dilated pelvic vessels around Anwer on 2/25/2020 8:59 AM       Assessment     Coffee-ground emesis. EGD noted will repeat  2/27 remains on protonix gtt and octreaotide     Blood loss anemia. Status post transfusion, stable     History of pancreatic carcinoma.   Status post Whipple procedure, oncology on board         Elaina Leyva MD

## 2020-02-27 NOTE — ANESTHESIA POSTPROCEDURE EVALUATION
Department of Anesthesiology  Postprocedure Note    Patient: Petra Galdamez  MRN: [id number]  [place]: 1960  Date of evaluation: 2/27/2020  Time:  1:17 PM     Procedure Summary     Date:  02/27/20 Room / Location:  [address]    Anesthesia Start:  3666 Anesthesia Stop:      Procedure:  EGD DIAGNOSTIC ONLY 2-27-20 Dr. Beverly Mayo Anemia, Coffee ground emesis, reevaluation due to retained food on previous EGD (N/A ) Diagnosis:  (Anemia, Coffee ground emesis, reevaluation due to retained food on previous EGD)    Surgeon:  Matthew Dimas DO Responsible Provider:  JORDYN Olivera CRNA    Anesthesia Type:  general ASA Status:  3          Anesthesia Type: general    Nilsa Phase I: Nilsa Score: 10    Nilsa Phase II:      Last vitals: Reviewed and per EMR flowsheets.        Anesthesia Post Evaluation    Patient location during evaluation: bedside  Patient participation: complete - patient participated  Level of consciousness: sleepy but conscious  Pain score: 0  Airway patency: patent  Nausea & Vomiting: no nausea and no vomiting  Complications: no  Cardiovascular status: hemodynamically stable and blood pressure returned to baseline  Respiratory status: acceptable and nasal cannula  Hydration status: stable

## 2020-02-28 ENCOUNTER — TELEPHONE (OUTPATIENT)
Dept: INFUSION THERAPY | Age: 60
End: 2020-02-28

## 2020-03-03 ENCOUNTER — OFFICE VISIT (OUTPATIENT)
Dept: HEMATOLOGY | Age: 60
End: 2020-03-03
Payer: COMMERCIAL

## 2020-03-03 ENCOUNTER — HOSPITAL ENCOUNTER (OUTPATIENT)
Dept: INFUSION THERAPY | Age: 60
Discharge: HOME | End: 2020-03-03
Payer: COMMERCIAL

## 2020-03-03 VITALS
HEART RATE: 100 BPM | BODY MASS INDEX: 18.52 KG/M2 | DIASTOLIC BLOOD PRESSURE: 70 MMHG | HEIGHT: 67 IN | SYSTOLIC BLOOD PRESSURE: 104 MMHG | TEMPERATURE: 98.8 F | WEIGHT: 118 LBS

## 2020-03-03 DIAGNOSIS — C25.9 MALIGNANT NEOPLASM OF PANCREAS, UNSPECIFIED LOCATION OF MALIGNANCY (HCC): Primary | ICD-10-CM

## 2020-03-03 PROCEDURE — 85025 COMPLETE CBC W/AUTO DIFF WBC: CPT

## 2020-03-03 PROCEDURE — 36415 COLL VENOUS BLD VENIPUNCTURE: CPT

## 2020-03-03 PROCEDURE — 80053 COMPREHEN METABOLIC PANEL: CPT

## 2020-03-03 PROCEDURE — 99214 OFFICE O/P EST MOD 30 MIN: CPT | Performed by: NURSE PRACTITIONER

## 2020-03-03 PROCEDURE — 99211 OFF/OP EST MAY X REQ PHY/QHP: CPT

## 2020-03-03 RX ORDER — DIPHENHYDRAMINE HYDROCHLORIDE 50 MG/ML
50 INJECTION INTRAMUSCULAR; INTRAVENOUS ONCE
Status: CANCELLED | OUTPATIENT
Start: 2020-03-03

## 2020-03-03 RX ORDER — EPINEPHRINE 1 MG/ML
0.3 INJECTION, SOLUTION, CONCENTRATE INTRAVENOUS PRN
Status: CANCELLED | OUTPATIENT
Start: 2020-03-03

## 2020-03-03 RX ORDER — SODIUM CHLORIDE 9 MG/ML
INJECTION, SOLUTION INTRAVENOUS CONTINUOUS
Status: CANCELLED | OUTPATIENT
Start: 2020-03-03

## 2020-03-03 RX ORDER — DEXAMETHASONE SODIUM PHOSPHATE 10 MG/ML
10 INJECTION, SOLUTION INTRAMUSCULAR; INTRAVENOUS ONCE
Status: DISCONTINUED | OUTPATIENT
Start: 2020-03-03 | End: 2020-03-03

## 2020-03-03 RX ORDER — SODIUM CHLORIDE 0.9 % (FLUSH) 0.9 %
10 SYRINGE (ML) INJECTION PRN
Status: CANCELLED | OUTPATIENT
Start: 2020-03-03

## 2020-03-03 RX ORDER — SODIUM CHLORIDE 0.9 % (FLUSH) 0.9 %
10 SYRINGE (ML) INJECTION PRN
Status: DISCONTINUED | OUTPATIENT
Start: 2020-03-03 | End: 2020-03-03

## 2020-03-03 RX ORDER — SODIUM CHLORIDE 9 MG/ML
20 INJECTION, SOLUTION INTRAVENOUS ONCE
Status: CANCELLED | OUTPATIENT
Start: 2020-03-03

## 2020-03-03 RX ORDER — METHYLPREDNISOLONE SODIUM SUCCINATE 125 MG/2ML
125 INJECTION, POWDER, LYOPHILIZED, FOR SOLUTION INTRAMUSCULAR; INTRAVENOUS ONCE
Status: CANCELLED | OUTPATIENT
Start: 2020-03-03

## 2020-03-03 RX ORDER — SODIUM CHLORIDE 0.9 % (FLUSH) 0.9 %
5 SYRINGE (ML) INJECTION PRN
Status: CANCELLED | OUTPATIENT
Start: 2020-03-03

## 2020-03-03 RX ORDER — PALONOSETRON 0.05 MG/ML
0.25 INJECTION, SOLUTION INTRAVENOUS ONCE
Status: CANCELLED | OUTPATIENT
Start: 2020-03-03

## 2020-03-03 RX ORDER — PALONOSETRON 0.05 MG/ML
0.25 INJECTION, SOLUTION INTRAVENOUS ONCE
Status: DISCONTINUED | OUTPATIENT
Start: 2020-03-03 | End: 2020-03-03

## 2020-03-03 RX ORDER — HEPARIN SODIUM (PORCINE) LOCK FLUSH IV SOLN 100 UNIT/ML 100 UNIT/ML
500 SOLUTION INTRAVENOUS PRN
Status: DISCONTINUED | OUTPATIENT
Start: 2020-03-03 | End: 2020-03-03

## 2020-03-03 RX ORDER — HEPARIN SODIUM (PORCINE) LOCK FLUSH IV SOLN 100 UNIT/ML 100 UNIT/ML
500 SOLUTION INTRAVENOUS PRN
Status: CANCELLED | OUTPATIENT
Start: 2020-03-03

## 2020-03-03 ASSESSMENT — ENCOUNTER SYMPTOMS
GASTROINTESTINAL NEGATIVE: 1
SORE THROAT: 0
VOMITING: 0
WHEEZING: 0
EYES NEGATIVE: 1
NAUSEA: 0
BLOOD IN STOOL: 0
RESPIRATORY NEGATIVE: 1
DIARRHEA: 0
BACK PAIN: 0
COUGH: 0
ABDOMINAL PAIN: 0
EYE PAIN: 0
SHORTNESS OF BREATH: 0
EYE DISCHARGE: 0
CONSTIPATION: 0
EYE REDNESS: 0

## 2020-03-03 ASSESSMENT — PAIN SCALES - GENERAL: PAINLEVEL_OUTOF10: 0

## 2020-03-03 NOTE — PROGRESS NOTES
hemoglobin of 8.8 with MCV of 90.3 and a platelet count of 894,139. ONCOLOGIC HISTORY:   Diagnosis  · Pancreatic adenocarcinoma, January 2018   · [id number]  · 7/6/2019-extended genetic panel-negative for a deleterious mutation    Treatment summary  · March 2018-Surgical consultation at [address] [place] [address] Road operable   · 4/3/2018 through 6/4/2018 Neoadjuvant chemotherapy FOLFORINOX ×5 Biweekly cycles   · 4/11/2018-Biliary stent   · August 2018- XRT 30 Gy/Xeloda   · 08/30/3018- Whipple procedure @ [facility]   · Recommended another 5 biweekly cycles of modified FOLFORINOX completed 12/26/2018   · 7/9/2019- 1/22/2020 Gemzar/Abraxane 125 mg/m2 q 14 days, discontinued due to progression of disease  · Anticipate on 3/4/2020- Irinotecan liposome 70 mg/m² with leucovorin 400 mg/m² and 5-FU 2400 mg/m² over 46 hours every 2 weeks. Tumor marker  · 3/12/2622-ST-03-9->430->370. · 4/30/2018 - CA 19- 9 of 157   · 5/25/20183218-UJ-84-9->32 after 4 cycles. · 08/02/2018- -> 8   · 10/01/2018- CA 19-9 -5   · 10/29/2018-CA 19-9- 7   · 12/3/0520-IO-47-9-7   · 04/11/2019-CA-19-9- 12   · 05/21/2019- CA 19-9- 41   · 7/2/2019-CA 19 9 = 114  · 7/9/2019- CA-19-9 = 225  · 8/6/2019- CA-19-9 = 171  · 9/3/4751-NC-95-9 = 198  · 9/13/20198387-ES-13-9 = 98 (at [facility])  · 10/29/2256-OX-28-9 =317  · 11/21/2019-CA-19-9 = 183  · 1/23/20209296-JL-17-9 = 520       Cancer history  Ms Rina Barragan was seen in initial oncology consultation on 3/12/2018 referred by Dr. Romel Oliva for a diagnosis of pancreatic adenocarcinoma. She was initially evaluated for acute pancreatitis at [facility] on February 2018. Further imaging revealed a pancreatic head mass. Lipase was elevated at 1184 and CA-19-9 elevated 134. The symptoms were going on for several weeks. Weight loss of 10-12 pounds over the last 6 months. · October 2017-CT abdomen without contrast was unremarkable.    · 2/2/2018-ultrasound of abdomen showed a pancreatic duct dilation FOLFORINOX. · 9/5/2018-CT of the chest abdomen pelvis was unremarkable for metastatic disease. · 1/14/2019-CT abdomen and pelvis at [facility] showed no evidence of metastasis in the chest abdomen pelvis. · 5/21/2019-CT chest, abdomen, pelvis at [facility] showed no evidence of metastatic disease   · 5/21/20197404-GH-31-9 = 42   · 06/12/2019- CA-19-9 = 154. Discussed with the patient. We'll also discuss with [facility]. · 7/1/2019-CT chest, abdomen, pelvis showed a soft tissue mass measuring 1.4 x 1.1 cm, not present on prior scan from January 2019, concerning for recurrence. Stable hypodense in the liver, too small to characterize. Subcentimeter ill-defined area of low attenuation liver may represent an early metastasis. · 7/3/2019-recommended palliative chemotherapy with nab paclitaxel 125mg/m² IV over 30 minutes on day 1 and gemcitabine 600 mg/m² IV over 10mg/m2/min (fixed dose rate) on day 1  · 7/2/2019-CA 19 9 = 114  · 7/6/2019- extended genetic panel negative for a deleterious mutation (invitae)  · 7/9/2019- CA-19-9 = 225  · 8/6/2019- CA-19-9 = 171  · 9/3/2354-MA-84-9 = 198   · 9/13/20197452-HC-67-9 = 98 at [facility]  · 9/13/2019-CT chest abdomen pelvis at [facility] showed a soft tissue thickening in the pancreatic bed with persistent narrowing of the portal SMV confluence. Superimposed tumor remains a possibility. The new low-density lesion in the periphery of the liver seen on the prior exam is no longer appreciated and likely represents treatment effect. Nodular enhancement may represent perfusion change in the region on image 187. · 9/13/2018- she was recommended to continue current treatment with Gemzar/Abraxane  · 11/1/2019- she was hospitalized with GI bleed.   An upper endoscopy showed ulceration at the efferent loop of the Whipple's procedure  · 10/29/2406-OY-53-9 =317  · 11/21/2019-CA-19-9 = 183  · 11/19/2019- CT chest abdomen pelvis showed no evidence of gross disease Types: Cigarettes     Last attempt to quit: 2019     Years since quittin.3    Smokeless tobacco: Never Used   Substance Use Topics    Alcohol use: Not Currently    Drug use: No       Family History:   Family History   Problem Relation Age of Onset    Heart Attack Mother 46        x 2-had bypass    Hypertension Mother     COPD Father     Liver Cancer Paternal Aunt     Lung Cancer Paternal Aunt     Breast Cancer Maternal Aunt         but  of brain cancer    Diabetes Maternal Uncle     Diabetes Maternal Grandmother     Colon Cancer Neg Hx     Colon Polyps Neg Hx     Esophageal Cancer Neg Hx     Rectal Cancer Neg Hx     Stomach Cancer Neg Hx        Vitals:  Vitals:    20 1030   BP: 104/70   Pulse: 100   Temp: 98.8 °F (37.1 °C)   TempSrc: Oral   Weight: 118 lb (53.5 kg)   Height: 5' 7\" (1.702 m)        Subjective   REVIEW OF SYSTEMS:   Review of Systems   Constitutional: Positive for appetite change and fatigue. Negative for chills, diaphoresis and fever. HENT: Negative. Negative for congestion, ear pain, hearing loss, nosebleeds, sore throat and tinnitus. Eyes: Negative. Negative for pain, discharge and redness. Respiratory: Negative. Negative for cough, shortness of breath and wheezing. Cardiovascular: Negative. Negative for chest pain, palpitations and leg swelling. Gastrointestinal: Negative. Negative for abdominal pain, blood in stool, constipation, diarrhea, nausea and vomiting. Endocrine: Negative for polydipsia. Genitourinary: Negative for dysuria, flank pain, frequency, hematuria and urgency. Musculoskeletal: Negative. Negative for back pain, myalgias and neck pain. Skin: Negative. Negative for rash. Neurological: Negative. Negative for dizziness, tremors, seizures, weakness and headaches. Hematological: Does not bruise/bleed easily. Psychiatric/Behavioral: Negative. The patient is not nervous/anxious.         Objective   PHYSICAL EXAM:  Physical Exam  Vitals signs reviewed. Constitutional:       General: She is not in acute distress. Appearance: She is well-developed. She is not diaphoretic. HENT:      Head: Normocephalic and atraumatic. Comments: Alopecia     Mouth/Throat:      Pharynx: Uvula midline. Tonsils: No tonsillar exudate. Eyes:      General: Lids are normal. No scleral icterus. Right eye: No discharge. Left eye: No discharge. Conjunctiva/sclera: Conjunctivae normal.      Pupils: Pupils are equal, round, and reactive to light. Neck:      Musculoskeletal: Normal range of motion and neck supple. Thyroid: No thyroid mass or thyromegaly. Vascular: No JVD. Trachea: Trachea normal. No tracheal deviation. Cardiovascular:      Rate and Rhythm: Normal rate and regular rhythm. Heart sounds: Normal heart sounds. No murmur. No friction rub. No gallop. Pulmonary:      Effort: Pulmonary effort is normal. No respiratory distress. Breath sounds: Normal breath sounds. No wheezing or rales. Chest:      Chest wall: No tenderness. Abdominal:      General: Bowel sounds are normal. There is no distension. Palpations: Abdomen is soft. There is no mass. Tenderness: There is no abdominal tenderness. There is no guarding or rebound. Hernia: No hernia is present. Musculoskeletal:         General: No tenderness or deformity. Comments: Range of motion within normal limits x4 extremities   Skin:     General: Skin is warm. Coloration: Skin is not pale. Findings: No erythema or rash. Neurological:      Mental Status: She is alert and oriented to person, place, and time. Cranial Nerves: No cranial nerve deficit. Coordination: Coordination normal.   Psychiatric:         Behavior: Behavior normal.         Thought Content:  Thought content normal.         Labs: WBC 5.87, ANC 4.5, hemoglobin 8.8, MCV 90.3 and a platelet count of 672,819  CBC: No

## 2020-03-04 PROBLEM — T45.1X5A CHEMOTHERAPY ADVERSE REACTION: Status: ACTIVE | Noted: 2020-03-04

## 2020-03-04 RX ORDER — EPINEPHRINE 1 MG/ML
0.3 INJECTION, SOLUTION, CONCENTRATE INTRAVENOUS PRN
Status: CANCELLED | OUTPATIENT
Start: 2020-03-11

## 2020-03-04 RX ORDER — DIPHENHYDRAMINE HYDROCHLORIDE 50 MG/ML
50 INJECTION INTRAMUSCULAR; INTRAVENOUS ONCE
Status: CANCELLED | OUTPATIENT
Start: 2020-03-11

## 2020-03-04 RX ORDER — METHYLPREDNISOLONE SODIUM SUCCINATE 125 MG/2ML
125 INJECTION, POWDER, LYOPHILIZED, FOR SOLUTION INTRAMUSCULAR; INTRAVENOUS ONCE
Status: CANCELLED | OUTPATIENT
Start: 2020-03-11

## 2020-03-04 RX ORDER — SODIUM CHLORIDE 0.9 % (FLUSH) 0.9 %
5 SYRINGE (ML) INJECTION PRN
Status: CANCELLED | OUTPATIENT
Start: 2020-03-11

## 2020-03-04 RX ORDER — PALONOSETRON 0.05 MG/ML
0.25 INJECTION, SOLUTION INTRAVENOUS ONCE
Status: CANCELLED | OUTPATIENT
Start: 2020-03-11

## 2020-03-04 RX ORDER — HEPARIN SODIUM (PORCINE) LOCK FLUSH IV SOLN 100 UNIT/ML 100 UNIT/ML
500 SOLUTION INTRAVENOUS PRN
Status: CANCELLED | OUTPATIENT
Start: 2020-03-11

## 2020-03-04 RX ORDER — SODIUM CHLORIDE 0.9 % (FLUSH) 0.9 %
10 SYRINGE (ML) INJECTION PRN
Status: CANCELLED | OUTPATIENT
Start: 2020-03-11

## 2020-03-04 RX ORDER — SODIUM CHLORIDE 9 MG/ML
20 INJECTION, SOLUTION INTRAVENOUS ONCE
Status: CANCELLED | OUTPATIENT
Start: 2020-03-11

## 2020-03-04 RX ORDER — SODIUM CHLORIDE 9 MG/ML
INJECTION, SOLUTION INTRAVENOUS CONTINUOUS
Status: CANCELLED | OUTPATIENT
Start: 2020-03-11

## 2020-03-06 ENCOUNTER — HOSPITAL ENCOUNTER (INPATIENT)
Age: 60
LOS: 3 days | Discharge: HOME | DRG: 378 | End: 2020-03-09
Attending: EMERGENCY MEDICINE | Admitting: FAMILY MEDICINE
Payer: COMMERCIAL

## 2020-03-06 PROBLEM — K92.2 UPPER GI BLEED: Status: ACTIVE | Noted: 2020-03-06

## 2020-03-06 LAB
ABO/RH: NORMAL
ALBUMIN SERPL-MCNC: 3.4 G/DL (ref 3.5–5.2)
ALP BLD-CCNC: 112 U/L (ref 35–104)
ALT SERPL-CCNC: 39 U/L (ref 5–33)
ANION GAP SERPL CALCULATED.3IONS-SCNC: 11 MMOL/L (ref 7–19)
ANTIBODY SCREEN: NORMAL
AST SERPL-CCNC: 53 U/L (ref 5–32)
BASOPHILS ABSOLUTE: 0 K/UL (ref 0–0.2)
BASOPHILS RELATIVE PERCENT: 0.6 % (ref 0–1)
BILIRUB SERPL-MCNC: <0.2 MG/DL (ref 0.2–1.2)
BUN BLDV-MCNC: 9 MG/DL (ref 6–20)
CALCIUM SERPL-MCNC: 8.2 MG/DL (ref 8.6–10)
CHLORIDE BLD-SCNC: 104 MMOL/L (ref 98–111)
CO2: 23 MMOL/L (ref 22–29)
CREAT SERPL-MCNC: 0.7 MG/DL (ref 0.5–0.9)
EOSINOPHILS ABSOLUTE: 0.1 K/UL (ref 0–0.6)
EOSINOPHILS RELATIVE PERCENT: 2.2 % (ref 0–5)
GFR NON-AFRICAN AMERICAN: >60
GLUCOSE BLD-MCNC: 171 MG/DL (ref 74–109)
HCT VFR BLD CALC: 25.7 % (ref 37–47)
HEMOGLOBIN: 8 G/DL (ref 12–16)
IMMATURE GRANULOCYTES #: 0 K/UL
LYMPHOCYTES ABSOLUTE: 0.7 K/UL (ref 1.1–4.5)
LYMPHOCYTES RELATIVE PERCENT: 11.5 % (ref 20–40)
MCH RBC QN AUTO: 28.6 PG (ref 27–31)
MCHC RBC AUTO-ENTMCNC: 31.1 G/DL (ref 33–37)
MCV RBC AUTO: 91.8 FL (ref 81–99)
MONOCYTES ABSOLUTE: 0.4 K/UL (ref 0–0.9)
MONOCYTES RELATIVE PERCENT: 6.5 % (ref 0–10)
NEUTROPHILS ABSOLUTE: 5.1 K/UL (ref 1.5–7.5)
NEUTROPHILS RELATIVE PERCENT: 78.7 % (ref 50–65)
PDW BLD-RTO: 17.3 % (ref 11.5–14.5)
PLATELET # BLD: 142 K/UL (ref 130–400)
PMV BLD AUTO: 11.1 FL (ref 9.4–12.3)
POTASSIUM REFLEX MAGNESIUM: 4.2 MMOL/L (ref 3.5–5)
RBC # BLD: 2.8 M/UL (ref 4.2–5.4)
SODIUM BLD-SCNC: 138 MMOL/L (ref 136–145)
TOTAL PROTEIN: 6 G/DL (ref 6.6–8.7)
WBC # BLD: 6.4 K/UL (ref 4.8–10.8)

## 2020-03-06 PROCEDURE — 2580000003 HC RX 258: Performed by: EMERGENCY MEDICINE

## 2020-03-06 PROCEDURE — P9016 RBC LEUKOCYTES REDUCED: HCPCS

## 2020-03-06 PROCEDURE — 86923 COMPATIBILITY TEST ELECTRIC: CPT

## 2020-03-06 PROCEDURE — 2580000003 HC RX 258: Performed by: INTERNAL MEDICINE

## 2020-03-06 PROCEDURE — 86900 BLOOD TYPING SEROLOGIC ABO: CPT

## 2020-03-06 PROCEDURE — 6360000002 HC RX W HCPCS: Performed by: INTERNAL MEDICINE

## 2020-03-06 PROCEDURE — 86901 BLOOD TYPING SEROLOGIC RH(D): CPT

## 2020-03-06 PROCEDURE — 2000000000 HC ICU R&B

## 2020-03-06 PROCEDURE — 80053 COMPREHEN METABOLIC PANEL: CPT

## 2020-03-06 PROCEDURE — 36415 COLL VENOUS BLD VENIPUNCTURE: CPT

## 2020-03-06 PROCEDURE — C9113 INJ PANTOPRAZOLE SODIUM, VIA: HCPCS | Performed by: INTERNAL MEDICINE

## 2020-03-06 PROCEDURE — 85025 COMPLETE CBC W/AUTO DIFF WBC: CPT

## 2020-03-06 PROCEDURE — 86850 RBC ANTIBODY SCREEN: CPT

## 2020-03-06 PROCEDURE — 6370000000 HC RX 637 (ALT 250 FOR IP): Performed by: INTERNAL MEDICINE

## 2020-03-06 PROCEDURE — 99285 EMERGENCY DEPT VISIT HI MDM: CPT

## 2020-03-06 RX ORDER — SODIUM CHLORIDE, SODIUM LACTATE, POTASSIUM CHLORIDE, CALCIUM CHLORIDE 600; 310; 30; 20 MG/100ML; MG/100ML; MG/100ML; MG/100ML
1000 INJECTION, SOLUTION INTRAVENOUS ONCE
Status: COMPLETED | OUTPATIENT
Start: 2020-03-06 | End: 2020-03-06

## 2020-03-06 RX ORDER — CHOLECALCIFEROL (VITAMIN D3) 125 MCG
1000 CAPSULE ORAL DAILY
Status: DISCONTINUED | OUTPATIENT
Start: 2020-03-06 | End: 2020-03-09 | Stop reason: HOSPADM

## 2020-03-06 RX ORDER — SODIUM CHLORIDE 9 MG/ML
10 INJECTION INTRAVENOUS DAILY
Status: DISCONTINUED | OUTPATIENT
Start: 2020-03-06 | End: 2020-03-09 | Stop reason: HOSPADM

## 2020-03-06 RX ORDER — SODIUM CHLORIDE 9 MG/ML
INJECTION, SOLUTION INTRAVENOUS CONTINUOUS
Status: DISCONTINUED | OUTPATIENT
Start: 2020-03-06 | End: 2020-03-09 | Stop reason: HOSPADM

## 2020-03-06 RX ORDER — ONDANSETRON 4 MG/1
8 TABLET, FILM COATED ORAL EVERY 8 HOURS PRN
Status: DISCONTINUED | OUTPATIENT
Start: 2020-03-06 | End: 2020-03-09 | Stop reason: HOSPADM

## 2020-03-06 RX ORDER — SODIUM CHLORIDE 0.9 % (FLUSH) 0.9 %
10 SYRINGE (ML) INJECTION EVERY 12 HOURS SCHEDULED
Status: DISCONTINUED | OUTPATIENT
Start: 2020-03-06 | End: 2020-03-09 | Stop reason: HOSPADM

## 2020-03-06 RX ORDER — ONDANSETRON 2 MG/ML
4 INJECTION INTRAMUSCULAR; INTRAVENOUS EVERY 6 HOURS PRN
Status: DISCONTINUED | OUTPATIENT
Start: 2020-03-06 | End: 2020-03-09 | Stop reason: HOSPADM

## 2020-03-06 RX ORDER — ONDANSETRON 2 MG/ML
4 INJECTION INTRAMUSCULAR; INTRAVENOUS ONCE
Status: DISCONTINUED | OUTPATIENT
Start: 2020-03-06 | End: 2020-03-09 | Stop reason: HOSPADM

## 2020-03-06 RX ORDER — SODIUM CHLORIDE 0.9 % (FLUSH) 0.9 %
10 SYRINGE (ML) INJECTION PRN
Status: DISCONTINUED | OUTPATIENT
Start: 2020-03-06 | End: 2020-03-09 | Stop reason: HOSPADM

## 2020-03-06 RX ORDER — SUCRALFATE 1 G/1
1 TABLET ORAL 4 TIMES DAILY
Status: DISCONTINUED | OUTPATIENT
Start: 2020-03-06 | End: 2020-03-09 | Stop reason: HOSPADM

## 2020-03-06 RX ORDER — PROMETHAZINE HYDROCHLORIDE 12.5 MG/1
12.5 TABLET ORAL EVERY 6 HOURS PRN
Status: DISCONTINUED | OUTPATIENT
Start: 2020-03-06 | End: 2020-03-09 | Stop reason: HOSPADM

## 2020-03-06 RX ORDER — ACETAMINOPHEN 325 MG/1
650 TABLET ORAL EVERY 4 HOURS PRN
Status: DISCONTINUED | OUTPATIENT
Start: 2020-03-06 | End: 2020-03-09 | Stop reason: HOSPADM

## 2020-03-06 RX ORDER — PANTOPRAZOLE SODIUM 40 MG/10ML
80 INJECTION, POWDER, LYOPHILIZED, FOR SOLUTION INTRAVENOUS DAILY
Status: DISCONTINUED | OUTPATIENT
Start: 2020-03-06 | End: 2020-03-09 | Stop reason: HOSPADM

## 2020-03-06 RX ADMIN — PANTOPRAZOLE SODIUM 80 MG: 40 INJECTION, POWDER, FOR SOLUTION INTRAVENOUS at 18:08

## 2020-03-06 RX ADMIN — SODIUM CHLORIDE, PRESERVATIVE FREE 10 ML: 5 INJECTION INTRAVENOUS at 18:09

## 2020-03-06 RX ADMIN — SODIUM CHLORIDE: 9 INJECTION, SOLUTION INTRAVENOUS at 19:17

## 2020-03-06 RX ADMIN — SODIUM CHLORIDE 8 MG/HR: 9 INJECTION, SOLUTION INTRAVENOUS at 18:08

## 2020-03-06 RX ADMIN — SODIUM CHLORIDE, SODIUM LACTATE, POTASSIUM CHLORIDE, AND CALCIUM CHLORIDE 1000 ML: 600; 310; 30; 20 INJECTION, SOLUTION INTRAVENOUS at 16:42

## 2020-03-06 RX ADMIN — SUCRALFATE 1 G: 1 TABLET ORAL at 20:13

## 2020-03-06 RX ADMIN — SODIUM CHLORIDE, PRESERVATIVE FREE 10 ML: 5 INJECTION INTRAVENOUS at 20:13

## 2020-03-06 RX ADMIN — PHYTONADIONE 5 MG: 10 INJECTION, EMULSION INTRAMUSCULAR; INTRAVENOUS; SUBCUTANEOUS at 18:09

## 2020-03-06 ASSESSMENT — ENCOUNTER SYMPTOMS
DIARRHEA: 0
HEMATEMESIS: 1
CONSTIPATION: 0
NAUSEA: 1
VOMITING: 1
SHORTNESS OF BREATH: 0
COUGH: 0
ABDOMINAL PAIN: 0
BACK PAIN: 0

## 2020-03-06 NOTE — ED PROVIDER NOTES
General: She is not in acute distress. Appearance: Normal appearance. She is ill-appearing (chronically). She is not diaphoretic. HENT:      Head:      Comments: Blood on corner of mouth     Nose: Nose normal.      Mouth/Throat:      Mouth: Mucous membranes are moist.   Eyes:      Comments: Pale conjunctivae   Neck:      Musculoskeletal: Normal range of motion and neck supple. Cardiovascular:      Rate and Rhythm: Normal rate and regular rhythm. Heart sounds: Normal heart sounds. Pulmonary:      Effort: Pulmonary effort is normal.      Breath sounds: Normal breath sounds. Abdominal:      Tenderness: There is no abdominal tenderness. Skin:     Coloration: Skin is pale. Neurological:      General: No focal deficit present. Mental Status: She is alert and oriented to person, place, and time.    Psychiatric:         Mood and Affect: Mood normal.         DIAGNOSTIC RESULTS     EKG: All EKG's are interpreted by the Emergency Department Physician who either signs or Co-signsthis chart in the absence of a cardiologist.        RADIOLOGY:   [place] such as CT, Ultrasound and MRI are read by the radiologist. Plain radiographic images are visualized and preliminarily interpreted by the emergency physician with the below findings:        Interpretation per the Radiologist below, if available at the time ofthis note:    No orders to display         ED BEDSIDE ULTRASOUND:   Performed by ED Physician - none    LABS:  Labs Reviewed   CBC WITH AUTO DIFFERENTIAL - Abnormal; Notable for the following components:       Result Value    RBC 2.80 (*)     Hemoglobin 8.0 (*)     Hematocrit 25.7 (*)     MCHC 31.1 (*)     RDW 17.3 (*)     Neutrophils % 78.7 (*)     Lymphocytes % 11.5 (*)     Lymphocytes Absolute 0.7 (*)     All other components within normal limits   COMPREHENSIVE METABOLIC PANEL W/ REFLEX TO MG FOR LOW K - Abnormal; Notable for the following components:    Glucose 171 (*)     Calcium 8.2

## 2020-03-06 NOTE — PROGRESS NOTES
Segundo Pablo arrived to room # 143. Presented with: upper GI bleed  Mental Status: Patient is oriented, alert, coherent and logical.   Vitals:    03/06/20 1702   BP: 102/72   Pulse: 59   Resp: 14   Temp:    SpO2: 97%     Patient safety contract and falls prevention contract reviewed with patient Yes. Oriented Patient to room. Call light within reach. Yes.   Needs, issues or concerns expressed at this time: no.      Electronically signed by Santhosh Garcia RN on 3/6/2020 at 5:04 PM

## 2020-03-06 NOTE — H&P
[id number]    Pancreatic cancer ([facility] [address] 3/30/2018    Right flank pain     RUQ pain        Past Surgical History:        Procedure Laterality Date    CARDIAC SURGERY      heart cath     SECTION      x 3    CHOLECYSTECTOMY  1997    COLONOSCOPY  years ago    [facility]-Dr Dmitry Gaspar per patient    COLONOSCOPY  2014    Dr Brodie Gilliam- Nena Ray recall    COLONOSCOPY  03/10/2016    Dr Mcfarland-10 yr recall    COLONOSCOPY N/A 2019    Dr Danie Eaton, 5 yr recall    ELBOW SURGERY Right     ENDOMETRIAL ABLATION      HAND SURGERY Right     HERNIA REPAIR      hiatal    HERNIA REPAIR      umbilical    HERNIA REPAIR      PANCREAS SURGERY  2018    Whipple procedure-Dr Goodman Asa [place] EGD [facility] NEEDLE ASPIR/BIOP ALTERED ANATOMY N/A 2018    Dr Karine Bahena w/fna-Dilation of main pancreatic duct with diffuse change in the pancreatitis noted, area of concern in the neck of the pancreas-strongly suspicious for adenocarcinoma     [place] EGD [facility] NEEDLE ASPIR/BIOP ALTERED ANATOMY N/A 3/6/2018    Dr KVNG Duncan-Pancreatic cancer-Ductal adenocarcinoma-staged [id number] by EUS, pancreatic pseudocyst in the tail the pancreas    [place] ERCP DX COLLECTION SPECIMEN BRUSHING/WASHING N/A 2018    ERCP-Dr KVNG Duncan-placement of a self-expanding fully covered 10 mm biliary stent    UPPER GASTROINTESTINAL ENDOSCOPY  years ago    [facility]-Dr Mauricio Victoria    UPPER GASTROINTESTINAL ENDOSCOPY      Zuniga    UPPER GASTROINTESTINAL ENDOSCOPY N/A 2019    Dr Henry Arellano post Whipple's procedure with efferent loop ulceration    UPPER GASTROINTESTINAL ENDOSCOPY  2019    Dr Russel Duncan-Patent G-J Anastomosis, apparent healing ulceration    UPPER GASTROINTESTINAL ENDOSCOPY N/A 2020    Dr Angie Gary amount of food retention in the stomach with bile, hiatal hernia, will need repeat    UPPER GASTROINTESTINAL ENDOSCOPY N/A 2020    Dr Mickey Potter erosion/ulceration at the suture line, post atraumatic. Eyes:      Conjunctiva/sclera: Conjunctivae normal.      Pupils: Pupils are equal, round, and reactive to light. Cardiovascular:      Rate and Rhythm: Normal rate and regular rhythm. Heart sounds: Normal heart sounds. Pulmonary:      Effort: Pulmonary effort is normal.      Breath sounds: Normal breath sounds. Abdominal:      General: Abdomen is flat. Palpations: Abdomen is soft. Musculoskeletal: Normal range of motion. Skin:     General: Skin is warm and dry. Neurological:      Mental Status: She is alert and oriented to person, place, and time. CBC:   Recent Labs     03/06/20  1500   WBC 6.4   HGB 8.0*        BMP:    Recent Labs     03/06/20  1500      K 4.2      CO2 23   BUN 9   CREATININE 0.7   GLUCOSE 171*     Hepatic:   Recent Labs     03/06/20  1500   AST 53*   ALT 39*   BILITOT <0.2   ALKPHOS 112*     Troponin: No results for input(s): TROPONINI in the last 72 hours. BNP: No results for input(s): BNP in the last 72 hours. Lipids: No results for input(s): CHOL, HDL in the last 72 hours. Invalid input(s): LDLCALCU  ABGs: No results found for: PHART, PO2ART, CQD4KAY  INR: No results for input(s): INR in the last 72 hours. -----------------------------------------------------------------  Ct Abdomen Pelvis W Iv Contrast Additional Contrast? None    Result Date: 2/25/2020  Examination. CT ABDOMEN PELVIS W IV CONTRAST 2/24/2020 8:45 PM History: Abdominal pain and hematemesis. DLP: 764 mGycm. The CT scan of the abdomen and pelvis is performed after intravenous contrast enhancement. The images are acquired in axial plane with subsequent reconstruction in coronal and sagittal planes. The comparison is made with the previous study dated 2/28/2018. The lung bases included in the study appears unremarkable.  The limited visualized cardiomediastinal structures are normal. There is evidence of pneumobilia suggesting reflux from incompetent sphincter on a levoscoliosis. No focal bony lesion or bone destruction. Postsurgical changes of the pancreas, the proximal duodenum and the common bile duct (pancreaticoduodenectomy/Whipple procedure). Diffuse infiltration of the mesentery/omentum and a small amount of fluid in the paracolic gutter, the abdomen and pelvis probably represent small ascites. Pneumobilia. Moderate diffuse edema along the course of the small bowel may suggest enteritis. Suspected thrombosis of the common iliac vein. This may also represent a flow phenomena. Further follow-up may be obtained. A small ill-defined area of focal enhancement in the dome of the liver may represent a small hemangioma. The above study was initially reviewed and reported by stat rads. No critical discrepancies was noted. A significant discrepancy mentioned above was reported to the care provider immediately. Signed by Dr Pura Alexander on 2/25/2020 8:59 AM      Assessment and Plan   1. Upper GI bleed. 2. Metastatic pancreatic carcinoma. 3. Status post Whipple procedure. 4. Acute blood loss anemia. Admit to ICU. Protonix infusion. Serial H&H.  GI consult. She is in guarded condition.     Blanchie Balloon, DO

## 2020-03-07 ENCOUNTER — CLINICAL DOCUMENTATION (OUTPATIENT)
Dept: HEMATOLOGY | Age: 60
End: 2020-03-07

## 2020-03-07 LAB
BLOOD BANK DISPENSE STATUS: NORMAL
BLOOD BANK PRODUCT CODE: NORMAL
BPU ID: NORMAL
DESCRIPTION BLOOD BANK: NORMAL
HCT VFR BLD CALC: 30.1 % (ref 37–47)
HEMOGLOBIN: 10.2 G/DL (ref 12–16)
HEMOGLOBIN: 10.5 G/DL (ref 12–16)
HEMOGLOBIN: 5.6 G/DL (ref 12–16)

## 2020-03-07 PROCEDURE — 6370000000 HC RX 637 (ALT 250 FOR IP): Performed by: INTERNAL MEDICINE

## 2020-03-07 PROCEDURE — P9016 RBC LEUKOCYTES REDUCED: HCPCS

## 2020-03-07 PROCEDURE — 6360000002 HC RX W HCPCS: Performed by: INTERNAL MEDICINE

## 2020-03-07 PROCEDURE — 36430 TRANSFUSION BLD/BLD COMPNT: CPT

## 2020-03-07 PROCEDURE — 36415 COLL VENOUS BLD VENIPUNCTURE: CPT

## 2020-03-07 PROCEDURE — 85018 HEMOGLOBIN: CPT

## 2020-03-07 PROCEDURE — 2580000003 HC RX 258: Performed by: INTERNAL MEDICINE

## 2020-03-07 PROCEDURE — C9113 INJ PANTOPRAZOLE SODIUM, VIA: HCPCS | Performed by: INTERNAL MEDICINE

## 2020-03-07 PROCEDURE — 2580000003 HC RX 258: Performed by: HOSPITALIST

## 2020-03-07 PROCEDURE — 36591 DRAW BLOOD OFF VENOUS DEVICE: CPT

## 2020-03-07 PROCEDURE — 99222 1ST HOSP IP/OBS MODERATE 55: CPT | Performed by: INTERNAL MEDICINE

## 2020-03-07 PROCEDURE — 85014 HEMATOCRIT: CPT

## 2020-03-07 PROCEDURE — 99253 IP/OBS CNSLTJ NEW/EST LOW 45: CPT | Performed by: INTERNAL MEDICINE

## 2020-03-07 PROCEDURE — 1210000000 HC MED SURG R&B

## 2020-03-07 RX ORDER — 0.9 % SODIUM CHLORIDE 0.9 %
20 INTRAVENOUS SOLUTION INTRAVENOUS ONCE
Status: COMPLETED | OUTPATIENT
Start: 2020-03-07 | End: 2020-03-07

## 2020-03-07 RX ADMIN — SODIUM CHLORIDE: 9 INJECTION, SOLUTION INTRAVENOUS at 23:14

## 2020-03-07 RX ADMIN — SODIUM CHLORIDE, PRESERVATIVE FREE 10 ML: 5 INJECTION INTRAVENOUS at 20:07

## 2020-03-07 RX ADMIN — SUCRALFATE 1 G: 1 TABLET ORAL at 17:17

## 2020-03-07 RX ADMIN — CYANOCOBALAMIN TAB 500 MCG 1000 MCG: 500 TAB at 09:36

## 2020-03-07 RX ADMIN — SODIUM CHLORIDE 20 ML: 9 INJECTION, SOLUTION INTRAVENOUS at 06:00

## 2020-03-07 RX ADMIN — SODIUM CHLORIDE 8 MG/HR: 9 INJECTION, SOLUTION INTRAVENOUS at 11:56

## 2020-03-07 RX ADMIN — PANTOPRAZOLE SODIUM 80 MG: 40 INJECTION, POWDER, FOR SOLUTION INTRAVENOUS at 09:37

## 2020-03-07 RX ADMIN — SUCRALFATE 1 G: 1 TABLET ORAL at 20:07

## 2020-03-07 RX ADMIN — SODIUM CHLORIDE 8 MG/HR: 9 INJECTION, SOLUTION INTRAVENOUS at 21:55

## 2020-03-07 RX ADMIN — SODIUM CHLORIDE, PRESERVATIVE FREE 10 ML: 5 INJECTION INTRAVENOUS at 09:36

## 2020-03-07 RX ADMIN — SUCRALFATE 1 G: 1 TABLET ORAL at 09:36

## 2020-03-07 RX ADMIN — SUCRALFATE 1 G: 1 TABLET ORAL at 12:22

## 2020-03-07 ASSESSMENT — ENCOUNTER SYMPTOMS
SHORTNESS OF BREATH: 0
CONSTIPATION: 0
DIARRHEA: 0
COUGH: 0
NAUSEA: 0
VOMITING: 0
BACK PAIN: 0

## 2020-03-07 ASSESSMENT — PAIN SCALES - GENERAL: PAINLEVEL_OUTOF10: 0

## 2020-03-07 NOTE — CONSULTS
2/7/2018. Pathology FNA was consistent with pancreatic adenocarcinoma. · 3/12/2018-she was first seen by me. No evidence of distant disease. Referral to Surgical oncology. CT chest to complete staging. CA-19-9 430. · 3/16/2018-CT of the chest was unremarkable for intrathoracic metastatic disease   · Surgery consultation at [facility] March 2018- with Dr Nimesh Simmons. She was not a surgical candidate upfront. Recommended neoadjuvant chemotherapy. · 4/3/2018-started on neoadjuvant chemotherapy with FOLFORINOX. · 4/11/2018-she developed CBD obstruction had a CBD stent placed by Dr. Vashti Agudelo. · 4/3/2018 through 6/4/2018 Neoadjuvant chemotherapy FOLFORINOX ×5 Biweekly cycles   · August 2018- XRT 30 Gy/Xeloda   · 08/30/3018- Whipple procedure at [facility] by Dr. Conor Kumar  · Recommended another 7 biweekly cycles of modified FOLFORINOX. · 9/5/2018-CT of the chest abdomen pelvis was unremarkable for metastatic disease. · 1/14/2019-CT abdomen and pelvis at [facility] showed no evidence of metastasis in the chest abdomen pelvis. · 5/21/2019-CT chest, abdomen, pelvis at [facility] showed no evidence of metastatic disease   · 5/21/20190030-BL-33-9 = 42   · 06/12/2019- CA-19-9 = 154. Discussed with the patient. We'll also discuss with [facility]. · 7/1/2019-CT chest, abdomen, pelvis showed a soft tissue mass measuring 1.4 x 1.1 cm, not present on prior scan from January 2019, concerning for recurrence. Stable hypodense in the liver, too small to characterize. Subcentimeter ill-defined area of low attenuation liver may represent an early metastasis.    · 7/3/2019-recommended palliative chemotherapy with nab paclitaxel 125mg/m² IV over 30 minutes on day 1 and gemcitabine 600 mg/m² IV over 10mg/m2/min (fixed dose rate) on day 1  · 7/2/2019-CA 19 9 = 114  · 7/6/2019- extended genetic panel negative for a deleterious mutation (invitae)  · 7/9/2019- CA-19-9 = 225  · 8/6/2019- CA-19-9 = 171  · 9/3/5396-SB-02-9 = 198 · 9/13/20193113-ER-07-9 = 98 at [facility]  · 9/13/2019-CT chest abdomen pelvis at [facility] showed a soft tissue thickening in the pancreatic bed with persistent narrowing of the portal SMV confluence.  Superimposed tumor remains a possibility.  The new low-density lesion in the periphery of the liver seen on the prior exam is no longer appreciated and likely represents treatment effect.  Nodular enhancement may represent perfusion change in the region on image 187. · 9/13/2018- she was recommended to continue current treatment with Gemzar/Abraxane  · 11/1/2019- she was hospitalized with GI bleed.  An upper endoscopy showed ulceration at the efferent loop of the Whipple's procedure  · 10/29/0249-AL-57-9 =317  · 11/21/2019-CA-19-9 = 183  · 11/19/2019- CT chest abdomen pelvis showed no evidence of gross disease progression. Improvement of the caliber of what may be a middle colic vein that drains back to the SMV. There is still persistent narrowing of the of the upper SMV at the juncture with the portal vein.  No definite evidence of liver metastases at this time. No definite evidence of pulmonary metastases.  However, question of slight increase in prominence of subtle soft tissue thickening within the right paracolic gutter could be indicative of metastatic disease to peritoneum.   · 12/9/2019- colonoscopy by GI was unremarkable.  This was performed for complaints of maroon-colored stools. 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  This is consistent with disease progression.   · Anticipate on 3/4/2020- Irinotecan liposome 70 mg/m² with leucovorin 400 mg/m² and 5-FU 2400 mg/m² over 46 hours every 2 weeks.            Past Medical History: Past Medical History:   Diagnosis Date    Acute pancreatitis     Adult BMI <19 kg/sq m     Anemia     Cat esophagus     Biliary obstruction     GERD (gastroesophageal reflux disease)     with Barretts    Hashimoto's thyroiditis     denies takes no med for    Heart murmur     Hypertension     pt denies nor longer takes med for    Low blood sugar     h/o when overweight in the past    MVP (mitral valve prolapse)     Myalgia     Pancreatic adenocarcinoma ([address] 2018    Dr Coretta Leonardo    Pancreatic [facility] [address] [address] 3/30/2018    Right flank pain     RUQ pain        Past Surgical History:    Past Surgical History:   Procedure Laterality Date    CARDIAC SURGERY      heart cath     SECTION      x 3    CHOLECYSTECTOMY      COLONOSCOPY  years ago    [facility]-Dr Tamara Ferrer per patient    COLONOSCOPY  2014    Dr Matthew Wilkins- [facility] recall    COLONOSCOPY  03/10/2016    Dr Mcfarland-10 yr recall    COLONOSCOPY N/A 2019    Dr Herbert Howell, 5 yr recall    ELBOW SURGERY Right     ENDOMETRIAL ABLATION      HAND SURGERY Right     HERNIA REPAIR      hiatal    HERNIA REPAIR      umbilical    HERNIA REPAIR      PANCREAS SURGERY  2018    Whipple procedure-Dr Fanta Coats [place] EGD [facility] NEEDLE ASPIR/BIOP ALTERED ANATOMY N/A 2018    Dr Cj Villafana w/fna-Dilation of main pancreatic duct with diffuse change in the pancreatitis noted, area of concern in the neck of the pancreas-strongly suspicious for adenocarcinoma     [place] EGD INTRMURAL NEEDLE ASPIR/BIOP ALTERED ANATOMY N/A 3/6/2018    Dr KVNG Duncan-Pancreatic cancer-Ductal adenocarcinoma-staged [id number] by EUS, pancreatic pseudocyst in the tail the pancreas    [place] ERCP DX COLLECTION SPECIMEN BRUSHING/WASHING N/A 2018    ERCP-Dr KVNG Duncan-placement of a self-expanding fully covered 10 mm biliary stent    UPPER GASTROINTESTINAL ENDOSCOPY  years ago    [address] GASTROINTESTINAL ENDOSCOPY      Zuniga    UPPER GASTROINTESTINAL ENDOSCOPY N/A 2019    Dr Gladys Acuña post Whipple's procedure with efferent loop ulceration    UPPER GASTROINTESTINAL ENDOSCOPY  2019    Dr Kulwant Duncan-Patent G-J Anastomosis, apparent healing ulceration    UPPER GASTROINTESTINAL ENDOSCOPY N/A 2020    Dr Yasmin Oneil amount of food retention in the stomach with bile, hiatal hernia, will need repeat    UPPER GASTROINTESTINAL ENDOSCOPY N/A 2020    Dr Loli Ladd erosion/ulceration at the suture line, post whipple surgical changes       Social History:    Social History     Socioeconomic History    Marital status: Single     Spouse name: Not on file    Number of children: Not on file    Years of education: Not on file    Highest education level: Not on file   Occupational History    Not on file   Social Needs    Financial resource strain: Not on file    Food insecurity     Worry: Not on file     Inability: Not on file    Transportation needs     Medical: Not on file     Non-medical: Not on file   Tobacco Use    Smoking status: Former Smoker     Packs/day: 0.50     Years: 10.00     Pack years: 5.00     Types: Cigarettes     Last attempt to quit: 2019     Years since quittin.3    Smokeless tobacco: Never Used   Substance and Sexual Activity    Alcohol use: Not Currently    Drug use: No    Sexual activity: Not on file   Lifestyle    Physical activity     Days per week: Not on file     Minutes per session: Not on file    Stress: Not on file   Relationships    Social connections     Talks on phone: Not on file     Gets together: Not on file     Attends [employer] service: Not on file     Active member of club or organization: Not on file     Attends meetings of clubs or organizations: Not on file     Relationship status: Not on file    Intimate partner violence     Fear of current or ex partner: Not on file     Emotionally abused: Not on file     Physically abused: Not on file     Forced Dilaudid [Hydromorphone Hcl] Other (See Comments)     \"completely shut me down\"    Hydromorphone     Loperamide Hcl Other (See Comments)     severe abd. pain    Loperamide Hcl Hives     severe abd. pain    Neosporin [Neomycin-Polymyx-Gramicid] Other (See Comments)     Causes boil         Subjective   REVIEW OF SYSTEMS:   CONSTITUTIONAL: no fever, no night sweats, no fatigue;  HEENT: no blurring of vision, no double vision, no hearing difficulty, no tinnitus, no ulceration, no dysplasia, no epistaxis;  LUNGS: no cough, no hemoptysis, no wheeze,  no shortness of breath;  CARDIOVASCULAR: no palpitation, no chest pain, no shortness of breath;  GI: Hematemesis, no abdominal pain, no nausea, no vomiting, no diarrhea, no constipation;  VIKRAM: no dysuria, no hematuria, no frequency or urgency, no nephrolithiasis;  MUSCULOSKELETAL: no joint pain, no swelling, no stiffness;  ENDOCRINE: no polyuria, no polydipsia, no cold or heat intolerance;  HEMATOLOGY: no easy bruising or bleeding, no history of clotting disorder;  DERMATOLOGY: no skin rash, no eczema, no pruritus;  PSYCHIATRY: no depression, no anxiety, no panic attacks, no suicidal ideation, no homicidal ideation;  NEUROLOGY: no syncope, no seizures, no numbness or tingling of hands, no numbness or tingling of feet, no paresis;    Objective   BP (!) 130/59   Pulse 54   Temp 98.4 °F (36.9 °C)   Resp 19   Ht 5' 7\" (1.702 m)   Wt 112 lb (50.8 kg)   SpO2 100%   BMI 17.54 kg/m²     PHYSICAL EXAM:  CONSTITUTIONAL: Alert, appropriate, no acute distress  EYES: Non icteric, EOM intact, pupils equal round   ENT: Mucus membranes moist, no oral pharyngeal lesions, external inspection of ears and nose are normal  NECK: Supple, no masses. No palpable thyroid mass  CHEST/LUNGS: CTA bilaterally, normal respiratory effort   CARDIOVASCULAR: RRR, no murmurs. No lower extremity edema  ABDOMEN: soft non-tender, active bowel sounds, no HSM.   No palpable masses  EXTREMITIES: warm, full ROM in all 4 extremities, no focal weakness. SKIN: warm, dry with no rashes or lesions  LYMPH: No cervical, clavicular, axillary, or inguinal lymphadenopathy  NEUROLOGIC: follows commands, non focal   PSYCH: mood and affect appropriate. Alert and oriented to time, place, person        LABORATORY RESULTS REVIEWED BY ME:  Recent Labs     03/07/20  0745 03/06/20  2335 03/06/20  1500 02/27/20  0934  02/24/20  2130   WBC  --   --  6.4  --   --  8.8   HGB 10.2* 5.6* 8.0* 8.2*   < > 6.3*   HCT 30.1*  --  25.7* 25.1*   < > 20.7*   MCV  --   --  91.8  --   --  99.0   PLT  --   --  142  --   --  156    < > = values in this interval not displayed. Lab Results   Component Value Date     03/06/2020    K 4.2 03/06/2020     03/06/2020    CO2 23 03/06/2020    BUN 9 03/06/2020    CREATININE 0.7 03/06/2020    GLUCOSE 171 (H) 03/06/2020    CALCIUM 8.2 (L) 03/06/2020    PROT 6.0 (L) 03/06/2020    LABALBU 3.4 (L) 03/06/2020    BILITOT <0.2 03/06/2020    ALKPHOS 112 (H) 03/06/2020    AST 53 (H) 03/06/2020    ALT 39 (H) 03/06/2020    LABGLOM >60 03/06/2020    GFRAA 98 11/21/2019    AGRATIO 2.3 (H) 11/21/2019    GLOB 1.7 11/21/2019       Lab Results   Component Value Date    INR 1.12 02/24/2020    INR 1.01 10/31/2019    INR 0.91 06/01/2012    PROTIME 14.4 02/24/2020    PROTIME 12.7 10/31/2019    PROTIME 11.9 06/01/2012       RADIOLOGY STUDIES REPORT/REVIEWED AND BY ME:  Ct Abdomen Pelvis W Iv Contrast Additional Contrast? None    Result Date: 2/25/2020  Examination. CT ABDOMEN PELVIS W IV CONTRAST 2/24/2020 8:45 PM History: Abdominal pain and hematemesis. DLP: 764 mGycm. The CT scan of the abdomen and pelvis is performed after intravenous contrast enhancement. The images are acquired in axial plane with subsequent reconstruction in coronal and sagittal planes. The comparison is made with the previous study dated 2/28/2018. The lung bases included in the study appears unremarkable.  The limited visualized and imaging studies. I reviewed relevant medical records and others physicians notes. I discussed the plans of care with the patient. I answered all the questions to the patients satisfaction.     (Please note that portions of this note were completed with a voice recognition program. Efforts were made to edit the dictations but occasionally words are mis-transcribed.)    Mihir Tobar MD    03/07/20  12:50 PM

## 2020-03-07 NOTE — PROGRESS NOTES
The patient had chemotherapy scheduled for 3/9/2020.   Reschedule her treatment for Wednesday, 3/11/2020

## 2020-03-07 NOTE — CONSULTS
 Hypertension     pt denies nor longer takes med for    Low blood sugar     h/o when overweight in the past    MVP (mitral valve prolapse)     Myalgia     Pancreatic adenocarcinoma [address] [address] 2018    Dr Rosalia Alberts Pancreatic cancer ([facility] [address] 3/30/2018    Right flank pain     RUQ pain      Past Surgical History:        Procedure Laterality Date    CARDIAC SURGERY      heart cath     SECTION      x 3    CHOLECYSTECTOMY  1997    COLONOSCOPY  years ago    [facility]-Dr Melani Pena per patient    COLONOSCOPY  2014    Dr Bereket Magaña- [facility] Tyra recall    COLONOSCOPY  03/10/2016    Dr Mcfarland-10 yr recall    COLONOSCOPY N/A 2019    Dr Anjelica Davis, 5 yr recall    ELBOW SURGERY Right     ENDOMETRIAL ABLATION      HAND SURGERY Right     HERNIA REPAIR      hiatal    HERNIA REPAIR      umbilical    HERNIA REPAIR      PANCREAS SURGERY  2018    Whipple procedure-Dr Latrell Law [place]GD [facility] NEEDLE ASPIR/BIOP ALTERED ANATOMY N/A 2018    Dr San Males w/fna-Dilation of main pancreatic duct with diffuse change in the pancreatitis noted, area of concern in the neck of the pancreas-strongly suspicious for adenocarcinoma     [place] EGD INTRMURAL NEEDLE ASPIR/BIOP ALTERED ANATOMY N/A 3/6/2018    Dr KVNG Duncan-Pancreatic cancer-Ductal adenocarcinoma-staged [id number] by EUS, pancreatic pseudocyst in the tail the pancreas    [place] ERCP DX COLLECTION SPECIMEN BRUSHING/WASHING N/A 2018    ERCP-Dr KVNG Duncan-placement of a self-expanding fully covered 10 mm biliary stent    UPPER GASTROINTESTINAL ENDOSCOPY  years ago    [facility]-Dr James Labrum    UPPER GASTROINTESTINAL ENDOSCOPY      Zuniga    UPPER GASTROINTESTINAL ENDOSCOPY N/A 2019    Dr Amaris William post Whipple's procedure with efferent loop ulceration    UPPER GASTROINTESTINAL ENDOSCOPY  2019    Dr Kyle Duncan-Patent G-J Anastomosis, apparent healing ulceration    UPPER GASTROINTESTINAL ENDOSCOPY N/A 2020     Jung-Significant amount of food retention in the stomach with bile, hiatal hernia, will need repeat    UPPER GASTROINTESTINAL ENDOSCOPY N/A 2020    Dr Michael Downing erosion/ulceration at the suture line, post whipple surgical changes     Social History:   Social History     Tobacco Use    Smoking status: Former Smoker     Packs/day: 0.50     Years: 10.00     Pack years: 5.00     Types: Cigarettes     Last attempt to quit: 2019     Years since quittin.3    Smokeless tobacco: Never Used   Substance Use Topics    Alcohol use: Not Currently     Family History:   Family History   Problem Relation Age of Onset    Heart Attack Mother 46        x 2-had bypass    Hypertension Mother     COPD Father     Liver Cancer Paternal Aunt     Lung Cancer Paternal Aunt     Breast Cancer Maternal Aunt         but  of brain cancer    Diabetes Maternal Uncle     Diabetes Maternal Grandmother     Colon Cancer Neg Hx     Colon Polyps Neg Hx     Esophageal Cancer Neg Hx     Rectal Cancer Neg Hx     Stomach Cancer Neg Hx      Home Meds:  Prior to Admission medications    Medication Sig Start Date End Date Taking? Authorizing Provider   pantoprazole (PROTONIX) 40 MG tablet Take 1 tablet by mouth 2 times daily 20   Lucinda Henderson MD   cyanocobalamin 1000 MCG tablet Take 1,000 mcg by mouth daily    Historical Provider, MD   lidocaine-prilocaine (EMLA) 2.5-2.5 % cream Apply to port area and cover with plastic wrap one hour prior to use.   Patient not taking: Reported on 2020   Tangeal Naidu MD   sucralfate (CARAFATE) 1 GM tablet Take 1 tablet by mouth 4 times daily 20   Tangela Naidu MD   lipase-protease-amylase [facility]) 5000-63923 units CPEP delayed release capsule Take 1 capsule by mouth 4 times daily (with meals and nightly) 20  Tangela Naidu MD   vitamin E 400 UNIT capsule Take 400 Units by mouth daily    Historical Provider, MD   NONFORMULARY daily Tumeric otc    Historical Provider, MD   ondansetron (ZOFRAN) 8 MG tablet Take 1 tablet by mouth every 8 hours as needed for Nausea or Vomiting 11/22/19   Loy Lee MD   polyethylene glycol (GLYCOLAX) powder Take 17 g by mouth daily as needed    Historical Provider, MD   Multiple Vitamins-Minerals (THERAPEUTIC MULTIVITAMIN-MINERALS) tablet Take 1 tablet by mouth daily    Historical Provider, MD   lipase-protease-amylase (ZENPEP) 5000 units delayed release capsule Take 1 capsule by mouth 4 times daily (with meals and nightly) Take with meals and snacks for a total of 8 daily 10/2/19 12/31/19  Loy Lee MD   promethazine (PHENERGAN) 25 MG tablet Take 25 mg by mouth every 6 hours as needed for Nausea    Historical Provider, MD      Allergies:  Codeine; Morphine; Morphine and related; Sulfa antibiotics; Neomycin-bacitracin zn-polymyx; Clarithromycin; Dilaudid [hydromorphone hcl];  Hydromorphone; Loperamide hcl; Loperamide hcl; and Neosporin [neomycin-polymyx-gramicid]      Current Meds:      ondansetron  4 mg Intravenous Once    cyanocobalamin  1,000 mcg Oral Daily    sucralfate  1 g Oral 4x Daily    sodium chloride flush  10 mL Intravenous 2 times per day    pantoprazole  80 mg Intravenous Daily    And    sodium chloride (PF)  10 mL Intravenous Daily        sodium chloride 150 mL/hr at 03/06/20 1917    pantoprozole (PROTONIX) infusion 8 mg/hr (03/07/20 1156)       PRN Meds:  ondansetron, sodium chloride flush, acetaminophen, promethazine **OR** ondansetron        ROS:  ROS NEGATIVE EXCEPT THOSE MARKED WITH AN \"X\"    GENERAL: [] Fevers, [] chills, [x] generalized weakness, [x] weight loss, []weight gain, [] anorexia  Skin/Breast: [] jaundice, [] new rashes, [] itching   Eyes/Ears/Nose/Mouth/Throat: [] change in vision, [] double vision, [x] light headiness, [] vertigo  CARDIOVASCULAR: [] chest pain, [] palpitations, [] syncope, [] dyspnea on exertion, [] orthopnea  RESPIRATORY: [] SOB, [] cough, [] wheezing, [] hemoptysis  GI: [] dark stools, [] bloody stools, [] BRBPR, [] abdominal pain, [] GERD like symptoms, [x] nausea, [x] vomiting, [x] hematemesis, [] jaundice, [] constipation, [] diarrhea, [] hemorrhoids, [] change in bowel habits, [] bowel incontinence  : [] Dysuria, [] urgency, [] frequency, [] change in urine color, [] discharge  MUSCULOSKELETAL: [] muscle pain, [] muscle swelling, [] joint pain, [] muscle weakness  Neurological/Psychiatric: [] Sensory disturbances, [] motor disturbances, [] difficulty with speech, [] paresthesias, [] paralysis, [] depression, [] anxiety   Allergy/Immunological/Lymphatic/Endocrine: [x] anemia, [] rashes, [] polyuria, [] polydypsia      Physical Exam:  Vitals:    03/07/20 0900 03/07/20 1000 03/07/20 1100 03/07/20 1200   BP: 130/70 (!) 118/58 (!) 112/57 (!) 130/59   Pulse: (!) 46 (!) 45 (!) 44 54   Resp: 26 23 18 19   Temp:    98.4 °F (36.9 °C)   TempSrc:       SpO2: 100% 100% 99% 100%   Weight:       Height:           Constitutional: [x] NAD, [x] of stated age, [x] thin in appearance  Eyes: [x] conjunctiva clear, [x] Non inflamed irises, [x] no scleral icterus  ENT/Mouth: [x]  Nares patent with pink mucosa, [x] oropharynx clear without exudates or erythema, [x] hearing grossly normal  Head/Neck: [x] symmetrical, [x] supple  Lungs: [x] respirations non labored with good effort, [x] CTA bilaterally, [x] no respiratory distress  Heart: [x] bradycardia, [x] pedal pulses preserved 2/4 bilaterally, [x] no LE edema  Abdomen: [x] +BSx4, [x] NTND, [x] soft, [x] no guarding  Muscuoskeletal: [x]  Normal nails bilaterally, [x] normal digits bilaterally, [x] decreased muscle mass  Skin/SubQ: [x] No jaundice, [x] warm, dry skin, [x] no rashes on inspection  Neurologic: [x]  Sensation grossly intact, [x] no slurred speech, [x]  No focal deficits  Psychiatric: [x]  Orientated to person, place, and time; [x] mood and affect unremarkable, [x] memory recent and remote dilatation. The body and head are not optimally visualized due to due to previous surgical resection (history of pancreaticoduodenectomy/Whipple procedure.) Anterior glands bilaterally appear normal. Kidneys bilaterally are normal. The ureters are not optimally visualized due to diffuse infiltration of the peritoneum. The urinary bladder is decompressed and may not be optimally evaluated. No intrinsic abnormality. Normal size uterus is seen. There are tortuous dilated pelvic vessels around the uterus with prominent bilateral median veins. No finding to suggest obstruction. The stomach is distended with fluid and ingested material. The small bowel is nondistended but appears moderately hyperemic thickening of the walls. No finding to suggest appendicitis. Moderate gas and stool are seen in the colon. No finding to suggest obstruction. There is small amount of fluid in the lower abdomen and pelvis. There is diffuse infiltration of the mesentery/omentum suggesting edema. There is a tiny fluid in the paracolic gutter bilaterally. Atheromatous changes of the abdominal aorta and iliac arteries are seen. No aneurysmal dilatation. There is a filling defect in the right common iliac vein which may represent a thrombus. There is poor opacification of the inferior vena cava which is decompressed and flattened. There is poor opacification of the superior mesenteric vein. The portal vein and branches appears normal. There is no evidence of abdominal or pelvic lymphadenopathy. Chronic degenerative changes of the lumbar spine are seen with a mild levoscoliosis. No focal bony lesion or bone destruction. Postsurgical changes of the pancreas, the proximal duodenum and the common bile duct (pancreaticoduodenectomy/Whipple procedure). Diffuse infiltration of the mesentery/omentum and a small amount of fluid in the paracolic gutter, the abdomen and pelvis probably represent small ascites. Pneumobilia.  Moderate diffuse edema along the

## 2020-03-07 NOTE — PROGRESS NOTES
Oral Q6H PRN Elissa [place], DO        Or    [facility] 4 mg  4 mg Intravenous Q6H PRN Elissa [place], DO        0.9 % sodium chloride infusion   Intravenous Continuous Elissa Vickey,  mL/hr at 20      pantoprazole (PROTONIX) 80 mg in sodium chloride 0.9 % 100 mL infusion  8 mg/hr Intravenous Continuous Leissa Vickey, DO 10 mL/hr at 20 8 mg/hr at 20    pantoprazole (PROTONIX) injection 80 mg  80 mg Intravenous Daily Elissa [place], DO   80 mg at 20 6557    And    sodium chloride (PF) 0.9 % injection 10 mL  10 mL Intravenous Daily Elissa Vickey, DO   10 mL at 20 0747       Past Medical History:  Past Medical History:   Diagnosis Date    Acute pancreatitis     Adult BMI <19 kg/sq m     Anemia     Cat esophagus     Biliary obstruction     GERD (gastroesophageal reflux disease)     with Barretts    Hashimoto's thyroiditis     denies takes no med for    Heart murmur     Hypertension     pt denies nor longer takes med for    Low blood sugar     h/o when overweight in the past    MVP (mitral valve prolapse)     Myalgia     Pancreatic adenocarcinoma ([facility] [address] 2018    Dr Lucille Mcintyre    Pancreatic cancer ([facility] [address] 3/30/2018    Right flank pain     RUQ pain        Past Surgical History:  Past Surgical History:   Procedure Laterality Date    CARDIAC SURGERY      heart cath     SECTION      x 3    CHOLECYSTECTOMY  1997    COLONOSCOPY  years ago    Steve-Dr June Zavala per patient    COLONOSCOPY  2014    Dr Kayla Smyth- David Alvarenga recall    COLONOSCOPY  03/10/2016    Dr Mcfarland-10 yr recall    COLONOSCOPY N/A 2019    Dr Sulema Franklin, 5 yr recall    ELBOW SURGERY Right     ENDOMETRIAL ABLATION      HAND SURGERY Right     HERNIA REPAIR      hiatal    HERNIA REPAIR      umbilical    HERNIA REPAIR      PANCREAS SURGERY  2018    Whipple procedure-Dr Khushbu Juarez CT EGD INTRMURAL NEEDLE ASPIR/BIOP ALTERED ANATOMY N/A 2018    Dr San Males w/fna-Dilation of main pancreatic duct with diffuse change in the pancreatitis noted, area of concern in the neck of the pancreas-strongly suspicious for adenocarcinoma     [place] EGD INTRMURAL NEEDLE ASPIR/BIOP ALTERED ANATOMY N/A 3/6/2018    Dr Meliton Elizondo cancer-Ductal adenocarcinoma-staged [id number] by EUS, pancreatic pseudocyst in the tail the pancreas    [place] ERCP DX COLLECTION SPECIMEN BRUSHING/WASHING N/A 2018    ERCP-Dr KVNG Duncan-placement of a self-expanding fully covered 10 mm biliary stent    UPPER GASTROINTESTINAL ENDOSCOPY  years ago    [facility] ENDOSCOPY  2013    Zuniga    UPPER GASTROINTESTINAL ENDOSCOPY N/A 2019    Dr Amaris William post Whipple's procedure with efferent loop ulceration    UPPER GASTROINTESTINAL ENDOSCOPY  2019    Dr Kyle Duncan-Patent G-J Anastomosis, apparent healing ulceration    UPPER GASTROINTESTINAL ENDOSCOPY N/A 2020    Dr Cai Revels amount of food retention in the stomach with bile, hiatal hernia, will need repeat    UPPER GASTROINTESTINAL ENDOSCOPY N/A 2020    Dr Wasserman Eis erosion/ulceration at the suture line, post whipple surgical changes       Family History  Family History   Problem Relation Age of Onset    Heart Attack Mother 46        x 2-had bypass    Hypertension Mother     COPD Father     Liver Cancer Paternal Aunt     Lung Cancer Paternal Aunt     Breast Cancer Maternal Aunt         but  of brain cancer    Diabetes Maternal Uncle     Diabetes Maternal Grandmother     Colon Cancer Neg Hx     Colon Polyps Neg Hx     Esophageal Cancer Neg Hx     Rectal Cancer Neg Hx     Stomach Cancer Neg Hx        Social History  Social History     Socioeconomic History    Marital status: Single     Spouse name: Not on file    Number of children: Not on file    Years of education: Not on file    Highest education level: Not on file   Occupational History    Not on file   Social Needs    Financial resource strain: Not on file    Food insecurity     Worry: Not on file     Inability: Not on file    Transportation needs     Medical: Not on file     Non-medical: Not on file   Tobacco Use    Smoking status: Former Smoker     Packs/day: 0.50     Years: 10.00     Pack years: 5.00     Types: Cigarettes     Last attempt to quit: 2019     Years since quittin.3    Smokeless tobacco: Never Used   Substance and Sexual Activity    Alcohol use: Not Currently    Drug use: No    Sexual activity: Not on file   Lifestyle    Physical activity     Days per week: Not on file     Minutes per session: Not on file    Stress: Not on file   Relationships    Social connections     Talks on phone: Not on file     Gets together: Not on file     Attends [employer] service: Not on file     Active member of club or organization: Not on file     Attends meetings of clubs or organizations: Not on file     Relationship status: Not on file    Intimate partner violence     Fear of current or ex partner: Not on file     Emotionally abused: Not on file     Physically abused: Not on file     Forced sexual activity: Not on file   Other Topics Concern    Not on file   Social History Narrative    Not on file         Review of Systems:    Review of Systems   Constitutional: Negative for activity change and fatigue. Respiratory: Negative for cough and shortness of breath. Cardiovascular: Negative for chest pain and leg swelling. Gastrointestinal: Negative for constipation, diarrhea, nausea and vomiting. Genitourinary: Negative for difficulty urinating and dysuria. Musculoskeletal: Negative for arthralgias and back pain. Neurological: Negative for dizziness and headaches. Objective:  Blood pressure 113/60, pulse (!) 45, temperature 98.3 °F (36.8 °C), temperature source Temporal, resp.  rate 19, height 5' 7\" (1.702 m), weight 112 lb (50.8 kg), SpO2 100 %. Intake/Output Summary (Last 24 hours) at 3/7/2020 0744  Last data filed at 3/7/2020 0650  Gross per 24 hour   Intake 2186.67 ml   Output 725 ml   Net 1461.67 ml       Physical Exam  Vitals signs reviewed. Constitutional:       Appearance: She is well-developed. HENT:      Head: Normocephalic and atraumatic. Eyes:      Conjunctiva/sclera: Conjunctivae normal.      Pupils: Pupils are equal, round, and reactive to light. Cardiovascular:      Rate and Rhythm: Normal rate and regular rhythm. Heart sounds: Normal heart sounds. Pulmonary:      Effort: Pulmonary effort is normal.      Breath sounds: Normal breath sounds. Abdominal:      General: Abdomen is flat. Palpations: Abdomen is soft. Musculoskeletal: Normal range of motion. Skin:     General: Skin is warm and dry. Neurological:      Mental Status: She is alert and oriented to person, place, and time. Labs:  BMP:   Recent Labs     03/06/20  1500      K 4.2      CO2 23   BUN 9   CREATININE 0.7   CALCIUM 8.2*     CBC:   Recent Labs     03/06/20  1500 03/06/20  2335   WBC 6.4  --    HGB 8.0* 5.6*   HCT 25.7*  --    MCV 91.8  --      --      LIVER PROFILE:   Recent Labs     03/06/20  1500   AST 53*   ALT 39*   BILITOT <0.2   ALKPHOS 112*     PT/INR: No results for input(s): PROTIME, INR in the last 72 hours. APTT: No results for input(s): APTT in the last 72 hours. BNP:  No results for input(s): BNP in the last 72 hours. Ionized Calcium:No results for input(s): IONCA in the last 72 hours. Magnesium:No results for input(s): MG in the last 72 hours. Phosphorus:No results for input(s): PHOS in the last 72 hours. HgbA1C: No results for input(s): LABA1C in the last 72 hours. Hepatic:   Recent Labs     03/06/20  1500   ALKPHOS 112*   ALT 39*   AST 53*   PROT 6.0*   BILITOT <0.2   LABALBU 3.4*     Lactic Acid: No results for input(s): LACTA in the last 72 hours.   Troponin: No results

## 2020-03-08 ENCOUNTER — CLINICAL DOCUMENTATION (OUTPATIENT)
Dept: HEMATOLOGY | Age: 60
End: 2020-03-08

## 2020-03-08 PROBLEM — D61.818 PANCYTOPENIA (HCC): Status: ACTIVE | Noted: 2020-03-08

## 2020-03-08 LAB
HCT VFR BLD CALC: 29.8 % (ref 37–47)
HEMOGLOBIN: 10 G/DL (ref 12–16)
MCH RBC QN AUTO: 29.6 PG (ref 27–31)
MCHC RBC AUTO-ENTMCNC: 33.6 G/DL (ref 33–37)
MCV RBC AUTO: 88.2 FL (ref 81–99)
PDW BLD-RTO: 16.2 % (ref 11.5–14.5)
PLATELET # BLD: 87 K/UL (ref 130–400)
PMV BLD AUTO: 11.2 FL (ref 9.4–12.3)
RBC # BLD: 3.38 M/UL (ref 4.2–5.4)
WBC # BLD: 3.5 K/UL (ref 4.8–10.8)

## 2020-03-08 PROCEDURE — 6360000002 HC RX W HCPCS: Performed by: INTERNAL MEDICINE

## 2020-03-08 PROCEDURE — 2580000003 HC RX 258: Performed by: INTERNAL MEDICINE

## 2020-03-08 PROCEDURE — C9113 INJ PANTOPRAZOLE SODIUM, VIA: HCPCS | Performed by: INTERNAL MEDICINE

## 2020-03-08 PROCEDURE — 99231 SBSQ HOSP IP/OBS SF/LOW 25: CPT | Performed by: INTERNAL MEDICINE

## 2020-03-08 PROCEDURE — 99233 SBSQ HOSP IP/OBS HIGH 50: CPT | Performed by: INTERNAL MEDICINE

## 2020-03-08 PROCEDURE — 6370000000 HC RX 637 (ALT 250 FOR IP): Performed by: INTERNAL MEDICINE

## 2020-03-08 PROCEDURE — 1210000000 HC MED SURG R&B

## 2020-03-08 PROCEDURE — 36591 DRAW BLOOD OFF VENOUS DEVICE: CPT

## 2020-03-08 PROCEDURE — 85027 COMPLETE CBC AUTOMATED: CPT

## 2020-03-08 RX ORDER — SODIUM CHLORIDE 0.9 % (FLUSH) 0.9 %
10 SYRINGE (ML) INJECTION PRN
Status: DISCONTINUED | OUTPATIENT
Start: 2020-03-08 | End: 2020-03-08 | Stop reason: SDUPTHER

## 2020-03-08 RX ORDER — SODIUM CHLORIDE 0.9 % (FLUSH) 0.9 %
10 SYRINGE (ML) INJECTION EVERY 12 HOURS SCHEDULED
Status: DISCONTINUED | OUTPATIENT
Start: 2020-03-08 | End: 2020-03-08 | Stop reason: SDUPTHER

## 2020-03-08 RX ORDER — MAGNESIUM HYDROXIDE/ALUMINUM HYDROXICE/SIMETHICONE 120; 1200; 1200 MG/30ML; MG/30ML; MG/30ML
30 SUSPENSION ORAL EVERY 6 HOURS PRN
Status: DISCONTINUED | OUTPATIENT
Start: 2020-03-08 | End: 2020-03-09 | Stop reason: HOSPADM

## 2020-03-08 RX ADMIN — SUCRALFATE 1 G: 1 TABLET ORAL at 08:16

## 2020-03-08 RX ADMIN — SUCRALFATE 1 G: 1 TABLET ORAL at 21:29

## 2020-03-08 RX ADMIN — SODIUM CHLORIDE 8 MG/HR: 9 INJECTION, SOLUTION INTRAVENOUS at 22:38

## 2020-03-08 RX ADMIN — CYANOCOBALAMIN TAB 500 MCG 1000 MCG: 500 TAB at 08:16

## 2020-03-08 RX ADMIN — SODIUM CHLORIDE, PRESERVATIVE FREE 10 ML: 5 INJECTION INTRAVENOUS at 08:16

## 2020-03-08 RX ADMIN — SODIUM CHLORIDE: 9 INJECTION, SOLUTION INTRAVENOUS at 21:29

## 2020-03-08 RX ADMIN — SODIUM CHLORIDE, PRESERVATIVE FREE 10 ML: 5 INJECTION INTRAVENOUS at 08:19

## 2020-03-08 RX ADMIN — SUCRALFATE 1 G: 1 TABLET ORAL at 11:31

## 2020-03-08 RX ADMIN — SODIUM CHLORIDE 8 MG/HR: 9 INJECTION, SOLUTION INTRAVENOUS at 09:10

## 2020-03-08 RX ADMIN — SUCRALFATE 1 G: 1 TABLET ORAL at 17:31

## 2020-03-08 RX ADMIN — PANTOPRAZOLE SODIUM 80 MG: 40 INJECTION, POWDER, FOR SOLUTION INTRAVENOUS at 08:16

## 2020-03-08 ASSESSMENT — ENCOUNTER SYMPTOMS
TROUBLE SWALLOWING: 0
SHORTNESS OF BREATH: 0
WHEEZING: 0
NAUSEA: 0
COUGH: 0
PHOTOPHOBIA: 0
DIARRHEA: 0
EYE REDNESS: 0
CONSTIPATION: 0
EYE ITCHING: 0
EYE DISCHARGE: 0
SORE THROAT: 0
ABDOMINAL PAIN: 0
VOMITING: 1

## 2020-03-08 ASSESSMENT — PAIN SCALES - GENERAL: PAINLEVEL_OUTOF10: 0

## 2020-03-08 NOTE — PROGRESS NOTES
TROPONINI in the last 72 hours. INR: No results for input(s): INR in the last 72 hours. No results for input(s): LIPASE in the last 72 hours. -----------------------------------------------------------------  RAD:   Ct Abdomen Pelvis W Iv Contrast Additional Contrast? None    Result Date: 2/25/2020  Examination. CT ABDOMEN PELVIS W IV CONTRAST 2/24/2020 8:45 PM History: Abdominal pain and hematemesis. DLP: 764 mGycm. The CT scan of the abdomen and pelvis is performed after intravenous contrast enhancement. The images are acquired in axial plane with subsequent reconstruction in coronal and sagittal planes. The comparison is made with the previous study dated 2/28/2018. The lung bases included in the study appears unremarkable. The limited visualized cardiomediastinal structures are normal. There is evidence of pneumobilia suggesting reflux from incompetent sphincter on a prior surgical diversion. There is geographic fatty infiltration more pronounced in the lower arterial. There is a small area of focal enhancement located anteriorly in the dome of the liver, image #9 and 10 in axial plane and image #14 and 15 in coronal plane. The etiology is not certain. The spleen appears normal. The left of the pancreas is visualized without evidence of ductal dilatation. The body and head are not optimally visualized due to due to previous surgical resection (history of pancreaticoduodenectomy/Whipple procedure.) Anterior glands bilaterally appear normal. Kidneys bilaterally are normal. The ureters are not optimally visualized due to diffuse infiltration of the peritoneum. The urinary bladder is decompressed and may not be optimally evaluated. No intrinsic abnormality. Normal size uterus is seen. There are tortuous dilated pelvic vessels around the uterus with prominent bilateral median veins. No finding to suggest obstruction.  The stomach is distended with fluid and ingested material. The small bowel is nondistended but

## 2020-03-08 NOTE — PROGRESS NOTES
Hospitalist Progress Note  3/8/2020 10:48 AM  Subjective:   Admit Date: 3/6/2020  PCP: Andrea Moncada MD    Chief Complaint: hematemesis    Subjective: Patient is feeling well, at her baseline. No further bleeding, no new weakness. Tolerating clear liquid diet. Scheduled for EGD 3-9. History is otherwise unchanged. Cumulative Hospital History:   3-6: Patient with history of metastatic pancreatic adenocarcinoma treated with Whipple procedure and ongoing chemotherapy, with history of bleeding from anastomosis site presents to ED with presyncope and weakness followed by vomiting of bloody fluids. Hemoglobin initially checked at 8 g with stable VS. Admitted to ICU on pantoprazole drip. On recheck overnight, hemoglobin had dropped to 5.2 g.  3-7: 3 units PRBCs transfused overnight. GI consulted, agrees to EGD 3-9. Oncology consulted, will reschedule chemotherapy treatment from 3-9 to 3-11. Transferred to med/surg. 3-8: Hemoglobin 10 g this morning. Patient is feeling well, at her baseline. EGD tomorrow, remains on pantoprazole drip. ROS: 14 point review of systems is negative except as specifically addressed above.     DIET CLEAR LIQUID;  Diet NPO, After Midnight Exceptions are: Ice Chips    Intake/Output Summary (Last 24 hours) at 3/8/2020 1048  Last data filed at 3/8/2020 1031  Gross per 24 hour   Intake 1109 ml   Output 2500 ml   Net -1391 ml     Medications:   sodium chloride 150 mL/hr at 03/07/20 2314    pantoprozole (PROTONIX) infusion 8 mg/hr (03/08/20 0910)     Current Facility-Administered Medications   Medication Dose Route Frequency Provider Last Rate Last Dose    ondansetron (ZOFRAN) injection 4 mg  4 mg Intravenous Once Estella Cooper DO   Stopped at 03/06/20 1525    vitamin B-12 (CYANOCOBALAMIN) tablet 1,000 mcg  1,000 mcg Oral Daily Estella Cooper DO   1,000 mcg at 03/08/20 0816    ondansetron (ZOFRAN) tablet 8 mg  8 mg Oral Q8H PRN Estella Cooper DO        sucralfate (CARAFATE) tablet 1 g  1 g Oral 4x Daily Estella Brash, DO   1 g at 03/08/20 1397    sodium chloride flush 0.9 % injection 10 mL  10 mL Intravenous 2 times per day Estella Brash, DO   10 mL at 03/08/20 8800    sodium chloride flush 0.9 % injection 10 mL  10 mL Intravenous PRN Estella Brash, DO        acetaminophen (TYLENOL) tablet 650 mg  650 mg Oral Q4H PRN Estella Brash, DO        promethazine (PHENERGAN) tablet 12.5 mg  12.5 mg Oral Q6H PRN Estella Brash, DO        Or    ondansetron [facility]) injection 4 mg  4 mg Intravenous Q6H PRN Estella Brash, DO        0.9 % sodium chloride infusion   Intravenous Continuous Estella Brash,  mL/hr at 03/07/20 2314      pantoprazole (PROTONIX) 80 mg in sodium chloride 0.9 % 100 mL infusion  8 mg/hr Intravenous Continuous Estella Brash, DO 10 mL/hr at 03/08/20 0910 8 mg/hr at 03/08/20 0910    pantoprazole (PROTONIX) injection 80 mg  80 mg Intravenous Daily Estella Brash, DO   80 mg at 03/08/20 4103    And    sodium chloride (PF) 0.9 % injection 10 mL  10 mL Intravenous Daily Estella Brash, DO   10 mL at 03/08/20 2502        Labs:     Recent Labs     03/06/20  1500  03/07/20  0745 03/07/20  1323 03/08/20  0423   WBC 6.4  --   --   --  3.5*   RBC 2.80*  --   --   --  3.38*   HGB 8.0*   < > 10.2* 10.5* 10.0*   HCT 25.7*  --  30.1*  --  29.8*   MCV 91.8  --   --   --  88.2   MCH 28.6  --   --   --  29.6   MCHC 31.1*  --   --   --  33.6     --   --   --  87*    < > = values in this interval not displayed. Recent Labs     03/06/20  1500      K 4.2   ANIONGAP 11      CO2 23   BUN 9   CREATININE 0.7   GLUCOSE 171*   CALCIUM 8.2*     No results for input(s): MG, PHOS in the last 72 hours. Recent Labs     03/06/20  1500   AST 53*   ALT 39*   BILITOT <0.2   ALKPHOS 112*     ABGs:No results for input(s): PH, PO2, PCO2, HCO3, BE, O2SAT in the last 72 hours.   Troponin T: No results for input(s): TROPONINI in the last 72 hours. INR: No results for input(s): INR in the last 72 hours. Lactic Acid: No results for input(s): LACTA in the last 72 hours. Objective:   Vitals: /60   Pulse (!) 49   Temp 97.3 °F (36.3 °C) (Temporal)   Resp 15   Ht 5' 7\" (1.702 m)   Wt 112 lb (50.8 kg)   SpO2 100%   BMI 17.54 kg/m²   24HR INTAKE/OUTPUT:      Intake/Output Summary (Last 24 hours) at 3/8/2020 1048  Last data filed at 3/8/2020 1031  Gross per 24 hour   Intake 1109 ml   Output 2500 ml   Net -1391 ml     General appearance: alert and cooperative with exam  HEENT: atraumatic, eyes with clear conjunctiva and normal lids, pupils and irises normal, external ears and nose are normal, lips normal  Neck without masses, lympadenopathy, bruit, thyroid normal  Lungs: no increased work of breathing, \"clear to auscultation bilaterally\" without rales, rhonchi or wheezes  Heart: regular rate and rhythm, S1, S2 normal, no murmur, click, rub or gallop  Abdomen: soft, non-tender; bowel sounds normal; no masses,  no organomegaly  Extremities: extremities normal, atraumatic, no cyanosis or edema  Neurologic: no focal neurologic deficits, normal sensation, alert and oriented, affect and mood appropriate  Skin: no rashes, nodules    Assessment and Plan:   Principal Problem:    Upper GI bleed  Active Problems:    Malignant neoplasm of pancreas (HCC)    Anemia associated with chemotherapy    Chemotherapy-induced thrombocytopenia    Neoplastic malignant related fatigue    Chemotherapy-induced peripheral neuropathy (HCC)    Severe protein-calorie malnutrition (HCC)    Pancytopenia (HCC)  Resolved Problems:    * No resolved hospital problems. *    Continue pantoprazole drip, serial H&H, transfuse per protocol. GI and oncology consulted, appreciate input. Plan is for EGD 3-9, so chemo rescheduled for 3-11.     Advance Directive: Full Code    DVT prophylaxis: SCDs    Discharge planning: TBD      [facility] [place], DO  Rounding

## 2020-03-08 NOTE — PROGRESS NOTES
Progress Note    Patient name: Raven Davenport  Patient : 1960  MR #[id number]  Room: 431    Subjective: Presented with hematemesis, none further since admit. Plans for EGD 3/9/2020. Tolerating clear liquid. She has been very active. She denies any melena. HISTORY OF PRESENT ILLNESS:  Vertis Spurling a 64 y.o.  female with pancreatic adenocarcinoma with intra-abdominal recurrent disease and now with further progression of disease.    Second line therapy with Gemzar and Abraxane has been discontinued, last treatment was on 2020 with cycle number 3-day 1Tia Sterling is also followed by MD Sanders, presently looking for possible clinical trial, in the meantime she will be initiated on third line regimen as per MD Sanders's recommendation with Irinotecan liposome 70 mg/m² with leucovorin 400 mg/m² and 5-FU 2400 mg/m² over 46 hours every 2 weeks.      Vickie Matta was recently hospitalized from 2020-2020 for acute GI bleed.  She presented with several episodes of coffee-ground emesis and a hemoglobin of 6.3.  During her hospitalization she required 2 units of PRBCs and was evaluated by Dr. Elissa Chin with GI. Tish Choudhury was placed on a Protonix and octreotide drip.  Unsuccessful EGD on 2020 due to residual food in the stomach.  Repeat endoscopy on 2020 revealed postsurgical changes consistent with Whipple.  Linear erosion/ulceration at Whipple site suture line.  No visible vessel or active bleeding.  Recommendation was to continue Carafate 4 times daily and Protonix 40 mg p.o. twice daily x8 weeks. Hemoglobin was 8.8 with MCV of 90.3 and a platelet count of 996,263 at follow-up on 3/3/2020.      Unfortunately, Vickie Matta was admitted with upper GI bleed again 3/6/2020. She had a drop on her hemoglobin to 5.6. She has received PRBCs transfusion. Post-transfusion Hemoglobin was 10.2 on 3/7/2020.     Vickie Matta has been seen by GI 3/7/2020 with plans for an upper endoscopy Monday, 3/9/2020.     Oncology is consulted for continuity of care.     ONCOLOGIC HISTORY:   Diagnosis  · Pancreatic adenocarcinoma, January 2018   · [id number]  · 7/6/2019-extended genetic panel-negative for a deleterious mutation     Treatment summary  · March 2018-Surgical consultation at [address] [place] [address] Road operable   · 4/3/2018 through 6/4/2018 Neoadjuvant chemotherapy FOLFORINOX ×5 Biweekly cycles   · 4/11/2018-Biliary stent   · August 2018- XRT 30 Gy/Xeloda   · 08/30/3018- Whipple procedure @ [facility]   · Recommended another 5 biweekly cycles of modified FOLFORINOX completed 12/26/2018   · 7/9/2019- 1/22/2020 Gemzar/Abraxane 125 mg/m2 q 14 days, discontinued due to progression of disease  · Anticipate on 3/4/2020- Irinotecan liposome 70 mg/m² with leucovorin 400 mg/m² and 5-FU 2400 mg/m² over 46 hours every 2 weeks.     Tumor marker  · 3/12/5203-VV-50-9->430->370. · 4/30/2018 - CA 19- 9 of 157   · 5/25/20181564-TD-61-9->32 after 4 cycles. · 08/02/2018- -> 8   · 10/01/2018- CA 19-9 -5   · 10/29/2018-CA 19-9- 7   · 12/3/8332-BU-30-9-7   · 04/11/2019-CA-19-9- 12   · 05/21/2019- CA 19-9- 41   · 7/2/2019-CA 19 9 = 114  · 7/9/2019- CA-19-9 = 225  · 8/6/2019- CA-19-9 = 171  · 9/3/2900-DO-57-9 = 198  · 9/13/20197336-RA-01-9 = 98 (at [facility])  · 10/29/1745-HB-47-9 =317  · 11/21/2019-CA-19-9 = 183  · 1/23/20203822-IT-88-9 = 520        Cancer history  Ms Ori Mcmullen was seen in initial oncology consultation on 3/12/2018 referred by Dr. Ayaka Rouse for a diagnosis of pancreatic adenocarcinoma. She was initially evaluated for acute pancreatitis at [facility] on February 2018. Further imaging revealed a pancreatic head mass. Lipase was elevated at 1184 and CA-19-9 elevated 134. The symptoms were going on for several weeks. Weight loss of 10-12 pounds over the last 6 months. · October 2017-CT abdomen without contrast was unremarkable.    · 2/2/2018-ultrasound of abdomen showed a pancreatic duct dilation   · 2/02/2018-CT abdomen showed 16 mm low-density lesion in the pancreas at the junction of the head of the body. No intra-abdominal adenopathy. No liver metastasis. · 2/7/2018-the patient underwent EGD/EUS and FNA biopsy of a pancreatic mass by Dr. Sarah Linares at [facility] . EUS showed inflammatory changes consistent with a recent history of pancreatitis. Main pancreatic duct was dilated. No concerning peripancreatic adenopathy. No definite mass was seen by a more diffuse hypoechoic area measuring 1.8 x 2.2 cm in the head of the pancreas. Pathology was consistent with a group of markedly atypical cells strongly suspicious for adenocarcinoma. · 2/28/2018-CT pancreatic protocol showed a poorly defined area of decreased enhancement located in the head of the pancreas measuring 1.8 x 1.4 x 1.7 cm with moderate meditation of the pancreatic duct. Well defined sharply marginated low density nodule located in the tail of the pancreas measuring 1.5 x 1.3 x 1.5 cm (IPMN?). · 3/6/2018-CA 19-9 was elevated at 150. Repeat EGD was performed by Dr. Jamin Ryder due to the inconclusive FNA resulted on 2/7/2018. Pathology FNA was consistent with pancreatic adenocarcinoma. · 3/12/2018-she was first seen by me. No evidence of distant disease. Referral to Surgical oncology. CT chest to complete staging. CA-19-9 430. · 3/16/2018-CT of the chest was unremarkable for intrathoracic metastatic disease   · Surgery consultation at [facility] March 2018- with Dr Jaqueline Hickman. She was not a surgical candidate upfront. Recommended neoadjuvant chemotherapy. · 4/3/2018-started on neoadjuvant chemotherapy with FOLFORINOX. · 4/11/2018-she developed CBD obstruction had a CBD stent placed by Dr. Sarah Linares.    · 4/3/2018 through 6/4/2018 Neoadjuvant chemotherapy FOLFORINOX ×5 Biweekly cycles   · August 2018- XRT 30 Gy/Xeloda   · 08/30/3018- Whipple procedure at [facility] by Dr. Neymar Justice  · Recommended another 7 biweekly cycles of adenopathy. Left cervical: No superficial cervical adenopathy. Upper Body:      Right upper body: No supraclavicular adenopathy. Left upper body: No supraclavicular adenopathy. Comments: No bulky palpable cervical, clavicular, axillary or inguinal adenopathies on the left or right. Skin:     General: Skin is warm and dry. Findings: No rash. Neurological:      Mental Status: She is alert and oriented to person, place, and time. Comments: follows commands, non-focal   Psychiatric:         Behavior: Behavior normal. Behavior is cooperative. Thought Content: Thought content normal.         Judgment: Judgment normal.      Comments: Alert and oriented to person, place and time. Vital Signs  /60   Pulse (!) 49   Temp 97.3 °F (36.3 °C) (Temporal)   Resp 15   Ht 5' 7\" (1.702 m)   Wt 112 lb (50.8 kg)   SpO2 100%   BMI 17.54 kg/m²     Intake/Output Summary (Last 24 hours) at 3/8/2020 0823  Last data filed at 3/8/2020 0429  Gross per 24 hour   Intake 1429 ml   Output 1600 ml   Net -171 ml       Labs:  CBC:   Recent Labs     03/06/20  1500  03/07/20  0745 03/07/20  1323 03/08/20  0423   WBC 6.4  --   --   --  3.5*   HGB 8.0*   < > 10.2* 10.5* 10.0*     --   --   --  87*    < > = values in this interval not displayed. CMP:   Recent Labs     03/06/20  1500   GLUCOSE 171*   BUN 9   CREATININE 0.7   CO2 23   CALCIUM 8.2*   ALKPHOS 112*   AST 53*   ALT 39*     Hepatic:     Recent Labs     02/25/20  0100   MRSAC No MRSA detected on culture      Urine Culture Recent : No results for input(s): LABURIN in the last 720 hours. Organism Recent : No results for input(s): ORG in the last 720 hours. Ct Abdomen Pelvis W Iv Contrast Additional Contrast? None  Result Date: 2/25/2020  Postsurgical changes of the pancreas, the proximal duodenum and the common bile duct (pancreaticoduodenectomy/Whipple procedure).    Diffuse infiltration of the mesentery/omentum and a small Genesis Caceres. I have reviewed relevant medical information/data to include but not limited to medication list, relevant appropriate labs and imaging when applicable. I reviewed other physician's notes, ancillary services and nurses assessment. I have reviewed the above documentation completed by the Nurse Practitioner or Physician Assistant. Please see my additional contributions to the history of present illness, physical examination, and assessment/medical decision-making that reflect my findings and impressions. I discussed essential elements of the care plan with the NP or PA and the patient as well. I answered all the questions to the patient's satisfaction. I concur with above stated. Subjective-feeling well this morning. No hematemesis or melena. Objective- physical exam is unchanged. Assessment/plan:  Acute anemia secondary to GI bleed-hemoglobin is stable. Plan for upper EGD tomorrow. Pancreatic cancer-we will schedule treatment for Wednesday.     Nita Agarwal MD

## 2020-03-09 ENCOUNTER — ANESTHESIA EVENT (OUTPATIENT)
Dept: ENDOSCOPY | Age: 60
DRG: 378 | End: 2020-03-09
Payer: COMMERCIAL

## 2020-03-09 ENCOUNTER — HOSPITAL ENCOUNTER (OUTPATIENT)
Dept: INFUSION THERAPY | Age: 60
End: 2020-03-09
Payer: COMMERCIAL

## 2020-03-09 ENCOUNTER — ANESTHESIA (OUTPATIENT)
Dept: ENDOSCOPY | Age: 60
DRG: 378 | End: 2020-03-09
Payer: COMMERCIAL

## 2020-03-09 VITALS
HEART RATE: 41 BPM | OXYGEN SATURATION: 98 % | RESPIRATION RATE: 15 BRPM | TEMPERATURE: 97.9 F | SYSTOLIC BLOOD PRESSURE: 137 MMHG | BODY MASS INDEX: 17.58 KG/M2 | WEIGHT: 112 LBS | DIASTOLIC BLOOD PRESSURE: 68 MMHG | HEIGHT: 67 IN

## 2020-03-09 VITALS — DIASTOLIC BLOOD PRESSURE: 73 MMHG | OXYGEN SATURATION: 100 % | SYSTOLIC BLOOD PRESSURE: 119 MMHG

## 2020-03-09 LAB
ANISOCYTOSIS: ABNORMAL
BASOPHILS ABSOLUTE: 0 K/UL (ref 0–0.2)
BASOPHILS RELATIVE PERCENT: 0 % (ref 0–1)
EOSINOPHILS ABSOLUTE: 0 K/UL (ref 0–0.6)
EOSINOPHILS RELATIVE PERCENT: 0 % (ref 0–5)
HCT VFR BLD CALC: 29.7 % (ref 37–47)
HEMOGLOBIN: 9.9 G/DL (ref 12–16)
IMMATURE GRANULOCYTES #: 0 K/UL
LYMPHOCYTES ABSOLUTE: 1.1 K/UL (ref 1.1–4.5)
LYMPHOCYTES RELATIVE PERCENT: 32 % (ref 20–40)
MCH RBC QN AUTO: 29.4 PG (ref 27–31)
MCHC RBC AUTO-ENTMCNC: 33.3 G/DL (ref 33–37)
MCV RBC AUTO: 88.1 FL (ref 81–99)
MONOCYTES ABSOLUTE: 0.2 K/UL (ref 0–0.9)
MONOCYTES RELATIVE PERCENT: 6 % (ref 0–10)
NEUTROPHILS ABSOLUTE: 2 K/UL (ref 1.5–7.5)
NEUTROPHILS RELATIVE PERCENT: 62 % (ref 50–65)
PDW BLD-RTO: 16.1 % (ref 11.5–14.5)
PLATELET # BLD: 91 K/UL (ref 130–400)
PLATELET SLIDE REVIEW: ABNORMAL
PMV BLD AUTO: 10.1 FL (ref 9.4–12.3)
RBC # BLD: 3.37 M/UL (ref 4.2–5.4)
WBC # BLD: 3.3 K/UL (ref 4.8–10.8)

## 2020-03-09 PROCEDURE — 0DJ08ZZ INSPECTION OF UPPER INTESTINAL TRACT, VIA NATURAL OR ARTIFICIAL OPENING ENDOSCOPIC: ICD-10-PCS | Performed by: INTERNAL MEDICINE

## 2020-03-09 PROCEDURE — 99231 SBSQ HOSP IP/OBS SF/LOW 25: CPT | Performed by: INTERNAL MEDICINE

## 2020-03-09 PROCEDURE — 7100000000 HC PACU RECOVERY - FIRST 15 MIN: Performed by: INTERNAL MEDICINE

## 2020-03-09 PROCEDURE — 7100000001 HC PACU RECOVERY - ADDTL 15 MIN: Performed by: INTERNAL MEDICINE

## 2020-03-09 PROCEDURE — 6360000002 HC RX W HCPCS: Performed by: NURSE ANESTHETIST, CERTIFIED REGISTERED

## 2020-03-09 PROCEDURE — 3609017100 HC EGD: Performed by: INTERNAL MEDICINE

## 2020-03-09 PROCEDURE — 36591 DRAW BLOOD OFF VENOUS DEVICE: CPT

## 2020-03-09 PROCEDURE — 2580000003 HC RX 258: Performed by: ANESTHESIOLOGY

## 2020-03-09 PROCEDURE — 3700000000 HC ANESTHESIA ATTENDED CARE: Performed by: INTERNAL MEDICINE

## 2020-03-09 PROCEDURE — 2500000003 HC RX 250 WO HCPCS: Performed by: NURSE ANESTHETIST, CERTIFIED REGISTERED

## 2020-03-09 PROCEDURE — 6360000002 HC RX W HCPCS: Performed by: INTERNAL MEDICINE

## 2020-03-09 PROCEDURE — 43235 EGD DIAGNOSTIC BRUSH WASH: CPT | Performed by: INTERNAL MEDICINE

## 2020-03-09 PROCEDURE — 6370000000 HC RX 637 (ALT 250 FOR IP): Performed by: INTERNAL MEDICINE

## 2020-03-09 PROCEDURE — 2580000003 HC RX 258: Performed by: INTERNAL MEDICINE

## 2020-03-09 PROCEDURE — C9113 INJ PANTOPRAZOLE SODIUM, VIA: HCPCS | Performed by: INTERNAL MEDICINE

## 2020-03-09 PROCEDURE — 3700000001 HC ADD 15 MINUTES (ANESTHESIA): Performed by: INTERNAL MEDICINE

## 2020-03-09 PROCEDURE — 85025 COMPLETE CBC W/AUTO DIFF WBC: CPT

## 2020-03-09 RX ORDER — ONDANSETRON 2 MG/ML
4 INJECTION INTRAMUSCULAR; INTRAVENOUS
Status: DISCONTINUED | OUTPATIENT
Start: 2020-03-09 | End: 2020-03-09 | Stop reason: HOSPADM

## 2020-03-09 RX ORDER — SODIUM CHLORIDE, SODIUM LACTATE, POTASSIUM CHLORIDE, CALCIUM CHLORIDE 600; 310; 30; 20 MG/100ML; MG/100ML; MG/100ML; MG/100ML
INJECTION, SOLUTION INTRAVENOUS CONTINUOUS
Status: DISCONTINUED | OUTPATIENT
Start: 2020-03-09 | End: 2020-03-09 | Stop reason: HOSPADM

## 2020-03-09 RX ORDER — PROMETHAZINE HYDROCHLORIDE 25 MG/ML
6.25 INJECTION, SOLUTION INTRAMUSCULAR; INTRAVENOUS
Status: DISCONTINUED | OUTPATIENT
Start: 2020-03-09 | End: 2020-03-09 | Stop reason: HOSPADM

## 2020-03-09 RX ORDER — HEPARIN SODIUM (PORCINE) LOCK FLUSH IV SOLN 100 UNIT/ML 100 UNIT/ML
300 SOLUTION INTRAVENOUS ONCE
Status: DISCONTINUED | OUTPATIENT
Start: 2020-03-09 | End: 2020-03-09 | Stop reason: HOSPADM

## 2020-03-09 RX ORDER — PROPOFOL 10 MG/ML
INJECTION, EMULSION INTRAVENOUS PRN
Status: DISCONTINUED | OUTPATIENT
Start: 2020-03-09 | End: 2020-03-09 | Stop reason: SDUPTHER

## 2020-03-09 RX ORDER — DIPHENHYDRAMINE HYDROCHLORIDE 50 MG/ML
12.5 INJECTION INTRAMUSCULAR; INTRAVENOUS
Status: DISCONTINUED | OUTPATIENT
Start: 2020-03-09 | End: 2020-03-09 | Stop reason: HOSPADM

## 2020-03-09 RX ORDER — LIDOCAINE HYDROCHLORIDE 10 MG/ML
INJECTION, SOLUTION INFILTRATION; PERINEURAL PRN
Status: DISCONTINUED | OUTPATIENT
Start: 2020-03-09 | End: 2020-03-09 | Stop reason: SDUPTHER

## 2020-03-09 RX ADMIN — SODIUM CHLORIDE: 9 INJECTION, SOLUTION INTRAVENOUS at 04:23

## 2020-03-09 RX ADMIN — CYANOCOBALAMIN TAB 500 MCG 1000 MCG: 500 TAB at 08:03

## 2020-03-09 RX ADMIN — SODIUM CHLORIDE 8 MG/HR: 9 INJECTION, SOLUTION INTRAVENOUS at 08:03

## 2020-03-09 RX ADMIN — LIDOCAINE HYDROCHLORIDE 50 MG: 10 INJECTION, SOLUTION INFILTRATION; PERINEURAL at 15:19

## 2020-03-09 RX ADMIN — PANTOPRAZOLE SODIUM 80 MG: 40 INJECTION, POWDER, FOR SOLUTION INTRAVENOUS at 08:04

## 2020-03-09 RX ADMIN — PROPOFOL 120 MG: 10 INJECTION, EMULSION INTRAVENOUS at 15:19

## 2020-03-09 RX ADMIN — SODIUM CHLORIDE, SODIUM LACTATE, POTASSIUM CHLORIDE, AND CALCIUM CHLORIDE: 600; 310; 30; 20 INJECTION, SOLUTION INTRAVENOUS at 14:40

## 2020-03-09 RX ADMIN — SUCRALFATE 1 G: 1 TABLET ORAL at 08:03

## 2020-03-09 RX ADMIN — SODIUM CHLORIDE, PRESERVATIVE FREE 10 ML: 5 INJECTION INTRAVENOUS at 08:04

## 2020-03-09 ASSESSMENT — ENCOUNTER SYMPTOMS
SORE THROAT: 0
CONSTIPATION: 0
DIARRHEA: 0
PHOTOPHOBIA: 0
NAUSEA: 0
TROUBLE SWALLOWING: 0
VOMITING: 0
SHORTNESS OF BREATH: 0
WHEEZING: 0
COUGH: 0
EYE REDNESS: 0
ABDOMINAL PAIN: 0
EYE DISCHARGE: 0
EYE ITCHING: 0

## 2020-03-09 ASSESSMENT — PAIN SCALES - GENERAL: PAINLEVEL_OUTOF10: 0

## 2020-03-09 ASSESSMENT — LIFESTYLE VARIABLES: SMOKING_STATUS: 0

## 2020-03-09 NOTE — OP NOTE
Endoscopic Procedure Note    Patient: Noble Ribeiro : 1960  Med Rec#: [id number] Acc#: [id number]     Primary Care Provider Edy Valentine MD  Referring Provider: Zulay Isaacs DO    Endoscopist: Thalia Acevedo DO    Date of Procedure:  3/9/2020    Procedure:   1. EGD    Indications:   1. GI bleed      Anesthesia:  Sedation was administered by anesthesia who monitored the patient during the procedure. Estimated Blood Loss: none    Informed consent: Informed consent was obtained from patient/patient's proxy after risk benefits were explained to patient/patient's proxy including but not limited to bleeding, infection, perforation, and need for surgery. Patient/patient's proxy was given opportunity to ask questions and elected to proceed with the procedure. Description of procedure: Patient was monitored continuously on oximetry, blood pressure, pulse monitoring; the above medications were given for sedation; prior to sedation, a timeout was taken to identify patient's name, procedure site and ID badge. Universal precaution measures were followed throughout procedure. Patient was placed in left lateral position; a bite block was placed between the incisors; Gastroscope was then passed through and advanced to the second portion of the duodenum. Hypopharynx and pharynx were grossly normal. The esophagus was grossly normal. GE at 34cm antonio . Careful examination of the stomach revealed post whipple surgical changes. There was an area of erosion along the suture line of the previous whipple surgery that has a non bleeding erosion that appearing more healed in appearance than her most recent EGD (pictures taken for documentation purposes). No visible vessel or active bleeding. No red blood, blood clots, or coffee ground material present in the examined region. At that point we slowly and carefully withdrew the gastroscope.       Findings:   Esophagus:   GE at 34cm    Stomach:   Area of erosion along the erosion

## 2020-03-09 NOTE — PROGRESS NOTES
12 hour chart check review completed. Electronically signed by Octavio Adkins LPN on 4/8/2332 at 24:89 AM

## 2020-03-09 NOTE — ANESTHESIA PRE PROCEDURE
medications:    No current facility-administered medications for this visit. No current outpatient medications on file.      Facility-Administered Medications Ordered in Other Visits   Medication Dose Route Frequency Provider Last Rate Last Dose    diphenhydrAMINE (BENADRYL) injection 12.5 mg  12.5 mg Intravenous Once PRN Creig Joshua, APRN - CRNA        ondansetron [facility]) injection 4 mg  4 mg Intravenous Once PRN Creig Joshua, APRN - CRNA        promethazine (PHENERGAN) injection 6.25 mg  6.25 mg Intramuscular Once PRN Creig Joshua, APRN - CRNA        aluminum & magnesium hydroxide-simethicone (MAALOX) 200-200-20 MG/5ML suspension 30 mL  30 mL Oral Q6H PRN Oral Dusky, DO        ondansetron [facility]) injection 4 mg  4 mg Intravenous Once Neela Kerrick, DO   Stopped at 03/06/20 1525    vitamin B-12 (CYANOCOBALAMIN) tablet 1,000 mcg  1,000 mcg Oral Daily Neela Kerrick, DO   1,000 mcg at 03/09/20 0803    ondansetron (ZOFRAN) tablet 8 mg  8 mg Oral Q8H PRN Neela Kerrick, DO        sucralfate (CARAFATE) tablet 1 g  1 g Oral 4x Daily Neela Kerrick, DO   1 g at 03/09/20 9973    sodium chloride flush 0.9 % injection 10 mL  10 mL Intravenous 2 times per day Neela Kerrick, DO   10 mL at 03/09/20 0804    sodium chloride flush 0.9 % injection 10 mL  10 mL Intravenous PRN Neela Kerrick, DO        acetaminophen (TYLENOL) tablet 650 mg  650 mg Oral Q4H PRN Neela Kerrick, DO        promethazine (PHENERGAN) tablet 12.5 mg  12.5 mg Oral Q6H PRN Neela Kerrick, DO        Or    ondansetron [facility]) injection 4 mg  4 mg Intravenous Q6H PRN Neela Kerrick, DO        0.9 % sodium chloride infusion   Intravenous Continuous Neela Kerrick,  mL/hr at 03/09/20 0423      pantoprazole (PROTONIX) 80 mg in sodium chloride 0.9 % 100 mL infusion  8 mg/hr Intravenous Continuous Neela Kerrick, DO 10 mL/hr at 03/09/20 0803 8 mg/hr at 03/09/20 0803    pantoprazole (PROTONIX) injection 80 mg  80 mg Intravenous Daily Demaris Rumpf, DO   80 mg at 03/09/20 9912    And    sodium chloride (PF) 0.9 % injection 10 mL  10 mL Intravenous Daily Demaris Rumpf, DO   10 mL at 03/08/20 9549       Allergies:     Allergies   Allergen Reactions    Codeine Shortness Of Breath     SOB and rash    Morphine Other (See Comments)     Other reaction(s): abd pain  Severe abd. pain    Morphine And Related Nausea Only    Sulfa Antibiotics Shortness Of Breath and Hives     SOB, rash and itching    Neomycin-Bacitracin Zn-Polymyx Rash    Clarithromycin     Dilaudid [Hydromorphone Hcl] Other (See Comments)     \"completely shut me down\"    Hydromorphone     Loperamide Hcl Other (See Comments)     severe abd. pain    Loperamide Hcl Hives     severe abd. 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 [address] C25.9    Chemotherapy management, encounter for Z51.11    Acute GI bleeding K92.2    Chemotherapy adverse reaction T45.1X5A    Upper GI bleed K92.2    Pancytopenia (HCC) G56.667       Past Medical History:        Diagnosis Date    Acute pancreatitis     Adult BMI <19 kg/sq m     PROT 6.9 06/01/2012    CALCIUM 8.2 03/06/2020    BILITOT <0.2 03/06/2020    ALKPHOS 112 03/06/2020    ALKPHOS 97 06/01/2012    AST 53 03/06/2020    ALT 39 03/06/2020       POC Tests: No results for input(s): POCGLU, POCNA, POCK, POCCL, POCBUN, POCHEMO, POCHCT in the last 72 hours. Coags:   Lab Results   Component Value Date    PROTIME 14.4 02/24/2020    PROTIME 11.9 06/01/2012    INR 1.12 02/24/2020    APTT 36.9 02/24/2020       HCG (If Applicable):   Lab Results   Component Value Date    PREGTESTUR Negative 03/06/2018        ABGs: No results found for: PHART, PO2ART, OAE7ZLX, BWF9QPV, BEART, R2OIHRGY     Type & Screen (If Applicable):  No results found for: [facility]    Anesthesia Evaluation  Patient summary reviewed and Nursing notes reviewed no history of anesthetic complications:   Airway: Mallampati: III  TM distance: >3 FB   Neck ROM: full  Mouth opening: < 3 FB Dental: normal exam         Pulmonary:Negative Pulmonary ROS and normal exam        (-) not a current smoker                           Cardiovascular:    (+) hypertension: mild, valvular problems/murmurs: MVP, hyperlipidemia        Rhythm: regular             Beta Blocker:  Not on Beta Blocker         Neuro/Psych:      (-) seizures and CVA            ROS comment: Peripheral neuropathy GI/Hepatic/Renal:   (+) hiatal hernia, GERD: well controlled,          ROS comment: Pancreatic cancer s/p Whipple 2018. Endo/Other:    (+) hypothyroidism, blood dyscrasia: anemia:., malignancy/cancer (pancreatic. On Chemo since June). (-) diabetes mellitus        Pt had PAT visit. Abdominal:       Abdomen: soft. Vascular: negative vascular ROS. Anesthesia Plan      general     ASA 3       Induction: intravenous. Anesthetic plan and risks discussed with patient.                       Ana Leggett MD   3/9/2020

## 2020-03-09 NOTE — DISCHARGE INSTR - COC
the neck of the pancreas-strongly suspicious for adenocarcinoma     VT EGD INTRMURAL NEEDLE ASPIR/BIOP ALTERED ANATOMY N/A 3/6/2018    Dr KVNG Duncan-Pancreatic cancer-Ductal adenocarcinoma-staged [id number] by EUS, pancreatic pseudocyst in the tail the pancreas    VT ERCP DX COLLECTION SPECIMEN BRUSHING/WASHING N/A 4/11/2018    ERCP-Dr KVNG Duncan-placement of a self-expanding fully covered 10 mm biliary stent    UPPER GASTROINTESTINAL ENDOSCOPY  years ago    Steve-Dr Matthew Frye    UPPER GASTROINTESTINAL ENDOSCOPY  2013    Zuniga    UPPER GASTROINTESTINAL ENDOSCOPY N/A 11/1/2019    Dr Rosalinda Little post Whipple's procedure with efferent loop ulceration    UPPER GASTROINTESTINAL ENDOSCOPY  12/17/2019    Dr Vicenta Duncan-Patent G-J Anastomosis, apparent healing ulceration    UPPER GASTROINTESTINAL ENDOSCOPY N/A 2/25/2020    Dr Parth Morales amount of food retention in the stomach with bile, hiatal hernia, will need repeat    UPPER GASTROINTESTINAL ENDOSCOPY N/A 2/27/2020    Dr Joe Auguste erosion/ulceration at the suture line, post whipple surgical changes       Immunization History:   Immunization History   Administered Date(s) Administered    Influenza Virus Vaccine 10/22/2019    Tdap (Boostrix, Adacel) 10/21/2016       Active Problems:  Patient Active Problem List   Diagnosis Code    Nausea R11.0    Right sided abdominal pain R10.9    Malignant neoplasm of pancreas ([facility] [address] C25.9    Tobacco abuse Z72.0    Hypertension I10    Dyslipidemia E78.5    Anemia associated with chemotherapy D64.81, T45.1X5A    Chemotherapy-induced thrombocytopenia D69.59, T45.1X5A    Neoplastic malignant related fatigue R53.0    Chemotherapy-induced peripheral neuropathy (HCC) G62.0, T45.1X5A    Diarrhea following gastrointestinal surgery R19.7, Z98.890    Exomphalos Q79.2    Hiatal hernia K44.9    History of acute pancreatitis Z87.19    Mixed hyperlipidemia E78.2    [place] spotted fever A77.0    Severe thickness:73840}  Daily Fluid Restriction: {CHP DME Yes amt example:669285665}  Last Modified Barium Swallow with Video (Video Swallowing Test): {Done Not Done LFTE:018973405}    Treatments at the Time of Hospital Discharge:   Respiratory Treatments: ***  Oxygen Therapy:  {Therapy; copd oxygen:25669}  Ventilator:    {[facility] Vent TOSU:774215107}    Rehab Therapies: {THERAPEUTIC INTERVENTION:9125318694}  Weight Bearing Status/Restrictions: {[facility] Weight Bearin}  Other Medical Equipment (for information only, NOT a DME order):  {EQUIPMENT:647838640}  Other Treatments: ***    Patient's personal belongings (please select all that are sent with patient):  {CHP DME Belongings:152528845}    RN SIGNATURE:  {Esignature:934527388}    CASE MANAGEMENT/SOCIAL WORK SECTION    Inpatient Status Date: ***    Readmission Risk Assessment Score:  Readmission Risk              Risk of Unplanned Readmission:        18           Discharging to Facility/ Agency   · Name:   · Address:  · Phone:  · Fax:    Dialysis Facility (if applicable)   · Name:  · Address:  · Dialysis Schedule:  · Phone:  · Fax:    / signature: {Esignature:034740208}    PHYSICIAN SECTION    Prognosis: {Prognosis:4317399305}    Condition at Discharge: [address] [place] [address] Patient Condition:919384801}    Rehab Potential (if transferring to Rehab): {Prognosis:7176084587}    Recommended Labs or Other Treatments After Discharge: ***    Physician Certification: I certify the above information and transfer of Marino Sauceda  is necessary for the continuing treatment of the diagnosis listed and that she requires {Admit to Appropriate Level of Care:02151} for {GREATER/LESS:874381018} 30 days.      Update Admission H&P: {CHP DME Changes in DKSRR:344759271}    PHYSICIAN SIGNATURE:  {Esignature:474771765}

## 2020-03-09 NOTE — PLAN OF CARE
Problem: Falls - Risk of:  Goal: Will remain free from falls  Description: Will remain free from falls  Outcome: Ongoing  Goal: Absence of physical injury  Description: Absence of physical injury  Outcome: Ongoing     Problem: Discharge Planning:  Goal: Discharged to appropriate level of care  Description: Discharged to appropriate level of care  Outcome: Ongoing     Problem: Bowel Function - Altered:  Goal: Bowel elimination is within specified parameters  Description: Bowel elimination is within specified parameters  Outcome: Ongoing     Problem: Nausea/Vomiting:  Goal: Absence of nausea/vomiting  Description: Absence of nausea/vomiting  Outcome: Ongoing  Goal: Able to drink  Description: Able to drink  Outcome: Ongoing  Goal: Able to eat  Description: Able to eat  Outcome: Ongoing  Goal: Ability to achieve adequate nutritional intake will improve  Description: Ability to achieve adequate nutritional intake will improve  Outcome: Ongoing     Problem: Activity:  Goal: Fatigue will decrease  Description: Fatigue will decrease  Outcome: Ongoing  Goal: Ability to tolerate increased activity will improve  Description: Ability to tolerate increased activity will improve  Outcome: Ongoing  Goal: Ability to maintain optimal joint mobility will improve  Description: Ability to maintain optimal joint mobility will improve  Outcome: Ongoing     Problem:  Bowel/Gastric:  Goal: Ability to achieve a regular elimination pattern will improve  Description: Ability to achieve a regular elimination pattern will improve  Outcome: Ongoing     Problem: Cardiac:  Goal: Ability to maintain an adequate cardiac output will improve  Description: Ability to maintain an adequate cardiac output will improve  Outcome: Ongoing  Goal: Ability to maintain adequate ventilation will improve  Description: Ability to maintain adequate ventilation will improve  Outcome: Ongoing  Goal: Ability to achieve and maintain adequate cardiopulmonary perfusion improve  Description: Skin integrity will improve  Outcome: Ongoing  Goal: Signs of wound healing will improve  Description: Signs of wound healing will improve  Outcome: Ongoing

## 2020-03-09 NOTE — PROGRESS NOTES
Consent for EGD was signed and placed in patient's soft chart. Electronically signed by Mary Beth Hall LPN on 3/1/9198 at 54:80 PM

## 2020-03-09 NOTE — PROGRESS NOTES
Progress Note    Patient name: Dalia Staton  Patient : 1960  MR #[id number]  Room: 431    Subjective: No further hematemesis. Rested okay last night. HISTORY OF PRESENT ILLNESS:  Dottie Guardian a 64 y.o.  female with pancreatic adenocarcinoma with intra-abdominal recurrent disease and now with further progression of disease.    Second line therapy with Gemzar and Abraxane has been discontinued, last treatment was on 2020 with cycle number 3-day 1Tia Sterling is also followed by MD Sanders, presently looking for possible clinical trial, in the meantime she will be initiated on third line regimen as per MD Sanders's recommendation with Irinotecan liposome 70 mg/m² with leucovorin 400 mg/m² and 5-FU 2400 mg/m² over 46 hours every 2 weeks.      Wilmer Rod was recently hospitalized from 2020-2020 for acute GI bleed.  She presented with several episodes of coffee-ground emesis and a hemoglobin of 6.3.  During her hospitalization she required 2 units of PRBCs and [facility]. Nataliia Hazard was placed on a Protonix and octreotide drip.  Unsuccessful EGD on 2020 due to residual food in the stomach.  Repeat endoscopy on 2020 revealed postsurgical changes consistent with Whipple.  Linear erosion/ulceration at Whipple site suture line.  No visible vessel or active bleeding.  Recommendation was to continue Carafate 4 times daily and Protonix 40 mg p.o. twice daily x8 weeks. Hemoglobin was 8.8 with MCV of 90.3 and a platelet count of 763,124 at follow-up on 3/3/2020.      Unfortunately, Wilmer Rod was admitted with upper GI bleed again 3/6/2020. She had a drop on her hemoglobin to 5.6. She has received PRBCs transfusion. Post-transfusion Hemoglobin was 10.2 on 3/7/2020.     Wilmer Rod has been seen by GI 3/7/2020 with plans for an upper endoscopy Monday, 3/9/2020.     Oncology is consulted for continuity of care.     ONCOLOGIC HISTORY: of the head of the body. No intra-abdominal adenopathy. No liver metastasis. · 2/7/2018-the patient underwent EGD/EUS and FNA biopsy of a pancreatic mass by Dr. Magalis Landin at [facility] . EUS showed inflammatory changes consistent with a recent history of pancreatitis. Main pancreatic duct was dilated. No concerning peripancreatic adenopathy. No definite mass was seen by a more diffuse hypoechoic area measuring 1.8 x 2.2 cm in the head of the pancreas. Pathology was consistent with a group of markedly atypical cells strongly suspicious for adenocarcinoma. · 2/28/2018-CT pancreatic protocol showed a poorly defined area of decreased enhancement located in the head of the pancreas measuring 1.8 x 1.4 x 1.7 cm with moderate meditation of the pancreatic duct. Well defined sharply marginated low density nodule located in the tail of the pancreas measuring 1.5 x 1.3 x 1.5 cm (IPMN?). · 3/6/2018-CA 19-9 was elevated at 150. Repeat EGD was performed by Dr. Cecily Colon due to the inconclusive FNA resulted on 2/7/2018. Pathology FNA was consistent with pancreatic adenocarcinoma. · 3/12/2018-she was first seen by me. No evidence of distant disease. Referral to Surgical oncology. CT chest to complete staging. CA-19-9 430. · 3/16/2018-CT of the chest was unremarkable for intrathoracic metastatic disease   · Surgery consultation at [facility] March 2018- with Dr Golden Hayden. She was not a surgical candidate upfront. Recommended neoadjuvant chemotherapy. · 4/3/2018-started on neoadjuvant chemotherapy with FOLFORINOX. · 4/11/2018-she developed CBD obstruction had a CBD stent placed by Dr. Magalis Landin. · 4/3/2018 through 6/4/2018 Neoadjuvant chemotherapy FOLFORINOX ×5 Biweekly cycles   · August 2018- XRT 30 Gy/Xeloda   · 08/30/3018- Whipple procedure at [facility] by Dr. Amber Jarrett  · Recommended another 7 biweekly cycles of modified FOLFORINOX. · 9/5/2018-CT of the chest abdomen pelvis was unremarkable for metastatic disease.

## 2020-03-10 ENCOUNTER — TELEPHONE (OUTPATIENT)
Dept: GASTROENTEROLOGY | Age: 60
End: 2020-03-10

## 2020-03-10 NOTE — TELEPHONE ENCOUNTER
Ruiz,    Per Dr Elissa Chin yesterday:    IMPRESSION:  1. Linear erosion along the previous whipple suture line  2. Post whipple surgical changes     RECOMMENDATIONS:    1. Complete 8 weeks of Protonix 40mg PO BID  2. Carafate 1gram QID as directed to promote healing   3. Recommend 2 days of liquid diet. Then 5 days of soft diet. The resume previous diet  4. Pt will need PillCam outpatient as her erosion is more healed than before, yet she had a drop in Hgb to the 5s. Will contact the GI clinic to arrange a PillCam  5. Avoid NSAIDs  6.  Ok to discharge from GI stand point     The results were discussed with the patient and/or family.   Ashley Prieto DO  3/9/2020  3:28 PM

## 2020-03-11 ENCOUNTER — HOSPITAL ENCOUNTER (OUTPATIENT)
Dept: INFUSION THERAPY | Age: 60
Discharge: HOME | End: 2020-03-11
Payer: COMMERCIAL

## 2020-03-11 VITALS
TEMPERATURE: 98.5 F | SYSTOLIC BLOOD PRESSURE: 106 MMHG | DIASTOLIC BLOOD PRESSURE: 68 MMHG | HEART RATE: 57 BPM | OXYGEN SATURATION: 95 % | BODY MASS INDEX: 17.58 KG/M2 | HEIGHT: 67 IN | WEIGHT: 112 LBS

## 2020-03-11 DIAGNOSIS — C25.9 MALIGNANT NEOPLASM OF PANCREAS, UNSPECIFIED LOCATION OF MALIGNANCY (HCC): Primary | ICD-10-CM

## 2020-03-11 PROCEDURE — 85025 COMPLETE CBC W/AUTO DIFF WBC: CPT

## 2020-03-11 PROCEDURE — 96375 TX/PRO/DX INJ NEW DRUG ADDON: CPT

## 2020-03-11 PROCEDURE — G0498 CHEMO EXTEND IV INFUS W/PUMP: HCPCS

## 2020-03-11 PROCEDURE — 96413 CHEMO IV INFUSION 1 HR: CPT

## 2020-03-11 PROCEDURE — 96367 TX/PROPH/DG ADDL SEQ IV INF: CPT

## 2020-03-11 PROCEDURE — 96368 THER/DIAG CONCURRENT INF: CPT

## 2020-03-11 PROCEDURE — 96415 CHEMO IV INFUSION ADDL HR: CPT

## 2020-03-11 PROCEDURE — 2580000003 HC RX 258: Performed by: NURSE PRACTITIONER

## 2020-03-11 PROCEDURE — 6360000002 HC RX W HCPCS: Performed by: NURSE PRACTITIONER

## 2020-03-11 RX ORDER — DEXAMETHASONE SODIUM PHOSPHATE 10 MG/ML
10 INJECTION, SOLUTION INTRAMUSCULAR; INTRAVENOUS ONCE
Status: COMPLETED | OUTPATIENT
Start: 2020-03-11 | End: 2020-03-11

## 2020-03-11 RX ORDER — PALONOSETRON 0.05 MG/ML
0.25 INJECTION, SOLUTION INTRAVENOUS ONCE
Status: COMPLETED | OUTPATIENT
Start: 2020-03-11 | End: 2020-03-11

## 2020-03-11 RX ADMIN — IRINOTECAN HYDROCHLORIDE 110.08 MG: 4.3 INJECTION, POWDER, FOR SOLUTION INTRAVENOUS at 09:55

## 2020-03-11 RX ADMIN — FLUOROURACIL 3770 MG: 50 INJECTION, SOLUTION INTRAVENOUS at 12:45

## 2020-03-11 RX ADMIN — LEUCOVORIN CALCIUM 650 MG: 350 INJECTION, POWDER, LYOPHILIZED, FOR SUSPENSION INTRAMUSCULAR; INTRAVENOUS at 11:40

## 2020-03-11 RX ADMIN — PALONOSETRON HYDROCHLORIDE 0.25 MG: 0.25 INJECTION, SOLUTION INTRAVENOUS at 09:25

## 2020-03-11 RX ADMIN — DEXAMETHASONE SODIUM PHOSPHATE 10 MG: 10 INJECTION, SOLUTION INTRAMUSCULAR; INTRAVENOUS at 09:25

## 2020-03-12 ENCOUNTER — OFFICE VISIT (OUTPATIENT)
Dept: OBSTETRICS AND GYNECOLOGY | Facility: CLINIC | Age: 60
End: 2020-03-12

## 2020-03-12 VITALS
WEIGHT: 112 LBS | HEIGHT: 67 IN | SYSTOLIC BLOOD PRESSURE: 130 MMHG | DIASTOLIC BLOOD PRESSURE: 70 MMHG | BODY MASS INDEX: 17.58 KG/M2

## 2020-03-12 DIAGNOSIS — Z78.9 NON-SMOKER: ICD-10-CM

## 2020-03-12 DIAGNOSIS — Z12.4 SCREENING FOR CERVICAL CANCER: ICD-10-CM

## 2020-03-12 DIAGNOSIS — Z01.419 ENCOUNTER FOR GYNECOLOGICAL EXAMINATION WITHOUT ABNORMAL FINDING: Primary | ICD-10-CM

## 2020-03-12 PROBLEM — K92.2 ACUTE GI BLEEDING: Status: ACTIVE | Noted: 2020-02-24

## 2020-03-12 PROBLEM — A77.0 ROCKY MOUNTAIN SPOTTED FEVER: Status: ACTIVE | Noted: 2019-02-13

## 2020-03-12 PROBLEM — D64.81 ANEMIA ASSOCIATED WITH CHEMOTHERAPY: Status: ACTIVE | Noted: 2019-08-06

## 2020-03-12 PROBLEM — E78.2 MIXED HYPERLIPIDEMIA: Status: ACTIVE | Noted: 2018-06-20

## 2020-03-12 PROBLEM — Z98.890 DIARRHEA FOLLOWING GASTROINTESTINAL SURGERY: Status: ACTIVE | Noted: 2019-08-06

## 2020-03-12 PROBLEM — T45.1X5A CHEMOTHERAPY-INDUCED PERIPHERAL NEUROPATHY (HCC): Status: ACTIVE | Noted: 2019-08-06

## 2020-03-12 PROBLEM — T45.1X5A ANEMIA ASSOCIATED WITH CHEMOTHERAPY: Status: ACTIVE | Noted: 2019-08-06

## 2020-03-12 PROBLEM — K44.9 HIATAL HERNIA: Status: ACTIVE | Noted: 2019-02-13

## 2020-03-12 PROBLEM — Z51.11 CHEMOTHERAPY MANAGEMENT, ENCOUNTER FOR: Status: ACTIVE | Noted: 2019-12-17

## 2020-03-12 PROBLEM — T45.1X5A CHEMOTHERAPY-INDUCED THROMBOCYTOPENIA: Status: ACTIVE | Noted: 2019-08-06

## 2020-03-12 PROBLEM — K92.1 MELENA: Status: ACTIVE | Noted: 2019-10-31

## 2020-03-12 PROBLEM — R19.7 DIARRHEA FOLLOWING GASTROINTESTINAL SURGERY: Status: ACTIVE | Noted: 2019-08-06

## 2020-03-12 PROBLEM — C25.9 MALIGNANT NEOPLASM OF PANCREAS (HCC): Status: ACTIVE | Noted: 2018-03-30

## 2020-03-12 PROBLEM — R53.0 NEOPLASTIC MALIGNANT RELATED FATIGUE: Status: ACTIVE | Noted: 2019-08-06

## 2020-03-12 PROBLEM — K92.2 UPPER GI BLEED: Status: ACTIVE | Noted: 2020-03-06

## 2020-03-12 PROBLEM — I10 HYPERTENSION: Status: ACTIVE | Noted: 2017-05-22

## 2020-03-12 PROBLEM — T45.1X5A CHEMOTHERAPY ADVERSE REACTION: Status: ACTIVE | Noted: 2020-03-04

## 2020-03-12 PROBLEM — Q79.2 EXOMPHALOS: Status: ACTIVE | Noted: 2019-02-13

## 2020-03-12 PROBLEM — G62.0 CHEMOTHERAPY-INDUCED PERIPHERAL NEUROPATHY (HCC): Status: ACTIVE | Noted: 2019-08-06

## 2020-03-12 PROBLEM — D69.59 CHEMOTHERAPY-INDUCED THROMBOCYTOPENIA: Status: ACTIVE | Noted: 2019-08-06

## 2020-03-12 PROBLEM — D61.818 PANCYTOPENIA (HCC): Status: ACTIVE | Noted: 2020-03-08

## 2020-03-12 PROCEDURE — 99396 PREV VISIT EST AGE 40-64: CPT | Performed by: NURSE PRACTITIONER

## 2020-03-12 PROCEDURE — 87624 HPV HI-RISK TYP POOLED RSLT: CPT | Performed by: NURSE PRACTITIONER

## 2020-03-12 PROCEDURE — G0123 SCREEN CERV/VAG THIN LAYER: HCPCS | Performed by: NURSE PRACTITIONER

## 2020-03-12 RX ORDER — SUCRALFATE 1 G/1
TABLET ORAL
COMMUNITY
Start: 2020-01-21

## 2020-03-12 RX ORDER — PANTOPRAZOLE SODIUM 40 MG/1
TABLET, DELAYED RELEASE ORAL
COMMUNITY
Start: 2020-01-21

## 2020-03-12 RX ORDER — LIDOCAINE AND PRILOCAINE 25; 25 MG/G; MG/G
CREAM TOPICAL
COMMUNITY
Start: 2020-01-22

## 2020-03-12 RX ORDER — POLYETHYLENE GLYCOL 3350 17 G/17G
17 POWDER, FOR SOLUTION ORAL
COMMUNITY

## 2020-03-12 RX ORDER — M-VIT,TX,IRON,MINS/CALC/FOLIC 27MG-0.4MG
1 TABLET ORAL
COMMUNITY

## 2020-03-12 RX ORDER — ONDANSETRON HYDROCHLORIDE 8 MG/1
8 TABLET, FILM COATED ORAL
COMMUNITY
Start: 2019-11-22

## 2020-03-12 NOTE — PROGRESS NOTES
"Subjective   Tiffanie Smith is a 59 y.o. female.     Annual exam      The following portions of the patient's history were reviewed and updated as appropriate: allergies, current medications, past family history, past medical history, past social history, past surgical history and problem list.    /70 (BP Location: Right arm, Patient Position: Sitting, Cuff Size: Adult)   Ht 170.2 cm (67\")   Wt 50.8 kg (112 lb)   BMI 17.54 kg/m²     Review of Systems   Constitutional: Negative for activity change, appetite change, fatigue and fever.   HENT: Negative for congestion, sore throat and trouble swallowing.    Eyes: Negative for pain, discharge and visual disturbance.   Respiratory: Negative for apnea, shortness of breath and wheezing.    Cardiovascular: Negative for chest pain, palpitations and leg swelling.   Gastrointestinal: Negative for abdominal pain, constipation and diarrhea.   Genitourinary: Negative for frequency, pelvic pain, urgency and vaginal discharge.   Musculoskeletal: Negative for back pain and gait problem.   Skin: Negative for color change and rash.   Neurological: Negative for dizziness, weakness and numbness.   Psychiatric/Behavioral: Negative for confusion and sleep disturbance.       Objective   Physical Exam   Constitutional: She is oriented to person, place, and time. She appears well-developed and well-nourished. No distress.   HENT:   Head: Normocephalic.   Right Ear: External ear normal.   Left Ear: External ear normal.   Nose: Nose normal.   Mouth/Throat: Oropharynx is clear and moist.   Eyes: Conjunctivae are normal. Right eye exhibits no discharge. Left eye exhibits no discharge. No scleral icterus.   Neck: Normal range of motion. Neck supple. Carotid bruit is not present. No tracheal deviation present. No thyromegaly present.   Cardiovascular: Normal rate, regular rhythm, normal heart sounds and intact distal pulses.   No murmur heard.  Pulmonary/Chest: Effort normal and breath " sounds normal. No respiratory distress. She has no wheezes. Right breast exhibits no inverted nipple, no mass, no nipple discharge, no skin change and no tenderness. Left breast exhibits no inverted nipple, no mass, no nipple discharge, no skin change and no tenderness. No breast swelling, tenderness, discharge or bleeding. Breasts are symmetrical.   Abdominal: Soft. She exhibits no distension and no mass. There is no tenderness. There is no guarding. No hernia. Hernia confirmed negative in the right inguinal area and confirmed negative in the left inguinal area.   Genitourinary: Rectum normal, vagina normal and uterus normal. Rectal exam shows no mass. No breast swelling, tenderness, discharge or bleeding. Pelvic exam was performed with patient supine. There is no rash, tenderness, lesion or injury on the right labia. There is no rash, tenderness, lesion or injury on the left labia. Uterus is not enlarged, not fixed and not tender. Cervix exhibits no motion tenderness, no discharge and no friability. Right adnexum displays no mass, no tenderness and no fullness. Left adnexum displays no mass, no tenderness and no fullness. No erythema, tenderness or bleeding in the vagina. No foreign body in the vagina. No signs of injury around the vagina. No vaginal discharge found.   Genitourinary Comments:   BSU normal  Urethral meatus  Normal  Perineum  Normal   Musculoskeletal: Normal range of motion. She exhibits no edema or tenderness.   Lymphadenopathy:        Head (right side): No submental, no submandibular, no tonsillar, no preauricular, no posterior auricular and no occipital adenopathy present.        Head (left side): No submental, no submandibular, no tonsillar, no preauricular, no posterior auricular and no occipital adenopathy present.     She has no cervical adenopathy.        Right cervical: No superficial cervical, no deep cervical and no posterior cervical adenopathy present.       Left cervical: No  superficial cervical, no deep cervical and no posterior cervical adenopathy present.     She has no axillary adenopathy.        Right: No inguinal adenopathy present.        Left: No inguinal adenopathy present.   Neurological: She is alert and oriented to person, place, and time. Coordination normal.   Skin: Skin is warm and dry. No bruising and no rash noted. She is not diaphoretic. No erythema.   Psychiatric: She has a normal mood and affect. Her behavior is normal. Judgment and thought content normal.   Nursing note and vitals reviewed.      Assessment/Plan   Well woman exam. Pap collected.     Patient's Body mass index is 17.54 kg/m². BMI is within normal parameters. No follow-up required..    RV annual exam/prn.   Tiffanie was seen today for gynecologic exam.    Diagnoses and all orders for this visit:    Encounter for gynecological examination without abnormal finding    Body mass index (BMI) less than 19    Non-smoker    Screening for cervical cancer  -     Liquid-based Pap Smear, Screening  -     HPV DNA Probe, Direct - ThinPrep Vial,; Future  -     HPV DNA Probe, Direct - ThinPrep Vial, Cervix

## 2020-03-12 NOTE — PROGRESS NOTES
Attempted to obtain health maintenance information, patient unable to provide answers for the following items Lipid Panel.

## 2020-03-13 ENCOUNTER — HOSPITAL ENCOUNTER (OUTPATIENT)
Dept: INFUSION THERAPY | Age: 60
Discharge: HOME | End: 2020-03-13
Payer: COMMERCIAL

## 2020-03-13 DIAGNOSIS — C25.9 MALIGNANT NEOPLASM OF PANCREAS, UNSPECIFIED LOCATION OF MALIGNANCY (HCC): Primary | ICD-10-CM

## 2020-03-13 PROCEDURE — 96523 IRRIG DRUG DELIVERY DEVICE: CPT

## 2020-03-13 PROCEDURE — 6360000002 HC RX W HCPCS: Performed by: INTERNAL MEDICINE

## 2020-03-13 PROCEDURE — 2580000003 HC RX 258: Performed by: INTERNAL MEDICINE

## 2020-03-13 RX ORDER — SODIUM CHLORIDE 0.9 % (FLUSH) 0.9 %
10 SYRINGE (ML) INJECTION PRN
Status: CANCELLED | OUTPATIENT
Start: 2020-03-13

## 2020-03-13 RX ORDER — HEPARIN SODIUM (PORCINE) LOCK FLUSH IV SOLN 100 UNIT/ML 100 UNIT/ML
500 SOLUTION INTRAVENOUS PRN
Status: DISCONTINUED | OUTPATIENT
Start: 2020-03-13 | End: 2020-03-14 | Stop reason: HOSPADM

## 2020-03-13 RX ORDER — HEPARIN SODIUM (PORCINE) LOCK FLUSH IV SOLN 100 UNIT/ML 100 UNIT/ML
500 SOLUTION INTRAVENOUS PRN
Status: CANCELLED | OUTPATIENT
Start: 2020-03-13

## 2020-03-13 RX ORDER — SODIUM CHLORIDE 0.9 % (FLUSH) 0.9 %
20 SYRINGE (ML) INJECTION PRN
Status: DISCONTINUED | OUTPATIENT
Start: 2020-03-13 | End: 2020-03-14 | Stop reason: HOSPADM

## 2020-03-13 RX ORDER — SODIUM CHLORIDE 0.9 % (FLUSH) 0.9 %
20 SYRINGE (ML) INJECTION PRN
Status: CANCELLED | OUTPATIENT
Start: 2020-03-13

## 2020-03-13 RX ADMIN — SODIUM CHLORIDE, PRESERVATIVE FREE 20 ML: 5 INJECTION INTRAVENOUS at 11:24

## 2020-03-13 RX ADMIN — HEPARIN 500 UNITS: 100 SYRINGE at 11:24

## 2020-03-20 NOTE — PATIENT INSTRUCTIONS

## 2020-03-24 NOTE — PROGRESS NOTES
= 171  · 9/3/2159-SM-48-9 = 198  · 9/13/20190132-FC-40-9 = 98 (at [facility])  · 10/29/9365-IP-97-9 =317  · 11/21/2019-CA-19-9 = 183  · 1/23/20205858-HS-01-9 = 520        Cancer history  Ms Rolin Bumpers was seen in initial oncology consultation on 3/12/2018 referred by Dr. Jeovanny Ramos for a diagnosis of pancreatic adenocarcinoma. She was initially evaluated for acute pancreatitis at [facility] on February 2018. Further imaging revealed a pancreatic head mass. Lipase was elevated at 1184 and CA-19-9 elevated 134. The symptoms were going on for several weeks. Weight loss of 10-12 pounds over the last 6 months. · October 2017-CT abdomen without contrast was unremarkable. · 2/2/2018-ultrasound of abdomen showed a pancreatic duct dilation   · 2/02/2018-CT abdomen showed 16 mm low-density lesion in the pancreas at the junction of the head of the body. No intra-abdominal adenopathy. No liver metastasis. · 2/7/2018-the patient underwent EGD/EUS and FNA biopsy of a pancreatic mass by Dr. Jeovanny Ramos at [facility] . EUS showed inflammatory changes consistent with a recent history of pancreatitis. Main pancreatic duct was dilated. No concerning peripancreatic adenopathy. No definite mass was seen by a more diffuse hypoechoic area measuring 1.8 x 2.2 cm in the head of the pancreas. Pathology was consistent with a group of markedly atypical cells strongly suspicious for adenocarcinoma. · 2/28/2018-CT pancreatic protocol showed a poorly defined area of decreased enhancement located in the head of the pancreas measuring 1.8 x 1.4 x 1.7 cm with moderate meditation of the pancreatic duct. Well defined sharply marginated low density nodule located in the tail of the pancreas measuring 1.5 x 1.3 x 1.5 cm (IPMN?). · 3/6/2018-CA 19-9 was elevated at 150. Repeat EGD was performed by Dr. Sukhjinder Hook due to the inconclusive FNA resulted on 2/7/2018. Pathology FNA was consistent with pancreatic adenocarcinoma. pelvis at [facility] showed a soft tissue thickening in the pancreatic bed with persistent narrowing of the portal SMV confluence.  Superimposed tumor remains a possibility.  The new low-density lesion in the periphery of the liver seen on the prior exam is no longer appreciated and likely represents treatment effect.  Nodular enhancement may represent perfusion change in the region on image 187. · 9/13/2018- she was recommended to continue current treatment with Gemzar/Abraxane  · 11/1/2019- she was hospitalized with GI bleed.  An upper endoscopy showed ulceration at the efferent loop of the Whipple's procedure  · 10/29/7552-NS-10-9 =317  · 11/21/2019-CA-19-9 = 183  · 11/19/2019- CT chest abdomen pelvis showed no evidence of gross disease progression. Improvement of the caliber of what may be a middle colic vein that drains back to the SMV. There is still persistent narrowing of the of the upper SMV at the juncture with the portal vein.  No definite evidence of liver metastases at this time. No definite evidence of pulmonary metastases.  However, question of slight increase in prominence of subtle soft tissue thickening within the right paracolic gutter could be indicative of metastatic disease to peritoneum.   · 12/9/2019- colonoscopy by GI was unremarkable.  This was performed for complaints of maroon-colored stools. 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 This is consistent with disease progression.   · 3/4/2020- 4th line therapy Irinotecan liposome 70 mg/m² with leucovorin 400 mg/m² and 5-FU 2400 mg/m² over 46 hours every 2 weeks.            PAST MEDICAL HISTORY:   Past Medical History:   Diagnosis Date    Acute pancreatitis     Not on file     Physically abused: Not on file     Forced sexual activity: Not on file   Other Topics Concern    Not on file   Social History Narrative    Not on file       FAMILY HISTORY:  Family History   Problem Relation Age of Onset    Heart Attack Mother 46        x 2-had bypass    Hypertension Mother     COPD Father     Liver Cancer Paternal Aunt     Lung Cancer Paternal Aunt     Breast Cancer Maternal Aunt         but  of brain cancer    Diabetes Maternal Uncle     Diabetes Maternal Grandmother     Colon Cancer Neg Hx     Colon Polyps Neg Hx     Esophageal Cancer Neg Hx     Rectal Cancer Neg Hx     Stomach Cancer Neg Hx         Current Outpatient Medications   Medication Sig Dispense Refill    pantoprazole (PROTONIX) 40 MG tablet Take 1 tablet by mouth 2 times daily 60 tablet 3    cyanocobalamin 1000 MCG tablet Take 1,000 mcg by mouth daily      lidocaine-prilocaine (EMLA) 2.5-2.5 % cream Apply to port area and cover with plastic wrap one hour prior to use.  (Patient not taking: Reported on 2020) 1 Tube 1    sucralfate (CARAFATE) 1 GM tablet Take 1 tablet by mouth 4 times daily 360 tablet 0    lipase-protease-amylase (ZENPEP) 5000-08425 units CPEP delayed release capsule Take 1 capsule by mouth 4 times daily (with meals and nightly) 360 capsule 0    vitamin E 400 UNIT capsule Take 400 Units by mouth daily      NONFORMULARY daily Tumeric otc      ondansetron (ZOFRAN) 8 MG tablet Take 1 tablet by mouth every 8 hours as needed for Nausea or Vomiting 30 tablet 3    polyethylene glycol (GLYCOLAX) powder Take 17 g by mouth daily as needed      Multiple Vitamins-Minerals (THERAPEUTIC MULTIVITAMIN-MINERALS) tablet Take 1 tablet by mouth daily      lipase-protease-amylase (ZENPEP) 5000 units delayed release capsule Take 1 capsule by mouth 4 times daily (with meals and nightly) Take with meals and snacks for a total of 8 daily 720 capsule 3    promethazine (PHENERGAN) 25 MG tablet lymphadenopathy  NEUROLOGIC: follows commands, non focal.   PSYCH: mood and affect appropriate. Alert and oriented to time and place and person. Relevant Lab findings/reviewed by me:  CBC:3/25/2020  WBC-2.44  HGB-9.7  PLT-65,000  Neut-1.11    Relevant Imaging studies/reviewed by me:   Ct Abdomen Pelvis W Iv Contrast Additional Contrast? None    Result Date: 2/25/2020  Examination. CT ABDOMEN PELVIS W IV CONTRAST 2/24/2020 8:45 PM History: Abdominal pain and hematemesis. DLP: 764 mGycm. The CT scan of the abdomen and pelvis is performed after intravenous contrast enhancement. The images are acquired in axial plane with subsequent reconstruction in coronal and sagittal planes. The comparison is made with the previous study dated 2/28/2018. The lung bases included in the study appears unremarkable. The limited visualized cardiomediastinal structures are normal. There is evidence of pneumobilia suggesting reflux from incompetent sphincter on a prior surgical diversion. There is geographic fatty infiltration more pronounced in the lower arterial. There is a small area of focal enhancement located anteriorly in the dome of the liver, image #9 and 10 in axial plane and image #14 and 15 in coronal plane. The etiology is not certain. The spleen appears normal. The left of the pancreas is visualized without evidence of ductal dilatation. The body and head are not optimally visualized due to due to previous surgical resection (history of pancreaticoduodenectomy/Whipple procedure.) Anterior glands bilaterally appear normal. Kidneys bilaterally are normal. The ureters are not optimally visualized due to diffuse infiltration of the peritoneum. The urinary bladder is decompressed and may not be optimally evaluated. No intrinsic abnormality. Normal size uterus is seen. There are tortuous dilated pelvic vessels around the uterus with prominent bilateral median veins. No finding to suggest obstruction.  The stomach is distended Samara Parada was seen today for other. Diagnoses and all orders for this visit:    Malignant neoplasm of pancreas, unspecified location of malignancy [facility])    Chemotherapy management, encounter for    Care plan discussed with patient    Anemia associated with chemotherapy    Chemotherapy-induced peripheral neuropathy (HCC)    Adverse effect of chemotherapy, subsequent encounter    Toxicity, late effect, due to drug, medicine, or biological substance         PLAN:  #Metastatic pancreatic cancer  third line cycle #3 palliative chemotherapy (liposomal Irinotecan/Leucovorin/5-FU) scheduled for next Monday. #GI bleed- secondary to erosion at previous Whipple anastomotic site. history of erosion at the anastomotic site with several instances of upper GI bleed in the past.  No further episodes of GI bleed.     #Normocytic anemia- secondary to GI bleed. Hemoglobin 9.7. No further GI bleed. She is in contact with GI. They are planning a capsule endoscopy.     #Pancytopenia-secondary to chemotherapy. CBC:3/25/2020  WBC-2.44  HGB-9.7  PLT-65,000  Neut-1.11    Plan:   Delay chemotherapy until next Monday. Repeat CBC next Monday    Follow Up:     No follow-ups on file. Data Ranjith Glass am scribing for Isaac Hernandez MD. Electronically signed by Amanda Glass on 3/25/2020 at 2:50 PM CDT. I, Dr Michael Lombardo, personally performed the services described in this documentation as scribed by Amanda Glass MA in my presence and is both accurate and complete. Over 50% of the total visit time of 25 minutes in face to face encounter with the patient, out of which more than 50% of the time was spent in counseling patient or family and coordination of care. Counseling included but was not limited to time spent reviewing labs, imaging studies/ treatment plan and answering questions.

## 2020-03-25 ENCOUNTER — OFFICE VISIT (OUTPATIENT)
Dept: HEMATOLOGY | Age: 60
End: 2020-03-25
Payer: COMMERCIAL

## 2020-03-25 ENCOUNTER — HOSPITAL ENCOUNTER (OUTPATIENT)
Dept: INFUSION THERAPY | Age: 60
Discharge: HOME | End: 2020-03-25
Payer: COMMERCIAL

## 2020-03-25 VITALS
HEART RATE: 58 BPM | BODY MASS INDEX: 17.5 KG/M2 | DIASTOLIC BLOOD PRESSURE: 82 MMHG | OXYGEN SATURATION: 99 % | HEIGHT: 67 IN | SYSTOLIC BLOOD PRESSURE: 130 MMHG | WEIGHT: 111.5 LBS | TEMPERATURE: 97.8 F

## 2020-03-25 DIAGNOSIS — C25.9 MALIGNANT NEOPLASM OF PANCREAS, UNSPECIFIED LOCATION OF MALIGNANCY (HCC): Primary | ICD-10-CM

## 2020-03-25 PROCEDURE — 99214 OFFICE O/P EST MOD 30 MIN: CPT | Performed by: INTERNAL MEDICINE

## 2020-03-25 PROCEDURE — 6360000002 HC RX W HCPCS: Performed by: INTERNAL MEDICINE

## 2020-03-25 PROCEDURE — 85025 COMPLETE CBC W/AUTO DIFF WBC: CPT

## 2020-03-25 PROCEDURE — 2580000003 HC RX 258: Performed by: INTERNAL MEDICINE

## 2020-03-25 PROCEDURE — 96523 IRRIG DRUG DELIVERY DEVICE: CPT

## 2020-03-25 RX ORDER — SODIUM CHLORIDE 0.9 % (FLUSH) 0.9 %
20 SYRINGE (ML) INJECTION PRN
Status: CANCELLED | OUTPATIENT
Start: 2020-03-25

## 2020-03-25 RX ORDER — SODIUM CHLORIDE 0.9 % (FLUSH) 0.9 %
10 SYRINGE (ML) INJECTION PRN
Status: DISCONTINUED | OUTPATIENT
Start: 2020-03-25 | End: 2020-03-26 | Stop reason: HOSPADM

## 2020-03-25 RX ORDER — HEPARIN SODIUM (PORCINE) LOCK FLUSH IV SOLN 100 UNIT/ML 100 UNIT/ML
500 SOLUTION INTRAVENOUS PRN
Status: DISCONTINUED | OUTPATIENT
Start: 2020-03-25 | End: 2020-03-26 | Stop reason: HOSPADM

## 2020-03-25 RX ORDER — HEPARIN SODIUM (PORCINE) LOCK FLUSH IV SOLN 100 UNIT/ML 100 UNIT/ML
500 SOLUTION INTRAVENOUS PRN
Status: CANCELLED | OUTPATIENT
Start: 2020-03-25

## 2020-03-25 RX ORDER — SODIUM CHLORIDE 0.9 % (FLUSH) 0.9 %
10 SYRINGE (ML) INJECTION PRN
Status: CANCELLED | OUTPATIENT
Start: 2020-03-25

## 2020-03-25 RX ADMIN — SODIUM CHLORIDE, PRESERVATIVE FREE 10 ML: 5 INJECTION INTRAVENOUS at 09:37

## 2020-03-25 RX ADMIN — HEPARIN 500 UNITS: 100 SYRINGE at 09:38

## 2020-03-25 ASSESSMENT — PAIN SCALES - GENERAL: PAINLEVEL_OUTOF10: 0

## 2020-03-30 ENCOUNTER — HOSPITAL ENCOUNTER (OUTPATIENT)
Dept: INFUSION THERAPY | Age: 60
Discharge: HOME | End: 2020-03-30
Payer: COMMERCIAL

## 2020-03-30 DIAGNOSIS — C25.9 MALIGNANT NEOPLASM OF PANCREAS, UNSPECIFIED LOCATION OF MALIGNANCY (HCC): ICD-10-CM

## 2020-03-30 PROCEDURE — 85025 COMPLETE CBC W/AUTO DIFF WBC: CPT

## 2020-04-03 ENCOUNTER — TELEPHONE (OUTPATIENT)
Dept: INFUSION THERAPY | Age: 60
End: 2020-04-03

## 2020-04-03 NOTE — TELEPHONE ENCOUNTER
Patient called clinic requesting neulasta due to treatment being held x2. Per , will add neulasta beginning next treatment cycle.

## 2020-04-08 ENCOUNTER — OFFICE VISIT (OUTPATIENT)
Dept: HEMATOLOGY | Age: 60
End: 2020-04-08
Payer: COMMERCIAL

## 2020-04-08 ENCOUNTER — HOSPITAL ENCOUNTER (OUTPATIENT)
Dept: INFUSION THERAPY | Age: 60
Discharge: HOME | End: 2020-04-08
Payer: COMMERCIAL

## 2020-04-08 VITALS
WEIGHT: 115 LBS | BODY MASS INDEX: 18.05 KG/M2 | TEMPERATURE: 98 F | HEIGHT: 67 IN | OXYGEN SATURATION: 99 % | SYSTOLIC BLOOD PRESSURE: 108 MMHG | DIASTOLIC BLOOD PRESSURE: 70 MMHG | HEART RATE: 59 BPM

## 2020-04-08 DIAGNOSIS — C25.9 MALIGNANT NEOPLASM OF PANCREAS, UNSPECIFIED LOCATION OF MALIGNANCY (HCC): Primary | ICD-10-CM

## 2020-04-08 PROCEDURE — 96368 THER/DIAG CONCURRENT INF: CPT

## 2020-04-08 PROCEDURE — 80053 COMPREHEN METABOLIC PANEL: CPT

## 2020-04-08 PROCEDURE — 85025 COMPLETE CBC W/AUTO DIFF WBC: CPT

## 2020-04-08 PROCEDURE — 96413 CHEMO IV INFUSION 1 HR: CPT

## 2020-04-08 PROCEDURE — 99214 OFFICE O/P EST MOD 30 MIN: CPT | Performed by: INTERNAL MEDICINE

## 2020-04-08 PROCEDURE — 96415 CHEMO IV INFUSION ADDL HR: CPT

## 2020-04-08 PROCEDURE — 6360000002 HC RX W HCPCS: Performed by: INTERNAL MEDICINE

## 2020-04-08 PROCEDURE — G0498 CHEMO EXTEND IV INFUS W/PUMP: HCPCS

## 2020-04-08 PROCEDURE — 2580000003 HC RX 258: Performed by: INTERNAL MEDICINE

## 2020-04-08 PROCEDURE — 36415 COLL VENOUS BLD VENIPUNCTURE: CPT

## 2020-04-08 PROCEDURE — 96367 TX/PROPH/DG ADDL SEQ IV INF: CPT

## 2020-04-08 PROCEDURE — 96375 TX/PRO/DX INJ NEW DRUG ADDON: CPT

## 2020-04-08 RX ORDER — SODIUM CHLORIDE 0.9 % (FLUSH) 0.9 %
10 SYRINGE (ML) INJECTION PRN
Status: CANCELLED | OUTPATIENT
Start: 2020-04-08

## 2020-04-08 RX ORDER — SODIUM CHLORIDE 9 MG/ML
INJECTION, SOLUTION INTRAVENOUS CONTINUOUS
Status: CANCELLED | OUTPATIENT
Start: 2020-04-08

## 2020-04-08 RX ORDER — EPINEPHRINE 1 MG/ML
0.3 INJECTION, SOLUTION, CONCENTRATE INTRAVENOUS PRN
Status: CANCELLED | OUTPATIENT
Start: 2020-04-08

## 2020-04-08 RX ORDER — PALONOSETRON 0.05 MG/ML
0.25 INJECTION, SOLUTION INTRAVENOUS ONCE
Status: COMPLETED | OUTPATIENT
Start: 2020-04-08 | End: 2020-04-08

## 2020-04-08 RX ORDER — SODIUM CHLORIDE 9 MG/ML
20 INJECTION, SOLUTION INTRAVENOUS ONCE
Status: CANCELLED | OUTPATIENT
Start: 2020-04-08

## 2020-04-08 RX ORDER — METHYLPREDNISOLONE SODIUM SUCCINATE 125 MG/2ML
125 INJECTION, POWDER, LYOPHILIZED, FOR SOLUTION INTRAMUSCULAR; INTRAVENOUS ONCE
Status: CANCELLED | OUTPATIENT
Start: 2020-04-08

## 2020-04-08 RX ORDER — SODIUM CHLORIDE 0.9 % (FLUSH) 0.9 %
5 SYRINGE (ML) INJECTION PRN
Status: CANCELLED | OUTPATIENT
Start: 2020-04-08

## 2020-04-08 RX ORDER — HEPARIN SODIUM (PORCINE) LOCK FLUSH IV SOLN 100 UNIT/ML 100 UNIT/ML
500 SOLUTION INTRAVENOUS PRN
Status: CANCELLED | OUTPATIENT
Start: 2020-04-08

## 2020-04-08 RX ORDER — DIPHENHYDRAMINE HYDROCHLORIDE 50 MG/ML
50 INJECTION INTRAMUSCULAR; INTRAVENOUS ONCE
Status: CANCELLED | OUTPATIENT
Start: 2020-04-08

## 2020-04-08 RX ORDER — DEXAMETHASONE SODIUM PHOSPHATE 10 MG/ML
10 INJECTION, SOLUTION INTRAMUSCULAR; INTRAVENOUS ONCE
Status: COMPLETED | OUTPATIENT
Start: 2020-04-08 | End: 2020-04-08

## 2020-04-08 RX ORDER — PALONOSETRON 0.05 MG/ML
0.25 INJECTION, SOLUTION INTRAVENOUS ONCE
Status: CANCELLED | OUTPATIENT
Start: 2020-04-08

## 2020-04-08 RX ADMIN — DEXAMETHASONE SODIUM PHOSPHATE 10 MG: 10 INJECTION, SOLUTION INTRAMUSCULAR; INTRAVENOUS at 09:17

## 2020-04-08 RX ADMIN — FLUOROURACIL 3770 MG: 50 INJECTION, SOLUTION INTRAVENOUS at 11:54

## 2020-04-08 RX ADMIN — PALONOSETRON HYDROCHLORIDE 0.25 MG: 0.25 INJECTION, SOLUTION INTRAVENOUS at 09:17

## 2020-04-08 RX ADMIN — LEUCOVORIN CALCIUM 650 MG: 350 INJECTION, POWDER, LYOPHILIZED, FOR SUSPENSION INTRAMUSCULAR; INTRAVENOUS at 11:23

## 2020-04-08 RX ADMIN — IRINOTECAN HYDROCHLORIDE 110.08 MG: 4.3 INJECTION, POWDER, FOR SOLUTION INTRAVENOUS at 09:43

## 2020-04-08 ASSESSMENT — PAIN SCALES - GENERAL: PAINLEVEL_OUTOF10: 0

## 2020-04-08 NOTE — PROGRESS NOTES
Surgical oncology. CT chest to complete staging. CA-19-9 430. · 3/16/2018-CT of the chest was unremarkable for intrathoracic metastatic disease   · Surgery consultation at [facility] March 2018- with Dr Jennifer Monsalve. She was not a surgical candidate upfront. Recommended neoadjuvant chemotherapy. · 4/3/2018-started on neoadjuvant chemotherapy with FOLFORINOX. · 4/11/2018-she developed CBD obstruction had a CBD stent placed by Dr. Aleks Connors. · 4/3/2018 through 6/4/2018 Neoadjuvant chemotherapy FOLFORINOX ×5 Biweekly cycles   · August 2018- XRT 30 Gy/Xeloda   · 08/30/3018- Whipple procedure at [facility] by Dr. Elida Oconnell  · Recommended another 7 biweekly cycles of modified FOLFORINOX. · 9/5/2018-CT of the chest abdomen pelvis was unremarkable for metastatic disease. · 1/14/2019-CT abdomen and pelvis at [facility] showed no evidence of metastasis in the chest abdomen pelvis. · 5/21/2019-CT chest, abdomen, pelvis at [facility] showed no evidence of metastatic disease   · 5/21/20192352-JD-04-9 = 42   · 06/12/2019- CA-19-9 = 154. Discussed with the patient. We'll also discuss with [facility]. · 7/1/2019-CT chest, abdomen, pelvis showed a soft tissue mass measuring 1.4 x 1.1 cm, not present on prior scan from January 2019, concerning for recurrence. Stable hypodense in the liver, too small to characterize. Subcentimeter ill-defined area of low attenuation liver may represent an early metastasis.    · 7/3/2019-recommended palliative chemotherapy with nab paclitaxel 125mg/m² IV over 30 minutes on day 1 and gemcitabine 600 mg/m² IV over 10mg/m2/min (fixed dose rate) on day 1  · 7/2/2019-CA 19 9 = 114  · 7/6/2019- extended genetic panel negative for a deleterious mutation (invitae)  · 7/9/2019- CA-19-9 = 225  · 8/6/2019- CA-19-9 = 171  · 9/3/7515-DC-92-9 = 198   · 9/13/20194954-PX-17-9 = 98 at [facility]  · 9/13/2019-CT chest abdomen pelvis at [facility] showed a soft tissue thickening in the pancreatic bed with

## 2020-04-08 NOTE — PROGRESS NOTES
Pt called yesterday with rectal bleeding. It has subsided now. Her CBC is stable. Discussed with Dr. Zenon cShaffer. OK to proceed with chemotherapy today.

## 2020-04-10 ENCOUNTER — HOSPITAL ENCOUNTER (OUTPATIENT)
Dept: INFUSION THERAPY | Age: 60
Setting detail: INFUSION SERIES
Discharge: HOME | End: 2020-04-10
Payer: COMMERCIAL

## 2020-04-10 ENCOUNTER — TELEPHONE (OUTPATIENT)
Dept: GASTROENTEROLOGY | Age: 60
End: 2020-04-10

## 2020-04-10 ENCOUNTER — TELEPHONE (OUTPATIENT)
Dept: INFUSION THERAPY | Age: 60
End: 2020-04-10

## 2020-04-10 ENCOUNTER — HOSPITAL ENCOUNTER (OUTPATIENT)
Dept: INFUSION THERAPY | Age: 60
Discharge: HOME | End: 2020-04-10
Payer: COMMERCIAL

## 2020-04-10 VITALS
OXYGEN SATURATION: 98 % | HEART RATE: 57 BPM | DIASTOLIC BLOOD PRESSURE: 70 MMHG | SYSTOLIC BLOOD PRESSURE: 106 MMHG | TEMPERATURE: 97.9 F

## 2020-04-10 VITALS — SYSTOLIC BLOOD PRESSURE: 111 MMHG | DIASTOLIC BLOOD PRESSURE: 61 MMHG | HEART RATE: 58 BPM

## 2020-04-10 DIAGNOSIS — D64.9 SYMPTOMATIC ANEMIA: Primary | ICD-10-CM

## 2020-04-10 DIAGNOSIS — T45.1X5A ANEMIA ASSOCIATED WITH CHEMOTHERAPY: ICD-10-CM

## 2020-04-10 DIAGNOSIS — E86.0 DEHYDRATION: ICD-10-CM

## 2020-04-10 DIAGNOSIS — D64.81 ANEMIA ASSOCIATED WITH CHEMOTHERAPY: ICD-10-CM

## 2020-04-10 DIAGNOSIS — C25.9 MALIGNANT NEOPLASM OF PANCREAS, UNSPECIFIED LOCATION OF MALIGNANCY (HCC): Primary | ICD-10-CM

## 2020-04-10 PROCEDURE — 6360000002 HC RX W HCPCS: Performed by: INTERNAL MEDICINE

## 2020-04-10 PROCEDURE — 96360 HYDRATION IV INFUSION INIT: CPT

## 2020-04-10 PROCEDURE — 2580000003 HC RX 258: Performed by: INTERNAL MEDICINE

## 2020-04-10 PROCEDURE — 86901 BLOOD TYPING SEROLOGIC RH(D): CPT

## 2020-04-10 PROCEDURE — 36430 TRANSFUSION BLD/BLD COMPNT: CPT

## 2020-04-10 PROCEDURE — P9016 RBC LEUKOCYTES REDUCED: HCPCS

## 2020-04-10 PROCEDURE — 96523 IRRIG DRUG DELIVERY DEVICE: CPT

## 2020-04-10 PROCEDURE — 86923 COMPATIBILITY TEST ELECTRIC: CPT

## 2020-04-10 PROCEDURE — 86850 RBC ANTIBODY SCREEN: CPT

## 2020-04-10 PROCEDURE — 96377 APPLICATON ON-BODY INJECTOR: CPT

## 2020-04-10 PROCEDURE — 86900 BLOOD TYPING SEROLOGIC ABO: CPT

## 2020-04-10 RX ORDER — HEPARIN SODIUM (PORCINE) LOCK FLUSH IV SOLN 100 UNIT/ML 100 UNIT/ML
500 SOLUTION INTRAVENOUS PRN
Status: DISCONTINUED | OUTPATIENT
Start: 2020-04-10 | End: 2020-04-11 | Stop reason: HOSPADM

## 2020-04-10 RX ORDER — SODIUM CHLORIDE 0.9 % (FLUSH) 0.9 %
20 SYRINGE (ML) INJECTION PRN
Status: CANCELLED | OUTPATIENT
Start: 2020-04-10

## 2020-04-10 RX ORDER — METHYLPREDNISOLONE SODIUM SUCCINATE 125 MG/2ML
125 INJECTION, POWDER, LYOPHILIZED, FOR SOLUTION INTRAMUSCULAR; INTRAVENOUS ONCE
Status: CANCELLED | OUTPATIENT
Start: 2020-04-10

## 2020-04-10 RX ORDER — 0.9 % SODIUM CHLORIDE 0.9 %
1000 INTRAVENOUS SOLUTION INTRAVENOUS ONCE
Status: CANCELLED | OUTPATIENT
Start: 2020-04-10

## 2020-04-10 RX ORDER — DIPHENHYDRAMINE HYDROCHLORIDE 50 MG/ML
50 INJECTION INTRAMUSCULAR; INTRAVENOUS ONCE
Status: CANCELLED | OUTPATIENT
Start: 2020-04-10

## 2020-04-10 RX ORDER — SODIUM CHLORIDE 9 MG/ML
INJECTION, SOLUTION INTRAVENOUS CONTINUOUS
Status: CANCELLED | OUTPATIENT
Start: 2020-04-10

## 2020-04-10 RX ORDER — 0.9 % SODIUM CHLORIDE 0.9 %
1000 INTRAVENOUS SOLUTION INTRAVENOUS ONCE
Status: COMPLETED | OUTPATIENT
Start: 2020-04-10 | End: 2020-04-10

## 2020-04-10 RX ORDER — SODIUM CHLORIDE 0.9 % (FLUSH) 0.9 %
5 SYRINGE (ML) INJECTION PRN
Status: CANCELLED | OUTPATIENT
Start: 2020-04-10

## 2020-04-10 RX ORDER — 0.9 % SODIUM CHLORIDE 0.9 %
250 INTRAVENOUS SOLUTION INTRAVENOUS ONCE
Status: COMPLETED | OUTPATIENT
Start: 2020-04-10 | End: 2020-04-10

## 2020-04-10 RX ORDER — SODIUM CHLORIDE 0.9 % (FLUSH) 0.9 %
10 SYRINGE (ML) INJECTION PRN
Status: DISCONTINUED | OUTPATIENT
Start: 2020-04-10 | End: 2020-04-11 | Stop reason: HOSPADM

## 2020-04-10 RX ORDER — HEPARIN SODIUM (PORCINE) LOCK FLUSH IV SOLN 100 UNIT/ML 100 UNIT/ML
500 SOLUTION INTRAVENOUS PRN
Status: CANCELLED | OUTPATIENT
Start: 2020-04-10

## 2020-04-10 RX ORDER — FUROSEMIDE 10 MG/ML
20 INJECTION INTRAMUSCULAR; INTRAVENOUS ONCE
Status: CANCELLED | OUTPATIENT
Start: 2020-04-10

## 2020-04-10 RX ORDER — EPINEPHRINE 1 MG/ML
0.3 INJECTION, SOLUTION, CONCENTRATE INTRAVENOUS PRN
Status: CANCELLED | OUTPATIENT
Start: 2020-04-10

## 2020-04-10 RX ORDER — SODIUM CHLORIDE 0.9 % (FLUSH) 0.9 %
10 SYRINGE (ML) INJECTION PRN
Status: CANCELLED | OUTPATIENT
Start: 2020-04-10

## 2020-04-10 RX ORDER — SODIUM CHLORIDE 0.9 % (FLUSH) 0.9 %
5 SYRINGE (ML) INJECTION PRN
Status: DISCONTINUED | OUTPATIENT
Start: 2020-04-10 | End: 2020-04-11 | Stop reason: HOSPADM

## 2020-04-10 RX ORDER — 0.9 % SODIUM CHLORIDE 0.9 %
250 INTRAVENOUS SOLUTION INTRAVENOUS ONCE
Status: CANCELLED | OUTPATIENT
Start: 2020-04-10

## 2020-04-10 RX ORDER — SODIUM CHLORIDE 9 MG/ML
INJECTION, SOLUTION INTRAVENOUS CONTINUOUS
Status: DISCONTINUED | OUTPATIENT
Start: 2020-04-10 | End: 2020-04-12 | Stop reason: HOSPADM

## 2020-04-10 RX ORDER — SODIUM CHLORIDE 0.9 % (FLUSH) 0.9 %
20 SYRINGE (ML) INJECTION PRN
Status: DISCONTINUED | OUTPATIENT
Start: 2020-04-10 | End: 2020-04-11 | Stop reason: HOSPADM

## 2020-04-10 RX ORDER — SODIUM CHLORIDE 0.9 % (FLUSH) 0.9 %
20 SYRINGE (ML) INJECTION PRN
Status: DISCONTINUED | OUTPATIENT
Start: 2020-04-10 | End: 2020-04-12 | Stop reason: HOSPADM

## 2020-04-10 RX ADMIN — HEPARIN 500 UNITS: 100 SYRINGE at 12:11

## 2020-04-10 RX ADMIN — SODIUM CHLORIDE 250 ML: 9 INJECTION, SOLUTION INTRAVENOUS at 13:15

## 2020-04-10 RX ADMIN — PEGFILGRASTIM 6 MG: KIT SUBCUTANEOUS at 12:11

## 2020-04-10 RX ADMIN — SODIUM CHLORIDE 1000 ML: 9 INJECTION, SOLUTION INTRAVENOUS at 14:00

## 2020-04-10 RX ADMIN — SODIUM CHLORIDE: 9 INJECTION, SOLUTION INTRAVENOUS at 11:30

## 2020-04-10 RX ADMIN — SODIUM CHLORIDE, PRESERVATIVE FREE 20 ML: 5 INJECTION INTRAVENOUS at 12:11

## 2020-04-10 RX ADMIN — HEPARIN 500 UNITS: 100 SYRINGE at 15:59

## 2020-04-13 ENCOUNTER — APPOINTMENT (OUTPATIENT)
Dept: INFUSION THERAPY | Age: 60
End: 2020-04-13
Payer: COMMERCIAL

## 2020-04-13 ENCOUNTER — HOSPITAL ENCOUNTER (OUTPATIENT)
Dept: INFUSION THERAPY | Age: 60
Discharge: HOME | End: 2020-04-13
Payer: COMMERCIAL

## 2020-04-13 DIAGNOSIS — C25.9 MALIGNANT NEOPLASM OF PANCREAS, UNSPECIFIED LOCATION OF MALIGNANCY (HCC): ICD-10-CM

## 2020-04-13 PROCEDURE — 85025 COMPLETE CBC W/AUTO DIFF WBC: CPT

## 2020-04-13 RX ORDER — SUCRALFATE 1 G/1
1 TABLET ORAL 4 TIMES DAILY
Qty: 360 TABLET | Refills: 1 | Status: SHIPPED | OUTPATIENT
Start: 2020-04-13

## 2020-04-14 ENCOUNTER — NURSE ONLY (OUTPATIENT)
Dept: GASTROENTEROLOGY | Age: 60
End: 2020-04-14
Payer: COMMERCIAL

## 2020-04-14 RX ORDER — PROMETHAZINE HYDROCHLORIDE 25 MG/1
25 TABLET ORAL EVERY 6 HOURS PRN
Qty: 30 TABLET | Refills: 2 | Status: SHIPPED | OUTPATIENT
Start: 2020-04-14

## 2020-04-14 NOTE — PROGRESS NOTES
Patient presented to the office, as scheduled, for M2A pill cam ingestion and equipment placement. Instructions, risks, and benefits were discussed. All questions were answered and patient acknowledged understanding. Consent was signed and dated. The sensor belt was then placed around the mid abdomen and attached the recorder device to it and placed in shoulder holster. The pill cam was then opened and activated (light on recorder blinking blue). The patient successfully swallowed the pill cam with a small amount of water mixed with simethicone drops. When patient returns, the recorder will be uploaded to the computer and the appointed physician will be notified for the report to be read and resulted.      Instructions are as follows:   Clear liquids 2 hours after ingestion of pill cam  Light snack 4 hours after ingestion of pill cam  Avoid any MRI machines  Resume ADL's as normal throughout the day  Watch for Pill cam to be expelled, if not seen in stool further testing may be required   Return to office no later than 4:30pm  Call the office if the blue light stops blinking or all lights disappear [phone]    Capsule id: [id number] [facility], [id number], Exp: 7/7/21

## 2020-04-15 ENCOUNTER — HOSPITAL ENCOUNTER (INPATIENT)
Age: 60
LOS: 3 days | Discharge: HOME | DRG: 369 | End: 2020-04-18
Attending: PEDIATRICS | Admitting: HOSPITALIST
Payer: COMMERCIAL

## 2020-04-15 PROBLEM — K92.2 GIB (GASTROINTESTINAL BLEEDING): Status: ACTIVE | Noted: 2020-04-15

## 2020-04-15 LAB
ABO/RH: NORMAL
ALBUMIN SERPL-MCNC: 3.5 G/DL (ref 3.5–5.2)
ALP BLD-CCNC: 153 U/L (ref 35–104)
ALT SERPL-CCNC: 28 U/L (ref 5–33)
ANION GAP SERPL CALCULATED.3IONS-SCNC: 14 MMOL/L (ref 7–19)
ANISOCYTOSIS: ABNORMAL
ANTIBODY SCREEN: NORMAL
APTT: 25.3 SEC (ref 26–36.2)
AST SERPL-CCNC: 28 U/L (ref 5–32)
BANDED NEUTROPHILS RELATIVE PERCENT: 4 % (ref 0–5)
BASOPHILS ABSOLUTE: 0 K/UL (ref 0–0.2)
BASOPHILS RELATIVE PERCENT: 0 % (ref 0–1)
BILIRUB SERPL-MCNC: <0.2 MG/DL (ref 0.2–1.2)
BUN BLDV-MCNC: 11 MG/DL (ref 6–20)
CALCIUM SERPL-MCNC: 8.7 MG/DL (ref 8.6–10)
CHLORIDE BLD-SCNC: 103 MMOL/L (ref 98–111)
CO2: 23 MMOL/L (ref 22–29)
CREAT SERPL-MCNC: 0.7 MG/DL (ref 0.5–0.9)
EOSINOPHILS ABSOLUTE: 0.16 K/UL (ref 0–0.6)
EOSINOPHILS RELATIVE PERCENT: 2 % (ref 0–5)
GFR NON-AFRICAN AMERICAN: >60
GLUCOSE BLD-MCNC: 142 MG/DL (ref 74–109)
HCT VFR BLD CALC: 18.6 % (ref 37–47)
HCT VFR BLD CALC: 22.9 % (ref 37–47)
HEMOGLOBIN: 6 G/DL (ref 12–16)
HEMOGLOBIN: 7.4 G/DL (ref 12–16)
HYPOCHROMIA: ABNORMAL
IMMATURE GRANULOCYTES #: 0.1 K/UL
INR BLD: 0.98 (ref 0.88–1.18)
LIPASE: 5 U/L (ref 13–60)
LYMPHOCYTES ABSOLUTE: 0.8 K/UL (ref 1.1–4.5)
LYMPHOCYTES RELATIVE PERCENT: 10 % (ref 20–40)
MCH RBC QN AUTO: 30.5 PG (ref 27–31)
MCHC RBC AUTO-ENTMCNC: 32.3 G/DL (ref 33–37)
MCV RBC AUTO: 94.2 FL (ref 81–99)
MONOCYTES ABSOLUTE: 0.5 K/UL (ref 0–0.9)
MONOCYTES RELATIVE PERCENT: 6 % (ref 0–10)
NEUTROPHILS ABSOLUTE: 6.5 K/UL (ref 1.5–7.5)
NEUTROPHILS RELATIVE PERCENT: 78 % (ref 50–65)
PDW BLD-RTO: 18.3 % (ref 11.5–14.5)
PLATELET # BLD: 68 K/UL (ref 130–400)
PLATELET SLIDE REVIEW: ABNORMAL
PMV BLD AUTO: 10.8 FL (ref 9.4–12.3)
POIKILOCYTES: ABNORMAL
POTASSIUM REFLEX MAGNESIUM: 3.9 MMOL/L (ref 3.5–5)
PROTHROMBIN TIME: 12.9 SEC (ref 12–14.6)
RBC # BLD: 2.43 M/UL (ref 4.2–5.4)
SODIUM BLD-SCNC: 140 MMOL/L (ref 136–145)
TOTAL PROTEIN: 5.8 G/DL (ref 6.6–8.7)
TROPONIN: <0.01 NG/ML (ref 0–0.03)
WBC # BLD: 7.9 K/UL (ref 4.8–10.8)

## 2020-04-15 PROCEDURE — P9016 RBC LEUKOCYTES REDUCED: HCPCS

## 2020-04-15 PROCEDURE — 86923 COMPATIBILITY TEST ELECTRIC: CPT

## 2020-04-15 PROCEDURE — 6360000002 HC RX W HCPCS: Performed by: PEDIATRICS

## 2020-04-15 PROCEDURE — 36415 COLL VENOUS BLD VENIPUNCTURE: CPT

## 2020-04-15 PROCEDURE — 86900 BLOOD TYPING SEROLOGIC ABO: CPT

## 2020-04-15 PROCEDURE — 93005 ELECTROCARDIOGRAM TRACING: CPT | Performed by: PEDIATRICS

## 2020-04-15 PROCEDURE — 2580000003 HC RX 258: Performed by: PHYSICIAN ASSISTANT

## 2020-04-15 PROCEDURE — 85610 PROTHROMBIN TIME: CPT

## 2020-04-15 PROCEDURE — 85025 COMPLETE CBC W/AUTO DIFF WBC: CPT

## 2020-04-15 PROCEDURE — 85730 THROMBOPLASTIN TIME PARTIAL: CPT

## 2020-04-15 PROCEDURE — 85014 HEMATOCRIT: CPT

## 2020-04-15 PROCEDURE — 87081 CULTURE SCREEN ONLY: CPT

## 2020-04-15 PROCEDURE — 85018 HEMOGLOBIN: CPT

## 2020-04-15 PROCEDURE — 80053 COMPREHEN METABOLIC PANEL: CPT

## 2020-04-15 PROCEDURE — 2580000003 HC RX 258: Performed by: PEDIATRICS

## 2020-04-15 PROCEDURE — 2000000000 HC ICU R&B

## 2020-04-15 PROCEDURE — 2580000003 HC RX 258: Performed by: INTERNAL MEDICINE

## 2020-04-15 PROCEDURE — 6370000000 HC RX 637 (ALT 250 FOR IP): Performed by: INTERNAL MEDICINE

## 2020-04-15 PROCEDURE — 86901 BLOOD TYPING SEROLOGIC RH(D): CPT

## 2020-04-15 PROCEDURE — 83690 ASSAY OF LIPASE: CPT

## 2020-04-15 PROCEDURE — C9113 INJ PANTOPRAZOLE SODIUM, VIA: HCPCS | Performed by: PEDIATRICS

## 2020-04-15 PROCEDURE — 86850 RBC ANTIBODY SCREEN: CPT

## 2020-04-15 PROCEDURE — 84484 ASSAY OF TROPONIN QUANT: CPT

## 2020-04-15 PROCEDURE — 99285 EMERGENCY DEPT VISIT HI MDM: CPT

## 2020-04-15 PROCEDURE — 96374 THER/PROPH/DIAG INJ IV PUSH: CPT

## 2020-04-15 RX ORDER — ONDANSETRON 2 MG/ML
4 INJECTION INTRAMUSCULAR; INTRAVENOUS EVERY 6 HOURS PRN
Status: DISCONTINUED | OUTPATIENT
Start: 2020-04-15 | End: 2020-04-18 | Stop reason: HOSPADM

## 2020-04-15 RX ORDER — SODIUM CHLORIDE 9 MG/ML
INJECTION, SOLUTION INTRAVENOUS CONTINUOUS
Status: DISCONTINUED | OUTPATIENT
Start: 2020-04-15 | End: 2020-04-15

## 2020-04-15 RX ORDER — SUCRALFATE 1 G/1
1 TABLET ORAL 4 TIMES DAILY
Status: DISCONTINUED | OUTPATIENT
Start: 2020-04-15 | End: 2020-04-18 | Stop reason: HOSPADM

## 2020-04-15 RX ORDER — PROMETHAZINE HYDROCHLORIDE 12.5 MG/1
12.5 TABLET ORAL EVERY 6 HOURS PRN
Status: DISCONTINUED | OUTPATIENT
Start: 2020-04-15 | End: 2020-04-18 | Stop reason: HOSPADM

## 2020-04-15 RX ORDER — SODIUM CHLORIDE 0.9 % (FLUSH) 0.9 %
10 SYRINGE (ML) INJECTION PRN
Status: DISCONTINUED | OUTPATIENT
Start: 2020-04-15 | End: 2020-04-18 | Stop reason: HOSPADM

## 2020-04-15 RX ORDER — 0.9 % SODIUM CHLORIDE 0.9 %
500 INTRAVENOUS SOLUTION INTRAVENOUS ONCE
Status: COMPLETED | OUTPATIENT
Start: 2020-04-15 | End: 2020-04-15

## 2020-04-15 RX ORDER — PANTOPRAZOLE SODIUM 40 MG/10ML
80 INJECTION, POWDER, LYOPHILIZED, FOR SOLUTION INTRAVENOUS ONCE
Status: COMPLETED | OUTPATIENT
Start: 2020-04-15 | End: 2020-04-15

## 2020-04-15 RX ORDER — DEXTROSE AND SODIUM CHLORIDE 5; .45 G/100ML; G/100ML
INJECTION, SOLUTION INTRAVENOUS CONTINUOUS
Status: DISCONTINUED | OUTPATIENT
Start: 2020-04-15 | End: 2020-04-18 | Stop reason: HOSPADM

## 2020-04-15 RX ORDER — PANCRELIPASE LIPASE, PANCRELIPASE PROTEASE, PANCRELIPASE AMYLASE 10000; 32000; 42000 [USP'U]/1; [USP'U]/1; [USP'U]/1
1-2 CAPSULE, DELAYED RELEASE ORAL
Status: ON HOLD | COMMUNITY
End: 2020-06-21

## 2020-04-15 RX ORDER — 0.9 % SODIUM CHLORIDE 0.9 %
500 INTRAVENOUS SOLUTION INTRAVENOUS ONCE
Status: COMPLETED | OUTPATIENT
Start: 2020-04-15 | End: 2020-04-16

## 2020-04-15 RX ORDER — SODIUM CHLORIDE 0.9 % (FLUSH) 0.9 %
10 SYRINGE (ML) INJECTION EVERY 12 HOURS SCHEDULED
Status: DISCONTINUED | OUTPATIENT
Start: 2020-04-15 | End: 2020-04-18 | Stop reason: HOSPADM

## 2020-04-15 RX ORDER — ACETAMINOPHEN 325 MG/1
650 TABLET ORAL EVERY 4 HOURS PRN
Status: DISCONTINUED | OUTPATIENT
Start: 2020-04-15 | End: 2020-04-18 | Stop reason: HOSPADM

## 2020-04-15 RX ORDER — 0.9 % SODIUM CHLORIDE 0.9 %
20 INTRAVENOUS SOLUTION INTRAVENOUS ONCE
Status: COMPLETED | OUTPATIENT
Start: 2020-04-15 | End: 2020-04-16

## 2020-04-15 RX ADMIN — SODIUM CHLORIDE 500 ML: 9 INJECTION, SOLUTION INTRAVENOUS at 23:34

## 2020-04-15 RX ADMIN — SODIUM CHLORIDE 500 ML: 9 INJECTION, SOLUTION INTRAVENOUS at 18:14

## 2020-04-15 RX ADMIN — SUCRALFATE 1 G: 1 TABLET ORAL at 23:34

## 2020-04-15 RX ADMIN — PANTOPRAZOLE SODIUM 80 MG: 40 INJECTION, POWDER, FOR SOLUTION INTRAVENOUS at 18:14

## 2020-04-15 RX ADMIN — SODIUM CHLORIDE 500 ML: 9 INJECTION, SOLUTION INTRAVENOUS at 20:48

## 2020-04-15 RX ADMIN — SODIUM CHLORIDE: 9 INJECTION, SOLUTION INTRAVENOUS at 21:49

## 2020-04-15 RX ADMIN — SODIUM CHLORIDE 8 MG/HR: 9 INJECTION, SOLUTION INTRAVENOUS at 20:48

## 2020-04-15 ASSESSMENT — ENCOUNTER SYMPTOMS
ABDOMINAL PAIN: 0
VOMITING: 1
TROUBLE SWALLOWING: 0
PHOTOPHOBIA: 0
SINUS PAIN: 0
SORE THROAT: 0
NAUSEA: 1
CONSTIPATION: 0
WHEEZING: 0
DIARRHEA: 0
STRIDOR: 0
COUGH: 0
ABDOMINAL DISTENTION: 0
SHORTNESS OF BREATH: 0
COLOR CHANGE: 0

## 2020-04-15 ASSESSMENT — PAIN SCALES - GENERAL: PAINLEVEL_OUTOF10: 0

## 2020-04-15 NOTE — ED PROVIDER NOTES
[facility] [address]  eMERGENCY dEPARTMENT eNCOUnter      Pt Name: Gi Ling  MRN: [id number]  [place] 1960  Date of evaluation: 4/15/2020  Provider: Andrew Amaro MD    CHIEF COMPLAINT       Chief Complaint   Patient presents with    Hematemesis     hx pancreatic cancer, has had hematemesis from chemo in the past         HISTORY OF PRESENT ILLNESS   (Location/Symptom, Timing/Onset,Context/Setting, Quality, Duration, Modifying Factors, Severity)  Note limiting factors. Gi Ling is a 64 y.o. female who presents to the emergency department with vomiting blood. Patient states that this is her third episode of vomiting blood. Patient has a history of pancreatic cancer status post Whipple procedure at [facility] in August 2018. Patient continues on chemotherapy. Patient has had a chronic ulceration of and anastomotic area in her bowel. Patient states that she intermittently has his blood in her stool. Patient states she passed blood twice last week in her stool. Patient is seen by Dr. Jaxon Wilson, GI specialist, who has done EGDs, colonoscopies, and camera colonoscopy was done yesterday. Patient states that she could not take her usual medications secondary to the procedure yesterday. Patient believes that missing her medication yesterday may have predisposed her to hematemesis toda shortness of breath, y. Patient states that she vomited blood approximately 4 times, 3 with large volumes. Hematemesis began at 2:45 PM today. Patient states that when she is bleeding into her stomach she feels \"queasy\" and nauseated. At that time she has abdominal cramping, but after she experiences emesis, \"I feel fine. \"  Patient denies dizziness, lightheadedness, chest pain, palpitations, or weakness. Patient states that she was outside doing yard work prior to vomiting. HPI    NursingNotes were reviewed.     REVIEW OF SYSTEMS    (2-9 systems for level 4, 10 or more for level 5)     Review of Systems Constitutional: Negative for chills and fever. HENT: Negative for congestion and rhinorrhea. Eyes: Negative for discharge. Respiratory: Negative for cough and shortness of breath. Cardiovascular: Negative for chest pain and palpitations. Gastrointestinal: Positive for blood in stool, nausea and vomiting. Negative for abdominal pain. Hematemesis   Genitourinary: Negative for difficulty urinating and dysuria. Musculoskeletal: Negative for back pain and neck pain. Skin: Negative for color change and pallor. Neurological: Negative for syncope and light-headedness. Psychiatric/Behavioral: Negative for agitation and confusion. All other systems reviewed and are negative.            PAST MEDICALHISTORY     Past Medical History:   Diagnosis Date    Acute pancreatitis     Adult BMI <19 kg/sq m     Anemia     Cat esophagus     Biliary obstruction     GERD (gastroesophageal reflux disease)     with Barretts    Hashimoto's thyroiditis     denies takes no med for    Heart murmur     Hypertension     pt denies nor longer takes med for    Low blood sugar     h/o when overweight in the past    MVP (mitral valve prolapse)     Myalgia     Pancreatic adenocarcinoma ([facility] [address] 2018    Dr Danny Long    Pancreatic cancer ([facility] [address] 3/30/2018    Right flank pain     RUQ pain          SURGICAL HISTORY       Past Surgical History:   Procedure Laterality Date    CARDIAC SURGERY      heart cath     SECTION      x 3    CHOLECYSTECTOMY  1997    COLONOSCOPY  years ago    Steve-Dr Nemo Huynh per patient    COLONOSCOPY  2014    Dr James Dickey recall    COLONOSCOPY  03/10/2016    Dr Mcfarland-10 yr recall    COLONOSCOPY N/A 2019    Dr Bárbara Suarez, 5 yr recall    ELBOW SURGERY Right     ENDOMETRIAL ABLATION      HAND SURGERY Right     HERNIA REPAIR      hiatal    HERNIA REPAIR      umbilical    HERNIA REPAIR      PANCREAS SURGERY  2018    Bette procedure-Dr Vandana Osorio [place] EGD [facility] NEEDLE ASPIR/BIOP ALTERED ANATOMY N/A 2/7/2018    Dr Ger Mg w/fna-Dilation of main pancreatic duct with diffuse change in the pancreatitis noted, area of concern in the neck of the pancreas-strongly suspicious for adenocarcinoma     [place] EGD INTRMURAL NEEDLE ASPIR/BIOP ALTERED ANATOMY N/A 3/6/2018    Dr KVNG Duncan-Pancreatic cancer-Ductal adenocarcinoma-staged [id number] by EUS, pancreatic pseudocyst in the tail the pancreas    [place] ERCP DX COLLECTION SPECIMEN BRUSHING/WASHING N/A 4/11/2018    Dr KVNG Duncan-w/placement of a self-expanding fully covered 10 mm biliary stent    UPPER GASTROINTESTINAL ENDOSCOPY  years ago    Wilson-Dr Raphael Castillo    UPPER GASTROINTESTINAL ENDOSCOPY  2013    Zuniga    UPPER GASTROINTESTINAL ENDOSCOPY N/A 11/1/2019    Dr Samuel Macedo post Whipple's procedure with efferent loop ulceration    UPPER GASTROINTESTINAL ENDOSCOPY  12/17/2019    Dr Maryann Duncan-Patent G-J Anastomosis, apparent healing ulceration    UPPER GASTROINTESTINAL ENDOSCOPY N/A 2/25/2020    Dr Reyes Lundborg amount of food retention in the stomach with bile, hiatal hernia, will need repeat    UPPER GASTROINTESTINAL ENDOSCOPY N/A 2/27/2020    Dr Dayanara Pettit erosion/ulceration at the suture line, post whipple surgical changes    UPPER GASTROINTESTINAL ENDOSCOPY N/A 3/9/2020    Dr Dayanara Pettit erosion along the previous whipple suture line    UPPER GASTROINTESTINAL ENDOSCOPY N/A 4/16/2020    Dr KVNG Duncan-w/hemostatic clip placement x 1-Allie Peres tear at GEJ-Likely culprit lesion for current presentation         CURRENT MEDICATIONS     Discharge Medication List as of 4/18/2020 11:18 AM      CONTINUE these medications which have NOT CHANGED    Details   lipase-protease-amylase (ZENPEP) 38336-57649 units CPEP delayed release capsule Take 1-2 capsules by mouth 3 times daily (with meals) Only takes as neededHistorical Med      Cyanocobalamin (VITAMIN B 12 PO) Take by mouthHistorical Med  Marital status: Single     Spouse name: None    Number of children: None    Years of education: None    Highest education level: None   Occupational History    None   Social Needs    Financial resource strain: None    Food insecurity     Worry: None     Inability: None    Transportation needs     Medical: None     Non-medical: None   Tobacco Use    Smoking status: Former Smoker     Packs/day: 0.50     Years: 10.00     Pack years: 5.00     Types: Cigarettes     Last attempt to quit: 2019     Years since quittin.4    Smokeless tobacco: Never Used   Substance and Sexual Activity    Alcohol use: Not Currently    Drug use: No    Sexual activity: None   Lifestyle    Physical activity     Days per week: None     Minutes per session: None    Stress: None   Relationships    Social connections     Talks on phone: None     Gets together: None     Attends [employer] service: None     Active member of club or organization: None     Attends meetings of clubs or organizations: None     Relationship status: None    Intimate partner violence     Fear of current or ex partner: None     Emotionally abused: None     Physically abused: None     Forced sexual activity: None   Other Topics Concern    None   Social History Narrative    None       SCREENINGS    Elizabeth Coma Scale  Eye Opening: Spontaneous  Best Verbal Response: Oriented  Best Motor Response: Obeys commands  Elizabeth Coma Scale Score: 15        PHYSICAL EXAM    (up to 7 for level 4, 8 or more for level 5)     ED Triage Vitals [04/15/20 1652]   BP Temp Temp src Pulse Resp SpO2 Height Weight   (!) 90/41 98.1 °F (36.7 °C) -- 64 18 100 % 5' 7\" (1.702 m) 110 lb (49.9 kg)       Physical Exam  Vitals signs and nursing note reviewed. Constitutional:       General: She is not in acute distress. Appearance: She is ill-appearing. Comments: Patient is thin and pale. She does not appear to be in acute distress at this time.    HENT:      Head: Normocephalic and atraumatic. Right Ear: External ear normal.      Left Ear: External ear normal.      Nose: Nose normal.      Mouth/Throat:      Mouth: Mucous membranes are moist.      Pharynx: Oropharynx is clear. No oropharyngeal exudate. Eyes:      General: No scleral icterus. Conjunctiva/sclera: Conjunctivae normal.      Pupils: Pupils are equal, round, and reactive to light. Neck:      Musculoskeletal: Neck supple. No neck rigidity. Cardiovascular:      Rate and Rhythm: Normal rate and regular rhythm. Pulses: Normal pulses. Heart sounds: Normal heart sounds. Pulmonary:      Effort: Pulmonary effort is normal.      Breath sounds: Normal breath sounds. Abdominal:      General: Bowel sounds are normal. There is no distension. Palpations: Abdomen is soft. Tenderness: There is no abdominal tenderness. There is no guarding. Musculoskeletal:         General: No tenderness or deformity. Skin:     General: Skin is warm and dry. Capillary Refill: Capillary refill takes less than 2 seconds. Coloration: Skin is pale. Skin is not jaundiced. Neurological:      General: No focal deficit present. Mental Status: She is alert and oriented to person, place, and time. Mental status is at baseline.       Coordination: Coordination normal.   Psychiatric:         Mood and Affect: Mood normal.         Behavior: Behavior normal.         DIAGNOSTIC RESULTS     EKG: All EKG's areinterpreted by the Emergency Department Physician who either signs or Co-signs this chart in the absence of a cardiologist.        RADIOLOGY:  Non-plain film images such as CT, Ultrasound and MRI are read by the radiologist. Plain radiographic images are visualized and preliminarily interpreted bythe emergency physician with the below findings:          No orders to display           LABS:  Labs Reviewed   CBC WITH AUTO DIFFERENTIAL - Abnormal; Notable for the following components:       Result Value

## 2020-04-16 ENCOUNTER — ANESTHESIA EVENT (OUTPATIENT)
Dept: ENDOSCOPY | Age: 60
DRG: 369 | End: 2020-04-16
Payer: COMMERCIAL

## 2020-04-16 ENCOUNTER — ANESTHESIA (OUTPATIENT)
Dept: ENDOSCOPY | Age: 60
DRG: 369 | End: 2020-04-16
Payer: COMMERCIAL

## 2020-04-16 VITALS
SYSTOLIC BLOOD PRESSURE: 115 MMHG | DIASTOLIC BLOOD PRESSURE: 68 MMHG | RESPIRATION RATE: 16 BRPM | OXYGEN SATURATION: 100 %

## 2020-04-16 LAB
ACANTHOCYTES: ABNORMAL
ALBUMIN SERPL-MCNC: 2.6 G/DL (ref 3.5–5.2)
ALP BLD-CCNC: 124 U/L (ref 35–104)
ALT SERPL-CCNC: 22 U/L (ref 5–33)
ANION GAP SERPL CALCULATED.3IONS-SCNC: 8 MMOL/L (ref 7–19)
ANISOCYTOSIS: ABNORMAL
ANISOCYTOSIS: ABNORMAL
AST SERPL-CCNC: 19 U/L (ref 5–32)
ATYPICAL LYMPHOCYTE RELATIVE PERCENT: 1 % (ref 0–8)
ATYPICAL LYMPHOCYTE RELATIVE PERCENT: 3 % (ref 0–8)
BANDED NEUTROPHILS RELATIVE PERCENT: 6 % (ref 0–5)
BANDED NEUTROPHILS RELATIVE PERCENT: 6 % (ref 0–5)
BASOPHILS ABSOLUTE: 0 K/UL (ref 0–0.2)
BASOPHILS ABSOLUTE: 0 K/UL (ref 0–0.2)
BASOPHILS RELATIVE PERCENT: 0 % (ref 0–1)
BASOPHILS RELATIVE PERCENT: 1 % (ref 0–1)
BILIRUB SERPL-MCNC: 1 MG/DL (ref 0.2–1.2)
BILIRUBIN URINE: NEGATIVE
BLOOD BANK DISPENSE STATUS: NORMAL
BLOOD BANK PRODUCT CODE: NORMAL
BLOOD, URINE: NEGATIVE
BPU ID: NORMAL
BUN BLDV-MCNC: 8 MG/DL (ref 6–20)
BURR CELLS: ABNORMAL
BURR CELLS: ABNORMAL
CALCIUM SERPL-MCNC: 8.1 MG/DL (ref 8.6–10)
CHLORIDE BLD-SCNC: 109 MMOL/L (ref 98–111)
CLARITY: CLEAR
CO2: 24 MMOL/L (ref 22–29)
COLOR: YELLOW
CREAT SERPL-MCNC: 0.6 MG/DL (ref 0.5–0.9)
DESCRIPTION BLOOD BANK: NORMAL
EOSINOPHILS ABSOLUTE: 0.19 K/UL (ref 0–0.6)
EOSINOPHILS ABSOLUTE: 0.2 K/UL (ref 0–0.6)
EOSINOPHILS RELATIVE PERCENT: 4 % (ref 0–5)
EOSINOPHILS RELATIVE PERCENT: 4 % (ref 0–5)
FERRITIN: 139.4 NG/ML (ref 13–150)
GFR NON-AFRICAN AMERICAN: >60
GLUCOSE BLD-MCNC: 99 MG/DL (ref 74–109)
GLUCOSE URINE: NEGATIVE MG/DL
HBA1C MFR BLD: 5.7 % (ref 4–6)
HCT VFR BLD CALC: 20.4 % (ref 37–47)
HCT VFR BLD CALC: 21.8 % (ref 37–47)
HCT VFR BLD CALC: 21.8 % (ref 37–47)
HCT VFR BLD CALC: 22.4 % (ref 37–47)
HEMOGLOBIN: 6.8 G/DL (ref 12–16)
HEMOGLOBIN: 7.1 G/DL (ref 12–16)
HEMOGLOBIN: 7.3 G/DL (ref 12–16)
HEMOGLOBIN: 7.3 G/DL (ref 12–16)
HYPOCHROMIA: ABNORMAL
IMMATURE GRANULOCYTES #: 0.1 K/UL
IMMATURE GRANULOCYTES #: 0.1 K/UL
IRON SATURATION: 95 % (ref 14–50)
IRON: 233 UG/DL (ref 37–145)
KETONES, URINE: NEGATIVE MG/DL
LEUKOCYTE ESTERASE, URINE: NEGATIVE
LYMPHOCYTES ABSOLUTE: 1.2 K/UL (ref 1.1–4.5)
LYMPHOCYTES ABSOLUTE: 1.4 K/UL (ref 1.1–4.5)
LYMPHOCYTES RELATIVE PERCENT: 23 % (ref 20–40)
LYMPHOCYTES RELATIVE PERCENT: 27 % (ref 20–40)
MACROCYTES: ABNORMAL
MAGNESIUM: 1.8 MG/DL (ref 1.6–2.6)
MCH RBC QN AUTO: 31 PG (ref 27–31)
MCH RBC QN AUTO: 31.2 PG (ref 27–31)
MCHC RBC AUTO-ENTMCNC: 32.6 G/DL (ref 33–37)
MCHC RBC AUTO-ENTMCNC: 33.5 G/DL (ref 33–37)
MCV RBC AUTO: 93.2 FL (ref 81–99)
MCV RBC AUTO: 95.2 FL (ref 81–99)
MICROCYTES: ABNORMAL
MONOCYTES ABSOLUTE: 0.2 K/UL (ref 0–0.9)
MONOCYTES ABSOLUTE: 0.3 K/UL (ref 0–0.9)
MONOCYTES RELATIVE PERCENT: 4 % (ref 0–10)
MONOCYTES RELATIVE PERCENT: 7 % (ref 0–10)
NEUTROPHILS ABSOLUTE: 3 K/UL (ref 1.5–7.5)
NEUTROPHILS ABSOLUTE: 3.2 K/UL (ref 1.5–7.5)
NEUTROPHILS RELATIVE PERCENT: 56 % (ref 50–65)
NEUTROPHILS RELATIVE PERCENT: 58 % (ref 50–65)
NITRITE, URINE: NEGATIVE
OVALOCYTES: ABNORMAL
OVALOCYTES: ABNORMAL
PDW BLD-RTO: 17.2 % (ref 11.5–14.5)
PDW BLD-RTO: 17.4 % (ref 11.5–14.5)
PH UA: 6 (ref 5–8)
PLATELET # BLD: 44 K/UL (ref 130–400)
PLATELET # BLD: 45 K/UL (ref 130–400)
PLATELET SLIDE REVIEW: ABNORMAL
PLATELET SLIDE REVIEW: ABNORMAL
PMV BLD AUTO: 11 FL (ref 9.4–12.3)
PMV BLD AUTO: 9.3 FL (ref 9.4–12.3)
POIKILOCYTES: ABNORMAL
POLYCHROMASIA: ABNORMAL
POLYCHROMASIA: ABNORMAL
POTASSIUM REFLEX MAGNESIUM: 4 MMOL/L (ref 3.5–5)
PROTEIN UA: NEGATIVE MG/DL
RBC # BLD: 2.29 M/UL (ref 4.2–5.4)
RBC # BLD: 2.34 M/UL (ref 4.2–5.4)
RETICULOCYTE ABSOLUTE COUNT: 0.02 M/UL (ref 0.03–0.12)
RETICULOCYTE COUNT PCT: 0.83 % (ref 0.5–1.5)
SCHISTOCYTES: ABNORMAL
SODIUM BLD-SCNC: 141 MMOL/L (ref 136–145)
SPECIFIC GRAVITY UA: 1.01 (ref 1–1.03)
TOTAL IRON BINDING CAPACITY: 244 UG/DL (ref 250–400)
TOTAL PROTEIN: 4.5 G/DL (ref 6.6–8.7)
URINE REFLEX TO CULTURE: NORMAL
UROBILINOGEN, URINE: 0.2 E.U./DL
WBC # BLD: 4.8 K/UL (ref 4.8–10.8)
WBC # BLD: 5 K/UL (ref 4.8–10.8)

## 2020-04-16 PROCEDURE — 2580000003 HC RX 258: Performed by: INTERNAL MEDICINE

## 2020-04-16 PROCEDURE — 3700000000 HC ANESTHESIA ATTENDED CARE: Performed by: INTERNAL MEDICINE

## 2020-04-16 PROCEDURE — C9113 INJ PANTOPRAZOLE SODIUM, VIA: HCPCS | Performed by: INTERNAL MEDICINE

## 2020-04-16 PROCEDURE — 3700000001 HC ADD 15 MINUTES (ANESTHESIA): Performed by: INTERNAL MEDICINE

## 2020-04-16 PROCEDURE — 3609017100 HC EGD: Performed by: INTERNAL MEDICINE

## 2020-04-16 PROCEDURE — 85018 HEMOGLOBIN: CPT

## 2020-04-16 PROCEDURE — 85045 AUTOMATED RETICULOCYTE COUNT: CPT

## 2020-04-16 PROCEDURE — 80053 COMPREHEN METABOLIC PANEL: CPT

## 2020-04-16 PROCEDURE — 36415 COLL VENOUS BLD VENIPUNCTURE: CPT

## 2020-04-16 PROCEDURE — 83036 HEMOGLOBIN GLYCOSYLATED A1C: CPT

## 2020-04-16 PROCEDURE — 99222 1ST HOSP IP/OBS MODERATE 55: CPT | Performed by: INTERNAL MEDICINE

## 2020-04-16 PROCEDURE — 6360000002 HC RX W HCPCS: Performed by: INTERNAL MEDICINE

## 2020-04-16 PROCEDURE — 6370000000 HC RX 637 (ALT 250 FOR IP): Performed by: INTERNAL MEDICINE

## 2020-04-16 PROCEDURE — 81003 URINALYSIS AUTO W/O SCOPE: CPT

## 2020-04-16 PROCEDURE — 83540 ASSAY OF IRON: CPT

## 2020-04-16 PROCEDURE — 2720000010 HC SURG SUPPLY STERILE: Performed by: INTERNAL MEDICINE

## 2020-04-16 PROCEDURE — 2580000003 HC RX 258: Performed by: NURSE ANESTHETIST, CERTIFIED REGISTERED

## 2020-04-16 PROCEDURE — 83550 IRON BINDING TEST: CPT

## 2020-04-16 PROCEDURE — 82728 ASSAY OF FERRITIN: CPT

## 2020-04-16 PROCEDURE — 2500000003 HC RX 250 WO HCPCS: Performed by: NURSE ANESTHETIST, CERTIFIED REGISTERED

## 2020-04-16 PROCEDURE — 99254 IP/OBS CNSLTJ NEW/EST MOD 60: CPT | Performed by: INTERNAL MEDICINE

## 2020-04-16 PROCEDURE — 6360000002 HC RX W HCPCS: Performed by: NURSE ANESTHETIST, CERTIFIED REGISTERED

## 2020-04-16 PROCEDURE — 36430 TRANSFUSION BLD/BLD COMPNT: CPT

## 2020-04-16 PROCEDURE — 85025 COMPLETE CBC W/AUTO DIFF WBC: CPT

## 2020-04-16 PROCEDURE — P9016 RBC LEUKOCYTES REDUCED: HCPCS

## 2020-04-16 PROCEDURE — 43255 EGD CONTROL BLEEDING ANY: CPT | Performed by: INTERNAL MEDICINE

## 2020-04-16 PROCEDURE — 83735 ASSAY OF MAGNESIUM: CPT

## 2020-04-16 PROCEDURE — 0W3P8ZZ CONTROL BLEEDING IN GASTROINTESTINAL TRACT, VIA NATURAL OR ARTIFICIAL OPENING ENDOSCOPIC: ICD-10-PCS | Performed by: INTERNAL MEDICINE

## 2020-04-16 PROCEDURE — 85014 HEMATOCRIT: CPT

## 2020-04-16 PROCEDURE — 1210000000 HC MED SURG R&B

## 2020-04-16 RX ORDER — ONDANSETRON 2 MG/ML
4 INJECTION INTRAMUSCULAR; INTRAVENOUS
Status: DISCONTINUED | OUTPATIENT
Start: 2020-04-16 | End: 2020-04-16 | Stop reason: HOSPADM

## 2020-04-16 RX ORDER — PROPOFOL 10 MG/ML
INJECTION, EMULSION INTRAVENOUS PRN
Status: DISCONTINUED | OUTPATIENT
Start: 2020-04-16 | End: 2020-04-16 | Stop reason: SDUPTHER

## 2020-04-16 RX ORDER — DIPHENHYDRAMINE HYDROCHLORIDE 50 MG/ML
12.5 INJECTION INTRAMUSCULAR; INTRAVENOUS
Status: DISCONTINUED | OUTPATIENT
Start: 2020-04-16 | End: 2020-04-16 | Stop reason: HOSPADM

## 2020-04-16 RX ORDER — SODIUM CHLORIDE, SODIUM LACTATE, POTASSIUM CHLORIDE, CALCIUM CHLORIDE 600; 310; 30; 20 MG/100ML; MG/100ML; MG/100ML; MG/100ML
INJECTION, SOLUTION INTRAVENOUS CONTINUOUS PRN
Status: DISCONTINUED | OUTPATIENT
Start: 2020-04-16 | End: 2020-04-16 | Stop reason: SDUPTHER

## 2020-04-16 RX ORDER — LIDOCAINE HYDROCHLORIDE 10 MG/ML
INJECTION, SOLUTION INFILTRATION; PERINEURAL PRN
Status: DISCONTINUED | OUTPATIENT
Start: 2020-04-16 | End: 2020-04-16 | Stop reason: SDUPTHER

## 2020-04-16 RX ADMIN — SODIUM CHLORIDE 8 MG/HR: 9 INJECTION, SOLUTION INTRAVENOUS at 16:59

## 2020-04-16 RX ADMIN — SUCRALFATE 1 G: 1 TABLET ORAL at 07:56

## 2020-04-16 RX ADMIN — SODIUM CHLORIDE 20 ML: 9 INJECTION, SOLUTION INTRAVENOUS at 00:12

## 2020-04-16 RX ADMIN — SUCRALFATE 1 G: 1 TABLET ORAL at 13:32

## 2020-04-16 RX ADMIN — LIDOCAINE HYDROCHLORIDE 50 MG: 10 INJECTION, SOLUTION INFILTRATION; PERINEURAL at 10:28

## 2020-04-16 RX ADMIN — SODIUM CHLORIDE 8 MG/HR: 9 INJECTION, SOLUTION INTRAVENOUS at 06:06

## 2020-04-16 RX ADMIN — SODIUM CHLORIDE, SODIUM LACTATE, POTASSIUM CHLORIDE, AND CALCIUM CHLORIDE: 600; 310; 30; 20 INJECTION, SOLUTION INTRAVENOUS at 10:26

## 2020-04-16 RX ADMIN — PROPOFOL 200 MG: 10 INJECTION, EMULSION INTRAVENOUS at 10:28

## 2020-04-16 RX ADMIN — SUCRALFATE 1 G: 1 TABLET ORAL at 21:01

## 2020-04-16 RX ADMIN — SUCRALFATE 1 G: 1 TABLET ORAL at 16:59

## 2020-04-16 RX ADMIN — DEXTROSE AND SODIUM CHLORIDE: 5; 450 INJECTION, SOLUTION INTRAVENOUS at 02:01

## 2020-04-16 ASSESSMENT — PAIN SCALES - GENERAL
PAINLEVEL_OUTOF10: 0
PAINLEVEL_OUTOF10: 0

## 2020-04-16 NOTE — CONSULTS
MEDICAL ONCOLOGY CONSULTATION    Pt Name: Jenni Sandra  MRN: [id number]  YOB: 1960  Date of evaluation: 4/16/2020    REASON FOR CONSULTATION: GI bleed/pancreatic cancer  REQUESTING PHYSICIAN:    History Obtained From:    patient, electronic medical record    HISTORY OF PRESENT ILLNESS:      Yoon Crandall is a pleasant 64years old female who is seen by me for a diagnosis of pancreatic cancer. The patient initially presented with localized disease and received neoadjuvant chemotherapy followed by a Whipple procedure at [facility] August 2018. Unfortunately, the patient had disease recurrence. She is currently receiving third line therapy. The patient presented emergency yesterday with hematemesis. She has a history of recurrent hematemesis on several occasions with several hospitalizations and transfusional support. It is thought that her GI bleed is secondary to a ulcer in the anastomotic site. She underwent capsule endoscopy a few days ago. She had a hemoglobin on arrival yesterday of 6.0. She reported blood in her stools nausea vomiting. Meghan Orozco a 64 y.o.  female with pancreatic adenocarcinoma with intra-abdominal recurrent disease and now with further progression of disease.  She presents today for fourth line therapy treatment with liposomal Irinotecan and 5-FU infusional. Unfortunately her CBC today showed platelet counts of 69,631 and therefore she cannot receive treatment. She denies any new episode of GI bleed. Her hemoglobin is stable at 9.4. She feels hungry and her weight has been stable. She complains of occasional epigastric pain.   She has an appointment at [facility] next month for repeat scans.        ONCOLOGIC HISTORY:   Diagnosis  · Pancreatic adenocarcinoma, January 2018   · [id number]  · 7/6/2019-extended genetic panel-negative for a deleterious mutation     Treatment summary  · March 2018-[facility]urgical consultation at [facility] operable of slight increase in prominence of subtle soft tissue thickening within the right paracolic gutter could be indicative of metastatic disease to peritoneum.   · 2019- colonoscopy by GI was unremarkable.  This was performed for complaints of maroon-colored stools. 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 This is consistent with disease progression.   · 3/4/2020- 4th line therapy Irinotecan liposome 70 mg/m² with leucovorin 400 mg/m² and 5-FU 2400 mg/m² over 46 hours every 2 weeks.         Past Medical History:    Past Medical History:   Diagnosis Date    Acute pancreatitis     Adult BMI <19 kg/sq m     Anemia     Cat esophagus     Biliary obstruction     GERD (gastroesophageal reflux disease)     with Barretts    Hashimoto's thyroiditis     denies takes no med for    Heart murmur     Hypertension     pt denies nor longer takes med for    Low blood sugar     h/o when overweight in the past    MVP (mitral valve prolapse)     Myalgia     Pancreatic adenocarcinoma ([address] 2018    Dr Jose Corona    Pancreatic cancer ([address] 3/30/2018    Right flank pain     RUQ pain        Past Surgical History:    Past Surgical History:   Procedure Laterality Date    CARDIAC SURGERY      heart cath     SECTION      x 3    CHOLECYSTECTOMY  1997    COLONOSCOPY  years ago    [facility]-Dr Lauren Harper per patient    COLONOSCOPY  2014    Dr Modesto Milner- Duncan Erum recall    COLONOSCOPY  03/10/2016    Dr Mcfarland-10 yr recall    COLONOSCOPY N/A 2019    Dr Lino Frazier, 5 yr recall    ELBOW SURGERY Right     ENDOMETRIAL ABLATION      HAND SURGERY Right     HERNIA REPAIR      hiatal    HERNIA REPAIR      umbilical  HERNIA REPAIR      PANCREAS SURGERY  08/30/2018    Whipple procedure-Dr Pastor Tafoya [place] EGD [facility] NEEDLE ASPIR/BIOP ALTERED ANATOMY N/A 2/7/2018    Dr Jaspreet Preciado w/fna-Dilation of main pancreatic duct with diffuse change in the pancreatitis noted, area of concern in the neck of the pancreas-strongly suspicious for adenocarcinoma     [place] EGD INTRMURAL NEEDLE ASPIR/BIOP ALTERED ANATOMY N/A 3/6/2018    Dr Lynnette Lewis cancer-Ductal adenocarcinoma-staged [id number] by EUS, pancreatic pseudocyst in the tail the pancreas    [place] ERCP DX COLLECTION SPECIMEN BRUSHING/WASHING N/A 4/11/2018    ERCP-Dr KVNG Duncan-placement of a self-expanding fully covered 10 mm biliary stent    UPPER GASTROINTESTINAL ENDOSCOPY  years ago    [facility]-Dr Clarisa Meredith    UPPER GASTROINTESTINAL ENDOSCOPY  2013    Zuniga    UPPER GASTROINTESTINAL ENDOSCOPY N/A 11/1/2019    Dr Malathi Schultz post Whipple's procedure with efferent loop ulceration    UPPER GASTROINTESTINAL ENDOSCOPY  12/17/2019    Dr Philipp Duncan-Patent G-J Anastomosis, apparent healing ulceration    UPPER GASTROINTESTINAL ENDOSCOPY N/A 2/25/2020    Dr Luis Alberto Fritz amount of food retention in the stomach with bile, hiatal hernia, will need repeat    UPPER GASTROINTESTINAL ENDOSCOPY N/A 2/27/2020    Dr Issa Vu erosion/ulceration at the suture line, post whipple surgical changes    UPPER GASTROINTESTINAL ENDOSCOPY N/A 3/9/2020    Dr Issa Vu erosion along the previous whipple suture line       Social History:    Social History     Socioeconomic History    Marital status: Single     Spouse name: Not on file    Number of children: Not on file    Years of education: Not on file    Highest education level: Not on file   Occupational History    Not on file   Social Needs    Financial resource strain: Not on file    Food insecurity     Worry: Not on file     Inability: Not on file    Transportation needs     Medical: Not on file     Non-medical: Not on file Resp 13   Ht 5' 7\" (1.702 m)   Wt 104 lb 12.8 oz (47.5 kg)   SpO2 100%   BMI 16.41 kg/m²     PHYSICAL EXAM:  CONSTITUTIONAL: Alert, appropriate, no acute distress  EYES: Non icteric, EOM intact, pupils equal round   ENT: Mucus membranes moist, no oral pharyngeal lesions, external inspection of ears and nose are normal  NECK: Supple, no masses. No palpable thyroid mass  CHEST/LUNGS: CTA bilaterally, normal respiratory effort   CARDIOVASCULAR: RRR, no murmurs. No lower extremity edema  ABDOMEN: soft non-tender, active bowel sounds, no HSM. No palpable masses  EXTREMITIES: warm, full ROM in all 4 extremities, no focal weakness. SKIN: warm, dry with no rashes or lesions  LYMPH: No cervical, clavicular, axillary, or inguinal lymphadenopathy  NEUROLOGIC: follows commands, non focal   PSYCH: mood and affect appropriate. Alert and oriented to time, place, person        LABORATORY RESULTS REVIEWED BY ME:  Recent Labs     04/16/20  0558 04/16/20  0237 04/15/20  2204 04/15/20  1703   WBC  --  4.8  --  7.9   HGB 7.3* 7.1* 6.0* 7.4*   HCT 22.4* 21.8* 18.6* 22.9*   MCV  --  95.2  --  94.2   PLT  --  45*  --  68*       Lab Results   Component Value Date     04/16/2020    K 4.0 04/16/2020     04/16/2020    CO2 24 04/16/2020    BUN 8 04/16/2020    CREATININE 0.6 04/16/2020    GLUCOSE 99 04/16/2020    CALCIUM 8.1 (L) 04/16/2020    PROT 4.5 (L) 04/16/2020    LABALBU 2.6 (L) 04/16/2020    BILITOT 1.0 04/16/2020    ALKPHOS 124 (H) 04/16/2020    AST 19 04/16/2020    ALT 22 04/16/2020    LABGLOM >60 04/16/2020    GFRAA 98 11/21/2019    AGRATIO 2.3 (H) 11/21/2019    GLOB 1.7 11/21/2019       Lab Results   Component Value Date    INR 0.98 04/15/2020    INR 1.12 02/24/2020    INR 1.01 10/31/2019    PROTIME 12.9 04/15/2020    PROTIME 14.4 02/24/2020    PROTIME 12.7 10/31/2019       RADIOLOGY STUDIES REPORT/REVIEWED AND INTERPRETED BY ME:  Not found      ASSESSMENT:  #Gastrointestinal bleeding-normal PT/APTT.   Platelet count 45,000. GI has been consulted. #Acute anemia-secondary to GI bleed. Hemoglobin 7.3. Status post 1 unit PRBC. #Metastatic pancreatic cancer-currently receiving third line therapy. #Thrombocytopenia-secondary to bone marrow suppression by chemotherapy. Platelets 27,556. No need for transfusion at this time. PLAN:  Transfuse for hemoglobin below 7  Transfuse for platelet counts below 30,000 or if persistent bleeding  Will repeat iron profile. Continue current supportive care  Continue PPI    I have seen, examined and reviewed this patient medication list, appropriate labs and imaging studies. I reviewed relevant medical records and others physicians notes. I discussed the plans of care with the patient. I answered all the questions to the patients satisfaction. I have also reviewed the chief complaint (CC) and part of the history (History of Present Illness (HPI), Past Family Social History [facility]), or Review of Systems (ROS) and made changes when appropriated.        (Please note that portions of this note were completed with a voice recognition program. Efforts were made to edit the dictations but occasionally words are mis-transcribed.)        Chris Cavanaugh MD    04/16/20  7:25 AM

## 2020-04-16 NOTE — CONSULTS
Acute pancreatitis     Adult BMI <19 kg/sq m     Anemia     Cat esophagus     Biliary obstruction     GERD (gastroesophageal reflux disease)     with Barretts    Hashimoto's thyroiditis     denies takes no med for    Heart murmur     Hypertension     pt denies nor longer takes med for    Low blood sugar     h/o when overweight in the past    MVP (mitral valve prolapse)     Myalgia     Pancreatic adenocarcinoma ([address] 2018    Dr Shahram Bernstein    Pancreatic cancer ([facility] [address] 3/30/2018    Right flank pain     RUQ pain      Past Surgical History:        Procedure Laterality Date    CARDIAC SURGERY      heart cath     SECTION      x 3    CHOLECYSTECTOMY  1997    COLONOSCOPY  years ago    Steve-Dr Ana Maria Tobar per patient    COLONOSCOPY  2014    Dr Viviana Lo- Christain Lundborg recall    COLONOSCOPY  03/10/2016    Dr Mcfarland-10 yr recall    COLONOSCOPY N/A 2019    Dr Prince Herrera, 5 yr recall    ELBOW SURGERY Right     ENDOMETRIAL ABLATION      HAND SURGERY Right     HERNIA REPAIR      hiatal    HERNIA REPAIR      umbilical    HERNIA REPAIR      PANCREAS SURGERY  2018    Whipple procedure-Dr Ben Kwon [place] EGD [facility] NEEDLE ASPIR/BIOP ALTERED ANATOMY N/A 2018    Dr Magalis Dunn w/fna-Dilation of main pancreatic duct with diffuse change in the pancreatitis noted, area of concern in the neck of the pancreas-strongly suspicious for adenocarcinoma     [place] EGD INTRMURAL NEEDLE ASPIR/BIOP ALTERED ANATOMY N/A 3/6/2018    Dr KVNG Duncan-Pancreatic cancer-Ductal adenocarcinoma-staged [id number] by EUS, pancreatic pseudocyst in the tail the pancreas    [place] ERCP DX COLLECTION SPECIMEN BRUSHING/WASHING N/A 2018    ERCP-Dr KVNG Duncan-placement of a self-expanding fully covered 10 mm biliary stent    UPPER GASTROINTESTINAL ENDOSCOPY  years ago    [facility] ENDOSCOPY  2013    Zuniga    UPPER GASTROINTESTINAL ENDOSCOPY N/A 2019     Marge-Status post Whipple's procedure with efferent loop ulceration    UPPER GASTROINTESTINAL ENDOSCOPY  12/17/2019    Dr Tammie Duncan-Patent G-J Anastomosis, apparent healing ulceration    UPPER GASTROINTESTINAL ENDOSCOPY N/A 2/25/2020    Dr Marianna Long amount of food retention in the stomach with bile, hiatal hernia, will need repeat    UPPER GASTROINTESTINAL ENDOSCOPY N/A 2/27/2020    Dr Susannah Carpio erosion/ulceration at the suture line, post whipple surgical changes    UPPER GASTROINTESTINAL ENDOSCOPY N/A 3/9/2020    Dr Susannah Carpio erosion along the previous whipple suture line     Allergies:  Codeine; Morphine; Morphine and related; Sulfa antibiotics; Neomycin-bacitracin zn-polymyx; Clarithromycin; Dilaudid [hydromorphone hcl]; Hydromorphone; Loperamide hcl; Loperamide hcl; and Neosporin [neomycin-polymyx-gramicid]  Home Meds:  Prior to Admission medications    Medication Sig Start Date End Date Taking?  Authorizing Provider   lipase-protease-amylase (ZENPEP) 45271-30842 units CPEP delayed release capsule Take 1-2 capsules by mouth 3 times daily (with meals) Only takes as needed   Yes Historical Provider, MD   Cyanocobalamin (VITAMIN B 12 PO) Take by mouth   Yes Historical Provider, MD   sucralfate (CARAFATE) 1 GM tablet Take 1 tablet by mouth 4 times daily 4/13/20  Yes Kody Gray MD   pantoprazole (PROTONIX) 40 MG tablet Take 1 tablet by mouth 2 times daily 2/27/20  Yes Yasemin Henderson MD   lidocaine-prilocaine (EMLA) 2.5-2.5 % cream Apply to port area and cover with plastic wrap one hour prior to use. 1/22/20  Yes Kody Gray MD   vitamin E 400 UNIT capsule Take 400 Units by mouth daily   Yes Historical Provider, MD   ondansetron (ZOFRAN) 8 MG tablet Take 1 tablet by mouth every 8 hours as needed for Nausea or Vomiting 11/22/19  Yes Kody Gray MD   Multiple Vitamins-Minerals (THERAPEUTIC MULTIVITAMIN-MINERALS) tablet Take 1 tablet by mouth daily   Yes Historical appears stated age, no acute distress   Head: normal cephalic, atraumatic. EOMI bilaterally, no neck lymphadenopathy appreciated, no carotid bruits noted  Lungs: clear to auscultation bilaterally  Heart: regular rate and rhythm, S1, S2 normal, no murmur, click, rub or gallop  Abdomen: soft, non-tender; bowel sounds normal; no masses,  no organomegaly  Skin: warm, dry, no obvious rash, non-jaundice  Extremities: extremities normal, atraumatic, no cyanosis or edema  Neurologic: No obvious focal neurologic deficits. CN II-XII grossly intact      Labs:     Recent Labs     04/15/20  1703  04/16/20  0237 04/16/20  0558 04/16/20  1010   WBC 7.9  --  4.8  --  5.0   RBC 2.43*  --  2.29*  --  2.34*   HGB 7.4*   < > 7.1* 7.3* 7.3*   HCT 22.9*   < > 21.8* 22.4* 21.8*   MCV 94.2  --  95.2  --  93.2   MCH 30.5  --  31.0  --  31.2*   MCHC 32.3*  --  32.6*  --  33.5   PLT 68*  --  45*  --  44*    < > = values in this interval not displayed. Recent Labs     04/15/20  1703 04/16/20  0237    141   K 3.9 4.0   ANIONGAP 14 8    109   CO2 23 24   BUN 11 8   CREATININE 0.7 0.6   GLUCOSE 142* 99   CALCIUM 8.7 8.1*     Recent Labs     04/16/20 0237   MG 1.8     Recent Labs     04/15/20  1703 04/16/20  0237   AST 28 19   ALT 28 22   BILITOT <0.2 1.0   ALKPHOS 153* 124*     HgBA1c:  No components found for: HGBA1C  FLP:  No results found for: TRIG, HDL, LDLCALC, LDLDIRECT, LABVLDL  TSH:  No results found for: TSH  Troponin T:   Recent Labs     04/15/20  1703   TROPONINI <0.01     INR:   Recent Labs     04/15/20  1703   INR 0.98       Recent Labs     04/15/20  1703   LIPASE 5*       Radiology:  No results found. Assessment:  1. Anemia due to acute GI Bleeding  2. Hematemesis  3. S/p Whipple in the past.   4. Recurrent pancreatic cancer currently on chemotherapy   5.  Known Marginal ulcer at anastomosis last EGD in 3/20/2020    Plan:  - EGD today.   - NPO  - continue PPI gtt for now  - Main ddx including PUD/Marginal

## 2020-04-16 NOTE — H&P
COLONOSCOPY  01/09/2014    Dr Marina Fu- Chazaman Oni recall    COLONOSCOPY  03/10/2016    Dr Mcfarland-10 yr recall    COLONOSCOPY N/A 12/9/2019    Dr Malathi Thomas, 5 yr recall    ELBOW SURGERY Right     ENDOMETRIAL ABLATION      HAND SURGERY Right     HERNIA REPAIR      hiatal    HERNIA REPAIR      umbilical    HERNIA REPAIR      PANCREAS SURGERY  08/30/2018    Whipple procedure-Dr Giuseppe Brush [place] EGD [facility] NEEDLE ASPIR/BIOP ALTERED ANATOMY N/A 2/7/2018    Dr Babak Tuttle w/fna-Dilation of main pancreatic duct with diffuse change in the pancreatitis noted, area of concern in the neck of the pancreas-strongly suspicious for adenocarcinoma     [place] EGD INTRMURAL NEEDLE ASPIR/BIOP ALTERED ANATOMY N/A 3/6/2018    Dr KVNG Duncan-Pancreatic cancer-Ductal adenocarcinoma-staged [id number] by EUS, pancreatic pseudocyst in the tail the pancreas    [place] ERCP DX COLLECTION SPECIMEN BRUSHING/WASHING N/A 4/11/2018    ERCP-Dr KVNG Duncan-placement of a self-expanding fully covered 10 mm biliary stent    UPPER GASTROINTESTINAL ENDOSCOPY  years ago    [place]-Dr Magalis Koch    UPPER GASTROINTESTINAL ENDOSCOPY  2013    Zuniga    UPPER GASTROINTESTINAL ENDOSCOPY N/A 11/1/2019    Dr Skylar Mccollum post Whipple's procedure with efferent loop ulceration    UPPER GASTROINTESTINAL ENDOSCOPY  12/17/2019    Dr Aileen Duncan-Patent G-J Anastomosis, apparent healing ulceration    UPPER GASTROINTESTINAL ENDOSCOPY N/A 2/25/2020    Dr Dale Palencia amount of food retention in the stomach with bile, hiatal hernia, will need repeat    UPPER GASTROINTESTINAL ENDOSCOPY N/A 2/27/2020    Dr Lionel Mistry erosion/ulceration at the suture line, post whipple surgical changes    UPPER GASTROINTESTINAL ENDOSCOPY N/A 3/9/2020    Dr Lionel Mistry erosion along the previous whipple suture line       Home Medications:  Prior to Admission medications    Medication Sig Start Date End Date Taking?  Authorizing Provider   sucralfate (CARAFATE) 1 GM tablet Take 1 tablet by Father     Liver Cancer Paternal Aunt     Lung Cancer Paternal Aunt     Breast Cancer Maternal Aunt         but  of brain cancer    Diabetes Maternal Uncle     Diabetes Maternal Grandmother     Colon Cancer Neg Hx     Colon Polyps Neg Hx     Esophageal Cancer Neg Hx     Rectal Cancer Neg Hx     Stomach Cancer Neg Hx        Review of Systems:   Review of Systems   Constitutional: Positive for fatigue. Negative for chills, diaphoresis and fever. HENT: Negative for congestion, ear pain, sinus pain, sore throat and trouble swallowing. Eyes: Negative for photophobia and visual disturbance. Respiratory: Negative for cough, shortness of breath, wheezing and stridor. Cardiovascular: Negative for chest pain, palpitations and leg swelling. Gastrointestinal: Positive for nausea and vomiting. Negative for abdominal distention, abdominal pain, constipation and diarrhea. Endocrine: Negative for cold intolerance and heat intolerance. Genitourinary: Negative for difficulty urinating, flank pain, frequency and urgency. Musculoskeletal: Negative for arthralgias and myalgias. Skin: Negative for color change, rash and wound. Neurological: Negative for dizziness, syncope, weakness, light-headedness, numbness and headaches. Hematological: Does not bruise/bleed easily. Psychiatric/Behavioral: Negative for agitation, confusion, dysphoric mood, self-injury, sleep disturbance and suicidal ideas. The patient is not nervous/anxious. Physical Examination:  VITALS: BP (!) 96/54   Pulse 67   Temp 98.1 °F (36.7 °C)   Resp 18   Ht 5' 7\" (1.702 m)   Wt 110 lb (49.9 kg)   SpO2 97%   BMI 17.23 kg/m²   Physical Exam  Constitutional:       General: She is not in acute distress. Appearance: She is ill-appearing. She is not toxic-appearing or diaphoretic. Comments: Very frail and thin   HENT:      Head: Normocephalic and atraumatic.       Right Ear: External ear normal.      Left Ear: INR 0.98   PROTIME 12.9   APTT 25.3*     Cardiac Enzymes:   Recent Labs     04/15/20  1703   TROPONINI <0.01     Urinalysis:  Lab Results   Component Value Date    NITRU NEGATIVE 01/03/2014    GLUCOSEU NEGATIVE 01/03/2014           Assessment/Plan:  Principal Problem:    GIB (gastrointestinal bleeding)- GI consultation and recommendations appreciated. NPO until GI clears. Protonix push and infusion. hgb 7.4. repeat pending. Type and screen completed. Active Problems:    Malignant neoplasm of pancreas (HCC)   Anemia associated with chemotherapy-   - Oncology consulted and recommendations appreciated.        Signed:  Rodrigo Cline PA-C  4/15/2020, 8:48 PM

## 2020-04-16 NOTE — PROGRESS NOTES
Shea Estevez arrived to room #143. Presented with: GI bleed  Mental Status: Patient is oriented, alert, coherent, logical, thought processes intact and able to concentrate and follow conversation. Vitals:    04/15/20 2032   BP: (!) 96/54   Pulse: 67   Resp: 18   Temp: 98.1 °F (36.7 °C)   SpO2: 97%     Patient safety contract and falls prevention contract reviewed with patient Yes. Oriented Patient to room. Call light within reach. Yes.   Needs, issues or concerns expressed at this time: no.      Electronically signed by Colleen Fenton RN on 4/15/2020 at 9:09 PM

## 2020-04-16 NOTE — PROGRESS NOTES
[facility] Hospital[facility]      Patient:  Charito Fisher  YOB: 1960  Date of Service: 4/16/2020  MRN: [id number]   Acct: [de-identified]   Primary Care Physician: Tameka Butler MD  Advance Directive: Full Code  Admit Date: 4/15/2020       Hospital Day: 1    Chief Complaint:   Chief Complaint   Patient presents with    Hematemesis     hx pancreatic cancer, has had hematemesis from chemo in the past   The patient is a 64 y.o. female with PMH of Cat esophagus, Pancreatic adenocarcinoma, HTN, MVP, who presented to [address] [place] [address] ED complaining of hematemesis which began at 2:45pm today. She is currently receiving chemotherapy for pancreatic cancer. Upon evaluation patient is pale and very thin. She is in no acute distress. She states she was mowing then came inside to eat and then started having burning pain in her stomach. She has had 3 episodes of vomiting \"large amounts\" of blood. Dr. Nohelia Esteban saw pt yesterday and she ingested pill cam. She last received a blood transfusion on 4/10/2020. She denies chest pain, dizziness, diarrhea, dark/black stool or bright red blood per rectum. Upon arrival pt is hypotensive at 90/41, afebrile, with SpO2 at 100%. Hgb 7.4, hct 22.9, platelets 68, aPTT 05.7. Pt has received 500mL fluid bolus x2 and Protonix push and on gtt now. GI and oncology have been consulted.      Subjective:feeling ok,HB stable no active bleeding   4/16/20  HB 7.3, PT , INR -ve, PTT mildly elevated, remains on PPI, IVF, appreciate GI, oncology, BP is fair reamains per GI clear diet  ,s/p EGD Allie Peres tear at [facility] - likely culprit lesion for current presentation transfer   Objective:   VITALS:  /62   Pulse 55   Temp 99.1 °F (37.3 °C) (Temporal)   Resp 13   Ht 5' 7\" (1.702 m)   Wt 104 lb 12.8 oz (47.5 kg)   SpO2 100%   BMI 16.41 kg/m²   24HR INTAKE/OUTPUT:      Intake/Output Summary (Last 24 hours) at 4/16/2020 0749  Last data filed at 4/16/2020 0600  Gross per 24 hour   Intake 2620.08 ml   Output 04/10/2020    Pancytopenia ([facility] [address] 03/08/2020    Upper GI bleed 03/06/2020    Chemotherapy adverse reaction 03/04/2020    Acute GI bleeding 02/24/2020    Chemotherapy management, encounter for 12/17/2019    Pancreatic cancer ([facility] [address] 11/25/2019    Melena 10/31/2019    Anemia associated with chemotherapy 08/06/2019    Chemotherapy-induced thrombocytopenia 08/06/2019    Neoplastic malignant related fatigue 08/06/2019    Chemotherapy-induced peripheral neuropathy ([facility] [address] 08/06/2019    Diarrhea following gastrointestinal surgery 08/06/2019    Exomphalos 02/13/2019     Overview:   Start Date: 1997  Start Date: 1997      Hiatal hernia 02/13/2019     Overview:   Start Date: unk  Start Date: unk      [place] spotted fever 02/13/2019     Overview:   Start Date: 1974  Start Date: 1974      Severe protein-calorie malnutrition ([facility] [address] 09/13/2018    Mixed hyperlipidemia 06/20/2018     Formatting of this note might be different from the original.  Lab Results   Component Value Date    CHOLTOTAL 188 06/18/2018     Lab Results   Component Value Date    HDL 54 06/18/2018     Lab Results   Component Value Date    LDLLIP 92 06/18/2018     Lab Results   Component Value Date    TRIG 211 (H) 06/18/2018         Last Assessment & Plan:     Currently stable, no further recommendations are being made in regards to this for the surgery. For the purposes of surgery and perioperative evaluation, this particular issue should not be a concern, continue any medications that are associated with the issue. The patient may follow up with his / her PCP for management of this issue.   Formatting of this note may be different from the original.  Lab Results   Component Value Date    CHOLTOTAL 188 06/18/2018     Lab Results   Component Value Date    HDL 54 06/18/2018     Lab Results   Component Value Date    LDLLIP 92 06/18/2018     Lab Results   Component Value Date    TRIG 211 (H) 06/18/2018     Last Assessment & Plan:     Currently stable, no further recommendations are being made in regards to this for the surgery. For the purposes of surgery and perioperative evaluation, this particular issue should not be a concern, continue any medications that are associated with the issue. The patient may follow up with his / her PCP for management of this issue.  Tobacco abuse 06/08/2018    Dyslipidemia 06/08/2018     Statin therapy        Malignant neoplasm of pancreas ([facility] [address] 03/30/2018    History of acute pancreatitis 03/02/2018    Hypertension 05/22/2017     Formatting of this note might be different from the original.  BP Readings from Last 3 Encounters:   06/20/18 114/61   06/19/18 113/73   06/19/18 113/73         Last Assessment & Plan:     Currently stable, no further recommendations are being made in regards to this for the surgery. For the purposes of surgery and perioperative evaluation, this particular issue should not be a concern, continue any medications that are associated with the issue. The patient may follow up with his / her PCP for management of this issue. Formatting of this note may be different from the original.  BP Readings from Last 3 Encounters:   06/20/18 114/61   06/19/18 113/73   06/19/18 113/73     Last Assessment & Plan:     Currently stable, no further recommendations are being made in regards to this for the surgery. For the purposes of surgery and perioperative evaluation, this particular issue should not be a concern, continue any medications that are associated with the issue. The patient may follow up with his / her PCP for management of this issue.       Nausea 01/03/2014    Right sided abdominal pain 01/03/2014       Assessment/plan:   Principal Problem:    GIB (gastrointestinal bleeding)  Active Problems:    Right sided abdominal pain    Malignant neoplasm of pancreas (HCC)    Tobacco abuse    Hypertension    Anemia associated with chemotherapy  Resolved Problems:    * No resolved hospital

## 2020-04-16 NOTE — ANESTHESIA POSTPROCEDURE EVALUATION
Department of Anesthesiology  Postprocedure Note    Patient: Chelsi Ireland  MRN: [id number]  [place]: 1960  Date of evaluation: 4/16/2020  Time:  10:42 AM     Procedure Summary     Date:  04/16/20 Room / Location:  [address]    Anesthesia Start:  1026 Anesthesia Stop:  6259    Procedure:  EGD DIAGNOSTIC ONLY (N/A ) Diagnosis:  (Gi bleeding)    Surgeon:  Candelario Guillory MD Responsible Provider:  JORDYN Britton CRNA    Anesthesia Type:  general ASA Status:  3          Anesthesia Type: general    Nilsa Phase I:      Nilsa Phase II:      Last vitals: Reviewed and per EMR flowsheets. Anesthesia Post Evaluation    Patient location during evaluation: PACU  Patient participation: complete - patient participated  Level of consciousness: awake and alert  Pain score: 0  Airway patency: patent  Nausea & Vomiting: no nausea and no vomiting  Complications: no  Cardiovascular status: hemodynamically stable and blood pressure returned to baseline  Respiratory status: acceptable and nasal cannula  Hydration status: stable  Comments: Vital Signs Stable. Exchanging well. PACU RN received care.

## 2020-04-17 LAB
ALBUMIN SERPL-MCNC: 2.7 G/DL (ref 3.5–5.2)
ALP BLD-CCNC: 117 U/L (ref 35–104)
ALT SERPL-CCNC: 20 U/L (ref 5–33)
ANION GAP SERPL CALCULATED.3IONS-SCNC: 10 MMOL/L (ref 7–19)
ANISOCYTOSIS: ABNORMAL
AST SERPL-CCNC: 16 U/L (ref 5–32)
BANDED NEUTROPHILS RELATIVE PERCENT: 6 % (ref 0–5)
BASOPHILS ABSOLUTE: 0 K/UL (ref 0–0.2)
BASOPHILS RELATIVE PERCENT: 0 % (ref 0–1)
BILIRUB SERPL-MCNC: 0.3 MG/DL (ref 0.2–1.2)
BLOOD BANK DISPENSE STATUS: NORMAL
BLOOD BANK PRODUCT CODE: NORMAL
BPU ID: NORMAL
BUN BLDV-MCNC: 4 MG/DL (ref 6–20)
BURR CELLS: ABNORMAL
CALCIUM SERPL-MCNC: 8.1 MG/DL (ref 8.6–10)
CHLORIDE BLD-SCNC: 108 MMOL/L (ref 98–111)
CO2: 25 MMOL/L (ref 22–29)
CREAT SERPL-MCNC: 0.7 MG/DL (ref 0.5–0.9)
DESCRIPTION BLOOD BANK: NORMAL
EKG P AXIS: 56 DEGREES
EKG P-R INTERVAL: 140 MS
EKG Q-T INTERVAL: 434 MS
EKG QRS DURATION: 78 MS
EKG QTC CALCULATION (BAZETT): 437 MS
EKG T AXIS: 51 DEGREES
EOSINOPHILS ABSOLUTE: 0.2 K/UL (ref 0–0.6)
EOSINOPHILS RELATIVE PERCENT: 6 % (ref 0–5)
GFR NON-AFRICAN AMERICAN: >60
GLUCOSE BLD-MCNC: 99 MG/DL (ref 74–109)
HCT VFR BLD CALC: 20.6 % (ref 37–47)
HCT VFR BLD CALC: 21.9 % (ref 37–47)
HCT VFR BLD CALC: 23.2 % (ref 37–47)
HEMOGLOBIN: 6.8 G/DL (ref 12–16)
HEMOGLOBIN: 6.9 G/DL (ref 12–16)
HEMOGLOBIN: 7.6 G/DL (ref 12–16)
IMMATURE GRANULOCYTES #: 0.2 K/UL
LYMPHOCYTES ABSOLUTE: 1.4 K/UL (ref 1.1–4.5)
LYMPHOCYTES RELATIVE PERCENT: 43 % (ref 20–40)
MAGNESIUM: 1.8 MG/DL (ref 1.6–2.6)
MCH RBC QN AUTO: 30.6 PG (ref 27–31)
MCHC RBC AUTO-ENTMCNC: 33 G/DL (ref 33–37)
MCV RBC AUTO: 92.8 FL (ref 81–99)
MONOCYTES ABSOLUTE: 0 K/UL (ref 0–0.9)
MONOCYTES RELATIVE PERCENT: 1 % (ref 0–10)
MRSA CULTURE ONLY: NORMAL
NEUTROPHILS ABSOLUTE: 1.7 K/UL (ref 1.5–7.5)
NEUTROPHILS RELATIVE PERCENT: 44 % (ref 50–65)
OVALOCYTES: ABNORMAL
PDW BLD-RTO: 17.1 % (ref 11.5–14.5)
PLATELET # BLD: 43 K/UL (ref 130–400)
PLATELET SLIDE REVIEW: ABNORMAL
PMV BLD AUTO: 12.9 FL (ref 9.4–12.3)
POLYCHROMASIA: ABNORMAL
POTASSIUM REFLEX MAGNESIUM: 4 MMOL/L (ref 3.5–5)
POTASSIUM SERPL-SCNC: 4 MMOL/L (ref 3.5–5)
RBC # BLD: 2.22 M/UL (ref 4.2–5.4)
SODIUM BLD-SCNC: 143 MMOL/L (ref 136–145)
TOTAL PROTEIN: 4.7 G/DL (ref 6.6–8.7)
WBC # BLD: 3.3 K/UL (ref 4.8–10.8)

## 2020-04-17 PROCEDURE — 99232 SBSQ HOSP IP/OBS MODERATE 35: CPT | Performed by: INTERNAL MEDICINE

## 2020-04-17 PROCEDURE — 2580000003 HC RX 258: Performed by: INTERNAL MEDICINE

## 2020-04-17 PROCEDURE — 6370000000 HC RX 637 (ALT 250 FOR IP): Performed by: INTERNAL MEDICINE

## 2020-04-17 PROCEDURE — 2580000003 HC RX 258: Performed by: HOSPITALIST

## 2020-04-17 PROCEDURE — 83735 ASSAY OF MAGNESIUM: CPT

## 2020-04-17 PROCEDURE — 85025 COMPLETE CBC W/AUTO DIFF WBC: CPT

## 2020-04-17 PROCEDURE — 93010 ELECTROCARDIOGRAM REPORT: CPT | Performed by: INTERNAL MEDICINE

## 2020-04-17 PROCEDURE — 6360000002 HC RX W HCPCS: Performed by: INTERNAL MEDICINE

## 2020-04-17 PROCEDURE — C9113 INJ PANTOPRAZOLE SODIUM, VIA: HCPCS | Performed by: INTERNAL MEDICINE

## 2020-04-17 PROCEDURE — 85014 HEMATOCRIT: CPT

## 2020-04-17 PROCEDURE — 80053 COMPREHEN METABOLIC PANEL: CPT

## 2020-04-17 PROCEDURE — 85018 HEMOGLOBIN: CPT

## 2020-04-17 PROCEDURE — 1210000000 HC MED SURG R&B

## 2020-04-17 PROCEDURE — 36415 COLL VENOUS BLD VENIPUNCTURE: CPT

## 2020-04-17 RX ORDER — 0.9 % SODIUM CHLORIDE 0.9 %
20 INTRAVENOUS SOLUTION INTRAVENOUS ONCE
Status: COMPLETED | OUTPATIENT
Start: 2020-04-17 | End: 2020-04-17

## 2020-04-17 RX ORDER — 0.9 % SODIUM CHLORIDE 0.9 %
250 INTRAVENOUS SOLUTION INTRAVENOUS ONCE
Status: DISCONTINUED | OUTPATIENT
Start: 2020-04-17 | End: 2020-04-18 | Stop reason: HOSPADM

## 2020-04-17 RX ADMIN — SUCRALFATE 1 G: 1 TABLET ORAL at 06:22

## 2020-04-17 RX ADMIN — SUCRALFATE 1 G: 1 TABLET ORAL at 17:03

## 2020-04-17 RX ADMIN — SODIUM CHLORIDE 8 MG/HR: 9 INJECTION, SOLUTION INTRAVENOUS at 14:17

## 2020-04-17 RX ADMIN — SUCRALFATE 1 G: 1 TABLET ORAL at 21:33

## 2020-04-17 RX ADMIN — SODIUM CHLORIDE 20 ML: 9 INJECTION, SOLUTION INTRAVENOUS at 21:33

## 2020-04-17 RX ADMIN — SODIUM CHLORIDE, PRESERVATIVE FREE 10 ML: 5 INJECTION INTRAVENOUS at 21:33

## 2020-04-17 RX ADMIN — DEXTROSE AND SODIUM CHLORIDE: 5; 450 INJECTION, SOLUTION INTRAVENOUS at 05:30

## 2020-04-17 RX ADMIN — SUCRALFATE 1 G: 1 TABLET ORAL at 10:58

## 2020-04-17 ASSESSMENT — ENCOUNTER SYMPTOMS
DIARRHEA: 0
EYES NEGATIVE: 1
CONSTIPATION: 0
SORE THROAT: 0
ABDOMINAL PAIN: 0
EYE REDNESS: 0
BLOOD IN STOOL: 0
GASTROINTESTINAL NEGATIVE: 1
SHORTNESS OF BREATH: 0
BACK PAIN: 0
EYE PAIN: 0
NAUSEA: 0
EYE DISCHARGE: 0
VOMITING: 0
COUGH: 0
RESPIRATORY NEGATIVE: 1
WHEEZING: 0

## 2020-04-17 ASSESSMENT — PAIN SCALES - GENERAL
PAINLEVEL_OUTOF10: 0
PAINLEVEL_OUTOF10: 0

## 2020-04-17 NOTE — PROGRESS NOTES
17.2*  --  17.4*  --  17.1*  --    PLT 45*  --  44*  --  43*  --     < > = values in this interval not displayed. CHEMISTRIES:  Recent Labs     04/15/20  1703 04/16/20  0237 04/17/20  0233    141 143   K 3.9 4.0 4.0  4.0    109 108   CO2 23 24 25   BUN 11 8 4*   CREATININE 0.7 0.6 0.7   GLUCOSE 142* 99 99   MG  --  1.8 1.8     PT/INR:  Recent Labs     04/15/20  1703   PROTIME 12.9   INR 0.98     APTT:  Recent Labs     04/15/20  1703   APTT 25.3*     LIVER PROFILE:  Recent Labs     04/15/20  1703 04/16/20  0237 04/17/20  0233   AST 28 19 16   ALT 28 22 20   BILITOT <0.2 1.0 0.3   ALKPHOS 153* 124* 117*       Imaging Last 24 Hours:  No results found. Assessment        Hospital Problems           Last Modified POA    * (Principal) GIB (gastrointestinal bleeding) 4/15/2020 Yes    Right sided abdominal pain 4/15/2020 Yes    Malignant neoplasm of pancreas ([address] 4/15/2020 Yes    Tobacco abuse 4/15/2020 Yes    Hypertension 4/15/2020 Yes    Overview Signed 8/6/2019  4:37 PM by JORDYN Donahue     Formatting of this note might be different from the original.  BP Readings from Last 3 Encounters:   06/20/18 114/61   06/19/18 113/73   06/19/18 113/73         Last Assessment & Plan:     Currently stable, no further recommendations are being made in regards to this for the surgery. For the purposes of surgery and perioperative evaluation, this particular issue should not be a concern, continue any medications that are associated with the issue. The patient may follow up with his / her PCP for management of this issue. Formatting of this note may be different from the original.  BP Readings from Last 3 Encounters:   06/20/18 114/61   06/19/18 113/73   06/19/18 113/73     Last Assessment & Plan:     Currently stable, no further recommendations are being made in regards to this for the surgery.   For the purposes of surgery and perioperative evaluation, this particular issue should not be a concern, continue

## 2020-04-17 NOTE — PROGRESS NOTES
Patient name: Pan Snyder  Patient : 1960  6:39 AM  ROOM 530  Patient Active Problem List   Diagnosis Code    Nausea R11.0    Right sided abdominal pain R10.9    Malignant neoplasm of pancreas ([facility] [address] C25.9    Tobacco abuse Z72.0    Hypertension I10    Dyslipidemia E78.5    Anemia associated with chemotherapy D64.81, T45.1X5A    Chemotherapy-induced thrombocytopenia D69.59, T45.1X5A    Neoplastic malignant related fatigue R53.0    Chemotherapy-induced peripheral neuropathy (HCC) G62.0, T45.1X5A    Diarrhea following gastrointestinal surgery R19.7, Z98.890    Exomphalos Q79.2    Hiatal hernia K44.9    History of acute pancreatitis Z87.19    Mixed hyperlipidemia E78.2    [facility] spotted fever A77.0    Severe protein-calorie malnutrition ([facility] [address] E43    Melena K92.1    Pancreatic cancer ([facility] [address] C25.9    Chemotherapy management, encounter for Z51.11    Acute GI bleeding K92.2    Chemotherapy adverse reaction T45.1X5A    Upper GI bleed K92.2    Pancytopenia (HCC) D61.818    Symptomatic anemia D64.9    Dehydration E86.0    GIB (gastrointestinal bleeding) K92.2    Allie-Peres tear K22.6       Subjective: Feeling better. No further episodes of mild hematemesis/hematochezia. HISTORY OF PRESENT ILLNESS:   Remigio Deleon is a pleasant 64years old female with a diagnosis of pancreatic cancer. Elva Saucedo initially presented with localized disease and received neoadjuvant chemotherapy followed by a Whipple procedure at [facility]. Unfortunately, she had disease recurrence. She is currently receiving third line therapy. She presented emergency yesterday with hematemesis. She has a history of recurrent hematemesis on several occasions with several hospitalizations and transfusional support. It is thought that her GI bleed is secondary to a ulcer in the anastomotic site. Elva Saucedo had a hemoglobin of 6.0 upon presentation on 4/15/2020.   She Provider Last Rate Last Dose    pantoprazole (PROTONIX) 80 mg in sodium chloride 0.9 % 100 mL infusion  8 mg/hr Intravenous Continuous Ryder Vicente MD 10 mL/hr at 04/16/20 1659 8 mg/hr at 04/16/20 1659    sodium chloride flush 0.9 % injection 10 mL  10 mL Intravenous 2 times per day Ryder Vicente MD        sodium chloride flush 0.9 % injection 10 mL  10 mL Intravenous PRN Ryder Vicente MD        acetaminophen (TYLENOL) tablet 650 mg  650 mg Oral Q4H PRN Ryder Vicente MD        promethazine (PHENERGAN) tablet 12.5 mg  12.5 mg Oral Q6H PRN Ryder Vicente MD        Or    [facility] injection 4 mg  4 mg Intravenous Q6H PRN Ryder Vicente MD        dextrose 5 % and 0.45 % sodium chloride infusion   Intravenous Continuous Ryder Vicente MD 75 mL/hr at 04/17/20 0530      sucralfate (CARAFATE) tablet 1 g  1 g Oral 4x Daily Ryder Vicente MD   1 g at 04/17/20 1160       Allergies  Codeine; Morphine; Morphine and related; Sulfa antibiotics; Neomycin-bacitracin zn-polymyx; Clarithromycin; Dilaudid [hydromorphone hcl]; Hydromorphone; Loperamide hcl; Loperamide hcl; and Neosporin [neomycin-polymyx-gramicid]      ASSESSMENT:  #Gastrointestinal bleeding secondary to Allie-Peres tear at GE junction  GI assisting  S/P endoscopy on 4/16/2020 by Dr. Geri Layton revealing Allie-Peres tear at GE junction. Along the anastomotic margin, a nonbleeding area of edema with mild ulceration. Continues on Protonix drip and oral Carafate    #Acute anemia-secondary to GI bleed. Hemoglobin 6.8/MCV 92.8 today, 4/17/2020. S/P 1 unit of PRBCs on 4/16/2020-transfuse 1 unit of PRBCs today, 4/17/2020    Labs on 4/16/2020:  · Ferritin 139.4  · Iron 233  · Iron saturation 95%  · TIBC 244  · Absolute reticulocyte count 0.0194      #Metastatic pancreatic cancer-currently receiving third line therapy. #Thrombocytopenia-secondary to bone marrow suppression by chemotherapy. Platelet count of 57,176 today, 4/17/2020.   No active bleeding will monitor.       PLAN:  Transfuse for hemoglobin below 7, 1 unit planned for today  Transfuse for platelet counts below 30,000 or if persistent bleeding  Continue current supportive care  Continues on Protonix drip and oral Carafate        Jayde Prater APRFINN    04/17/20  6:39 AM    Physician's attestation/substantial contribution:  I, Dr Toshia Madrid, independently performed an evaluation on [facility]. I have reviewed relevant medical information/data to include but not limited to medication list, relevant appropriate labs and imaging when applicable. I reviewed other physician's notes, ancillary services and nurses assessment. I have reviewed the above documentation completed by the Nurse Practitioner or Physician Assistant. Please see my additional contributions to the history of present illness, physical examination, and assessment/medical decision-making that reflect my findings and impressions. I discussed essential elements of the care plan with the NP or PA and the patient as well. I answered all the questions to the patient's satisfaction. I concur with above stated. Subjective-feeling slightly better today. Objective- unchanged. Assessment/plan:  Pancreatic cancer-as outpatient. GI bleed- hemoglobin 6.8. Transfuse 1 unit PRBC today.     Toshia Madrid MD

## 2020-04-17 NOTE — PLAN OF CARE
ALISTAIR Storey  Outcome: Ongoing  Goal: Ability to achieve adequate nutritional intake will improve  Description: Ability to achieve adequate nutritional intake will improve  4/17/2020 1022 by Jeremias Milligan RN  Outcome: Ongoing  4/17/2020 1008 by Jeremias Milligan RN  Outcome: Ongoing

## 2020-04-18 VITALS
WEIGHT: 104.8 LBS | RESPIRATION RATE: 16 BRPM | HEART RATE: 54 BPM | BODY MASS INDEX: 16.45 KG/M2 | TEMPERATURE: 98.8 F | HEIGHT: 67 IN | DIASTOLIC BLOOD PRESSURE: 74 MMHG | SYSTOLIC BLOOD PRESSURE: 113 MMHG | OXYGEN SATURATION: 98 %

## 2020-04-18 LAB
ALBUMIN SERPL-MCNC: 2.8 G/DL (ref 3.5–5.2)
ALP BLD-CCNC: 120 U/L (ref 35–104)
ALT SERPL-CCNC: 20 U/L (ref 5–33)
ANION GAP SERPL CALCULATED.3IONS-SCNC: 8 MMOL/L (ref 7–19)
ANISOCYTOSIS: ABNORMAL
AST SERPL-CCNC: 19 U/L (ref 5–32)
BANDED NEUTROPHILS RELATIVE PERCENT: 7 % (ref 0–5)
BASOPHILS ABSOLUTE: 0 K/UL (ref 0–0.2)
BASOPHILS RELATIVE PERCENT: 1 % (ref 0–1)
BILIRUB SERPL-MCNC: 0.4 MG/DL (ref 0.2–1.2)
BUN BLDV-MCNC: 5 MG/DL (ref 6–20)
CALCIUM SERPL-MCNC: 8.3 MG/DL (ref 8.6–10)
CHLORIDE BLD-SCNC: 105 MMOL/L (ref 98–111)
CO2: 25 MMOL/L (ref 22–29)
CREAT SERPL-MCNC: 0.7 MG/DL (ref 0.5–0.9)
EOSINOPHILS ABSOLUTE: 0.16 K/UL (ref 0–0.6)
EOSINOPHILS RELATIVE PERCENT: 4 % (ref 0–5)
GFR NON-AFRICAN AMERICAN: >60
GLUCOSE BLD-MCNC: 98 MG/DL (ref 74–109)
HCT VFR BLD CALC: 23.9 % (ref 37–47)
HCT VFR BLD CALC: 25.3 % (ref 37–47)
HEMOGLOBIN: 8 G/DL (ref 12–16)
HEMOGLOBIN: 8.5 G/DL (ref 12–16)
IMMATURE GRANULOCYTES #: 0.2 K/UL
LYMPHOCYTES ABSOLUTE: 1.4 K/UL (ref 1.1–4.5)
LYMPHOCYTES RELATIVE PERCENT: 36 % (ref 20–40)
MAGNESIUM: 1.9 MG/DL (ref 1.6–2.6)
MCH RBC QN AUTO: 31.4 PG (ref 27–31)
MCHC RBC AUTO-ENTMCNC: 33.5 G/DL (ref 33–37)
MCV RBC AUTO: 93.7 FL (ref 81–99)
MONOCYTES ABSOLUTE: 0.5 K/UL (ref 0–0.9)
MONOCYTES RELATIVE PERCENT: 13 % (ref 0–10)
NEUTROPHILS ABSOLUTE: 1.8 K/UL (ref 1.5–7.5)
NEUTROPHILS RELATIVE PERCENT: 39 % (ref 50–65)
OVALOCYTES: ABNORMAL
PDW BLD-RTO: 17 % (ref 11.5–14.5)
PLATELET # BLD: 40 K/UL (ref 130–400)
PLATELET SLIDE REVIEW: ABNORMAL
PMV BLD AUTO: 12.7 FL (ref 9.4–12.3)
POTASSIUM REFLEX MAGNESIUM: 3.8 MMOL/L (ref 3.5–5)
POTASSIUM SERPL-SCNC: 3.8 MMOL/L (ref 3.5–5)
RBC # BLD: 2.55 M/UL (ref 4.2–5.4)
SODIUM BLD-SCNC: 138 MMOL/L (ref 136–145)
TOTAL PROTEIN: 4.9 G/DL (ref 6.6–8.7)
WBC # BLD: 4 K/UL (ref 4.8–10.8)

## 2020-04-18 PROCEDURE — 85014 HEMATOCRIT: CPT

## 2020-04-18 PROCEDURE — 85018 HEMOGLOBIN: CPT

## 2020-04-18 PROCEDURE — 36415 COLL VENOUS BLD VENIPUNCTURE: CPT

## 2020-04-18 PROCEDURE — 99231 SBSQ HOSP IP/OBS SF/LOW 25: CPT | Performed by: INTERNAL MEDICINE

## 2020-04-18 PROCEDURE — 80053 COMPREHEN METABOLIC PANEL: CPT

## 2020-04-18 PROCEDURE — 6370000000 HC RX 637 (ALT 250 FOR IP): Performed by: INTERNAL MEDICINE

## 2020-04-18 PROCEDURE — 83735 ASSAY OF MAGNESIUM: CPT

## 2020-04-18 PROCEDURE — 85025 COMPLETE CBC W/AUTO DIFF WBC: CPT

## 2020-04-18 RX ADMIN — SUCRALFATE 1 G: 1 TABLET ORAL at 06:30

## 2020-04-18 RX ADMIN — SUCRALFATE 1 G: 1 TABLET ORAL at 10:46

## 2020-04-18 ASSESSMENT — ENCOUNTER SYMPTOMS
GASTROINTESTINAL NEGATIVE: 1
RESPIRATORY NEGATIVE: 1
SHORTNESS OF BREATH: 0
BLOOD IN STOOL: 0
CONSTIPATION: 0
ABDOMINAL PAIN: 0
EYES NEGATIVE: 1
VOMITING: 0
COUGH: 0
EYE PAIN: 0
EYE REDNESS: 0
EYE DISCHARGE: 0
WHEEZING: 0
SORE THROAT: 0
NAUSEA: 0
BACK PAIN: 0
DIARRHEA: 0

## 2020-04-18 ASSESSMENT — PAIN SCALES - GENERAL: PAINLEVEL_OUTOF10: 0

## 2020-04-18 NOTE — DISCHARGE INSTR - DIET

## 2020-04-19 LAB
BLOOD BANK DISPENSE STATUS: NORMAL
BLOOD BANK PRODUCT CODE: NORMAL
BPU ID: NORMAL
DESCRIPTION BLOOD BANK: NORMAL

## 2020-04-19 PROCEDURE — 91110 GI TRC IMG INTRAL ESOPH-ILE: CPT | Performed by: INTERNAL MEDICINE

## 2020-04-20 ENCOUNTER — CARE COORDINATION (OUTPATIENT)
Dept: CASE MANAGEMENT | Age: 60
End: 2020-04-20

## 2020-04-20 ENCOUNTER — TELEPHONE (OUTPATIENT)
Dept: GASTROENTEROLOGY | Age: 60
End: 2020-04-20

## 2020-04-20 NOTE — TELEPHONE ENCOUNTER
Clary Lynn called to schedule a HFU pt was admitted for hematemesis . Please be advised that the best time to call her to accommodate their needs is Anytime. Thank you.

## 2020-04-20 NOTE — PROGRESS NOTES
Capsule Report    - full report available scanned in MEDIA    Summary  - known marginal ulceration seen.    - no culprit areas noted in small bowel     Plan  - continue current  - If symptoms recur with overt bleeding, I would repeat EGD and Colon

## 2020-04-20 NOTE — CARE COORDINATION
[facility] 45 Transitions Initial Follow Up Call    Call within 2 business days of discharge: Yes    Patient: Terry Lal Patient : 1960   MRN: [id number]  Reason for Admission:   Discharge Date: 20 RARS: Readmission Risk Score: 22      Last Discharge [address] [address]       Complaint Diagnosis Description Type Department Provider    4/15/20 Hematemesis Gastrointestinal hemorrhage with hematemesis . .. ED to Hosp-Admission (Discharged) (ADMITTED) [facility] 5 SURG Adama Lala MD; Domonique Bobby ... Spoke with: [address]: [place] [address]    Non-face-to-face services provided:  Chart review for COVID-19 follow up call. Care Transitions 24 Hour Call    Do you have any ongoing symptoms?:  No  Do you have a copy of your discharge instructions?:  Yes  Do you have all of your prescriptions and are they filled?:  Yes  Have you been contacted by a [address]?:  No  Have you scheduled your follow up appointment?:  Yes  How are you going to get to your appointment?:  Car - family or friend to transport  Were you discharged with any Home Care or Post Acute Services:  No  Do you feel like you have everything you need to keep you well at home?:  Yes  Care Transitions Interventions         Follow Up : Spoke with patient for COVID-19 follow up call after discharge. She says she is feeling much better, taking meds as prescribed. She says she is not having any s/s of COVID-19. Did discuss CDC guidelines, good handwashing, social distancing, and keeping high traffic areas cleaned and sanitized. She says she understands the importance of this. Will follow up at a later time.    Future Appointments   Date Time Provider Sy Angelo   2020  9:00 AM SCHEDULE, [facility] TREAT[facility]\""   2020 10:15 AM [facility]   2020  4:45 PM SCHEDULE, [facility] [place] \[facility]\""   2020  1:30 PM Jimenez Rosado MD [facility]   Patient contacted regarding Author Kevin. Care Transition Nurse/ Ambulatory Care Manager contacted the patient by telephone to perform post discharge assessment. Verified name and  with patient as identifiers. Provided introduction to self, and explanation of the CTN/ACM role, and reason for call due to risk factors for infection and/or exposure to COVID-19. Symptoms reviewed with patient who verbalized the following symptoms: no new symptoms and no worsening symptoms. Due to no new or worsening symptoms encounter was not routed to provider for escalation. Patient has following risk factors of: COPD and asthma. CTN/ACM reviewed discharge instructions, medical action plan and red flags such as increased shortness of breath, increasing fever and signs of decompensation with patient who verbalized understanding. Discussed exposure protocols and quarantine with CDC Guidelines What to do if you are sick with coronavirus disease .  Patient was given an opportunity for questions and concerns. The patient agrees to contact the [facility] exposure line [phone], local [facility] department [facility]: ([phone]) and PCP office for questions related to their healthcare. CTN/ACM provided contact information for future needs. Reviewed and educated patient on any new and changed medications related to discharge diagnosis     Patient/family/caregiver given information for [employer] Loop and agrees to enroll no  Patient's preferred e-mail:    Patient's preferred phone number:   Based on Loop alert triggers, patient will be contacted by nurse care manager for worsening symptoms. Plan for follow-up call in 5-7 days based on severity of symptoms and risk factors.       Eliza Murillo RN

## 2020-04-21 ASSESSMENT — ENCOUNTER SYMPTOMS
NAUSEA: 1
BLOOD IN STOOL: 1
VOMITING: 1
SHORTNESS OF BREATH: 0
BACK PAIN: 0
COLOR CHANGE: 0
ABDOMINAL PAIN: 0
EYE DISCHARGE: 0
RHINORRHEA: 0
COUGH: 0

## 2020-04-21 NOTE — PROGRESS NOTES
· 12/3/3863-YU-71-9-7   · 04/11/2019-CA-19-9- 12   · 05/21/2019- CA 19-9- 41   · 7/2/2019-CA 19 9 = 114  · 7/9/2019- CA-19-9 = 225  · 8/6/2019- CA-19-9 = 171  · 9/3/4889-SS-35-9 = 198  · 9/13/20194334-OK-37-9 = 98 (at [facility])  · 10/29/3076-LO-75-9 =317  · 11/21/2019-CA-19-9 = 183  · 1/23/20203119-GB-58-9 = 520  ·         Cancer history  Ms Yoon Crandall was seen in initial oncology consultation on 3/12/2018 referred by Dr. Diogenes De La Torre for a diagnosis of pancreatic adenocarcinoma. She was initially evaluated for acute pancreatitis at [facility] on February 2018. Further imaging revealed a pancreatic head mass. Lipase was elevated at 1184 and CA-19-9 elevated 134. The symptoms were going on for several weeks. Weight loss of 10-12 pounds over the last 6 months. · October 2017-CT abdomen without contrast was unremarkable. · 2/2/2018-ultrasound of abdomen showed a pancreatic duct dilation   · 2/02/2018-CT abdomen showed 16 mm low-density lesion in the pancreas at the junction of the head of the body. No intra-abdominal adenopathy. No liver metastasis. · 2/7/2018-the patient underwent EGD/EUS and FNA biopsy of a pancreatic mass by Dr. Diogenes De La Torre at [facility] . EUS showed inflammatory changes consistent with a recent history of pancreatitis. Main pancreatic duct was dilated. No concerning peripancreatic adenopathy. No definite mass was seen by a more diffuse hypoechoic area measuring 1.8 x 2.2 cm in the head of the pancreas. Pathology was consistent with a group of markedly atypical cells strongly suspicious for adenocarcinoma. · 2/28/2018-CT pancreatic protocol showed a poorly defined area of decreased enhancement located in the head of the pancreas measuring 1.8 x 1.4 x 1.7 cm with moderate meditation of the pancreatic duct. Well defined sharply marginated low density nodule located in the tail of the pancreas measuring 1.5 x 1.3 x 1.5 cm (IPMN?).    · 3/6/2018-CA 19-9 was elevated at ([facility])  · 7/9/2019- CA-19-9 = 225  · 8/6/2019- CA-19-9 = 171  · 9/3/4100-RH-36-9 = 198   · 9/13/20192760-MK-72-9 = 98 at [facility]  · 9/13/2019-CT chest abdomen pelvis at [facility] showed a soft tissue thickening in the pancreatic bed with persistent narrowing of the portal SMV confluence.  Superimposed tumor remains a possibility.  The new low-density lesion in the periphery of the liver seen on the prior exam is no longer appreciated and likely represents treatment effect.  Nodular enhancement may represent perfusion change in the region on image 187. · 9/13/2018- she was recommended to continue current treatment with Gemzar/Abraxane  · 11/1/2019- she was hospitalized with GI bleed.  An upper endoscopy showed ulceration at the efferent loop of the Whipple's procedure  · 10/29/1621-UV-44-9 =317  · 11/21/2019-CA-19-9 = 183  · 11/19/2019- CT chest abdomen pelvis showed no evidence of gross disease progression. Improvement of the caliber of what may be a middle colic vein that drains back to the SMV. There is still persistent narrowing of the of the upper SMV at the juncture with the portal vein.  No definite evidence of liver metastases at this time. No definite evidence of pulmonary metastases.  However, question of slight increase in prominence of subtle soft tissue thickening within the right paracolic gutter could be indicative of metastatic disease to peritoneum.   · 12/9/2019- colonoscopy by GI was unremarkable.  This was performed for complaints of maroon-colored stools. 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 This is consistent with disease progression.   · 3/4/2020- 4th line therapy Irinotecan liposome 70 mg/m² with leucovorin ENDOSCOPY  years ago    Steve-Dr Asha Ziegler    UPPER GASTROINTESTINAL ENDOSCOPY  2013    Zuniga    UPPER GASTROINTESTINAL ENDOSCOPY N/A 2019    Dr Kulwant Askew post Whipple's procedure with efferent loop ulceration    UPPER GASTROINTESTINAL ENDOSCOPY  2019    Dr Darnell Duncan-Patent G-J Anastomosis, apparent healing ulceration    UPPER GASTROINTESTINAL ENDOSCOPY N/A 2020    Dr Tushar Trujillo amount of food retention in the stomach with bile, hiatal hernia, will need repeat    UPPER GASTROINTESTINAL ENDOSCOPY N/A 2020    Dr Rodriguez Charlotte erosion/ulceration at the suture line, post whipple surgical changes    UPPER GASTROINTESTINAL ENDOSCOPY N/A 3/9/2020    Dr Rodriguez Charlotte erosion along the previous whipple suture line    UPPER GASTROINTESTINAL ENDOSCOPY N/A 2020    Dr KVNG Duncan-w/hemostatic clip placement x 1-Allie Peres tear at GEJ-Likely culprit lesion for current presentation        SOCIAL HISTORY:  Social History     Socioeconomic History    Marital status: Single     Spouse name: Not on file    Number of children: Not on file    Years of education: Not on file    Highest education level: Not on file   Occupational History    Not on file   Social Needs    Financial resource strain: Not on file    Food insecurity     Worry: Not on file     Inability: Not on file    Transportation needs     Medical: Not on file     Non-medical: Not on file   Tobacco Use    Smoking status: Former Smoker     Packs/day: 0.50     Years: 10.00     Pack years: 5.00     Types: Cigarettes     Last attempt to quit: 2019     Years since quittin.4    Smokeless tobacco: Never Used   Substance and Sexual Activity    Alcohol use: Not Currently    Drug use: No    Sexual activity: Not on file   Lifestyle    Physical activity     Days per week: Not on file     Minutes per session: Not on file    Stress: Not on file   Relationships    Social connections     Talks on phone: Not on file ondansetron (ZOFRAN) 8 MG tablet Take 1 tablet by mouth every 8 hours as needed for Nausea or Vomiting 30 tablet 3    Multiple Vitamins-Minerals (THERAPEUTIC MULTIVITAMIN-MINERALS) tablet Take 1 tablet by mouth daily      lipase-protease-amylase (ZENPEP) 5000 units delayed release capsule Take 1 capsule by mouth 4 times daily (with meals and nightly) Take with meals and snacks for a total of 8 daily 720 capsule 3     No current facility-administered medications for this visit. REVIEW OF SYSTEMS:    Constitutional: no fever, no night sweats,  fatigue;   HEENT: no blurring of vision, no double vision, no hearing difficulty, no tinnitus,no ulceration, no dysphagia  Lungs: no cough, no shortness of breath, no wheeze;   CVS: no palpitation, no chest pain, no shortness of breath;  GI: epigastric abdominal pain, no nausea , no vomiting, no constipation;   VIKRAM: no dysuria, frequency and urgency, no hematuria, no kidney stones;   Musculoskeletal: no joint pain, swelling , stiffness;   Endocrine: no polyuria, polydypsia, no cold or heat intolerence; Hematology/lymphatic: no easy brusing or bleeding, no hx of clotting disorder; no peripheral adenopathy. Dermatology: no skin rash, no eczema, no pruritis;   Psychiatry: no depression, no anxiety,no panic attacks, no suicide ideation; Neurology: no syncope, no seizures, no numbness or tingling of hands, no numbness or tingling of feet, no paresis;     PHYSICAL EXAM:    Vitals signs:  /72   Pulse 59   Temp 98.2 °F (36.8 °C)   Wt 115 lb 4.8 oz (52.3 kg)   BMI 18.06 kg/m²    Pain scale:      CONSTITUTIONAL: Alert, appropriate, no acute distress,   EYES: Non icteric, EOM intact, pupils equal round and reactive to light and accommodation. ENT: Oral mucus membranes moist, no oral pharyngeal lesions. External inspection of ears and nose are normal.   NECK: Supple, no masses.  No palpable thyroid mass    CHEST/LUNGS: CTA bilaterally, normal respiratory effort

## 2020-04-22 ENCOUNTER — HOSPITAL ENCOUNTER (OUTPATIENT)
Dept: INFUSION THERAPY | Age: 60
Discharge: HOME | End: 2020-04-22
Payer: COMMERCIAL

## 2020-04-22 ENCOUNTER — OFFICE VISIT (OUTPATIENT)
Dept: HEMATOLOGY | Age: 60
End: 2020-04-22
Payer: COMMERCIAL

## 2020-04-22 VITALS
HEART RATE: 59 BPM | DIASTOLIC BLOOD PRESSURE: 72 MMHG | WEIGHT: 115.3 LBS | TEMPERATURE: 98.2 F | SYSTOLIC BLOOD PRESSURE: 120 MMHG | BODY MASS INDEX: 18.06 KG/M2

## 2020-04-22 DIAGNOSIS — C25.9 MALIGNANT NEOPLASM OF PANCREAS, UNSPECIFIED LOCATION OF MALIGNANCY (HCC): Primary | ICD-10-CM

## 2020-04-22 PROCEDURE — 80053 COMPREHEN METABOLIC PANEL: CPT

## 2020-04-22 PROCEDURE — 99214 OFFICE O/P EST MOD 30 MIN: CPT | Performed by: INTERNAL MEDICINE

## 2020-04-22 PROCEDURE — 2580000003 HC RX 258: Performed by: INTERNAL MEDICINE

## 2020-04-22 PROCEDURE — G0498 CHEMO EXTEND IV INFUS W/PUMP: HCPCS

## 2020-04-22 PROCEDURE — 96374 THER/PROPH/DIAG INJ IV PUSH: CPT

## 2020-04-22 PROCEDURE — 96367 TX/PROPH/DG ADDL SEQ IV INF: CPT

## 2020-04-22 PROCEDURE — 6360000002 HC RX W HCPCS: Performed by: INTERNAL MEDICINE

## 2020-04-22 PROCEDURE — 96375 TX/PRO/DX INJ NEW DRUG ADDON: CPT

## 2020-04-22 PROCEDURE — 36415 COLL VENOUS BLD VENIPUNCTURE: CPT

## 2020-04-22 PROCEDURE — 85025 COMPLETE CBC W/AUTO DIFF WBC: CPT

## 2020-04-22 PROCEDURE — 96415 CHEMO IV INFUSION ADDL HR: CPT

## 2020-04-22 PROCEDURE — 96413 CHEMO IV INFUSION 1 HR: CPT

## 2020-04-22 PROCEDURE — 86301 IMMUNOASSAY TUMOR CA 19-9: CPT

## 2020-04-22 PROCEDURE — 96417 CHEMO IV INFUS EACH ADDL SEQ: CPT

## 2020-04-22 RX ORDER — PALONOSETRON 0.05 MG/ML
0.25 INJECTION, SOLUTION INTRAVENOUS ONCE
Status: COMPLETED | OUTPATIENT
Start: 2020-04-22 | End: 2020-04-22

## 2020-04-22 RX ORDER — METHYLPREDNISOLONE SODIUM SUCCINATE 125 MG/2ML
125 INJECTION, POWDER, LYOPHILIZED, FOR SOLUTION INTRAMUSCULAR; INTRAVENOUS ONCE
Status: CANCELLED | OUTPATIENT
Start: 2020-04-22

## 2020-04-22 RX ORDER — PALONOSETRON 0.05 MG/ML
0.25 INJECTION, SOLUTION INTRAVENOUS ONCE
Status: CANCELLED | OUTPATIENT
Start: 2020-04-22

## 2020-04-22 RX ORDER — SODIUM CHLORIDE 0.9 % (FLUSH) 0.9 %
10 SYRINGE (ML) INJECTION PRN
Status: DISCONTINUED | OUTPATIENT
Start: 2020-04-22 | End: 2020-04-23 | Stop reason: HOSPADM

## 2020-04-22 RX ORDER — HEPARIN SODIUM (PORCINE) LOCK FLUSH IV SOLN 100 UNIT/ML 100 UNIT/ML
500 SOLUTION INTRAVENOUS PRN
Status: CANCELLED | OUTPATIENT
Start: 2020-04-22

## 2020-04-22 RX ORDER — SODIUM CHLORIDE 9 MG/ML
20 INJECTION, SOLUTION INTRAVENOUS ONCE
Status: CANCELLED | OUTPATIENT
Start: 2020-04-22

## 2020-04-22 RX ORDER — DIPHENHYDRAMINE HYDROCHLORIDE 50 MG/ML
50 INJECTION INTRAMUSCULAR; INTRAVENOUS ONCE
Status: CANCELLED | OUTPATIENT
Start: 2020-04-22

## 2020-04-22 RX ORDER — DEXAMETHASONE SODIUM PHOSPHATE 10 MG/ML
10 INJECTION, SOLUTION INTRAMUSCULAR; INTRAVENOUS ONCE
Status: COMPLETED | OUTPATIENT
Start: 2020-04-22 | End: 2020-04-22

## 2020-04-22 RX ORDER — SODIUM CHLORIDE 0.9 % (FLUSH) 0.9 %
10 SYRINGE (ML) INJECTION PRN
Status: CANCELLED | OUTPATIENT
Start: 2020-04-22

## 2020-04-22 RX ORDER — EPINEPHRINE 1 MG/ML
0.3 INJECTION, SOLUTION, CONCENTRATE INTRAVENOUS PRN
Status: CANCELLED | OUTPATIENT
Start: 2020-04-22

## 2020-04-22 RX ORDER — SODIUM CHLORIDE 9 MG/ML
INJECTION, SOLUTION INTRAVENOUS CONTINUOUS
Status: CANCELLED | OUTPATIENT
Start: 2020-04-22

## 2020-04-22 RX ORDER — HEPARIN SODIUM (PORCINE) LOCK FLUSH IV SOLN 100 UNIT/ML 100 UNIT/ML
500 SOLUTION INTRAVENOUS PRN
Status: DISCONTINUED | OUTPATIENT
Start: 2020-04-22 | End: 2020-04-23 | Stop reason: HOSPADM

## 2020-04-22 RX ORDER — PANTOPRAZOLE SODIUM 40 MG/1
40 TABLET, DELAYED RELEASE ORAL 2 TIMES DAILY
Qty: 60 TABLET | Refills: 3 | Status: SHIPPED | OUTPATIENT
Start: 2020-04-22 | End: 2020-07-06

## 2020-04-22 RX ORDER — SODIUM CHLORIDE 0.9 % (FLUSH) 0.9 %
5 SYRINGE (ML) INJECTION PRN
Status: CANCELLED | OUTPATIENT
Start: 2020-04-22

## 2020-04-22 RX ADMIN — DEXAMETHASONE SODIUM PHOSPHATE 10 MG: 10 INJECTION, SOLUTION INTRAMUSCULAR; INTRAVENOUS at 10:06

## 2020-04-22 RX ADMIN — IRINOTECAN HYDROCHLORIDE 110.08 MG: 4.3 INJECTION, POWDER, FOR SOLUTION INTRAVENOUS at 10:31

## 2020-04-22 RX ADMIN — PALONOSETRON HYDROCHLORIDE 0.25 MG: 0.25 INJECTION, SOLUTION INTRAVENOUS at 10:06

## 2020-04-22 RX ADMIN — LEUCOVORIN CALCIUM 650 MG: 350 INJECTION, POWDER, LYOPHILIZED, FOR SUSPENSION INTRAMUSCULAR; INTRAVENOUS at 12:06

## 2020-04-22 RX ADMIN — FLUOROURACIL 3770 MG: 50 INJECTION, SOLUTION INTRAVENOUS at 12:40

## 2020-04-22 ASSESSMENT — PAIN SCALES - GENERAL: PAINLEVEL_OUTOF10: 0

## 2020-04-23 RX ORDER — POTASSIUM CHLORIDE 20 MEQ/1
20 TABLET, EXTENDED RELEASE ORAL 2 TIMES DAILY
Qty: 180 TABLET | Refills: 0 | Status: ON HOLD | OUTPATIENT
Start: 2020-04-23 | End: 2020-05-12 | Stop reason: ALTCHOICE

## 2020-04-24 ENCOUNTER — HOSPITAL ENCOUNTER (OUTPATIENT)
Dept: INFUSION THERAPY | Age: 60
Discharge: HOME | End: 2020-04-24
Payer: COMMERCIAL

## 2020-04-24 DIAGNOSIS — C25.9 MALIGNANT NEOPLASM OF PANCREAS, UNSPECIFIED LOCATION OF MALIGNANCY (HCC): Primary | ICD-10-CM

## 2020-04-24 PROCEDURE — 2580000003 HC RX 258: Performed by: INTERNAL MEDICINE

## 2020-04-24 PROCEDURE — 96377 APPLICATON ON-BODY INJECTOR: CPT

## 2020-04-24 PROCEDURE — 6360000002 HC RX W HCPCS: Performed by: INTERNAL MEDICINE

## 2020-04-24 PROCEDURE — 96523 IRRIG DRUG DELIVERY DEVICE: CPT

## 2020-04-24 RX ORDER — HEPARIN SODIUM (PORCINE) LOCK FLUSH IV SOLN 100 UNIT/ML 100 UNIT/ML
500 SOLUTION INTRAVENOUS PRN
Status: DISCONTINUED | OUTPATIENT
Start: 2020-04-24 | End: 2020-04-25 | Stop reason: HOSPADM

## 2020-04-24 RX ORDER — HEPARIN SODIUM (PORCINE) LOCK FLUSH IV SOLN 100 UNIT/ML 100 UNIT/ML
500 SOLUTION INTRAVENOUS PRN
Status: CANCELLED | OUTPATIENT
Start: 2020-04-24

## 2020-04-24 RX ORDER — SODIUM CHLORIDE 0.9 % (FLUSH) 0.9 %
20 SYRINGE (ML) INJECTION PRN
Status: CANCELLED | OUTPATIENT
Start: 2020-04-24

## 2020-04-24 RX ORDER — SODIUM CHLORIDE 0.9 % (FLUSH) 0.9 %
20 SYRINGE (ML) INJECTION PRN
Status: DISCONTINUED | OUTPATIENT
Start: 2020-04-24 | End: 2020-04-25 | Stop reason: HOSPADM

## 2020-04-24 RX ORDER — SODIUM CHLORIDE 0.9 % (FLUSH) 0.9 %
10 SYRINGE (ML) INJECTION PRN
Status: CANCELLED | OUTPATIENT
Start: 2020-04-24

## 2020-04-24 RX ADMIN — HEPARIN 500 UNITS: 100 SYRINGE at 09:03

## 2020-04-24 RX ADMIN — SODIUM CHLORIDE, PRESERVATIVE FREE 20 ML: 5 INJECTION INTRAVENOUS at 09:03

## 2020-04-24 RX ADMIN — PEGFILGRASTIM 6 MG: KIT SUBCUTANEOUS at 09:04

## 2020-04-27 ENCOUNTER — CARE COORDINATION (OUTPATIENT)
Dept: CASE MANAGEMENT | Age: 60
End: 2020-04-27

## 2020-05-04 ENCOUNTER — CARE COORDINATION (OUTPATIENT)
Dept: CASE MANAGEMENT | Age: 60
End: 2020-05-04

## 2020-05-06 ENCOUNTER — HOSPITAL ENCOUNTER (OUTPATIENT)
Dept: INFUSION THERAPY | Age: 60
Discharge: HOME | End: 2020-05-06
Payer: COMMERCIAL

## 2020-05-06 ENCOUNTER — OFFICE VISIT (OUTPATIENT)
Dept: HEMATOLOGY | Age: 60
End: 2020-05-06
Payer: COMMERCIAL

## 2020-05-06 VITALS
WEIGHT: 109 LBS | HEART RATE: 63 BPM | SYSTOLIC BLOOD PRESSURE: 120 MMHG | HEIGHT: 67 IN | TEMPERATURE: 98.7 F | OXYGEN SATURATION: 98 % | DIASTOLIC BLOOD PRESSURE: 82 MMHG | BODY MASS INDEX: 17.11 KG/M2

## 2020-05-06 DIAGNOSIS — D64.9 SYMPTOMATIC ANEMIA: ICD-10-CM

## 2020-05-06 DIAGNOSIS — C25.9 MALIGNANT NEOPLASM OF PANCREAS, UNSPECIFIED LOCATION OF MALIGNANCY (HCC): Primary | ICD-10-CM

## 2020-05-06 PROCEDURE — 36415 COLL VENOUS BLD VENIPUNCTURE: CPT

## 2020-05-06 PROCEDURE — 96375 TX/PRO/DX INJ NEW DRUG ADDON: CPT

## 2020-05-06 PROCEDURE — 96415 CHEMO IV INFUSION ADDL HR: CPT

## 2020-05-06 PROCEDURE — 99215 OFFICE O/P EST HI 40 MIN: CPT | Performed by: INTERNAL MEDICINE

## 2020-05-06 PROCEDURE — 86301 IMMUNOASSAY TUMOR CA 19-9: CPT

## 2020-05-06 PROCEDURE — 6360000002 HC RX W HCPCS: Performed by: INTERNAL MEDICINE

## 2020-05-06 PROCEDURE — 2580000003 HC RX 258: Performed by: INTERNAL MEDICINE

## 2020-05-06 PROCEDURE — 80053 COMPREHEN METABOLIC PANEL: CPT

## 2020-05-06 PROCEDURE — G0498 CHEMO EXTEND IV INFUS W/PUMP: HCPCS

## 2020-05-06 PROCEDURE — 96413 CHEMO IV INFUSION 1 HR: CPT

## 2020-05-06 PROCEDURE — 96367 TX/PROPH/DG ADDL SEQ IV INF: CPT

## 2020-05-06 PROCEDURE — 85025 COMPLETE CBC W/AUTO DIFF WBC: CPT

## 2020-05-06 RX ORDER — EPINEPHRINE 1 MG/ML
0.3 INJECTION, SOLUTION, CONCENTRATE INTRAVENOUS PRN
Status: CANCELLED | OUTPATIENT
Start: 2020-05-08

## 2020-05-06 RX ORDER — HEPARIN SODIUM (PORCINE) LOCK FLUSH IV SOLN 100 UNIT/ML 100 UNIT/ML
500 SOLUTION INTRAVENOUS PRN
Status: CANCELLED | OUTPATIENT
Start: 2020-05-06

## 2020-05-06 RX ORDER — SODIUM CHLORIDE 0.9 % (FLUSH) 0.9 %
20 SYRINGE (ML) INJECTION PRN
Status: CANCELLED | OUTPATIENT
Start: 2020-05-08

## 2020-05-06 RX ORDER — 0.9 % SODIUM CHLORIDE 0.9 %
250 INTRAVENOUS SOLUTION INTRAVENOUS ONCE
Status: CANCELLED | OUTPATIENT
Start: 2020-05-08

## 2020-05-06 RX ORDER — SODIUM CHLORIDE 9 MG/ML
20 INJECTION, SOLUTION INTRAVENOUS ONCE
Status: CANCELLED | OUTPATIENT
Start: 2020-05-06

## 2020-05-06 RX ORDER — SODIUM CHLORIDE 9 MG/ML
INJECTION, SOLUTION INTRAVENOUS CONTINUOUS
Status: CANCELLED | OUTPATIENT
Start: 2020-05-06

## 2020-05-06 RX ORDER — SODIUM CHLORIDE 0.9 % (FLUSH) 0.9 %
10 SYRINGE (ML) INJECTION PRN
Status: CANCELLED | OUTPATIENT
Start: 2020-05-06

## 2020-05-06 RX ORDER — EPINEPHRINE 1 MG/ML
0.3 INJECTION, SOLUTION, CONCENTRATE INTRAVENOUS PRN
Status: CANCELLED | OUTPATIENT
Start: 2020-05-06

## 2020-05-06 RX ORDER — PALONOSETRON 0.05 MG/ML
0.25 INJECTION, SOLUTION INTRAVENOUS ONCE
Status: CANCELLED | OUTPATIENT
Start: 2020-05-06

## 2020-05-06 RX ORDER — DIPHENHYDRAMINE HYDROCHLORIDE 50 MG/ML
50 INJECTION INTRAMUSCULAR; INTRAVENOUS ONCE
Status: CANCELLED | OUTPATIENT
Start: 2020-05-08

## 2020-05-06 RX ORDER — SODIUM CHLORIDE 9 MG/ML
INJECTION, SOLUTION INTRAVENOUS CONTINUOUS
Status: CANCELLED | OUTPATIENT
Start: 2020-05-08

## 2020-05-06 RX ORDER — DIPHENHYDRAMINE HYDROCHLORIDE 50 MG/ML
50 INJECTION INTRAMUSCULAR; INTRAVENOUS ONCE
Status: CANCELLED | OUTPATIENT
Start: 2020-05-06

## 2020-05-06 RX ORDER — METHYLPREDNISOLONE SODIUM SUCCINATE 125 MG/2ML
125 INJECTION, POWDER, LYOPHILIZED, FOR SOLUTION INTRAMUSCULAR; INTRAVENOUS ONCE
Status: CANCELLED | OUTPATIENT
Start: 2020-05-06

## 2020-05-06 RX ORDER — PALONOSETRON 0.05 MG/ML
0.25 INJECTION, SOLUTION INTRAVENOUS ONCE
Status: COMPLETED | OUTPATIENT
Start: 2020-05-06 | End: 2020-05-06

## 2020-05-06 RX ORDER — METHYLPREDNISOLONE SODIUM SUCCINATE 125 MG/2ML
125 INJECTION, POWDER, LYOPHILIZED, FOR SOLUTION INTRAMUSCULAR; INTRAVENOUS ONCE
Status: CANCELLED | OUTPATIENT
Start: 2020-05-08

## 2020-05-06 RX ORDER — SODIUM CHLORIDE 0.9 % (FLUSH) 0.9 %
5 SYRINGE (ML) INJECTION PRN
Status: CANCELLED | OUTPATIENT
Start: 2020-05-06

## 2020-05-06 RX ORDER — DEXAMETHASONE SODIUM PHOSPHATE 10 MG/ML
10 INJECTION, SOLUTION INTRAMUSCULAR; INTRAVENOUS ONCE
Status: COMPLETED | OUTPATIENT
Start: 2020-05-06 | End: 2020-05-06

## 2020-05-06 RX ADMIN — PALONOSETRON HYDROCHLORIDE 0.25 MG: 0.25 INJECTION, SOLUTION INTRAVENOUS at 09:51

## 2020-05-06 RX ADMIN — DEXAMETHASONE SODIUM PHOSPHATE 10 MG: 10 INJECTION, SOLUTION INTRAMUSCULAR; INTRAVENOUS at 09:51

## 2020-05-06 RX ADMIN — IRINOTECAN HYDROCHLORIDE 86 MG: 4.3 INJECTION, POWDER, FOR SOLUTION INTRAVENOUS at 10:12

## 2020-05-06 RX ADMIN — FLUOROURACIL 3000 MG: 50 INJECTION, SOLUTION INTRAVENOUS at 12:33

## 2020-05-06 RX ADMIN — LEUCOVORIN CALCIUM 650 MG: 350 INJECTION, POWDER, LYOPHILIZED, FOR SUSPENSION INTRAMUSCULAR; INTRAVENOUS at 12:01

## 2020-05-06 NOTE — PROGRESS NOTES
· 7/9/2019- 1/22/2020 Gemzar/Abraxane 125 mg/m2 q 14 days, discontinued due to progression of disease  · Anticipate on 3/4/2020- Irinotecan liposome 70 mg/m² with leucovorin 400 mg/m² and 5-FU 2400 mg/m² over 46 hours every 2 weeks.     Tumor marker  · 3/12/3318-IQ-85-9->430->370. · 4/30/2018 - CA 19- 9 of 157   · 5/25/20183040-QB-64-9->32 after 4 cycles. · 08/02/2018- -> 8   · 10/01/2018- CA 19-9 -5   · 10/29/2018-CA 19-9- 7   · 12/3/3677-NS-93-9-7   · 04/11/2019-CA-19-9- 12   · 05/21/2019- CA 19-9- 41   · 7/2/2019-CA 19 9 = 114  · 7/9/2019- CA-19-9 = 225  · 8/6/2019- CA-19-9 = 171  · 9/3/8893-TB-20-9 = 198  · 9/13/20191273-QX-21-9 = 98 (at  [address])  · 10/29/6789-SN-89-9 =317  · 11/21/2019-CA-19-9 = 183  · 1/23/20205940-PS-41-9 = 520  · 4/22/2020- CA-19-9 = 940        Cancer history  Ms Michelle Gomez was seen in initial oncology consultation on 3/12/2018 referred by Dr. Vicenta Deleon for a diagnosis of pancreatic adenocarcinoma. She was initially evaluated for acute pancreatitis at [facility] on February 2018. Further imaging revealed a pancreatic head mass. Lipase was elevated at 1184 and CA-19-9 elevated 134. The symptoms were going on for several weeks. Weight loss of 10-12 pounds over the last 6 months. · October 2017-CT abdomen without contrast was unremarkable. · 2/2/2018-ultrasound of abdomen showed a pancreatic duct dilation   · 2/02/2018-CT abdomen showed 16 mm low-density lesion in the pancreas at the junction of the head of the body. No intra-abdominal adenopathy. No liver metastasis. · 2/7/2018-the patient underwent EGD/EUS and FNA biopsy of a pancreatic mass by Dr. Vicenta Deleon at [facility] . EUS showed inflammatory changes consistent with a recent history of pancreatitis. Main pancreatic duct was dilated. No concerning peripancreatic adenopathy. No definite mass was seen by a more diffuse hypoechoic area measuring 1.8 x 2.2 cm in the head of the pancreas.  Pathology was stools. 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 This is consistent with disease progression.   · 3/4/2020- 4th line therapy Irinotecan liposome 70 mg/m² with leucovorin 400 mg/m² and 5-FU 2400 mg/m² over 46 hours every 2 weeks.              PAST MEDICAL HISTORY:   Past Medical History:   Diagnosis Date    Acute pancreatitis     Adult BMI <19 kg/sq m     Anemia     Cat esophagus     Biliary obstruction     GERD (gastroesophageal reflux disease)     with Barretts    Hashimoto's thyroiditis     denies takes no med for    Heart murmur     Hypertension     pt denies nor longer takes med for    Low blood sugar     h/o when overweight in the past    MVP (mitral valve prolapse)     Myalgia     Pancreatic adenocarcinoma ([address] 2018    Dr Elida Hampton    Pancreatic cancer ([address] 3/30/2018    Right flank pain     RUQ pain           PAST SURGICAL HISTORY:  Past Surgical History:   Procedure Laterality Date    CARDIAC SURGERY      heart cath     SECTION      x 3    CHOLECYSTECTOMY  1997    COLONOSCOPY  years ago    Steve-Dr Felice Skiff per patient    COLONOSCOPY  2014    Dr Tamara Regan- Marlene Mendez recall    COLONOSCOPY  03/10/2016    Dr Mcfarland-10 yr recall    COLONOSCOPY N/A 2019    Dr Aysha Arechiga, 5 yr recall    ELBOW SURGERY Right     ENDOMETRIAL ABLATION      HAND SURGERY Right     HERNIA REPAIR      hiatal    HERNIA REPAIR      umbilical    HERNIA REPAIR      PANCREAS SURGERY  2018    Whipple procedure-Dr Marina Sheets [place] EGD [facility] NEEDLE ASPIR/BIOP ALTERED ANATOMY N/A 2018    Dr Moreno Cobb w/fna-Dilation of main pancreatic duct with diffuse change in the pancreatitis noted, Years since quittin.5    Smokeless tobacco: Never Used   Substance and Sexual Activity    Alcohol use: Not Currently    Drug use: No    Sexual activity: None   Lifestyle    Physical activity     Days per week: None     Minutes per session: None    Stress: None   Relationships    Social connections     Talks on phone: None     Gets together: None     Attends Holiness service: None     Active member of club or organization: None     Attends meetings of clubs or organizations: None     Relationship status: None    Intimate partner violence     Fear of current or ex partner: None     Emotionally abused: None     Physically abused: None     Forced sexual activity: None   Other Topics Concern    None   Social History Narrative    None       FAMILY HISTORY:  Family History   Problem Relation Age of Onset    Heart Attack Mother 46        x 2-had bypass    Hypertension Mother     COPD Father     Liver Cancer Paternal Aunt     Lung Cancer Paternal Aunt     Breast Cancer Maternal Aunt         but  of brain cancer    Diabetes Maternal Uncle     Diabetes Maternal Grandmother     Colon Cancer Neg Hx     Colon Polyps Neg Hx     Esophageal Cancer Neg Hx     Rectal Cancer Neg Hx     Stomach Cancer Neg Hx         Current Outpatient Medications   Medication Sig Dispense Refill    potassium chloride (KLOR-CON M) 20 MEQ extended release tablet Take 1 tablet by mouth 2 times daily 180 tablet 0    pantoprazole (PROTONIX) 40 MG tablet Take 1 tablet by mouth 2 times daily 60 tablet 3    lipase-protease-amylase (ZENPEP) 60655-05762 units CPEP delayed release capsule Take 1-2 capsules by mouth 3 times daily (with meals) Only takes as needed      Cyanocobalamin (VITAMIN B 12 PO) Take by mouth      promethazine (PHENERGAN) 25 MG tablet Take 1 tablet by mouth every 6 hours as needed for Nausea 30 tablet 2    sucralfate (CARAFATE) 1 GM tablet Take 1 tablet by mouth 4 times daily 360 tablet 1    lidocaine-prilocaine (EMLA) 2.5-2.5 % cream Apply to port area and cover with plastic wrap one hour prior to use. 1 Tube 1    lipase-protease-amylase (ZENPEP) 5000-61503 units CPEP delayed release capsule Take 1 capsule by mouth 4 times daily (with meals and nightly) 360 capsule 0    vitamin E 400 UNIT capsule Take 400 Units by mouth daily      NONFORMULARY daily Tumeric otc      ondansetron (ZOFRAN) 8 MG tablet Take 1 tablet by mouth every 8 hours as needed for Nausea or Vomiting 30 tablet 3    Multiple Vitamins-Minerals (THERAPEUTIC MULTIVITAMIN-MINERALS) tablet Take 1 tablet by mouth daily      lipase-protease-amylase (ZENPEP) 5000 units delayed release capsule Take 1 capsule by mouth 4 times daily (with meals and nightly) Take with meals and snacks for a total of 8 daily 720 capsule 3     No current facility-administered medications for this visit. Facility-Administered Medications Ordered in Other Visits   Medication Dose Route Frequency Provider Last Rate Last Dose    fluorouracil (ADRUCIL) 3,000 mg in sodium chloride 0.9 % 250 mL chemo infusion  3,000 mg Intravenous Over 46 hours Fer Walker MD 5.4 mL/hr at 05/06/20 1233 3,000 mg at 05/06/20 1233        REVIEW OF SYSTEMS:    Constitutional: no fever, no night sweats,  fatigue;   HEENT: no blurring of vision, no double vision, no hearing difficulty, no tinnitus,no ulceration, no dysphagia  Lungs: no cough, no shortness of breath, no wheeze;   CVS: no palpitation, no chest pain, no shortness of breath;  GI: epigastric abdominal pain, no nausea , no vomiting, no constipation; melanotic stools  VIKRAM: no dysuria, frequency and urgency, no hematuria, no kidney stones;   Musculoskeletal: no joint pain, swelling , stiffness;   Endocrine: no polyuria, polydypsia, no cold or heat intolerence; Hematology/lymphatic: no easy brusing or bleeding, no hx of clotting disorder; no peripheral adenopathy.   Dermatology: no skin rash, no eczema, no pruritis; Contrast?     Answer:   Oral    Comprehensive Metabolic Panel     Standing Status:   Future     Standing Expiration Date:   5/6/2021      Noemy Wen was seen today for pancreatic cancer. Diagnoses and all orders for this visit:    Malignant neoplasm of pancreas, unspecified location of malignancy ([facility]  -     [address]; Future  -     CT ABDOMEN PELVIS W IV CONTRAST Additional Contrast? Oral; Future  -     Comprehensive Metabolic Panel; Future    Chemotherapy management, encounter for    Care plan discussed with patient    Adverse effect of chemotherapy, subsequent encounter    Gastrointestinal hemorrhage, unspecified gastrointestinal hemorrhage type    Acute blood loss anemia    Chronic fatigue    Cancer-related pain       #Metastatic pancreatic cancer  third line palliative chemotherapy (liposomal Irinotecan/Leucovorin/5-FU)   Proceed with chemotherapy today  Repeat CT chest abdomen pelvis for next visit    #GI bleed- secondary to erosion at previous Whipple anastomotic site. history of erosion at the anastomotic site with several instances of upper GI bleed in the past.  No further episodes of GI bleed. Hemoglobin 7.8. Transfuse 1 unit PRBC next Friday  Again she had melanotic stools recently.     #Normocytic anemia- secondary to GI bleed. Hemoglobin 7.8. Transfuse 1 unit PRBC next Friday. No further GI bleed. She is followed by GI.     #Thrombocytopenia secondary to chemotherapy. Plan:   · CT chest abdomen pelvis for next visit  · Transfuse 1 unit PRBC next Friday  · RTC 2 weeks     Follow Up:     Return in about 2 weeks (around 5/20/2020) for appt with Dr. Lorrie Herron after scans. Scans scheduled for next week if possible, Dr. Lorrie Herron F/U after scans     Tarsha CORREIA am scribing for Suze Mclaughlin MD. Electronically signed by Tarsha Smith on 5/6/2020 at 2:50 PM CDT.      I, Dr Lynette Lozoya, personally performed the services described in this documentation as scribed by Tarsha Smith MA in

## 2020-05-08 ENCOUNTER — HOSPITAL ENCOUNTER (OUTPATIENT)
Dept: INFUSION THERAPY | Age: 60
Setting detail: INFUSION SERIES
Discharge: HOME | End: 2020-05-08
Payer: COMMERCIAL

## 2020-05-08 VITALS
SYSTOLIC BLOOD PRESSURE: 110 MMHG | RESPIRATION RATE: 20 BRPM | DIASTOLIC BLOOD PRESSURE: 71 MMHG | OXYGEN SATURATION: 100 % | HEART RATE: 55 BPM | TEMPERATURE: 99.6 F

## 2020-05-08 DIAGNOSIS — D64.81 ANEMIA ASSOCIATED WITH CHEMOTHERAPY: ICD-10-CM

## 2020-05-08 DIAGNOSIS — T45.1X5A ANEMIA ASSOCIATED WITH CHEMOTHERAPY: ICD-10-CM

## 2020-05-08 DIAGNOSIS — C25.9 MALIGNANT NEOPLASM OF PANCREAS, UNSPECIFIED LOCATION OF MALIGNANCY (HCC): ICD-10-CM

## 2020-05-08 DIAGNOSIS — D64.9 SYMPTOMATIC ANEMIA: Primary | ICD-10-CM

## 2020-05-08 PROCEDURE — 36430 TRANSFUSION BLD/BLD COMPNT: CPT

## 2020-05-08 PROCEDURE — 96521 REFILL/MAINT PORTABLE PUMP: CPT

## 2020-05-08 PROCEDURE — 96377 APPLICATON ON-BODY INJECTOR: CPT

## 2020-05-08 PROCEDURE — P9016 RBC LEUKOCYTES REDUCED: HCPCS

## 2020-05-08 PROCEDURE — 86850 RBC ANTIBODY SCREEN: CPT

## 2020-05-08 PROCEDURE — 86900 BLOOD TYPING SEROLOGIC ABO: CPT

## 2020-05-08 PROCEDURE — 86901 BLOOD TYPING SEROLOGIC RH(D): CPT

## 2020-05-08 PROCEDURE — 86923 COMPATIBILITY TEST ELECTRIC: CPT

## 2020-05-08 PROCEDURE — 2580000003 HC RX 258: Performed by: INTERNAL MEDICINE

## 2020-05-08 PROCEDURE — 6360000002 HC RX W HCPCS: Performed by: INTERNAL MEDICINE

## 2020-05-08 RX ORDER — EPINEPHRINE 1 MG/ML
0.3 INJECTION, SOLUTION, CONCENTRATE INTRAVENOUS PRN
Status: CANCELLED | OUTPATIENT
Start: 2020-05-08

## 2020-05-08 RX ORDER — 0.9 % SODIUM CHLORIDE 0.9 %
250 INTRAVENOUS SOLUTION INTRAVENOUS ONCE
Status: COMPLETED | OUTPATIENT
Start: 2020-05-08 | End: 2020-05-08

## 2020-05-08 RX ORDER — SODIUM CHLORIDE 9 MG/ML
INJECTION, SOLUTION INTRAVENOUS CONTINUOUS
Status: CANCELLED | OUTPATIENT
Start: 2020-05-08

## 2020-05-08 RX ORDER — SODIUM CHLORIDE 0.9 % (FLUSH) 0.9 %
20 SYRINGE (ML) INJECTION PRN
Status: DISCONTINUED | OUTPATIENT
Start: 2020-05-08 | End: 2020-05-10 | Stop reason: HOSPADM

## 2020-05-08 RX ORDER — HEPARIN SODIUM (PORCINE) LOCK FLUSH IV SOLN 100 UNIT/ML 100 UNIT/ML
500 SOLUTION INTRAVENOUS ONCE
Status: COMPLETED | OUTPATIENT
Start: 2020-05-08 | End: 2020-05-08

## 2020-05-08 RX ORDER — 0.9 % SODIUM CHLORIDE 0.9 %
250 INTRAVENOUS SOLUTION INTRAVENOUS ONCE
Status: CANCELLED | OUTPATIENT
Start: 2020-05-08

## 2020-05-08 RX ORDER — DIPHENHYDRAMINE HYDROCHLORIDE 50 MG/ML
50 INJECTION INTRAMUSCULAR; INTRAVENOUS ONCE
Status: CANCELLED | OUTPATIENT
Start: 2020-05-08

## 2020-05-08 RX ORDER — METHYLPREDNISOLONE SODIUM SUCCINATE 125 MG/2ML
125 INJECTION, POWDER, LYOPHILIZED, FOR SOLUTION INTRAMUSCULAR; INTRAVENOUS ONCE
Status: CANCELLED | OUTPATIENT
Start: 2020-05-08

## 2020-05-08 RX ORDER — SODIUM CHLORIDE 0.9 % (FLUSH) 0.9 %
20 SYRINGE (ML) INJECTION PRN
Status: CANCELLED | OUTPATIENT
Start: 2020-05-08

## 2020-05-08 RX ADMIN — PEGFILGRASTIM 6 MG: KIT SUBCUTANEOUS at 13:44

## 2020-05-08 RX ADMIN — HEPARIN 500 UNITS: 100 SYRINGE at 13:51

## 2020-05-08 RX ADMIN — SODIUM CHLORIDE 250 ML: 9 INJECTION, SOLUTION INTRAVENOUS at 11:34

## 2020-05-11 ENCOUNTER — HOSPITAL ENCOUNTER (OUTPATIENT)
Dept: INFUSION THERAPY | Age: 60
Setting detail: INFUSION SERIES
Discharge: HOME | End: 2020-05-11
Payer: COMMERCIAL

## 2020-05-11 ENCOUNTER — HOSPITAL ENCOUNTER (OUTPATIENT)
Dept: INFUSION THERAPY | Age: 60
Discharge: HOME | End: 2020-05-11
Payer: COMMERCIAL

## 2020-05-11 VITALS
HEART RATE: 64 BPM | OXYGEN SATURATION: 95 % | DIASTOLIC BLOOD PRESSURE: 66 MMHG | RESPIRATION RATE: 17 BRPM | TEMPERATURE: 98.7 F | SYSTOLIC BLOOD PRESSURE: 112 MMHG

## 2020-05-11 VITALS
HEART RATE: 61 BPM | OXYGEN SATURATION: 98 % | HEIGHT: 67 IN | BODY MASS INDEX: 17.27 KG/M2 | WEIGHT: 110 LBS | DIASTOLIC BLOOD PRESSURE: 56 MMHG | SYSTOLIC BLOOD PRESSURE: 98 MMHG | TEMPERATURE: 97.7 F

## 2020-05-11 DIAGNOSIS — T45.1X5A ANEMIA ASSOCIATED WITH CHEMOTHERAPY: ICD-10-CM

## 2020-05-11 DIAGNOSIS — C25.9 MALIGNANT NEOPLASM OF PANCREAS, UNSPECIFIED LOCATION OF MALIGNANCY (HCC): ICD-10-CM

## 2020-05-11 DIAGNOSIS — D64.9 SYMPTOMATIC ANEMIA: Primary | ICD-10-CM

## 2020-05-11 DIAGNOSIS — D64.81 ANEMIA ASSOCIATED WITH CHEMOTHERAPY: ICD-10-CM

## 2020-05-11 PROCEDURE — 86900 BLOOD TYPING SEROLOGIC ABO: CPT

## 2020-05-11 PROCEDURE — 6360000002 HC RX W HCPCS

## 2020-05-11 PROCEDURE — 86923 COMPATIBILITY TEST ELECTRIC: CPT

## 2020-05-11 PROCEDURE — 86850 RBC ANTIBODY SCREEN: CPT

## 2020-05-11 PROCEDURE — 86901 BLOOD TYPING SEROLOGIC RH(D): CPT

## 2020-05-11 PROCEDURE — 36430 TRANSFUSION BLD/BLD COMPNT: CPT

## 2020-05-11 PROCEDURE — 2580000003 HC RX 258: Performed by: INTERNAL MEDICINE

## 2020-05-11 PROCEDURE — P9016 RBC LEUKOCYTES REDUCED: HCPCS

## 2020-05-11 RX ORDER — SODIUM CHLORIDE 0.9 % (FLUSH) 0.9 %
20 SYRINGE (ML) INJECTION PRN
Status: DISCONTINUED | OUTPATIENT
Start: 2020-05-11 | End: 2020-05-12 | Stop reason: HOSPADM

## 2020-05-11 RX ORDER — SODIUM CHLORIDE 0.9 % (FLUSH) 0.9 %
10 SYRINGE (ML) INJECTION PRN
Status: CANCELLED | OUTPATIENT
Start: 2020-05-11

## 2020-05-11 RX ORDER — SODIUM CHLORIDE 9 MG/ML
INJECTION, SOLUTION INTRAVENOUS CONTINUOUS
Status: CANCELLED | OUTPATIENT
Start: 2020-05-11

## 2020-05-11 RX ORDER — METHYLPREDNISOLONE SODIUM SUCCINATE 125 MG/2ML
125 INJECTION, POWDER, LYOPHILIZED, FOR SOLUTION INTRAMUSCULAR; INTRAVENOUS ONCE
Status: CANCELLED | OUTPATIENT
Start: 2020-05-11

## 2020-05-11 RX ORDER — HEPARIN SODIUM (PORCINE) LOCK FLUSH IV SOLN 100 UNIT/ML 100 UNIT/ML
SOLUTION INTRAVENOUS
Status: COMPLETED
Start: 2020-05-11 | End: 2020-05-11

## 2020-05-11 RX ORDER — SODIUM CHLORIDE 0.9 % (FLUSH) 0.9 %
20 SYRINGE (ML) INJECTION PRN
Status: CANCELLED | OUTPATIENT
Start: 2020-05-11

## 2020-05-11 RX ORDER — HEPARIN SODIUM (PORCINE) LOCK FLUSH IV SOLN 100 UNIT/ML 100 UNIT/ML
500 SOLUTION INTRAVENOUS PRN
Status: DISCONTINUED | OUTPATIENT
Start: 2020-05-11 | End: 2020-05-12 | Stop reason: HOSPADM

## 2020-05-11 RX ORDER — DIPHENHYDRAMINE HYDROCHLORIDE 50 MG/ML
50 INJECTION INTRAMUSCULAR; INTRAVENOUS ONCE
Status: CANCELLED | OUTPATIENT
Start: 2020-05-11

## 2020-05-11 RX ORDER — 0.9 % SODIUM CHLORIDE 0.9 %
250 INTRAVENOUS SOLUTION INTRAVENOUS ONCE
Status: CANCELLED | OUTPATIENT
Start: 2020-05-11

## 2020-05-11 RX ORDER — EPINEPHRINE 1 MG/ML
0.3 INJECTION, SOLUTION, CONCENTRATE INTRAVENOUS PRN
Status: CANCELLED | OUTPATIENT
Start: 2020-05-11

## 2020-05-11 RX ORDER — 0.9 % SODIUM CHLORIDE 0.9 %
250 INTRAVENOUS SOLUTION INTRAVENOUS ONCE
Status: COMPLETED | OUTPATIENT
Start: 2020-05-11 | End: 2020-05-11

## 2020-05-11 RX ORDER — HEPARIN SODIUM (PORCINE) LOCK FLUSH IV SOLN 100 UNIT/ML 100 UNIT/ML
500 SOLUTION INTRAVENOUS PRN
Status: CANCELLED | OUTPATIENT
Start: 2020-05-11

## 2020-05-11 RX ADMIN — Medication 20 ML: at 12:30

## 2020-05-11 RX ADMIN — HEPARIN SODIUM (PORCINE) LOCK FLUSH IV SOLN 100 UNIT/ML 300 UNITS: 100 SOLUTION at 12:30

## 2020-05-11 RX ADMIN — SODIUM CHLORIDE 250 ML: 9 INJECTION, SOLUTION INTRAVENOUS at 10:12

## 2020-05-11 RX ADMIN — HEPARIN 300 UNITS: 100 SYRINGE at 12:30

## 2020-05-11 ASSESSMENT — PAIN SCALES - GENERAL: PAINLEVEL_OUTOF10: 0

## 2020-05-12 ENCOUNTER — HOSPITAL ENCOUNTER (INPATIENT)
Age: 60
LOS: 1 days | Discharge: HOME | DRG: 812 | End: 2020-05-13
Attending: EMERGENCY MEDICINE | Admitting: INTERNAL MEDICINE
Payer: COMMERCIAL

## 2020-05-12 ENCOUNTER — APPOINTMENT (OUTPATIENT)
Dept: CT IMAGING | Age: 60
DRG: 812 | End: 2020-05-12
Payer: COMMERCIAL

## 2020-05-12 PROBLEM — K92.2 GI BLEED: Status: ACTIVE | Noted: 2020-05-12

## 2020-05-12 LAB
ABO/RH: NORMAL
ACANTHOCYTES: ABNORMAL
ALBUMIN SERPL-MCNC: 2.7 G/DL (ref 3.5–5.2)
ALP BLD-CCNC: 193 U/L (ref 35–104)
ALT SERPL-CCNC: 12 U/L (ref 5–33)
ANION GAP SERPL CALCULATED.3IONS-SCNC: 10 MMOL/L (ref 7–19)
ANISOCYTOSIS: ABNORMAL
ANTIBODY SCREEN: NORMAL
APTT: 26 SEC (ref 26–36.2)
AST SERPL-CCNC: 15 U/L (ref 5–32)
BANDED NEUTROPHILS RELATIVE PERCENT: 6 % (ref 0–5)
BASOPHILS ABSOLUTE: 0 K/UL (ref 0–0.2)
BASOPHILS RELATIVE PERCENT: 0 % (ref 0–1)
BILIRUB SERPL-MCNC: <0.2 MG/DL (ref 0.2–1.2)
BILIRUBIN URINE: NEGATIVE
BLOOD, URINE: NEGATIVE
BUN BLDV-MCNC: 9 MG/DL (ref 6–20)
CALCIUM SERPL-MCNC: 8 MG/DL (ref 8.6–10)
CHLORIDE BLD-SCNC: 102 MMOL/L (ref 98–111)
CLARITY: CLEAR
CO2: 26 MMOL/L (ref 22–29)
COLOR: YELLOW
CREAT SERPL-MCNC: 0.7 MG/DL (ref 0.5–0.9)
EOSINOPHILS ABSOLUTE: 0 K/UL (ref 0–0.6)
EOSINOPHILS RELATIVE PERCENT: 0 % (ref 0–5)
GFR NON-AFRICAN AMERICAN: >60
GLUCOSE BLD-MCNC: 160 MG/DL (ref 74–109)
GLUCOSE URINE: NEGATIVE MG/DL
HCT VFR BLD CALC: 22.8 % (ref 37–47)
HEMOGLOBIN: 7.3 G/DL (ref 12–16)
HYPOCHROMIA: ABNORMAL
IMMATURE GRANULOCYTES #: 2 K/UL
INR BLD: 1.19 (ref 0.88–1.18)
KETONES, URINE: NEGATIVE MG/DL
LEUKOCYTE ESTERASE, URINE: NEGATIVE
LIPASE: 4 U/L (ref 13–60)
LYMPHOCYTES ABSOLUTE: 0 K/UL (ref 1.1–4.5)
LYMPHOCYTES RELATIVE PERCENT: 0 % (ref 20–40)
MCH RBC QN AUTO: 30.9 PG (ref 27–31)
MCHC RBC AUTO-ENTMCNC: 32 G/DL (ref 33–37)
MCV RBC AUTO: 96.6 FL (ref 81–99)
MONOCYTES ABSOLUTE: 1.3 K/UL (ref 0–0.9)
MONOCYTES RELATIVE PERCENT: 4 % (ref 0–10)
NEUTROPHILS ABSOLUTE: 32 K/UL (ref 1.5–7.5)
NEUTROPHILS RELATIVE PERCENT: 90 % (ref 50–65)
NITRITE, URINE: NEGATIVE
PDW BLD-RTO: 19.9 % (ref 11.5–14.5)
PH UA: 6.5 (ref 5–8)
PLATELET # BLD: 113 K/UL (ref 130–400)
PLATELET SLIDE REVIEW: ADEQUATE
PMV BLD AUTO: 10 FL (ref 9.4–12.3)
POTASSIUM REFLEX MAGNESIUM: 4.2 MMOL/L (ref 3.5–5)
PROTEIN UA: NEGATIVE MG/DL
PROTHROMBIN TIME: 15.1 SEC (ref 12–14.6)
RBC # BLD: 2.36 M/UL (ref 4.2–5.4)
SODIUM BLD-SCNC: 138 MMOL/L (ref 136–145)
SPECIFIC GRAVITY UA: 1.03 (ref 1–1.03)
TOTAL PROTEIN: 4.9 G/DL (ref 6.6–8.7)
UROBILINOGEN, URINE: 0.2 E.U./DL
WBC # BLD: 33.3 K/UL (ref 4.8–10.8)

## 2020-05-12 PROCEDURE — 85730 THROMBOPLASTIN TIME PARTIAL: CPT

## 2020-05-12 PROCEDURE — C9113 INJ PANTOPRAZOLE SODIUM, VIA: HCPCS | Performed by: INTERNAL MEDICINE

## 2020-05-12 PROCEDURE — 99285 EMERGENCY DEPT VISIT HI MDM: CPT

## 2020-05-12 PROCEDURE — 81003 URINALYSIS AUTO W/O SCOPE: CPT

## 2020-05-12 PROCEDURE — 71260 CT THORAX DX C+: CPT

## 2020-05-12 PROCEDURE — 80053 COMPREHEN METABOLIC PANEL: CPT

## 2020-05-12 PROCEDURE — 2000000000 HC ICU R&B

## 2020-05-12 PROCEDURE — 2580000003 HC RX 258: Performed by: EMERGENCY MEDICINE

## 2020-05-12 PROCEDURE — 93005 ELECTROCARDIOGRAM TRACING: CPT | Performed by: EMERGENCY MEDICINE

## 2020-05-12 PROCEDURE — P9016 RBC LEUKOCYTES REDUCED: HCPCS

## 2020-05-12 PROCEDURE — 85025 COMPLETE CBC W/AUTO DIFF WBC: CPT

## 2020-05-12 PROCEDURE — 86850 RBC ANTIBODY SCREEN: CPT

## 2020-05-12 PROCEDURE — 86900 BLOOD TYPING SEROLOGIC ABO: CPT

## 2020-05-12 PROCEDURE — 87081 CULTURE SCREEN ONLY: CPT

## 2020-05-12 PROCEDURE — 86901 BLOOD TYPING SEROLOGIC RH(D): CPT

## 2020-05-12 PROCEDURE — 85610 PROTHROMBIN TIME: CPT

## 2020-05-12 PROCEDURE — 74177 CT ABD & PELVIS W/CONTRAST: CPT

## 2020-05-12 PROCEDURE — 6360000002 HC RX W HCPCS: Performed by: EMERGENCY MEDICINE

## 2020-05-12 PROCEDURE — 83690 ASSAY OF LIPASE: CPT

## 2020-05-12 PROCEDURE — 6360000004 HC RX CONTRAST MEDICATION: Performed by: EMERGENCY MEDICINE

## 2020-05-12 PROCEDURE — 96374 THER/PROPH/DIAG INJ IV PUSH: CPT

## 2020-05-12 PROCEDURE — 2580000003 HC RX 258: Performed by: INTERNAL MEDICINE

## 2020-05-12 PROCEDURE — 36415 COLL VENOUS BLD VENIPUNCTURE: CPT

## 2020-05-12 PROCEDURE — 6360000002 HC RX W HCPCS: Performed by: INTERNAL MEDICINE

## 2020-05-12 PROCEDURE — 6370000000 HC RX 637 (ALT 250 FOR IP): Performed by: INTERNAL MEDICINE

## 2020-05-12 PROCEDURE — 86923 COMPATIBILITY TEST ELECTRIC: CPT

## 2020-05-12 RX ORDER — ACETAMINOPHEN 325 MG/1
650 TABLET ORAL EVERY 4 HOURS PRN
Status: DISCONTINUED | OUTPATIENT
Start: 2020-05-12 | End: 2020-05-13 | Stop reason: HOSPADM

## 2020-05-12 RX ORDER — PROMETHAZINE HYDROCHLORIDE 25 MG/1
25 TABLET ORAL EVERY 6 HOURS PRN
Status: DISCONTINUED | OUTPATIENT
Start: 2020-05-12 | End: 2020-05-13 | Stop reason: HOSPADM

## 2020-05-12 RX ORDER — PANTOPRAZOLE SODIUM 40 MG/10ML
40 INJECTION, POWDER, LYOPHILIZED, FOR SOLUTION INTRAVENOUS EVERY 12 HOURS
Status: DISCONTINUED | OUTPATIENT
Start: 2020-05-12 | End: 2020-05-13 | Stop reason: HOSPADM

## 2020-05-12 RX ORDER — PROMETHAZINE HYDROCHLORIDE 12.5 MG/1
12.5 TABLET ORAL EVERY 6 HOURS PRN
Status: DISCONTINUED | OUTPATIENT
Start: 2020-05-12 | End: 2020-05-13 | Stop reason: HOSPADM

## 2020-05-12 RX ORDER — ONDANSETRON 2 MG/ML
4 INJECTION INTRAMUSCULAR; INTRAVENOUS EVERY 6 HOURS PRN
Status: DISCONTINUED | OUTPATIENT
Start: 2020-05-12 | End: 2020-05-13 | Stop reason: HOSPADM

## 2020-05-12 RX ORDER — SODIUM CHLORIDE 9 MG/ML
INJECTION, SOLUTION INTRAVENOUS CONTINUOUS
Status: DISCONTINUED | OUTPATIENT
Start: 2020-05-12 | End: 2020-05-13 | Stop reason: HOSPADM

## 2020-05-12 RX ORDER — ONDANSETRON 4 MG/1
8 TABLET, FILM COATED ORAL EVERY 8 HOURS PRN
Status: DISCONTINUED | OUTPATIENT
Start: 2020-05-12 | End: 2020-05-13 | Stop reason: HOSPADM

## 2020-05-12 RX ORDER — SODIUM CHLORIDE 0.9 % (FLUSH) 0.9 %
10 SYRINGE (ML) INJECTION PRN
Status: DISCONTINUED | OUTPATIENT
Start: 2020-05-12 | End: 2020-05-13 | Stop reason: HOSPADM

## 2020-05-12 RX ORDER — SODIUM CHLORIDE 9 MG/ML
10 INJECTION INTRAVENOUS EVERY 12 HOURS
Status: DISCONTINUED | OUTPATIENT
Start: 2020-05-12 | End: 2020-05-13 | Stop reason: HOSPADM

## 2020-05-12 RX ORDER — 0.9 % SODIUM CHLORIDE 0.9 %
1000 INTRAVENOUS SOLUTION INTRAVENOUS ONCE
Status: COMPLETED | OUTPATIENT
Start: 2020-05-12 | End: 2020-05-12

## 2020-05-12 RX ORDER — SODIUM CHLORIDE 0.9 % (FLUSH) 0.9 %
10 SYRINGE (ML) INJECTION EVERY 12 HOURS SCHEDULED
Status: DISCONTINUED | OUTPATIENT
Start: 2020-05-12 | End: 2020-05-13 | Stop reason: HOSPADM

## 2020-05-12 RX ORDER — SUCRALFATE 1 G/1
1 TABLET ORAL 4 TIMES DAILY
Status: DISCONTINUED | OUTPATIENT
Start: 2020-05-12 | End: 2020-05-13 | Stop reason: HOSPADM

## 2020-05-12 RX ORDER — ONDANSETRON 2 MG/ML
4 INJECTION INTRAMUSCULAR; INTRAVENOUS ONCE
Status: COMPLETED | OUTPATIENT
Start: 2020-05-12 | End: 2020-05-12

## 2020-05-12 RX ADMIN — ONDANSETRON 4 MG: 2 INJECTION INTRAMUSCULAR; INTRAVENOUS at 15:39

## 2020-05-12 RX ADMIN — IOPAMIDOL 90 ML: 755 INJECTION, SOLUTION INTRAVENOUS at 17:07

## 2020-05-12 RX ADMIN — SUCRALFATE 1 G: 1 TABLET ORAL at 20:53

## 2020-05-12 RX ADMIN — SODIUM CHLORIDE: 9 INJECTION, SOLUTION INTRAVENOUS at 20:11

## 2020-05-12 RX ADMIN — SODIUM CHLORIDE 1000 ML: 9 INJECTION, SOLUTION INTRAVENOUS at 15:39

## 2020-05-12 RX ADMIN — PANTOPRAZOLE SODIUM 40 MG: 40 INJECTION, POWDER, FOR SOLUTION INTRAVENOUS at 20:53

## 2020-05-12 ASSESSMENT — ENCOUNTER SYMPTOMS
NAUSEA: 0
BACK PAIN: 0
NAUSEA: 1
CONSTIPATION: 0
DIARRHEA: 0
VISUAL CHANGE: 0
COUGH: 0
ABDOMINAL PAIN: 1
VOMITING: 0
SHORTNESS OF BREATH: 0
VOMITING: 1

## 2020-05-12 ASSESSMENT — PAIN SCALES - GENERAL: PAINLEVEL_OUTOF10: 0

## 2020-05-12 NOTE — ED PROVIDER NOTES
Musculoskeletal: Negative. Neurological: Positive for light-headedness. Negative for dizziness, seizures, loss of consciousness, syncope, speech difficulty and headaches. All other systems reviewed and are negative. Except as noted above the remainder of the review of systems was reviewed and negative.        PAST MEDICAL HISTORY     Past Medical History:   Diagnosis Date    Acute pancreatitis     Adult BMI <19 kg/sq m     Anemia     Cat esophagus     Biliary obstruction     GERD (gastroesophageal reflux disease)     with Barretts    Hashimoto's thyroiditis     denies takes no med for    Heart murmur     Hypertension     pt denies nor longer takes med for    Low blood sugar     h/o when overweight in the past    MVP (mitral valve prolapse)     Myalgia     Pancreatic adenocarcinoma ([facility] 2018    Dr Elida Hampton    Pancreatic cancer ([facility] 3/30/2018    Right flank pain     RUQ pain          SURGICAL HISTORY       Past Surgical History:   Procedure Laterality Date    CARDIAC SURGERY      heart cath     SECTION      x 3    CHOLECYSTECTOMY  1997    COLONOSCOPY  years ago    [facility]-Dr Felice Skiff per patient    COLONOSCOPY  2014    Dr Tamara Regan- Marlene Mendez recall    COLONOSCOPY  03/10/2016    Dr Mcfarland-10 yr recall    COLONOSCOPY N/A 2019    Dr Aysha Arechiga, 5 yr recall    ELBOW SURGERY Right     ENDOMETRIAL ABLATION      HAND SURGERY Right     HERNIA REPAIR      hiatal    HERNIA REPAIR      umbilical    HERNIA REPAIR      PANCREAS SURGERY  2018    Whipple procedure-Dr Marina Sheets [place] EGD [facility] NEEDLE ASPIR/BIOP ALTERED ANATOMY N/A 2018    Dr Moreno Cobb w/fna-Dilation of main pancreatic duct with diffuse change in the pancreatitis noted, area of concern in the neck of the pancreas-strongly suspicious for adenocarcinoma     [place] EGD [facility] NEEDLE ASPIR/BIOP ALTERED ANATOMY N/A 3/6/2018    Dr KVNG Duncan-Pancreatic cancer-Ductal Pulse: 60 55 55 58   Resp: 15 15 16 20   Temp:    98.3 °F (36.8 °C)   TempSrc:    Temporal   SpO2: 100% 99% 98% 99%   Weight:       Height:               MDM  Number of Diagnoses or Management Options  Anemia in neoplastic disease:   Diagnosis management comments: Patient is a 53-year-old young lady who presents with concerns of generalized weakness. She has had to have 2 blood transfusions in the past week. She is with a history of multiple GI bleeds. Hemoccult is not positive today. Her hemoglobin is 7.3. I would expect after having a unit of blood yesterday elevated this would be higher. Etiology of her anemia today is not entirely clear. CT chest/abdomen/pelvis does not show acute changes. Patient is hypotensive. Her white blood cell count is elevated. However, this seems most likely related to her recent Neulasta. She has no fever. No chills. There is no source of infection to suggest this is severe sepsis or septic shock. Patient will be admitted to the ICU for further evaluation and cares. Amount and/or Complexity of Data Reviewed  Clinical lab tests: ordered and reviewed  Tests in the radiology section of CPT®: ordered and reviewed    Patient Progress  Patient progress: stable        REASSESSMENT          CRITICAL CARE TIME   Total Critical Care time was 0 minutes, excluding separately reportable procedures. There was a high probability of clinically significant/life threatening deterioration in the patient's condition which required my urgent intervention. CONSULTS:  IP CONSULT TO HEM/ONC  IP CONSULT TO PALLIATIVE CARE    PROCEDURES:  Unless otherwise noted below, none     Procedures        FINAL IMPRESSION      1.  Anemia in neoplastic disease          DISPOSITION/PLAN   DISPOSITION Admitted 05/12/2020 06:49:58 PM      PATIENT REFERRED TO:  Erma Acharya MD  [address]   [address] [facility]   [phone]            DISCHARGE MEDICATIONS:  Current Discharge Medication

## 2020-05-12 NOTE — ED NOTES
Dr David Beasley @ bedside for occult stool test, Declan Levy accompanying      Freida Lombardi RN  05/12/20 [phone]

## 2020-05-12 NOTE — ED NOTES
Pt stood and transfer [facility]     [facility]  05/12/20 1811

## 2020-05-12 NOTE — H&P
[facility] Medicine H&P    Patient:  Brian Peck  MRN: [id number]    Consulting Physician: Susan Fajardo MD  Reason for Consult: Medical Management  Primacy Care Physician: Julianne Alexander MD    HISTORY OF PRESENT ILLNESS:   The patient is a 64 y.o. female with a history of recurrent pancreatic carcinoma has a history of recurrent episodes of blood loss anemia. She presents with weakness and her Hg was 7.3 gm. She is also hypotensive on intake. We will observe overnight in ICU and ask her oncologist to follow. She was due for repeat scans of the abdomen and chest.  These were done today and unfortunately, she has further disease progression.     Past Medical History:        Diagnosis Date    Acute pancreatitis     Adult BMI <19 kg/sq m     Anemia     Cat esophagus     Biliary obstruction     GERD (gastroesophageal reflux disease)     with Barretts    Hashimoto's thyroiditis     denies takes no med for    Heart murmur     Hypertension     pt denies nor longer takes med for    Low blood sugar     h/o when overweight in the past    MVP (mitral valve prolapse)     Myalgia     Pancreatic adenocarcinoma ([address] 2018    Dr Jada Morrell    Pancreatic cancer ([address] 3/30/2018    Right flank pain     RUQ pain        Past Surgical History:        Procedure Laterality Date    CARDIAC SURGERY      heart cath     SECTION      x 3    CHOLECYSTECTOMY      COLONOSCOPY  years ago    Steve-Dr Teri Pérez per patient    COLONOSCOPY  2014    Dr Thomasenia Sacks- [facility] recall    COLONOSCOPY  03/10/2016    Dr Mcfarland-10 yr recall    COLONOSCOPY N/A 2019    Dr Jasmeet Mac, 5 yr recall    ELBOW SURGERY Right     ENDOMETRIAL ABLATION      HAND SURGERY Right     HERNIA REPAIR      hiatal    HERNIA REPAIR      umbilical    HERNIA REPAIR      PANCREAS SURGERY  2018    Whipple procedure-Dr Corrinne Butt    [place] EGD [facility] NEEDLE ASPIR/BIOP ALTERED ANATOMY N/A 2018    Dr Shea Joaquin has never used smokeless tobacco.  ETOH:   reports previous alcohol use. Family History:       Problem Relation Age of Onset    Heart Attack Mother 46        x 2-had bypass    Hypertension Mother     COPD Father     Liver Cancer Paternal Aunt     Lung Cancer Paternal Aunt     Breast Cancer Maternal Aunt         but  of brain cancer    Diabetes Maternal Uncle     Diabetes Maternal Grandmother     Colon Cancer Neg Hx     Colon Polyps Neg Hx     Esophageal Cancer Neg Hx     Rectal Cancer Neg Hx     Stomach Cancer Neg Hx        ROS:  Review of Systems   Constitutional: Positive for activity change. Negative for fatigue. Respiratory: Negative for cough and shortness of breath. Cardiovascular: Negative for chest pain and leg swelling. Gastrointestinal: Negative for constipation, diarrhea, nausea and vomiting. Genitourinary: Negative for difficulty urinating and dysuria. Musculoskeletal: Positive for gait problem. Negative for arthralgias and back pain. Neurological: Positive for weakness. Negative for dizziness and headaches. Physical Exam:    Vitals: BP (!) 88/56   Pulse 72   Temp 98.5 °F (36.9 °C)   Resp 16   Ht 5' 7\" (1.702 m)   Wt 110 lb (49.9 kg)   SpO2 95%   BMI 17.23 kg/m²     Physical Exam  Vitals signs reviewed. Constitutional:       Appearance: She is well-developed. She is ill-appearing. HENT:      Head: Normocephalic and atraumatic. Eyes:      Conjunctiva/sclera: Conjunctivae normal.      Pupils: Pupils are equal, round, and reactive to light. Cardiovascular:      Rate and Rhythm: Normal rate and regular rhythm. Heart sounds: Normal heart sounds. Pulmonary:      Effort: Pulmonary effort is normal.      Breath sounds: Normal breath sounds. Abdominal:      General: Abdomen is flat. Palpations: Abdomen is soft. Musculoskeletal: Normal range of motion. Skin:     General: Skin is warm and dry.    Neurological:      Mental Status: She is alert appear unremarkable. Normal caliber abdominal aorta with scattered atherosclerotic calcification. SMA is patent. Diffuse body wall edema. No aggressive osseous lesions. 1.  Postoperative change of Whipple with residual stranding and free fluid in the surgical bed. 2.  Residual soft tissue nodular prominence in the retroperitoneum adjacent to the portal vein which is compressed and occluded/thrombosed. The splenic vein is not visualized and presumed thrombosed. LEFT upper quadrant perigastric/periesophageal varices. 3.  Moderate volume ascites. 4.  No evidence of bowel obstruction. Mild distention of the stomach, which may be secondary to slow flow through the gastrojejunostomy. Correlate clinically. 5.  There is a small linear device in the distal esophagus which could represent a hemostasis clip but recommend clinical correlation. Signed by Dr Dontae Olmedo on 5/12/2020 6:08 PM        Assessment and Plan   1. GI Bleed. 2. Metastatic pancreatic carcinoma. 3. Hypotension. Observe in ICU overnight. Ask Oncology to see for continuation of care. Ask Palliative Care to see for setting care goals. Serial H&H. Transfuse PRN.       Holley Walton,

## 2020-05-13 VITALS
TEMPERATURE: 98.6 F | BODY MASS INDEX: 17.56 KG/M2 | DIASTOLIC BLOOD PRESSURE: 85 MMHG | RESPIRATION RATE: 12 BRPM | SYSTOLIC BLOOD PRESSURE: 134 MMHG | WEIGHT: 111.9 LBS | HEART RATE: 58 BPM | HEIGHT: 67 IN | OXYGEN SATURATION: 100 %

## 2020-05-13 PROBLEM — K92.2 GI BLEED: Status: RESOLVED | Noted: 2020-05-12 | Resolved: 2020-05-13

## 2020-05-13 PROBLEM — Z51.5 PALLIATIVE CARE PATIENT: Status: ACTIVE | Noted: 2020-05-13

## 2020-05-13 PROBLEM — Z51.5 PALLIATIVE CARE PATIENT: Status: RESOLVED | Noted: 2020-05-13 | Resolved: 2020-05-13

## 2020-05-13 LAB
BLOOD BANK DISPENSE STATUS: NORMAL
BLOOD BANK DISPENSE STATUS: NORMAL
BLOOD BANK PRODUCT CODE: NORMAL
BLOOD BANK PRODUCT CODE: NORMAL
BPU ID: NORMAL
BPU ID: NORMAL
CA 19-9: 129 U/ML (ref 0–35)
DESCRIPTION BLOOD BANK: NORMAL
DESCRIPTION BLOOD BANK: NORMAL
EKG P AXIS: 59 DEGREES
EKG P-R INTERVAL: 138 MS
EKG Q-T INTERVAL: 402 MS
EKG QRS DURATION: 72 MS
EKG QTC CALCULATION (BAZETT): 401 MS
EKG T AXIS: 46 DEGREES
HCT VFR BLD CALC: 18.1 % (ref 37–47)
HCT VFR BLD CALC: 28 % (ref 37–47)
HEMOGLOBIN: 5.8 G/DL (ref 12–16)
HEMOGLOBIN: 9.3 G/DL (ref 12–16)
MCH RBC QN AUTO: 30.4 PG (ref 27–31)
MCHC RBC AUTO-ENTMCNC: 33.2 G/DL (ref 33–37)
MCV RBC AUTO: 91.5 FL (ref 81–99)
MRSA CULTURE ONLY: NORMAL
PDW BLD-RTO: 18 % (ref 11.5–14.5)
PLATELET # BLD: 97 K/UL (ref 130–400)
PMV BLD AUTO: 10.7 FL (ref 9.4–12.3)
RBC # BLD: 3.06 M/UL (ref 4.2–5.4)
WBC # BLD: 15.7 K/UL (ref 4.8–10.8)

## 2020-05-13 PROCEDURE — 93010 ELECTROCARDIOGRAM REPORT: CPT | Performed by: INTERNAL MEDICINE

## 2020-05-13 PROCEDURE — 2580000003 HC RX 258: Performed by: HOSPITALIST

## 2020-05-13 PROCEDURE — 85018 HEMOGLOBIN: CPT

## 2020-05-13 PROCEDURE — P9016 RBC LEUKOCYTES REDUCED: HCPCS

## 2020-05-13 PROCEDURE — 6360000002 HC RX W HCPCS: Performed by: INTERNAL MEDICINE

## 2020-05-13 PROCEDURE — C9113 INJ PANTOPRAZOLE SODIUM, VIA: HCPCS | Performed by: INTERNAL MEDICINE

## 2020-05-13 PROCEDURE — 2580000003 HC RX 258: Performed by: INTERNAL MEDICINE

## 2020-05-13 PROCEDURE — 86301 IMMUNOASSAY TUMOR CA 19-9: CPT

## 2020-05-13 PROCEDURE — 36415 COLL VENOUS BLD VENIPUNCTURE: CPT

## 2020-05-13 PROCEDURE — 36430 TRANSFUSION BLD/BLD COMPNT: CPT

## 2020-05-13 PROCEDURE — 99253 IP/OBS CNSLTJ NEW/EST LOW 45: CPT | Performed by: INTERNAL MEDICINE

## 2020-05-13 PROCEDURE — 99222 1ST HOSP IP/OBS MODERATE 55: CPT | Performed by: INTERNAL MEDICINE

## 2020-05-13 PROCEDURE — 85014 HEMATOCRIT: CPT

## 2020-05-13 PROCEDURE — 6370000000 HC RX 637 (ALT 250 FOR IP): Performed by: INTERNAL MEDICINE

## 2020-05-13 PROCEDURE — 99221 1ST HOSP IP/OBS SF/LOW 40: CPT | Performed by: NURSE PRACTITIONER

## 2020-05-13 PROCEDURE — 85027 COMPLETE CBC AUTOMATED: CPT

## 2020-05-13 RX ORDER — 0.9 % SODIUM CHLORIDE 0.9 %
20 INTRAVENOUS SOLUTION INTRAVENOUS ONCE
Status: COMPLETED | OUTPATIENT
Start: 2020-05-13 | End: 2020-05-13

## 2020-05-13 RX ADMIN — SODIUM CHLORIDE 20 ML: 9 INJECTION, SOLUTION INTRAVENOUS at 02:23

## 2020-05-13 RX ADMIN — SODIUM CHLORIDE, PRESERVATIVE FREE 10 ML: 5 INJECTION INTRAVENOUS at 08:38

## 2020-05-13 RX ADMIN — SUCRALFATE 1 G: 1 TABLET ORAL at 08:33

## 2020-05-13 RX ADMIN — PANTOPRAZOLE SODIUM 40 MG: 40 INJECTION, POWDER, FOR SOLUTION INTRAVENOUS at 08:32

## 2020-05-13 RX ADMIN — SODIUM CHLORIDE, PRESERVATIVE FREE 10 ML: 5 INJECTION INTRAVENOUS at 08:37

## 2020-05-13 RX ADMIN — SODIUM CHLORIDE: 9 INJECTION, SOLUTION INTRAVENOUS at 05:42

## 2020-05-13 ASSESSMENT — ENCOUNTER SYMPTOMS
DIARRHEA: 0
EYES NEGATIVE: 1
NAUSEA: 1
BLOOD IN STOOL: 1
RESPIRATORY NEGATIVE: 1
SHORTNESS OF BREATH: 0
ABDOMINAL PAIN: 1
NAUSEA: 0
BACK PAIN: 0
COUGH: 0
CONSTIPATION: 0
VOMITING: 0

## 2020-05-13 ASSESSMENT — PAIN SCALES - GENERAL
PAINLEVEL_OUTOF10: 0

## 2020-05-13 NOTE — PROGRESS NOTES
Hospitalist Progress Note    Patient:  Nichole Castro  YOB: 1960  Date of Service: 5/13/2020  MRN: [id number]   Acct: [de-identified]   Primary Care Physician: Barbara Kimball MD  Advance Directive: Full Code  Admit Date: 5/12/2020       Hospital Day: 1  Referring Provider: Chris Choudhury DO    Patient Seen, Chart, Consults, Notes, Labs, Radiology studies reviewed. Subjective:  Nichole Castro is a 64 y.o. female  whom we are following for metastatic pancreatic carcinoma, GI blood loss. Her hemoglobin got down to 5.8 g overnight. She received 2 units of packed cells. Her hemoglobin posttransfusion is now 9.3 g. She did have a dark/black bowel movement overnight. I will ask GI to evaluate. Blood pressure is improved. She is stable for transfer out of ICU at this point. Allergies:  Codeine; Morphine; Morphine and related; Sulfa antibiotics; Neomycin-bacitracin zn-polymyx; Clarithromycin; Dilaudid [hydromorphone hcl];  Hydromorphone; Loperamide hcl; Loperamide hcl; and Neosporin [neomycin-polymyx-gramicid]    Medicines:  Current Facility-Administered Medications   Medication Dose Route Frequency Provider Last Rate Last Dose    ondansetron (ZOFRAN) tablet 8 mg  8 mg Oral Q8H PRN Chris Choudhury DO        promethazine (PHENERGAN) tablet 25 mg  25 mg Oral Q6H PRN Chris Choudhury DO        sucralfate (CARAFATE) tablet 1 g  1 g Oral 4x Daily Chris Choudhury DO   1 g at 05/12/20 2053    sodium chloride flush 0.9 % injection 10 mL  10 mL Intravenous 2 times per day Chris Choudhury DO        sodium chloride flush 0.9 % injection 10 mL  10 mL Intravenous PRN Chris Choudhury DO        acetaminophen (TYLENOL) tablet 650 mg  650 mg Oral Q4H PRN Chris Choudhury DO        promethazine (PHENERGAN) tablet 12.5 mg  12.5 mg Oral Q6H PRN Chris Choudhury DO        Or    ondansetron [facility]) injection 4 mg  4 mg Intravenous Q6H PRN Chris Choudhury DO        0.9 % History    Not on file   Social Needs    Financial resource strain: Not on file    Food insecurity     Worry: Not on file     Inability: Not on file    Transportation needs     Medical: Not on file     Non-medical: Not on file   Tobacco Use    Smoking status: Former Smoker     Packs/day: 0.50     Years: 10.00     Pack years: 5.00     Types: Cigarettes     Last attempt to quit: 2019     Years since quittin.5    Smokeless tobacco: Never Used   Substance and Sexual Activity    Alcohol use: Not Currently    Drug use: No    Sexual activity: Not on file   Lifestyle    Physical activity     Days per week: Not on file     Minutes per session: Not on file    Stress: Not on file   Relationships    Social connections     Talks on phone: Not on file     Gets together: Not on file     Attends Christianity service: Not on file     Active member of club or organization: Not on file     Attends meetings of clubs or organizations: Not on file     Relationship status: Not on file    Intimate partner violence     Fear of current or ex partner: Not on file     Emotionally abused: Not on file     Physically abused: Not on file     Forced sexual activity: Not on file   Other Topics Concern    Not on file   Social History Narrative    Not on file         Review of Systems:    Review of Systems   Constitutional: Negative for activity change and fatigue. Respiratory: Negative for cough and shortness of breath. Cardiovascular: Negative for chest pain and leg swelling. Gastrointestinal: Negative for constipation, diarrhea, nausea and vomiting. Genitourinary: Negative for difficulty urinating and dysuria. Musculoskeletal: Negative for arthralgias and back pain. Neurological: Negative for dizziness and headaches. Objective:  Blood pressure (!) 102/58, pulse 53, temperature 99.4 °F (37.4 °C), temperature source Temporal, resp.  rate 18, height 5' 7\" (1.702 m), weight 111 lb 14.4 oz (50.8 kg), SpO2 99

## 2020-05-13 NOTE — CONSULTS
acute pancreatitis at [facility] on February 2018. Further imaging revealed a pancreatic head mass. Lipase was elevated at 1184 and CA-19-9 elevated 134. The symptoms were going on for several weeks. Weight loss of 10-12 pounds over the last 6 months. · October 2017-CT abdomen without contrast was unremarkable. · 2/2/2018-ultrasound of abdomen showed a pancreatic duct dilation   · 2/02/2018-CT abdomen showed 16 mm low-density lesion in the pancreas at the junction of the head of the body. No intra-abdominal adenopathy. No liver metastasis. · 2/7/2018-the patient underwent EGD/EUS and FNA biopsy of a pancreatic mass by Dr. Holly Curran at [facility] . EUS showed inflammatory changes consistent with a recent history of pancreatitis. Main pancreatic duct was dilated. No concerning peripancreatic adenopathy. No definite mass was seen by a more diffuse hypoechoic area measuring 1.8 x 2.2 cm in the head of the pancreas. Pathology was consistent with a group of markedly atypical cells strongly suspicious for adenocarcinoma. · 2/28/2018-CT pancreatic protocol showed a poorly defined area of decreased enhancement located in the head of the pancreas measuring 1.8 x 1.4 x 1.7 cm with moderate meditation of the pancreatic duct. Well defined sharply marginated low density nodule located in the tail of the pancreas measuring 1.5 x 1.3 x 1.5 cm (IPMN?). · 3/6/2018-CA 19-9 was elevated at 150. Repeat EGD was performed by Dr. Bernadette Gongora due to the inconclusive FNA resulted on 2/7/2018. Pathology FNA was consistent with pancreatic adenocarcinoma. · 3/12/2018-she was first seen by me. No evidence of distant disease. Referral to Surgical oncology. CT chest to complete staging. CA-19-9 430. · 3/16/2018-CT of the chest was unremarkable for intrathoracic metastatic disease   · Surgery consultation at [facility] March 2018- with Dr Alena Harvey. She was not a surgical candidate upfront.  Recommended neoadjuvant chemotherapy. · 4/3/2018-started on neoadjuvant chemotherapy with FOLFORINOX. · 4/11/2018-she developed CBD obstruction had a CBD stent placed by Dr. Asya Marti. · 4/3/2018 through 6/4/2018 Neoadjuvant chemotherapy FOLFORINOX ×5 Biweekly cycles   · August 2018- XRT 30 Gy/Xeloda   · 08/30/3018- Whipple procedure at [facility] by Dr. Ladonna Schneider  · Recommended another 7 biweekly cycles of modified FOLFORINOX. · 9/5/2018-CT of the chest abdomen pelvis was unremarkable for metastatic disease. · 1/14/2019-CT abdomen and pelvis at [facility] showed no evidence of metastasis in the chest abdomen pelvis. · 5/21/2019-CT chest, abdomen, pelvis at [facility] showed no evidence of metastatic disease   · 5/21/20198470-CX-92-9 = 42   · 06/12/2019- CA-19-9 = 154. Discussed with the patient. We'll also discuss with [facility]. · 7/1/2019-CT chest, abdomen, pelvis showed a soft tissue mass measuring 1.4 x 1.1 cm, not present on prior scan from January 2019, concerning for recurrence. Stable hypodense in the liver, too small to characterize. Subcentimeter ill-defined area of low attenuation liver may represent an early metastasis.    · 7/3/2019-recommended palliative chemotherapy with nab paclitaxel 125mg/m² IV over 30 minutes on day 1 and gemcitabine 600 mg/m² IV over 10mg/m2/min (fixed dose rate) on day 1  · 7/2/2019-CA 19 9 = 114  · 7/6/2019- extended genetic panel negative for a deleterious mutation (invitae)  · 7/9/2019- CA-19-9 = 225  · 8/6/2019- CA-19-9 = 171  · 9/3/2497-CV-92-9 = 198   · 9/13/20194090-DU-45-9 = 98 at [facility]  · 9/13/2019-CT chest abdomen pelvis at [facility] showed a soft tissue thickening in the pancreatic bed with persistent narrowing of the portal SMV confluence.  Superimposed tumor remains a possibility.  The new low-density lesion in the periphery of the liver seen on the prior exam is no longer appreciated and likely represents treatment effect.  Nodular enhancement may represent perfusion change 2020    Dr Danita Haley erosion/ulceration at the suture line, post whipple surgical changes    UPPER GASTROINTESTINAL ENDOSCOPY N/A 3/9/2020    Dr Danita Haley erosion along the previous whipple suture line    UPPER GASTROINTESTINAL ENDOSCOPY N/A 2020    Dr KVNG Duncan-w/hemostatic clip placement x 1-Allie Peres tear at [facility]-Likely culprit lesion for current presentation       Social History:    Social History     Socioeconomic History    Marital status: Single     Spouse name: Not on file    Number of children: Not on file    Years of education: Not on file    Highest education level: Not on file   Occupational History    Not on file   Social Needs    Financial resource strain: Not on file    Food insecurity     Worry: Not on file     Inability: Not on file   [employer] needs     Medical: Not on file     Non-medical: Not on file   Tobacco Use    Smoking status: Former Smoker     Packs/day: 0.50     Years: 10.00     Pack years: 5.00     Types: Cigarettes     Last attempt to quit: 2019     Years since quittin.5    Smokeless tobacco: Never Used   Substance and Sexual Activity    Alcohol use: Not Currently    Drug use: No    Sexual activity: Not on file   Lifestyle    Physical activity     Days per week: Not on file     Minutes per session: Not on file    Stress: Not on file   Relationships    Social connections     Talks on phone: Not on file     Gets together: Not on file     Attends Yazidism service: Not on file     Active member of club or organization: Not on file     Attends meetings of clubs or organizations: Not on file     Relationship status: Not on file    Intimate partner violence     Fear of current or ex partner: Not on file     Emotionally abused: Not on file     Physically abused: Not on file     Forced sexual activity: Not on file   Other Topics Concern    Not on file   Social History Narrative    Not on file       Family History:   Family History   Problem

## 2020-05-13 NOTE — CONSULTS
Peres tear at [facility]-Likely culprit lesion for current presentation     Social History:   Social History     Tobacco Use    Smoking status: Former Smoker     Packs/day: 0.50     Years: 10.00     Pack years: 5.00     Types: Cigarettes     Last attempt to quit: 2019     Years since quittin.5    Smokeless tobacco: Never Used   Substance Use Topics    Alcohol use: Not Currently     Family History:   Family History   Problem Relation Age of Onset    Heart Attack Mother 46        x 2-had bypass    Hypertension Mother     COPD Father     Liver Cancer Paternal Aunt     Lung Cancer Paternal Aunt     Breast Cancer Maternal Aunt         but  of brain cancer    Diabetes Maternal Uncle     Diabetes Maternal Grandmother     Colon Cancer Neg Hx     Colon Polyps Neg Hx     Esophageal Cancer Neg Hx     Rectal Cancer Neg Hx     Stomach Cancer Neg Hx      Home Meds:  Prior to Admission medications    Medication Sig Start Date End Date Taking?  Authorizing Provider   pantoprazole (PROTONIX) 40 MG tablet Take 1 tablet by mouth 2 times daily 20  Yes Tamia Ramirez MD   lipase-protease-amylase (ZENPEP) 91929-16057 units CPEP delayed release capsule Take 1-2 capsules by mouth 3 times daily (with meals) Only takes as needed   Yes Historical Provider, MD   Cyanocobalamin (VITAMIN B 12 PO) Take by mouth   Yes Historical Provider, MD   promethazine (PHENERGAN) 25 MG tablet Take 1 tablet by mouth every 6 hours as needed for Nausea 20  Yes Tamia Ramirez MD   sucralfate (CARAFATE) 1 GM tablet Take 1 tablet by mouth 4 times daily 20  Yes Tamia Ramirez MD   vitamin E 400 UNIT capsule Take 400 Units by mouth daily   Yes Historical Provider, MD   NONFORMULARY daily Tumeric otc   Yes Historical Provider, MD   ondansetron (ZOFRAN) 8 MG tablet Take 1 tablet by mouth every 8 hours as needed for Nausea or Vomiting 19  Yes Tamia Ramirez MD   Multiple Vitamins-Minerals (THERAPEUTIC MULTIVITAMIN-MINERALS) tablet Take 1 tablet by mouth daily   Yes Historical Provider, MD   lidocaine-prilocaine (EMLA) 2.5-2.5 % cream Apply to port area and cover with plastic wrap one hour prior to use. 1/22/20   Rita Cary MD   lipase-protease-amylase (ZENPEP) 5000-78870 units CPEP delayed release capsule Take 1 capsule by mouth 4 times daily (with meals and nightly) 1/20/20 4/19/20  Rita Cary MD   lipase-protease-amylase (ZENPEP) 5000 units delayed release capsule Take 1 capsule by mouth 4 times daily (with meals and nightly) Take with meals and snacks for a total of 8 daily 10/2/19 12/31/19  Rita Cary MD      Allergies:  Codeine; Morphine; Morphine and related; Sulfa antibiotics; Neomycin-bacitracin zn-polymyx; Clarithromycin; Dilaudid [hydromorphone hcl];  Hydromorphone; Loperamide hcl; Loperamide hcl; and Neosporin [neomycin-polymyx-gramicid]      Current Meds:      sucralfate  1 g Oral 4x Daily    sodium chloride flush  10 mL Intravenous 2 times per day    pantoprazole  40 mg Intravenous Q12H    And    sodium chloride (PF)  10 mL Intravenous Q12H        sodium chloride Stopped (05/13/20 1229)       PRN Meds:  ondansetron, promethazine, sodium chloride flush, acetaminophen, promethazine **OR** ondansetron        ROS:  ROS NEGATIVE EXCEPT THOSE MARKED WITH AN \"X\"    GENERAL: [] Fevers, [] chills, [x] generalized weakness, [x] weight loss, []weight gain, [] anorexia  Skin/Breast: [] jaundice, [] new rashes, [] itching   Eyes/Ears/Nose/Mouth/Throat: [] change in vision, [] double vision, [] light headiness, [] vertigo  CARDIOVASCULAR: [] chest pain, [] palpitations, [x] syncope, [] dyspnea on exertion, [] orthopnea  RESPIRATORY: [] SOB, [] cough, [] wheezing, [] hemoptysis  GI: [x] dark stools, [] bloody stools, [] BRBPR, [] abdominal pain, [] GERD like symptoms, [] nausea, [] vomiting, [] hematemesis, [] jaundice, [] constipation, [] diarrhea, [] hemorrhoids, [] change in bowel habits, [] bowel incontinence  : [] Dysuria, [] urgency, [] frequency, [] change in urine color, [] discharge  MUSCULOSKELETAL: [] muscle pain, [] muscle swelling, [] joint pain, [] muscle weakness  Neurological/Psychiatric: [] Sensory disturbances, [] motor disturbances, [] difficulty with speech, [] paresthesias, [] paralysis, [] depression, [] anxiety   Allergy/Immunological/Lymphatic/Endocrine: [x] anemia, [] rashes, [] polyuria, [] polydypsia      Physical Exam:  Vitals:    05/13/20 0703 05/13/20 0800 05/13/20 0900 05/13/20 1000   BP:  117/72  134/85   Pulse: 53 53 55 58   Resp:  15 14 12   Temp:  98.6 °F (37 °C)     TempSrc:  Temporal     SpO2:  99% 100% 100%   Weight:       Height:           Constitutional: [x] NAD, [x] of stated age, [x] underweight  Eyes: [x] conjunctiva clear, [x] Non inflamed irises, [x] no scleral icterus  ENT/Mouth: [x]  Nares patent with pink mucosa, [x] oropharynx clear without exudates or erythema, [x] hearing grossly normal  Head/Neck: [x] symmetrical, [x] supple  Lungs: [x] respirations non labored with good effort, [x] CTA bilaterally, [x] no respiratory distress  Heart: [x] normal S1S2, [x]  [place], [x] pedal pulses preserved 2/4 bilaterally, [x] no LE edema  Abdomen: [x] +BSx4, [x] NTND, [x] soft, [x] no guarding, [x] no peritoneal signs  Muscuoskeletal: [x]  Normal nails bilaterally, [x] Normal digits bilaterally, [x] no muscle atrophy  Skin/SubQ: [x] No jaundice, [x] warm, dry skin, [x] no rashes on inspection  Neurologic: [x]  Sensation grossly intact, [x] no slurred speech, [x]  No focal deficits  Psychiatric: [x]  Orientated to person, place, and time; [x] mood and affect unremarkable, [x] memory recent and remote intact      Labs:     Recent Labs     05/12/20  1525 05/13/20  0048 05/13/20  0615   WBC 33.3*  --  15.7*   RBC 2.36*  --  3.06*   HGB 7.3* 5.8* 9.3*   HCT 22.8* 18.1* 28.0*   MCV 96.6  --  91.5   MCH 30.9  --  30.4   MCHC 32.0*  --  33.2   *

## 2020-05-14 ENCOUNTER — CARE COORDINATION (OUTPATIENT)
Dept: CASE MANAGEMENT | Age: 60
End: 2020-05-14

## 2020-05-14 NOTE — CARE COORDINATION
[facility] 45 Transitions Initial Follow Up Call    Call within 2 business days of discharge: Yes    Patient: Ellen Vences Patient : 1960   MRN: [id number]    Discharge Date: 20 RARS: Readmission Risk Score: 26      Last Discharge [facility]       Complaint Diagnosis Description Type Department Provider    20 Extremity Weakness; Emesis Anemia in neoplastic disease ED to Hosp-Admission (Discharged) (ADMITTED) [facility] Oxana Ellsworth DO; Soledad Blanton. .. Spoke with: [address]: [address]       Non-face-to-face services provided:  Chart review for COVID 19 Risk Monitoring Call      Care Transitions 24 Hour Call    Schedule Follow Up Appointment with PCP:  Completed  Do you have any ongoing symptoms?:  No  Do you have a copy of your discharge instructions?:  Yes  Do you have all of your prescriptions and are they filled?:  Yes  Have you been contacted by a [facility] Pharmacist?:  No  Have you scheduled your follow up appointment?:  Yes  How are you going to get to your appointment?:  Other  Were you discharged with any Home Care or Post Acute Services:  No  Do you feel like you have everything you need to keep you well at home?:  Yes  Care Transitions Interventions         Follow Up: Placed a call to the patient and spoke with her briefly about her status today. She reported that she is doing very well, just weak, tired. She said she cannot seem to get her strength built up and if she is not any stronger this coming week, she is going to ask the oncologist to hold off a week on her chemo. She said she just can't seem to get built up before it is time for any treatment. She denied any respiratory or other abnormal symptoms. She is drinking and sticking to a liquid diet. She has all of her meds. She has follow up appointments scheduled. Discussed COVID 19 information as below. Will follow up as indicated.      Patient contacted regarding [phone]

## 2020-05-15 ENCOUNTER — HOSPITAL ENCOUNTER (OUTPATIENT)
Dept: INFUSION THERAPY | Age: 60
Discharge: HOME | End: 2020-05-15
Payer: COMMERCIAL

## 2020-05-15 DIAGNOSIS — C25.9 MALIGNANT NEOPLASM OF PANCREAS, UNSPECIFIED LOCATION OF MALIGNANCY (HCC): ICD-10-CM

## 2020-05-15 LAB
BASOPHILS ABSOLUTE: 0.03 K/UL (ref 0.01–0.08)
BASOPHILS RELATIVE PERCENT: 0.3 % (ref 0.1–1.2)
EOSINOPHILS ABSOLUTE: 0.1 K/UL (ref 0.04–0.54)
EOSINOPHILS RELATIVE PERCENT: 1.1 % (ref 0.7–7)
HCT VFR BLD CALC: 28.6 % (ref 34.1–44.9)
HEMOGLOBIN: 9.3 G/DL (ref 11.2–15.7)
LYMPHOCYTES ABSOLUTE: 1.03 K/UL (ref 1.18–3.74)
LYMPHOCYTES RELATIVE PERCENT: 11.4 % (ref 19.3–53.1)
MCH RBC QN AUTO: 31.4 PG (ref 25.6–32.2)
MCHC RBC AUTO-ENTMCNC: 32.5 G/DL (ref 32.3–35.5)
MCV RBC AUTO: 96.6 FL (ref 79.4–94.8)
MONOCYTES ABSOLUTE: 1.6 K/UL (ref 0.24–0.82)
MONOCYTES RELATIVE PERCENT: 17.7 % (ref 4.7–12.5)
NEUTROPHILS ABSOLUTE: 6.28 K/UL (ref 1.56–6.13)
NEUTROPHILS RELATIVE PERCENT: 69.5 % (ref 34–71.1)
PDW BLD-RTO: 18.3 % (ref 11.7–14.4)
PLATELET # BLD: 87 K/UL (ref 182–369)
PMV BLD AUTO: 11.3 FL (ref 7.4–10.4)
RBC # BLD: 2.96 M/UL (ref 3.93–5.22)
WBC # BLD: 9.04 K/UL (ref 3.98–10.04)

## 2020-05-15 PROCEDURE — 85025 COMPLETE CBC W/AUTO DIFF WBC: CPT

## 2020-05-18 ENCOUNTER — VIRTUAL VISIT (OUTPATIENT)
Dept: GASTROENTEROLOGY | Age: 60
End: 2020-05-18
Payer: COMMERCIAL

## 2020-05-18 PROCEDURE — 99442 PR PHYS/QHP TELEPHONE EVALUATION 11-20 MIN: CPT | Performed by: INTERNAL MEDICINE

## 2020-05-18 NOTE — PROGRESS NOTES
Francis Alicea is a 64 y.o. female evaluated via telephone on 5/18/2020. Consent:  She and/or health care decision maker is aware that that she may receive a bill for this telephone service, depending on her insurance coverage, and has provided verbal consent to proceed: Yes      Documentation:  I communicated with the patient and/or health care decision maker about continued anemia and blood transfusion requirement. .   Details of this discussion including any medical advice provided: We are discussing need for further endoscopy vs watchful waiting. Patient with know malignancy on chemotherapy and having recurrent anemia. I affirm this is a Patient Initiated Episode with a Patient who has not had a related appointment within my department in the past 7 days or scheduled within the next 24 hours. Stool really dark. Red blood in underwear this past weekend and was seen in hospital with multiple units of blood given. She is aware that this is likely from her marginal ulceration from Whipple. She has known recurrent disease- followed by Dr Kunal Gray    Colonoscopy in Dec 2019 - unrevealing at this time. BM's upwards to 3 times per day. Claudino - going to try and decrease chemo therapy regimen. Trying to wait one more week to recover from the anemia/GI bleeding. Plan  - continue f/u with Dr Claudy Muro currently rx'd PPI and Carafate   - she wishes to defer any further endoscopic evaluation unless it's a true emergency.      Patient identification was verified at the start of the visit: Yes    Total Time: minutes: 11-20 minutes    Note: not billable if this call serves to triage the patient into an appointment for the relevant concern      Lyndsey Cyr

## 2020-05-19 ENCOUNTER — HOSPITAL ENCOUNTER (OUTPATIENT)
Dept: INFUSION THERAPY | Age: 60
Discharge: HOME | End: 2020-05-19
Payer: COMMERCIAL

## 2020-05-19 DIAGNOSIS — C25.9 MALIGNANT NEOPLASM OF PANCREAS, UNSPECIFIED LOCATION OF MALIGNANCY (HCC): ICD-10-CM

## 2020-05-19 LAB
BASOPHILS ABSOLUTE: 0.04 K/UL (ref 0.01–0.08)
BASOPHILS RELATIVE PERCENT: 0.3 % (ref 0.1–1.2)
EOSINOPHILS ABSOLUTE: 0.03 K/UL (ref 0.04–0.54)
EOSINOPHILS RELATIVE PERCENT: 0.2 % (ref 0.7–7)
HCT VFR BLD CALC: 27.5 % (ref 34.1–44.9)
HEMOGLOBIN: 8.9 G/DL (ref 11.2–15.7)
LYMPHOCYTES ABSOLUTE: 0.99 K/UL (ref 1.18–3.74)
LYMPHOCYTES RELATIVE PERCENT: 6.8 % (ref 19.3–53.1)
MCH RBC QN AUTO: 32.4 PG (ref 25.6–32.2)
MCHC RBC AUTO-ENTMCNC: 32.4 G/DL (ref 32.3–35.5)
MCV RBC AUTO: 100 FL (ref 79.4–94.8)
MONOCYTES ABSOLUTE: 1.98 K/UL (ref 0.24–0.82)
MONOCYTES RELATIVE PERCENT: 13.5 % (ref 4.7–12.5)
NEUTROPHILS ABSOLUTE: 11.59 K/UL (ref 1.56–6.13)
NEUTROPHILS RELATIVE PERCENT: 79.2 % (ref 34–71.1)
PDW BLD-RTO: 20 % (ref 11.7–14.4)
PLATELET # BLD: 110 K/UL (ref 182–369)
PMV BLD AUTO: 10.6 FL (ref 7.4–10.4)
RBC # BLD: 2.75 M/UL (ref 3.93–5.22)
WBC # BLD: 14.63 K/UL (ref 3.98–10.04)

## 2020-05-19 PROCEDURE — 85025 COMPLETE CBC W/AUTO DIFF WBC: CPT

## 2020-05-21 ENCOUNTER — CARE COORDINATION (OUTPATIENT)
Dept: CASE MANAGEMENT | Age: 60
End: 2020-05-21

## 2020-05-21 ENCOUNTER — APPOINTMENT (OUTPATIENT)
Dept: INFUSION THERAPY | Age: 60
End: 2020-05-21
Payer: COMMERCIAL

## 2020-05-21 NOTE — CARE COORDINATION
[facility] 45 Transitions Follow Up Call    2020    Patient: Pan Snyder  Patient : 1960   MRN: [id number]  Reason for Admission:   Discharge Date: 20 RARS: Readmission Risk Score: 26         Spoke with: Thomas Jung 668 Transitions Subsequent and Final Call    Subsequent and Final Calls  Do you have any ongoing symptoms?:  No  Have your medications changed?:  No  Do you have any questions related to your medications?:  No  Do you currently have any active services?:  No  Do you have any needs or concerns that I can assist you with?:  No  Identified Barriers:  None  Care Transitions Interventions  Other Interventions: Follow Up : Spoke with patient today for follow up COVID-19 call. No s/s of COVID-19 at this time. She is aware of CDC guidelines. She says she is off chemo this week and will pick back up next week. She was so weak and not getting her strength, she wanted to push it back a week to try to regain some strength. She says she is eating some, trying to do better. No problems or concerns at this time. See COVID-19 monitoring below, will follow up at a later time. Future Appointments   Date Time Provider Sy Angelo   2020  9:00 AM SCHEDULE, [facility] TREAT[facility] [place] HOD   2020 12:00 PM Waynard Denver, MD [facility]   2020  1:30 PM SCHEDULE, [facility]   2020  1:30 PM Markus Mancini MD [facility]   Patient contacted regarding COVID-19 risk and screening. Care Transition Nurse/ Ambulatory Care Manager contacted the patient by telephone to perform follow-up assessment. Verified name and  with patient as identifiers. Patient has following risk factors of: Pancreatic Cancer, on Chemo      Symptoms reviewed with patient who verbalized the following symptoms: no new symptoms and no worsening symptoms.       Due to no new or worsening symptoms encounter was not routed to

## 2020-05-27 ENCOUNTER — OFFICE VISIT (OUTPATIENT)
Dept: HEMATOLOGY | Age: 60
End: 2020-05-27
Payer: COMMERCIAL

## 2020-05-27 ENCOUNTER — HOSPITAL ENCOUNTER (OUTPATIENT)
Dept: INFUSION THERAPY | Age: 60
Discharge: HOME | End: 2020-05-27
Payer: COMMERCIAL

## 2020-05-27 VITALS
WEIGHT: 123 LBS | DIASTOLIC BLOOD PRESSURE: 70 MMHG | OXYGEN SATURATION: 95 % | HEIGHT: 67 IN | TEMPERATURE: 97.9 F | BODY MASS INDEX: 19.3 KG/M2 | SYSTOLIC BLOOD PRESSURE: 118 MMHG | HEART RATE: 61 BPM

## 2020-05-27 DIAGNOSIS — C25.9 MALIGNANT NEOPLASM OF PANCREAS, UNSPECIFIED LOCATION OF MALIGNANCY (HCC): ICD-10-CM

## 2020-05-27 LAB
BASOPHILS ABSOLUTE: 0.05 K/UL (ref 0.01–0.08)
BASOPHILS RELATIVE PERCENT: 1 % (ref 0.1–1.2)
EOSINOPHILS ABSOLUTE: 0.11 K/UL (ref 0.04–0.54)
EOSINOPHILS RELATIVE PERCENT: 2.3 % (ref 0.7–7)
HCT VFR BLD CALC: 19 % (ref 34.1–44.9)
HEMOGLOBIN: 6.3 G/DL (ref 11.2–15.7)
LYMPHOCYTES ABSOLUTE: 0.66 K/UL (ref 1.18–3.74)
LYMPHOCYTES RELATIVE PERCENT: 13.6 % (ref 19.3–53.1)
MCH RBC QN AUTO: 32 PG (ref 25.6–32.2)
MCHC RBC AUTO-ENTMCNC: 33.2 G/DL (ref 32.3–35.5)
MCV RBC AUTO: 96.4 FL (ref 79.4–94.8)
MONOCYTES ABSOLUTE: 0.63 K/UL (ref 0.24–0.82)
MONOCYTES RELATIVE PERCENT: 13 % (ref 4.7–12.5)
NEUTROPHILS ABSOLUTE: 3.4 K/UL (ref 1.56–6.13)
NEUTROPHILS RELATIVE PERCENT: 70.1 % (ref 34–71.1)
PDW BLD-RTO: 19.9 % (ref 11.7–14.4)
PLATELET # BLD: 208 K/UL (ref 182–369)
PMV BLD AUTO: 9.7 FL (ref 7.4–10.4)
RBC # BLD: 1.97 M/UL (ref 3.93–5.22)
WBC # BLD: 4.85 K/UL (ref 3.98–10.04)

## 2020-05-27 PROCEDURE — 85025 COMPLETE CBC W/AUTO DIFF WBC: CPT

## 2020-05-27 PROCEDURE — 99212 OFFICE O/P EST SF 10 MIN: CPT

## 2020-05-27 PROCEDURE — 99215 OFFICE O/P EST HI 40 MIN: CPT | Performed by: INTERNAL MEDICINE

## 2020-05-27 RX ORDER — METHYLPREDNISOLONE SODIUM SUCCINATE 125 MG/2ML
125 INJECTION, POWDER, LYOPHILIZED, FOR SOLUTION INTRAMUSCULAR; INTRAVENOUS ONCE
Status: CANCELLED | OUTPATIENT
Start: 2020-05-28

## 2020-05-27 RX ORDER — SODIUM CHLORIDE 0.9 % (FLUSH) 0.9 %
20 SYRINGE (ML) INJECTION PRN
Status: CANCELLED | OUTPATIENT
Start: 2020-05-28

## 2020-05-27 RX ORDER — SODIUM CHLORIDE 9 MG/ML
INJECTION, SOLUTION INTRAVENOUS CONTINUOUS
Status: CANCELLED | OUTPATIENT
Start: 2020-05-28

## 2020-05-27 RX ORDER — 0.9 % SODIUM CHLORIDE 0.9 %
250 INTRAVENOUS SOLUTION INTRAVENOUS ONCE
Status: CANCELLED | OUTPATIENT
Start: 2020-05-28

## 2020-05-27 RX ORDER — DIPHENHYDRAMINE HYDROCHLORIDE 50 MG/ML
50 INJECTION INTRAMUSCULAR; INTRAVENOUS ONCE
Status: CANCELLED | OUTPATIENT
Start: 2020-05-28

## 2020-05-27 RX ORDER — EPINEPHRINE 1 MG/ML
0.3 INJECTION, SOLUTION, CONCENTRATE INTRAVENOUS PRN
Status: CANCELLED | OUTPATIENT
Start: 2020-05-28

## 2020-05-27 NOTE — PROGRESS NOTES
metastasis. · 7/3/2019-recommended palliative chemotherapy with nab paclitaxel 125mg/m² IV over 30 minutes on day 1 and gemcitabine 600 mg/m² IV over 10mg/m2/min (fixed dose rate) on day 1  · 7/2/2019-CA 19 9 = 114  · 7/6/2019- extended genetic panel negative for a deleterious mutation (invitae)  · 7/9/2019- CA-19-9 = 225  · 8/6/2019- CA-19-9 = 171  · 9/3/6791-YG-87-9 = 198   · 9/13/20193762-WP-87-9 = 98 at [facility]  · 9/13/2019-CT chest abdomen pelvis at [facility] showed a soft tissue thickening in the pancreatic bed with persistent narrowing of the portal SMV confluence.  Superimposed tumor remains a possibility.  The new low-density lesion in the periphery of the liver seen on the prior exam is no longer appreciated and likely represents treatment effect.  Nodular enhancement may represent perfusion change in the region on image 187. · 9/13/2018- she was recommended to continue current treatment with Gemzar/Abraxane  · 11/1/2019- she was hospitalized with GI bleed.  An upper endoscopy showed ulceration at the efferent loop of the Whipple's procedure  · 10/29/4088-UC-48-9 =317  · 11/21/2019-CA-19-9 = 183  · 11/19/2019- CT chest abdomen pelvis showed no evidence of gross disease progression. Improvement of the caliber of what may be a middle colic vein that drains back to the SMV. There is still persistent narrowing of the of the upper SMV at the juncture with the portal vein.  No definite evidence of liver metastases at this time. No definite evidence of pulmonary metastases.  However, question of slight increase in prominence of subtle soft tissue thickening within the right paracolic gutter could be indicative of metastatic disease to peritoneum.   · 12/9/2019- colonoscopy by GI was unremarkable.  This was performed for complaints of maroon-colored stools.   · 1/23/20209467-SN-56-9 = 520  · 1/28/2020- CT chest abdomen pelvis at [facility] showed progressive disease with recurrence at the retroperitoneal just Maternal Grandmother     Colon Cancer Neg Hx     Colon Polyps Neg Hx     Esophageal Cancer Neg Hx     Rectal Cancer Neg Hx     Stomach Cancer Neg Hx         Current Outpatient Medications   Medication Sig Dispense Refill    pantoprazole (PROTONIX) 40 MG tablet Take 1 tablet by mouth 2 times daily 60 tablet 3    lipase-protease-amylase (ZENPEP) 89702-81971 units CPEP delayed release capsule Take 1-2 capsules by mouth 3 times daily (with meals) Only takes as needed      Cyanocobalamin (VITAMIN B 12 PO) Take by mouth      promethazine (PHENERGAN) 25 MG tablet Take 1 tablet by mouth every 6 hours as needed for Nausea 30 tablet 2    sucralfate (CARAFATE) 1 GM tablet Take 1 tablet by mouth 4 times daily 360 tablet 1    lidocaine-prilocaine (EMLA) 2.5-2.5 % cream Apply to port area and cover with plastic wrap one hour prior to use. 1 Tube 1    lipase-protease-amylase (ZENPEP) 5000-38136 units CPEP delayed release capsule Take 1 capsule by mouth 4 times daily (with meals and nightly) 360 capsule 0    vitamin E 400 UNIT capsule Take 400 Units by mouth daily      NONFORMULARY daily Tumeric otc      ondansetron (ZOFRAN) 8 MG tablet Take 1 tablet by mouth every 8 hours as needed for Nausea or Vomiting 30 tablet 3    Multiple Vitamins-Minerals (THERAPEUTIC MULTIVITAMIN-MINERALS) tablet Take 1 tablet by mouth daily      lipase-protease-amylase (ZENPEP) 5000 units delayed release capsule Take 1 capsule by mouth 4 times daily (with meals and nightly) Take with meals and snacks for a total of 8 daily 720 capsule 3     No current facility-administered medications for this visit.          REVIEW OF SYSTEMS:    Constitutional: no fever, no night sweats,  fatigue;   HEENT: no blurring of vision, no double vision, no hearing difficulty, no tinnitus,no ulceration, no dysphagia  Lungs: no cough, no shortness of breath, no wheeze;   CVS: no palpitation, no chest pain, no shortness of breath;  GI: epigastric abdominal pain, no nausea , no vomiting, no constipation; melanotic stools  VIKRAM: no dysuria, frequency and urgency, no hematuria, no kidney stones;   Musculoskeletal: no joint pain, swelling , stiffness;   Endocrine: no polyuria, polydypsia, no cold or heat intolerence; Hematology/lymphatic: no easy brusing or bleeding, no hx of clotting disorder; no peripheral adenopathy. Dermatology: no skin rash, no eczema, no pruritis;   Psychiatry: no depression, no anxiety,no panic attacks, no suicide ideation; Neurology: no syncope, no seizures, no numbness or tingling of hands, no numbness or tingling of feet, no paresis;     PHYSICAL EXAM:    Vitals signs:  /70   Pulse 61   Temp 97.9 °F (36.6 °C)   Ht 5' 7\" (1.702 m)   Wt 123 lb (55.8 kg)   SpO2 95%   BMI 19.26 kg/m²    Pain scale:  Pain Score:   0 - No pain   CONSTITUTIONAL: Alert, appropriate, no acute distress,   EYES: Non icteric, EOM intact, pupils equal round and reactive to light and accommodation. ENT: Oral mucus membranes moist, no oral pharyngeal lesions. External inspection of ears and nose are normal.   NECK: Supple, no masses. No palpable thyroid mass    CHEST/LUNGS: CTA bilaterally, normal respiratory effort   CARDIOVASCULAR: RRR, no murmurs. No lower extremity edema   ABDOMEN: soft non-tender, active bowel sounds, no hepatosplenomegaly. No palpable masses. EXTREMITIES: warm, Full ROM of all fours extremities. No focal weakness. SKIN: warm, dry with no rashes or lesions  LYMPH: No cervical, clavicular, axillary, or inguinal lymphadenopathy  NEUROLOGIC: follows commands, non focal.   PSYCH: mood and affect appropriate. Alert and oriented to time and place and person.     Relevant Lab findings/reviewed by me:  YRE:5/26/2847  WBC-4.85  HGB-6.3  PLT-208,000  Neut-3.40    5/12/2020  BUN-9  Creatinine-0.7  Calcium-8.0  Total Protein-4.9  Albumin-2.7  Alk Phos-193  ALT-12  AST-15  CA19.9-129    Relevant Imaging studies/reviewed by me:  Ct Chest W

## 2020-05-28 ENCOUNTER — CARE COORDINATION (OUTPATIENT)
Dept: CASE MANAGEMENT | Age: 60
End: 2020-05-28

## 2020-05-28 ENCOUNTER — HOSPITAL ENCOUNTER (OUTPATIENT)
Dept: INFUSION THERAPY | Age: 60
Setting detail: INFUSION SERIES
Discharge: HOME | End: 2020-05-28
Payer: COMMERCIAL

## 2020-05-28 VITALS
DIASTOLIC BLOOD PRESSURE: 76 MMHG | HEART RATE: 52 BPM | TEMPERATURE: 97.6 F | SYSTOLIC BLOOD PRESSURE: 121 MMHG | RESPIRATION RATE: 18 BRPM | OXYGEN SATURATION: 100 %

## 2020-05-28 DIAGNOSIS — D64.9 SYMPTOMATIC ANEMIA: Primary | ICD-10-CM

## 2020-05-28 DIAGNOSIS — D64.81 ANEMIA ASSOCIATED WITH CHEMOTHERAPY: ICD-10-CM

## 2020-05-28 DIAGNOSIS — T45.1X5A ANEMIA ASSOCIATED WITH CHEMOTHERAPY: ICD-10-CM

## 2020-05-28 PROCEDURE — P9016 RBC LEUKOCYTES REDUCED: HCPCS

## 2020-05-28 PROCEDURE — 6360000002 HC RX W HCPCS: Performed by: INTERNAL MEDICINE

## 2020-05-28 PROCEDURE — 86923 COMPATIBILITY TEST ELECTRIC: CPT

## 2020-05-28 PROCEDURE — 86901 BLOOD TYPING SEROLOGIC RH(D): CPT

## 2020-05-28 PROCEDURE — 2580000003 HC RX 258: Performed by: INTERNAL MEDICINE

## 2020-05-28 PROCEDURE — 86900 BLOOD TYPING SEROLOGIC ABO: CPT

## 2020-05-28 PROCEDURE — 86850 RBC ANTIBODY SCREEN: CPT

## 2020-05-28 PROCEDURE — 36430 TRANSFUSION BLD/BLD COMPNT: CPT

## 2020-05-28 RX ORDER — SODIUM CHLORIDE 0.9 % (FLUSH) 0.9 %
20 SYRINGE (ML) INJECTION PRN
Status: CANCELLED | OUTPATIENT
Start: 2020-05-28

## 2020-05-28 RX ORDER — 0.9 % SODIUM CHLORIDE 0.9 %
250 INTRAVENOUS SOLUTION INTRAVENOUS ONCE
Status: COMPLETED | OUTPATIENT
Start: 2020-05-28 | End: 2020-05-28

## 2020-05-28 RX ORDER — HEPARIN SODIUM (PORCINE) LOCK FLUSH IV SOLN 100 UNIT/ML 100 UNIT/ML
300 SOLUTION INTRAVENOUS PRN
Status: DISCONTINUED | OUTPATIENT
Start: 2020-05-28 | End: 2020-05-30 | Stop reason: HOSPADM

## 2020-05-28 RX ORDER — EPINEPHRINE 1 MG/ML
0.3 INJECTION, SOLUTION, CONCENTRATE INTRAVENOUS PRN
Status: CANCELLED | OUTPATIENT
Start: 2020-05-28

## 2020-05-28 RX ORDER — SODIUM CHLORIDE 0.9 % (FLUSH) 0.9 %
20 SYRINGE (ML) INJECTION PRN
Status: DISCONTINUED | OUTPATIENT
Start: 2020-05-28 | End: 2020-05-30 | Stop reason: HOSPADM

## 2020-05-28 RX ORDER — DIPHENHYDRAMINE HYDROCHLORIDE 50 MG/ML
50 INJECTION INTRAMUSCULAR; INTRAVENOUS ONCE
Status: CANCELLED | OUTPATIENT
Start: 2020-05-28

## 2020-05-28 RX ORDER — SODIUM CHLORIDE 9 MG/ML
INJECTION, SOLUTION INTRAVENOUS CONTINUOUS
Status: CANCELLED | OUTPATIENT
Start: 2020-05-28

## 2020-05-28 RX ORDER — METHYLPREDNISOLONE SODIUM SUCCINATE 125 MG/2ML
125 INJECTION, POWDER, LYOPHILIZED, FOR SOLUTION INTRAMUSCULAR; INTRAVENOUS ONCE
Status: CANCELLED | OUTPATIENT
Start: 2020-05-28

## 2020-05-28 RX ORDER — 0.9 % SODIUM CHLORIDE 0.9 %
250 INTRAVENOUS SOLUTION INTRAVENOUS ONCE
Status: CANCELLED | OUTPATIENT
Start: 2020-05-28

## 2020-05-28 RX ADMIN — SODIUM CHLORIDE 250 ML: 9 INJECTION, SOLUTION INTRAVENOUS at 07:14

## 2020-05-28 RX ADMIN — HEPARIN 300 UNITS: 100 SYRINGE at 11:23

## 2020-05-28 RX ADMIN — SODIUM CHLORIDE, PRESERVATIVE FREE 20 ML: 5 INJECTION INTRAVENOUS at 11:23

## 2020-05-29 ENCOUNTER — APPOINTMENT (OUTPATIENT)
Dept: INFUSION THERAPY | Age: 60
End: 2020-05-29
Payer: COMMERCIAL

## 2020-06-01 ENCOUNTER — HOSPITAL ENCOUNTER (OUTPATIENT)
Dept: INFUSION THERAPY | Age: 60
Discharge: HOME | End: 2020-06-01
Payer: COMMERCIAL

## 2020-06-01 VITALS
HEART RATE: 58 BPM | DIASTOLIC BLOOD PRESSURE: 78 MMHG | WEIGHT: 123 LBS | HEIGHT: 67 IN | TEMPERATURE: 98 F | OXYGEN SATURATION: 98 % | SYSTOLIC BLOOD PRESSURE: 124 MMHG | BODY MASS INDEX: 19.3 KG/M2

## 2020-06-01 DIAGNOSIS — C25.9 MALIGNANT NEOPLASM OF PANCREAS, UNSPECIFIED LOCATION OF MALIGNANCY (HCC): Primary | ICD-10-CM

## 2020-06-01 LAB
ALBUMIN SERPL-MCNC: 2.8 G/DL (ref 3.5–5.2)
ALP BLD-CCNC: 158 U/L (ref 35–104)
ALT SERPL-CCNC: 26 U/L (ref 9–52)
ANION GAP SERPL CALCULATED.3IONS-SCNC: 5 MMOL/L (ref 7–19)
AST SERPL-CCNC: 38 U/L (ref 14–36)
BASOPHILS ABSOLUTE: 0.05 K/UL (ref 0.01–0.08)
BASOPHILS RELATIVE PERCENT: 1 % (ref 0.1–1.2)
BILIRUB SERPL-MCNC: 0.4 MG/DL (ref 0.2–1.3)
BUN BLDV-MCNC: 5 MG/DL (ref 7–17)
CA 19-9: 523 U/ML (ref 0–37)
CALCIUM SERPL-MCNC: 7.6 MG/DL (ref 8.4–10.2)
CHLORIDE BLD-SCNC: 106 MMOL/L (ref 98–111)
CO2: 26 MMOL/L (ref 22–29)
CREAT SERPL-MCNC: 0.7 MG/DL (ref 0.5–1)
EOSINOPHILS ABSOLUTE: 0.1 K/UL (ref 0.04–0.54)
EOSINOPHILS RELATIVE PERCENT: 2 % (ref 0.7–7)
GFR NON-AFRICAN AMERICAN: >60
GLOBULIN: 2.5 G/DL
GLUCOSE BLD-MCNC: 146 MG/DL (ref 74–106)
HCT VFR BLD CALC: 28.1 % (ref 34.1–44.9)
HEMOGLOBIN: 9.3 G/DL (ref 11.2–15.7)
LYMPHOCYTES ABSOLUTE: 0.68 K/UL (ref 1.18–3.74)
LYMPHOCYTES RELATIVE PERCENT: 13.5 % (ref 19.3–53.1)
MCH RBC QN AUTO: 31 PG (ref 25.6–32.2)
MCHC RBC AUTO-ENTMCNC: 33.1 G/DL (ref 32.3–35.5)
MCV RBC AUTO: 93.7 FL (ref 79.4–94.8)
MONOCYTES ABSOLUTE: 0.56 K/UL (ref 0.24–0.82)
MONOCYTES RELATIVE PERCENT: 11.1 % (ref 4.7–12.5)
NEUTROPHILS ABSOLUTE: 3.66 K/UL (ref 1.56–6.13)
NEUTROPHILS RELATIVE PERCENT: 72.4 % (ref 34–71.1)
PDW BLD-RTO: 17.9 % (ref 11.7–14.4)
PLATELET # BLD: 145 K/UL (ref 182–369)
PMV BLD AUTO: 9.9 FL (ref 7.4–10.4)
POTASSIUM SERPL-SCNC: 3.9 MMOL/L (ref 3.5–5.1)
RBC # BLD: 3 M/UL (ref 3.93–5.22)
SODIUM BLD-SCNC: 137 MMOL/L (ref 137–145)
TOTAL PROTEIN: 5.3 G/DL (ref 6.3–8.2)
WBC # BLD: 5.05 K/UL (ref 3.98–10.04)

## 2020-06-01 PROCEDURE — 96367 TX/PROPH/DG ADDL SEQ IV INF: CPT

## 2020-06-01 PROCEDURE — 85025 COMPLETE CBC W/AUTO DIFF WBC: CPT

## 2020-06-01 PROCEDURE — 6360000002 HC RX W HCPCS: Performed by: INTERNAL MEDICINE

## 2020-06-01 PROCEDURE — 96375 TX/PRO/DX INJ NEW DRUG ADDON: CPT

## 2020-06-01 PROCEDURE — 96413 CHEMO IV INFUSION 1 HR: CPT

## 2020-06-01 PROCEDURE — 2580000003 HC RX 258: Performed by: INTERNAL MEDICINE

## 2020-06-01 PROCEDURE — 96368 THER/DIAG CONCURRENT INF: CPT

## 2020-06-01 PROCEDURE — G0498 CHEMO EXTEND IV INFUS W/PUMP: HCPCS

## 2020-06-01 PROCEDURE — 96417 CHEMO IV INFUS EACH ADDL SEQ: CPT

## 2020-06-01 PROCEDURE — 80053 COMPREHEN METABOLIC PANEL: CPT

## 2020-06-01 PROCEDURE — 86301 IMMUNOASSAY TUMOR CA 19-9: CPT

## 2020-06-01 RX ORDER — DIPHENHYDRAMINE HYDROCHLORIDE 50 MG/ML
50 INJECTION INTRAMUSCULAR; INTRAVENOUS ONCE
Status: CANCELLED | OUTPATIENT
Start: 2020-06-01

## 2020-06-01 RX ORDER — PALONOSETRON 0.05 MG/ML
0.25 INJECTION, SOLUTION INTRAVENOUS ONCE
Status: COMPLETED | OUTPATIENT
Start: 2020-06-01 | End: 2020-06-01

## 2020-06-01 RX ORDER — SODIUM CHLORIDE 0.9 % (FLUSH) 0.9 %
5 SYRINGE (ML) INJECTION PRN
Status: CANCELLED | OUTPATIENT
Start: 2020-06-01

## 2020-06-01 RX ORDER — EPINEPHRINE 1 MG/ML
0.3 INJECTION, SOLUTION, CONCENTRATE INTRAVENOUS PRN
Status: CANCELLED | OUTPATIENT
Start: 2020-06-01

## 2020-06-01 RX ORDER — SODIUM CHLORIDE 9 MG/ML
INJECTION, SOLUTION INTRAVENOUS CONTINUOUS
Status: CANCELLED | OUTPATIENT
Start: 2020-06-01

## 2020-06-01 RX ORDER — PALONOSETRON 0.05 MG/ML
0.25 INJECTION, SOLUTION INTRAVENOUS ONCE
Status: CANCELLED | OUTPATIENT
Start: 2020-06-01

## 2020-06-01 RX ORDER — HEPARIN SODIUM (PORCINE) LOCK FLUSH IV SOLN 100 UNIT/ML 100 UNIT/ML
500 SOLUTION INTRAVENOUS PRN
Status: CANCELLED | OUTPATIENT
Start: 2020-06-01

## 2020-06-01 RX ORDER — SODIUM CHLORIDE 9 MG/ML
20 INJECTION, SOLUTION INTRAVENOUS ONCE
Status: CANCELLED | OUTPATIENT
Start: 2020-06-01

## 2020-06-01 RX ORDER — DEXAMETHASONE SODIUM PHOSPHATE 10 MG/ML
10 INJECTION, SOLUTION INTRAMUSCULAR; INTRAVENOUS ONCE
Status: COMPLETED | OUTPATIENT
Start: 2020-06-01 | End: 2020-06-01

## 2020-06-01 RX ORDER — SODIUM CHLORIDE 0.9 % (FLUSH) 0.9 %
10 SYRINGE (ML) INJECTION PRN
Status: CANCELLED | OUTPATIENT
Start: 2020-06-01

## 2020-06-01 RX ORDER — METHYLPREDNISOLONE SODIUM SUCCINATE 125 MG/2ML
125 INJECTION, POWDER, LYOPHILIZED, FOR SOLUTION INTRAMUSCULAR; INTRAVENOUS ONCE
Status: CANCELLED | OUTPATIENT
Start: 2020-06-01

## 2020-06-01 RX ADMIN — PALONOSETRON HYDROCHLORIDE 0.25 MG: 0.05 INJECTION, SOLUTION INTRAVENOUS at 12:15

## 2020-06-01 RX ADMIN — IRINOTECAN HYDROCHLORIDE 86 MG: 4.3 INJECTION, POWDER, FOR SOLUTION INTRAVENOUS at 12:44

## 2020-06-01 RX ADMIN — DEXAMETHASONE SODIUM PHOSPHATE 10 MG: 10 INJECTION, SOLUTION INTRAMUSCULAR; INTRAVENOUS at 12:15

## 2020-06-01 RX ADMIN — LEUCOVORIN CALCIUM 650 MG: 350 INJECTION, POWDER, LYOPHILIZED, FOR SUSPENSION INTRAMUSCULAR; INTRAVENOUS at 12:48

## 2020-06-01 RX ADMIN — FLUOROURACIL 3000 MG: 50 INJECTION, SOLUTION INTRAVENOUS at 14:25

## 2020-06-01 ASSESSMENT — PAIN SCALES - GENERAL: PAINLEVEL_OUTOF10: 0

## 2020-06-03 ENCOUNTER — HOSPITAL ENCOUNTER (OUTPATIENT)
Dept: INFUSION THERAPY | Age: 60
Discharge: HOME | End: 2020-06-03
Payer: COMMERCIAL

## 2020-06-03 DIAGNOSIS — C25.9 MALIGNANT NEOPLASM OF PANCREAS, UNSPECIFIED LOCATION OF MALIGNANCY (HCC): Primary | ICD-10-CM

## 2020-06-03 PROCEDURE — 96377 APPLICATON ON-BODY INJECTOR: CPT

## 2020-06-03 PROCEDURE — 2580000003 HC RX 258: Performed by: INTERNAL MEDICINE

## 2020-06-03 PROCEDURE — 6360000002 HC RX W HCPCS: Performed by: INTERNAL MEDICINE

## 2020-06-03 PROCEDURE — 96523 IRRIG DRUG DELIVERY DEVICE: CPT

## 2020-06-03 RX ORDER — SODIUM CHLORIDE 0.9 % (FLUSH) 0.9 %
20 SYRINGE (ML) INJECTION PRN
Status: CANCELLED | OUTPATIENT
Start: 2020-06-03

## 2020-06-03 RX ORDER — HEPARIN SODIUM (PORCINE) LOCK FLUSH IV SOLN 100 UNIT/ML 100 UNIT/ML
500 SOLUTION INTRAVENOUS PRN
Status: DISCONTINUED | OUTPATIENT
Start: 2020-06-03 | End: 2020-06-04 | Stop reason: HOSPADM

## 2020-06-03 RX ORDER — HEPARIN SODIUM (PORCINE) LOCK FLUSH IV SOLN 100 UNIT/ML 100 UNIT/ML
500 SOLUTION INTRAVENOUS PRN
Status: CANCELLED | OUTPATIENT
Start: 2020-06-03

## 2020-06-03 RX ORDER — SODIUM CHLORIDE 0.9 % (FLUSH) 0.9 %
10 SYRINGE (ML) INJECTION PRN
Status: CANCELLED | OUTPATIENT
Start: 2020-06-03

## 2020-06-03 RX ORDER — SODIUM CHLORIDE 0.9 % (FLUSH) 0.9 %
20 SYRINGE (ML) INJECTION PRN
Status: DISCONTINUED | OUTPATIENT
Start: 2020-06-03 | End: 2020-06-04 | Stop reason: HOSPADM

## 2020-06-03 RX ADMIN — PEGFILGRASTIM 6 MG: KIT SUBCUTANEOUS at 11:49

## 2020-06-03 RX ADMIN — Medication 20 ML: at 11:49

## 2020-06-03 RX ADMIN — HEPARIN 500 UNITS: 100 SYRINGE at 11:50

## 2020-06-10 ENCOUNTER — TELEPHONE (OUTPATIENT)
Dept: INFUSION THERAPY | Age: 60
End: 2020-06-10

## 2020-06-12 NOTE — PROGRESS NOTES
in the head of the pancreas measuring 1.8 x 1.4 x 1.7 cm with moderate meditation of the pancreatic duct. Well defined sharply marginated low density nodule located in the tail of the pancreas measuring 1.5 x 1.3 x 1.5 cm (IPMN?). · 3/6/2018-CA 19-9 was elevated at 150. Repeat EGD was performed by Dr. Micah Nova due to the inconclusive FNA resulted on 2/7/2018. Pathology FNA was consistent with pancreatic adenocarcinoma. · 3/12/2018-she was first seen by me. No evidence of distant disease. Referral to Surgical oncology. CT chest to complete staging. CA-19-9 430. · 3/16/2018-CT of the chest was unremarkable for intrathoracic metastatic disease   · Surgery consultation at [facility] March 2018- with Dr Shemar Manning. She was not a surgical candidate upfront. Recommended neoadjuvant chemotherapy. · 4/3/2018-started on neoadjuvant chemotherapy with FOLFORINOX. · 4/11/2018-she developed CBD obstruction had a CBD stent placed by Dr. Caron Nunes. · 4/3/2018 through 6/4/2018 Neoadjuvant chemotherapy FOLFORINOX ×5 Biweekly cycles   · August 2018- XRT 30 Gy/Xeloda   · 08/30/3018- Whipple procedure at [facility] by Dr. Sommer Ragsdale  · Recommended another 7 biweekly cycles of modified FOLFORINOX. · 9/5/2018-CT of the chest abdomen pelvis was unremarkable for metastatic disease. · 1/14/2019-CT abdomen and pelvis at [facility] showed no evidence of metastasis in the chest abdomen pelvis. · 5/21/2019-CT chest, abdomen, pelvis at [facility] showed no evidence of metastatic disease   · 5/21/20197990-ZH-09-9 = 42   · 06/12/2019- CA-19-9 = 154. Discussed with the patient. We'll also discuss with [facility]. · 7/1/2019-CT chest, abdomen, pelvis showed a soft tissue mass measuring 1.4 x 1.1 cm, not present on prior scan from January 2019, concerning for recurrence. Stable hypodense in the liver, too small to characterize. Subcentimeter ill-defined area of low attenuation liver may represent an early metastasis. · 7/3/2019-recommended palliative chemotherapy with nab paclitaxel 125mg/m² IV over 30 minutes on day 1 and gemcitabine 600 mg/m² IV over 10mg/m2/min (fixed dose rate) on day 1  · 7/2/2019-CA 19 9 = 114  · 7/6/2019- extended genetic panel negative for a deleterious mutation (invitae)  · 7/9/2019- CA-19-9 = 225  · 8/6/2019- CA-19-9 = 171  · 9/3/3597-FQ-23-9 = 198   · 9/13/20196199-WI-50-9 = 98 at [facility]  · 9/13/2019-CT chest abdomen pelvis at [facility] showed a soft tissue thickening in the pancreatic bed with persistent narrowing of the portal SMV confluence.  Superimposed tumor remains a possibility.  The new low-density lesion in the periphery of the liver seen on the prior exam is no longer appreciated and likely represents treatment effect.  Nodular enhancement may represent perfusion change in the region on image 187. · 9/13/2018- she was recommended to continue current treatment with Gemzar/Abraxane  · 11/1/2019- she was hospitalized with GI bleed.  An upper endoscopy showed ulceration at the efferent loop of the Whipple's procedure  · 10/29/0051-ML-51-9 =317  · 11/21/2019-CA-19-9 = 183  · 11/19/2019- CT chest abdomen pelvis showed no evidence of gross disease progression. Improvement of the caliber of what may be a middle colic vein that drains back to the SMV. There is still persistent narrowing of the of the upper SMV at the juncture with the portal vein.  No definite evidence of liver metastases at this time. No definite evidence of pulmonary metastases.  However, question of slight increase in prominence of subtle soft tissue thickening within the right paracolic gutter could be indicative of metastatic disease to peritoneum.   · 12/9/2019- colonoscopy by GI was unremarkable.  This was performed for complaints of maroon-colored stools.   · 1/23/20206694-CV-69-9 = 520  · 1/28/2020- CT chest abdomen pelvis at [facility] showed progressive disease with recurrence at the retroperitoneal just inferior to the mL IVPB  400 mg/m2 (Treatment Plan Recorded) Intravenous Once Filemon Vicente MD        fluorouracil (ADRUCIL) 3,000 mg in sodium chloride 0.9 % 250 mL chemo infusion  3,000 mg Intravenous Over 46 hours Filemon Vicente MD            REVIEW OF SYSTEMS:    Constitutional: no fever, no night sweats, fatigue;   HEENT: no blurring of vision, no double vision, no hearing difficulty, no tinnitus,no ulceration, no dysphagia  Lungs: no cough, no shortness of breath, no wheeze;   CVS: no palpitation, no chest pain, no shortness of breath;  GI: epigastric abdominal pain, no nausea , no vomiting, no constipation; melanotic stools  VIKRAM: no dysuria, frequency and urgency, no hematuria, no kidney stones;   Musculoskeletal: no joint pain, swelling , stiffness;   Endocrine: no polyuria, polydypsia, no cold or heat intolerence; Hematology/lymphatic: no easy brusing or bleeding, no hx of clotting disorder; no peripheral adenopathy. Dermatology: no skin rash, no eczema, no pruritis;   Psychiatry: no depression, no anxiety,no panic attacks, no suicide ideation; Neurology: no syncope, no seizures, no numbness or tingling of hands, no numbness or tingling of feet, no paresis;     PHYSICAL EXAM:    Vitals signs:  /70   Pulse 60   Temp 98.8 °F (37.1 °C)   Ht 5' 7\" (1.702 m)   Wt 113 lb (51.3 kg)   SpO2 100%   BMI 17.70 kg/m²    Pain scale:  Pain Score:   0 - No pain   CONSTITUTIONAL: Alert, appropriate, no acute distress, cachectic appearing  EYES: Non icteric, EOM intact, pupils equal round and reactive to light and accommodation. ENT: Oral mucus membranes moist, no oral pharyngeal lesions. External inspection of ears and nose are normal.   NECK: Supple, no masses. No palpable thyroid mass    CHEST/LUNGS: CTA bilaterally, normal respiratory effort   CARDIOVASCULAR: RRR, no murmurs. No lower extremity edema   ABDOMEN: soft non-tender, active bowel sounds, no hepatosplenomegaly. No palpable masses.

## 2020-06-15 ENCOUNTER — OFFICE VISIT (OUTPATIENT)
Dept: HEMATOLOGY | Age: 60
End: 2020-06-15
Payer: COMMERCIAL

## 2020-06-15 ENCOUNTER — HOSPITAL ENCOUNTER (OUTPATIENT)
Dept: INFUSION THERAPY | Age: 60
Discharge: HOME | End: 2020-06-15
Payer: COMMERCIAL

## 2020-06-15 VITALS
TEMPERATURE: 98.8 F | HEIGHT: 67 IN | SYSTOLIC BLOOD PRESSURE: 106 MMHG | BODY MASS INDEX: 17.74 KG/M2 | DIASTOLIC BLOOD PRESSURE: 70 MMHG | OXYGEN SATURATION: 100 % | WEIGHT: 113 LBS | HEART RATE: 60 BPM

## 2020-06-15 DIAGNOSIS — C25.9 MALIGNANT NEOPLASM OF PANCREAS, UNSPECIFIED LOCATION OF MALIGNANCY (HCC): Primary | ICD-10-CM

## 2020-06-15 LAB
ALBUMIN SERPL-MCNC: 3 G/DL (ref 3.5–5.2)
ALP BLD-CCNC: 166 U/L (ref 35–104)
ALT SERPL-CCNC: 26 U/L (ref 9–52)
ANION GAP SERPL CALCULATED.3IONS-SCNC: 5 MMOL/L (ref 7–19)
AST SERPL-CCNC: 36 U/L (ref 14–36)
BASOPHILS ABSOLUTE: 0.02 K/UL (ref 0.01–0.08)
BASOPHILS RELATIVE PERCENT: 0.2 % (ref 0.1–1.2)
BILIRUB SERPL-MCNC: 0.5 MG/DL (ref 0.2–1.3)
BUN BLDV-MCNC: 4 MG/DL (ref 7–17)
CA 19-9: 383 U/ML (ref 0–37)
CALCIUM SERPL-MCNC: 7.8 MG/DL (ref 8.4–10.2)
CHLORIDE BLD-SCNC: 105 MMOL/L (ref 98–111)
CO2: 27 MMOL/L (ref 22–29)
CREAT SERPL-MCNC: 0.5 MG/DL (ref 0.5–1)
EOSINOPHILS ABSOLUTE: 0.16 K/UL (ref 0.04–0.54)
EOSINOPHILS RELATIVE PERCENT: 1.7 % (ref 0.7–7)
GFR NON-AFRICAN AMERICAN: >60
GLOBULIN: 2.5 G/DL
GLUCOSE BLD-MCNC: 101 MG/DL (ref 74–106)
HCT VFR BLD CALC: 26.2 % (ref 34.1–44.9)
HEMOGLOBIN: 8.8 G/DL (ref 11.2–15.7)
LYMPHOCYTES ABSOLUTE: 0.59 K/UL (ref 1.18–3.74)
LYMPHOCYTES RELATIVE PERCENT: 6.2 % (ref 19.3–53.1)
MCH RBC QN AUTO: 31.4 PG (ref 25.6–32.2)
MCHC RBC AUTO-ENTMCNC: 33.6 G/DL (ref 32.3–35.5)
MCV RBC AUTO: 93.6 FL (ref 79.4–94.8)
MONOCYTES ABSOLUTE: 0.85 K/UL (ref 0.24–0.82)
MONOCYTES RELATIVE PERCENT: 8.9 % (ref 4.7–12.5)
NEUTROPHILS ABSOLUTE: 7.9 K/UL (ref 1.56–6.13)
NEUTROPHILS RELATIVE PERCENT: 83 % (ref 34–71.1)
PDW BLD-RTO: 17.2 % (ref 11.7–14.4)
PLATELET # BLD: 137 K/UL (ref 182–369)
PMV BLD AUTO: 10.3 FL (ref 7.4–10.4)
POTASSIUM SERPL-SCNC: 3.2 MMOL/L (ref 3.5–5.1)
RBC # BLD: 2.8 M/UL (ref 3.93–5.22)
SODIUM BLD-SCNC: 137 MMOL/L (ref 137–145)
TOTAL PROTEIN: 5.5 G/DL (ref 6.3–8.2)
WBC # BLD: 9.52 K/UL (ref 3.98–10.04)

## 2020-06-15 PROCEDURE — 96367 TX/PROPH/DG ADDL SEQ IV INF: CPT

## 2020-06-15 PROCEDURE — 96415 CHEMO IV INFUSION ADDL HR: CPT

## 2020-06-15 PROCEDURE — 96375 TX/PRO/DX INJ NEW DRUG ADDON: CPT

## 2020-06-15 PROCEDURE — 85025 COMPLETE CBC W/AUTO DIFF WBC: CPT

## 2020-06-15 PROCEDURE — G0498 CHEMO EXTEND IV INFUS W/PUMP: HCPCS

## 2020-06-15 PROCEDURE — 2580000003 HC RX 258: Performed by: INTERNAL MEDICINE

## 2020-06-15 PROCEDURE — 96413 CHEMO IV INFUSION 1 HR: CPT

## 2020-06-15 PROCEDURE — 80053 COMPREHEN METABOLIC PANEL: CPT

## 2020-06-15 PROCEDURE — 86301 IMMUNOASSAY TUMOR CA 19-9: CPT

## 2020-06-15 PROCEDURE — 99214 OFFICE O/P EST MOD 30 MIN: CPT | Performed by: INTERNAL MEDICINE

## 2020-06-15 PROCEDURE — 6360000002 HC RX W HCPCS: Performed by: INTERNAL MEDICINE

## 2020-06-15 RX ORDER — DIPHENHYDRAMINE HYDROCHLORIDE 50 MG/ML
50 INJECTION INTRAMUSCULAR; INTRAVENOUS ONCE
Status: CANCELLED | OUTPATIENT
Start: 2020-06-15

## 2020-06-15 RX ORDER — HEPARIN SODIUM (PORCINE) LOCK FLUSH IV SOLN 100 UNIT/ML 100 UNIT/ML
500 SOLUTION INTRAVENOUS PRN
Status: CANCELLED | OUTPATIENT
Start: 2020-06-15

## 2020-06-15 RX ORDER — EPINEPHRINE 1 MG/ML
0.3 INJECTION, SOLUTION, CONCENTRATE INTRAVENOUS PRN
Status: CANCELLED | OUTPATIENT
Start: 2020-06-15

## 2020-06-15 RX ORDER — DEXAMETHASONE SODIUM PHOSPHATE 10 MG/ML
10 INJECTION, SOLUTION INTRAMUSCULAR; INTRAVENOUS ONCE
Status: COMPLETED | OUTPATIENT
Start: 2020-06-15 | End: 2020-06-15

## 2020-06-15 RX ORDER — SODIUM CHLORIDE 9 MG/ML
INJECTION, SOLUTION INTRAVENOUS CONTINUOUS
Status: CANCELLED | OUTPATIENT
Start: 2020-06-15

## 2020-06-15 RX ORDER — SODIUM CHLORIDE 0.9 % (FLUSH) 0.9 %
10 SYRINGE (ML) INJECTION PRN
Status: CANCELLED | OUTPATIENT
Start: 2020-06-15

## 2020-06-15 RX ORDER — PALONOSETRON 0.05 MG/ML
0.25 INJECTION, SOLUTION INTRAVENOUS ONCE
Status: COMPLETED | OUTPATIENT
Start: 2020-06-15 | End: 2020-06-15

## 2020-06-15 RX ORDER — SODIUM CHLORIDE 0.9 % (FLUSH) 0.9 %
5 SYRINGE (ML) INJECTION PRN
Status: CANCELLED | OUTPATIENT
Start: 2020-06-15

## 2020-06-15 RX ORDER — DEXAMETHASONE SODIUM PHOSPHATE 10 MG/ML
10 INJECTION, SOLUTION INTRAMUSCULAR; INTRAVENOUS ONCE
Status: CANCELLED | OUTPATIENT
Start: 2020-06-15

## 2020-06-15 RX ORDER — METHYLPREDNISOLONE SODIUM SUCCINATE 125 MG/2ML
125 INJECTION, POWDER, LYOPHILIZED, FOR SOLUTION INTRAMUSCULAR; INTRAVENOUS ONCE
Status: CANCELLED | OUTPATIENT
Start: 2020-06-15

## 2020-06-15 RX ORDER — PALONOSETRON 0.05 MG/ML
0.25 INJECTION, SOLUTION INTRAVENOUS ONCE
Status: CANCELLED | OUTPATIENT
Start: 2020-06-15

## 2020-06-15 RX ORDER — SODIUM CHLORIDE 9 MG/ML
20 INJECTION, SOLUTION INTRAVENOUS ONCE
Status: CANCELLED | OUTPATIENT
Start: 2020-06-15

## 2020-06-15 RX ADMIN — PALONOSETRON HYDROCHLORIDE 0.25 MG: 0.25 INJECTION, SOLUTION INTRAVENOUS at 09:05

## 2020-06-15 RX ADMIN — DEXAMETHASONE SODIUM PHOSPHATE 10 MG: 10 INJECTION, SOLUTION INTRAMUSCULAR; INTRAVENOUS at 09:05

## 2020-06-15 RX ADMIN — IRINOTECAN HYDROCHLORIDE 86 MG: 4.3 INJECTION, POWDER, FOR SOLUTION INTRAVENOUS at 09:30

## 2020-06-15 RX ADMIN — FLUOROURACIL 3000 MG: 50 INJECTION, SOLUTION INTRAVENOUS at 11:43

## 2020-06-15 RX ADMIN — LEUCOVORIN CALCIUM 650 MG: 350 INJECTION, POWDER, LYOPHILIZED, FOR SUSPENSION INTRAMUSCULAR; INTRAVENOUS at 11:11

## 2020-06-17 ENCOUNTER — HOSPITAL ENCOUNTER (OUTPATIENT)
Dept: INFUSION THERAPY | Age: 60
Discharge: HOME | End: 2020-06-17
Payer: COMMERCIAL

## 2020-06-17 DIAGNOSIS — C25.9 MALIGNANT NEOPLASM OF PANCREAS, UNSPECIFIED LOCATION OF MALIGNANCY (HCC): Primary | ICD-10-CM

## 2020-06-17 PROCEDURE — 96377 APPLICATON ON-BODY INJECTOR: CPT

## 2020-06-17 PROCEDURE — 96523 IRRIG DRUG DELIVERY DEVICE: CPT

## 2020-06-17 PROCEDURE — 6360000002 HC RX W HCPCS: Performed by: INTERNAL MEDICINE

## 2020-06-17 RX ORDER — HEPARIN SODIUM (PORCINE) LOCK FLUSH IV SOLN 100 UNIT/ML 100 UNIT/ML
500 SOLUTION INTRAVENOUS PRN
Status: CANCELLED | OUTPATIENT
Start: 2020-06-17

## 2020-06-17 RX ORDER — SODIUM CHLORIDE 0.9 % (FLUSH) 0.9 %
10 SYRINGE (ML) INJECTION PRN
Status: DISCONTINUED | OUTPATIENT
Start: 2020-06-17 | End: 2020-06-18 | Stop reason: HOSPADM

## 2020-06-17 RX ORDER — SODIUM CHLORIDE 0.9 % (FLUSH) 0.9 %
20 SYRINGE (ML) INJECTION PRN
Status: CANCELLED | OUTPATIENT
Start: 2020-06-17

## 2020-06-17 RX ORDER — SODIUM CHLORIDE 0.9 % (FLUSH) 0.9 %
10 SYRINGE (ML) INJECTION PRN
Status: CANCELLED | OUTPATIENT
Start: 2020-06-17

## 2020-06-17 RX ORDER — HEPARIN SODIUM (PORCINE) LOCK FLUSH IV SOLN 100 UNIT/ML 100 UNIT/ML
500 SOLUTION INTRAVENOUS PRN
Status: DISCONTINUED | OUTPATIENT
Start: 2020-06-17 | End: 2020-06-18 | Stop reason: HOSPADM

## 2020-06-17 RX ADMIN — HEPARIN 500 UNITS: 100 SYRINGE at 10:08

## 2020-06-17 RX ADMIN — PEGFILGRASTIM 6 MG: KIT SUBCUTANEOUS at 10:07

## 2020-06-20 ENCOUNTER — APPOINTMENT (OUTPATIENT)
Dept: CT IMAGING | Age: 60
DRG: 369 | End: 2020-06-20
Payer: COMMERCIAL

## 2020-06-20 ENCOUNTER — HOSPITAL ENCOUNTER (INPATIENT)
Age: 60
LOS: 2 days | Discharge: HOME | DRG: 369 | End: 2020-06-22
Attending: EMERGENCY MEDICINE | Admitting: INTERNAL MEDICINE
Payer: COMMERCIAL

## 2020-06-20 LAB
ABO/RH: NORMAL
ALBUMIN SERPL-MCNC: 2.7 G/DL (ref 3.5–5.2)
ALP BLD-CCNC: 153 U/L (ref 35–104)
ALT SERPL-CCNC: 16 U/L (ref 5–33)
ANION GAP SERPL CALCULATED.3IONS-SCNC: 8 MMOL/L (ref 7–19)
ANISOCYTOSIS: ABNORMAL
ANTIBODY SCREEN: NORMAL
APTT: 28 SEC (ref 26–36.2)
AST SERPL-CCNC: 18 U/L (ref 5–32)
ATYPICAL LYMPHOCYTE RELATIVE PERCENT: 1 % (ref 0–8)
BANDED NEUTROPHILS RELATIVE PERCENT: 3 % (ref 0–5)
BASOPHILS ABSOLUTE: 0 K/UL (ref 0–0.2)
BASOPHILS RELATIVE PERCENT: 0 % (ref 0–1)
BILIRUB SERPL-MCNC: <0.2 MG/DL (ref 0.2–1.2)
BUN BLDV-MCNC: 13 MG/DL (ref 6–20)
CALCIUM SERPL-MCNC: 7.9 MG/DL (ref 8.6–10)
CHLORIDE BLD-SCNC: 102 MMOL/L (ref 98–111)
CO2: 24 MMOL/L (ref 22–29)
CREAT SERPL-MCNC: 0.6 MG/DL (ref 0.5–0.9)
EOSINOPHILS ABSOLUTE: 0 K/UL (ref 0–0.6)
EOSINOPHILS RELATIVE PERCENT: 0 % (ref 0–5)
GFR NON-AFRICAN AMERICAN: >60
GLUCOSE BLD-MCNC: 116 MG/DL (ref 74–109)
HAPTOGLOBIN: 89 MG/DL (ref 30–200)
HCT VFR BLD CALC: 15.6 % (ref 37–47)
HEMOGLOBIN: 5 G/DL (ref 12–16)
IMMATURE GRANULOCYTES #: 2.8 K/UL
INR BLD: 1.09 (ref 0.88–1.18)
LIPASE: 6 U/L (ref 13–60)
LYMPHOCYTES ABSOLUTE: 2.4 K/UL (ref 1.1–4.5)
LYMPHOCYTES RELATIVE PERCENT: 5 % (ref 20–40)
MACROCYTES: ABNORMAL
MCH RBC QN AUTO: 31.8 PG (ref 27–31)
MCHC RBC AUTO-ENTMCNC: 32.1 G/DL (ref 33–37)
MCV RBC AUTO: 99.4 FL (ref 81–99)
MONOCYTES ABSOLUTE: 0 K/UL (ref 0–0.9)
MONOCYTES RELATIVE PERCENT: 0 % (ref 0–10)
NEUTROPHILS ABSOLUTE: 37.4 K/UL (ref 1.5–7.5)
NEUTROPHILS RELATIVE PERCENT: 91 % (ref 50–65)
OVALOCYTES: ABNORMAL
PDW BLD-RTO: 17.9 % (ref 11.5–14.5)
PLATELET # BLD: 112 K/UL (ref 130–400)
PLATELET SLIDE REVIEW: ABNORMAL
PMV BLD AUTO: 10.8 FL (ref 9.4–12.3)
POTASSIUM SERPL-SCNC: 3.7 MMOL/L (ref 3.5–5)
PROTHROMBIN TIME: 14.1 SEC (ref 12–14.6)
RBC # BLD: 1.57 M/UL (ref 4.2–5.4)
RETICULOCYTE ABSOLUTE COUNT: 0.01 M/UL (ref 0.03–0.12)
RETICULOCYTE COUNT PCT: 0.45 % (ref 0.5–1.5)
SCHISTOCYTES: ABNORMAL
SODIUM BLD-SCNC: 134 MMOL/L (ref 136–145)
TEAR DROP CELLS: ABNORMAL
TOTAL PROTEIN: 4.7 G/DL (ref 6.6–8.7)
WBC # BLD: 39.8 K/UL (ref 4.8–10.8)

## 2020-06-20 PROCEDURE — 83010 ASSAY OF HAPTOGLOBIN QUANT: CPT

## 2020-06-20 PROCEDURE — 86900 BLOOD TYPING SEROLOGIC ABO: CPT

## 2020-06-20 PROCEDURE — 0W3P8ZZ CONTROL BLEEDING IN GASTROINTESTINAL TRACT, VIA NATURAL OR ARTIFICIAL OPENING ENDOSCOPIC: ICD-10-PCS | Performed by: INTERNAL MEDICINE

## 2020-06-20 PROCEDURE — 93005 ELECTROCARDIOGRAM TRACING: CPT | Performed by: EMERGENCY MEDICINE

## 2020-06-20 PROCEDURE — C9113 INJ PANTOPRAZOLE SODIUM, VIA: HCPCS | Performed by: EMERGENCY MEDICINE

## 2020-06-20 PROCEDURE — 96375 TX/PRO/DX INJ NEW DRUG ADDON: CPT

## 2020-06-20 PROCEDURE — 85730 THROMBOPLASTIN TIME PARTIAL: CPT

## 2020-06-20 PROCEDURE — 6360000002 HC RX W HCPCS: Performed by: HOSPITALIST

## 2020-06-20 PROCEDURE — 6360000002 HC RX W HCPCS: Performed by: EMERGENCY MEDICINE

## 2020-06-20 PROCEDURE — 86850 RBC ANTIBODY SCREEN: CPT

## 2020-06-20 PROCEDURE — 74177 CT ABD & PELVIS W/CONTRAST: CPT

## 2020-06-20 PROCEDURE — 99285 EMERGENCY DEPT VISIT HI MDM: CPT

## 2020-06-20 PROCEDURE — 2000000000 HC ICU R&B

## 2020-06-20 PROCEDURE — 36430 TRANSFUSION BLD/BLD COMPNT: CPT

## 2020-06-20 PROCEDURE — 2580000003 HC RX 258: Performed by: EMERGENCY MEDICINE

## 2020-06-20 PROCEDURE — 80053 COMPREHEN METABOLIC PANEL: CPT

## 2020-06-20 PROCEDURE — 70450 CT HEAD/BRAIN W/O DYE: CPT

## 2020-06-20 PROCEDURE — 83690 ASSAY OF LIPASE: CPT

## 2020-06-20 PROCEDURE — 6360000004 HC RX CONTRAST MEDICATION: Performed by: EMERGENCY MEDICINE

## 2020-06-20 PROCEDURE — 85610 PROTHROMBIN TIME: CPT

## 2020-06-20 PROCEDURE — 2580000003 HC RX 258: Performed by: HOSPITALIST

## 2020-06-20 PROCEDURE — 86901 BLOOD TYPING SEROLOGIC RH(D): CPT

## 2020-06-20 PROCEDURE — 85045 AUTOMATED RETICULOCYTE COUNT: CPT

## 2020-06-20 PROCEDURE — P9016 RBC LEUKOCYTES REDUCED: HCPCS

## 2020-06-20 PROCEDURE — P9035 PLATELET PHERES LEUKOREDUCED: HCPCS

## 2020-06-20 PROCEDURE — 85025 COMPLETE CBC W/AUTO DIFF WBC: CPT

## 2020-06-20 PROCEDURE — 96374 THER/PROPH/DIAG INJ IV PUSH: CPT

## 2020-06-20 PROCEDURE — 36415 COLL VENOUS BLD VENIPUNCTURE: CPT

## 2020-06-20 PROCEDURE — 86923 COMPATIBILITY TEST ELECTRIC: CPT

## 2020-06-20 RX ORDER — SODIUM CHLORIDE, SODIUM LACTATE, POTASSIUM CHLORIDE, CALCIUM CHLORIDE 600; 310; 30; 20 MG/100ML; MG/100ML; MG/100ML; MG/100ML
INJECTION, SOLUTION INTRAVENOUS CONTINUOUS
Status: DISCONTINUED | OUTPATIENT
Start: 2020-06-20 | End: 2020-06-22 | Stop reason: HOSPADM

## 2020-06-20 RX ORDER — ONDANSETRON 2 MG/ML
4 INJECTION INTRAMUSCULAR; INTRAVENOUS EVERY 6 HOURS PRN
Status: DISCONTINUED | OUTPATIENT
Start: 2020-06-20 | End: 2020-06-22 | Stop reason: HOSPADM

## 2020-06-20 RX ORDER — ONDANSETRON 2 MG/ML
4 INJECTION INTRAMUSCULAR; INTRAVENOUS ONCE
Status: DISCONTINUED | OUTPATIENT
Start: 2020-06-20 | End: 2020-06-21

## 2020-06-20 RX ORDER — 0.9 % SODIUM CHLORIDE 0.9 %
1000 INTRAVENOUS SOLUTION INTRAVENOUS ONCE
Status: COMPLETED | OUTPATIENT
Start: 2020-06-20 | End: 2020-06-20

## 2020-06-20 RX ORDER — 0.9 % SODIUM CHLORIDE 0.9 %
20 INTRAVENOUS SOLUTION INTRAVENOUS ONCE
Status: COMPLETED | OUTPATIENT
Start: 2020-06-20 | End: 2020-06-21

## 2020-06-20 RX ORDER — ONDANSETRON 4 MG/1
4 TABLET, ORALLY DISINTEGRATING ORAL EVERY 8 HOURS PRN
Status: DISCONTINUED | OUTPATIENT
Start: 2020-06-20 | End: 2020-06-22 | Stop reason: HOSPADM

## 2020-06-20 RX ORDER — ONDANSETRON 2 MG/ML
4 INJECTION INTRAMUSCULAR; INTRAVENOUS ONCE
Status: COMPLETED | OUTPATIENT
Start: 2020-06-20 | End: 2020-06-20

## 2020-06-20 RX ORDER — PANTOPRAZOLE SODIUM 40 MG/10ML
40 INJECTION, POWDER, LYOPHILIZED, FOR SOLUTION INTRAVENOUS ONCE
Status: COMPLETED | OUTPATIENT
Start: 2020-06-20 | End: 2020-06-20

## 2020-06-20 RX ORDER — ACETAMINOPHEN 325 MG/1
650 TABLET ORAL EVERY 4 HOURS PRN
Status: DISCONTINUED | OUTPATIENT
Start: 2020-06-20 | End: 2020-06-22 | Stop reason: HOSPADM

## 2020-06-20 RX ORDER — SODIUM CHLORIDE 0.9 % (FLUSH) 0.9 %
10 SYRINGE (ML) INJECTION EVERY 12 HOURS SCHEDULED
Status: DISCONTINUED | OUTPATIENT
Start: 2020-06-20 | End: 2020-06-22 | Stop reason: HOSPADM

## 2020-06-20 RX ORDER — ONDANSETRON 2 MG/ML
INJECTION INTRAMUSCULAR; INTRAVENOUS
Status: DISCONTINUED
Start: 2020-06-20 | End: 2020-06-21

## 2020-06-20 RX ORDER — SODIUM CHLORIDE 0.9 % (FLUSH) 0.9 %
10 SYRINGE (ML) INJECTION PRN
Status: DISCONTINUED | OUTPATIENT
Start: 2020-06-20 | End: 2020-06-22 | Stop reason: HOSPADM

## 2020-06-20 RX ADMIN — SODIUM CHLORIDE, POTASSIUM CHLORIDE, SODIUM LACTATE AND CALCIUM CHLORIDE: 600; 310; 30; 20 INJECTION, SOLUTION INTRAVENOUS at 23:47

## 2020-06-20 RX ADMIN — ONDANSETRON 4 MG: 2 INJECTION INTRAMUSCULAR; INTRAVENOUS at 21:46

## 2020-06-20 RX ADMIN — PANTOPRAZOLE SODIUM 40 MG: 40 INJECTION, POWDER, FOR SOLUTION INTRAVENOUS at 21:46

## 2020-06-20 RX ADMIN — SODIUM CHLORIDE 20 ML: 9 INJECTION, SOLUTION INTRAVENOUS at 22:24

## 2020-06-20 RX ADMIN — OCTREOTIDE ACETATE 25 MCG/HR: 500 INJECTION, SOLUTION INTRAVENOUS; SUBCUTANEOUS at 23:47

## 2020-06-20 RX ADMIN — SODIUM CHLORIDE 8 MG/HR: 900 INJECTION, SOLUTION INTRAVENOUS at 22:24

## 2020-06-20 RX ADMIN — SODIUM CHLORIDE 1000 ML: 9 INJECTION, SOLUTION INTRAVENOUS at 21:46

## 2020-06-20 RX ADMIN — IOPAMIDOL 90 ML: 755 INJECTION, SOLUTION INTRAVENOUS at 22:30

## 2020-06-20 RX ADMIN — SODIUM CHLORIDE, PRESERVATIVE FREE 10 ML: 5 INJECTION INTRAVENOUS at 23:20

## 2020-06-20 ASSESSMENT — ENCOUNTER SYMPTOMS
BACK PAIN: 0
DIARRHEA: 0
VOMITING: 0
ABDOMINAL PAIN: 1
SORE THROAT: 0
BLOOD IN STOOL: 1
COUGH: 0
RHINORRHEA: 0
NAUSEA: 0
SHORTNESS OF BREATH: 0

## 2020-06-21 ENCOUNTER — APPOINTMENT (OUTPATIENT)
Dept: ULTRASOUND IMAGING | Age: 60
DRG: 369 | End: 2020-06-21
Payer: COMMERCIAL

## 2020-06-21 LAB
ALBUMIN FLUID: 0.4 G/DL
ANION GAP SERPL CALCULATED.3IONS-SCNC: 8 MMOL/L (ref 7–19)
ANISOCYTOSIS: ABNORMAL
APPEARANCE FLUID: NORMAL
BANDED NEUTROPHILS RELATIVE PERCENT: 6 % (ref 0–5)
BASOPHILS ABSOLUTE: 0 K/UL (ref 0–0.2)
BASOPHILS RELATIVE PERCENT: 0 % (ref 0–1)
BLOOD BANK DISPENSE STATUS: NORMAL
BLOOD BANK PRODUCT CODE: NORMAL
BPU ID: NORMAL
BUN BLDV-MCNC: 11 MG/DL (ref 6–20)
CALCIUM SERPL-MCNC: 7.5 MG/DL (ref 8.6–10)
CELL COUNT FLUID TYPE: NORMAL
CHLORIDE BLD-SCNC: 104 MMOL/L (ref 98–111)
CLOT EVALUATION: NORMAL
CO2: 23 MMOL/L (ref 22–29)
COLOR FLUID: YELLOW
CREAT SERPL-MCNC: 0.5 MG/DL (ref 0.5–0.9)
DESCRIPTION BLOOD BANK: NORMAL
EOSINOPHILS ABSOLUTE: 0 K/UL (ref 0–0.6)
EOSINOPHILS RELATIVE PERCENT: 0 % (ref 0–5)
FLUID TYPE: NORMAL
GFR NON-AFRICAN AMERICAN: >60
GLUCOSE BLD-MCNC: 112 MG/DL (ref 74–109)
HCT VFR BLD CALC: 21.7 % (ref 37–47)
HCT VFR BLD CALC: 23.6 % (ref 37–47)
HCT VFR BLD CALC: 23.7 % (ref 37–47)
HCT VFR BLD CALC: 24 % (ref 37–47)
HEMOGLOBIN: 7.5 G/DL (ref 12–16)
HEMOGLOBIN: 8 G/DL (ref 12–16)
HEMOGLOBIN: 8.1 G/DL (ref 12–16)
HEMOGLOBIN: 8.2 G/DL (ref 12–16)
IMMATURE GRANULOCYTES #: 2.2 K/UL
INR BLD: 1.1 (ref 0.88–1.18)
LYMPHOCYTES ABSOLUTE: 2.6 K/UL (ref 1.1–4.5)
LYMPHOCYTES RELATIVE PERCENT: 6 % (ref 20–40)
LYMPHOCYTES, BODY FLUID: 18 %
MACROPHAGE FLUID: 5 %
MCH RBC QN AUTO: 32.3 PG (ref 27–31)
MCHC RBC AUTO-ENTMCNC: 34.3 G/DL (ref 33–37)
MCV RBC AUTO: 94 FL (ref 81–99)
MESOTHELIAL FLUID: 4 %
MONOCYTE, FLUID: 13 %
MONOCYTES ABSOLUTE: 0 K/UL (ref 0–0.9)
MONOCYTES RELATIVE PERCENT: 0 % (ref 0–10)
NEUTROPHIL, FLUID: 60 %
NEUTROPHILS ABSOLUTE: 41.2 K/UL (ref 1.5–7.5)
NEUTROPHILS RELATIVE PERCENT: 88 % (ref 50–65)
NUCLEATED CELLS FLUID: 263 /CUMM
NUMBER OF CELLS COUNTED FLUID: 100
OVALOCYTES: ABNORMAL
PDW BLD-RTO: 15.8 % (ref 11.5–14.5)
PLATELET # BLD: 94 K/UL (ref 130–400)
PLATELET SLIDE REVIEW: ABNORMAL
PMV BLD AUTO: 10.5 FL (ref 9.4–12.3)
POTASSIUM REFLEX MAGNESIUM: 3.8 MMOL/L (ref 3.5–5)
PROTEIN FLUID: 0.6 G/DL
PROTHROMBIN TIME: 14.2 SEC (ref 12–14.6)
RBC # BLD: 2.51 M/UL (ref 4.2–5.4)
RBC FLUID: 498 /CUMM
SARS-COV-2, NAAT: NOT DETECTED
SODIUM BLD-SCNC: 135 MMOL/L (ref 136–145)
WBC # BLD: 43.8 K/UL (ref 4.8–10.8)

## 2020-06-21 PROCEDURE — 0W9G3ZZ DRAINAGE OF PERITONEAL CAVITY, PERCUTANEOUS APPROACH: ICD-10-PCS | Performed by: INTERNAL MEDICINE

## 2020-06-21 PROCEDURE — U0002 COVID-19 LAB TEST NON-CDC: HCPCS

## 2020-06-21 PROCEDURE — 6360000002 HC RX W HCPCS: Performed by: EMERGENCY MEDICINE

## 2020-06-21 PROCEDURE — 87015 SPECIMEN INFECT AGNT CONCNTJ: CPT

## 2020-06-21 PROCEDURE — 87070 CULTURE OTHR SPECIMN AEROBIC: CPT

## 2020-06-21 PROCEDURE — C1729 CATH, DRAINAGE: HCPCS

## 2020-06-21 PROCEDURE — 36415 COLL VENOUS BLD VENIPUNCTURE: CPT

## 2020-06-21 PROCEDURE — 99253 IP/OBS CNSLTJ NEW/EST LOW 45: CPT | Performed by: INTERNAL MEDICINE

## 2020-06-21 PROCEDURE — 85018 HEMOGLOBIN: CPT

## 2020-06-21 PROCEDURE — 85610 PROTHROMBIN TIME: CPT

## 2020-06-21 PROCEDURE — 89051 BODY FLUID CELL COUNT: CPT

## 2020-06-21 PROCEDURE — 87075 CULTR BACTERIA EXCEPT BLOOD: CPT

## 2020-06-21 PROCEDURE — C9113 INJ PANTOPRAZOLE SODIUM, VIA: HCPCS | Performed by: EMERGENCY MEDICINE

## 2020-06-21 PROCEDURE — 36591 DRAW BLOOD OFF VENOUS DEVICE: CPT

## 2020-06-21 PROCEDURE — P9016 RBC LEUKOCYTES REDUCED: HCPCS

## 2020-06-21 PROCEDURE — 87205 SMEAR GRAM STAIN: CPT

## 2020-06-21 PROCEDURE — 6360000002 HC RX W HCPCS: Performed by: HOSPITALIST

## 2020-06-21 PROCEDURE — 85014 HEMATOCRIT: CPT

## 2020-06-21 PROCEDURE — 2580000003 HC RX 258: Performed by: HOSPITALIST

## 2020-06-21 PROCEDURE — 88112 CYTOPATH CELL ENHANCE TECH: CPT

## 2020-06-21 PROCEDURE — 36430 TRANSFUSION BLD/BLD COMPNT: CPT

## 2020-06-21 PROCEDURE — 85025 COMPLETE CBC W/AUTO DIFF WBC: CPT

## 2020-06-21 PROCEDURE — 82040 ASSAY OF SERUM ALBUMIN: CPT

## 2020-06-21 PROCEDURE — 84157 ASSAY OF PROTEIN OTHER: CPT

## 2020-06-21 PROCEDURE — 2000000000 HC ICU R&B

## 2020-06-21 PROCEDURE — 2580000003 HC RX 258: Performed by: EMERGENCY MEDICINE

## 2020-06-21 PROCEDURE — 88305 TISSUE EXAM BY PATHOLOGIST: CPT

## 2020-06-21 PROCEDURE — 80048 BASIC METABOLIC PNL TOTAL CA: CPT

## 2020-06-21 PROCEDURE — 99291 CRITICAL CARE FIRST HOUR: CPT | Performed by: INTERNAL MEDICINE

## 2020-06-21 RX ORDER — 0.9 % SODIUM CHLORIDE 0.9 %
20 INTRAVENOUS SOLUTION INTRAVENOUS ONCE
Status: DISCONTINUED | OUTPATIENT
Start: 2020-06-21 | End: 2020-06-22 | Stop reason: HOSPADM

## 2020-06-21 RX ADMIN — ONDANSETRON 4 MG: 2 INJECTION INTRAMUSCULAR; INTRAVENOUS at 08:16

## 2020-06-21 RX ADMIN — SODIUM CHLORIDE, POTASSIUM CHLORIDE, SODIUM LACTATE AND CALCIUM CHLORIDE: 600; 310; 30; 20 INJECTION, SOLUTION INTRAVENOUS at 11:02

## 2020-06-21 RX ADMIN — SODIUM CHLORIDE 8 MG/HR: 900 INJECTION, SOLUTION INTRAVENOUS at 19:38

## 2020-06-21 RX ADMIN — OCTREOTIDE ACETATE 25 MCG/HR: 500 INJECTION, SOLUTION INTRAVENOUS; SUBCUTANEOUS at 12:27

## 2020-06-21 RX ADMIN — SODIUM CHLORIDE 8 MG/HR: 900 INJECTION, SOLUTION INTRAVENOUS at 09:21

## 2020-06-21 ASSESSMENT — PAIN SCALES - GENERAL
PAINLEVEL_OUTOF10: 0

## 2020-06-21 ASSESSMENT — ENCOUNTER SYMPTOMS
SHORTNESS OF BREATH: 0
VOMITING: 0
COUGH: 0
NAUSEA: 0
BACK PAIN: 0
CONSTIPATION: 0
DIARRHEA: 0

## 2020-06-21 NOTE — H&P
Patient Information:  Patient: Sylvester Martines  MRN: [id number]   Acct: [de-identified]  YOB: 1960  Admit Date: 6/20/2020       Primary Care Physician: Sharonda Beckham MD  Advance Directive: Full Code  HEALTH CARE PROXY: her daughter, Mrs. John Yuan, of [address] [place] [address]:    Chief Complaint   Patient presents with    GI Bleeding     dark black tarry stools x24 hours, last GI bleed 6 wks ago     ED Team Sign Out:  GIB  Hb 5  GI has been consulted  Dark blood and melena  No Blood from mouth    HPI and ROS:  Mrs. Charles Pastrana is a pleasant 64year old lady from home. She states that she had a bleed Friday at about 20:00. She states that she had passed more blood from this Saturday. She began to improve until later tonight when she had abdominal pain. She states that she has these very sharp pains with a wave of heat. She then came to on the floor. Today she has been getting weaker and weaker throughout the day. She was afraid that she did not have that much more blood to lose and had her kids bring her in. She also endorses: fatigue. She also denies: fever, chills, night sweats, chest pain, chest pressure, confusion, near syncope, nausea, dyspnea, diaphoresis, and head ache. She also wants us to know that she had chemo on Monday, and that this has happened after chemo before.      Past Medical History:   Diagnosis Date    Acute pancreatitis     Adult BMI <19 kg/sq m     Anemia     Cat esophagus     Biliary obstruction     GERD (gastroesophageal reflux disease)     with Barretts    Hashimoto's thyroiditis     denies takes no med for    Heart murmur     Hypertension     pt denies nor longer takes med for    Low blood sugar     h/o when overweight in the past    MVP (mitral valve prolapse)     Myalgia     Palliative care patient 05/13/2020    Pancreatic adenocarcinoma ([facility] [address] 01/2018    Dr Rosalie Callahan    Pancreatic cancer ([facility] [address] 3/30/2018    Right flank pain     RUQ pain      Past Psychiatric History:  Denies any    Past Surgical History:   Procedure Laterality Date    CARDIAC SURGERY      heart cath     SECTION      x 3    CHOLECYSTECTOMY  1997    COLONOSCOPY  years ago    [facility]-Dr Perla Johnson per patient    COLONOSCOPY  2014    Dr Lucy Bell- Mohan Gan recall    COLONOSCOPY  03/10/2016    Dr Mcfarland-10 yr recall    COLONOSCOPY N/A 2019    Dr Melvin Mtz, 5 yr recall    ELBOW SURGERY Right     ENDOMETRIAL ABLATION      HAND SURGERY Right     HERNIA REPAIR      hiatal    HERNIA REPAIR      umbilical    HERNIA REPAIR      PANCREAS SURGERY  2018    Whipple procedure-Dr Philipp Hong OR EGD [facility] NEEDLE ASPIR/BIOP ALTERED ANATOMY N/A 2018    Dr Onesimo Sales w/fna-Dilation of main pancreatic duct with diffuse change in the pancreatitis noted, area of concern in the neck of the pancreas-strongly suspicious for adenocarcinoma     OR EGD INTRMURAL NEEDLE ASPIR/BIOP ALTERED ANATOMY N/A 3/6/2018    Dr KVNG Duncan-Pancreatic cancer-Ductal adenocarcinoma-staged [id number] by EUS, pancreatic pseudocyst in the tail the pancreas    OR ERCP DX COLLECTION SPECIMEN BRUSHING/WASHING N/A 2018    Dr KVNG Duncan-w/placement of a self-expanding fully covered 10 mm biliary stent    UPPER GASTROINTESTINAL ENDOSCOPY  years ago    [facility]-Dr Maryjane Banda    UPPER GASTROINTESTINAL ENDOSCOPY      Zuniga    UPPER GASTROINTESTINAL ENDOSCOPY N/A 2019    Dr María Noble post Whipple's procedure with efferent loop ulceration    UPPER GASTROINTESTINAL ENDOSCOPY  2019    Dr Chaz Duncan-Patent G-J Anastomosis, apparent healing ulceration    UPPER GASTROINTESTINAL ENDOSCOPY N/A 2020    Dr Radha Foley amount of food retention in the stomach with bile, hiatal hernia, will need repeat    UPPER GASTROINTESTINAL ENDOSCOPY N/A 2020    Dr Titus Ambrosio erosion/ulceration at the suture line, post whipple surgical changes    UPPER GASTROINTESTINAL ENDOSCOPY N/A 3/9/2020    Dr Quiroga Neat erosion along the previous whipple suture line    UPPER GASTROINTESTINAL ENDOSCOPY N/A 4/16/2020    Dr KVNG Duncan-w/hemostatic clip placement x 1-Allie Peres tear at GEJ-Likely culprit lesion for current presentation     Social History     Tobacco History     Smoking Status  Former Smoker Quit date  11/1/2019 Smoking Frequency  0.5 packs/day for 10 years (5 pk yrs) Smoking Tobacco Type  Cigarettes    Smokeless Tobacco Use  Never Used          Alcohol History     Alcohol Use Status  Not Currently          Drug Use     Drug Use Status  Never          Sexual Activity     Sexually Active  Not Asked         Has 3 kids   CODE STATUS: Full Code  HEALTH CARE PROXY: her daughter, Mrs. Ngoc Mcgowan, of [address] [facility]: independently  DOMICILED: lives in a private home, without steps inside, lives alone, has 2 dogs, no steps in to home    Family History:  Deferred    Allergies   Allergen Reactions    Codeine Shortness Of Breath     SOB and rash    Morphine Other (See Comments)     Other reaction(s): abd pain  Severe abd. pain    Morphine And Related Nausea Only    Sulfa Antibiotics Shortness Of Breath and Hives     SOB, rash and itching    Neomycin-Bacitracin Zn-Polymyx Rash    Clarithromycin     Dilaudid [Hydromorphone Hcl] Other (See Comments)     \"completely shut me down\"    Hydromorphone     Loperamide Hcl Other (See Comments)     severe abd. pain    Loperamide Hcl Hives     severe abd. pain    Neosporin [Neomycin-Polymyx-Gramicid] Other (See Comments)     Causes boil      Current Facility-Administered Medications   Medication Dose Route Frequency Provider Last Rate Last Dose    ondansetron (ZOFRAN) injection 4 mg  4 mg Intravenous Once Peri Izaguirre MD        pantoprazole (PROTONIX) 80 mg in sodium chloride 0.9 % 100 mL infusion  8 mg/hr Intravenous Continuous Peri Izaguirre MD 10 mL/hr at 06/20/20 2224 8 mg/hr at 06/20/20 2224    octreotide (SANDOSTATIN) 500 67   Resp: 20   Temp:    SpO2: 100%   breathing room air    BP (!) 81/51   Pulse 67   Temp 97.8 °F (36.6 °C)   Resp 20   Ht 5' 7\" (1.702 m)   Wt 107 lb (48.5 kg)   SpO2 100%   BMI 16.76 kg/m²     No intake or output data in the 24 hours ending 06/20/20 2257    Physical Exam  Vitals signs reviewed. Constitutional:       General: She is not in acute distress. Appearance: She is ill-appearing. She is not toxic-appearing. Comments: Wasted appearing   HENT:      Head: Atraumatic. Comments: Wasted TM muscles     Nose: No congestion or rhinorrhea. Eyes:      General:         Right eye: No discharge. Left eye: No discharge. Neck:      Musculoskeletal: Neck supple. Comments: Trachea appears midline  Cardiovascular:      Rate and Rhythm: Normal rate and regular rhythm. Heart sounds: No murmur. No friction rub. No gallop. Pulmonary:      Effort: No respiratory distress. Breath sounds: No stridor. No wheezing, rhonchi or rales. Chest:      Chest wall: No tenderness. Abdominal:      General: Bowel sounds are normal.      Tenderness: There is no abdominal tenderness. There is no guarding or rebound. Musculoskeletal:         General: Swelling present. No tenderness. Right lower leg: Edema present. Left lower leg: Edema present. Comments: Depleted fat and muscle stores   Skin:     General: Skin is warm. Comments: nondaiphoretic   Neurological:      Mental Status: She is alert and oriented to person, place, and time.    Psychiatric:         Mood and Affect: Mood normal.         Behavior: Behavior normal.       LABORATORY DATA:    CBC:   Recent Labs     06/20/20 2130   WBC 39.8*   HGB 5.0*   HCT 15.6*   *     BMP:   Recent Labs     06/20/20 2130   *   K 3.7      CO2 24   BUN 13   CREATININE 0.6   CALCIUM 7.9*     Hepatic Profile:   Recent Labs     06/20/20 2130   AST 18   ALT 16   BILITOT <0.2   ALKPHOS 153*     Coag Panel:   Recent Labs     06/20/20 2130   INR 1.09   PROTIME 14.1   APTT 28.0       IMAGING:  CT Head w/O contrast and CT ABD/Pelvis with/WO contrast pending reads        ASESSMENTS & PLANS:    Acute on Chronic Anemia due to Gastrointestinal Bleeding:  Admit to ICU under Hospitalist Service   pantoprozole (PROTONIX) infusion 8 mg/hr (06/20/20 2224)    octreotide (SANDOSTATIN) infusion 25 mcg/hr (06/20/20 2347)    lactated ringers 75 mL/hr at 06/20/20 2347   GI consult  NPO except meds  Haptoglobin and Retic count added on to ED blood work (pre-transfusion)    Chronic Medical Problems:  Continue home medications as indicated  Had chemo Monday - consult to Oncology    PEM POA: has Cachexia with wasting of both fat and muscle stores  Dietician consult would be of use when able to have PO intake  Would consider TPN early as per severe cachexia if GI suggests more than a few days NPO    Supoportive and Prophylactic Txx:  DVT Prophylaxis: SCDs  GI (PUD) Ppx: as per above  PT consult for evalutation and Txx as indicated: would consider when stable enough to participate   sodium chloride flush  10 mL Intravenous 2 times per day   Tylenol PRN, Zofran PRN,       Critical Care time of >45 minutes  Pt seen/examined and admitted to inpatient status. Inpatient status is used for patients with an expected LOS extending past two midnights due to medical therapy and or critical care needs, otherwise patients are placed to OBServation status. Signed:  Electronically signed by Gabo Saravia MD on 6/21/20 at 01:10 CDT.

## 2020-06-21 NOTE — PROGRESS NOTES
Yu Stout MD 75 mL/hr at 20 1102      ondansetron (ZOFRAN-ODT) disintegrating tablet 4 mg  4 mg Oral Q8H PRN Angela Heimlich, MD        Or    ondansetron [facility] injection 4 mg  4 mg Intravenous Q6H PRN Angela Heimlich, MD   4 mg at 20 9510       Past Medical History:  Past Medical History:   Diagnosis Date    Acute pancreatitis     Adult BMI <19 kg/sq m     Anemia     Cat esophagus     Biliary obstruction     GERD (gastroesophageal reflux disease)     with Barretts    Hashimoto's thyroiditis     denies takes no med for    Heart murmur     Hypertension     pt denies nor longer takes med for    Low blood sugar     h/o when overweight in the past    MVP (mitral valve prolapse)     Myalgia     Palliative care patient 2020    Pancreatic adenocarcinoma ([facility] [address] 2018    Dr Patricia Leon    Pancreatic cancer ([facility] [address] 3/30/2018    Right flank pain     RUQ pain        Past Surgical History:  Past Surgical History:   Procedure Laterality Date    CARDIAC CATHETERIZATION      heart cath     SECTION      x 3    CHOLECYSTECTOMY  1997    COLONOSCOPY  years ago    Steve-Dr Chasity Irwin per patient    COLONOSCOPY  2014    Dr Ronak Richards- José Luiscus Chun recall    COLONOSCOPY  03/10/2016    Dr Mcfarland-10 yr recall    COLONOSCOPY N/A 2019    Dr Terry Mendoza, 5 yr recall    ELBOW SURGERY Right     ENDOMETRIAL ABLATION      HAND SURGERY Right     HERNIA REPAIR      hiatal    HERNIA REPAIR      umbilical    HERNIA REPAIR      PANCREAS SURGERY  2018    Whipple procedure-Dr Scott Barbosa [place] EGD [facility] NEEDLE ASPIR/BIOP ALTERED ANATOMY N/A 2018    Dr Marco Landa w/fna-Dilation of main pancreatic duct with diffuse change in the pancreatitis noted, area of concern in the neck of the pancreas-strongly suspicious for adenocarcinoma     [place] EGD [facility] NEEDLE ASPIR/BIOP ALTERED ANATOMY N/A 3/6/2018    Dr KVNG Duncan-Pancreatic cancer-Ductal adenocarcinoma-staged [id number] by EUS, pancreatic pseudocyst in the tail the pancreas    [place] ERCP DX COLLECTION SPECIMEN BRUSHING/WASHING N/A 2018    Dr KVNG Duncan-w/placement of a self-expanding fully covered 10 mm biliary stent    UPPER GASTROINTESTINAL ENDOSCOPY  years ago    Steve-Dr [facility] ENDOSCOPY      Zuniga    UPPER GASTROINTESTINAL ENDOSCOPY N/A 2019    Dr Adeel Miller post Whipple's procedure with efferent loop ulceration    UPPER GASTROINTESTINAL ENDOSCOPY  2019    Dr Robert Duncan-Patent G-J Anastomosis, apparent healing ulceration    UPPER GASTROINTESTINAL ENDOSCOPY N/A 2020    Dr Fernández Meliton amount of food retention in the stomach with bile, hiatal hernia, will need repeat    UPPER GASTROINTESTINAL ENDOSCOPY N/A 2020    Dr Speedy Gay erosion/ulceration at the suture line, post whipple surgical changes    UPPER GASTROINTESTINAL ENDOSCOPY N/A 3/9/2020    Dr Speedy Gay erosion along the previous whipple suture line    UPPER GASTROINTESTINAL ENDOSCOPY N/A 2020    Dr KVNG Duncan-w/hemostatic clip placement x 1-Allie Peres tear at GEJ-Likely culprit lesion for current presentation       Family History  Family History   Problem Relation Age of Onset    Heart Attack Mother 46        x 2-had bypass    Hypertension Mother     COPD Father     Liver Cancer Paternal Aunt     Lung Cancer Paternal Aunt     Breast Cancer Maternal Aunt         but  of brain cancer    Diabetes Maternal Uncle     Diabetes Maternal Grandmother     Colon Cancer Neg Hx     Colon Polyps Neg Hx     Esophageal Cancer Neg Hx     Rectal Cancer Neg Hx     Stomach Cancer Neg Hx        Social History  Social History     Socioeconomic History    Marital status: Single     Spouse name: Not on file    Number of children: 3    Years of education: Not on file    Highest education level: Not on file   Occupational History    Occupation: retired chemical operqator at [employer] Occupation: and back pain. Neurological: Positive for weakness. Negative for dizziness and headaches. Objective:  Blood pressure 118/70, pulse 53, temperature 97.6 °F (36.4 °C), temperature source Temporal, resp. rate 20, height 5' 7\" (1.702 m), weight 107 lb (48.5 kg), SpO2 98 %. Intake/Output Summary (Last 24 hours) at 6/21/2020 1358  Last data filed at 6/21/2020 1226  Gross per 24 hour   Intake 1955.58 ml   Output 1400 ml   Net 555.58 ml       Physical Exam  Vitals signs reviewed. Constitutional:       Appearance: She is well-developed. HENT:      Head: Normocephalic and atraumatic. Eyes:      Conjunctiva/sclera: Conjunctivae normal.      Pupils: Pupils are equal, round, and reactive to light. Cardiovascular:      Rate and Rhythm: Normal rate and regular rhythm. Heart sounds: Normal heart sounds. Pulmonary:      Effort: Pulmonary effort is normal.      Breath sounds: Normal breath sounds. Abdominal:      Palpations: Abdomen is soft. Musculoskeletal: Normal range of motion. Skin:     General: Skin is warm and dry. Neurological:      Mental Status: She is alert and oriented to person, place, and time. Labs:  BMP:   Recent Labs     06/20/20 2130 06/21/20  0427   * 135*   K 3.7 3.8    104   CO2 24 23   BUN 13 11   CREATININE 0.6 0.5   CALCIUM 7.9* 7.5*     CBC:   Recent Labs     06/20/20 2130 06/21/20  0427 06/21/20  0900 06/21/20  1230   WBC 39.8* 43.8*  --   --    HGB 5.0* 8.1* 7.5* 8.0*   HCT 15.6* 23.6* 21.7* 24.0*   MCV 99.4* 94.0  --   --    * 94*  --   --      LIVER PROFILE:   Recent Labs     06/20/20 2130   AST 18   ALT 16   LIPASE 6*   BILITOT <0.2   ALKPHOS 153*     PT/INR:   Recent Labs     06/20/20 2130 06/21/20  1230   PROTIME 14.1 14.2   INR 1.09 1.10     APTT:   Recent Labs     06/20/20 2130   APTT 28.0     BNP:  No results for input(s): BNP in the last 72 hours. Ionized Calcium:No results for input(s): IONCA in the last 72 hours.   Magnesium:No results for input(s): MG in the last 72 hours. Phosphorus:No results for input(s): PHOS in the last 72 hours. HgbA1C: No results for input(s): LABA1C in the last 72 hours. Hepatic:   Recent Labs     06/20/20  2130   ALKPHOS 153*   ALT 16   AST 18   PROT 4.7*   BILITOT <0.2   LABALBU 2.7*     Lactic Acid: No results for input(s): LACTA in the last 72 hours. Troponin: No results for input(s): CKTOTAL, CKMB, TROPONINT in the last 72 hours. ABGs: No results for input(s): PH, PCO2, PO2, HCO3, O2SAT in the last 72 hours. CRP:  No results for input(s): CRP in the last 72 hours. Sed Rate:  No results for input(s): SEDRATE in the last 72 hours. Cultures:   No results for input(s): CULTURE in the last 72 hours. No results for input(s): BC, Erika Tipton in the last 72 hours. No results for input(s): CXSURG in the last 72 hours. Radiology reports as per the Radiologist  Radiology: Ct Head Wo Contrast    Result Date: 6/21/2020  CT HEAD WO CONTRAST 6/20/2020 8:45 PM HISTORY: Fall, syncope COMPARISON: None DOSE LENGTH PRODUCT: 1507 mGy cm TECHNIQUE: Helical tomographic images of the brain were obtained without the use of intravenous contrast. Automated exposure control was also utilized to decrease patient radiation dose. FINDINGS: There is no evidence of evolving large vascular territory infarct. No visualized intra-axial or extra-axial hemorrhage. No mass lesion is identified. Normal size and configuration of the ventricular system. The basal cisterns are symmetric. Posterior fossa structures are unremarkable. The included orbits and their contents are unremarkable. The visualized paranasal sinuses, mastoid air cells and middle ear cavities are clear. Tiny outer table osteoma along the frontal bone, just right of midline near the coronal suture. Scalp and calvarium are otherwise unremarkable. 1. No acute intracranial process.  2. Findings in agreement with the emergent findings from the initial [facility] preliminary report. Signed by Dr Arianna Pittman on 6/21/2020 9:39 AM    Ct Abdomen Pelvis W Iv Contrast Additional Contrast? None    Result Date: 6/21/2020  CT ABDOMEN PELVIS W IV CONTRAST 6/20/2020 8:45 PM HISTORY: Abdominal pain, GI bleed COMPARISON: CT scan dated 5/12/2020. DLP: 1507 mGy cm TECHNIQUE: Following the uneventful administration of intravenous iodinated contrast, helical CT tomographic images of the abdomen and pelvis were acquired. Coronal reformatted images were also provided for review. Automated exposure control was also utilized to decrease patient radiation dose. FINDINGS: Lung bases are clear. Heart size is upper limit of normal. Hiatal hernia sac which mostly contains ascitic fluid. LIVER: Relative hypoenhancement along the inferior segments 3 and 4B adjacent to the falciform. This appears to be a flow-related phenomenon and does not appear to be a focal lesion. Intrahepatic portal veins appear patent. Hepatic veins are patent. Pneumobilia which is not unexpected following Whipple procedure. BILIARY SYSTEM: Prior Whipple procedure. Extrahepatic ducts are surgically absent. PANCREAS: Prior Whipple procedure. The body and tail the pancreas are atrophic and there is a mild dilation of the main pancreatic duct up to 3 mm. SPLEEN: Normal size. KIDNEYS AND ADRENALS: Bilateral kidneys and adrenal glands are unremarkable. The ureters are decompressed and normal in appearance. RETROPERITONEUM: No adenopathy, mass or hemorrhage in the retroperitoneal space. Retroperitoneal structures appear compressed as result of the ascites, with the IVC near completely attenuated in the midabdomen. GI TRACT: Minimal hiatal hernia. Mild gastric distention. Antrectomy as part of the Whipple procedure. Overall appearance is nonobstructive. Diffuse wall thickening throughout the small and large bowel, appearing is a diffuse venous congestion. No nonenhancing segments to suggest arterial ischemia. Moderate colonic stool.  OTHER:  Large volume ascites for patient size. 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 No gross intraperitoneal free air. No peritoneal abscess. Abdominal aorta is normal in caliber. The celiac and SMA origins and branching appears appropriate. The KVNG is patent. PELVIS: No pelvic mass or adenopathy. The urinary bladder is normal in appearance. 1. Prior Whipple procedure with expected pneumobilia. 2. Large volume ascites for patient size. 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 Diffuse wall thickening along the small and large bowel secondary to this venous congestion. No evidence for arterial ischemia, as the bowel mucosa appears to enhance normally. No evidence of viscus perforation or peritoneal abscess. Of note, the the retroperitoneal structures are quite decompressed as a result of the ascites, with near complete attenuation of the IVC in the mid abdomen. Unsure if this is contributing to any lower extremity edema. If evidence of multiorgan dysfunction, abdominal compartment syndrome could also be considered. The results of this exam were discussed with Dr. Lenny Lott on 6/21/2020 at 1002 hours. In particular, I wanted to address if there was indication/need for therapeutic paracentesis. This is under consideration. Signed by Dr Vy Mercado on 6/21/2020 10:03 AM    Us Guided Paracentesis    Result Date: 6/21/2020  Exam: [id number] Date of examination is 6/21/2020 Comparison Study: CT scan dated 6/20/2020 Findings: The risks and benefits of ultrasound-guided abdominal paracentesis were described to the patient. Other alternatives were discussed. Questions were answered. Verbal and written consents were obtained. Timeout was taken confirming the patient's name, date of birth, the procedure, the entry site, and list of allergies.  The entry site within the left lower quadrant was marked. This was sterilely prepped and draped. Local anesthetic was administered. Utilizing a 5 French 10 cm catheter, the peritoneal cavity was entered under direct ultrasound visualization. 1.25 L of cloudy a low ascitic fluid was obtained. The patient tolerated the procedure well. No immediate complications were noted. 1. Ultrasound-guided abdominal paracentesis performed for therapeutic purposes. A 60 cc aspirate was set aside for lab evaluation, if desired. The patient tolerated the proceed well. Signed by Dr Richard Bautista on 6/21/2020 12:31 PM       Assessment     GIB (gastrointestinal bleeding). Serial H&H's. Transfuse as needed. Possible EGD tomorrow. Stage IV pancreatic carcinoma. Supportive care.       Elroy Chowdhury DO

## 2020-06-21 NOTE — ED PROVIDER NOTES
[facility]  eMERGENCY dEPARTMENT eNCOUnter      Pt Name: Danyelle Robison  MRN: [id number]  [place] 1960  Date of evaluation: 6/20/2020  Provider: Vasile Gaspar MD    [address] [place] [address]       Chief Complaint   Patient presents with    GI Bleeding     dark black tarry stools x24 hours, last GI bleed 6 wks ago         HISTORY OF PRESENT ILLNESS   (Location/Symptom, Timing/Onset,Context/Setting, Quality, Duration, Modifying Factors, Severity)  Note limiting factors. Danyelle Robison is a 64 y.o. female who presents to the emergency department for GI bleed and generalized fatigue and weakness. The patient has had prior GI bleeds involving Allie-Peres tear as well as a small erosion at her prior Whipple suture line based on GI notes. She states she only has mild abdominal cramping intermittently denies any current pain. Last couple of days she said increasing fatigue and been off balance because she has been so fatigued. She woke up in the floor once she tells me. She does not believe she struck her head. She has had dark stools over the past several days. She is not on anticoagulants. She has a known history of pancreatic cancer and followed by Dr. Adam Mcguire and last chemotherapy was 5 days ago. HPI    NursingNotes were reviewed. REVIEW OF SYSTEMS    (2-9 systems for level 4, 10 or more for level 5)     Review of Systems   Constitutional: Positive for activity change and fatigue. Negative for chills and fever. HENT: Negative for rhinorrhea and sore throat. Respiratory: Negative for cough and shortness of breath. Cardiovascular: Negative for chest pain and leg swelling. Gastrointestinal: Positive for abdominal pain and blood in stool. Negative for diarrhea, nausea and vomiting. Genitourinary: Negative for dysuria, frequency and urgency. Musculoskeletal: Negative for back pain and neck pain. Neurological: Positive for dizziness, syncope and light-headedness. Negative for headaches. All other systems reviewed and are negative.            PAST MEDICALHISTORY     Past Medical History:   Diagnosis Date    Acute pancreatitis     Adult BMI <19 kg/sq m     Anemia     Cat esophagus     Biliary obstruction     GERD (gastroesophageal reflux disease)     with Barretts    Hashimoto's thyroiditis     denies takes no med for    Heart murmur     Hypertension     pt denies nor longer takes med for    Low blood sugar     h/o when overweight in the past    MVP (mitral valve prolapse)     Myalgia     Palliative care patient 2020    Pancreatic adenocarcinoma ([facility] [address] 2018    Dr Hector Lozoya    Pancreatic cancer ([facility] [address] 3/30/2018    Right flank pain     RUQ pain          SURGICAL HISTORY       Past Surgical History:   Procedure Laterality Date    CARDIAC SURGERY      heart cath     SECTION      x 3    CHOLECYSTECTOMY      COLONOSCOPY  years ago    [facility]-Dr Clarissa Healy per patient    COLONOSCOPY  2014    Dr Estrella Greenfield- Cecile Zaragoza recall    COLONOSCOPY  03/10/2016    Dr Mcfarland-10 yr recall    COLONOSCOPY N/A 2019    Dr Sally Angela, 5 yr recall    ELBOW SURGERY Right     ENDOMETRIAL ABLATION      HAND SURGERY Right     HERNIA REPAIR      hiatal    HERNIA REPAIR      umbilical    HERNIA REPAIR      PANCREAS SURGERY  2018    Whipple procedure-Dr Ana Sena [facility] NEEDLE ASPIR/BIOP ALTERED ANATOMY N/A 2018    Dr Kusum Carballo w/fna-Dilation of main pancreatic duct with diffuse change in the pancreatitis noted, area of concern in the neck of the pancreas-strongly suspicious for adenocarcinoma     [facility] NEEDLE ASPIR/BIOP ALTERED ANATOMY N/A 3/6/2018    Dr KVNG Duncan-Pancreatic cancer-Ductal adenocarcinoma-staged [id number] by EUS, pancreatic pseudocyst in the tail the pancreas    [facility] ERCP DX COLLECTION SPECIMEN BRUSHING/WASHING N/A 2018    Dr KVNG Duncan-w/placement of a self-expanding fully covered 10 mm biliary stent    UPPER GASTROINTESTINAL ENDOSCOPY  years ago    [facility]-Dr Chris Singh    UPPER GASTROINTESTINAL ENDOSCOPY  2013    Zuniga    UPPER GASTROINTESTINAL ENDOSCOPY N/A 11/1/2019    Dr Carlos Sewell post Whipple's procedure with efferent loop ulceration    UPPER GASTROINTESTINAL ENDOSCOPY  12/17/2019    Dr Katia Duncan-Patent G-J Anastomosis, apparent healing ulceration    UPPER GASTROINTESTINAL ENDOSCOPY N/A 2/25/2020    Dr Atul Romero amount of food retention in the stomach with bile, hiatal hernia, will need repeat    UPPER GASTROINTESTINAL ENDOSCOPY N/A 2/27/2020    Dr Babs Keane erosion/ulceration at the suture line, post whipple surgical changes    UPPER GASTROINTESTINAL ENDOSCOPY N/A 3/9/2020    Dr Babs Keane erosion along the previous whipple suture line    UPPER GASTROINTESTINAL ENDOSCOPY N/A 4/16/2020    Dr KVNG Duncan-w/hemostatic clip placement x 1-Allie Peres tear at GEJ-Likely culprit lesion for current presentation         CURRENT MEDICATIONS     Previous Medications    CYANOCOBALAMIN (VITAMIN B 12 PO)    Take by mouth    LIDOCAINE-PRILOCAINE (EMLA) 2.5-2.5 % CREAM    Apply to port area and cover with plastic wrap one hour prior to use.     LIPASE-PROTEASE-AMYLASE (ZENPEP) 91545-48354 UNITS CPEP DELAYED RELEASE CAPSULE    Take 1-2 capsules by mouth 3 times daily (with meals) Only takes as needed    LIPASE-PROTEASE-AMYLASE (ZENPEP) 5000 UNITS DELAYED RELEASE CAPSULE    Take 1 capsule by mouth 4 times daily (with meals and nightly) Take with meals and snacks for a total of 8 daily    LIPASE-PROTEASE-AMYLASE (ZENPEP) [id number] UNITS CPEP DELAYED RELEASE CAPSULE    Take 1 capsule by mouth 4 times daily (with meals and nightly)    MULTIPLE VITAMINS-MINERALS (THERAPEUTIC MULTIVITAMIN-MINERALS) TABLET    Take 1 tablet by mouth daily    NONFORMULARY    daily Tumeric otc    ONDANSETRON (ZOFRAN) 8 MG TABLET    Take 1 tablet by mouth every 8 hours as needed for Nausea or Vomiting    PANTOPRAZOLE (PROTONIX) 40 MG Gets together: None     Attends Zoroastrian service: None     Active member of club or organization: None     Attends meetings of clubs or organizations: None     Relationship status: None    Intimate partner violence     Fear of current or ex partner: None     Emotionally abused: None     Physically abused: None     Forced sexual activity: None   Other Topics Concern    None   Social History Narrative    None       SCREENINGS             PHYSICAL EXAM    (up to 7 for level 4, 8 or more for level 5)     ED Triage Vitals [06/20/20 2105]   BP Temp Temp src Pulse Resp SpO2 Height Weight   97/61 97 °F (36.1 °C) -- 97 16 98 % 5' 7\" (1.702 m) 107 lb (48.5 kg)       Physical Exam  Vitals signs and nursing note reviewed. Exam conducted with a chaperone present. Constitutional:       General: She is not in acute distress. Appearance: She is well-developed and underweight. She is ill-appearing. She is not diaphoretic. HENT:      Head: Normocephalic and atraumatic. Right Ear: External ear normal.      Left Ear: External ear normal.      Nose: Nose normal.      Mouth/Throat:      Mouth: Mucous membranes are moist.   Eyes:      Comments: Pale conjunctiva   Neck:      Musculoskeletal: Normal range of motion. Trachea: No tracheal deviation. Cardiovascular:      Rate and Rhythm: Normal rate and regular rhythm. Heart sounds: Normal heart sounds. No murmur. Pulmonary:      Effort: No respiratory distress. Breath sounds: Normal breath sounds. No wheezing or rales. Abdominal:      Palpations: Abdomen is soft. There is no mass. Tenderness: There is no abdominal tenderness. Genitourinary:     Rectum: Guaiac result positive. Comments: Maroon somewhat tarry stool  Musculoskeletal: Normal range of motion. Skin:     General: Skin is warm and dry. Neurological:      Mental Status: She is alert and oriented to person, place, and time. GCS: GCS eye subscore is 4. GCS verbal subscore is 5. GCS motor subscore is 6. Psychiatric:         Behavior: Behavior is cooperative. DIAGNOSTIC RESULTS     EKG: All EKG's areinterpreted by the Emergency Department Physician who either signs or Co-signs this chart in the absence of a cardiologist.    64 normal sinus rhythm no ST changes nondiagnostic EKG    RADIOLOGY:  Non-plain film images such as CT, Ultrasound and MRI are read by the radiologist. Plain radiographic images are visualized and preliminarily interpreted bythe emergency physician with the below findings:      CT ABDOMEN PELVIS W IV CONTRAST Additional Contrast? None    (Results Pending)   CT Head WO Contrast    (Results Pending)           LABS:  Labs Reviewed   CBC WITH AUTO DIFFERENTIAL - Abnormal; Notable for the following components:       Result Value    WBC 39.8 (*)     RBC 1.57 (*)     Hemoglobin 5.0 (*)     Hematocrit 15.6 (*)     MCV 99.4 (*)     MCH 31.8 (*)     MCHC 32.1 (*)     RDW 17.9 (*)     Platelets 797 (*)     All other components within normal limits    Narrative:     CALL  [place]  [facility] tel. ,  Hematology results called to and read back by deena perea rn klegrace, 06/20/2020  21:45, by [address] [place] [address] - Abnormal; Notable for the following components:    Sodium 134 (*)     Glucose 116 (*)     Calcium 7.9 (*)     Total Protein 4.7 (*)     Alb 2.7 (*)     Alkaline Phosphatase 153 (*)     All other components within normal limits   LIPASE - Abnormal; Notable for the following components:    Lipase 6 (*)     All other components within normal limits   APTT   PROTIME-INR   TYPE AND SCREEN   PREPARE RBC (CROSSMATCH)       All other labs were within normal range or not returned as of this dictation.     EMERGENCY DEPARTMENT COURSE and DIFFERENTIAL DIAGNOSIS/MDM:   Vitals:    Vitals:    06/20/20 2105   BP: 97/61   Pulse: 97   Resp: 16   Temp: 97 °F (36.1 °C)   SpO2: 98%   Weight: 107 lb (48.5 kg)   Height: 5' 7\" (1.702 m)       MDM  Number of Diagnoses or Management Options     Amount and/or Complexity of Data Reviewed  Clinical lab tests: ordered and reviewed  Tests in the radiology section of CPT®: ordered and reviewed  Discuss the patient with other providers: yes  Independent visualization of images, tracings, or specimens: yes    Critical Care  Total time providing critical care: 30-74 minutes    Patient ill-appearing, afebrile, history of pancreatic cancer on chemotherapy, BP around 719 systolic not tachycardic, has maroon tarry stools on exam, admits to fatigue lightheaded dizziness likely syncopal event, hemoglobin is 5, will transfuse 2 units, patient has had multiple endoscopies possible source of Allie-Peres tear versus erosion at Whipple suture line, unclear exact etiology based on reviewing notes, discussed the case with Dr. Erin Evans who is agreeable to keep the patient here but unsure if he will be able to localize the bleed, will offer her transfer for possible interventional radiology, continue to monitor closely 2221    Pt does not wish to be transferred, d/w Dr. Cary Kahn for admission to ICU 2249      CRITICAL CARE TIME   Total Critical Care time was 40 minutes, excluding separately reportable procedures. There was a high probability of clinically significant/life threatening deterioration in the patient's condition which required my urgent intervention. CONSULTS:  IP CONSULT TO GI    PROCEDURES:  Unless otherwise noted below, none     Procedures    FINAL IMPRESSION      1. Gastrointestinal hemorrhage, unspecified gastrointestinal hemorrhage type    2. Symptomatic anemia    3. Malignant neoplasm of pancreas, unspecified location of malignancy ([facility] [address]          DISPOSITION/PLAN   DISPOSITION        PATIENT REFERRED TO:  No follow-up provider specified.     DISCHARGE MEDICATIONS:  New Prescriptions    No medications on file          (Please note that portions of this note were completed with a voice recognition program.  Efforts were made to edit thedictations but occasionally words are mis-transcribed.)    Tanesha Grewal MD (electronically signed)  Attending Emergency Physician        Cristo Lynn MD  06/21/20 6853

## 2020-06-21 NOTE — CONSULTS
Patient Information:  Patient: Francis Alicea  MRN: [id number]    Jericho Flair: [de-identified]  YOB: 1960  Admit Date: 6/20/2020                        Primary Care Physician: Erin Kirby MD  Advance Directive: Full Code  HEALTH CARE PROXY: her daughter, Mrs. Ngoc Mcgowan, of [place]      Reason for Consult:  GI bleeding, pancreatic cancer    Requesting Physician: Shahram Lynn MD    CHIEF COMPLAINT:    Chief Complaint   Patient presents with    GI Bleeding     dark black tarry stools x24 hours, last GI bleed 6 wks ago       History Obtained From:  History obtained from chart review and the patient. HISTORY OF PRESENT ILLNESS:    Krystyna Lynn is a 61-year-old female with an underlying history of refractory stage IV metastatic pancreatic cancer who was admitted to the ICU with GI bleed. Dr. Kunal Gray, her oncologist, is called in continuity of care. I am seeing her in coverage over the weekend for this issue. She received palliative chemotherapy with liposomal irinotecan 70 mg/m² with leucovorin 400 mg/m² and 5-FU 2400 mg/m² over 46 hours every 2 weeks. This was last delivered on 6/15/2020. Brittanie Taylor has had GI bleeding documented in the past from Allie-Peres tear and an erosion at the prior Whipple suture line based on GI notes from previous evaluations. Brittanie Taylor presented with intermittent abdominal cramping, fatigue, syncopal episodes and black tarry stools over 2-day period of time. She is not on anticoagulants. Brittanie Taylor was admitted to the [facility] ED on 6/20/2020 with the above complaints and a hemoglobin and hematocrit of 5.0 and 15.6 respectively, with a WBC of 39.8 and a platelet count of 057,226. PT and INR are 14.1 and 1.09 respectively with an APTT that was normal at 28.0. Hematology/Oncology consultation called in continuity of care.     ONCOLOGIC HISTORY:   Diagnosis  · Pancreatic adenocarcinoma, January 2018   · [id number]  · 7/6/2019-extended genetic panel-negative for a deleterious mutation     Treatment summary  · March 2018-Surgical consultation at [address] [place] [address] Road operable   · 4/3/2018 through 6/4/2018 Neoadjuvant chemotherapy FOLFORINOX ×5 Biweekly cycles   · 4/11/2018-Biliary stent   · August 2018- XRT 30 Gy/Xeloda   · 08/30/3018- [facility]ipple procedure @ [facility]   · Recommended another 5 biweekly cycles of modified FOLFORINOX completed 12/26/2018   · 7/9/2019- 1/22/2020 Gemzar/Abraxane 125 mg/m2 q 14 days, discontinued due to progression of disease  · 3/4/2020- Irinotecan liposome 70 mg/m² with leucovorin 400 mg/m² and 5-FU 2400 mg/m² over 46 hours every 2 weeks.     Tumor marker  · 3/12/0206-NX-87-9->430->370. · 4/30/2018 - CA 19- 9 of 157   · 5/25/20182757-HO-98-9->32 after 4 cycles. · 08/02/2018- -> 8   · 10/01/2018- CA 19-9 -5   · 10/29/2018-CA 19-9- 7   · 12/3/4424-UY-99-9-7   · 04/11/2019-CA-19-9- 12   · 05/21/2019- CA 19-9- 41   · 7/2/2019-CA 19 9 = 114  · 7/9/2019- CA-19-9 = 225  · 8/6/2019- CA-19-9 = 171  · 9/3/0260-JT-99-9 = 198  · 9/13/20199603-TM-29-9 = 98 (at  [address])  · 10/29/0829-VW-39-9 =317  · 11/21/2019-CA-19-9 = 183  · 1/23/20209488-EJ-24-9 = 520  · 4/22/2020- CA-19-9 = 940  · 5/13/20202368-VX-80-9 = 129  · 6/1/20204923-UJ-49-9 = 523? ?        Cancer history  Ms July Pollack was seen in initial oncology consultation on 3/12/2018 referred by Dr. Gwendel Sax for a diagnosis of pancreatic adenocarcinoma. She was initially evaluated for acute pancreatitis at [facility] on February 2018. Further imaging revealed a pancreatic head mass. Lipase was elevated at 1184 and CA-19-9 elevated 134. The symptoms were going on for several weeks. Weight loss of 10-12 pounds over the last 6 months. · October 2017-CT abdomen without contrast was unremarkable. · 2/2/2018-ultrasound of abdomen showed a pancreatic duct dilation   · 2/02/2018-CT abdomen showed 16 mm low-density lesion in the pancreas at the junction of the head of the body.  No intra-abdominal adenopathy. No liver metastasis. · 2/7/2018-the patient underwent EGD/EUS and FNA biopsy of a pancreatic mass by Dr. Cosmo Baird at [facility] . EUS showed inflammatory changes consistent with a recent history of pancreatitis. Main pancreatic duct was dilated. No concerning peripancreatic adenopathy. No definite mass was seen by a more diffuse hypoechoic area measuring 1.8 x 2.2 cm in the head of the pancreas. Pathology was consistent with a group of markedly atypical cells strongly suspicious for adenocarcinoma. · 2/28/2018-CT pancreatic protocol showed a poorly defined area of decreased enhancement located in the head of the pancreas measuring 1.8 x 1.4 x 1.7 cm with moderate meditation of the pancreatic duct. Well defined sharply marginated low density nodule located in the tail of the pancreas measuring 1.5 x 1.3 x 1.5 cm (IPMN?). · 3/6/2018-CA 19-9 was elevated at 150. Repeat EGD was performed by Dr. Yoana Zepeda due to the inconclusive FNA resulted on 2/7/2018. Pathology FNA was consistent with pancreatic adenocarcinoma. · 3/12/2018-she was first seen by me. No evidence of distant disease. Referral to Surgical oncology. CT chest to complete staging. CA-19-9 430. · 3/16/2018-CT of the chest was unremarkable for intrathoracic metastatic disease   · Surgery consultation at [facility] March 2018- with Dr Nelson Melendez. She was not a surgical candidate upfront. Recommended neoadjuvant chemotherapy. · 4/3/2018-started on neoadjuvant chemotherapy with FOLFORINOX. · 4/11/2018-she developed CBD obstruction had a CBD stent placed by Dr. Cosmo Baird. · 4/3/2018 through 6/4/2018 Neoadjuvant chemotherapy FOLFORINOX ×5 Biweekly cycles   · August 2018- XRT 30 Gy/Xeloda   · 08/30/3018- Whipple procedure at [facility] [place] [place] by Dr. Alyssa Peraza  · Recommended another 7 biweekly cycles of modified FOLFORINOX. · 9/5/2018-CT of the chest abdomen pelvis was unremarkable for metastatic disease.    · 1/14/2019-CT abdomen and pelvis at [facility] showed no evidence of metastasis in the chest abdomen pelvis. · 5/21/2019-CT chest, abdomen, pelvis at [facility] showed no evidence of metastatic disease   · 5/21/20196013-IL-10-9 = 42   · 06/12/2019- CA-19-9 = 154. Discussed with the patient. We'll also discuss with [facility]. · 7/1/2019-CT chest, abdomen, pelvis showed a soft tissue mass measuring 1.4 x 1.1 cm, not present on prior scan from January 2019, concerning for recurrence. Stable hypodense in the liver, too small to characterize. Subcentimeter ill-defined area of low attenuation liver may represent an early metastasis. · 7/3/2019-recommended palliative chemotherapy with nab paclitaxel 125mg/m² IV over 30 minutes on day 1 and gemcitabine 600 mg/m² IV over 10mg/m2/min (fixed dose rate) on day 1  · 7/2/2019-CA 19 9 = 114  · 7/6/2019- extended genetic panel negative for a deleterious mutation (invitae)  · 7/9/2019- CA-19-9 = 225  · 8/6/2019- CA-19-9 = 171  · 9/3/8867-ZW-60-9 = 198   · 9/13/20191379-SU-17-9 = 98 at [facility]  · 9/13/2019-CT chest abdomen pelvis at [facility] showed a soft tissue thickening in the pancreatic bed with persistent narrowing of the portal SMV confluence.  Superimposed tumor remains a possibility.  The new low-density lesion in the periphery of the liver seen on the prior exam is no longer appreciated and likely represents treatment effect.  Nodular enhancement may represent perfusion change in the region on image 187. · 9/13/2018- she was recommended to continue current treatment with Gemzar/Abraxane  · 11/1/2019- she was hospitalized with GI bleed.  An upper endoscopy showed ulceration at the efferent loop of the Whipple's procedure  · 10/29/0334-UY-26-9 =317  · 11/21/2019-CA-19-9 = 183  · 11/19/2019- CT chest abdomen pelvis showed no evidence of gross disease progression. Improvement of the caliber of what may be a middle colic vein that drains back to the SMV.  There is still persistent narrowing of the of the upper SMV at the juncture with the portal vein.  No definite evidence of liver metastases at this time. No definite evidence of pulmonary metastases.  However, question of slight increase in prominence of subtle soft tissue thickening within the right paracolic gutter could be indicative of metastatic disease to peritoneum.   · 12/9/2019- colonoscopy by GI was unremarkable.  This was performed for complaints of maroon-colored stools. · 1/23/20209868-AW-44-9 = 520  · 1/28/2020- CT chest abdomen pelvis at [facility] showed progressive disease with recurrence at the retroperitoneal just inferior to the pancreaticojejunostomy with vascular involvement and complete occlusion of the portal vein and SMV resulting in worsening bowel edema and developing ascites. This is consistent with disease progression. · 3/4/2020- 4th line therapy Irinotecan liposome 70 mg/m² with leucovorin 400 mg/m² and 5-FU 2400 mg/m² over 46 hours every 2 weeks. · 5/12/202 Ct Chest W Contrast  No acute findings in the chest.  Slight increase in size of LEFT supraclavicular lymph nodes and subcarinal mediastinal lymph node. Recommend special attention on follow-up imaging to evaluate for stability or resolution. Ct Abdomen Pelvis W Iv Contrast   Postoperative change of Whipple with residual stranding and free fluid in the surgical bed. Residual soft tissue nodular prominence in the retroperitoneum adjacent to the portal vein which is compressed and occluded/thrombosed. The splenic vein is not visualized and presumed thrombosed. LEFT upper quadrant perigastric/periesophageal varices. Moderate volume ascites. 4.  No evidence of bowel obstruction. Mild distention of the stomach, which may be secondary to slow flow through the gastrojejunostomy. Correlate clinically. There is a small linear device in the distal esophagus which could represent a hemostasis clip but recommend clinical correlation.    · 5/13/2020- VD27.1-252  · 6/1/2020- CA 19.9- 523 Past Medical History:    Past Medical History:   Diagnosis Date    Acute pancreatitis     Adult BMI <19 kg/sq m     Anemia     Cat esophagus     Biliary obstruction     GERD (gastroesophageal reflux disease)     with Barretts    Hashimoto's thyroiditis     denies takes no med for    Heart murmur     Hypertension     pt denies nor longer takes med for    Low blood sugar     h/o when overweight in the past    MVP (mitral valve prolapse)     Myalgia     Palliative care patient 2020    Pancreatic adenocarcinoma ([facility] [address] 2018    Dr Guillermo Holder    Pancreatic cancer ([facility] [address] 3/30/2018    Right flank pain     RUQ pain          Past Surgical History:    Past Surgical History:   Procedure Laterality Date    CARDIAC CATHETERIZATION      heart cath     SECTION      x 3    CHOLECYSTECTOMY      COLONOSCOPY  years ago    Steve-Dr Alvarez Joya per patient    COLONOSCOPY  2014    Dr Melisa Smart Stagers recall    COLONOSCOPY  03/10/2016    Dr Mcfarland-10 yr recall    COLONOSCOPY N/A 2019    Dr Craig Tucker, 5 yr recall    ELBOW SURGERY Right     ENDOMETRIAL ABLATION      HAND SURGERY Right     HERNIA REPAIR      hiatal    HERNIA REPAIR      umbilical    HERNIA REPAIR      PANCREAS SURGERY  2018    Whipple procedure-Dr Azam High [place] EGD [facility] NEEDLE ASPIR/BIOP ALTERED ANATOMY N/A 2018    Dr Nuvia Cintron w/fna-Dilation of main pancreatic duct with diffuse change in the pancreatitis noted, area of concern in the neck of the pancreas-strongly suspicious for adenocarcinoma     MO EGD INTRMURAL NEEDLE ASPIR/BIOP ALTERED ANATOMY N/A 3/6/2018    Dr KVNG Duncan-Pancreatic cancer-Ductal adenocarcinoma-staged [id number] by EUS, pancreatic pseudocyst in the tail the pancreas    MO ERCP DX COLLECTION SPECIMEN BRUSHING/WASHING N/A 2018    Dr KVNG Duncan-w/placement of a self-expanding fully covered 10 mm biliary stent    UPPER GASTROINTESTINAL ENDOSCOPY  years ago at 20 2347      ondansetron (ZOFRAN-ODT) disintegrating tablet 4 mg  4 mg Oral Q8H PRN Debbie Brady MD        Or    ondansetron [facility] injection 4 mg  4 mg Intravenous Q6H PRN Debbie Brady MD   4 mg at 20 0816         Allergies:    Allergies   Allergen Reactions    Codeine Shortness Of Breath, Swelling and Rash    Morphine Other (See Comments)     Other reaction(s): abd pain  Severe abd. pain    Morphine And Related Nausea Only    Sulfa Antibiotics Shortness Of Breath and Hives     SOB, rash and itching    Neomycin-Bacitracin Zn-Polymyx Rash    Clarithromycin     Dilaudid [Hydromorphone Hcl] Other (See Comments)     \"completely shut me down\"    Hydromorphone     Loperamide Hcl Other (See Comments)     severe abd. pain    Loperamide Hcl Hives     severe abd. 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[place] [address] Occupation: [employer]    Occupation: [employer]   Social Needs    Financial resource strain: None    Food insecurity     Worry: None     Inability: None    Transportation needs     Medical: None     Non-medical: None   Tobacco Use    Smoking status: Former Smoker     Packs/day: 0.50     Years: 10.00     Pack years: 5.00     Types: Cigarettes     Last attempt to quit: 2019     Years since quittin.6    Smokeless tobacco: Never Used   Substance and Sexual Activity    Alcohol use: Not Currently    Drug use: Never    Sexual activity: None     Comment: 3   Lifestyle    Physical activity     Days per week: None     Minutes per session: None    Stress: None   Relationships    Social connections     Talks on phone: None     Gets together: None     Attends Jew service: None     Active member of club or organization: None     Attends meetings of clubs or organizations: None     Relationship status: None    Intimate partner violence     Fear of current or ex partner: None     Emotionally abused: None     Physically abused: None     Forced sexual activity: None   Other Topics Concern    None   Social History Narrative    CODE STATUS: Full Code    HEALTH CARE PROXY: her daughter, Mrs. Js Lackey, of [facility]: independently    DOMICILED: lives in a private home, without steps inside, lives alone, has 2 dogs, no steps in to home         Family History:   Family History   Problem Relation Age of Onset    Heart Attack Mother 46        x 2-had bypass    Hypertension Mother     COPD Father     Liver Cancer Paternal Aunt     Lung Cancer Paternal Aunt     Breast Cancer Maternal Aunt         but  of brain cancer    Diabetes Maternal Uncle     Diabetes Maternal Grandmother     Colon Cancer Neg Hx     Colon Polyps Neg Hx     Esophageal Cancer Neg Hx     Rectal Cancer Neg Hx     Stomach Cancer Neg Hx        Review of Systems:  Constitutional: Negative for chills, fatigue, fever or significant weight loss. HENT: Negative for congestion, hearing loss, nosebleeds or sore throat. Eyes: Negative for photophobia, pain, discharge, redness and visual disturbance. Respiratory: Negative for cough, shortness of breath, or wheezing. Cardiovascular: Negative for chest pain, palpitations or leg swelling. Gastrointestinal: Negative for abdominal pain, blood in stool, constipation, diarrhea, nausea or vomiting. Genitourinary: Negative for dysuria, flank pain, frequency, hematuria or urgency. Musculoskeletal: Negative for back pain, joint swelling, myalgias or neck pain. Skin: Negative for rash or petechiae. Neurological: Negative for tremors, seizures, syncope, weakness or headaches.    Hematological: No active bruising or bleeding. Psychiatric/Behavioral: Negative for hallucinations. Objective:  Vitals:    Vitals:    06/21/20 0800   BP: 129/73   Pulse: 57   Resp: 15   Temp: 97.9 °F (36.6 °C)   SpO2: 96%       Physical Exam   Constitutional: Oriented to person, place, and time. No acute distress. Head: Normocephalic and atraumatic. Nose: Nose normal.   Mouth/Throat: Oropharynx is clear and moist. No oropharyngeal exudate. Eyes: Pupils are equal and round. Conjunctivae and EOM are normal. No scleral icterus. Neck: Normal range of motion. Neck supple. No JVD. No appreciable thyromegaly. Cardiovascular: Normal rate, regular rhythm, normal heart sounds and intact distal pulses. Exam reveals no gallop, murmurs or friction rub. Pulmonary/Chest: Effort normal and breath sounds normal. No respiratory distress. No wheezes. Abdominal: Soft. Bowel sounds are normal. No organomegally or masses. No tenderness. There is no rebound and no guarding. Musculoskeletal: Normal range of motion. No edema or tenderness. Lymphadenopathy: No cervical, axillary or inguinal lymphadenopathy. Neurological: Alert and oriented to person, place, and time. Cranial nerves are intact. Neurological exam is nonfocal  Skin: Skin is warm and dry. No rash noted. No erythema. No pallor.    Psychiatric: Judgment normal.         DATA:    Labs:  General Labs:  CBC:   Lab Results   Component Value Date    WBC 43.8 (H) 06/21/2020    HGB 8.1 (L) 06/21/2020    HCT 23.6 (L) 06/21/2020    MCV 94.0 06/21/2020    PLT 94 (L) 06/21/2020       CMP:    Lab Results   Component Value Date     (L) 06/21/2020    K 3.8 06/21/2020     06/21/2020    CO2 23 06/21/2020    BUN 11 06/21/2020    CREATININE 0.5 06/21/2020    GLUCOSE 112 (H) 06/21/2020    CALCIUM 7.5 (L) 06/21/2020    PROT 4.7 (L) 06/20/2020    LABALBU 2.7 (L) 06/20/2020    BILITOT <0.2 06/20/2020    ALKPHOS 153 (H) 06/20/2020    AST 18 06/20/2020    ALT 16 06/20/2020    LABGLOM mg/m² and 5-FU 2400 mg/m² over 46 hours every 2 weeks. This was last delivered on 6/15/2020. #2  GI bleeding documented in the past from Allie-Peres tear and an erosion at the prior Whipple suture line   Admitted through the [facility] ED on 6/20/2020 with the above complaints and a hemoglobin and hematocrit of 5.0 and 15.6 respectively, with a WBC of 39.8 and a platelet count of 325,919. PT and INR are 14.1 and 1.09 respectively with an APTT that was normal at 28.0. Posttransfusion hemoglobin up to 8.1 but then on recheck back down to 7.5    PARAMETERS:  Recommend transfuse for hemoglobin <8. Due to active GI bleeding. Monitor daily CBC to keep track of platelet count as well. With active GI bleeding and platelet count <72,899 would also recommend platelet transfusions. GI has been consulted    Dr. Eda Soria to return in the morning    #3  Abdominal compartment syndrome secondary to ascites    Discussed CT scan with radiologist, Dr. Marquis Client who noted retroperitoneal structures decompressed as a result of ascites with near complete attenuation of the IVC in the mid abdomen. He is concerned about a possibility of abdominal compartment syndrome and agreed to paracentesis to try to alleviate this. Order placedTia Pozo    06/21/20  8:38 AM

## 2020-06-21 NOTE — ED NOTES
Patient placed on cardiac monitor, continuous pulse oximeter, and NIBP monitor. Monitor alarms on.        Geovanni Joseph RN  06/20/20 1677

## 2020-06-21 NOTE — CONSULTS
GI Consult Note    Pt Name: Hector Davison  MRN: [id number]  [id number]  YOB: 1960  Admit Date: 2020  9:12 PM  Date of evaluation: 2020  Primary Care Physician: Juaquin Fowler MD   [id number]       CC:  Recurrent upper GI bleed, Anemia    HPI: 59yr pleasant female with PMHx pancreatic CA, GERD, Hx whipple with linear ulceration, and multiple previous upper GI bleeds presented to the hospital with similar presentation to the past. She notices a pain and weakness which precedes some blood loss. She has undergone multiple EGDs since 2019 in which the exact sources/site of bleeding has not been identified. She also has very slow passage of food out of her stomach which requires waiting 1-2 days prior to EGD to allow appropriate visualization of her gastric mucosa. She denies hematemesis, coffee ground emesis, and fever.        Past Medical History:        Diagnosis Date    Acute pancreatitis     Adult BMI <19 kg/sq m     Anemia     Cat esophagus     Biliary obstruction     GERD (gastroesophageal reflux disease)     with Barretts    Hashimoto's thyroiditis     denies takes no med for    Heart murmur     Hypertension     pt denies nor longer takes med for    Low blood sugar     h/o when overweight in the past    MVP (mitral valve prolapse)     Myalgia     Palliative care patient 2020    Pancreatic adenocarcinoma ([facility] [address] 2018    Dr Gely Boo    Pancreatic cancer ([facility] [address] 3/30/2018    Right flank pain     RUQ pain      Past Surgical History:        Procedure Laterality Date    CARDIAC CATHETERIZATION      heart cath     SECTION      x 3    CHOLECYSTECTOMY  1997    COLONOSCOPY  years ago    [facility]-Dr Kaylyn Hollis per patient    COLONOSCOPY  2014    Dr Bella Fall- Markos Leonard recall    COLONOSCOPY  03/10/2016    Dr Mcfarland-10 yr recall    COLONOSCOPY N/A 2019    Dr Héctor Moura, 5 yr recall    ELBOW SURGERY Right     ENDOMETRIAL ABLATION      HAND SURGERY Right     HERNIA REPAIR      hiatal    HERNIA REPAIR      umbilical    HERNIA REPAIR      PANCREAS SURGERY  2018    Whipple procedure-Dr Gianluca Hernandez DC EGD [facility] NEEDLE ASPIR/BIOP ALTERED ANATOMY N/A 2018    Dr Jack Zavala w/fna-Dilation of main pancreatic duct with diffuse change in the pancreatitis noted, area of concern in the neck of the pancreas-strongly suspicious for adenocarcinoma     DC EGD [facility] NEEDLE ASPIR/BIOP ALTERED ANATOMY N/A 3/6/2018    Dr KVNG Duncan-Pancreatic cancer-Ductal adenocarcinoma-staged [id number] by EUS, pancreatic pseudocyst in the tail the pancreas    DC ERCP DX COLLECTION SPECIMEN BRUSHING/WASHING N/A 2018    Dr KVNG Duncan-w/placement of a self-expanding fully covered 10 mm biliary stent    UPPER GASTROINTESTINAL ENDOSCOPY  years ago    [facility]-Dr Darwin Gillespie    UPPER GASTROINTESTINAL ENDOSCOPY      Zuniga    UPPER GASTROINTESTINAL ENDOSCOPY N/A 2019    Dr Claus Braga post Whipple's procedure with efferent loop ulceration    UPPER GASTROINTESTINAL ENDOSCOPY  2019    Dr Darwin Duncan-Patent G-J Anastomosis, apparent healing ulceration    UPPER GASTROINTESTINAL ENDOSCOPY N/A 2020    Dr Merna Kennedy amount of food retention in the stomach with bile, hiatal hernia, will need repeat    UPPER GASTROINTESTINAL ENDOSCOPY N/A 2020    Dr Laura Shelby erosion/ulceration at the suture line, post whipple surgical changes    UPPER GASTROINTESTINAL ENDOSCOPY N/A 3/9/2020    Dr Laura Shelby erosion along the previous whipple suture line    UPPER GASTROINTESTINAL ENDOSCOPY N/A 2020    Dr KVNG Duncan-w/hemostatic clip placement x 1-Allie Peres tear at GEJ-Likely culprit lesion for current presentation     Social History:   Social History     Tobacco Use    Smoking status: Former Smoker     Packs/day: 0.50     Years: 10.00     Pack years: 5.00     Types: Cigarettes     Last attempt to quit: 2019     Years since quittin.6    Smokeless tobacco: Never Used   Substance Use Topics    Alcohol use: Not Currently     Family History:   Family History   Problem Relation Age of Onset    Heart Attack Mother 46        x 2-had bypass    Hypertension Mother     COPD Father     Liver Cancer Paternal Aunt     Lung Cancer Paternal Aunt     Breast Cancer Maternal Aunt         but  of brain cancer    Diabetes Maternal Uncle     Diabetes Maternal Grandmother     Colon Cancer Neg Hx     Colon Polyps Neg Hx     Esophageal Cancer Neg Hx     Rectal Cancer Neg Hx     Stomach Cancer Neg Hx      Home Meds:  Prior to Admission medications    Medication Sig Start Date End Date Taking? Authorizing Provider   pantoprazole (PROTONIX) 40 MG tablet Take 1 tablet by mouth 2 times daily 20  Yes Nate Espinal MD   Cyanocobalamin (VITAMIN B 12 PO) Take by mouth   Yes Historical Provider, MD   sucralfate (CARAFATE) 1 GM tablet Take 1 tablet by mouth 4 times daily 20  Yes Nate Espinal MD   vitamin E 400 UNIT capsule Take 400 Units by mouth daily   Yes Historical Provider, MD   NONFORMULARY daily Tumeric otc   Yes Historical Provider, MD   ondansetron (ZOFRAN) 8 MG tablet Take 1 tablet by mouth every 8 hours as needed for Nausea or Vomiting 19  Yes Nate Espinal MD   Multiple Vitamins-Minerals (THERAPEUTIC MULTIVITAMIN-MINERALS) tablet Take 1 tablet by mouth daily   Yes Historical Provider, MD   promethazine (PHENERGAN) 25 MG tablet Take 1 tablet by mouth every 6 hours as needed for Nausea 20   Nate Espinal MD   lidocaine-prilocaine (EMLA) 2.5-2.5 % cream Apply to port area and cover with plastic wrap one hour prior to use. 20   Nate Espinal MD      Allergies:  Codeine; Morphine; Morphine and related; Sulfa antibiotics; Neomycin-bacitracin zn-polymyx; Clarithromycin; Dilaudid [hydromorphone hcl];  Hydromorphone; Loperamide hcl; Loperamide hcl; and Neosporin [neomycin-polymyx-gramicid]      Current Meds:      sodium chloride  20 mL Intravenous Once    sodium chloride flush  10 mL Intravenous 2 times per day        pantoprozole (PROTONIX) infusion 8 mg/hr (06/21/20 0921)    octreotide (SANDOSTATIN) infusion 25 mcg/hr (06/21/20 1227)    lactated ringers 75 mL/hr at 06/21/20 1102       PRN Meds:  sodium chloride flush, acetaminophen, ondansetron **OR** ondansetron        ROS:  ROS NEGATIVE EXCEPT THOSE MARKED WITH AN \"X\"    GENERAL: [] Fevers, [] chills, [x] generalized weakness, [x] weight loss, []weight gain, [] anorexia  Skin/Breast: [] jaundice, [] new rashes, [] itching   Eyes/Ears/Nose/Mouth/Throat: [] change in vision, [] double vision, [] light headiness, [] vertigo  CARDIOVASCULAR: [] chest pain, [] palpitations, [] syncope, [] dyspnea on exertion, [] orthopnea  RESPIRATORY: [] SOB, [] cough, [] wheezing, [] hemoptysis  GI: [x] dark stools, [x] bloody stools, [x] abdominal pain, [] GERD like symptoms, [x] nausea, [ vomiting, [] hematemesis, [] jaundice, [] constipation, [] diarrhea, [] hemorrhoids, [] change in bowel habits, [ bowel incontinence  : [] Dysuria, [] urgency, [] frequency, [] change in urine color, [] discharge  MUSCULOSKELETAL: [] muscle pain, [] muscle swelling, [] joint pain, [] muscle weakness  Neurological/Psychiatric: [] Sensory disturbances, [] motor disturbances, [ difficulty with speech, [] paresthesias, [] paralysis, [] depression, [] anxiety   Allergy/Immunological/Lymphatic/Endocrine: [x] anemia, [] rashes, [] polyuria, [] polydypsia      Physical Exam:  Vitals:    06/21/20 1000 06/21/20 1018 06/21/20 1100 06/21/20 1226   BP: 106/62 106/62 126/68 113/71   Pulse: 60 55 59 53   Resp: 16 13 15 18   Temp:  98.4 °F (36.9 °C) 97.9 °F (36.6 °C) 97.6 °F (36.4 °C)   TempSrc:   Temporal    SpO2: 97%  100%    Weight:       Height:           Constitutional: [x] NAD, [x] of stated age, [x] ill appearing  Eyes: [x] conjunctiva clear, [x] Non inflamed irises, [x] no scleral icterus  ENT/Mouth: [x]  Nares patent with pink mucosa, [x] oropharynx clear without exudates or erythema, [x] hearing grossly normal  Head/Neck: [x] symmetrical, [x] supple  Lungs: [x] respirations non labored with good effort, [x] no respiratory distress,  [x]  Equal air entry bilaterally  Heart: [x] normal S1S2, [x] bradycardia, [x] pedal pulses preserved 2/4 bilaterally, [x] no LE edema  Abdomen: [x] +BSx4, [x] ND, [x] soft, [x] no guarding, [x] no peritoneal signs, [x] mild tenderness in epigastrum  Muscuoskeletal: [x]  Normal nails bilaterally, [x] Normal digits bilaterally, [x] decreased muscle mass  Skin/SubQ: [x] No jaundice, [x] warm, dry skin, [x] no rashes on inspection  Neurologic: [x]  Sensation grossly intact, [x] no slurred speech, [x]  No focal deficits  Psychiatric: [x]  Orientated to person, place, and time; [x] tired in appearance, [x] memory recent and remote intact      Labs:     Recent Labs     06/20/20 2130 06/21/20 0427 06/21/20  0900   WBC 39.8* 43.8*  --    RBC 1.57* 2.51*  --    HGB 5.0* 8.1* 7.5*   HCT 15.6* 23.6* 21.7*   MCV 99.4* 94.0  --    MCH 31.8* 32.3*  --    MCHC 32.1* 34.3  --    * 94*  --      Recent Labs     06/20/20 2130 06/21/20 0427   * 135*   K 3.7 3.8   ANIONGAP 8 8    104   CO2 24 23   BUN 13 11   CREATININE 0.6 0.5   GLUCOSE 116* 112*   CALCIUM 7.9* 7.5*     No results for input(s): MG, PHOS in the last 72 hours. Recent Labs     06/20/20 2130   AST 18   ALT 16   BILITOT <0.2   ALKPHOS 153*     HgBA1c:  No components found for: HGBA1C  FLP:  No results found for: TRIG, HDL, LDLCALC, LDLDIRECT, LABVLDL  TSH:  No results found for: TSH  Troponin T: No results for input(s): TROPONINI in the last 72 hours.   INR:   Recent Labs     06/20/20 2130   INR 1.09       Recent Labs     06/20/20 2130   LIPASE 6*       Radiology:  Ct Head Wo Contrast    Result Date: 6/21/2020  CT HEAD WO CONTRAST 6/20/2020 8:45 PM HISTORY: Fall, syncope COMPARISON: None DOSE LENGTH PRODUCT: 1507 mGy cm TECHNIQUE: Helical tomographic images of the brain were obtained without the use of intravenous contrast. Automated exposure control was also utilized to decrease patient radiation dose. FINDINGS: There is no evidence of evolving large vascular territory infarct. No visualized intra-axial or extra-axial hemorrhage. No mass lesion is identified. Normal size and configuration of the ventricular system. The basal cisterns are symmetric. Posterior fossa structures are unremarkable. The included orbits and their contents are unremarkable. The visualized paranasal sinuses, mastoid air cells and middle ear cavities are clear. Tiny outer table osteoma along the frontal bone, just right of midline near the coronal suture. Scalp and calvarium are otherwise unremarkable. 1. No acute intracranial process. 2. Findings in agreement with the emergent findings from the initial [facility] preliminary report. Signed by Dr Rose Marie Martinez on 6/21/2020 9:39 AM    Ct Abdomen Pelvis W Iv Contrast Additional Contrast? None    Result Date: 6/21/2020  CT ABDOMEN PELVIS W IV CONTRAST 6/20/2020 8:45 PM HISTORY: Abdominal pain, GI bleed COMPARISON: CT scan dated 5/12/2020. DLP: 1507 mGy cm TECHNIQUE: Following the uneventful administration of intravenous iodinated contrast, helical CT tomographic images of the abdomen and pelvis were acquired. Coronal reformatted images were also provided for review. Automated exposure control was also utilized to decrease patient radiation dose. FINDINGS: Lung bases are clear. Heart size is upper limit of normal. Hiatal hernia sac which mostly contains ascitic fluid. LIVER: Relative hypoenhancement along the inferior segments 3 and 4B adjacent to the falciform. This appears to be a flow-related phenomenon and does not appear to be a focal lesion. Intrahepatic portal veins appear patent. Hepatic veins are patent. Pneumobilia which is not unexpected following Whipple procedure.  BILIARY SYSTEM: Prior Whipple procedure. Extrahepatic ducts are surgically absent. PANCREAS: Prior Whipple procedure. The body and tail the pancreas are atrophic and there is a mild dilation of the main pancreatic duct up to 3 mm. SPLEEN: Normal size. KIDNEYS AND ADRENALS: Bilateral kidneys and adrenal glands are unremarkable. The ureters are decompressed and normal in appearance. RETROPERITONEUM: No adenopathy, mass or hemorrhage in the retroperitoneal space. Retroperitoneal structures appear compressed as result of the ascites, with the IVC near completely attenuated in the midabdomen. GI TRACT: Minimal hiatal hernia. Mild gastric distention. Antrectomy as part of the Whipple procedure. Overall appearance is nonobstructive. Diffuse wall thickening throughout the small and large bowel, appearing is a diffuse venous congestion. No nonenhancing segments to suggest arterial ischemia. Moderate colonic stool. OTHER:  Large volume ascites for patient size. 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 No gross intraperitoneal free air. No peritoneal abscess. Abdominal aorta is normal in caliber. The celiac and SMA origins and branching appears appropriate. The KVNG is patent. PELVIS: No pelvic mass or adenopathy. The urinary bladder is normal in appearance. 1. Prior Whipple procedure with expected pneumobilia. 2. Large volume ascites for patient size. 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 Diffuse wall thickening along the small and large bowel secondary to this venous congestion. No evidence for arterial ischemia, as the bowel mucosa appears to enhance normally. No evidence of viscus perforation or peritoneal abscess. Of note, the the retroperitoneal structures are quite decompressed as a result of the ascites, with near complete attenuation of the IVC in the mid abdomen.  Unsure if this is contributing to any lower extremity edema. If evidence of multiorgan dysfunction, abdominal compartment syndrome could also be considered. The results of this exam were discussed with Dr. Tavia Leonardo on 6/21/2020 at 1002 hours. In particular, I wanted to address if there was indication/need for therapeutic paracentesis. This is under consideration. Signed by Dr Aparicio Comes on 6/21/2020 10:03 AM    Us Guided Paracentesis    Result Date: 6/21/2020  Exam: [id number] Date of examination is 6/21/2020 Comparison Study: CT scan dated 6/20/2020 Findings: The risks and benefits of ultrasound-guided abdominal paracentesis were described to the patient. Other alternatives were discussed. Questions were answered. Verbal and written consents were obtained. Timeout was taken confirming the patient's name, date of birth, the procedure, the entry site, and list of allergies. The entry site within the left lower quadrant was marked. This was sterilely prepped and draped. Local anesthetic was administered. Utilizing a 5 [place] 10 cm catheter, the peritoneal cavity was entered under direct ultrasound visualization. 1.25 L of cloudy a low ascitic fluid was obtained. The patient tolerated the procedure well. No immediate complications were noted. 1. Ultrasound-guided abdominal paracentesis performed for therapeutic purposes. A 60 cc aspirate was set aside for lab evaluation, if desired. The patient tolerated the proceed well. Signed by Dr Aparicio Comes on 6/21/2020 12:31 PM      Assessment:  1. Upper GI bleed (recurrent)  2. Hx whipple for pancreatic CA  3. Ascites  4. Anemia    Plan:  - Pt is a pleasant lady who is well known to our GI service for recurrent upper GI bleeds that has been difficult to identify the exact site of bleeding. She did have a linear ulceration at the whipple anastomosis site in the past, but no active bleeding at that site. She presented today with similar symptoms and a low Hgb of 5.8.  She is s/p 2 units of PRBCs and now has a hgb of 8.0. Discussed with the patient that due to her previous history of very slow passage of food through her stomach, if her Hgb remains stable today then will not perform an EGD today. Will reevaluate her in the AM to discuss risks/benefits tomorrow morning for an EGD. Since multiple EGDs in the past have not identified a bleeding source, this may be the result on a repeat EGD, but will discuss her thoughts tomorrow on whether she would like to have an EGD tomorrow.   - Continue NPO  - Will also order COVID in the event that she wishes to have an EGD  - Paracentesis ordered for her ascites with fluid analysis as well     Mansi Henry, DO

## 2020-06-22 ENCOUNTER — ANESTHESIA EVENT (OUTPATIENT)
Dept: ENDOSCOPY | Age: 60
DRG: 369 | End: 2020-06-22
Payer: COMMERCIAL

## 2020-06-22 ENCOUNTER — ANESTHESIA (OUTPATIENT)
Dept: ENDOSCOPY | Age: 60
DRG: 369 | End: 2020-06-22
Payer: COMMERCIAL

## 2020-06-22 VITALS
HEART RATE: 48 BPM | WEIGHT: 111 LBS | RESPIRATION RATE: 21 BRPM | DIASTOLIC BLOOD PRESSURE: 82 MMHG | HEIGHT: 67 IN | OXYGEN SATURATION: 99 % | TEMPERATURE: 98.3 F | SYSTOLIC BLOOD PRESSURE: 147 MMHG | BODY MASS INDEX: 17.42 KG/M2

## 2020-06-22 VITALS
SYSTOLIC BLOOD PRESSURE: 105 MMHG | DIASTOLIC BLOOD PRESSURE: 46 MMHG | RESPIRATION RATE: 14 BRPM | OXYGEN SATURATION: 99 %

## 2020-06-22 PROBLEM — K92.2 GIB (GASTROINTESTINAL BLEEDING): Status: RESOLVED | Noted: 2020-04-15 | Resolved: 2020-06-22

## 2020-06-22 LAB
ANION GAP SERPL CALCULATED.3IONS-SCNC: 9 MMOL/L (ref 7–19)
ANISOCYTOSIS: ABNORMAL
BANDED NEUTROPHILS RELATIVE PERCENT: 3 % (ref 0–5)
BASOPHILS ABSOLUTE: 0 K/UL (ref 0–0.2)
BASOPHILS RELATIVE PERCENT: 0 % (ref 0–1)
BLOOD BANK DISPENSE STATUS: NORMAL
BLOOD BANK PRODUCT CODE: NORMAL
BPU ID: NORMAL
BUN BLDV-MCNC: 9 MG/DL (ref 6–20)
CALCIUM SERPL-MCNC: 7.6 MG/DL (ref 8.6–10)
CHLORIDE BLD-SCNC: 107 MMOL/L (ref 98–111)
CO2: 24 MMOL/L (ref 22–29)
CREAT SERPL-MCNC: 0.5 MG/DL (ref 0.5–0.9)
DESCRIPTION BLOOD BANK: NORMAL
DOHLE BODIES: ABNORMAL
EOSINOPHILS ABSOLUTE: 0.13 K/UL (ref 0–0.6)
EOSINOPHILS RELATIVE PERCENT: 1 % (ref 0–5)
GFR NON-AFRICAN AMERICAN: >60
GLUCOSE BLD-MCNC: 88 MG/DL (ref 74–109)
HCT VFR BLD CALC: 22.9 % (ref 37–47)
HCT VFR BLD CALC: 24.7 % (ref 37–47)
HEMOGLOBIN: 7.9 G/DL (ref 12–16)
HEMOGLOBIN: 8.3 G/DL (ref 12–16)
IMMATURE GRANULOCYTES #: 0.5 K/UL
LYMPHOCYTES ABSOLUTE: 0.3 K/UL (ref 1.1–4.5)
LYMPHOCYTES RELATIVE PERCENT: 2 % (ref 20–40)
MCH RBC QN AUTO: 31.2 PG (ref 27–31)
MCHC RBC AUTO-ENTMCNC: 34.5 G/DL (ref 33–37)
MCV RBC AUTO: 90.5 FL (ref 81–99)
MONOCYTES ABSOLUTE: 0.4 K/UL (ref 0–0.9)
MONOCYTES RELATIVE PERCENT: 3 % (ref 0–10)
NEUTROPHILS ABSOLUTE: 11.8 K/UL (ref 1.5–7.5)
NEUTROPHILS RELATIVE PERCENT: 91 % (ref 50–65)
PDW BLD-RTO: 17.8 % (ref 11.5–14.5)
PLATELET # BLD: 67 K/UL (ref 130–400)
PLATELET SLIDE REVIEW: ABNORMAL
PMV BLD AUTO: 9.7 FL (ref 9.4–12.3)
POTASSIUM REFLEX MAGNESIUM: 3.7 MMOL/L (ref 3.5–5)
RBC # BLD: 2.53 M/UL (ref 4.2–5.4)
SODIUM BLD-SCNC: 140 MMOL/L (ref 136–145)
TOXIC GRANULATION: ABNORMAL
WBC # BLD: 12.5 K/UL (ref 4.8–10.8)

## 2020-06-22 PROCEDURE — 2580000003 HC RX 258: Performed by: INTERNAL MEDICINE

## 2020-06-22 PROCEDURE — 43235 EGD DIAGNOSTIC BRUSH WASH: CPT | Performed by: INTERNAL MEDICINE

## 2020-06-22 PROCEDURE — C9113 INJ PANTOPRAZOLE SODIUM, VIA: HCPCS | Performed by: EMERGENCY MEDICINE

## 2020-06-22 PROCEDURE — 85014 HEMATOCRIT: CPT

## 2020-06-22 PROCEDURE — 36430 TRANSFUSION BLD/BLD COMPNT: CPT

## 2020-06-22 PROCEDURE — 6360000002 HC RX W HCPCS: Performed by: NURSE ANESTHETIST, CERTIFIED REGISTERED

## 2020-06-22 PROCEDURE — 3700000001 HC ADD 15 MINUTES (ANESTHESIA): Performed by: INTERNAL MEDICINE

## 2020-06-22 PROCEDURE — 80048 BASIC METABOLIC PNL TOTAL CA: CPT

## 2020-06-22 PROCEDURE — 99232 SBSQ HOSP IP/OBS MODERATE 35: CPT | Performed by: INTERNAL MEDICINE

## 2020-06-22 PROCEDURE — 85025 COMPLETE CBC W/AUTO DIFF WBC: CPT

## 2020-06-22 PROCEDURE — 85018 HEMOGLOBIN: CPT

## 2020-06-22 PROCEDURE — 2580000003 HC RX 258: Performed by: HOSPITALIST

## 2020-06-22 PROCEDURE — 6360000002 HC RX W HCPCS: Performed by: EMERGENCY MEDICINE

## 2020-06-22 PROCEDURE — 3609013000 HC EGD TRANSORAL CONTROL BLEEDING ANY METHOD: Performed by: INTERNAL MEDICINE

## 2020-06-22 PROCEDURE — P9035 PLATELET PHERES LEUKOREDUCED: HCPCS

## 2020-06-22 PROCEDURE — 2709999900 HC NON-CHARGEABLE SUPPLY: Performed by: INTERNAL MEDICINE

## 2020-06-22 PROCEDURE — 2500000003 HC RX 250 WO HCPCS: Performed by: NURSE ANESTHETIST, CERTIFIED REGISTERED

## 2020-06-22 PROCEDURE — 2580000003 HC RX 258: Performed by: EMERGENCY MEDICINE

## 2020-06-22 PROCEDURE — 3700000000 HC ANESTHESIA ATTENDED CARE: Performed by: INTERNAL MEDICINE

## 2020-06-22 RX ORDER — LIDOCAINE HYDROCHLORIDE 10 MG/ML
INJECTION, SOLUTION EPIDURAL; INFILTRATION; INTRACAUDAL; PERINEURAL PRN
Status: DISCONTINUED | OUTPATIENT
Start: 2020-06-22 | End: 2020-06-22 | Stop reason: SDUPTHER

## 2020-06-22 RX ORDER — SODIUM CHLORIDE 0.9 % (FLUSH) 0.9 %
10 SYRINGE (ML) INJECTION EVERY 12 HOURS SCHEDULED
Status: DISCONTINUED | OUTPATIENT
Start: 2020-06-22 | End: 2020-06-22 | Stop reason: HOSPADM

## 2020-06-22 RX ORDER — PROPOFOL 10 MG/ML
INJECTION, EMULSION INTRAVENOUS PRN
Status: DISCONTINUED | OUTPATIENT
Start: 2020-06-22 | End: 2020-06-22 | Stop reason: SDUPTHER

## 2020-06-22 RX ORDER — SODIUM CHLORIDE 0.9 % (FLUSH) 0.9 %
10 SYRINGE (ML) INJECTION PRN
Status: DISCONTINUED | OUTPATIENT
Start: 2020-06-22 | End: 2020-06-22 | Stop reason: HOSPADM

## 2020-06-22 RX ORDER — 0.9 % SODIUM CHLORIDE 0.9 %
20 INTRAVENOUS SOLUTION INTRAVENOUS ONCE
Status: COMPLETED | OUTPATIENT
Start: 2020-06-22 | End: 2020-06-22

## 2020-06-22 RX ADMIN — SODIUM CHLORIDE 20 ML: 9 INJECTION, SOLUTION INTRAVENOUS at 10:14

## 2020-06-22 RX ADMIN — PROPOFOL 180 MG: 10 INJECTION, EMULSION INTRAVENOUS at 10:46

## 2020-06-22 RX ADMIN — SODIUM CHLORIDE, POTASSIUM CHLORIDE, SODIUM LACTATE AND CALCIUM CHLORIDE: 600; 310; 30; 20 INJECTION, SOLUTION INTRAVENOUS at 01:08

## 2020-06-22 RX ADMIN — LIDOCAINE HYDROCHLORIDE 30 MG: 10 INJECTION, SOLUTION EPIDURAL; INFILTRATION; INTRACAUDAL; PERINEURAL at 10:46

## 2020-06-22 RX ADMIN — SODIUM CHLORIDE 8 MG/HR: 900 INJECTION, SOLUTION INTRAVENOUS at 05:16

## 2020-06-22 ASSESSMENT — ENCOUNTER SYMPTOMS
COUGH: 0
SHORTNESS OF BREATH: 0
DIARRHEA: 0
BACK PAIN: 0
VOMITING: 0
NAUSEA: 0
CONSTIPATION: 0

## 2020-06-22 NOTE — DISCHARGE SUMMARY
Discharge Summary    Norm Raygoza  :  1960  MRN:  [id number]    Admit date:  2020  Discharge date:  2020    Admitting Physician:  Pipo Palafox DO    Advance Directive: Full Code    Consults: GI    Primary Care Physician:  Minal Marie MD    Discharge Diagnoses: Active Problems:    * No active hospital problems. *  Resolved Problems:    GIB (gastrointestinal bleeding)  Acute blood loss anemia    Significant Diagnostic Studies:   Ct Head Wo Contrast    Result Date: 2020  CT HEAD WO CONTRAST 2020 8:45 PM HISTORY: Fall, syncope COMPARISON: None DOSE LENGTH PRODUCT: 1507 mGy cm TECHNIQUE: Helical tomographic images of the brain were obtained without the use of intravenous contrast. Automated exposure control was also utilized to decrease patient radiation dose. FINDINGS: There is no evidence of evolving large vascular territory infarct. No visualized intra-axial or extra-axial hemorrhage. No mass lesion is identified. Normal size and configuration of the ventricular system. The basal cisterns are symmetric. Posterior fossa structures are unremarkable. The included orbits and their contents are unremarkable. The visualized paranasal sinuses, mastoid air cells and middle ear cavities are clear. Tiny outer table osteoma along the frontal bone, just right of midline near the coronal suture. Scalp and calvarium are otherwise unremarkable. 1. No acute intracranial process. 2. Findings in agreement with the emergent findings from the initial [facility] preliminary report. Signed by Dr Irene Mosley on 2020 9:39 AM    Ct Abdomen Pelvis W Iv Contrast Additional Contrast? None    Result Date: 2020  CT ABDOMEN PELVIS W IV CONTRAST 2020 8:45 PM HISTORY: Abdominal pain, GI bleed COMPARISON: CT scan dated 2020.  DLP: 1507 mGy cm TECHNIQUE: Following the uneventful administration of intravenous iodinated contrast, helical CT tomographic images of the abdomen and pelvis were acquired. Coronal reformatted images were also provided for review. Automated exposure control was also utilized to decrease patient radiation dose. FINDINGS: Lung bases are clear. Heart size is upper limit of normal. Hiatal hernia sac which mostly contains ascitic fluid. LIVER: Relative hypoenhancement along the inferior segments 3 and 4B adjacent to the falciform. This appears to be a flow-related phenomenon and does not appear to be a focal lesion. Intrahepatic portal veins appear patent. Hepatic veins are patent. Pneumobilia which is not unexpected following Whipple procedure. BILIARY SYSTEM: Prior Whipple procedure. Extrahepatic ducts are surgically absent. PANCREAS: Prior Whipple procedure. The body and tail the pancreas are atrophic and there is a mild dilation of the main pancreatic duct up to 3 mm. SPLEEN: Normal size. KIDNEYS AND ADRENALS: Bilateral kidneys and adrenal glands are unremarkable. The ureters are decompressed and normal in appearance. RETROPERITONEUM: No adenopathy, mass or hemorrhage in the retroperitoneal space. Retroperitoneal structures appear compressed as result of the ascites, with the IVC near completely attenuated in the midabdomen. GI TRACT: Minimal hiatal hernia. Mild gastric distention. Antrectomy as part of the Whipple procedure. Overall appearance is nonobstructive. Diffuse wall thickening throughout the small and large bowel, appearing is a diffuse venous congestion. No nonenhancing segments to suggest arterial ischemia. Moderate colonic stool. OTHER:  Large volume ascites for patient size. 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 No gross intraperitoneal free air. No peritoneal abscess. Abdominal aorta is normal in caliber. The celiac and SMA origins and branching appears appropriate. The KVNG is patent. PELVIS: No pelvic mass or adenopathy. The urinary bladder is normal in appearance.     1. Prior Whipple procedure with expected pneumobilia. 2. Large volume ascites for patient size. 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 Diffuse wall thickening along the small and large bowel secondary to this venous congestion. No evidence for arterial ischemia, as the bowel mucosa appears to enhance normally. No evidence of viscus perforation or peritoneal abscess. Of note, the the retroperitoneal structures are quite decompressed as a result of the ascites, with near complete attenuation of the IVC in the mid abdomen. Unsure if this is contributing to any lower extremity edema. If evidence of multiorgan dysfunction, abdominal compartment syndrome could also be considered. The results of this exam were discussed with Dr. Jadiel Perez on 6/21/2020 at 1002 hours. In particular, I wanted to address if there was indication/need for therapeutic paracentesis. This is under consideration. Signed by Dr Madi Kaufman on 6/21/2020 10:03 AM    Us Guided Paracentesis    Result Date: 6/21/2020  Exam: [id number] Date of examination is 6/21/2020 Comparison Study: CT scan dated 6/20/2020 Findings: The risks and benefits of ultrasound-guided abdominal paracentesis were described to the patient. Other alternatives were discussed. Questions were answered. Verbal and written consents were obtained. Timeout was taken confirming the patient's name, date of birth, the procedure, the entry site, and list of allergies. The entry site within the left lower quadrant was marked. This was sterilely prepped and draped. Local anesthetic was administered. Utilizing a 5 [place] 10 cm catheter, the peritoneal cavity was entered under direct ultrasound visualization. 1.25 L of cloudy a low ascitic fluid was obtained. The patient tolerated the procedure well. No immediate complications were noted. 1. Ultrasound-guided abdominal paracentesis performed for therapeutic purposes.  A 60 cc aspirate murmurs, gallops, or rubs auscultated. Abdomen:soft, non-tender; normal bowel sounds, no masses, no organomegaly. Extremities: No clubbing or cyanosis. No peripheral edema. Peripheral pulses palpable. Neurologic: Grossly intact. Discharge Medications:       [place] [facility]   Home Medication Instructions AZA:[id number]    Printed on:06/22/20 6581   Medication Information                      Cyanocobalamin (VITAMIN B 12 PO)  Take by mouth             lidocaine-prilocaine (EMLA) 2.5-2.5 % cream  Apply to port area and cover with plastic wrap one hour prior to use. Multiple Vitamins-Minerals (THERAPEUTIC MULTIVITAMIN-MINERALS) tablet  Take 1 tablet by mouth daily             NONFORMULARY  daily Tumeric otc             ondansetron (ZOFRAN) 8 MG tablet  Take 1 tablet by mouth every 8 hours as needed for Nausea or Vomiting             pantoprazole (PROTONIX) 40 MG tablet  Take 1 tablet by mouth 2 times daily             promethazine (PHENERGAN) 25 MG tablet  Take 1 tablet by mouth every 6 hours as needed for Nausea             sucralfate (CARAFATE) 1 GM tablet  Take 1 tablet by mouth 4 times daily             vitamin E 400 UNIT capsule  Take 400 Units by mouth daily                 Discharge Instructions: Follow up with Minal Marie MD in 3-5 days. Take medications as directed. Resume activity as tolerated. Diet: Diet NPO Effective Now Exceptions are: Ice Chips, Sips with Meds     Disposition: Patient is medically stable and will be discharged to home. Time spent on discharge less than 30 minutes.     Signed:  Marianela Schafer DO

## 2020-06-22 NOTE — PLAN OF CARE
Problem: Falls - Risk of:  Goal: Will remain free from falls  Description: Will remain free from falls  6/22/2020 0727 by Dougie Diaz RN  Outcome: Ongoing  6/22/2020 0714 by Queenie Hammonds RN  Outcome: Ongoing     Problem:  Bowel Function - Altered:  Goal: Bowel elimination is within specified parameters  Description: Bowel elimination is within specified parameters  6/22/2020 0727 by Dougie Diaz RN  Outcome: Ongoing  6/22/2020 0714 by Queenie Hammonds RN  Outcome: Ongoing

## 2020-06-22 NOTE — PROGRESS NOTES
Hospitalist Progress Note    Patient:  Hector Davison  YOB: 1960  Date of Service: 6/22/2020  MRN: [id number]   Acct: [de-identified]   Primary Care Physician: Juaquin Fowler MD  Advance Directive: Full Code  Admit Date: 6/20/2020       Hospital Day: 2  Referring Provider: Liliam Scherer DO    Patient Seen, Chart, Consults, Notes, Labs, Radiology studies reviewed. Subjective:  Hector Davison is a 64 y.o. female  whom we are following for upper GI bleed, stage IV pancreatic carcinoma. She did well overnight. No further evidence of bleeding. Hemoglobin is 7.9 g this morning. We are awaiting the decision from GI regarding EGD. Hemodynamics are stable. I will transfer her out of ICU. Allergies:  Codeine; Morphine; Morphine and related; Sulfa antibiotics; Neomycin-bacitracin zn-polymyx; Clarithromycin; Dilaudid [hydromorphone hcl];  Hydromorphone; Loperamide hcl; Loperamide hcl; and Neosporin [neomycin-polymyx-gramicid]    Medicines:  Current Facility-Administered Medications   Medication Dose Route Frequency Provider Last Rate Last Dose    0.9 % sodium chloride bolus  20 mL Intravenous Once JORDYN Norris        pantoprazole (PROTONIX) 80 mg in sodium chloride 0.9 % 100 mL infusion  8 mg/hr Intravenous Continuous Zora Peters MD 10 mL/hr at 06/22/20 0516 8 mg/hr at 06/22/20 0516    octreotide (SANDOSTATIN) 500 mcg in sodium chloride 0.9 % 100 mL infusion  25 mcg/hr Intravenous Continuous Gabo Saravia MD 5 mL/hr at 06/21/20 1227 25 mcg/hr at 06/21/20 1227    sodium chloride flush 0.9 % injection 10 mL  10 mL Intravenous 2 times per day Gabo Saravia MD   10 mL at 06/20/20 2320    sodium chloride flush 0.9 % injection 10 mL  10 mL Intravenous PRN Gabo Saravia MD        acetaminophen (TYLENOL) tablet 650 mg  650 mg Oral Q4H PRN Gabo Saravia MD        lactated ringers infusion   Intravenous Continuous Gabo Saravia MD 75 mL/hr at 06/22/20 0108      ondansetron (ZOFRAN-ODT) disintegrating tablet 4 mg  4 mg Oral Q8H PRN Jerrell Velasquez MD        Or    ondansetron [facility]) injection 4 mg  4 mg Intravenous Q6H PRN Jerrell Velasquez MD   4 mg at 20 1834       Past Medical History:  Past Medical History:   Diagnosis Date    Acute pancreatitis     Adult BMI <19 kg/sq m     Anemia     Cat esophagus     Biliary obstruction     GERD (gastroesophageal reflux disease)     with Barretts    Hashimoto's thyroiditis     denies takes no med for    Heart murmur     Hypertension     pt denies nor longer takes med for    Low blood sugar     h/o when overweight in the past    MVP (mitral valve prolapse)     Myalgia     Palliative care patient 2020    Pancreatic adenocarcinoma ([facility] [address] 2018    Dr Marissa Wolfe    Pancreatic cancer ([facility] [address] 3/30/2018    Right flank pain     RUQ pain        Past Surgical History:  Past Surgical History:   Procedure Laterality Date    CARDIAC CATHETERIZATION      heart cath     SECTION      x 3    CHOLECYSTECTOMY  1997    COLONOSCOPY  years ago    [facility]-Dr Apryl Rothman per patient    COLONOSCOPY  2014    Dr Deborah Lux- [facility] recall    COLONOSCOPY  03/10/2016    Dr Mcfarland-10 yr recall    COLONOSCOPY N/A 2019    Dr Manuel Means, 5 yr recall    ELBOW SURGERY Right     ENDOMETRIAL ABLATION      HAND SURGERY Right     HERNIA REPAIR      hiatal    HERNIA REPAIR      umbilical    HERNIA REPAIR      PANCREAS SURGERY  2018    Whipple procedure-Dr Corey Lou ID EGD [facility] NEEDLE ASPIR/BIOP ALTERED ANATOMY N/A 2018    Dr Bakari Villalobos w/fna-Dilation of main pancreatic duct with diffuse change in the pancreatitis noted, area of concern in the neck of the pancreas-strongly suspicious for adenocarcinoma     ID EGD [facility] NEEDLE ASPIR/BIOP ALTERED ANATOMY N/A 3/6/2018    Dr KVNG Duncan-Pancreatic cancer-Ductal adenocarcinoma-staged [id number] by EUS, pancreatic pseudocyst in the tail the pancreas    ID ERCP DX COLLECTION SPECIMEN BRUSHING/WASHING N/A 2018    Dr KVNG Duncan-w/placement of a self-expanding fully covered 10 mm biliary stent    UPPER GASTROINTESTINAL ENDOSCOPY  years ago    Steve-Dr [address] [facility] [address] ENDOSCOPY      Zuniga    UPPER GASTROINTESTINAL ENDOSCOPY N/A 2019    Dr Emir Vicente post Whipple's procedure with efferent loop ulceration    UPPER GASTROINTESTINAL ENDOSCOPY  2019    Dr Renae Duncan-Patent G-J Anastomosis, apparent healing ulceration    UPPER GASTROINTESTINAL ENDOSCOPY N/A 2020    Dr Ana Maria Elias amount of food retention in the stomach with bile, hiatal hernia, will need repeat    UPPER GASTROINTESTINAL ENDOSCOPY N/A 2020    Dr Ginny Chen erosion/ulceration at the suture line, post whipple surgical changes    UPPER GASTROINTESTINAL ENDOSCOPY N/A 3/9/2020    Dr Ginny Chen erosion along the previous whipple suture line    UPPER GASTROINTESTINAL ENDOSCOPY N/A 2020    Dr KVNG Duncan-gianluca/hemostatic clip placement x 1-Allie Peres tear at GEJ-Likely culprit lesion for current presentation       Family History  Family History   Problem Relation Age of Onset    Heart Attack Mother 46        x 2-had bypass    Hypertension Mother     COPD Father     Liver Cancer Paternal Aunt     Lung Cancer Paternal Aunt     Breast Cancer Maternal Aunt         but  of brain cancer    Diabetes Maternal Uncle     Diabetes Maternal Grandmother     Colon Cancer Neg Hx     Colon Polyps Neg Hx     Esophageal Cancer Neg Hx     Rectal Cancer Neg Hx     Stomach Cancer Neg Hx        Social History  Social History     Socioeconomic History    Marital status: Single     Spouse name: Not on file    Number of children: 3    Years of education: Not on file    Highest education level: Not on file   Occupational History    Occupation: retired chemical operqator at [employer] Occupation: [employer]    Occupation: [employer]   Social Needs    Financial resource strain: Not on file    Food insecurity     Worry: Not on file     Inability: Not on file    Transportation needs     Medical: Not on file     Non-medical: Not on file   Tobacco Use    Smoking status: Former Smoker     Packs/day: 0.50     Years: 10.00     Pack years: 5.00     Types: Cigarettes     Last attempt to quit: 2019     Years since quittin.6    Smokeless tobacco: Never Used   Substance and Sexual Activity    Alcohol use: Not Currently    Drug use: Never    Sexual activity: Not on file     Comment: 3   Lifestyle    Physical activity     Days per week: Not on file     Minutes per session: Not on file    Stress: Not on file   Relationships    Social connections     Talks on phone: Not on file     Gets together: Not on file     Attends Baptism service: Not on file     Active member of club or organization: Not on file     Attends meetings of clubs or organizations: Not on file     Relationship status: Not on file    Intimate partner violence     Fear of current or ex partner: Not on file     Emotionally abused: Not on file     Physically abused: Not on file     Forced sexual activity: Not on file   Other Topics Concern    Not on file   Social History Narrative    CODE STATUS: Full Code    HEALTH CARE PROXY: her daughter, Mrs. Asya Rubalcava, of [facility]: independently    DOMICILED: lives in a private home, without steps inside, lives alone, has 2 dogs, no steps in to home         Review of Systems:    Review of Systems   Constitutional: Negative for activity change and fatigue. Respiratory: Negative for cough and shortness of breath. Cardiovascular: Negative for chest pain and leg swelling. Gastrointestinal: Negative for constipation, diarrhea, nausea and vomiting. Genitourinary: Negative for difficulty urinating and dysuria. Musculoskeletal: Negative for arthralgias and back pain.    Neurological: Negative for dizziness and headaches. Objective:  Blood pressure (!) 110/57, pulse (!) 48, temperature 98.9 °F (37.2 °C), temperature source Temporal, resp. rate 15, height 5' 7\" (1.702 m), weight 111 lb (50.3 kg), SpO2 99 %. Intake/Output Summary (Last 24 hours) at 6/22/2020 0814  Last data filed at 6/22/2020 0800  Gross per 24 hour   Intake 2394 ml   Output 1400 ml   Net 994 ml       Physical Exam  Vitals signs reviewed. Constitutional:       Appearance: She is well-developed. HENT:      Head: Normocephalic and atraumatic. Eyes:      Conjunctiva/sclera: Conjunctivae normal.      Pupils: Pupils are equal, round, and reactive to light. Cardiovascular:      Rate and Rhythm: Normal rate and regular rhythm. Heart sounds: Normal heart sounds. Pulmonary:      Effort: Pulmonary effort is normal.      Breath sounds: Normal breath sounds. Abdominal:      Palpations: Abdomen is soft. Musculoskeletal: Normal range of motion. Skin:     General: Skin is warm and dry. Neurological:      Mental Status: She is alert and oriented to person, place, and time. Labs:  BMP:   Recent Labs     06/20/20 2130 06/21/20 0427 06/22/20  0258   * 135* 140   K 3.7 3.8 3.7    104 107   CO2 24 23 24   BUN 13 11 9   CREATININE 0.6 0.5 0.5   CALCIUM 7.9* 7.5* 7.6*     CBC:   Recent Labs     06/20/20 2130 06/21/20  0427  06/21/20  1230 06/21/20  1430 06/22/20  0258   WBC 39.8* 43.8*  --   --   --  12.5*   HGB 5.0* 8.1*   < > 8.0* 8.2* 7.9*   HCT 15.6* 23.6*   < > 24.0* 23.7* 22.9*   MCV 99.4* 94.0  --   --   --  90.5   * 94*  --   --   --  67*    < > = values in this interval not displayed.      LIVER PROFILE:   Recent Labs     06/20/20 2130   AST 18   ALT 16   LIPASE 6*   BILITOT <0.2   ALKPHOS 153*     PT/INR:   Recent Labs     06/20/20 2130 06/21/20  1230   PROTIME 14.1 14.2   INR 1.09 1.10     APTT:   Recent Labs     06/20/20 2130   APTT 28.0     BNP:  No results for input(s): BNP in the last 72 hours. Ionized Calcium:No results for input(s): IONCA in the last 72 hours. Magnesium:No results for input(s): MG in the last 72 hours. Phosphorus:No results for input(s): PHOS in the last 72 hours. HgbA1C: No results for input(s): LABA1C in the last 72 hours. Hepatic:   Recent Labs     06/20/20  2130   ALKPHOS 153*   ALT 16   AST 18   PROT 4.7*   BILITOT <0.2   LABALBU 2.7*     Lactic Acid: No results for input(s): LACTA in the last 72 hours. Troponin: No results for input(s): CKTOTAL, CKMB, TROPONINT in the last 72 hours. ABGs: No results for input(s): PH, PCO2, PO2, HCO3, O2SAT in the last 72 hours. CRP:  No results for input(s): CRP in the last 72 hours. Sed Rate:  No results for input(s): SEDRATE in the last 72 hours. Cultures:   No results for input(s): CULTURE in the last 72 hours. No results for input(s): BC, Nancey Asa in the last 72 hours. No results for input(s): CXSURG in the last 72 hours. Radiology reports as per the Radiologist  Radiology: Ct Head Wo Contrast    Result Date: 6/21/2020  CT HEAD WO CONTRAST 6/20/2020 8:45 PM HISTORY: Fall, syncope COMPARISON: None DOSE LENGTH PRODUCT: 1507 mGy cm TECHNIQUE: Helical tomographic images of the brain were obtained without the use of intravenous contrast. Automated exposure control was also utilized to decrease patient radiation dose. FINDINGS: There is no evidence of evolving large vascular territory infarct. No visualized intra-axial or extra-axial hemorrhage. No mass lesion is identified. Normal size and configuration of the ventricular system. The basal cisterns are symmetric. Posterior fossa structures are unremarkable. The included orbits and their contents are unremarkable. The visualized paranasal sinuses, mastoid air cells and middle ear cavities are clear. Tiny outer table osteoma along the frontal bone, just right of midline near the coronal suture. Scalp and calvarium are otherwise unremarkable.     1. No acute intracranial process. 2. Findings in agreement with the emergent findings from the initial [facility] preliminary report. Signed by Dr Samantha Hyatt on 6/21/2020 9:39 AM    Ct Abdomen Pelvis W Iv Contrast Additional Contrast? None    Result Date: 6/21/2020  CT ABDOMEN PELVIS W IV CONTRAST 6/20/2020 8:45 PM HISTORY: Abdominal pain, GI bleed COMPARISON: CT scan dated 5/12/2020. DLP: 1507 mGy cm TECHNIQUE: Following the uneventful administration of intravenous iodinated contrast, helical CT tomographic images of the abdomen and pelvis were acquired. Coronal reformatted images were also provided for review. Automated exposure control was also utilized to decrease patient radiation dose. FINDINGS: Lung bases are clear. Heart size is upper limit of normal. Hiatal hernia sac which mostly contains ascitic fluid. LIVER: Relative hypoenhancement along the inferior segments 3 and 4B adjacent to the falciform. This appears to be a flow-related phenomenon and does not appear to be a focal lesion. Intrahepatic portal veins appear patent. Hepatic veins are patent. Pneumobilia which is not unexpected following Whipple procedure. BILIARY SYSTEM: Prior Whipple procedure. Extrahepatic ducts are surgically absent. PANCREAS: Prior Whipple procedure. The body and tail the pancreas are atrophic and there is a mild dilation of the main pancreatic duct up to 3 mm. SPLEEN: Normal size. KIDNEYS AND ADRENALS: Bilateral kidneys and adrenal glands are unremarkable. The ureters are decompressed and normal in appearance. RETROPERITONEUM: No adenopathy, mass or hemorrhage in the retroperitoneal space. Retroperitoneal structures appear compressed as result of the ascites, with the IVC near completely attenuated in the midabdomen. GI TRACT: Minimal hiatal hernia. Mild gastric distention. Antrectomy as part of the Whipple procedure. Overall appearance is nonobstructive.  Diffuse wall thickening throughout the small and large bowel, appearing is a diffuse venous congestion. No nonenhancing segments to suggest arterial ischemia. Moderate colonic stool. OTHER:  Large volume ascites for patient size. 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 No gross intraperitoneal free air. No peritoneal abscess. Abdominal aorta is normal in caliber. The celiac and SMA origins and branching appears appropriate. The KVNG is patent. PELVIS: No pelvic mass or adenopathy. The urinary bladder is normal in appearance. 1. Prior Whipple procedure with expected pneumobilia. 2. Large volume ascites for patient size. 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 Diffuse wall thickening along the small and large bowel secondary to this venous congestion. No evidence for arterial ischemia, as the bowel mucosa appears to enhance normally. No evidence of viscus perforation or peritoneal abscess. Of note, the the retroperitoneal structures are quite decompressed as a result of the ascites, with near complete attenuation of the IVC in the mid abdomen. Unsure if this is contributing to any lower extremity edema. If evidence of multiorgan dysfunction, abdominal compartment syndrome could also be considered. The results of this exam were discussed with Dr. Tania Grace on 6/21/2020 at 1002 hours. In particular, I wanted to address if there was indication/need for therapeutic paracentesis. This is under consideration. Signed by Dr Keri Kocher on 6/21/2020 10:03 AM    Us Guided Paracentesis    Result Date: 6/21/2020  Exam: [id number] Date of examination is 6/21/2020 Comparison Study: CT scan dated 6/20/2020 Findings: The risks and benefits of ultrasound-guided abdominal paracentesis were described to the patient. Other alternatives were discussed. Questions were answered. Verbal and written consents were obtained.  Timeout was taken confirming the patient's name, date of birth, the procedure, the entry site, and list of allergies. The entry site within the left lower quadrant was marked. This was sterilely prepped and draped. Local anesthetic was administered. Utilizing a 5 French 10 cm catheter, the peritoneal cavity was entered under direct ultrasound visualization. 1.25 L of cloudy a low ascitic fluid was obtained. The patient tolerated the procedure well. No immediate complications were noted. 1. Ultrasound-guided abdominal paracentesis performed for therapeutic purposes. A 60 cc aspirate was set aside for lab evaluation, if desired. The patient tolerated the proceed well. Signed by Dr Vero Vicente on 6/21/2020 12:31 PM       Assessment     GIB (gastrointestinal bleeding). Serial H&H's. Transfuse as needed. Possible EGD today.     Stage IV pancreatic carcinoma. Supportive care. Transfer out of ICU.       Liliam Scherer DO

## 2020-06-22 NOTE — OP NOTE
Endoscopic Procedure Note    Patient: Marii Lynn : 1960  Med Rec#: [id number] Acc#: [id number]     Primary Care Provider Sabra Duncan MD  Referring Provider: Rehana Salomon DO    Endoscopist: Gely Selby DO    Date of Procedure:  2020    Procedure:   1. EGD     Indications:   1. Upper GI bleed      Anesthesia:  Sedation was administered by anesthesia who monitored the patient during the procedure. Estimated Blood Loss: minimal    Informed consent: Informed consent was obtained from patient/patient's proxy after risk benefits were explained to patient/patient's proxy including but not limited to bleeding, infection, perforation, and need for surgery. Patient/patient's proxy was given opportunity to ask questions and elected to proceed with the procedure. Description of procedure: Patient was monitored continuously on oximetry, blood pressure, pulse monitoring; the above medications were given for sedation; prior to sedation, a timeout was taken to identify patient's name, procedure site and ID badge. Universal precaution measures were followed throughout procedure. Patient was placed in left lateral position; a bite block was placed between the incisors; Gastroscope was then passed through and advanced to the second portion of the duodenum. Hypopharynx and pharynx were grossly normal. The esophagus was grossly normal. GE at 34cm antonio. Careful examination of the stomach revealed previous whipple surgical changes. There remains a linear erosion at the whipple suture site that has an improved appearance since EGD on 3/9/20. No sites of active bleeding. No red blood, blood clots, or coffee ground material in the stomach. Retroflexed view was not possible due to small gastric remnants and limited space. The small bowel examined had grossly normal mucosa. At that point we slowly and carefully withdrew the gastroscope.  The patient tolerated the procedure well without

## 2020-06-22 NOTE — PLAN OF CARE
Problem: Falls - Risk of:  Goal: Will remain free from falls  Description: Will remain free from falls  Outcome: Ongoing  Goal: Absence of physical injury  Description: Absence of physical injury  Outcome: Ongoing     Problem:  Bowel Function - Altered:  Goal: Bowel elimination is within specified parameters  Description: Bowel elimination is within specified parameters  Outcome: Ongoing     Problem: Nausea/Vomiting:  Goal: Absence of nausea/vomiting  Description: Absence of nausea/vomiting  Outcome: Ongoing  Goal: Able to drink  Description: Able to drink  Outcome: Ongoing  Goal: Able to eat  Description: Able to eat  Outcome: Ongoing  Goal: Ability to achieve adequate nutritional intake will improve  Description: Ability to achieve adequate nutritional intake will improve  Outcome: Ongoing

## 2020-06-22 NOTE — ANESTHESIA PRE PROCEDURE
Savanah Washburn, APRFINN        pantoprazole (PROTONIX) 80 mg in sodium chloride 0.9 % 100 mL infusion  8 mg/hr Intravenous Continuous Sejal Flores MD 10 mL/hr at 06/22/20 0516 8 mg/hr at 06/22/20 0516    octreotide (SANDOSTATIN) 500 mcg in sodium chloride 0.9 % 100 mL infusion  25 mcg/hr Intravenous Continuous Nhi Meza MD 5 mL/hr at 06/21/20 1227 25 mcg/hr at 06/21/20 1227    sodium chloride flush 0.9 % injection 10 mL  10 mL Intravenous 2 times per day Nhi Meza MD   10 mL at 06/20/20 2320    sodium chloride flush 0.9 % injection 10 mL  10 mL Intravenous PRN Nhi Meza MD        acetaminophen (TYLENOL) tablet 650 mg  650 mg Oral Q4H PRN Nhi Meza MD        lactated ringers infusion   Intravenous Continuous Nhi Meza MD 75 mL/hr at 06/22/20 0108      ondansetron (ZOFRAN-ODT) disintegrating tablet 4 mg  4 mg Oral Q8H PRN Nhi Meza MD        Or    ondansetron [facility]) injection 4 mg  4 mg Intravenous Q6H PRN Nhi Meza MD   4 mg at 06/21/20 0816       Allergies:     Allergies   Allergen Reactions    Codeine Shortness Of Breath, Swelling and Rash    Morphine Other (See Comments)     Other reaction(s): abd pain  Severe abd. pain    Morphine And Related Nausea Only    Sulfa Antibiotics Shortness Of Breath and Hives     SOB, rash and itching    Neomycin-Bacitracin Zn-Polymyx Rash    Clarithromycin     Dilaudid [Hydromorphone Hcl] Other (See Comments)     \"completely shut me down\"    Hydromorphone     Loperamide Hcl Other (See Comments)     severe abd. pain    Loperamide Hcl Hives     severe abd. pain    Neosporin [Neomycin-Polymyx-Gramicid] Other (See Comments)     Causes boil       Problem List:    Patient Active Problem List   Diagnosis Code    Nausea R11.0    Right sided abdominal pain R10.9    Malignant neoplasm of pancreas (HCC) C25.9    Tobacco abuse Z72.0    Hypertension I10    Dyslipidemia E78.5    Anemia associated with chemotherapy D64.81, T45.1X5A    REPAIR      umbilical    HERNIA REPAIR      PANCREAS SURGERY  2018    Whipple procedure-Dr Elma Rome [place] EGD [facility] NEEDLE ASPIR/BIOP ALTERED ANATOMY N/A 2018    Dr Luis Cobian w/fna-Dilation of main pancreatic duct with diffuse change in the pancreatitis noted, area of concern in the neck of the pancreas-strongly suspicious for adenocarcinoma     [place] EGD [facility] NEEDLE ASPIR/BIOP ALTERED ANATOMY N/A 3/6/2018    Dr Willie Nuñez cancer-Ductal adenocarcinoma-staged [id number] by EUS, pancreatic pseudocyst in the tail the pancreas    [place] ERCP DX COLLECTION SPECIMEN BRUSHING/WASHING N/A 2018    Dr KVNG Duncan-w/placement of a self-expanding fully covered 10 mm biliary stent    UPPER GASTROINTESTINAL ENDOSCOPY  years ago    [facility]-Dr Swapna Olivia    UPPER GASTROINTESTINAL ENDOSCOPY      Zuniga    UPPER GASTROINTESTINAL ENDOSCOPY N/A 2019    Dr Amara Morrison post Whipple's procedure with efferent loop ulceration    UPPER GASTROINTESTINAL ENDOSCOPY  2019    Dr eZnobia Duncan-Patent G-J Anastomosis, apparent healing ulceration    UPPER GASTROINTESTINAL ENDOSCOPY N/A 2020    Dr Asaf Bernal amount of food retention in the stomach with bile, hiatal hernia, will need repeat    UPPER GASTROINTESTINAL ENDOSCOPY N/A 2020    Dr Liz Smith erosion/ulceration at the suture line, post whipple surgical changes    UPPER GASTROINTESTINAL ENDOSCOPY N/A 3/9/2020    Dr Liz Smith erosion along the previous whipple suture line    UPPER GASTROINTESTINAL ENDOSCOPY N/A 2020    Dr KVNG Duncan-w/hemostatic clip placement x 1-Allie Peres tear at GEJ-Likely culprit lesion for current presentation       Social History:    Social History     Tobacco Use    Smoking status: Former Smoker     Packs/day: 0.50     Years: 10.00     Pack years: 5.00     Types: Cigarettes     Last attempt to quit: 2019     Years since quittin.6    Smokeless tobacco: Never Used   Substance Use Topics    ABGs: No results found for: PHART, PO2ART, CLL1IXH, QMX1RKS, BEART, R9RBOGXY     Type & Screen (If Applicable):  No results found for: LABABO, LABRH    Drug/Infectious Status (If Applicable):  No results found for: HIV, HEPCAB    COVID-19 Screening (If Applicable):   Lab Results   Component Value Date    COVID19 Not Detected 06/21/2020         Anesthesia Evaluation  Patient summary reviewed and Nursing notes reviewed  Airway: Mallampati: I  TM distance: >3 FB   Neck ROM: full  Mouth opening: > = 3 FB Dental:          Pulmonary:Negative Pulmonary ROS                              Cardiovascular:             Beta Blocker:  Not on Beta Blocker         Neuro/Psych:               GI/Hepatic/Renal:             Endo/Other: Negative Endo/Other ROS                     ROS comment: H/o pancreatic cancer Abdominal:           Vascular: negative vascular ROS. Anesthesia Plan      general and TIVA     ASA 3       Induction: intravenous. Anesthetic plan and risks discussed with patient. Use of blood products discussed with patient whom.                    JORDYN Hastings CRNA   6/22/2020

## 2020-06-22 NOTE — PROGRESS NOTES
PROGRESS NOTE    Pt Name: Lowery Goldberg  MRN: [id number]  YOB: 1960  Date of evaluation: 6/22/2020    Subjective Feels fatigue. Denies any hematemesis. N.p.o. for possible procedure today. Status post paracentesis. Maverick Amos is a 72-year-old female with an underlying history of refractory stage IV metastatic pancreatic cancer who was admitted to the ICU with GI bleed. Dr. Aixa Alcantara, her oncologist, is called in continuity of care. I am seeing her in coverage over the weekend for this issue. She received palliative chemotherapy with liposomal irinotecan 70 mg/m² with leucovorin 400 mg/m² and 5-FU 2400 mg/m² over 46 hours every 2 weeks. This was last delivered on 6/15/2020.     Keturah Lopez has had GI bleeding documented in the past from Allie-Peres tear and an erosion at the prior Whipple suture line based on GI notes from previous evaluations.     Keturah Lopez presented with intermittent abdominal cramping, fatigue, syncopal episodes and black tarry stools over 2-day period of time. She is not on anticoagulants. Keturah Lopez was admitted to the [facility] ED on 6/20/2020 with the above complaints and a hemoglobin and hematocrit of 5.0 and 15.6 respectively, with a WBC of 39.8 and a platelet count of 608,513. PT and INR are 14.1 and 1.09 respectively with an APTT that was normal at 28. 0.     Hematology/Oncology consultation called in continuity of care.     ONCOLOGIC HISTORY:   Diagnosis  · Pancreatic adenocarcinoma, January 2018   · [id number]  · 7/6/2019-extended genetic panel-negative for a deleterious mutation     Treatment summary  · March 2018-Surgical consultation at [address] [place] [address] Road operable   · 4/3/2018 through 6/4/2018 Neoadjuvant chemotherapy FOLFORINOX ×5 Biweekly cycles   · 4/11/2018-Biliary stent   · August 2018- XRT 30 Gy/Xeloda   · 08/30/3018- Whipple procedure @ [facility]   · Recommended another 5 biweekly cycles of modified FOLFORINOX completed 12/26/2018   · 7/9/2019- 1/22/2020 Gemzar/Abraxane 125 mg/m2 q 14 days, discontinued due to progression of disease  · 3/4/2020- Irinotecan liposome 70 mg/m² with leucovorin 400 mg/m² and 5-FU 2400 mg/m² over 46 hours every 2 weeks.     Tumor marker  · 3/12/5207-MS-28-9->430->370. · 4/30/2018 - CA 19- 9 of 157   · 5/25/20186516-FN-46-9->32 after 4 cycles. · 08/02/2018- -> 8   · 10/01/2018- CA 19-9 -5   · 10/29/2018-CA 19-9- 7   · 12/3/2570-CF-39-9-7   · 04/11/2019-CA-19-9- 12   · 05/21/2019- CA 19-9- 41   · 7/2/2019-CA 19 9 = 114  · 7/9/2019- CA-19-9 = 225  · 8/6/2019- CA-19-9 = 171  · 9/3/1752-RE-91-9 = 198  · 9/13/20191740-FZ-73-9 = 98 (at  [address])  · 10/29/4400-OP-01-9 =317  · 11/21/2019-CA-19-9 = 183  · 1/23/20208620-FM-73-9 = 520  · 4/22/2020- CA-19-9 = 940  · 5/13/20205569-QY-80-9 = 129  · 6/1/20209845-QW-86-9 = 523? ?        Cancer history  Ms Sriram Perez was seen in initial oncology consultation on 3/12/2018 referred by Dr. Orville Stein for a diagnosis of pancreatic adenocarcinoma. She was initially evaluated for acute pancreatitis at [facility] on February 2018. Further imaging revealed a pancreatic head mass. Lipase was elevated at 1184 and CA-19-9 elevated 134. The symptoms were going on for several weeks. Weight loss of 10-12 pounds over the last 6 months. · October 2017-CT abdomen without contrast was unremarkable. · 2/2/2018-ultrasound of abdomen showed a pancreatic duct dilation   · 2/02/2018-CT abdomen showed 16 mm low-density lesion in the pancreas at the junction of the head of the body. No intra-abdominal adenopathy. No liver metastasis. · 2/7/2018-the patient underwent EGD/EUS and FNA biopsy of a pancreatic mass by Dr. Orville Stein at [facility] . EUS showed inflammatory changes consistent with a recent history of pancreatitis. Main pancreatic duct was dilated. No concerning peripancreatic adenopathy. No definite mass was seen by a more diffuse hypoechoic area measuring 1.8 x 2.2 cm in the head of the pancreas.  Pathology was consistent with a group of markedly atypical cells strongly suspicious for adenocarcinoma. · 2/28/2018-CT pancreatic protocol showed a poorly defined area of decreased enhancement located in the head of the pancreas measuring 1.8 x 1.4 x 1.7 cm with moderate meditation of the pancreatic duct. Well defined sharply marginated low density nodule located in the tail of the pancreas measuring 1.5 x 1.3 x 1.5 cm (IPMN?). · 3/6/2018-CA 19-9 was elevated at 150. Repeat EGD was performed by Dr. David Mcgraw due to the inconclusive FNA resulted on 2/7/2018. Pathology FNA was consistent with pancreatic adenocarcinoma. · 3/12/2018-she was first seen by me. No evidence of distant disease. Referral to Surgical oncology. CT chest to complete staging. CA-19-9 430. · 3/16/2018-CT of the chest was unremarkable for intrathoracic metastatic disease   · Surgery consultation at [facility] March 2018- with Dr Sue Fisher. She was not a surgical candidate upfront. Recommended neoadjuvant chemotherapy. · 4/3/2018-started on neoadjuvant chemotherapy with FOLFORINOX. · 4/11/2018-she developed CBD obstruction had a CBD stent placed by Dr. Karma Bliss. · 4/3/2018 through 6/4/2018 Neoadjuvant chemotherapy FOLFORINOX ×5 Biweekly cycles   · August 2018- XRT 30 Gy/Xeloda   · 08/30/3018- Whipple procedure at [facility] by Dr. Jennifer Barriga  · Recommended another 7 biweekly cycles of modified FOLFORINOX. · 9/5/2018-CT of the chest abdomen pelvis was unremarkable for metastatic disease. · 1/14/2019-CT abdomen and pelvis at [facility] showed no evidence of metastasis in the chest abdomen pelvis. · 5/21/2019-CT chest, abdomen, pelvis at [facility] showed no evidence of metastatic disease   · 5/21/20191306-KH-07-9 = 42   · 06/12/2019- CA-19-9 = 154. Discussed with the patient. We'll also discuss with [facility].    · 7/1/2019-CT chest, abdomen, pelvis showed a soft tissue mass measuring 1.4 x 1.1 cm, not present on prior scan from January 2019, concerning for recurrence. Stable hypodense in the liver, too small to characterize. Subcentimeter ill-defined area of low attenuation liver may represent an early metastasis. · 7/3/2019-recommended palliative chemotherapy with nab paclitaxel 125mg/m² IV over 30 minutes on day 1 and gemcitabine 600 mg/m² IV over 10mg/m2/min (fixed dose rate) on day 1  · 7/2/2019-CA 19 9 = 114  · 7/6/2019- extended genetic panel negative for a deleterious mutation (invitae)  · 7/9/2019- CA-19-9 = 225  · 8/6/2019- CA-19-9 = 171  · 9/3/3971-NF-29-9 = 198   · 9/13/20190546-JL-65-9 = 98 at [facility]  · 9/13/2019-CT chest abdomen pelvis at [facility] showed a soft tissue thickening in the pancreatic bed with persistent narrowing of the portal SMV confluence.  Superimposed tumor remains a possibility.  The new low-density lesion in the periphery of the liver seen on the prior exam is no longer appreciated and likely represents treatment effect.  Nodular enhancement may represent perfusion change in the region on image 187. · 9/13/2018- she was recommended to continue current treatment with Gemzar/Abraxane  · 11/1/2019- she was hospitalized with GI bleed.  An upper endoscopy showed ulceration at the efferent loop of the Whipple's procedure  · 10/29/3044-EE-47-9 =317  · 11/21/2019-CA-19-9 = 183  · 11/19/2019- CT chest abdomen pelvis showed no evidence of gross disease progression. Improvement of the caliber of what may be a middle colic vein that drains back to the SMV. There is still persistent narrowing of the of the upper SMV at the juncture with the portal vein.  No definite evidence of liver metastases at this time.  No definite evidence of pulmonary metastases.  However, question of slight increase in prominence of subtle soft tissue thickening within the right paracolic gutter could be indicative of metastatic disease to peritoneum.   · 12/9/2019- colonoscopy by GI was unremarkable.  This was performed for complaints of maroon-colored stools. 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 This is consistent with disease progression. · 3/4/2020- 4th line therapy Irinotecan liposome 70 mg/m² with leucovorin 400 mg/m² and 5-FU 2400 mg/m² over 46 hours every 2 weeks. · 5/12/202 Ct Chest W Contrast  No acute findings in the chest.  Slight increase in size of LEFT supraclavicular lymph nodes and subcarinal mediastinal lymph node. Recommend special attention on follow-up imaging to evaluate for stability or resolution. Ct Abdomen Pelvis W Iv Contrast   Postoperative change of Whipple with residual stranding and free fluid in the surgical bed.  Residual soft tissue nodular prominence in the retroperitoneum adjacent to the portal vein which is compressed and occluded/thrombosed. The splenic vein is not visualized and presumed thrombosed. LEFT upper quadrant perigastric/periesophageal varices.  Moderate volume ascites. 4.  No evidence of bowel obstruction. Mild distention of the stomach, which may be secondary to slow flow through the gastrojejunostomy. Correlate clinically. Jimmy Sos is a small linear device in the distal esophagus which could represent a hemostasis clip but recommend clinical correlation.    · 5/13/2020- CA19.9-129  · 6/1/2020- CA 19.9- 523        REVIEW OF SYSTEMS:   CONSTITUTIONAL: no fever, no night sweats, fatigue;  HEENT: no blurring of vision, no double vision, no hearing difficulty, no tinnitus, no ulceration, no dysplasia, no epistaxis;  LUNGS: no cough, no hemoptysis, no wheeze,  no shortness of breath;  CARDIOVASCULAR: no palpitation, no chest pain, no shortness of breath;  GI: no abdominal pain, no nausea, no vomiting, no diarrhea, no constipation;  VIKRAM: no dysuria, no hematuria, no frequency or urgency, no nephrolithiasis;  MUSCULOSKELETAL: no joint pain, no swelling, no stiffness;  ENDOCRINE: no polyuria, no polydipsia, no cold or heat intolerance;  HEMATOLOGY: no easy bruising or bleeding, no history of clotting disorder;  DERMATOLOGY: no skin rash, no eczema, no pruritus;  PSYCHIATRY: no depression, no anxiety, no panic attacks, no suicidal ideation, no homicidal ideation;  NEUROLOGY: no syncope, no seizures, no numbness or tingling of hands, no numbness or tingling of feet, no paresis;    Objective   /69   Pulse (!) 49   Temp 98.5 °F (36.9 °C) (Temporal)   Resp 15   Ht 5' 7\" (1.702 m)   Wt 111 lb (50.3 kg)   SpO2 96%   BMI 17.39 kg/m²     PHYSICAL EXAM:  CONSTITUTIONAL: Alert, appropriate, no acute distress  EYES: Non icteric, EOM intact, pupils equal round   ENT: Mucus membranes moist, no oral pharyngeal lesions, external inspection of ears and nose are normal  NECK: Supple, no masses. No palpable thyroid mass  CHEST/LUNGS: CTA bilaterally, normal respiratory effort   CARDIOVASCULAR: RRR, no murmurs. No lower extremity edema  ABDOMEN: soft non-tender, active bowel sounds, no HSM. No palpable masses  EXTREMITIES: warm, full ROM in all 4 extremities, no focal weakness. SKIN: warm, dry with no rashes or lesions  LYMPH: No cervical, clavicular, axillary, or inguinal lymphadenopathy  NEUROLOGIC: follows commands, non focal   PSYCH: mood and affect appropriate. Alert and oriented to time, place, person        LABORATORY RESULTS REVIEWED BY ME:  Recent Labs     06/22/20  0258 06/21/20  1430 06/21/20  1230  06/21/20  0427 06/20/20  2130   WBC 12.5*  --   --   --  43.8* 39.8*   HGB 7.9* 8.2* 8.0*   < > 8.1* 5.0*   HCT 22.9* 23.7* 24.0*   < > 23.6* 15.6*   MCV 90.5  --   --   --  94.0 99.4*   PLT 67*  --   --   --  94* 112*    < > = values in this interval not displayed.        Lab Results   Component Value Date     06/22/2020    K 3.7 06/22/2020  06/22/2020    CO2 24 06/22/2020    BUN 9 06/22/2020    CREATININE 0.5 06/22/2020    GLUCOSE 88 06/22/2020    CALCIUM 7.6 (L) 06/22/2020    PROT 4.7 (L) 06/20/2020    LABALBU 2.7 (L) 06/20/2020    BILITOT <0.2 06/20/2020    ALKPHOS 153 (H) 06/20/2020    AST 18 06/20/2020    ALT 16 06/20/2020    LABGLOM >60 06/22/2020    GFRAA 98 11/21/2019    AGRATIO 2.3 (H) 11/21/2019    GLOB 2.5 06/15/2020       Lab Results   Component Value Date    INR 1.10 06/21/2020    INR 1.09 06/20/2020    INR 1.19 (H) 05/12/2020    PROTIME 14.2 06/21/2020    PROTIME 14.1 06/20/2020    PROTIME 15.1 (H) 05/12/2020       RADIOLOGY STUDIES REVIEWED BY ME:    ASSESSMENT:  #1  Stage IV pancreatic cancer  Palliative chemotherapy with liposomal irinotecan 70 mg/m² with leucovorin 400 mg/m² and 5-FU 2400 mg/m² over 46 hours every 2 weeks. This was last delivered on 6/15/2020.     #2  GI bleeding documented in the past from Allie-Peres tear and an erosion at the prior Whipple suture line   Admitted through the [address] [place] [address] ED on 6/20/2020 with the above complaints and a hemoglobin and hematocrit of 5.0 and 15.6 respectively, with a WBC of 39.8 and a platelet count of 879,003. PT and INR are 14.1 and 1.09 respectively with an APTT that was normal at 28.0. Hb 7.9 this am       PARAMETERS:  Recommend transfuse for hemoglobin <8. Due to active GI bleeding. Monitor daily CBC to keep track of platelet count as well. With active GI bleeding and platelet count <22,610 would also recommend platelet transfusions.     GI has been consulted.       #3Abdominal compartment syndrome secondary to ascites     Discussed CT scan with radiologist, Dr. Elida Sahni who noted retroperitoneal structures decompressed as a result of ascites with near complete attenuation of the IVC in the mid abdomen.   He is concerned about a possibility of abdominal compartment syndrome and agreed to paracentesis to try to alleviate this.   status post paracentesis 1. 25L. Plan:  · Continue current supportive care  · Cytology requested.      Марина Harley MD    06/22/20  6:48 AM

## 2020-06-23 LAB
EKG P AXIS: 60 DEGREES
EKG P-R INTERVAL: 140 MS
EKG Q-T INTERVAL: 416 MS
EKG QRS DURATION: 98 MS
EKG QTC CALCULATION (BAZETT): 423 MS
EKG T AXIS: 60 DEGREES

## 2020-06-23 PROCEDURE — 93010 ELECTROCARDIOGRAM REPORT: CPT | Performed by: INTERNAL MEDICINE

## 2020-06-25 LAB
ANAEROBIC CULTURE: NORMAL
BODY FLUID CULTURE, STERILE: NORMAL
GRAM STAIN RESULT: NORMAL

## 2020-06-29 ENCOUNTER — HOSPITAL ENCOUNTER (OUTPATIENT)
Dept: INFUSION THERAPY | Age: 60
Discharge: HOME | End: 2020-06-29
Payer: COMMERCIAL

## 2020-06-29 DIAGNOSIS — C25.9 MALIGNANT NEOPLASM OF PANCREAS, UNSPECIFIED LOCATION OF MALIGNANCY (HCC): ICD-10-CM

## 2020-06-29 LAB
BASOPHILS ABSOLUTE: 0.05 K/UL (ref 0.01–0.08)
BASOPHILS RELATIVE PERCENT: 0.4 % (ref 0.1–1.2)
EOSINOPHILS ABSOLUTE: 0.1 K/UL (ref 0.04–0.54)
EOSINOPHILS RELATIVE PERCENT: 0.8 % (ref 0.7–7)
HCT VFR BLD CALC: 28.2 % (ref 34.1–44.9)
HEMOGLOBIN: 9 G/DL (ref 11.2–15.7)
LYMPHOCYTES ABSOLUTE: 0.97 K/UL (ref 1.18–3.74)
LYMPHOCYTES RELATIVE PERCENT: 7.6 % (ref 19.3–53.1)
MCH RBC QN AUTO: 31.6 PG (ref 25.6–32.2)
MCHC RBC AUTO-ENTMCNC: 31.9 G/DL (ref 32.3–35.5)
MCV RBC AUTO: 98.9 FL (ref 79.4–94.8)
MONOCYTES ABSOLUTE: 1.52 K/UL (ref 0.24–0.82)
MONOCYTES RELATIVE PERCENT: 11.9 % (ref 4.7–12.5)
NEUTROPHILS ABSOLUTE: 10.15 K/UL (ref 1.56–6.13)
NEUTROPHILS RELATIVE PERCENT: 79.3 % (ref 34–71.1)
PDW BLD-RTO: 18.7 % (ref 11.7–14.4)
PLATELET # BLD: 83 K/UL (ref 182–369)
PMV BLD AUTO: 10.1 FL (ref 7.4–10.4)
RBC # BLD: 2.85 M/UL (ref 3.93–5.22)
WBC # BLD: 12.79 K/UL (ref 3.98–10.04)

## 2020-06-29 PROCEDURE — 85025 COMPLETE CBC W/AUTO DIFF WBC: CPT

## 2020-07-02 NOTE — PROGRESS NOTES
marker  · 3/12/9320-YC-23-9->430->370. · 4/30/2018 - CA 19- 9 of 157   · 5/25/20181197-XF-68-9->32 after 4 cycles. · 08/02/2018- -> 8   · 10/01/2018- CA 19-9 -5   · 10/29/2018-CA 19-9- 7   · 12/3/3865-GO-63-9-7   · 04/11/2019-CA-19-9- 12   · 05/21/2019- CA 19-9- 41   · 7/2/2019-CA 19 9 = 114  · 7/9/2019- CA-19-9 = 225  · 8/6/2019- CA-19-9 = 171  · 9/3/5638-LG-11-9 = 198  · 9/13/20195464-BI-53-9 = 98 (at  [address])  · 10/29/1676-PL-94-9 =317  · 11/21/2019-CA-19-9 = 183  · 1/23/20204625-BQ-83-9 = 520  · 4/22/2020- CA-19-9 = 940  · 5/13/20208277-ND-10-9 = 129  · 6/1/20207339-VQ-76-9 = 523? ?        Cancer history  Ms Gladys Collier was seen in initial oncology consultation on 3/12/2018 referred by Dr. Oneal Deluna for a diagnosis of pancreatic adenocarcinoma. She was initially evaluated for acute pancreatitis at [facility] on February 2018. Further imaging revealed a pancreatic head mass. Lipase was elevated at 1184 and CA-19-9 elevated 134. The symptoms were going on for several weeks. Weight loss of 10-12 pounds over the last 6 months. · October 2017-CT abdomen without contrast was unremarkable. · 2/2/2018-ultrasound of abdomen showed a pancreatic duct dilation   · 2/02/2018-CT abdomen showed 16 mm low-density lesion in the pancreas at the junction of the head of the body. No intra-abdominal adenopathy. No liver metastasis. · 2/7/2018-the patient underwent EGD/EUS and FNA biopsy of a pancreatic mass by Dr. Oneal Deluna at [facility] . EUS showed inflammatory changes consistent with a recent history of pancreatitis. Main pancreatic duct was dilated. No concerning peripancreatic adenopathy. No definite mass was seen by a more diffuse hypoechoic area measuring 1.8 x 2.2 cm in the head of the pancreas. Pathology was consistent with a group of markedly atypical cells strongly suspicious for adenocarcinoma.    · 2/28/2018-CT pancreatic protocol showed a poorly defined area of decreased enhancement located in the head of the pancreas measuring 1.8 x 1.4 x 1.7 cm with moderate meditation of the pancreatic duct. Well defined sharply marginated low density nodule located in the tail of the pancreas measuring 1.5 x 1.3 x 1.5 cm (IPMN?). · 3/6/2018-CA 19-9 was elevated at 150. Repeat EGD was performed by Dr. Nita Sifuentes due to the inconclusive FNA resulted on 2/7/2018. Pathology FNA was consistent with pancreatic adenocarcinoma. · 3/12/2018-she was first seen by me. No evidence of distant disease. Referral to Surgical oncology. CT chest to complete staging. CA-19-9 430. · 3/16/2018-CT of the chest was unremarkable for intrathoracic metastatic disease   · Surgery consultation at [facility] March 2018- with Dr Jeancarlos Bartlett. She was not a surgical candidate upfront. Recommended neoadjuvant chemotherapy. · 4/3/2018-started on neoadjuvant chemotherapy with FOLFORINOX. · 4/11/2018-she developed CBD obstruction had a CBD stent placed by Dr. Africa Mao. · 4/3/2018 through 6/4/2018 Neoadjuvant chemotherapy FOLFORINOX ×5 Biweekly cycles   · August 2018- XRT 30 Gy/Xeloda   · 08/30/3018- Whipple procedure at [facility] [place], [place] by Dr. Yair Martinez  · Recommended another 7 biweekly cycles of modified FOLFORINOX. · 9/5/2018-CT of the chest abdomen pelvis was unremarkable for metastatic disease. · 1/14/2019-CT abdomen and pelvis at [facility] showed no evidence of metastasis in the chest abdomen pelvis. · 5/21/2019-CT chest, abdomen, pelvis at [facility] showed no evidence of metastatic disease   · 5/21/20198195-XS-19-9 = 42   · 06/12/2019- CA-19-9 = 154. Discussed with the patient. We'll also discuss with [facility]. · 7/1/2019-CT chest, abdomen, pelvis showed a soft tissue mass measuring 1.4 x 1.1 cm, not present on prior scan from January 2019, concerning for recurrence. Stable hypodense in the liver, too small to characterize. Subcentimeter ill-defined area of low attenuation liver may represent an early metastasis. · 7/3/2019-recommended palliative chemotherapy with nab paclitaxel 125mg/m² IV over 30 minutes on day 1 and gemcitabine 600 mg/m² IV over 10mg/m2/min (fixed dose rate) on day 1  · 7/2/2019-CA 19 9 = 114  · 7/6/2019- extended genetic panel negative for a deleterious mutation (invitae)  · 7/9/2019- CA-19-9 = 225  · 8/6/2019- CA-19-9 = 171  · 9/3/2620-AY-83-9 = 198   · 9/13/20192703-CS-29-9 = 98 at [facility]  · 9/13/2019-CT chest abdomen pelvis at [facility] showed a soft tissue thickening in the pancreatic bed with persistent narrowing of the portal SMV confluence.  Superimposed tumor remains a possibility.  The new low-density lesion in the periphery of the liver seen on the prior exam is no longer appreciated and likely represents treatment effect.  Nodular enhancement may represent perfusion change in the region on image 187. · 9/13/2018- she was recommended to continue current treatment with Gemzar/Abraxane  · 11/1/2019- she was hospitalized with GI bleed.  An upper endoscopy showed ulceration at the efferent loop of the Whipple's procedure  · 10/29/5548-OH-45-9 =317  · 11/21/2019-CA-19-9 = 183  · 11/19/2019- CT chest abdomen pelvis showed no evidence of gross disease progression. Improvement of the caliber of what may be a middle colic vein that drains back to the SMV. There is still persistent narrowing of the of the upper SMV at the juncture with the portal vein.  No definite evidence of liver metastases at this time. No definite evidence of pulmonary metastases.  However, question of slight increase in prominence of subtle soft tissue thickening within the right paracolic gutter could be indicative of metastatic disease to peritoneum.   · 12/9/2019- colonoscopy by GI was unremarkable.  This was performed for complaints of maroon-colored stools.   · 1/23/20201054-NM-48-9 = 520  · 1/28/2020- CT chest abdomen pelvis at [facility] showed progressive disease with recurrence at the retroperitoneal just inferior to the pancreaticojejunostomy with vascular involvement and complete occlusion of the portal vein and SMV resulting in worsening bowel edema and developing ascites. This is consistent with disease progression. · 3/4/2020- 4th line therapy Irinotecan liposome 70 mg/m² with leucovorin 400 mg/m² and 5-FU 2400 mg/m² over 46 hours every 2 weeks. · 5/12/202 Ct Chest W Contrast  No acute findings in the chest.  Slight increase in size of LEFT supraclavicular lymph nodes and subcarinal mediastinal lymph node. Recommend special attention on follow-up imaging to evaluate for stability or resolution. Ct Abdomen Pelvis W Iv Contrast   Postoperative change of Whipple with residual stranding and free fluid in the surgical bed. Residual soft tissue nodular prominence in the retroperitoneum adjacent to the portal vein which is compressed and occluded/thrombosed. The splenic vein is not visualized and presumed thrombosed. LEFT upper quadrant perigastric/periesophageal varices. Moderate volume ascites. 4.  No evidence of bowel obstruction. Mild distention of the stomach, which may be secondary to slow flow through the gastrojejunostomy. Correlate clinically. There is a small linear device in the distal esophagus which could represent a hemostasis clip but recommend clinical correlation. · 5/13/2020- CA19.9-129  · 6/1/2020- CA 19.9- 523  · 6/15/2020- CA 19.9- 383  · 6/21/2020- Ct Head Wo Contrast No acute intracranial process. Findings in agreement with the emergent findings from the initial [facility] preliminary report. · 6/21/2020- Ct Abdomen Pelvis W Contrast Prior Whipple procedure with expected pneumobilia. Large volume ascites for patient size. 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 Diffuse wall thickening along the small and large bowel secondary to this venous congestion. No evidence for arterial ischemia, as the bowel mucosa appears to enhance normally.  No evidence of viscus perforation or peritoneal abscess. Of note, the the retroperitoneal structures are quite decompressed as a result of the ascites, with near complete attenuation of the IVC in the mid abdomen. Unsure if this is contributing to any lower extremity edema. If evidence of multiorgan dysfunction, abdominal compartment syndrome could also be considered. The results of this exam were discussed with Dr. Elizabeth Felton on 2020 at 1002 hours. In particular, I wanted to address if there was indication/need for therapeutic paracentesis. This is under consideration. · 9.3.  2020- US Guided Paracentesis Ultrasound-guided abdominal paracentesis performed for therapeutic purposes. A 60 cc aspirate was set aside for lab evaluation, if desired. The patient tolerated the proceed well. Cytology was negative for malignant cells.           PAST MEDICAL HISTORY:   Past Medical History:   Diagnosis Date    Acute pancreatitis     Adult BMI <19 kg/sq m     Anemia     Cat esophagus     Biliary obstruction     GERD (gastroesophageal reflux disease)     with Barretts    Hashimoto's thyroiditis     denies takes no med for    Heart murmur     Hypertension     pt denies nor longer takes med for    Low blood sugar     h/o when overweight in the past    MVP (mitral valve prolapse)     Myalgia     Palliative care patient 2020    Pancreatic adenocarcinoma ([facility] 2018    Dr Nehemias Newton    Pancreatic cancer ([facility] 3/30/2018    Right flank pain     RUQ pain           PAST SURGICAL HISTORY:  Past Surgical History:   Procedure Laterality Date    CARDIAC CATHETERIZATION      heart cath     SECTION      x 3    CHOLECYSTECTOMY      COLONOSCOPY  years ago    [facility]-Dr Mitch Farmer per patient    COLONOSCOPY  2014    Dr Romy Austin- Urmila Nair recall    COLONOSCOPY  03/10/2016    Dr Mcfarland-10 yr recall    COLONOSCOPY N/A 2019    Dr Tamara Rothman, 5 yr recall    ELBOW SURGERY Right     ENDOMETRIAL ABLATION      HAND SURGERY Right     HERNIA REPAIR      hiatal    HERNIA REPAIR      umbilical    HERNIA REPAIR      PANCREAS SURGERY  08/30/2018    Whipple procedure-Dr Brooklyn Moura [place] EGD [facility] NEEDLE ASPIR/BIOP ALTERED ANATOMY N/A 2/7/2018    Dr Brice Kennedy w/fna-Dilation of main pancreatic duct with diffuse change in the pancreatitis noted, area of concern in the neck of the pancreas-strongly suspicious for adenocarcinoma     [place] EGD INTRMURAL NEEDLE ASPIR/BIOP ALTERED ANATOMY N/A 3/6/2018    Dr Maik Begum cancer-Ductal adenocarcinoma-staged [id number] by EUS, pancreatic pseudocyst in the tail the pancreas    [place] ERCP DX COLLECTION SPECIMEN BRUSHING/WASHING N/A 4/11/2018    Dr KVNG Duncan-w/placement of a self-expanding fully covered 10 mm biliary stent    UPPER GASTROINTESTINAL ENDOSCOPY  years ago    [facility]-Dr Sid Coats    UPPER GASTROINTESTINAL ENDOSCOPY  2013    Zuniga    UPPER GASTROINTESTINAL ENDOSCOPY N/A 11/1/2019    Dr Fabiano Quiñones post Whipple's procedure with efferent loop ulceration    UPPER GASTROINTESTINAL ENDOSCOPY  12/17/2019    Dr Akin Duncan-Patent G-J Anastomosis, apparent healing ulceration    UPPER GASTROINTESTINAL ENDOSCOPY N/A 2/25/2020    Dr Josr Morel amount of food retention in the stomach with bile, hiatal hernia, will need repeat    UPPER GASTROINTESTINAL ENDOSCOPY N/A 2/27/2020    Dr Severino Quintero erosion/ulceration at the suture line, post whipple surgical changes    UPPER GASTROINTESTINAL ENDOSCOPY N/A 3/9/2020    Dr Severino Quintero erosion along the previous whipple suture line    UPPER GASTROINTESTINAL ENDOSCOPY N/A 4/16/2020    Dr KVNG Duncan-w/hemostatic clip placement x 1-Allie Peres tear at [facility]-Likely culprit lesion for current presentation    UPPER GASTROINTESTINAL ENDOSCOPY N/A 6/22/2020    Dr Severino Quintero erosion along the previous whipple suture line        SOCIAL HISTORY:  Social History     Socioeconomic History    Marital status: Single Spouse name: None    Number of children: 3    Years of education: None    Highest education level: None   Occupational History    Occupation: retired chemical operqator at [employer] Occupation: [employer]    Occupation: [employer]   Social Needs    Financial resource strain: None    Food insecurity     Worry: None     Inability: None    Transportation needs     Medical: None     Non-medical: None   Tobacco Use    Smoking status: Former Smoker     Packs/day: 0.50     Years: 10.00     Pack years: 5.00     Types: Cigarettes     Last attempt to quit: 2019     Years since quittin.6    Smokeless tobacco: Never Used   Substance and Sexual Activity    Alcohol use: Not Currently    Drug use: Never    Sexual activity: None     Comment: 3   Lifestyle    Physical activity     Days per week: None     Minutes per session: None    Stress: None   Relationships    Social connections     Talks on phone: None     Gets together: None     Attends Christianity service: None     Active member of club or organization: None     Attends meetings of clubs or organizations: None     Relationship status: None    Intimate partner violence     Fear of current or ex partner: None     Emotionally abused: None     Physically abused: None     Forced sexual activity: None   Other Topics Concern    None   Social History Narrative    CODE STATUS: Full Code    HEALTH CARE PROXY: her daughter, Mrs. Valerio Beltran, of [facility]    AMBULATES: independently    DOMICILED: lives in a private home, without steps inside, lives alone, has 2 dogs, no steps in to home       FAMILY HISTORY:  Family History   Problem Relation Age of Onset    Heart Attack Mother 46        x 2-had bypass    Hypertension Mother     COPD Father     Liver Cancer Paternal Aunt     Lung Cancer Paternal Aunt     Breast Cancer Maternal Aunt         but  of brain cancer    Diabetes Maternal Uncle     Diabetes Maternal Grandmother     Colon Cancer Neg Hx     Colon Polyps Neg Hx     Esophageal Cancer Neg Hx     Rectal Cancer Neg Hx     Stomach Cancer Neg Hx         Current Outpatient Medications   Medication Sig Dispense Refill    pantoprazole (PROTONIX) 40 MG tablet Take 1 tablet by mouth 2 times daily 60 tablet 3    Cyanocobalamin (VITAMIN B 12 PO) Take by mouth      promethazine (PHENERGAN) 25 MG tablet Take 1 tablet by mouth every 6 hours as needed for Nausea 30 tablet 2    sucralfate (CARAFATE) 1 GM tablet Take 1 tablet by mouth 4 times daily 360 tablet 1    lidocaine-prilocaine (EMLA) 2.5-2.5 % cream Apply to port area and cover with plastic wrap one hour prior to use. 1 Tube 1    vitamin E 400 UNIT capsule Take 400 Units by mouth daily      NONFORMULARY daily Tumeric otc      ondansetron (ZOFRAN) 8 MG tablet Take 1 tablet by mouth every 8 hours as needed for Nausea or Vomiting 30 tablet 3    Multiple Vitamins-Minerals (THERAPEUTIC MULTIVITAMIN-MINERALS) tablet Take 1 tablet by mouth daily       No current facility-administered medications for this visit. REVIEW OF SYSTEMS:    Constitutional: no fever, no night sweats, no fatigue;   HEENT: no blurring of vision, no double vision, no hearing difficulty, no tinnitus,no ulceration, no dysphagia  Lungs: no cough, no shortness of breath, no wheeze;   CVS: no palpitation, no chest pain, no shortness of breath;  GI: no abdominal pain, no nausea , no vomiting, no constipation; melanotic stools  VIKRAM: no dysuria, frequency and urgency, no hematuria, no kidney stones;   Musculoskeletal: no joint pain, swelling , stiffness;   Endocrine: no polyuria, polydypsia, no cold or heat intolerence; Hematology/lymphatic: no easy brusing or bleeding, no hx of clotting disorder; no peripheral adenopathy. Dermatology: no skin rash, no eczema, no pruritis;   Psychiatry: no depression, no anxiety,no panic attacks, no suicide ideation;    Neurology: no syncope, no seizures, no numbness or tingling of hands, no numbness or tingling of feet, no paresis;     PHYSICAL EXAM:    Vitals signs:  /80   Pulse 62   Temp 98 °F (36.7 °C)   Resp 16   Wt 128 lb 1.6 oz (58.1 kg)   SpO2 98%   BMI 20.06 kg/m²    Pain scale:  Pain Score:   0 - No pain   CONSTITUTIONAL: Alert, appropriate, no acute distress, cachectic appearing  EYES: Non icteric, EOM intact, pupils equal round and reactive to light and accommodation. ENT: Oral mucus membranes moist, no oral pharyngeal lesions. External inspection of ears and nose are normal.   NECK: Supple, no masses. No palpable thyroid mass    CHEST/LUNGS: CTA bilaterally, normal respiratory effort   CARDIOVASCULAR: RRR, no murmurs. No lower extremity edema   ABDOMEN: soft non-tender, active bowel sounds, no hepatosplenomegaly. No palpable masses. EXTREMITIES: warm, Full ROM of all fours extremities. No focal weakness. SKIN: warm, dry with no rashes or lesions  LYMPH: No cervical, clavicular, axillary, or inguinal lymphadenopathy  NEUROLOGIC: follows commands, non focal.   PSYCH: mood and affect appropriate. Alert and oriented to time and place and person. Relevant Lab findings/reviewed by me:  CBC:7/6/2020  WBC- 5.65  HGB- 9.3  PLT- 162,000  Neut- 4.29    6/22/2020  BUN-9  Creatinine-0.5  Calcium-7.6  Total Protein-4.7  Albumin-2.7  Alk Phos-153  ALT-16  AST-18     6/15/2020  TR60.5-500    Relevant Imaging studies/reviewed by me:  Ct Head Wo Contrast    Result Date: 6/21/2020  1. No acute intracranial process. 2. Findings in agreement with the emergent findings from the initial [facility] preliminary report. Signed by Dr Elida Sahni on 6/21/2020 9:39 AM    Ct Abdomen Pelvis W Iv Contrast Additional Contrast? None    Result Date: 6/21/2020  1. Prior Whipple procedure with expected pneumobilia. 2. Large volume ascites for patient size. 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  Diffuse wall thickening along the small and large bowel secondary to this venous congestion. No evidence for arterial ischemia, as the bowel mucosa appears to enhance normally. No evidence of viscus perforation or peritoneal abscess. Of note, the the retroperitoneal structures are quite decompressed as a result of the ascites, with near complete attenuation of the IVC in the mid abdomen. Unsure if this is contributing to any lower extremity edema. If evidence of multiorgan dysfunction, abdominal compartment syndrome could also be considered. The results of this exam were discussed with Dr. Robbie Boswell on 6/21/2020 at 1002 hours. In particular, I wanted to address if there was indication/need for therapeutic paracentesis. This is under consideration. Signed by Dr Franco Foster on 6/21/2020 10:03 AM    Us Guided Paracentesis    Result Date: 6/21/2020  1. Ultrasound-guided abdominal paracentesis performed for therapeutic purposes. A 60 cc aspirate was set aside for lab evaluation, if desired. The patient tolerated the proceed well. Signed by Dr Franco Foster on 6/21/2020 12:31 PM    ASSESSMENT    No orders of the defined types were placed in this encounter. Denisa Layton was seen today for follow-up. Diagnoses and all orders for this visit:    Malignant neoplasm of pancreas, unspecified location of malignancy ([address]    Anemia, unspecified type    Chemotherapy management, encounter for    Chronic fatigue    Adverse effect of chemotherapy, subsequent encounter    Care plan discussed with patient    Gastrointestinal hemorrhage, unspecified gastrointestinal hemorrhage type    Cachexia ([address]    Moderate protein-calorie malnutrition ([address]    Other ascites       #Metastatic pancreatic cancer  third line palliative chemotherapy (liposomal Irinotecan/Leucovorin/5-FU)   Proceed with chemotherapy today  Last CA-19-9 = 523. However CT scan showed stable disease. The patient has felt well otherwise. She has been working her farm.   She has been quite active. She feels clinically better. Therefore, we will continue treatment and repeat CT scans in about a month. Weight 47.5 kg on 4/16/2020  Weight 51.3 kg on 6/15/2020   Weight 58.3 kg on 7/6/2020. #GI bleed- secondary to erosion at previous Whipple anastomotic site. history of erosion at the anastomotic site with several instances of upper GI bleed in the past.  No further episodes of GI bleed. Hemoglobin        #Normocytic anemia- secondary to GI bleed. Hemoglobin 9.3. She had a capsule endoscopy performed by GI that showed no source of bleeding. #Prognosis-poor prognosis overall. She has had disease progression several lines of treatment before. #Ascites-cytology was negative. 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 Unfortunately, patient has a history of GI bleed and therefore she has a contraindication for anticoagulation. Plan:   · Proceed with treatment today. · CMP and CA-19-9  · RTC 2 weeks     Follow Up:     No follow-ups on file. Data Bridgette Huerta am scribing for Ajay Paige MD. Electronically signed by Angie Huerta on 7/6/2020 at 2:50 PM CDT. I, Dr Mohan Kamara, personally performed the services described in this documentation as scribed by Angie Huerta MA in my presence and is both accurate and complete. Over 50% of the total visit time of 25 minutes in face to face encounter with the patient, out of which more than 50% of the time was spent in counseling patient or family and coordination of care. Counseling included but was not limited to time spent reviewing labs, imaging studies/ treatment plan and answering questions.

## 2020-07-06 ENCOUNTER — HOSPITAL ENCOUNTER (OUTPATIENT)
Dept: INFUSION THERAPY | Age: 60
Discharge: HOME | End: 2020-07-06
Payer: COMMERCIAL

## 2020-07-06 ENCOUNTER — OFFICE VISIT (OUTPATIENT)
Dept: HEMATOLOGY | Age: 60
End: 2020-07-06
Payer: COMMERCIAL

## 2020-07-06 VITALS
DIASTOLIC BLOOD PRESSURE: 80 MMHG | HEART RATE: 62 BPM | TEMPERATURE: 98 F | SYSTOLIC BLOOD PRESSURE: 136 MMHG | BODY MASS INDEX: 20.06 KG/M2 | RESPIRATION RATE: 16 BRPM | OXYGEN SATURATION: 98 % | WEIGHT: 128.1 LBS

## 2020-07-06 DIAGNOSIS — C25.9 MALIGNANT NEOPLASM OF PANCREAS, UNSPECIFIED LOCATION OF MALIGNANCY (HCC): Primary | ICD-10-CM

## 2020-07-06 LAB
ALBUMIN SERPL-MCNC: 2.5 G/DL (ref 3.5–5.2)
ALP BLD-CCNC: 180 U/L (ref 35–104)
ALT SERPL-CCNC: 38 U/L (ref 9–52)
ANION GAP SERPL CALCULATED.3IONS-SCNC: 4 MMOL/L (ref 7–19)
AST SERPL-CCNC: 40 U/L (ref 14–36)
BASOPHILS ABSOLUTE: 0.08 K/UL (ref 0.01–0.08)
BASOPHILS RELATIVE PERCENT: 1.4 % (ref 0.1–1.2)
BILIRUB SERPL-MCNC: 0.3 MG/DL (ref 0.2–1.3)
BUN BLDV-MCNC: 5 MG/DL (ref 7–17)
CA 19-9: 483 U/ML (ref 0–37)
CALCIUM SERPL-MCNC: 8.2 MG/DL (ref 8.4–10.2)
CHLORIDE BLD-SCNC: 106 MMOL/L (ref 98–111)
CO2: 27 MMOL/L (ref 22–29)
CREAT SERPL-MCNC: 0.5 MG/DL (ref 0.5–1)
EOSINOPHILS ABSOLUTE: 0.07 K/UL (ref 0.04–0.54)
EOSINOPHILS RELATIVE PERCENT: 1.2 % (ref 0.7–7)
GFR NON-AFRICAN AMERICAN: >60
GLOBULIN: 2.4 G/DL
GLUCOSE BLD-MCNC: 140 MG/DL (ref 74–106)
HCT VFR BLD CALC: 28.1 % (ref 34.1–44.9)
HEMOGLOBIN: 9.3 G/DL (ref 11.2–15.7)
LYMPHOCYTES ABSOLUTE: 0.6 K/UL (ref 1.18–3.74)
LYMPHOCYTES RELATIVE PERCENT: 10.6 % (ref 19.3–53.1)
MCH RBC QN AUTO: 31.5 PG (ref 25.6–32.2)
MCHC RBC AUTO-ENTMCNC: 33.1 G/DL (ref 32.3–35.5)
MCV RBC AUTO: 95.3 FL (ref 79.4–94.8)
MONOCYTES ABSOLUTE: 0.61 K/UL (ref 0.24–0.82)
MONOCYTES RELATIVE PERCENT: 10.8 % (ref 4.7–12.5)
NEUTROPHILS ABSOLUTE: 4.29 K/UL (ref 1.56–6.13)
NEUTROPHILS RELATIVE PERCENT: 76 % (ref 34–71.1)
PDW BLD-RTO: 18.9 % (ref 11.7–14.4)
PLATELET # BLD: 162 K/UL (ref 182–369)
PMV BLD AUTO: 9.3 FL (ref 7.4–10.4)
POTASSIUM SERPL-SCNC: 4.4 MMOL/L (ref 3.5–5.1)
RBC # BLD: 2.95 M/UL (ref 3.93–5.22)
SODIUM BLD-SCNC: 137 MMOL/L (ref 137–145)
TOTAL PROTEIN: 4.9 G/DL (ref 6.3–8.2)
WBC # BLD: 5.65 K/UL (ref 3.98–10.04)

## 2020-07-06 PROCEDURE — 80053 COMPREHEN METABOLIC PANEL: CPT

## 2020-07-06 PROCEDURE — 96375 TX/PRO/DX INJ NEW DRUG ADDON: CPT

## 2020-07-06 PROCEDURE — 85025 COMPLETE CBC W/AUTO DIFF WBC: CPT

## 2020-07-06 PROCEDURE — 86301 IMMUNOASSAY TUMOR CA 19-9: CPT

## 2020-07-06 PROCEDURE — 6360000002 HC RX W HCPCS: Performed by: INTERNAL MEDICINE

## 2020-07-06 PROCEDURE — 99214 OFFICE O/P EST MOD 30 MIN: CPT | Performed by: INTERNAL MEDICINE

## 2020-07-06 PROCEDURE — 96413 CHEMO IV INFUSION 1 HR: CPT

## 2020-07-06 PROCEDURE — 96367 TX/PROPH/DG ADDL SEQ IV INF: CPT

## 2020-07-06 PROCEDURE — 96415 CHEMO IV INFUSION ADDL HR: CPT

## 2020-07-06 PROCEDURE — 2580000003 HC RX 258: Performed by: INTERNAL MEDICINE

## 2020-07-06 PROCEDURE — G0498 CHEMO EXTEND IV INFUS W/PUMP: HCPCS

## 2020-07-06 RX ORDER — METHYLPREDNISOLONE SODIUM SUCCINATE 125 MG/2ML
125 INJECTION, POWDER, LYOPHILIZED, FOR SOLUTION INTRAMUSCULAR; INTRAVENOUS ONCE
Status: CANCELLED | OUTPATIENT
Start: 2020-07-06

## 2020-07-06 RX ORDER — PANTOPRAZOLE SODIUM 40 MG/1
40 TABLET, DELAYED RELEASE ORAL 2 TIMES DAILY
Qty: 60 TABLET | Refills: 3 | Status: SHIPPED | OUTPATIENT
Start: 2020-07-06 | End: 2021-01-04

## 2020-07-06 RX ORDER — HEPARIN SODIUM (PORCINE) LOCK FLUSH IV SOLN 100 UNIT/ML 100 UNIT/ML
500 SOLUTION INTRAVENOUS PRN
Status: CANCELLED | OUTPATIENT
Start: 2020-07-06

## 2020-07-06 RX ORDER — SODIUM CHLORIDE 9 MG/ML
20 INJECTION, SOLUTION INTRAVENOUS ONCE
Status: COMPLETED | OUTPATIENT
Start: 2020-07-06 | End: 2020-07-07

## 2020-07-06 RX ORDER — EPINEPHRINE 1 MG/ML
0.3 INJECTION, SOLUTION, CONCENTRATE INTRAVENOUS PRN
Status: CANCELLED | OUTPATIENT
Start: 2020-07-06

## 2020-07-06 RX ORDER — PALONOSETRON 0.05 MG/ML
0.25 INJECTION, SOLUTION INTRAVENOUS ONCE
Status: COMPLETED | OUTPATIENT
Start: 2020-07-06 | End: 2020-07-06

## 2020-07-06 RX ORDER — SODIUM CHLORIDE 9 MG/ML
20 INJECTION, SOLUTION INTRAVENOUS ONCE
Status: CANCELLED | OUTPATIENT
Start: 2020-07-06

## 2020-07-06 RX ORDER — SODIUM CHLORIDE 0.9 % (FLUSH) 0.9 %
10 SYRINGE (ML) INJECTION PRN
Status: CANCELLED | OUTPATIENT
Start: 2020-07-06

## 2020-07-06 RX ORDER — DIPHENHYDRAMINE HYDROCHLORIDE 50 MG/ML
50 INJECTION INTRAMUSCULAR; INTRAVENOUS ONCE
Status: CANCELLED | OUTPATIENT
Start: 2020-07-06

## 2020-07-06 RX ORDER — DEXAMETHASONE SODIUM PHOSPHATE 10 MG/ML
10 INJECTION, SOLUTION INTRAMUSCULAR; INTRAVENOUS ONCE
Status: COMPLETED | OUTPATIENT
Start: 2020-07-06 | End: 2020-07-06

## 2020-07-06 RX ORDER — PALONOSETRON 0.05 MG/ML
0.25 INJECTION, SOLUTION INTRAVENOUS ONCE
Status: CANCELLED | OUTPATIENT
Start: 2020-07-06

## 2020-07-06 RX ORDER — SODIUM CHLORIDE 0.9 % (FLUSH) 0.9 %
5 SYRINGE (ML) INJECTION PRN
Status: CANCELLED | OUTPATIENT
Start: 2020-07-06

## 2020-07-06 RX ORDER — SODIUM CHLORIDE 9 MG/ML
INJECTION, SOLUTION INTRAVENOUS CONTINUOUS
Status: CANCELLED | OUTPATIENT
Start: 2020-07-06

## 2020-07-06 RX ADMIN — LEUCOVORIN CALCIUM 650 MG: 200 INJECTION, POWDER, LYOPHILIZED, FOR SUSPENSION INTRAMUSCULAR; INTRAVENOUS at 11:32

## 2020-07-06 RX ADMIN — FLUOROURACIL 3000 MG: 50 INJECTION, SOLUTION INTRAVENOUS at 12:02

## 2020-07-06 RX ADMIN — DEXAMETHASONE SODIUM PHOSPHATE 10 MG: 10 INJECTION, SOLUTION INTRAMUSCULAR; INTRAVENOUS at 09:19

## 2020-07-06 RX ADMIN — IRINOTECAN HYDROCHLORIDE 86 MG: 4.3 INJECTION, POWDER, FOR SOLUTION INTRAVENOUS at 09:45

## 2020-07-06 RX ADMIN — SODIUM CHLORIDE 20 ML/HR: 9 INJECTION, SOLUTION INTRAVENOUS at 09:20

## 2020-07-06 RX ADMIN — PALONOSETRON 0.25 MG: 0.05 INJECTION, SOLUTION INTRAVENOUS at 09:19

## 2020-07-08 ENCOUNTER — HOSPITAL ENCOUNTER (OUTPATIENT)
Dept: INFUSION THERAPY | Age: 60
Discharge: HOME | End: 2020-07-08
Payer: COMMERCIAL

## 2020-07-08 DIAGNOSIS — C25.9 MALIGNANT NEOPLASM OF PANCREAS, UNSPECIFIED LOCATION OF MALIGNANCY (HCC): Primary | ICD-10-CM

## 2020-07-08 PROCEDURE — 96377 APPLICATON ON-BODY INJECTOR: CPT

## 2020-07-08 PROCEDURE — 96523 IRRIG DRUG DELIVERY DEVICE: CPT

## 2020-07-08 PROCEDURE — 6360000002 HC RX W HCPCS: Performed by: INTERNAL MEDICINE

## 2020-07-08 PROCEDURE — 2580000003 HC RX 258: Performed by: INTERNAL MEDICINE

## 2020-07-08 RX ORDER — SODIUM CHLORIDE 0.9 % (FLUSH) 0.9 %
20 SYRINGE (ML) INJECTION PRN
Status: CANCELLED | OUTPATIENT
Start: 2020-07-08

## 2020-07-08 RX ORDER — SODIUM CHLORIDE 0.9 % (FLUSH) 0.9 %
20 SYRINGE (ML) INJECTION PRN
Status: DISCONTINUED | OUTPATIENT
Start: 2020-07-08 | End: 2020-07-09 | Stop reason: HOSPADM

## 2020-07-08 RX ORDER — HEPARIN SODIUM (PORCINE) LOCK FLUSH IV SOLN 100 UNIT/ML 100 UNIT/ML
500 SOLUTION INTRAVENOUS PRN
Status: CANCELLED | OUTPATIENT
Start: 2020-07-08

## 2020-07-08 RX ORDER — SODIUM CHLORIDE 0.9 % (FLUSH) 0.9 %
10 SYRINGE (ML) INJECTION PRN
Status: CANCELLED | OUTPATIENT
Start: 2020-07-08

## 2020-07-08 RX ORDER — HEPARIN SODIUM (PORCINE) LOCK FLUSH IV SOLN 100 UNIT/ML 100 UNIT/ML
500 SOLUTION INTRAVENOUS PRN
Status: DISCONTINUED | OUTPATIENT
Start: 2020-07-08 | End: 2020-07-09 | Stop reason: HOSPADM

## 2020-07-08 RX ADMIN — HEPARIN 500 UNITS: 100 SYRINGE at 10:38

## 2020-07-08 RX ADMIN — SODIUM CHLORIDE, PRESERVATIVE FREE 20 ML: 5 INJECTION INTRAVENOUS at 10:38

## 2020-07-08 RX ADMIN — PEGFILGRASTIM 6 MG: KIT SUBCUTANEOUS at 10:38

## 2020-07-12 ENCOUNTER — HOSPITAL ENCOUNTER (EMERGENCY)
Age: 60
Discharge: HOME | End: 2020-07-12
Attending: EMERGENCY MEDICINE
Payer: COMMERCIAL

## 2020-07-12 VITALS
HEART RATE: 59 BPM | TEMPERATURE: 98.2 F | SYSTOLIC BLOOD PRESSURE: 137 MMHG | OXYGEN SATURATION: 97 % | WEIGHT: 128 LBS | HEIGHT: 67 IN | DIASTOLIC BLOOD PRESSURE: 81 MMHG | RESPIRATION RATE: 20 BRPM | BODY MASS INDEX: 20.09 KG/M2

## 2020-07-12 LAB
ABO/RH: NORMAL
ALBUMIN SERPL-MCNC: 2.9 G/DL (ref 3.5–5.2)
ALP BLD-CCNC: 250 U/L (ref 35–104)
ALT SERPL-CCNC: 31 U/L (ref 5–33)
ANION GAP SERPL CALCULATED.3IONS-SCNC: 9 MMOL/L (ref 7–19)
ANTIBODY IDENTIFICATION: NORMAL
ANTIBODY SCREEN: NORMAL
AST SERPL-CCNC: 27 U/L (ref 5–32)
BANDED NEUTROPHILS RELATIVE PERCENT: 5 % (ref 0–5)
BASOPHILS ABSOLUTE: 0 K/UL (ref 0–0.2)
BASOPHILS RELATIVE PERCENT: 0 % (ref 0–1)
BILIRUB SERPL-MCNC: <0.2 MG/DL (ref 0.2–1.2)
BLOOD BANK DISPENSE STATUS: NORMAL
BLOOD BANK DISPENSE STATUS: NORMAL
BLOOD BANK PRODUCT CODE: NORMAL
BLOOD BANK PRODUCT CODE: NORMAL
BPU ID: NORMAL
BPU ID: NORMAL
BUN BLDV-MCNC: 8 MG/DL (ref 6–20)
CALCIUM SERPL-MCNC: 8.1 MG/DL (ref 8.6–10)
CHLORIDE BLD-SCNC: 101 MMOL/L (ref 98–111)
CO2: 26 MMOL/L (ref 22–29)
CREAT SERPL-MCNC: 0.7 MG/DL (ref 0.5–0.9)
DESCRIPTION BLOOD BANK: NORMAL
DESCRIPTION BLOOD BANK: NORMAL
EOSINOPHILS ABSOLUTE: 0.47 K/UL (ref 0–0.6)
EOSINOPHILS RELATIVE PERCENT: 1 % (ref 0–5)
GFR NON-AFRICAN AMERICAN: >60
GLUCOSE BLD-MCNC: 127 MG/DL (ref 74–109)
HCT VFR BLD CALC: 23.2 % (ref 37–47)
HEMOGLOBIN: 7.5 G/DL (ref 12–16)
IMMATURE GRANULOCYTES #: 1.7 K/UL
INR BLD: 1.21 (ref 0.88–1.18)
LYMPHOCYTES ABSOLUTE: 1.4 K/UL (ref 1.1–4.5)
LYMPHOCYTES RELATIVE PERCENT: 3 % (ref 20–40)
MCH RBC QN AUTO: 31.6 PG (ref 27–31)
MCHC RBC AUTO-ENTMCNC: 32.3 G/DL (ref 33–37)
MCV RBC AUTO: 97.9 FL (ref 81–99)
MONOCYTES ABSOLUTE: 0.9 K/UL (ref 0–0.9)
MONOCYTES RELATIVE PERCENT: 2 % (ref 0–10)
NEUTROPHILS ABSOLUTE: 44.3 K/UL (ref 1.5–7.5)
NEUTROPHILS RELATIVE PERCENT: 89 % (ref 50–65)
PDW BLD-RTO: 19.3 % (ref 11.5–14.5)
PLATELET # BLD: 103 K/UL (ref 130–400)
PLATELET SLIDE REVIEW: ABNORMAL
PMV BLD AUTO: 9 FL (ref 9.4–12.3)
POTASSIUM REFLEX MAGNESIUM: 4.1 MMOL/L (ref 3.5–5)
PROTHROMBIN TIME: 15.3 SEC (ref 12–14.6)
RBC # BLD: 2.37 M/UL (ref 4.2–5.4)
RBC # BLD: NORMAL 10*6/UL
SODIUM BLD-SCNC: 136 MMOL/L (ref 136–145)
TOTAL PROTEIN: 5 G/DL (ref 6.6–8.7)
WBC # BLD: 47.1 K/UL (ref 4.8–10.8)

## 2020-07-12 PROCEDURE — 86922 COMPATIBILITY TEST ANTIGLOB: CPT

## 2020-07-12 PROCEDURE — 36415 COLL VENOUS BLD VENIPUNCTURE: CPT

## 2020-07-12 PROCEDURE — 86870 RBC ANTIBODY IDENTIFICATION: CPT

## 2020-07-12 PROCEDURE — 86900 BLOOD TYPING SEROLOGIC ABO: CPT

## 2020-07-12 PROCEDURE — 86850 RBC ANTIBODY SCREEN: CPT

## 2020-07-12 PROCEDURE — 6360000002 HC RX W HCPCS

## 2020-07-12 PROCEDURE — 80053 COMPREHEN METABOLIC PANEL: CPT

## 2020-07-12 PROCEDURE — 99283 EMERGENCY DEPT VISIT LOW MDM: CPT

## 2020-07-12 PROCEDURE — 85025 COMPLETE CBC W/AUTO DIFF WBC: CPT

## 2020-07-12 PROCEDURE — 86901 BLOOD TYPING SEROLOGIC RH(D): CPT

## 2020-07-12 PROCEDURE — 85610 PROTHROMBIN TIME: CPT

## 2020-07-12 RX ORDER — HEPARIN SODIUM (PORCINE) LOCK FLUSH IV SOLN 100 UNIT/ML 100 UNIT/ML
300 SOLUTION INTRAVENOUS ONCE
Status: COMPLETED | OUTPATIENT
Start: 2020-07-12 | End: 2020-07-12

## 2020-07-12 RX ORDER — HEPARIN SODIUM (PORCINE) LOCK FLUSH IV SOLN 100 UNIT/ML 100 UNIT/ML
SOLUTION INTRAVENOUS
Status: COMPLETED
Start: 2020-07-12 | End: 2020-07-12

## 2020-07-12 RX ADMIN — HEPARIN 300 UNITS: 100 SYRINGE at 19:15

## 2020-07-12 RX ADMIN — HEPARIN SODIUM (PORCINE) LOCK FLUSH IV SOLN 100 UNIT/ML 300 UNITS: 100 SOLUTION at 19:15

## 2020-07-12 ASSESSMENT — ENCOUNTER SYMPTOMS
BLOOD IN STOOL: 1
ANAL BLEEDING: 1

## 2020-07-12 NOTE — ED NOTES
Pt does not want an IV or port accessed. Pt states \"I just want my blood drawn to see what my hgb is and I can call my doctor in the morning to get blood if needed\".       Pito Garsia RN  07/12/20 [id number]

## 2020-07-12 NOTE — ED PROVIDER NOTES
3/30/2018    Right flank pain     RUQ pain          SURGICAL HISTORY       Past Surgical History:   Procedure Laterality Date    CARDIAC CATHETERIZATION      heart cath     SECTION      x 3    CHOLECYSTECTOMY  1997    COLONOSCOPY  years ago    [facility]-Dr Nina Gowers per patient    COLONOSCOPY  2014    Dr Taiwo Shukla- [facility] recall    COLONOSCOPY  03/10/2016    Dr Mcfarland-10 yr recall    COLONOSCOPY N/A 2019    Dr Nita Barros, 5 yr recall    ELBOW SURGERY Right     ENDOMETRIAL ABLATION      HAND SURGERY Right     HERNIA REPAIR      hiatal    HERNIA REPAIR      umbilical    HERNIA REPAIR      PANCREAS SURGERY  2018    Whipple procedure-Dr Deidre Obregon [place] EGD [facility] NEEDLE ASPIR/BIOP ALTERED ANATOMY N/A 2018    Dr Higinio Sampson w/fna-Dilation of main pancreatic duct with diffuse change in the pancreatitis noted, area of concern in the neck of the pancreas-strongly suspicious for adenocarcinoma     [place] EGD INTRMURAL NEEDLE ASPIR/BIOP ALTERED ANATOMY N/A 3/6/2018    Dr KVNG Duncan-Pancreatic cancer-Ductal adenocarcinoma-staged iT2P9Gy by EUS, pancreatic pseudocyst in the tail the pancreas    [place] ERCP DX COLLECTION SPECIMEN BRUSHING/WASHING N/A 2018    Dr KVNG Duncan-w/placement of a self-expanding fully covered 10 mm biliary stent    UPPER GASTROINTESTINAL ENDOSCOPY  years ago    Steve-Dr Tamayo Files    UPPER GASTROINTESTINAL ENDOSCOPY      Zuniga    UPPER GASTROINTESTINAL ENDOSCOPY N/A 2019    Dr Breann Yepez post Whipple's procedure with efferent loop ulceration    UPPER GASTROINTESTINAL ENDOSCOPY  2019    Dr Corie Duncan-Patent G-J Anastomosis, apparent healing ulceration    UPPER GASTROINTESTINAL ENDOSCOPY N/A 2020    Dr Sanjiv Briggs amount of food retention in the stomach with bile, hiatal hernia, will need repeat    UPPER GASTROINTESTINAL ENDOSCOPY N/A 2020    Dr Von Monet erosion/ulceration at the suture line, post whipple surgical changes    UPPER GASTROINTESTINAL ENDOSCOPY N/A 3/9/2020    Dr Almonte Raw erosion along the previous whipple suture line    UPPER GASTROINTESTINAL ENDOSCOPY N/A 2020    Dr KVNG Duncan-w/hemostatic clip placement x 1-Allie Peres tear at GEJ-Likely culprit lesion for current presentation    UPPER GASTROINTESTINAL ENDOSCOPY N/A 2020    Dr Almonte Raw erosion along the previous whipple suture line         CURRENT MEDICATIONS       Previous Medications    CYANOCOBALAMIN (VITAMIN B 12 PO)    Take by mouth    LIDOCAINE-PRILOCAINE (EMLA) 2.5-2.5 % CREAM    Apply to port area and cover with plastic wrap one hour prior to use. MULTIPLE VITAMINS-MINERALS (THERAPEUTIC MULTIVITAMIN-MINERALS) TABLET    Take 1 tablet by mouth daily    NONFORMULARY    daily Tumeric otc    ONDANSETRON (ZOFRAN) 8 MG TABLET    Take 1 tablet by mouth every 8 hours as needed for Nausea or Vomiting    PANTOPRAZOLE (PROTONIX) 40 MG TABLET    Take 1 tablet by mouth 2 times daily    PROMETHAZINE (PHENERGAN) 25 MG TABLET    Take 1 tablet by mouth every 6 hours as needed for Nausea    SUCRALFATE (CARAFATE) 1 GM TABLET    Take 1 tablet by mouth 4 times daily    VITAMIN E 400 UNIT CAPSULE    Take 400 Units by mouth daily       ALLERGIES     Codeine; Morphine; Morphine and related; Sulfa antibiotics; Neomycin-bacitracin zn-polymyx; Clarithromycin; Dilaudid [hydromorphone hcl];  Hydromorphone; Loperamide hcl; Loperamide hcl; and Neosporin [neomycin-polymyx-gramicid]    FAMILY HISTORY       Family History   Problem Relation Age of Onset    Heart Attack Mother 46        x 2-had bypass    Hypertension Mother     COPD Father     Liver Cancer Paternal Aunt     Lung Cancer Paternal Aunt     Breast Cancer Maternal Aunt         but  of brain cancer    Diabetes Maternal Uncle     Diabetes Maternal Grandmother     Colon Cancer Neg Hx     Colon Polyps Neg Hx     Esophageal Cancer Neg Hx     Rectal Cancer Neg Hx     Stomach Cancer Neg Hx SOCIAL HISTORY       Social History     Socioeconomic History    Marital status: Single     Spouse name: None    Number of children: 3    Years of education: None    Highest education level: None   Occupational History    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 Occupation: [employer]    Occupation: [employer]   Social Needs    Financial resource strain: None    Food insecurity     Worry: None     Inability: None    Transportation needs     Medical: None     Non-medical: None   Tobacco Use    Smoking status: Former Smoker     Packs/day: 0.50     Years: 10.00     Pack years: 5.00     Types: Cigarettes     Last attempt to quit: 2019     Years since quittin.6    Smokeless tobacco: Never Used   Substance and Sexual Activity    Alcohol use: Not Currently    Drug use: Never    Sexual activity: None     Comment: 3   Lifestyle    Physical activity     Days per week: None     Minutes per session: None    Stress: None   Relationships    Social connections     Talks on phone: None     Gets together: None     Attends [employer] service: None     Active member of club or organization: None     Attends meetings of clubs or organizations: None     Relationship status: None    Intimate partner violence     Fear of current or ex partner: None     Emotionally abused: None     Physically abused: None     Forced sexual activity: None   Other Topics Concern    None   Social History Narrative    CODE STATUS: Full Code    HEALTH CARE PROXY: her daughter, Mrs. Karishma Garcia, of [facility]: independently    DOMICILED: lives in a private home, without steps inside, lives alone, has 2 dogs, no steps in to home       [facility]    (up to 7 for level 4, 8 or more for level 5)     ED Triage Vitals [20 1509]   BP Temp Temp src Pulse Resp SpO2 Height Weight   115/77 98.2 °F (36.8 °C) -- 76 16 97 % 5' 7\" (1.702 m) 128 lb (58.1 kg)       Physical Exam  Vitals signs reviewed. HENT:      Head: Normocephalic. Right Ear: External ear normal.      Left Ear: External ear normal.   Eyes:      Conjunctiva/sclera: Conjunctivae normal.      Pupils: Pupils are equal, round, and reactive to light. Neck:      Musculoskeletal: Normal range of motion. Cardiovascular:      Rate and Rhythm: Normal rate and regular rhythm. Heart sounds: Normal heart sounds. Pulmonary:      Effort: Pulmonary effort is normal.      Breath sounds: Normal breath sounds. Abdominal:      General: Bowel sounds are normal.      Palpations: Abdomen is soft. Tenderness: There is no abdominal tenderness. Musculoskeletal: Normal range of motion. Skin:     General: Skin is warm and dry. Comments: Trace edema bilateral lower extremities   Neurological:      Mental Status: She is alert and oriented to person, place, and time.          DIAGNOSTIC RESULTS     EKG: All EKG's are interpreted by the Emergency Department Physician who either signs or Co-signs this chart in the absence of acardiologist.        RADIOLOGY:   Non-plain film images such as CT, Ultrasound andMRI are read by the radiologist. Plain radiographic images are visualized and preliminarily interpreted by the emergency physician with the below findings:        Interpretation per the Radiologist below, if available at the time of this note:    No orders to display         ED BEDSIDE ULTRASOUND:   Performed by ED Physician - none    LABS:  Labs Reviewed   CBC WITH AUTO DIFFERENTIAL - Abnormal; Notable for the following components:       Result Value    WBC 47.1 (*)     RBC 2.37 (*)     Hemoglobin 7.5 (*)     Hematocrit 23.2 (*)     MCH 31.6 (*)     MCHC 32.3 (*)     RDW 19.3 (*)     Platelets 629 (*)     MPV 9.0 (*)     Neutrophils % 89.0 (*)     Lymphocytes % 3.0 (*)     Neutrophils Absolute 44.3 (*)     All other components within normal limits   COMPREHENSIVE METABOLIC PANEL W/ REFLEX TO MG FOR LOW K - Abnormal; Notable for the following components:    Glucose 127 (*)     Calcium 8.1 (*)     Total Protein 5.0 (*)     Alb 2.9 (*)     Alkaline Phosphatase 250 (*)     All other components within normal limits   PROTIME-INR - Abnormal; Notable for the following components:    Protime 15.3 (*)     INR 1.21 (*)     All other components within normal limits   TYPE AND SCREEN   ANTIBODY IDENTIFICATION   PREPARE RBC (CROSSMATCH)       All other labs were within normal range or not returned as of this dictation. RE-ASSESSMENT     Recommendation given for hospital admission as hemoglobin down 3 points from 6 days ago in setting of gi bleeding. Pt tells me that she wants to go home and follow up with Dr. Almaz White in am. Pt tells me that she received Neulasta 3 days ago. Discussed with Dr. Almaz White who will follow up with patient tomorrow. Return precautions discussed with patient and family. EMERGENCY DEPARTMENT COURSE and DIFFERENTIALDIAGNOSIS/MDM:   Vitals:    Vitals:    07/12/20 1632 07/12/20 1703 07/12/20 1733 07/12/20 1803   BP: 106/70 123/69 113/78 120/70   Pulse: 59 62 61 63   Resp: 17 15 14 17   Temp:       SpO2:       Weight:       Height:           MDM      CONSULTS:  None    PROCEDURES:  Unless otherwise notedbelow, none     Procedures    FINAL IMPRESSION     1. Gastrointestinal hemorrhage, unspecified gastrointestinal hemorrhage type    2.  Anemia, unspecified type          DISPOSITION/PLAN   DISPOSITION Decision To Discharge 07/12/2020 07:00:01 PM      PATIENT REFERRED TO:  Yahaira Lopez MD  [address] [place] [address] [address] [facility]  [phone]    Schedule an appointment as soon as possible for a visit   call in am      DISCHARGE MEDICATIONS:       Current Discharge Medication List          (Pleasenote that portions of this note were completed with a voice recognition program.  Efforts were made to edit the dictations but occasionally words are mis-transcribed.) Franky Valentine, JORDYN  07/12/20 190

## 2020-07-12 NOTE — ED PROVIDER NOTES
Attending Supervisory Note/Shared Visit    I have reviewed the mid-levels findings and agree. We have discussed the case and reviewed the diagnostic data. I am in agreement with the plan and disposition.   Yolis Silva MD  Attending Emergency Physician        Shreya Harding MD  07/12/20 2109

## 2020-07-13 ENCOUNTER — HOSPITAL ENCOUNTER (OUTPATIENT)
Dept: INFUSION THERAPY | Age: 60
Discharge: HOME | End: 2020-07-13
Payer: COMMERCIAL

## 2020-07-13 DIAGNOSIS — C25.9 MALIGNANT NEOPLASM OF PANCREAS, UNSPECIFIED LOCATION OF MALIGNANCY (HCC): ICD-10-CM

## 2020-07-13 LAB
BASOPHILS ABSOLUTE: 0.03 K/UL (ref 0.01–0.08)
BASOPHILS RELATIVE PERCENT: 0.1 % (ref 0.1–1.2)
EOSINOPHILS ABSOLUTE: 0.21 K/UL (ref 0.04–0.54)
EOSINOPHILS RELATIVE PERCENT: 0.8 % (ref 0.7–7)
HCT VFR BLD CALC: 25.5 % (ref 34.1–44.9)
HEMOGLOBIN: 8 G/DL (ref 11.2–15.7)
LYMPHOCYTES ABSOLUTE: 0.95 K/UL (ref 1.18–3.74)
LYMPHOCYTES RELATIVE PERCENT: 3.5 % (ref 19.3–53.1)
MCH RBC QN AUTO: 32.1 PG (ref 25.6–32.2)
MCHC RBC AUTO-ENTMCNC: 31.4 G/DL (ref 32.3–35.5)
MCV RBC AUTO: 102.4 FL (ref 79.4–94.8)
MONOCYTES ABSOLUTE: 1.52 K/UL (ref 0.24–0.82)
MONOCYTES RELATIVE PERCENT: 5.6 % (ref 4.7–12.5)
NEUTROPHILS ABSOLUTE: 24.24 K/UL (ref 1.56–6.13)
NEUTROPHILS RELATIVE PERCENT: 90 % (ref 34–71.1)
PDW BLD-RTO: 19 % (ref 11.7–14.4)
PLATELET # BLD: 112 K/UL (ref 182–369)
PMV BLD AUTO: 9.5 FL (ref 7.4–10.4)
RBC # BLD: 2.49 M/UL (ref 3.93–5.22)
WBC # BLD: 26.95 K/UL (ref 3.98–10.04)

## 2020-07-13 PROCEDURE — 85025 COMPLETE CBC W/AUTO DIFF WBC: CPT

## 2020-07-14 ENCOUNTER — HOSPITAL ENCOUNTER (EMERGENCY)
Age: 60
Discharge: ANOTHER HOSPITAL | End: 2020-07-14
Attending: EMERGENCY MEDICINE
Payer: COMMERCIAL

## 2020-07-14 VITALS
SYSTOLIC BLOOD PRESSURE: 99 MMHG | HEART RATE: 68 BPM | HEIGHT: 67 IN | RESPIRATION RATE: 17 BRPM | WEIGHT: 128 LBS | DIASTOLIC BLOOD PRESSURE: 62 MMHG | TEMPERATURE: 98.3 F | OXYGEN SATURATION: 97 % | BODY MASS INDEX: 20.09 KG/M2

## 2020-07-14 LAB
ABO/RH: NORMAL
ALBUMIN SERPL-MCNC: 2.6 G/DL (ref 3.5–5.2)
ALP BLD-CCNC: 177 U/L (ref 35–104)
ALT SERPL-CCNC: 21 U/L (ref 5–33)
ANION GAP SERPL CALCULATED.3IONS-SCNC: 8 MMOL/L (ref 7–19)
ANISOCYTOSIS: ABNORMAL
ANTIBODY IDENTIFICATION: NORMAL
ANTIBODY SCREEN: NORMAL
APTT: 25.2 SEC (ref 26–36.2)
AST SERPL-CCNC: 19 U/L (ref 5–32)
ATYPICAL LYMPHOCYTE RELATIVE PERCENT: 1 % (ref 0–8)
BANDED NEUTROPHILS RELATIVE PERCENT: 5 % (ref 0–5)
BASOPHILS ABSOLUTE: 0 K/UL (ref 0–0.2)
BASOPHILS RELATIVE PERCENT: 0 % (ref 0–1)
BILIRUB SERPL-MCNC: <0.2 MG/DL (ref 0.2–1.2)
BLOOD BANK DISPENSE STATUS: NORMAL
BLOOD BANK DISPENSE STATUS: NORMAL
BLOOD BANK PRODUCT CODE: NORMAL
BLOOD BANK PRODUCT CODE: NORMAL
BPU ID: NORMAL
BPU ID: NORMAL
BUN BLDV-MCNC: 11 MG/DL (ref 6–20)
CALCIUM SERPL-MCNC: 7.7 MG/DL (ref 8.6–10)
CHLORIDE BLD-SCNC: 104 MMOL/L (ref 98–111)
CO2: 25 MMOL/L (ref 22–29)
CREAT SERPL-MCNC: 0.6 MG/DL (ref 0.5–0.9)
DESCRIPTION BLOOD BANK: NORMAL
DESCRIPTION BLOOD BANK: NORMAL
EOSINOPHILS ABSOLUTE: 0 K/UL (ref 0–0.6)
EOSINOPHILS RELATIVE PERCENT: 0 % (ref 0–5)
GFR AFRICAN AMERICAN: >59
GFR NON-AFRICAN AMERICAN: >60
GLUCOSE BLD-MCNC: 156 MG/DL (ref 74–109)
HCT VFR BLD CALC: 13.6 % (ref 37–47)
HEMOGLOBIN: 4.4 G/DL (ref 12–16)
IMMATURE GRANULOCYTES #: 0.8 K/UL
INR BLD: 1.14 (ref 0.88–1.18)
LYMPHOCYTES ABSOLUTE: 2 K/UL (ref 1.1–4.5)
LYMPHOCYTES RELATIVE PERCENT: 8 % (ref 20–40)
MACROCYTES: ABNORMAL
MCH RBC QN AUTO: 31.9 PG (ref 27–31)
MCHC RBC AUTO-ENTMCNC: 32.4 G/DL (ref 33–37)
MCV RBC AUTO: 98.6 FL (ref 81–99)
MONOCYTES ABSOLUTE: 0.2 K/UL (ref 0–0.9)
MONOCYTES RELATIVE PERCENT: 1 % (ref 0–10)
NEUTROPHILS ABSOLUTE: 19.8 K/UL (ref 1.5–7.5)
NEUTROPHILS RELATIVE PERCENT: 84 % (ref 50–65)
OVALOCYTES: ABNORMAL
PDW BLD-RTO: 20.4 % (ref 11.5–14.5)
PLASMA CELLS PERCENT: 1 %
PLATELET # BLD: 116 K/UL (ref 130–400)
PLATELET SLIDE REVIEW: ABNORMAL
PMV BLD AUTO: 10.7 FL (ref 9.4–12.3)
POLYCHROMASIA: ABNORMAL
POTASSIUM REFLEX MAGNESIUM: 4.1 MMOL/L (ref 3.5–5)
PROTHROMBIN TIME: 14.6 SEC (ref 12–14.6)
RBC # BLD: 1.38 M/UL (ref 4.2–5.4)
SODIUM BLD-SCNC: 137 MMOL/L (ref 136–145)
TOTAL PROTEIN: 4.3 G/DL (ref 6.6–8.7)
WBC # BLD: 22.3 K/UL (ref 4.8–10.8)

## 2020-07-14 PROCEDURE — P9016 RBC LEUKOCYTES REDUCED: HCPCS

## 2020-07-14 PROCEDURE — 86900 BLOOD TYPING SEROLOGIC ABO: CPT

## 2020-07-14 PROCEDURE — 85730 THROMBOPLASTIN TIME PARTIAL: CPT

## 2020-07-14 PROCEDURE — 96365 THER/PROPH/DIAG IV INF INIT: CPT

## 2020-07-14 PROCEDURE — C9113 INJ PANTOPRAZOLE SODIUM, VIA: HCPCS | Performed by: EMERGENCY MEDICINE

## 2020-07-14 PROCEDURE — 6360000002 HC RX W HCPCS: Performed by: EMERGENCY MEDICINE

## 2020-07-14 PROCEDURE — 80053 COMPREHEN METABOLIC PANEL: CPT

## 2020-07-14 PROCEDURE — 99284 EMERGENCY DEPT VISIT MOD MDM: CPT

## 2020-07-14 PROCEDURE — 96366 THER/PROPH/DIAG IV INF ADDON: CPT

## 2020-07-14 PROCEDURE — 2580000003 HC RX 258: Performed by: EMERGENCY MEDICINE

## 2020-07-14 PROCEDURE — 85610 PROTHROMBIN TIME: CPT

## 2020-07-14 PROCEDURE — 36415 COLL VENOUS BLD VENIPUNCTURE: CPT

## 2020-07-14 PROCEDURE — 96361 HYDRATE IV INFUSION ADD-ON: CPT

## 2020-07-14 PROCEDURE — 86922 COMPATIBILITY TEST ANTIGLOB: CPT

## 2020-07-14 PROCEDURE — 86901 BLOOD TYPING SEROLOGIC RH(D): CPT

## 2020-07-14 PROCEDURE — 86850 RBC ANTIBODY SCREEN: CPT

## 2020-07-14 PROCEDURE — 85025 COMPLETE CBC W/AUTO DIFF WBC: CPT

## 2020-07-14 PROCEDURE — 86870 RBC ANTIBODY IDENTIFICATION: CPT

## 2020-07-14 PROCEDURE — 36430 TRANSFUSION BLD/BLD COMPNT: CPT

## 2020-07-14 RX ORDER — SODIUM CHLORIDE 9 MG/ML
10 INJECTION INTRAVENOUS DAILY
Status: DISCONTINUED | OUTPATIENT
Start: 2020-07-17 | End: 2020-07-15 | Stop reason: HOSPADM

## 2020-07-14 RX ORDER — PANTOPRAZOLE SODIUM 40 MG/10ML
40 INJECTION, POWDER, LYOPHILIZED, FOR SOLUTION INTRAVENOUS DAILY
Status: DISCONTINUED | OUTPATIENT
Start: 2020-07-17 | End: 2020-07-15 | Stop reason: HOSPADM

## 2020-07-14 RX ORDER — 0.9 % SODIUM CHLORIDE 0.9 %
1000 INTRAVENOUS SOLUTION INTRAVENOUS ONCE
Status: COMPLETED | OUTPATIENT
Start: 2020-07-14 | End: 2020-07-14

## 2020-07-14 RX ORDER — 0.9 % SODIUM CHLORIDE 0.9 %
250 INTRAVENOUS SOLUTION INTRAVENOUS ONCE
Status: COMPLETED | OUTPATIENT
Start: 2020-07-14 | End: 2020-07-14

## 2020-07-14 RX ADMIN — SODIUM CHLORIDE 1000 ML: 9 INJECTION, SOLUTION INTRAVENOUS at 20:46

## 2020-07-14 RX ADMIN — SODIUM CHLORIDE 80 MG: 9 INJECTION, SOLUTION INTRAVENOUS at 21:31

## 2020-07-14 RX ADMIN — SODIUM CHLORIDE 250 ML: 9 INJECTION, SOLUTION INTRAVENOUS at 20:47

## 2020-07-14 RX ADMIN — SODIUM CHLORIDE 8 MG/HR: 9 INJECTION, SOLUTION INTRAVENOUS at 22:03

## 2020-07-14 ASSESSMENT — ENCOUNTER SYMPTOMS
ABDOMINAL PAIN: 0
HEMATEMESIS: 0
NAUSEA: 0
BLOOD IN STOOL: 1
HEMATOCHEZIA: 1
VOMITING: 0
DIARRHEA: 0
RESPIRATORY NEGATIVE: 1

## 2020-07-15 NOTE — ED NOTES
Report given to [employer]; updated [place] on mode of transported and ETA     Sushant Lazo, ALISTAIR  07/14/20 4866

## 2020-07-15 NOTE — ED NOTES
[place] [address] Hotter; son     Francie More RN  07/14/20 2114       Francie More RN  07/14/20 2134

## 2020-07-15 NOTE — ED NOTES
PHONED [employer]-EV[employer] , \"WILL FLY 18 MINUTES OUT\"     Melquiades Atkinson, [place]  07/14/20 9655

## 2020-07-15 NOTE — ED NOTES
Called [facility] for Dr. Celestine Manley. Spoke with JEANNINE. Told me they are at their highest level of capacity. Susi Matthew they would connect me with [facility] . Spoke with Duane Ramming at [facility] about transferring the Pt. Waiting call back from [facility] .      Girish Stein  07/14/20 2046

## 2020-07-15 NOTE — ED NOTES
Frances Mjoica with [facility] called back with Hospitalists on the phone from [facility] for Dr. Rupesh Oliver.       Demetrio Sequeira  07/14/20 3444

## 2020-07-15 NOTE — ED PROVIDER NOTES
[facility] EMERGENCY DEPT  EMERGENCY DEPARTMENT ENCOUNTER      Pt Name: Danyelle Robison  MRN: [id number]  [place] 1960  Date of evaluation: 7/14/2020  Provider: Alex Perez MD    [address] [place] [address]       Chief Complaint   Patient presents with    Rectal Bleeding     2 episodes today, was seen in ER couple days ago and low Hgb         HISTORY OF PRESENT ILLNESS   (Location/Symptom, Timing/Onset, Context/Setting, Quality, Duration, Modifying Factors, Severity)  Note limiting factors. Danyelle Robison is a 64 y.o. female who presents to the emergency department        Rectal Bleeding   Quality:  Black and tarry  Amount:  Copious  Timing:  Intermittent  Chronicity:  Recurrent  Context: defecation    Context: not diarrhea    Similar prior episodes: yes    Relieved by:  None tried  Worsened by:  Nothing  Associated symptoms: no abdominal pain, no dizziness, no fever, no hematemesis, no light-headedness and no vomiting    Risk factors: liver disease    Risk factors: no anticoagulant use        Nursing Notes were reviewed. REVIEW OF SYSTEMS    (2-9 systems for level 4, 10 or more for level 5)     Review of Systems   Constitutional: Negative for chills and fever. HENT: Negative. Respiratory: Negative. Cardiovascular: Negative. Gastrointestinal: Positive for blood in stool and hematochezia. Negative for abdominal pain, diarrhea, hematemesis, nausea and vomiting. Neurological: Negative for dizziness and light-headedness. All other systems reviewed and are negative. Except as noted above the remainder of the review of systems was reviewed and negative.        PAST MEDICAL HISTORY     Past Medical History:   Diagnosis Date    Acute pancreatitis     Adult BMI <19 kg/sq m     Anemia     Cat esophagus     Biliary obstruction     GERD (gastroesophageal reflux disease)     with Barretts    Hashimoto's thyroiditis     denies takes no med for    Heart murmur     Hypertension     pt denies nor longer takes med for    Low blood sugar     h/o when overweight in the past    MVP (mitral valve prolapse)     Myalgia     Palliative care patient 2020    Pancreatic adenocarcinoma ([facility] [address] 2018    Dr Nikolay Guadalupe    Pancreatic cancer ([facility] [address] 3/30/2018    Right flank pain     RUQ pain          SURGICAL HISTORY       Past Surgical History:   Procedure Laterality Date    CARDIAC CATHETERIZATION      heart cath     SECTION      x 3    CHOLECYSTECTOMY  1997    COLONOSCOPY  years ago    [facility]-Dr Maria Del Carmen Mckeon per patient    COLONOSCOPY  2014    Dr Nate Rodriguez- [facility] recall    COLONOSCOPY  03/10/2016    Dr Mcfarland-10 yr recall    COLONOSCOPY N/A 2019    Dr Isa Abreu, 5 yr recall    ELBOW SURGERY Right     ENDOMETRIAL ABLATION      HAND SURGERY Right     HERNIA REPAIR      hiatal    HERNIA REPAIR      umbilical    HERNIA REPAIR      PANCREAS SURGERY  2018    Whipple procedure-Dr Libra Castillo [place] EGD [facility] NEEDLE ASPIR/BIOP ALTERED ANATOMY N/A 2018    Dr Bert Bell w/fna-Dilation of main pancreatic duct with diffuse change in the pancreatitis noted, area of concern in the neck of the pancreas-strongly suspicious for adenocarcinoma     [place] EGD INTRMURAL NEEDLE ASPIR/BIOP ALTERED ANATOMY N/A 3/6/2018    Dr KVNG Duncan-Pancreatic cancer-Ductal adenocarcinoma-staged [id number] by EUS, pancreatic pseudocyst in the tail the pancreas    [place] ERCP DX COLLECTION SPECIMEN BRUSHING/WASHING N/A 2018    Dr KVNG Duncan-w/placement of a self-expanding fully covered 10 mm biliary stent    UPPER GASTROINTESTINAL ENDOSCOPY  years ago    [facility]-Dr Devi Padilla    UPPER GASTROINTESTINAL ENDOSCOPY      Zuniga    UPPER GASTROINTESTINAL ENDOSCOPY N/A 2019    Dr Kalia Spencer post Whipple's procedure with efferent loop ulceration    UPPER GASTROINTESTINAL ENDOSCOPY  2019    Dr Luke Duncan-Patent G-J Anastomosis, apparent healing ulceration    UPPER GASTROINTESTINAL ENDOSCOPY N/A 2/25/2020    Dr Corey Martin amount of food retention in the stomach with bile, hiatal hernia, will need repeat    UPPER GASTROINTESTINAL ENDOSCOPY N/A 2/27/2020    Dr Faith Collado erosion/ulceration at the suture line, post whipple surgical changes    UPPER GASTROINTESTINAL ENDOSCOPY N/A 3/9/2020    Dr Faith Collado erosion along the previous whipple suture line    UPPER GASTROINTESTINAL ENDOSCOPY N/A 4/16/2020    Dr KVNG Duncan-w/hemostatic clip placement x 1-Allie Peres tear at GEJ-Likely culprit lesion for current presentation    UPPER GASTROINTESTINAL ENDOSCOPY N/A 6/22/2020    Dr Faith Collado erosion along the previous whipple suture line         CURRENT MEDICATIONS       Previous Medications    CYANOCOBALAMIN (VITAMIN B 12 PO)    Take by mouth    LIDOCAINE-PRILOCAINE (EMLA) 2.5-2.5 % CREAM    Apply to port area and cover with plastic wrap one hour prior to use. MULTIPLE VITAMINS-MINERALS (THERAPEUTIC MULTIVITAMIN-MINERALS) TABLET    Take 1 tablet by mouth daily    NONFORMULARY    daily Tumeric otc    ONDANSETRON (ZOFRAN) 8 MG TABLET    Take 1 tablet by mouth every 8 hours as needed for Nausea or Vomiting    PANTOPRAZOLE (PROTONIX) 40 MG TABLET    Take 1 tablet by mouth 2 times daily    PROMETHAZINE (PHENERGAN) 25 MG TABLET    Take 1 tablet by mouth every 6 hours as needed for Nausea    SUCRALFATE (CARAFATE) 1 GM TABLET    Take 1 tablet by mouth 4 times daily    VITAMIN E 400 UNIT CAPSULE    Take 400 Units by mouth daily       ALLERGIES     Codeine; Morphine; Morphine and related; Sulfa antibiotics; Neomycin-bacitracin zn-polymyx; Clarithromycin; Dilaudid [hydromorphone hcl];  Hydromorphone; Loperamide hcl; Loperamide hcl; and Neosporin [neomycin-polymyx-gramicid]    FAMILY HISTORY       Family History   Problem Relation Age of Onset    Heart Attack Mother 46        x 2-had bypass    Hypertension Mother     COPD Father     Liver Cancer Paternal Aunt     Lung Cancer Paternal Aunt     Perla Mathias who feels the patient should be transferred for interventional radiology. Patient be transferred to [facility] for further care. Amount and/or Complexity of Data Reviewed  Clinical lab tests: ordered and reviewed    Patient Progress  Patient progress: improved        REASSESSMENT          CRITICAL CARE TIME   Total Critical Care time was 0 minutes, excluding separately reportable procedures. There was a high probability of clinically significant/life threatening deterioration in the patient's condition which required my urgent intervention. CONSULTS:  None    PROCEDURES:  Unless otherwise noted below, none     Procedures        FINAL IMPRESSION      1. Gastrointestinal hemorrhage, unspecified gastrointestinal hemorrhage type          DISPOSITION/PLAN   DISPOSITION Decision To Transfer 07/14/2020 09:13:54 PM      PATIENT REFERRED TO:  No follow-up provider specified. DISCHARGE MEDICATIONS:  New Prescriptions    No medications on file     Controlled Substances Monitoring:     No flowsheet data found.     (Please note that portions of this note were completed with a voice recognition program.  Efforts were made to edit the dictations but occasionally words are mis-transcribed.)    Verna Barrientos MD (electronically signed)  Attending Emergency Physician          Verna Barrientos MD  07/14/20 1337

## 2020-07-20 ENCOUNTER — HOSPITAL ENCOUNTER (OUTPATIENT)
Dept: INFUSION THERAPY | Age: 60
Discharge: HOME | End: 2020-07-20
Payer: COMMERCIAL

## 2020-07-20 DIAGNOSIS — C25.9 MALIGNANT NEOPLASM OF PANCREAS, UNSPECIFIED LOCATION OF MALIGNANCY (HCC): ICD-10-CM

## 2020-07-20 LAB
BASOPHILS ABSOLUTE: 0.05 K/UL (ref 0.01–0.08)
BASOPHILS RELATIVE PERCENT: 0.3 % (ref 0.1–1.2)
EOSINOPHILS ABSOLUTE: 0.14 K/UL (ref 0.04–0.54)
EOSINOPHILS RELATIVE PERCENT: 0.7 % (ref 0.7–7)
HCT VFR BLD CALC: 28.2 % (ref 34.1–44.9)
HEMOGLOBIN: 9 G/DL (ref 11.2–15.7)
LYMPHOCYTES ABSOLUTE: 0.88 K/UL (ref 1.18–3.74)
LYMPHOCYTES RELATIVE PERCENT: 4.5 % (ref 19.3–53.1)
MCH RBC QN AUTO: 31.8 PG (ref 25.6–32.2)
MCHC RBC AUTO-ENTMCNC: 31.9 G/DL (ref 32.3–35.5)
MCV RBC AUTO: 99.6 FL (ref 79.4–94.8)
MONOCYTES ABSOLUTE: 1.88 K/UL (ref 0.24–0.82)
MONOCYTES RELATIVE PERCENT: 9.6 % (ref 4.7–12.5)
NEUTROPHILS ABSOLUTE: 16.72 K/UL (ref 1.56–6.13)
NEUTROPHILS RELATIVE PERCENT: 84.9 % (ref 34–71.1)
PDW BLD-RTO: 18.5 % (ref 11.7–14.4)
PLATELET # BLD: 133 K/UL (ref 182–369)
PMV BLD AUTO: 10 FL (ref 7.4–10.4)
RBC # BLD: 2.83 M/UL (ref 3.93–5.22)
WBC # BLD: 19.67 K/UL (ref 3.98–10.04)

## 2020-07-20 PROCEDURE — 85025 COMPLETE CBC W/AUTO DIFF WBC: CPT

## 2020-07-27 ENCOUNTER — HOSPITAL ENCOUNTER (OUTPATIENT)
Dept: INFUSION THERAPY | Age: 60
Discharge: HOME | End: 2020-07-27
Payer: COMMERCIAL

## 2020-07-27 VITALS
HEIGHT: 67 IN | OXYGEN SATURATION: 97 % | TEMPERATURE: 98.5 F | SYSTOLIC BLOOD PRESSURE: 116 MMHG | WEIGHT: 132 LBS | BODY MASS INDEX: 20.72 KG/M2 | HEART RATE: 69 BPM | DIASTOLIC BLOOD PRESSURE: 80 MMHG

## 2020-07-27 DIAGNOSIS — C25.9 MALIGNANT NEOPLASM OF PANCREAS, UNSPECIFIED LOCATION OF MALIGNANCY (HCC): Primary | ICD-10-CM

## 2020-07-27 LAB
ALBUMIN SERPL-MCNC: 2.5 G/DL (ref 3.5–5.2)
ALP BLD-CCNC: 155 U/L (ref 35–104)
ALT SERPL-CCNC: 34 U/L (ref 9–52)
ANION GAP SERPL CALCULATED.3IONS-SCNC: 3 MMOL/L (ref 7–19)
AST SERPL-CCNC: 45 U/L (ref 14–36)
BASOPHILS ABSOLUTE: 0.05 K/UL (ref 0.01–0.08)
BASOPHILS RELATIVE PERCENT: 0.6 % (ref 0.1–1.2)
BILIRUB SERPL-MCNC: 0.3 MG/DL (ref 0.2–1.3)
BUN BLDV-MCNC: 5 MG/DL (ref 7–17)
CALCIUM SERPL-MCNC: 7.8 MG/DL (ref 8.4–10.2)
CHLORIDE BLD-SCNC: 109 MMOL/L (ref 98–111)
CO2: 25 MMOL/L (ref 22–29)
CREAT SERPL-MCNC: 0.5 MG/DL (ref 0.5–1)
EOSINOPHILS ABSOLUTE: 0.09 K/UL (ref 0.04–0.54)
EOSINOPHILS RELATIVE PERCENT: 1.1 % (ref 0.7–7)
GFR NON-AFRICAN AMERICAN: >60
GLOBULIN: 2.3 G/DL
GLUCOSE BLD-MCNC: 139 MG/DL (ref 74–106)
HCT VFR BLD CALC: 25.4 % (ref 34.1–44.9)
HEMOGLOBIN: 8.4 G/DL (ref 11.2–15.7)
LYMPHOCYTES ABSOLUTE: 0.63 K/UL (ref 1.18–3.74)
LYMPHOCYTES RELATIVE PERCENT: 7.6 % (ref 19.3–53.1)
MCH RBC QN AUTO: 31.2 PG (ref 25.6–32.2)
MCHC RBC AUTO-ENTMCNC: 33.1 G/DL (ref 32.3–35.5)
MCV RBC AUTO: 94.4 FL (ref 79.4–94.8)
MONOCYTES ABSOLUTE: 0.87 K/UL (ref 0.24–0.82)
MONOCYTES RELATIVE PERCENT: 10.6 % (ref 4.7–12.5)
NEUTROPHILS ABSOLUTE: 6.6 K/UL (ref 1.56–6.13)
NEUTROPHILS RELATIVE PERCENT: 80.1 % (ref 34–71.1)
PDW BLD-RTO: 17.3 % (ref 11.7–14.4)
PLATELET # BLD: 175 K/UL (ref 182–369)
PMV BLD AUTO: 8.9 FL (ref 7.4–10.4)
POTASSIUM SERPL-SCNC: 3.9 MMOL/L (ref 3.5–5.1)
RBC # BLD: 2.69 M/UL (ref 3.93–5.22)
SODIUM BLD-SCNC: 137 MMOL/L (ref 137–145)
TOTAL PROTEIN: 4.7 G/DL (ref 6.3–8.2)
WBC # BLD: 8.24 K/UL (ref 3.98–10.04)

## 2020-07-27 PROCEDURE — 80053 COMPREHEN METABOLIC PANEL: CPT

## 2020-07-27 PROCEDURE — 36415 COLL VENOUS BLD VENIPUNCTURE: CPT

## 2020-07-27 PROCEDURE — 85025 COMPLETE CBC W/AUTO DIFF WBC: CPT

## 2020-07-27 RX ORDER — SODIUM CHLORIDE 0.9 % (FLUSH) 0.9 %
10 SYRINGE (ML) INJECTION PRN
Status: CANCELLED | OUTPATIENT
Start: 2020-07-27

## 2020-07-27 RX ORDER — HEPARIN SODIUM (PORCINE) LOCK FLUSH IV SOLN 100 UNIT/ML 100 UNIT/ML
500 SOLUTION INTRAVENOUS PRN
Status: CANCELLED | OUTPATIENT
Start: 2020-07-27

## 2020-07-27 RX ORDER — SODIUM CHLORIDE 0.9 % (FLUSH) 0.9 %
20 SYRINGE (ML) INJECTION PRN
Status: CANCELLED | OUTPATIENT
Start: 2020-07-27

## 2020-07-27 RX ORDER — HEPARIN SODIUM (PORCINE) LOCK FLUSH IV SOLN 100 UNIT/ML 100 UNIT/ML
500 SOLUTION INTRAVENOUS PRN
Status: DISCONTINUED | OUTPATIENT
Start: 2020-07-27 | End: 2020-07-28 | Stop reason: HOSPADM

## 2020-07-27 ASSESSMENT — PAIN SCALES - GENERAL: PAINLEVEL_OUTOF10: 0

## 2020-07-27 NOTE — PROGRESS NOTES
I spoke with Dr. Fabrizio Aguirre this morning re: Hallie's treatment. Her CBC and CMP are stable. She feels like she needs a paracentesis and her legs have started weeping. She was inpt. At [facility] the week before last for a GI bleed. I have faxed requesting records and have not received them. Per Dr. Tai Gusman, she will have a paracentesis this week and return next week to see him and possible treatment. He has also requested that a palliative care consult be placed.

## 2020-07-27 NOTE — PROGRESS NOTES
Danyelle Ricki   1960  8/3/2020     Chief Complaint   Patient presents with    Pancreatic Cancer      INTERVAL HISTORY/HISTORY OF PRESENT ILLNESS:  The patient is a very pleasant 64years old female who has a diagnosis of pancreatic adenocarcinoma. She initially had localized disease and underwent neoadjuvant chemotherapy followed by surgery at [facility]. Unfortunately, the patient was found to have recurrence of her disease in the abdomen. She has received 2 prior lines of therapy and unfortunately had disease progression. She is currently receiving fourth line treatment with  Irinotecan liposome 70 mg/m² with leucovorin 400 mg/m² and 5-FU 2400 mg/m² over 46 hours every 2 weeks. She has tolerating treatments with complains fatigue. In addition, the patient has recurrent GI bleed due to a anastomotic ulcer on the site of prior surgery. She has had several EGD/colonoscopy and capsule endoscopy. She has been receiving her palliative chemotherapy with complaints of mild diarrhea, nausea vomiting grade 1, fatigue. She has lost more weight since last visit. She feels otherwise great and has been eating. She had another GI bleed and was admitted to [address] [place] [address] in [facility]. She received 2 units of PRBCs. Hemoglobin today 8.6.     ONCOLOGIC HISTORY:   Diagnosis  · Pancreatic adenocarcinoma, January 2018   · [id number]  · 7/6/2019-extended genetic panel-negative for a deleterious mutation     Treatment summary  · March 2018-Surgical consultation at [address] [place] [address] operable   · 4/3/2018 through 6/4/2018 Neoadjuvant chemotherapy FOLFORINOX ×5 Biweekly cycles   · 4/11/2018-Biliary stent   · August 2018- XRT 30 Gy/Xeloda   · 08/30/3018- Whipple procedure @ [facility]   · Recommended another 5 biweekly cycles of modified FOLFORINOX completed 12/26/2018   · 7/9/2019- 1/22/2020 Gemzar/Abraxane 125 mg/m2 q 14 days, discontinued due to progression of disease  · 3/4/2020- Irinotecan liposome 70 mg/m² with leucovorin 400 mg/m² and 5-FU 2400 mg/m² over 46 hours every 2 weeks.     Tumor marker  · 3/12/7054-MV-18-9->430->370. · 4/30/2018 - CA 19- 9 of 157   · 5/25/20187907-PT-71-9->32 after 4 cycles. · 08/02/2018- -> 8   · 10/01/2018- CA 19-9 -5   · 10/29/2018-CA 19-9- 7   · 12/3/8935-TG-89-9-7   · 04/11/2019-CA-19-9- 12   · 05/21/2019- CA 19-9- 41   · 7/2/2019-CA 19 9 = 114  · 7/9/2019- CA-19-9 = 225  · 8/6/2019- CA-19-9 = 171  · 9/3/8048-LL-62-9 = 198  · 9/13/20197083-XC-97-9 = 98 (at [address])  · 10/29/0431-GY-72-9 =317  · 11/21/2019-CA-19-9 = 183  · 1/23/20204492-PX-64-9 = 520  · 4/22/2020- CA-19-9 = 940  · 5/13/20200676-VD-90-9 = 129  · 6/1/20205109-FK-31-9 = 523??  · 7/6/2020- CA 19-9- 483        Cancer history  Ms Nabeel Suarez was seen in initial oncology consultation on 3/12/2018 referred by Dr. Connie Frederick for a diagnosis of pancreatic adenocarcinoma. She was initially evaluated for acute pancreatitis at [facility] on February 2018. Further imaging revealed a pancreatic head mass. Lipase was elevated at 1184 and CA-19-9 elevated 134. The symptoms were going on for several weeks. Weight loss of 10-12 pounds over the last 6 months. · October 2017-CT abdomen without contrast was unremarkable. · 2/2/2018-ultrasound of abdomen showed a pancreatic duct dilation   · 2/02/2018-CT abdomen showed 16 mm low-density lesion in the pancreas at the junction of the head of the body. No intra-abdominal adenopathy. No liver metastasis. · 2/7/2018-the patient underwent EGD/EUS and FNA biopsy of a pancreatic mass by Dr. Connie Frederick at [facility] . EUS showed inflammatory changes consistent with a recent history of pancreatitis. Main pancreatic duct was dilated. No concerning peripancreatic adenopathy. No definite mass was seen by a more diffuse hypoechoic area measuring 1.8 x 2.2 cm in the head of the pancreas.  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 This is consistent with disease progression. · 3/4/2020- 4th line therapy Irinotecan liposome 70 mg/m² with leucovorin 400 mg/m² and 5-FU 2400 mg/m² over 46 hours every 2 weeks. · 5/12/202 Ct Chest W Contrast  No acute findings in the chest.  Slight increase in size of LEFT supraclavicular lymph nodes and subcarinal mediastinal lymph node. Recommend special attention on follow-up imaging to evaluate for stability or resolution. Ct Abdomen Pelvis W Iv Contrast   Postoperative change of Whipple with residual stranding and free fluid in the surgical bed. Residual soft tissue nodular prominence in the retroperitoneum adjacent to the portal vein which is compressed and occluded/thrombosed. The splenic vein is not visualized and presumed thrombosed. LEFT upper quadrant perigastric/periesophageal varices. Moderate volume ascites. 4.  No evidence of bowel obstruction. Mild distention of the stomach, which may be secondary to slow flow through the gastrojejunostomy. Correlate clinically. There is a small linear device in the distal esophagus which could represent a hemostasis clip but recommend clinical correlation. · 5/13/2020- CA19.9-129  · 6/1/2020- CA 19.9- 523  · 6/15/2020- CA 19.9- 383  · 6/21/2020- Ct Head Wo Contrast No acute intracranial process. Findings in agreement with the emergent findings from the initial [facility] preliminary report. · 6/21/2020- Ct Abdomen Pelvis W Contrast Prior Whipple procedure with expected pneumobilia. Large volume ascites for patient size. 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  Diffuse wall thickening along the small and large bowel secondary to this venous congestion. No evidence for arterial ischemia, as the bowel mucosa appears to enhance normally. No evidence of viscus perforation or peritoneal abscess. Of note, the the retroperitoneal structures are quite decompressed as a result of the ascites, with near complete attenuation of the IVC in the mid abdomen. Unsure if this is contributing to any lower extremity edema. If evidence of multiorgan dysfunction, abdominal compartment syndrome could also be considered. The results of this exam were discussed with Dr. Teddy Katz on 2020 at 1002 hours. In particular, I wanted to address if there was indication/need for therapeutic paracentesis. This is under consideration. · 2020- US Guided Paracentesis Ultrasound-guided abdominal paracentesis performed for therapeutic purposes. A 60 cc aspirate was set aside for lab evaluation, if desired. The patient tolerated the proceed well. Cytology was negative for malignant cells. · 2020 CA19.9- 483.         PAST MEDICAL HISTORY:   Past Medical History:   Diagnosis Date    Acute pancreatitis     Adult BMI <19 kg/sq m     Anemia     Cat esophagus     Biliary obstruction     GERD (gastroesophageal reflux disease)     with Barretts    Hashimoto's thyroiditis     denies takes no med for    Heart murmur     Hypertension     pt denies nor longer takes med for    Low blood sugar     h/o when overweight in the past    MVP (mitral valve prolapse)     Myalgia     Palliative care patient 2020    Pancreatic adenocarcinoma ([facility] [address] 2018    Dr Nikolay Guadalupe    Pancreatic cancer ([facility] [address] 3/30/2018    Right flank pain     RUQ pain           PAST SURGICAL HISTORY:  Past Surgical History:   Procedure Laterality Date    CARDIAC CATHETERIZATION      heart cath     SECTION      x 3    CHOLECYSTECTOMY  1997    COLONOSCOPY  years ago    [facility]-Dr Maria Del Carmen Mckeon per patient    COLONOSCOPY  2014    Dr Nate Rodriguez- Normal 10yr recall    COLONOSCOPY 03/10/2016    Dr Mcfarland-10 yr recall    COLONOSCOPY N/A 12/9/2019    Dr Gilbert Bill, 5 yr recall    ELBOW SURGERY Right     ENDOMETRIAL ABLATION      HAND SURGERY Right     HERNIA REPAIR      hiatal    HERNIA REPAIR      umbilical    HERNIA REPAIR      PANCREAS SURGERY  08/30/2018    Whipple procedure-Dr Rodney Benson [place] EGD [facility] NEEDLE ASPIR/BIOP ALTERED ANATOMY N/A 2/7/2018    Dr Ciarra Jarvis w/fna-Dilation of main pancreatic duct with diffuse change in the pancreatitis noted, area of concern in the neck of the pancreas-strongly suspicious for adenocarcinoma     OK EGD INTRMURAL NEEDLE ASPIR/BIOP ALTERED ANATOMY N/A 3/6/2018    Dr KVNG Duncan-Pancreatic cancer-Ductal adenocarcinoma-staged [id number] by EUS, pancreatic pseudocyst in the tail the pancreas    [place] ERCP DX COLLECTION SPECIMEN BRUSHING/WASHING N/A 4/11/2018    Dr KVNG Duncan-w/placement of a self-expanding fully covered 10 mm biliary stent    UPPER GASTROINTESTINAL ENDOSCOPY  years ago    [facility]-Dr Dominique Betancourt    UPPER GASTROINTESTINAL ENDOSCOPY  2013    Zuniga    UPPER GASTROINTESTINAL ENDOSCOPY N/A 11/1/2019    Dr Leigh Aguilar post Whipple's procedure with efferent loop ulceration    UPPER GASTROINTESTINAL ENDOSCOPY  12/17/2019    Dr Darleen Duncan-Patent G-J Anastomosis, apparent healing ulceration    UPPER GASTROINTESTINAL ENDOSCOPY N/A 2/25/2020    Dr Alejandro Whitley amount of food retention in the stomach with bile, hiatal hernia, will need repeat    UPPER GASTROINTESTINAL ENDOSCOPY N/A 2/27/2020    Dr Annalise Grigsby erosion/ulceration at the suture line, post whipple surgical changes    UPPER GASTROINTESTINAL ENDOSCOPY N/A 3/9/2020    Dr Annalise Grigsby erosion along the previous whipple suture line    UPPER GASTROINTESTINAL ENDOSCOPY N/A 4/16/2020    Dr KVNG Duncan-w/hemostatic clip placement x 1-Allie Peres tear at GEJ-Likely culprit lesion for current presentation    UPPER GASTROINTESTINAL ENDOSCOPY N/A 6/22/2020    Dr Lucy Torres along the previous whipple suture line        SOCIAL HISTORY:  Social History     Socioeconomic History    Marital status:      Spouse name: None    Number of children: 3    Years of education: None    Highest education level: None   Occupational History    Occupation: retired chemical operqator at [employer] Occupation: [employer]    Occupation: [employer]   Social Needs    Financial resource strain: None    Food insecurity     Worry: None     Inability: None    Transportation needs     Medical: None     Non-medical: None   Tobacco Use    Smoking status: Former Smoker     Packs/day: 0.50     Years: 10.00     Pack years: 5.00     Types: Cigarettes     Last attempt to quit: 2019     Years since quittin.7    Smokeless tobacco: Never Used   Substance and Sexual Activity    Alcohol use: Not Currently    Drug use: Never    Sexual activity: None     Comment: 3   Lifestyle    Physical activity     Days per week: None     Minutes per session: None    Stress: None   Relationships    Social connections     Talks on phone: None     Gets together: None     Attends Taoist service: None     Active member of club or organization: None     Attends meetings of clubs or organizations: None     Relationship status: None    Intimate partner violence     Fear of current or ex partner: None     Emotionally abused: None     Physically abused: None     Forced sexual activity: None   Other Topics Concern    None   Social History Narrative    CODE STATUS: Full Code    HEALTH CARE PROXY: her [facility] Ng[facility]    AMBULATES: independently    DOMICILED: lives in a private home, without steps inside, lives alone, has 2 dogs, no steps in to home       FAMILY HISTORY:  Family History   Problem Relation Age of Onset    Heart Attack Mother 46        x 2-had bypass    Hypertension Mother     COPD Father     Liver Cancer Paternal Aunt     Lung Cancer Paternal Aunt     Breast Cancer Maternal Aunt         but  of brain cancer    Diabetes Maternal Uncle     Diabetes Maternal Grandmother     Colon Cancer Neg Hx     Colon Polyps Neg Hx     Esophageal Cancer Neg Hx     Rectal Cancer Neg Hx     Stomach Cancer Neg Hx         Current Outpatient Medications   Medication Sig Dispense Refill    pantoprazole (PROTONIX) 40 MG tablet Take 1 tablet by mouth 2 times daily 60 tablet 3    Cyanocobalamin (VITAMIN B 12 PO) Take by mouth      promethazine (PHENERGAN) 25 MG tablet Take 1 tablet by mouth every 6 hours as needed for Nausea 30 tablet 2    sucralfate (CARAFATE) 1 GM tablet Take 1 tablet by mouth 4 times daily 360 tablet 1    lidocaine-prilocaine (EMLA) 2.5-2.5 % cream Apply to port area and cover with plastic wrap one hour prior to use. 1 Tube 1    vitamin E 400 UNIT capsule Take 400 Units by mouth daily      NONFORMULARY daily Tumeric otc      ondansetron (ZOFRAN) 8 MG tablet Take 1 tablet by mouth every 8 hours as needed for Nausea or Vomiting 30 tablet 3    Multiple Vitamins-Minerals (THERAPEUTIC MULTIVITAMIN-MINERALS) tablet Take 1 tablet by mouth daily       No current facility-administered medications for this visit. REVIEW OF SYSTEMS:    Constitutional: no fever, no night sweats, fatigue;   HEENT: no blurring of vision, no double vision, no hearing difficulty, no tinnitus,no ulceration, no dysphagia  Lungs: no cough, no shortness of breath, no wheeze;   CVS: no palpitation, no chest pain, no shortness of breath; bilateral leg edema  GI: abdominal distention no abdominal pain, no nausea , no vomiting, no constipation; melanotic stools  VIKRAM: no dysuria, frequency and urgency, no hematuria, no kidney stones;   Musculoskeletal: no joint pain, swelling , stiffness;   Endocrine: no polyuria, polydypsia, no cold or heat intolerence;    Hematology/lymphatic: no easy brusing or bleeding, no hx of clotting disorder; no peripheral adenopathy. Dermatology: no skin rash, no eczema, no pruritis;   Psychiatry: no depression, no anxiety,no panic attacks, no suicide ideation; Neurology: no syncope, no seizures, no numbness or tingling of hands, numbness or tingling of feet, no paresis;     PHYSICAL EXAM:    Vitals signs:  /82   Pulse 73   Temp 98 °F (36.7 °C)   Ht 5' 7\" (1.702 m)   Wt 135 lb (61.2 kg)   SpO2 98%   BMI 21.14 kg/m²    Pain scale:  Pain Score:   0 - No pain   CONSTITUTIONAL: Alert, appropriate, no acute distress,   EYES: Non icteric, EOM intact, pupils equal round and reactive to light and accommodation. ENT: Oral mucus membranes moist, no oral pharyngeal lesions. External inspection of ears and nose are normal.   NECK: Supple, no masses. No palpable thyroid mass    CHEST/LUNGS: CTA bilaterally, normal respiratory effort   CARDIOVASCULAR: RRR, no murmurs. No lower extremity edema   ABDOMEN: soft non-tender, active bowel sounds, no hepatosplenomegaly. No palpable masses. EXTREMITIES: warm, Full ROM of all fours extremities. No focal weakness. SKIN: warm, dry with no rashes or lesions  LYMPH: No cervical, clavicular, axillary, or inguinal lymphadenopathy  NEUROLOGIC: follows commands, non focal.   PSYCH: mood and affect appropriate. Alert and oriented to time and place and person. Relevant Lab findings/reviewed by me:  CBC:8/3/2020  WBC- 5.46  HGB- 8.8  PLT- 155,000  Neut- 4.12    7/27/2020  BUN-5 (L)  Creatinine-0.5  Calcium-7.8 (L)  Total Protein-4.7 (L)  Albumin-2.5 (L)  Alk Phos-155 (H)  ALT-34  AST-45 (H)    7/6/2020  KK59.4-434 (H)    Relevant Imaging studies/reviewed by me:  No results found.     ASSESSMENT    Orders Placed This Encounter   Procedures    Comprehensive Metabolic Panel     Standing Status:   Future     Number of Occurrences:   1     Standing Expiration Date:   8/3/2021    Cancer Antigen 19-9     Standing Status:   Future     Standing Expiration Date:   8/3/2021      Denisa Layton was seen today for pancreatic cancer. Diagnoses and all orders for this visit:    Malignant neoplasm of pancreas, unspecified location of malignancy ([facility] [address]  -     Comprehensive Metabolic Panel; Future  -     Cancer Antigen 19-9; Future    Chemotherapy management, encounter for    Adverse effect of chemotherapy, initial encounter    Malignant ascites    Care plan discussed with patient    Health care maintenance       #Metastatic pancreatic cancer  third line palliative chemotherapy (liposomal Irinotecan/Leucovorin/5-FU)   Proceed with chemotherapy today  Last CA-19-9 = 523->383, 483. However, CT scan showed stable disease. The patient has felt well otherwise. She has been working her farm. She has been quite active. She feels clinically better. Therefore, we will continue treatment and repeat CT scans in about a month. #GI bleed- secondary to erosion at previous Whipple anastomotic site. history of erosion at the anastomotic site with several instances of upper GI bleed in the past.  No further episodes of GI bleed.       #Normocytic anemia- secondary to GI bleed. Hemoglobin 8.6. She had a capsule endoscopy performed by GI that showed no source of bleeding. She had another episode of GI bleed and was admitted to [address] [place] [address] in [place]. She received 3 units PRBCs. #Prognosis-poor prognosis overall. She has had disease progression several lines of treatment before. #Ascites-cytology was negative. 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 Unfortunately, patient has a history of GI bleed and therefore she has a contraindication for anticoagulation. Status post paracentesis of 4 L. Cytology negative. Plan:   · Proceed with treatment today. · CMP and CA-19-9  · RTC 2 weeks     Follow Up:     No follow-ups on file. [facility] am scribing for Kath Cox MD. Electronically signed by Ramesh Garrison on 8/3/2020 at 2:50 PM CDT.      I, Dr Kay Newell, personally performed the services described in this documentation as scribed by Ramesh Garrison MA in my presence and is both accurate and complete. Over 50% of the total visit time of 25 minutes in face to face encounter with the patient, out of which more than 50% of the time was spent in counseling patient or family and coordination of care. Counseling included but was not limited to time spent reviewing labs, imaging studies/ treatment plan and answering questions.

## 2020-07-29 ENCOUNTER — HOSPITAL ENCOUNTER (OUTPATIENT)
Dept: ULTRASOUND IMAGING | Age: 60
Discharge: HOME | End: 2020-07-29
Payer: COMMERCIAL

## 2020-07-29 VITALS — DIASTOLIC BLOOD PRESSURE: 70 MMHG | OXYGEN SATURATION: 99 % | HEART RATE: 68 BPM | SYSTOLIC BLOOD PRESSURE: 114 MMHG

## 2020-07-29 PROCEDURE — 2709999900 US GUIDED PARACENTESIS

## 2020-07-29 ASSESSMENT — PAIN SCALES - GENERAL: PAINLEVEL_OUTOF10: 0

## 2020-07-29 NOTE — PROGRESS NOTES
4 liters removed from paracentesis. Patient returned to [address] [place] [address]. Vitals stable. Patient eating lunch at this time. Puncture site clean and dry. Patient discharge instructions reviewed with the patient.   Patient discharged to home with

## 2020-07-29 NOTE — PROGRESS NOTES
Patient here today for paracentesis. Procedure verified and medications and allergies reviewed and verified. Patient labs have already been processed and within limits to proceed with procedure. Patient oriented to room and to call light. Assessment completed. Ultrasound notified of patient arrival.  Discharge instructions reviewed with the patient and she verbalized understanding. Bruce Figueredo here at this time to transport patient with him to ultrasound for procedure.

## 2020-08-03 ENCOUNTER — HOSPITAL ENCOUNTER (OUTPATIENT)
Dept: INFUSION THERAPY | Age: 60
Discharge: HOME | End: 2020-08-03
Payer: COMMERCIAL

## 2020-08-03 ENCOUNTER — OFFICE VISIT (OUTPATIENT)
Dept: HEMATOLOGY | Age: 60
End: 2020-08-03
Payer: COMMERCIAL

## 2020-08-03 VITALS
OXYGEN SATURATION: 98 % | BODY MASS INDEX: 21.19 KG/M2 | TEMPERATURE: 98 F | HEART RATE: 73 BPM | HEIGHT: 67 IN | SYSTOLIC BLOOD PRESSURE: 124 MMHG | DIASTOLIC BLOOD PRESSURE: 82 MMHG | WEIGHT: 135 LBS

## 2020-08-03 DIAGNOSIS — C25.9 MALIGNANT NEOPLASM OF PANCREAS, UNSPECIFIED LOCATION OF MALIGNANCY (HCC): Primary | ICD-10-CM

## 2020-08-03 LAB
ALBUMIN SERPL-MCNC: 2.6 G/DL (ref 3.5–5.2)
ALP BLD-CCNC: 163 U/L (ref 35–104)
ALT SERPL-CCNC: 37 U/L (ref 9–52)
ANION GAP SERPL CALCULATED.3IONS-SCNC: -1 MMOL/L (ref 7–19)
AST SERPL-CCNC: 46 U/L (ref 14–36)
BASOPHILS ABSOLUTE: 0.04 K/UL (ref 0.01–0.08)
BASOPHILS RELATIVE PERCENT: 0.7 % (ref 0.1–1.2)
BILIRUB SERPL-MCNC: 0.3 MG/DL (ref 0.2–1.3)
BUN BLDV-MCNC: 6 MG/DL (ref 7–17)
CALCIUM SERPL-MCNC: 7.7 MG/DL (ref 8.4–10.2)
CHLORIDE BLD-SCNC: 108 MMOL/L (ref 98–111)
CO2: 28 MMOL/L (ref 22–29)
CREAT SERPL-MCNC: 0.6 MG/DL (ref 0.5–1)
EOSINOPHILS ABSOLUTE: 0.08 K/UL (ref 0.04–0.54)
EOSINOPHILS RELATIVE PERCENT: 1.5 % (ref 0.7–7)
GFR NON-AFRICAN AMERICAN: >60
GLOBULIN: 2.7 G/DL
GLUCOSE BLD-MCNC: 137 MG/DL (ref 74–106)
HCT VFR BLD CALC: 26.5 % (ref 34.1–44.9)
HEMOGLOBIN: 8.8 G/DL (ref 11.2–15.7)
LYMPHOCYTES ABSOLUTE: 0.61 K/UL (ref 1.18–3.74)
LYMPHOCYTES RELATIVE PERCENT: 11.2 % (ref 19.3–53.1)
MCH RBC QN AUTO: 31.5 PG (ref 25.6–32.2)
MCHC RBC AUTO-ENTMCNC: 33.2 G/DL (ref 32.3–35.5)
MCV RBC AUTO: 95 FL (ref 79.4–94.8)
MONOCYTES ABSOLUTE: 0.61 K/UL (ref 0.24–0.82)
MONOCYTES RELATIVE PERCENT: 11.2 % (ref 4.7–12.5)
NEUTROPHILS ABSOLUTE: 4.12 K/UL (ref 1.56–6.13)
NEUTROPHILS RELATIVE PERCENT: 75.4 % (ref 34–71.1)
PDW BLD-RTO: 16.8 % (ref 11.7–14.4)
PLATELET # BLD: 155 K/UL (ref 182–369)
PMV BLD AUTO: 9.9 FL (ref 7.4–10.4)
POTASSIUM SERPL-SCNC: 4.4 MMOL/L (ref 3.5–5.1)
RBC # BLD: 2.79 M/UL (ref 3.93–5.22)
SODIUM BLD-SCNC: 135 MMOL/L (ref 137–145)
TOTAL PROTEIN: 5.3 G/DL (ref 6.3–8.2)
WBC # BLD: 5.46 K/UL (ref 3.98–10.04)

## 2020-08-03 PROCEDURE — 96368 THER/DIAG CONCURRENT INF: CPT

## 2020-08-03 PROCEDURE — 6360000002 HC RX W HCPCS: Performed by: INTERNAL MEDICINE

## 2020-08-03 PROCEDURE — G0498 CHEMO EXTEND IV INFUS W/PUMP: HCPCS

## 2020-08-03 PROCEDURE — 85025 COMPLETE CBC W/AUTO DIFF WBC: CPT

## 2020-08-03 PROCEDURE — 96417 CHEMO IV INFUS EACH ADDL SEQ: CPT

## 2020-08-03 PROCEDURE — 96367 TX/PROPH/DG ADDL SEQ IV INF: CPT

## 2020-08-03 PROCEDURE — 2580000003 HC RX 258: Performed by: INTERNAL MEDICINE

## 2020-08-03 PROCEDURE — 80053 COMPREHEN METABOLIC PANEL: CPT

## 2020-08-03 PROCEDURE — 96413 CHEMO IV INFUSION 1 HR: CPT

## 2020-08-03 PROCEDURE — 99214 OFFICE O/P EST MOD 30 MIN: CPT | Performed by: INTERNAL MEDICINE

## 2020-08-03 PROCEDURE — 96415 CHEMO IV INFUSION ADDL HR: CPT

## 2020-08-03 RX ORDER — EPINEPHRINE 1 MG/ML
0.3 INJECTION, SOLUTION, CONCENTRATE INTRAVENOUS PRN
Status: CANCELLED | OUTPATIENT
Start: 2020-08-03

## 2020-08-03 RX ORDER — SODIUM CHLORIDE 0.9 % (FLUSH) 0.9 %
5 SYRINGE (ML) INJECTION PRN
Status: CANCELLED | OUTPATIENT
Start: 2020-08-03

## 2020-08-03 RX ORDER — HEPARIN SODIUM (PORCINE) LOCK FLUSH IV SOLN 100 UNIT/ML 100 UNIT/ML
500 SOLUTION INTRAVENOUS PRN
Status: CANCELLED | OUTPATIENT
Start: 2020-08-03

## 2020-08-03 RX ORDER — SODIUM CHLORIDE 9 MG/ML
INJECTION, SOLUTION INTRAVENOUS CONTINUOUS
Status: CANCELLED | OUTPATIENT
Start: 2020-08-03

## 2020-08-03 RX ORDER — DIPHENHYDRAMINE HYDROCHLORIDE 50 MG/ML
50 INJECTION INTRAMUSCULAR; INTRAVENOUS ONCE
Status: CANCELLED | OUTPATIENT
Start: 2020-08-03

## 2020-08-03 RX ORDER — SODIUM CHLORIDE 0.9 % (FLUSH) 0.9 %
10 SYRINGE (ML) INJECTION PRN
Status: CANCELLED | OUTPATIENT
Start: 2020-08-03

## 2020-08-03 RX ORDER — SODIUM CHLORIDE 9 MG/ML
20 INJECTION, SOLUTION INTRAVENOUS ONCE
Status: CANCELLED | OUTPATIENT
Start: 2020-08-03

## 2020-08-03 RX ORDER — METHYLPREDNISOLONE SODIUM SUCCINATE 125 MG/2ML
125 INJECTION, POWDER, LYOPHILIZED, FOR SOLUTION INTRAMUSCULAR; INTRAVENOUS ONCE
Status: CANCELLED | OUTPATIENT
Start: 2020-08-03

## 2020-08-03 RX ADMIN — DEXAMETHASONE SODIUM PHOSPHATE: 10 INJECTION, SOLUTION INTRAMUSCULAR; INTRAVENOUS at 10:38

## 2020-08-03 RX ADMIN — IRINOTECAN HYDROCHLORIDE 86 MG: 4.3 INJECTION, POWDER, FOR SOLUTION INTRAVENOUS at 11:16

## 2020-08-03 RX ADMIN — LEUCOVORIN CALCIUM 650 MG: 350 INJECTION, POWDER, LYOPHILIZED, FOR SOLUTION INTRAMUSCULAR; INTRAVENOUS at 12:58

## 2020-08-03 RX ADMIN — FLUOROURACIL 2250 MG: 50 INJECTION, SOLUTION INTRAVENOUS at 13:34

## 2020-08-04 ENCOUNTER — HOSPITAL ENCOUNTER (EMERGENCY)
Age: 60
Discharge: HOME | End: 2020-08-04
Payer: COMMERCIAL

## 2020-08-04 VITALS
TEMPERATURE: 97.9 F | HEART RATE: 74 BPM | SYSTOLIC BLOOD PRESSURE: 112 MMHG | HEIGHT: 67 IN | DIASTOLIC BLOOD PRESSURE: 68 MMHG | BODY MASS INDEX: 20.72 KG/M2 | OXYGEN SATURATION: 97 % | RESPIRATION RATE: 15 BRPM | WEIGHT: 132 LBS

## 2020-08-04 LAB
ALBUMIN SERPL-MCNC: 2.9 G/DL (ref 3.5–5.2)
ALP BLD-CCNC: 194 U/L (ref 35–104)
ALT SERPL-CCNC: 47 U/L (ref 5–33)
ANION GAP SERPL CALCULATED.3IONS-SCNC: 9 MMOL/L (ref 7–19)
APTT: 25 SEC (ref 26–36.2)
AST SERPL-CCNC: 41 U/L (ref 5–32)
BASOPHILS ABSOLUTE: 0 K/UL (ref 0–0.2)
BASOPHILS RELATIVE PERCENT: 0.6 % (ref 0–1)
BILIRUB SERPL-MCNC: <0.2 MG/DL (ref 0.2–1.2)
BUN BLDV-MCNC: 12 MG/DL (ref 6–20)
CALCIUM SERPL-MCNC: 8.2 MG/DL (ref 8.6–10)
CHLORIDE BLD-SCNC: 104 MMOL/L (ref 98–111)
CO2: 26 MMOL/L (ref 22–29)
CREAT SERPL-MCNC: 0.6 MG/DL (ref 0.5–0.9)
EOSINOPHILS ABSOLUTE: 0.1 K/UL (ref 0–0.6)
EOSINOPHILS RELATIVE PERCENT: 0.9 % (ref 0–5)
GFR AFRICAN AMERICAN: >59
GFR NON-AFRICAN AMERICAN: >60
GLUCOSE BLD-MCNC: 121 MG/DL (ref 74–109)
HCT VFR BLD CALC: 25.6 % (ref 37–47)
HEMOGLOBIN: 8 G/DL (ref 12–16)
IMMATURE GRANULOCYTES #: 0 K/UL
INR BLD: 1.03 (ref 0.88–1.18)
LYMPHOCYTES ABSOLUTE: 0.6 K/UL (ref 1.1–4.5)
LYMPHOCYTES RELATIVE PERCENT: 8.4 % (ref 20–40)
MCH RBC QN AUTO: 30.1 PG (ref 27–31)
MCHC RBC AUTO-ENTMCNC: 31.3 G/DL (ref 33–37)
MCV RBC AUTO: 96.2 FL (ref 81–99)
MONOCYTES ABSOLUTE: 0.5 K/UL (ref 0–0.9)
MONOCYTES RELATIVE PERCENT: 7.8 % (ref 0–10)
NEUTROPHILS ABSOLUTE: 5.6 K/UL (ref 1.5–7.5)
NEUTROPHILS RELATIVE PERCENT: 81.9 % (ref 50–65)
PDW BLD-RTO: 17.9 % (ref 11.5–14.5)
PLATELET # BLD: 167 K/UL (ref 130–400)
PMV BLD AUTO: 9.9 FL (ref 9.4–12.3)
POTASSIUM REFLEX MAGNESIUM: 4.4 MMOL/L (ref 3.5–5)
PROTHROMBIN TIME: 13.4 SEC (ref 12–14.6)
RBC # BLD: 2.66 M/UL (ref 4.2–5.4)
SODIUM BLD-SCNC: 139 MMOL/L (ref 136–145)
TOTAL PROTEIN: 5.4 G/DL (ref 6.6–8.7)
WBC # BLD: 6.8 K/UL (ref 4.8–10.8)

## 2020-08-04 PROCEDURE — 36415 COLL VENOUS BLD VENIPUNCTURE: CPT

## 2020-08-04 PROCEDURE — 85025 COMPLETE CBC W/AUTO DIFF WBC: CPT

## 2020-08-04 PROCEDURE — 85610 PROTHROMBIN TIME: CPT

## 2020-08-04 PROCEDURE — 80053 COMPREHEN METABOLIC PANEL: CPT

## 2020-08-04 PROCEDURE — 85730 THROMBOPLASTIN TIME PARTIAL: CPT

## 2020-08-04 PROCEDURE — 99283 EMERGENCY DEPT VISIT LOW MDM: CPT

## 2020-08-04 ASSESSMENT — PAIN DESCRIPTION - LOCATION: LOCATION: ABDOMEN

## 2020-08-04 ASSESSMENT — PAIN SCALES - GENERAL
PAINLEVEL_OUTOF10: 0
PAINLEVEL_OUTOF10: 5

## 2020-08-04 ASSESSMENT — ENCOUNTER SYMPTOMS
HEMATEMESIS: 0
HEMATOCHEZIA: 1
ABDOMINAL PAIN: 0

## 2020-08-05 ENCOUNTER — TELEPHONE (OUTPATIENT)
Dept: INFUSION THERAPY | Age: 60
End: 2020-08-05

## 2020-08-05 ENCOUNTER — HOSPITAL ENCOUNTER (OUTPATIENT)
Dept: INFUSION THERAPY | Age: 60
End: 2020-08-05
Payer: COMMERCIAL

## 2020-08-05 NOTE — ED PROVIDER NOTES
denies nor longer takes med for    Low blood sugar     h/o when overweight in the past    MVP (mitral valve prolapse)     Myalgia     Palliative care patient 2020    Pancreatic adenocarcinoma ([facility] [address] 2018    Dr Patricia Leon    Pancreatic cancer ([facility] [address] 3/30/2018    Right flank pain     RUQ pain          SURGICALHISTORY       Past Surgical History:   Procedure Laterality Date    CARDIAC CATHETERIZATION      heart cath     SECTION      x 3    CHOLECYSTECTOMY  1997    COLONOSCOPY  years ago    Steve-Dr Chasity Irwin per patient    COLONOSCOPY  2014    Dr Ronak Richards- José Luismanasa Chun recall    COLONOSCOPY  03/10/2016    Dr Mcfarland-10 yr recall    COLONOSCOPY N/A 2019    Dr Terry Mendoza, 5 yr recall    ELBOW SURGERY Right     ENDOMETRIAL ABLATION      HAND SURGERY Right     HERNIA REPAIR      hiatal    HERNIA REPAIR      umbilical    HERNIA REPAIR      PANCREAS SURGERY  2018    Whipple procedure-Dr Scott Barbosa [place] [facility] NEEDLE ASPIR/BIOP ALTERED ANATOMY N/A 2018    Dr Marco Landa w/fna-Dilation of main pancreatic duct with diffuse change in the pancreatitis noted, area of concern in the neck of the pancreas-strongly suspicious for adenocarcinoma     [place] EGD [facility] NEEDLE ASPIR/BIOP ALTERED ANATOMY N/A 3/6/2018    Dr KVNG Duncan-Pancreatic cancer-Ductal adenocarcinoma-staged [id number] by EUS, pancreatic pseudocyst in the tail the pancreas    [place] ERCP DX COLLECTION SPECIMEN BRUSHING/WASHING N/A 2018    Dr KVNG Duncan-w/placement of a self-expanding fully covered 10 mm biliary stent    UPPER GASTROINTESTINAL ENDOSCOPY  years ago    Steve-Dr Norma Radford    UPPER GASTROINTESTINAL ENDOSCOPY      Zuniga    UPPER GASTROINTESTINAL ENDOSCOPY N/A 2019    Dr Ella Crockett post Whipple's procedure with efferent loop ulceration    UPPER GASTROINTESTINAL ENDOSCOPY  2019    Dr Pete Duncan-Patent G-J Anastomosis, apparent healing ulceration    UPPER GASTROINTESTINAL ENDOSCOPY N/A 2/25/2020    Dr Ana Maria Elias amount of food retention in the stomach with bile, hiatal hernia, will need repeat    UPPER GASTROINTESTINAL ENDOSCOPY N/A 2/27/2020    Dr Ginny Chen erosion/ulceration at the suture line, post whipple surgical changes    UPPER GASTROINTESTINAL ENDOSCOPY N/A 3/9/2020    Dr Ginny Chen erosion along the previous whipple suture line    UPPER GASTROINTESTINAL ENDOSCOPY N/A 4/16/2020    Dr KVNG Duncan-w/hemostatic clip placement x 1-Allie Peres tear at GEJ-Likely culprit lesion for current presentation    UPPER GASTROINTESTINAL ENDOSCOPY N/A 6/22/2020    Dr Ginny Chen erosion along the previous whipple suture line         CURRENT MEDICATIONS       Discharge Medication List as of 8/4/2020 10:03 PM      CONTINUE these medications which have NOT CHANGED    Details   pantoprazole (PROTONIX) 40 MG tablet Take 1 tablet by mouth 2 times daily, Disp-60 tablet, R-3Normal      Cyanocobalamin (VITAMIN B 12 PO) Take by mouthHistorical Med      sucralfate (CARAFATE) 1 GM tablet Take 1 tablet by mouth 4 times daily, Disp-360 tablet, R-1Normal      Multiple Vitamins-Minerals (THERAPEUTIC MULTIVITAMIN-MINERALS) tablet Take 1 tablet by mouth dailyHistorical Med      promethazine (PHENERGAN) 25 MG tablet Take 1 tablet by mouth every 6 hours as needed for Nausea, Disp-30 tablet, R-2Normal      lidocaine-prilocaine (EMLA) 2.5-2.5 % cream Apply to port area and cover with plastic wrap one hour prior to use., Disp-1 Tube, R-1, Normal      vitamin E 400 UNIT capsule Take 400 Units by mouth dailyHistorical Med      NONFORMULARY daily Tumeric otcHistorical Med      ondansetron (ZOFRAN) 8 MG tablet Take 1 tablet by mouth every 8 hours as needed for Nausea or Vomiting, Disp-30 tablet, R-3Normal             ALLERGIES     Codeine; Morphine; Morphine and related; Sulfa antibiotics; Neomycin-bacitracin zn-polymyx; Clarithromycin; Dilaudid [hydromorphone hcl];  Hydromorphone; Loperamide hcl; Loperamide hcl; and Neosporin [neomycin-polymyx-gramicid]    FAMILY HISTORY       Family History   Problem Relation Age of Onset    Heart Attack Mother 46        x 2-had bypass    Hypertension Mother     COPD Father     Liver Cancer Paternal Aunt     Lung Cancer Paternal Aunt     Breast Cancer Maternal Aunt         but  of brain cancer    Diabetes Maternal Uncle     Diabetes Maternal Grandmother     Colon Cancer Neg Hx     Colon Polyps Neg Hx     Esophageal Cancer Neg Hx     Rectal Cancer Neg Hx     Stomach Cancer Neg Hx           SOCIAL HISTORY       Social History     Socioeconomic History    Marital status:      Spouse name: None    Number of children: 3    Years of education: None    Highest education level: None   Occupational History    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[place] [address] Occupation: [employer]    Occupation: [employer]   Social Needs    Financial resource strain: None    Food insecurity     Worry: None     Inability: None    Transportation needs     Medical: None     Non-medical: None   Tobacco Use    Smoking status: Former Smoker     Packs/day: 0.50     Years: 10.00     Pack years: 5.00     Types: Cigarettes     Last attempt to quit: 2019     Years since quittin.7    Smokeless tobacco: Never Used   Substance and Sexual Activity    Alcohol use: Not Currently    Drug use: Never    Sexual activity: None     Comment: 3   Lifestyle    Physical activity     Days per week: None     Minutes per session: None    Stress: None   Relationships    Social connections     Talks on phone: None     Gets together: None     Attends Ep[employer] service: None     Active member of club or organization: None     Attends meetings of clubs or organizations: None     Relationship status: None    Intimate partner violence     Fear of current or ex partner: None     Emotionally abused: None     Physically abused: None     Forced sexual activity: None   Other Topics Concern    None   Social History Narrative    CODE STATUS: Full Code    HEALTH CARE PROXY: her daughter, Mrs. Nabila [facility], [facility] independently    DOMICILED: lives in a private home, without steps inside, lives alone, has 2 dogs, no steps in to home       SCREENINGS      @FLOW([id number])@      PHYSICAL EXAM    (up to 7 for level 4, 8 or more for level 5)     ED Triage Vitals [08/04/20 1936]   BP Temp Temp Source Pulse Resp SpO2 Height Weight   120/72 97.2 °F (36.2 °C) Temporal 78 20 95 % 5' 7\" (1.702 m) 132 lb (59.9 kg)       Physical Exam  Vitals signs and nursing note reviewed. Constitutional:       Appearance: She is well-developed. HENT:      Head: Normocephalic and atraumatic. Eyes:      General: No scleral icterus. Right eye: No discharge. Left eye: No discharge. Neck:      Musculoskeletal: Normal range of motion and neck supple. Cardiovascular:      Rate and Rhythm: Normal rate. Pulmonary:      Effort: No respiratory distress. Abdominal:      General: There is distension. Tenderness: There is no abdominal tenderness. Genitourinary:     Rectum: Guaiac result positive. No tenderness. Normal anal tone. Neurological:      Mental Status: She is alert.    Psychiatric:         Behavior: Behavior normal.         DIAGNOSTIC RESULTS     EKG: All EKG's are interpreted by the Emergency Department Physician who either signs or Co-signsthis chart in the absence of a cardiologist.        RADIOLOGY:   Lenn Crea such as CT, Ultrasound and MRI are read by the radiologist. Plain radiographic images are visualized and preliminarily interpreted by the emergency physician with the below findings:      Interpretation per the Radiologist below, if available at the time of this note:    No orders to display         ED BEDSIDEULTRASOUND:   Performed by ED Physician -none    LABS:  Labs Reviewed   CBC WITH AUTO DIFFERENTIAL - Abnormal; Notable for the following components:       Result Value    RBC 2.66 (*)     Hemoglobin 8.0 (*)     Hematocrit 25.6 (*)     MCHC 31.3 (*)     RDW 17.9 (*)     Neutrophils % 81.9 (*)     Lymphocytes % 8.4 (*)     Lymphocytes Absolute 0.6 (*)     All other components within normal limits   COMPREHENSIVE METABOLIC PANEL W/ REFLEX TO MG FOR LOW K - Abnormal; Notable for the following components:    Glucose 121 (*)     Calcium 8.2 (*)     Total Protein 5.4 (*)     Alb 2.9 (*)     Alkaline Phosphatase 194 (*)     ALT 47 (*)     AST 41 (*)     All other components within normal limits   APTT - Abnormal; Notable for the following components:    aPTT 25.0 (*)     All other components within normal limits   PROTIME-INR       All other labs were within normal range or not returned as of this dictation. EMERGENCY DEPARTMENT COURSE and DIFFERENTIALDIAGNOSIS/MDM:   Vitals:    Vitals:    08/04/20 1936 08/04/20 2153 08/04/20 2230   BP: 120/72 123/72 112/68   Pulse: 78 78 74   Resp: 20 16 15   Temp: 97.2 °F (36.2 °C)  97.9 °F (36.6 °C)   TempSrc: Temporal  Oral   SpO2: 95% 98% 97%   Weight: 132 lb (59.9 kg)     Height: 5' 7\" (1.702 m)             MDM  Pt is seeing her provider tomorrow and feels ok to go home and follow up. Blood count is stable and it was a small amount of blood      CONSULTS:  None    PROCEDURES:  Unless otherwise noted below, none     Procedures    FINAL IMPRESSION      1. Rectal bleed    2. Anemia associated with chemotherapy    3. Malignant neoplasm of pancreas, unspecified location of malignancy [facility])        DISPOSITION/PLAN   DISPOSITION Decision To Discharge 08/04/2020 09:57:40 PM      PATIENT REFERRED TO:  No follow-up provider specified.     DISCHARGE MEDICATIONS:  Discharge Medication List as of 8/4/2020 10:03 PM             (Please note that portions of this note were completed with a voice recognitionprogram.  Efforts were made to edit the dictations but occasionally words are mis-transcribed.)    JORDYN Jean (electronically signed)          JORDYN Jean  08/06/20 1484

## 2020-08-05 NOTE — ED NOTES
Patient placed in a gown Patient placed on cardiac monitor, continuous pulse oximeter, and NIBP monitor.  Monitor alarms on.       Helio Solano RN  08/04/20 1954

## 2020-08-05 NOTE — TELEPHONE ENCOUNTER
Dr. Arianna Brewster from [address] called and states that Joanell Leyden is in the ER there right now with a GI Bleed. She is wearing a 5 FU CIP and they will d/c it. He will keep us up to date. I informed Dr. Shankar Marie.

## 2020-08-10 ENCOUNTER — HOSPITAL ENCOUNTER (OUTPATIENT)
Dept: INFUSION THERAPY | Age: 60
Discharge: HOME | End: 2020-08-10
Payer: COMMERCIAL

## 2020-08-10 DIAGNOSIS — C25.9 MALIGNANT NEOPLASM OF PANCREAS, UNSPECIFIED LOCATION OF MALIGNANCY (HCC): ICD-10-CM

## 2020-08-10 LAB
BASOPHILS ABSOLUTE: 0.02 K/UL (ref 0.01–0.08)
BASOPHILS RELATIVE PERCENT: 0.3 % (ref 0.1–1.2)
EOSINOPHILS ABSOLUTE: 0.13 K/UL (ref 0.04–0.54)
EOSINOPHILS RELATIVE PERCENT: 2.3 % (ref 0.7–7)
HCT VFR BLD CALC: 28.6 % (ref 34.1–44.9)
HEMOGLOBIN: 9.1 G/DL (ref 11.2–15.7)
LYMPHOCYTES ABSOLUTE: 0.72 K/UL (ref 1.18–3.74)
LYMPHOCYTES RELATIVE PERCENT: 12.5 % (ref 19.3–53.1)
MCH RBC QN AUTO: 31.2 PG (ref 25.6–32.2)
MCHC RBC AUTO-ENTMCNC: 31.8 G/DL (ref 32.3–35.5)
MCV RBC AUTO: 97.9 FL (ref 79.4–94.8)
MONOCYTES ABSOLUTE: 0.36 K/UL (ref 0.24–0.82)
MONOCYTES RELATIVE PERCENT: 6.3 % (ref 4.7–12.5)
NEUTROPHILS ABSOLUTE: 4.53 K/UL (ref 1.56–6.13)
NEUTROPHILS RELATIVE PERCENT: 78.6 % (ref 34–71.1)
PDW BLD-RTO: 15.5 % (ref 11.7–14.4)
PLATELET # BLD: 154 K/UL (ref 182–369)
PMV BLD AUTO: 10.2 FL (ref 7.4–10.4)
RBC # BLD: 2.92 M/UL (ref 3.93–5.22)
WBC # BLD: 5.76 K/UL (ref 3.98–10.04)

## 2020-08-10 PROCEDURE — 85025 COMPLETE CBC W/AUTO DIFF WBC: CPT

## 2020-08-13 NOTE — PROGRESS NOTES
Demetra Gosselin   1960 8/17/2020     Chief Complaint   Patient presents with    Follow-up    Pancreatic Cancer      INTERVAL HISTORY/HISTORY OF PRESENT ILLNESS:  The patient is a very pleasant 64years old female who has a diagnosis of pancreatic adenocarcinoma. She initially had localized disease and underwent neoadjuvant chemotherapy followed by surgery at [facility]. Unfortunately, the patient was found to have recurrence of her disease in the abdomen. She has received 2 prior lines of therapy and unfortunately had disease progression. She is currently receiving fourth line treatment with  Irinotecan liposome 70 mg/m² with leucovorin 400 mg/m² and 5-FU 2400 mg/m² over 46 hours every 2 weeks. She has tolerating treatments with complains fatigue. In addition, the patient has recurrent GI bleed due to a anastomotic ulcer on the site of prior surgery. She has had several EGD/colonoscopy and capsule endoscopy. She has been receiving her palliative chemotherapy with complaints of mild diarrhea, nausea vomiting grade 1, fatigue. She has lost more weight since last visit. She feels otherwise great and has been eating. She had another GI bleed and was admitted to [address] [place] [address] in [place]. She received 1 units of PRBCs. Hemoglobin today 8.3. She wants to delay treatment today. We discussed about repeating scans and tumor markers.     ONCOLOGIC HISTORY:   Diagnosis  · Pancreatic adenocarcinoma, January 2018   · [id number]  · 7/6/2019-extended genetic panel-negative for a deleterious mutation     Treatment summary  · March 2018-Surgical consultation at [address] [place] [address] operable   · 4/3/2018 through 6/4/2018 Neoadjuvant chemotherapy FOLFORINOX ×5 Biweekly cycles   · 4/11/2018-Biliary stent   · August 2018- XRT 30 Gy/Xeloda   · 08/30/3018- Whipple procedure @ [facility]   · Recommended another 5 biweekly cycles of modified FOLFORINOX completed 12/26/2018   · 7/9/2019- 1/22/2020 Gemzar/Abraxane the pancreas. Pathology was consistent with a group of markedly atypical cells strongly suspicious for adenocarcinoma. · 2/28/2018-CT pancreatic protocol showed a poorly defined area of decreased enhancement located in the head of the pancreas measuring 1.8 x 1.4 x 1.7 cm with moderate meditation of the pancreatic duct. Well defined sharply marginated low density nodule located in the tail of the pancreas measuring 1.5 x 1.3 x 1.5 cm (IPMN?). · 3/6/2018-CA 19-9 was elevated at 150. Repeat EGD was performed by Dr. Anant Wen due to the inconclusive FNA resulted on 2/7/2018. Pathology FNA was consistent with pancreatic adenocarcinoma. · 3/12/2018-she was first seen by me. No evidence of distant disease. Referral to Surgical oncology. CT chest to complete staging. CA-19-9 430. · 3/16/2018-CT of the chest was unremarkable for intrathoracic metastatic disease   · Surgery consultation at [facility] March 2018- with Dr Angela George. She was not a surgical candidate upfront. Recommended neoadjuvant chemotherapy. · 4/3/2018-started on neoadjuvant chemotherapy with FOLFORINOX. · 4/11/2018-she developed CBD obstruction had a CBD stent placed by Dr. Heath Villasenor. · 4/3/2018 through 6/4/2018 Neoadjuvant chemotherapy FOLFORINOX ×5 Biweekly cycles   · August 2018- XRT 30 Gy/Xeloda   · 08/30/3018- Whipple procedure at [facility] by Dr. Víctor Faulkner  · Recommended another 7 biweekly cycles of modified FOLFORINOX. · 9/5/2018-CT of the chest abdomen pelvis was unremarkable for metastatic disease. · 1/14/2019-CT abdomen and pelvis at [facility] showed no evidence of metastasis in the chest abdomen pelvis. · 5/21/2019-CT chest, abdomen, pelvis at [facility] showed no evidence of metastatic disease   · 5/21/20194540-GE-77-9 = 42   · 06/12/2019- CA-19-9 = 154. Discussed with the patient. We'll also discuss with [facility].    · 7/1/2019-CT chest, abdomen, pelvis showed a soft tissue mass measuring 1.4 x 1.1 cm, not present on prior scan from January 2019, concerning for recurrence. Stable hypodense in the liver, too small to characterize. Subcentimeter ill-defined area of low attenuation liver may represent an early metastasis. · 7/3/2019-recommended palliative chemotherapy with nab paclitaxel 125mg/m² IV over 30 minutes on day 1 and gemcitabine 600 mg/m² IV over 10mg/m2/min (fixed dose rate) on day 1  · 7/2/2019-CA 19 9 = 114  · 7/6/2019- extended genetic panel negative for a deleterious mutation (invitae)  · 7/9/2019- CA-19-9 = 225  · 8/6/2019- CA-19-9 = 171  · 9/3/6049-EA-88-9 = 198   · 9/13/20195065-PP-25-9 = 98 at [facility]  · 9/13/2019-CT chest abdomen pelvis at [facility] showed a soft tissue thickening in the pancreatic bed with persistent narrowing of the portal SMV confluence.  Superimposed tumor remains a possibility.  The new low-density lesion in the periphery of the liver seen on the prior exam is no longer appreciated and likely represents treatment effect.  Nodular enhancement may represent perfusion change in the region on image 187. · 9/13/2018- she was recommended to continue current treatment with Gemzar/Abraxane  · 11/1/2019- she was hospitalized with GI bleed.  An upper endoscopy showed ulceration at the efferent loop of the Whipple's procedure  · 10/29/1551-FC-09-9 =317  · 11/21/2019-CA-19-9 = 183  · 11/19/2019- CT chest abdomen pelvis showed no evidence of gross disease progression. Improvement of the caliber of what may be a middle colic vein that drains back to the SMV. There is still persistent narrowing of the of the upper SMV at the juncture with the portal vein.  No definite evidence of liver metastases at this time.  No definite evidence of pulmonary metastases.  However, question of slight increase in prominence of subtle soft tissue thickening within the right paracolic gutter could be indicative of metastatic disease to peritoneum.   · 12/9/2019- colonoscopy by GI was unremarkable.  This was performed for complaints of maroon-colored stools. 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 This is consistent with disease progression. · 3/4/2020- 4th line therapy Irinotecan liposome 70 mg/m² with leucovorin 400 mg/m² and 5-FU 2400 mg/m² over 46 hours every 2 weeks. · 5/12/202 Ct Chest W Contrast  No acute findings in the chest.  Slight increase in size of LEFT supraclavicular lymph nodes and subcarinal mediastinal lymph node. Recommend special attention on follow-up imaging to evaluate for stability or resolution. Ct Abdomen Pelvis W Iv Contrast   Postoperative change of Whipple with residual stranding and free fluid in the surgical bed. Residual soft tissue nodular prominence in the retroperitoneum adjacent to the portal vein which is compressed and occluded/thrombosed. The splenic vein is not visualized and presumed thrombosed. LEFT upper quadrant perigastric/periesophageal varices. Moderate volume ascites. 4.  No evidence of bowel obstruction. Mild distention of the stomach, which may be secondary to slow flow through the gastrojejunostomy. Correlate clinically. There is a small linear device in the distal esophagus which could represent a hemostasis clip but recommend clinical correlation. · 5/13/2020- CA19.9-129  · 6/1/2020- CA 19.9- 523  · 6/15/2020- CA 19.9- 383  · 6/21/2020- Ct Head Wo Contrast No acute intracranial process. Findings in agreement with the emergent findings from the initial [facility] preliminary report. · 6/21/2020- Ct Abdomen Pelvis W Contrast Prior Whipple procedure with expected pneumobilia. Large volume ascites for patient size.  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 Diffuse wall thickening along the small and large bowel secondary to this venous congestion. No evidence for arterial ischemia, as the bowel mucosa appears to enhance normally. No evidence of viscus perforation or peritoneal abscess. Of note, the the retroperitoneal structures are quite decompressed as a result of the ascites, with near complete attenuation of the IVC in the mid abdomen. Unsure if this is contributing to any lower extremity edema. If evidence of multiorgan dysfunction, abdominal compartment syndrome could also be considered. The results of this exam were discussed with Dr. Dalia Correa on 2020 at 1002 hours. In particular, I wanted to address if there was indication/need for therapeutic paracentesis. This is under consideration. · 2020- US Guided Paracentesis Ultrasound-guided abdominal paracentesis performed for therapeutic purposes. A 60 cc aspirate was set aside for lab evaluation, if desired. The patient tolerated the proceed well. Cytology was negative for malignant cells. · 2020 CA19.9- 483.         PAST MEDICAL HISTORY:   Past Medical History:   Diagnosis Date    Acute pancreatitis     Adult BMI <19 kg/sq m     Anemia     Cat esophagus     Biliary obstruction     GERD (gastroesophageal reflux disease)     with Barretts    Hashimoto's thyroiditis     denies takes no med for    Heart murmur     Hypertension     pt denies nor longer takes med for    Low blood sugar     h/o when overweight in the past    MVP (mitral valve prolapse)     Myalgia     Palliative care patient 2020    Pancreatic adenocarcinoma ([facility] [address] 2018    Dr Kaylen Santamaria    Pancreatic cancer ([facility] [address] 3/30/2018    Right flank pain     RUQ pain           PAST SURGICAL HISTORY:  Past Surgical History:   Procedure Laterality Date    CARDIAC CATHETERIZATION      heart cath     SECTION      x 3    CHOLECYSTECTOMY      COLONOSCOPY  years ago    Steve-Dr Primo Calzada Hypertension Mother     COPD Father     Liver Cancer Paternal Aunt     Lung Cancer Paternal Aunt     Breast Cancer Maternal Aunt         but  of brain cancer    Diabetes Maternal Uncle     Diabetes Maternal Grandmother     Colon Cancer Neg Hx     Colon Polyps Neg Hx     Esophageal Cancer Neg Hx     Rectal Cancer Neg Hx     Stomach Cancer Neg Hx         Current Outpatient Medications   Medication Sig Dispense Refill    pantoprazole (PROTONIX) 40 MG tablet Take 1 tablet by mouth 2 times daily 60 tablet 3    Cyanocobalamin (VITAMIN B 12 PO) Take by mouth      promethazine (PHENERGAN) 25 MG tablet Take 1 tablet by mouth every 6 hours as needed for Nausea 30 tablet 2    sucralfate (CARAFATE) 1 GM tablet Take 1 tablet by mouth 4 times daily 360 tablet 1    lidocaine-prilocaine (EMLA) 2.5-2.5 % cream Apply to port area and cover with plastic wrap one hour prior to use. 1 Tube 1    vitamin E 400 UNIT capsule Take 400 Units by mouth daily      NONFORMULARY daily Tumeric otc      ondansetron (ZOFRAN) 8 MG tablet Take 1 tablet by mouth every 8 hours as needed for Nausea or Vomiting 30 tablet 3    Multiple Vitamins-Minerals (THERAPEUTIC MULTIVITAMIN-MINERALS) tablet Take 1 tablet by mouth daily       No current facility-administered medications for this visit. REVIEW OF SYSTEMS:    Constitutional: no fever, no night sweats, fatigue;   HEENT: no blurring of vision, no double vision, no hearing difficulty, no tinnitus,no ulceration, no dysphagia  Lungs: no cough, no shortness of breath, no wheeze;   CVS: no palpitation, no chest pain, no shortness of breath; bilateral leg edema  GI: abdominal distention no abdominal pain, no nausea , no vomiting, no constipation; melanotic stools  VIKRAM: no dysuria, frequency and urgency, no hematuria, no kidney stones;   Musculoskeletal: no joint pain, swelling , stiffness;   Endocrine: no polyuria, polydypsia, no cold or heat intolerence;    Hematology/lymphatic: no easy brusing or bleeding, no hx of clotting disorder; no peripheral adenopathy. Dermatology: no skin rash, no eczema, no pruritis;   Psychiatry: no depression, no anxiety,no panic attacks, no suicide ideation; Neurology: no syncope, no seizures, no numbness or tingling of hands, numbness or tingling of feet, no paresis;     PHYSICAL EXAM:    Vitals signs:  /70   Pulse 81   Temp 97.3 °F (36.3 °C)   Resp 18   Wt 132 lb 14.4 oz (60.3 kg)   SpO2 98%   BMI 20.82 kg/m²    Pain scale:  Pain Score:   0 - No pain   CONSTITUTIONAL: Alert, appropriate, no acute distress,   EYES: Non icteric, EOM intact, pupils equal round and reactive to light and accommodation. ENT: Oral mucus membranes moist, no oral pharyngeal lesions. External inspection of ears and nose are normal.   NECK: Supple, no masses. No palpable thyroid mass    CHEST/LUNGS: CTA bilaterally, normal respiratory effort   CARDIOVASCULAR: RRR, no murmurs. No lower extremity edema   ABDOMEN: soft non-tender, active bowel sounds, no hepatosplenomegaly. No palpable masses. EXTREMITIES: warm, Full ROM of all fours extremities. No focal weakness. SKIN: warm, dry with no rashes or lesions  LYMPH: No cervical, clavicular, axillary, or inguinal lymphadenopathy  NEUROLOGIC: follows commands, non focal.   PSYCH: mood and affect appropriate. Alert and oriented to time and place and person.     Relevant Lab findings/reviewed by me:  Lab Results   Component Value Date     08/17/2020    K 4.2 08/17/2020     08/17/2020    CO2 26 08/17/2020    BUN 6 (L) 08/17/2020    CREATININE 0.7 08/17/2020    GLUCOSE 134 (H) 08/17/2020    CALCIUM 8.1 (L) 08/17/2020    PROT 5.4 (L) 08/17/2020    LABALBU 2.8 (L) 08/17/2020    BILITOT 0.3 08/17/2020    ALKPHOS 183 (H) 08/17/2020    AST 49 (H) 08/17/2020    ALT 34 08/17/2020    LABGLOM >60 08/17/2020    GFRAA >59 08/04/2020    AGRATIO 2.3 (H) 11/21/2019    GLOB 2.7 08/17/2020     Lab Results   Component Value Date    WBC cancer  third line palliative chemotherapy (liposomal Irinotecan/Leucovorin/5-FU)   Proceed with chemotherapy today  Last CA-19-9 = 523->383, 483. However, CT scan showed stable disease. The patient has felt well otherwise. She has been working her farm. She has been quite active. She feels clinically better. Therefore, we will continue treatment and repeat CT scans in about a month. #GI bleed- secondary to erosion at previous Whipple anastomotic site. history of erosion at the anastomotic site with several instances of upper GI bleed in the past.  No further episodes of GI bleed.     #Normocytic anemia- secondary to GI bleed. Hemoglobin 8.6. She had a capsule endoscopy performed by GI that showed no source of bleeding. She had another episode of GI bleed and was admitted to [address] [place] [address] in [place]. She received 3 units PRBCs. #Prognosis-poor prognosis overall. She has had disease progression several lines of treatment before. #Ascites-cytology was negative. 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 Unfortunately, patient has a history of GI bleed and therefore she has a contraindication for anticoagulation. Status post paracentesis of 4 L. Cytology negative. Plan:   · Delay treatment today  · CMP and CA-19-9  · RTC 2 weeks  · Repeat CT C/A/P     Follow Up:     No follow-ups on file. Data Yvette Espinal am scribing for Fuentes Ni MD. Electronically signed by Amna Espinal on 8/17/2020 at 2:50 PM CDT. I, Dr Mely Carcamo, personally performed the services described in this documentation as scribed by Amna Espinal MA in my presence and is both accurate and complete. Over 50% of the total visit time of 25 minutes in face to face encounter with the patient, out of which more than 50% of the time was spent in counseling patient or family and coordination of care.  Counseling included but was not limited to time spent reviewing labs, imaging studies/ treatment plan and answering questions.

## 2020-08-17 ENCOUNTER — OFFICE VISIT (OUTPATIENT)
Dept: HEMATOLOGY | Age: 60
End: 2020-08-17
Payer: COMMERCIAL

## 2020-08-17 ENCOUNTER — HOSPITAL ENCOUNTER (OUTPATIENT)
Dept: INFUSION THERAPY | Age: 60
Discharge: HOME | End: 2020-08-17
Payer: COMMERCIAL

## 2020-08-17 VITALS
TEMPERATURE: 97.3 F | RESPIRATION RATE: 18 BRPM | DIASTOLIC BLOOD PRESSURE: 70 MMHG | BODY MASS INDEX: 20.82 KG/M2 | HEART RATE: 81 BPM | WEIGHT: 132.9 LBS | SYSTOLIC BLOOD PRESSURE: 122 MMHG | OXYGEN SATURATION: 98 %

## 2020-08-17 DIAGNOSIS — C25.9 MALIGNANT NEOPLASM OF PANCREAS, UNSPECIFIED LOCATION OF MALIGNANCY (HCC): ICD-10-CM

## 2020-08-17 LAB
ALBUMIN SERPL-MCNC: 2.8 G/DL (ref 3.5–5.2)
ALP BLD-CCNC: 183 U/L (ref 35–104)
ALT SERPL-CCNC: 34 U/L (ref 9–52)
ANION GAP SERPL CALCULATED.3IONS-SCNC: 3 MMOL/L (ref 7–19)
AST SERPL-CCNC: 49 U/L (ref 14–36)
BASOPHILS ABSOLUTE: 0.02 K/UL (ref 0.01–0.08)
BASOPHILS RELATIVE PERCENT: 0.5 % (ref 0.1–1.2)
BILIRUB SERPL-MCNC: 0.3 MG/DL (ref 0.2–1.3)
BUN BLDV-MCNC: 6 MG/DL (ref 7–17)
CA 19-9: 785 U/ML (ref 0–37)
CALCIUM SERPL-MCNC: 8.1 MG/DL (ref 8.4–10.2)
CHLORIDE BLD-SCNC: 109 MMOL/L (ref 98–111)
CO2: 26 MMOL/L (ref 22–29)
CREAT SERPL-MCNC: 0.7 MG/DL (ref 0.5–1)
EOSINOPHILS ABSOLUTE: 0.07 K/UL (ref 0.04–0.54)
EOSINOPHILS RELATIVE PERCENT: 1.6 % (ref 0.7–7)
GFR NON-AFRICAN AMERICAN: >60
GLOBULIN: 2.7 G/DL
GLUCOSE BLD-MCNC: 134 MG/DL (ref 74–106)
HCT VFR BLD CALC: 25.4 % (ref 34.1–44.9)
HEMOGLOBIN: 8.3 G/DL (ref 11.2–15.7)
LYMPHOCYTES ABSOLUTE: 0.47 K/UL (ref 1.18–3.74)
LYMPHOCYTES RELATIVE PERCENT: 10.6 % (ref 19.3–53.1)
MCH RBC QN AUTO: 31 PG (ref 25.6–32.2)
MCHC RBC AUTO-ENTMCNC: 32.7 G/DL (ref 32.3–35.5)
MCV RBC AUTO: 94.8 FL (ref 79.4–94.8)
MONOCYTES ABSOLUTE: 0.46 K/UL (ref 0.24–0.82)
MONOCYTES RELATIVE PERCENT: 10.4 % (ref 4.7–12.5)
NEUTROPHILS ABSOLUTE: 3.42 K/UL (ref 1.56–6.13)
NEUTROPHILS RELATIVE PERCENT: 76.9 % (ref 34–71.1)
PDW BLD-RTO: 15.7 % (ref 11.7–14.4)
PLATELET # BLD: 136 K/UL (ref 182–369)
PMV BLD AUTO: 10.9 FL (ref 7.4–10.4)
POTASSIUM SERPL-SCNC: 4.2 MMOL/L (ref 3.5–5.1)
RBC # BLD: 2.68 M/UL (ref 3.93–5.22)
SODIUM BLD-SCNC: 138 MMOL/L (ref 137–145)
TOTAL PROTEIN: 5.4 G/DL (ref 6.3–8.2)
WBC # BLD: 4.44 K/UL (ref 3.98–10.04)

## 2020-08-17 PROCEDURE — 99214 OFFICE O/P EST MOD 30 MIN: CPT | Performed by: INTERNAL MEDICINE

## 2020-08-17 PROCEDURE — 85025 COMPLETE CBC W/AUTO DIFF WBC: CPT

## 2020-08-17 PROCEDURE — 86301 IMMUNOASSAY TUMOR CA 19-9: CPT

## 2020-08-17 PROCEDURE — 80053 COMPREHEN METABOLIC PANEL: CPT

## 2020-08-17 PROCEDURE — 99212 OFFICE O/P EST SF 10 MIN: CPT

## 2020-08-17 RX ORDER — GABAPENTIN 100 MG/1
100 CAPSULE ORAL 3 TIMES DAILY
Qty: 90 CAPSULE | Refills: 3 | Status: SHIPPED | OUTPATIENT
Start: 2020-08-17 | End: 2021-03-03 | Stop reason: ALTCHOICE

## 2020-08-19 ENCOUNTER — APPOINTMENT (OUTPATIENT)
Dept: INFUSION THERAPY | Age: 60
End: 2020-08-19
Payer: COMMERCIAL

## 2020-08-20 ENCOUNTER — HOSPITAL ENCOUNTER (OUTPATIENT)
Dept: INFUSION THERAPY | Age: 60
Discharge: HOME | End: 2020-08-20
Payer: COMMERCIAL

## 2020-08-20 DIAGNOSIS — C25.9 MALIGNANT NEOPLASM OF PANCREAS, UNSPECIFIED LOCATION OF MALIGNANCY (HCC): ICD-10-CM

## 2020-08-20 LAB
BASOPHILS ABSOLUTE: 0.02 K/UL (ref 0.01–0.08)
BASOPHILS RELATIVE PERCENT: 0.5 % (ref 0.1–1.2)
EOSINOPHILS ABSOLUTE: 0.05 K/UL (ref 0.04–0.54)
EOSINOPHILS RELATIVE PERCENT: 1.2 % (ref 0.7–7)
HCT VFR BLD CALC: 28.2 % (ref 34.1–44.9)
HEMOGLOBIN: 8.4 G/DL (ref 11.2–15.7)
LYMPHOCYTES ABSOLUTE: 0.58 K/UL (ref 1.18–3.74)
LYMPHOCYTES RELATIVE PERCENT: 13.9 % (ref 19.3–53.1)
MCH RBC QN AUTO: 30.7 PG (ref 25.6–32.2)
MCHC RBC AUTO-ENTMCNC: 29.8 G/DL (ref 32.3–35.5)
MCV RBC AUTO: 102.9 FL (ref 79.4–94.8)
MONOCYTES ABSOLUTE: 0.67 K/UL (ref 0.24–0.82)
MONOCYTES RELATIVE PERCENT: 16.1 % (ref 4.7–12.5)
NEUTROPHILS ABSOLUTE: 2.84 K/UL (ref 1.56–6.13)
NEUTROPHILS RELATIVE PERCENT: 68.3 % (ref 34–71.1)
PDW BLD-RTO: 16.7 % (ref 11.7–14.4)
PLATELET # BLD: 138 K/UL (ref 182–369)
PMV BLD AUTO: 11.1 FL (ref 7.4–10.4)
RBC # BLD: 2.74 M/UL (ref 3.93–5.22)
WBC # BLD: 4.16 K/UL (ref 3.98–10.04)

## 2020-08-20 PROCEDURE — 85025 COMPLETE CBC W/AUTO DIFF WBC: CPT

## 2020-08-21 ENCOUNTER — HOSPITAL ENCOUNTER (OUTPATIENT)
Dept: CT IMAGING | Age: 60
Discharge: HOME | End: 2020-08-21
Payer: COMMERCIAL

## 2020-08-21 ENCOUNTER — HOSPITAL ENCOUNTER (OUTPATIENT)
Dept: ULTRASOUND IMAGING | Age: 60
Discharge: HOME | End: 2020-08-21
Payer: COMMERCIAL

## 2020-08-21 PROCEDURE — 88305 TISSUE EXAM BY PATHOLOGIST: CPT

## 2020-08-21 PROCEDURE — 6360000004 HC RX CONTRAST MEDICATION: Performed by: INTERNAL MEDICINE

## 2020-08-21 PROCEDURE — 74177 CT ABD & PELVIS W/CONTRAST: CPT

## 2020-08-21 PROCEDURE — 88112 CYTOPATH CELL ENHANCE TECH: CPT

## 2020-08-21 PROCEDURE — 71260 CT THORAX DX C+: CPT

## 2020-08-21 PROCEDURE — C1729 CATH, DRAINAGE: HCPCS

## 2020-08-21 RX ADMIN — IOPAMIDOL 75 ML: 755 INJECTION, SOLUTION INTRAVENOUS at 16:12

## 2020-08-24 ENCOUNTER — HOSPITAL ENCOUNTER (OUTPATIENT)
Dept: INFUSION THERAPY | Age: 60
Discharge: HOME | End: 2020-08-24
Payer: COMMERCIAL

## 2020-08-24 ENCOUNTER — OFFICE VISIT (OUTPATIENT)
Dept: HEMATOLOGY | Age: 60
End: 2020-08-24
Payer: COMMERCIAL

## 2020-08-24 VITALS
DIASTOLIC BLOOD PRESSURE: 90 MMHG | RESPIRATION RATE: 18 BRPM | BODY MASS INDEX: 21.61 KG/M2 | TEMPERATURE: 97.7 F | WEIGHT: 138 LBS | HEART RATE: 75 BPM | SYSTOLIC BLOOD PRESSURE: 148 MMHG

## 2020-08-24 DIAGNOSIS — C25.9 MALIGNANT NEOPLASM OF PANCREAS, UNSPECIFIED LOCATION OF MALIGNANCY (HCC): Primary | ICD-10-CM

## 2020-08-24 LAB
ALBUMIN SERPL-MCNC: 2.4 G/DL (ref 3.5–5.2)
ALP BLD-CCNC: 200 U/L (ref 35–104)
ALT SERPL-CCNC: 39 U/L (ref 9–52)
ANION GAP SERPL CALCULATED.3IONS-SCNC: 3 MMOL/L (ref 7–19)
AST SERPL-CCNC: 55 U/L (ref 14–36)
BASOPHILS ABSOLUTE: 0.02 K/UL (ref 0.01–0.08)
BASOPHILS RELATIVE PERCENT: 0.8 % (ref 0.1–1.2)
BILIRUB SERPL-MCNC: 0.3 MG/DL (ref 0.2–1.3)
BUN BLDV-MCNC: 8 MG/DL (ref 7–17)
CALCIUM SERPL-MCNC: 8 MG/DL (ref 8.4–10.2)
CHLORIDE BLD-SCNC: 109 MMOL/L (ref 98–111)
CO2: 26 MMOL/L (ref 22–29)
CREAT SERPL-MCNC: 0.6 MG/DL (ref 0.5–1)
EOSINOPHILS ABSOLUTE: 0.06 K/UL (ref 0.04–0.54)
EOSINOPHILS RELATIVE PERCENT: 2.4 % (ref 0.7–7)
GFR NON-AFRICAN AMERICAN: >60
GLOBULIN: 2.6 G/DL
GLUCOSE BLD-MCNC: 128 MG/DL (ref 74–106)
HCT VFR BLD CALC: 24 % (ref 34.1–44.9)
HEMOGLOBIN: 7.9 G/DL (ref 11.2–15.7)
LYMPHOCYTES ABSOLUTE: 0.5 K/UL (ref 1.18–3.74)
LYMPHOCYTES RELATIVE PERCENT: 20.1 % (ref 19.3–53.1)
MCH RBC QN AUTO: 30.6 PG (ref 25.6–32.2)
MCHC RBC AUTO-ENTMCNC: 32.9 G/DL (ref 32.3–35.5)
MCV RBC AUTO: 93 FL (ref 79.4–94.8)
MONOCYTES ABSOLUTE: 0.54 K/UL (ref 0.24–0.82)
MONOCYTES RELATIVE PERCENT: 21.7 % (ref 4.7–12.5)
NEUTROPHILS ABSOLUTE: 1.37 K/UL (ref 1.56–6.13)
NEUTROPHILS RELATIVE PERCENT: 55 % (ref 34–71.1)
PDW BLD-RTO: 16.2 % (ref 11.7–14.4)
PLATELET # BLD: 168 K/UL (ref 182–369)
PMV BLD AUTO: 10.4 FL (ref 7.4–10.4)
POTASSIUM SERPL-SCNC: 4 MMOL/L (ref 3.5–5.1)
RBC # BLD: 2.58 M/UL (ref 3.93–5.22)
SODIUM BLD-SCNC: 138 MMOL/L (ref 137–145)
TOTAL PROTEIN: 5.1 G/DL (ref 6.3–8.2)
WBC # BLD: 2.49 K/UL (ref 3.98–10.04)

## 2020-08-24 PROCEDURE — 96415 CHEMO IV INFUSION ADDL HR: CPT

## 2020-08-24 PROCEDURE — 80053 COMPREHEN METABOLIC PANEL: CPT

## 2020-08-24 PROCEDURE — 96366 THER/PROPH/DIAG IV INF ADDON: CPT

## 2020-08-24 PROCEDURE — G0498 CHEMO EXTEND IV INFUS W/PUMP: HCPCS

## 2020-08-24 PROCEDURE — 2580000003 HC RX 258: Performed by: INTERNAL MEDICINE

## 2020-08-24 PROCEDURE — 96413 CHEMO IV INFUSION 1 HR: CPT

## 2020-08-24 PROCEDURE — 96367 TX/PROPH/DG ADDL SEQ IV INF: CPT

## 2020-08-24 PROCEDURE — 96375 TX/PRO/DX INJ NEW DRUG ADDON: CPT

## 2020-08-24 PROCEDURE — 6360000002 HC RX W HCPCS: Performed by: INTERNAL MEDICINE

## 2020-08-24 PROCEDURE — 85025 COMPLETE CBC W/AUTO DIFF WBC: CPT

## 2020-08-24 PROCEDURE — 99214 OFFICE O/P EST MOD 30 MIN: CPT | Performed by: INTERNAL MEDICINE

## 2020-08-24 RX ORDER — METHYLPREDNISOLONE SODIUM SUCCINATE 125 MG/2ML
125 INJECTION, POWDER, LYOPHILIZED, FOR SOLUTION INTRAMUSCULAR; INTRAVENOUS ONCE
Status: CANCELLED | OUTPATIENT
Start: 2020-08-24

## 2020-08-24 RX ORDER — SODIUM CHLORIDE 9 MG/ML
INJECTION, SOLUTION INTRAVENOUS CONTINUOUS
Status: CANCELLED | OUTPATIENT
Start: 2020-08-24

## 2020-08-24 RX ORDER — PALONOSETRON 0.05 MG/ML
0.25 INJECTION, SOLUTION INTRAVENOUS ONCE
Status: CANCELLED | OUTPATIENT
Start: 2020-08-24

## 2020-08-24 RX ORDER — DEXTROSE MONOHYDRATE 50 MG/ML
INJECTION, SOLUTION INTRAVENOUS ONCE
Status: CANCELLED | OUTPATIENT
Start: 2020-08-24

## 2020-08-24 RX ORDER — DEXTROSE MONOHYDRATE 50 MG/ML
INJECTION, SOLUTION INTRAVENOUS ONCE
Status: COMPLETED | OUTPATIENT
Start: 2020-08-24 | End: 2020-08-24

## 2020-08-24 RX ORDER — SODIUM CHLORIDE 0.9 % (FLUSH) 0.9 %
5 SYRINGE (ML) INJECTION PRN
Status: CANCELLED | OUTPATIENT
Start: 2020-08-24

## 2020-08-24 RX ORDER — DEXAMETHASONE SODIUM PHOSPHATE 10 MG/ML
10 INJECTION, SOLUTION INTRAMUSCULAR; INTRAVENOUS ONCE
Status: CANCELLED | OUTPATIENT
Start: 2020-08-24

## 2020-08-24 RX ORDER — HEPARIN SODIUM (PORCINE) LOCK FLUSH IV SOLN 100 UNIT/ML 100 UNIT/ML
500 SOLUTION INTRAVENOUS PRN
Status: DISCONTINUED | OUTPATIENT
Start: 2020-08-24 | End: 2020-08-25 | Stop reason: HOSPADM

## 2020-08-24 RX ORDER — DEXAMETHASONE SODIUM PHOSPHATE 10 MG/ML
10 INJECTION, SOLUTION INTRAMUSCULAR; INTRAVENOUS ONCE
Status: COMPLETED | OUTPATIENT
Start: 2020-08-24 | End: 2020-08-24

## 2020-08-24 RX ORDER — DIPHENHYDRAMINE HYDROCHLORIDE 50 MG/ML
50 INJECTION INTRAMUSCULAR; INTRAVENOUS ONCE
Status: CANCELLED | OUTPATIENT
Start: 2020-08-24

## 2020-08-24 RX ORDER — PALONOSETRON 0.05 MG/ML
0.25 INJECTION, SOLUTION INTRAVENOUS ONCE
Status: COMPLETED | OUTPATIENT
Start: 2020-08-24 | End: 2020-08-24

## 2020-08-24 RX ORDER — HEPARIN SODIUM (PORCINE) LOCK FLUSH IV SOLN 100 UNIT/ML 100 UNIT/ML
500 SOLUTION INTRAVENOUS PRN
Status: CANCELLED | OUTPATIENT
Start: 2020-08-24

## 2020-08-24 RX ORDER — SODIUM CHLORIDE 0.9 % (FLUSH) 0.9 %
10 SYRINGE (ML) INJECTION PRN
Status: DISCONTINUED | OUTPATIENT
Start: 2020-08-24 | End: 2020-08-25 | Stop reason: HOSPADM

## 2020-08-24 RX ORDER — SODIUM CHLORIDE 0.9 % (FLUSH) 0.9 %
10 SYRINGE (ML) INJECTION PRN
Status: CANCELLED | OUTPATIENT
Start: 2020-08-24

## 2020-08-24 RX ORDER — SODIUM CHLORIDE 9 MG/ML
INJECTION, SOLUTION INTRAVENOUS ONCE
Status: CANCELLED | OUTPATIENT
Start: 2020-08-24

## 2020-08-24 RX ORDER — EPINEPHRINE 1 MG/ML
0.3 INJECTION, SOLUTION, CONCENTRATE INTRAVENOUS PRN
Status: CANCELLED | OUTPATIENT
Start: 2020-08-24

## 2020-08-24 RX ADMIN — FLUOROURACIL 3300 MG: 50 INJECTION, SOLUTION INTRAVENOUS at 13:39

## 2020-08-24 RX ADMIN — PALONOSETRON 0.25 MG: 0.05 INJECTION, SOLUTION INTRAVENOUS at 11:02

## 2020-08-24 RX ADMIN — DEXTROSE MONOHYDRATE: 50 INJECTION, SOLUTION INTRAVENOUS at 11:02

## 2020-08-24 RX ADMIN — LEUCOVORIN CALCIUM 560 MG: 350 INJECTION, POWDER, LYOPHILIZED, FOR SOLUTION INTRAMUSCULAR; INTRAVENOUS at 11:27

## 2020-08-24 RX ADMIN — DEXAMETHASONE SODIUM PHOSPHATE 10 MG: 10 INJECTION, SOLUTION INTRAMUSCULAR; INTRAVENOUS at 11:02

## 2020-08-24 RX ADMIN — OXALIPLATIN 117 MG: 5 INJECTION, SOLUTION INTRAVENOUS at 11:24

## 2020-08-24 NOTE — PROGRESS NOTES
Yoselyn Clock   1960 8/24/2020     Chief Complaint   Patient presents with    Cancer      INTERVAL HISTORY/HISTORY OF PRESENT ILLNESS:  The patient is a very pleasant 64years old female who has a diagnosis of pancreatic adenocarcinoma. She initially had localized disease and underwent neoadjuvant chemotherapy followed by surgery at [facility]. Unfortunately, the patient was found to have recurrence of her disease in the abdomen. She has received 2 prior lines of therapy and unfortunately had disease progression. She is currently receiving fourth line treatment with  Irinotecan liposome 70 mg/m² with leucovorin 400 mg/m² and 5-FU 2400 mg/m² over 46 hours every 2 weeks. She has tolerating treatments with complains fatigue. In addition, the patient has recurrent GI bleed due to a anastomotic ulcer on the site of prior surgery. She has had several EGD/colonoscopy and capsule endoscopy. She has been receiving her palliative chemotherapy with complaints of mild diarrhea, nausea vomiting grade 1, fatigue. She has recurrent ascites. She has had paracentesis with removal of a milky appearing ascites on 8/21/2020.   She has been interested in being referred for a clinical trial.      ONCOLOGIC HISTORY:   Diagnosis  · Pancreatic adenocarcinoma, January 2018   · [id number]  · 7/6/2019-extended genetic panel-negative for a deleterious mutation     Treatment summary  · March 2018-Surgical consultation at [address] [place] [address] operable   · 4/3/2018 through 6/4/2018 Neoadjuvant chemotherapy FOLFORINOX ×5 Biweekly cycles   · 4/11/2018-Biliary stent   · August 2018- XRT 30 Gy/Xeloda   · 08/30/3018- Whipple procedure @ [facility]   · Recommended another 5 biweekly cycles of modified FOLFORINOX completed 12/26/2018   · 7/9/2019- 1/22/2020 Gemzar/Abraxane 125 mg/m2 q 14 days, discontinued due to progression of disease  · 3/4/2020- Irinotecan liposome 70 mg/m² with leucovorin 400 mg/m² and 5-FU 2400 mg/m² over 46 hours every 2 weeks.     Tumor marker  · 3/12/9491-LH-38-9->430->370. · 4/30/2018 - CA 19- 9 of 157   · 5/25/20184254-SH-85-9->32 after 4 cycles. · 08/02/2018- -> 8   · 10/01/2018- CA 19-9 -5   · 10/29/2018-CA 19-9- 7   · 12/3/7148-QS-71-9-7   · 04/11/2019-CA-19-9- 12   · 05/21/2019- CA 19-9- 41   · 7/2/2019-CA 19 9 = 114  · 7/9/2019- CA-19-9 = 225  · 8/6/2019- CA-19-9 = 171  · 9/3/6221-LI-69-9 = 198  · 9/13/20195793-KF-37-9 = 98 (at  [address])  · 10/29/6859-TG-81-9 =317  · 11/21/2019-CA-19-9 = 183  · 1/23/20207831-UH-19-9 = 520  · 4/22/2020- CA-19-9 = 940  · 5/13/20200761-CR-44-9 = 129  · 6/1/20208541-BI-03-9 = 523??  · 7/6/2020- CA 19-9- 483  · 8/17/20205025-UQ-82-9 = 785        Cancer history  Ms Daniella Kelly was seen in initial oncology consultation on 3/12/2018 referred by Dr. Faustino Lockwood for a diagnosis of pancreatic adenocarcinoma. She was initially evaluated for acute pancreatitis at [facility] on February 2018. Further imaging revealed a pancreatic head mass. Lipase was elevated at 1184 and CA-19-9 elevated 134. The symptoms were going on for several weeks. Weight loss of 10-12 pounds over the last 6 months. · October 2017-CT abdomen without contrast was unremarkable. · 2/2/2018-ultrasound of abdomen showed a pancreatic duct dilation   · 2/02/2018-CT abdomen showed 16 mm low-density lesion in the pancreas at the junction of the head of the body. No intra-abdominal adenopathy. No liver metastasis. · 2/7/2018-the patient underwent EGD/EUS and FNA biopsy of a pancreatic mass by Dr. Faustino Lockwood at [facility] . EUS showed inflammatory changes consistent with a recent history of pancreatitis. Main pancreatic duct was dilated. No concerning peripancreatic adenopathy. No definite mass was seen by a more diffuse hypoechoic area measuring 1.8 x 2.2 cm in the head of the pancreas. Pathology was consistent with a group of markedly atypical cells strongly suspicious for adenocarcinoma. · 2/28/2018-CT pancreatic protocol showed a poorly defined area of decreased enhancement located in the head of the pancreas measuring 1.8 x 1.4 x 1.7 cm with moderate meditation of the pancreatic duct. Well defined sharply marginated low density nodule located in the tail of the pancreas measuring 1.5 x 1.3 x 1.5 cm (IPMN?). · 3/6/2018-CA 19-9 was elevated at 150. Repeat EGD was performed by Dr. Lisa Hwang due to the inconclusive FNA resulted on 2/7/2018. Pathology FNA was consistent with pancreatic adenocarcinoma. · 3/12/2018-she was first seen by me. No evidence of distant disease. Referral to Surgical oncology. CT chest to complete staging. CA-19-9 430. · 3/16/2018-CT of the chest was unremarkable for intrathoracic metastatic disease   · Surgery consultation at [facility] March 2018- with Dr Jesús Serna. She was not a surgical candidate upfront. Recommended neoadjuvant chemotherapy. · 4/3/2018-started on neoadjuvant chemotherapy with FOLFORINOX. · 4/11/2018-she developed CBD obstruction had a CBD stent placed by Dr. Tyler Rowe. · 4/3/2018 through 6/4/2018 Neoadjuvant chemotherapy FOLFORINOX ×5 Biweekly cycles   · August 2018- XRT 30 Gy/Xeloda   · 08/30/3018- Whipple procedure at [facility] by Dr. Colin Uribe  · Recommended another 7 biweekly cycles of modified FOLFORINOX. · 9/5/2018-CT of the chest abdomen pelvis was unremarkable for metastatic disease. · 1/14/2019-CT abdomen and pelvis at [facility] showed no evidence of metastasis in the chest abdomen pelvis. · 5/21/2019-CT chest, abdomen, pelvis at [facility] showed no evidence of metastatic disease   · 5/21/20198811-CE-89-9 = 42   · 06/12/2019- CA-19-9 = 154. Discussed with the patient. We'll also discuss with [facility]. · 7/1/2019-CT chest, abdomen, pelvis showed a soft tissue mass measuring 1.4 x 1.1 cm, not present on prior scan from January 2019, concerning for recurrence. Stable hypodense in the liver, too small to characterize.  Subcentimeter ill-defined area of low attenuation liver may represent an early metastasis. · 7/3/2019-recommended palliative chemotherapy with nab paclitaxel 125mg/m² IV over 30 minutes on day 1 and gemcitabine 600 mg/m² IV over 10mg/m2/min (fixed dose rate) on day 1  · 7/2/2019-CA 19 9 = 114  · 7/6/2019- extended genetic panel negative for a deleterious mutation (invitae)  · 7/9/2019- CA-19-9 = 225  · 8/6/2019- CA-19-9 = 171  · 9/3/4373-MU-44-9 = 198   · 9/13/20196955-RX-83-9 = 98 at [address]  · 9/13/2019-CT chest abdomen pelvis at [address] showed a soft tissue thickening in the pancreatic bed with persistent narrowing of the portal SMV confluence.  Superimposed tumor remains a possibility.  The new low-density lesion in the periphery of the liver seen on the prior exam is no longer appreciated and likely represents treatment effect.  Nodular enhancement may represent perfusion change in the region on image 187. · 9/13/2018- she was recommended to continue current treatment with Gemzar/Abraxane  · 11/1/2019- she was hospitalized with GI bleed.  An upper endoscopy showed ulceration at the efferent loop of the Whipple's procedure  · 10/29/7496-HI-39-9 =317  · 11/21/2019-CA-19-9 = 183  · 11/19/2019- CT chest abdomen pelvis showed no evidence of gross disease progression. Improvement of the caliber of what may be a middle colic vein that drains back to the SMV. There is still persistent narrowing of the of the upper SMV at the juncture with the portal vein.  No definite evidence of liver metastases at this time. No definite evidence of pulmonary metastases.  However, question of slight increase in prominence of subtle soft tissue thickening within the right paracolic gutter could be indicative of metastatic disease to peritoneum.   · 12/9/2019- colonoscopy by GI was unremarkable.  This was performed for complaints of maroon-colored stools.   · 1/23/20200314-JZ-87-9 = 520  · 1/28/2020- CT chest abdomen pelvis at [address] showed progressive disease with recurrence at the retroperitoneal just inferior to the pancreaticojejunostomy with vascular involvement and complete occlusion of the portal vein and SMV resulting in worsening bowel edema and developing ascites. This is consistent with disease progression. · 3/4/2020- 4th line therapy Irinotecan liposome 70 mg/m² with leucovorin 400 mg/m² and 5-FU 2400 mg/m² over 46 hours every 2 weeks. · 5/12/202 Ct Chest W Contrast  No acute findings in the chest.  Slight increase in size of LEFT supraclavicular lymph nodes and subcarinal mediastinal lymph node. Recommend special attention on follow-up imaging to evaluate for stability or resolution. Ct Abdomen Pelvis W Iv Contrast   Postoperative change of Whipple with residual stranding and free fluid in the surgical bed. Residual soft tissue nodular prominence in the retroperitoneum adjacent to the portal vein which is compressed and occluded/thrombosed. The splenic vein is not visualized and presumed thrombosed. LEFT upper quadrant perigastric/periesophageal varices. Moderate volume ascites. 4.  No evidence of bowel obstruction. Mild distention of the stomach, which may be secondary to slow flow through the gastrojejunostomy. Correlate clinically. There is a small linear device in the distal esophagus which could represent a hemostasis clip but recommend clinical correlation. · 5/13/2020- CA19.9-129  · 6/1/2020- CA 19.9- 523  · 6/15/2020- CA 19.9- 383  · 6/21/2020- Ct Head Wo Contrast No acute intracranial process. Findings in agreement with the emergent findings from the initial [facility] preliminary report. · 6/21/2020- Ct Abdomen Pelvis W Contrast Prior Whipple procedure with expected pneumobilia. Large volume ascites for patient size. 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 Diffuse wall thickening along the small and large bowel secondary to this venous congestion.  No evidence for arterial ischemia, as the bowel mucosa appears to enhance normally. No evidence of viscus perforation or peritoneal abscess. Of note, the the retroperitoneal structures are quite decompressed as a result of the ascites, with near complete attenuation of the IVC in the mid abdomen. Unsure if this is contributing to any lower extremity edema. If evidence of multiorgan dysfunction, abdominal compartment syndrome could also be considered. The results of this exam were discussed with Dr. Kari Wells on 6/21/2020 at 1002 hours. In particular, I wanted to address if there was indication/need for therapeutic paracentesis. This is under consideration. · 6/21/2020- US Guided Paracentesis Ultrasound-guided abdominal paracentesis performed for therapeutic purposes. A 60 cc aspirate was set aside for lab evaluation, if desired. The patient tolerated the proceed well. Cytology was negative for malignant cells. · 7/6/2020 CA19.9- 483. · 8/17/2020 EB02.2-837  · 8/21/2020 CT Chest/Abdomen/Pelvis- No acute intrathoracic abnormality. Hiatal hernia containing ascitic fluid. Questionable wall thickening of the distal esophagus. No pathologic intrathoracic or axillary lymphadenopathy. Stable subcentimeter left supraclavicular/lower jugular chain and subcarinal lymph nodes compared to 5/12/2020. Previous Whipple procedure with associated pneumobilia. Stable diffuse prominence of the intrapancreatic duct within the residual pancreatic body and tail with no discrete pancreatic mass or convincing evidence of solid organ metastasis. Small volume of ascites. Ultrasound-guided paracentesis performed prior to this exam. 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 Multiple collateral vascular structures described above. Diffuse soft tissue anasarca. Trace left pleural effusion. Wall thickening of nondilated small bowel loops and rectosigmoid colon may relate to hypoproteinemia and third spacing of fluid.  Nonspecific inflammatory infectious enterocolitis is a second possibility. Moderate size hiatal hernia. · 2020 Us Guided Paracentesis-Ultrasound-guided abdominal paracentesis performed for diagnostic and therapeutic purposes. The patient tolerated the proceed well.  Signed by Dr Harshad Ernandez on 2020 2:38 PM        PAST MEDICAL HISTORY:   Past Medical History:   Diagnosis Date    Acute pancreatitis     Adult BMI <19 kg/sq m     Anemia     Cat esophagus     Biliary obstruction     GERD (gastroesophageal reflux disease)     with Barretts    Hashimoto's thyroiditis     denies takes no med for    Heart murmur     Hypertension     pt denies nor longer takes med for    Low blood sugar     h/o when overweight in the past    MVP (mitral valve prolapse)     Myalgia     Palliative care patient 2020    Pancreatic adenocarcinoma ([facility] [address] 2018    Dr Nidia Whitney    Pancreatic cancer ([facility] [address] 3/30/2018    Right flank pain     RUQ pain           PAST SURGICAL HISTORY:  Past Surgical History:   Procedure Laterality Date    CARDIAC CATHETERIZATION      heart cath     SECTION      x 3    CHOLECYSTECTOMY  1997    COLONOSCOPY  years ago    [facility]-Dr White Laverne per patient    COLONOSCOPY  2014    Dr Chepe Clark- Mariusz Valdes recall    COLONOSCOPY  03/10/2016    Dr Mcfarland-10 yr recall    COLONOSCOPY N/A 2019    Dr Georgina Borges, 5 yr recall    ELBOW SURGERY Right     ENDOMETRIAL ABLATION      HAND SURGERY Right     HERNIA REPAIR      hiatal    HERNIA REPAIR      umbilical    HERNIA REPAIR      PANCREAS SURGERY  2018    Whipple procedure-Dr Emir Courtney [place] EGD [facility] NEEDLE ASPIR/BIOP ALTERED ANATOMY N/A 2018    Dr Stephanie Ho w/fna-Dilation of main pancreatic duct with diffuse change in the pancreatitis noted, area of concern in the neck of the pancreas-strongly suspicious for adenocarcinoma     [place] EGD INTRMURAL NEEDLE ASPIR/BIOP ALTERED ANATOMY N/A 3/6/2018    Dr Noam Fischer [place]-Pancreatic cancer-Ductal adenocarcinoma-staged [id number] by EUS, pancreatic pseudocyst in the tail the pancreas    [place] ERCP DX COLLECTION SPECIMEN BRUSHING/WASHING N/A 4/11/2018    Dr KVNG Duncan-gianluca/placement of a self-expanding fully covered 10 mm biliary stent    UPPER GASTROINTESTINAL ENDOSCOPY  years ago    [facility]-Dr Giuseppe People    UPPER GASTROINTESTINAL ENDOSCOPY  2013    Zuniga    UPPER GASTROINTESTINAL ENDOSCOPY N/A 11/1/2019    Dr Danilo Varma post Whipple's procedure with efferent loop ulceration    UPPER GASTROINTESTINAL ENDOSCOPY  12/17/2019    Dr Noam Duncan-Patent G-J Anastomosis, apparent healing ulceration    UPPER GASTROINTESTINAL ENDOSCOPY N/A 2/25/2020    Dr Idris Kim amount of food retention in the stomach with bile, hiatal hernia, will need repeat    UPPER GASTROINTESTINAL ENDOSCOPY N/A 2/27/2020    Dr Tony Murry erosion/ulceration at the suture line, post whipple surgical changes    UPPER GASTROINTESTINAL ENDOSCOPY N/A 3/9/2020    Dr Tony Murry erosion along the previous whipple suture line    UPPER GASTROINTESTINAL ENDOSCOPY N/A 4/16/2020    Dr KVNG Duncan-gianluca/hemostatic clip placement x 1-Allie Peres tear at [facility]J-Likely culprit lesion for current presentation    UPPER GASTROINTESTINAL ENDOSCOPY N/A 6/22/2020    Dr Tony Murry erosion along the previous whipple suture line        SOCIAL HISTORY:  Social History     Socioeconomic History    Marital status:      Spouse name: None    Number of children: 3    Years of education: None    Highest education level: None   Occupational History    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[place] [address] Occupation: [employer]    Occupation: [employer]   Social Needs    Financial resource strain: None    Food insecurity     Worry: None     Inability: None    Transportation needs     Medical: None     Non-medical: None   Tobacco Use    Smoking status: Former Smoker     Packs/day: 0.50     Years: 10.00 Pack years: 5.00     Types: Cigarettes     Last attempt to quit: 2019     Years since quittin.8    Smokeless tobacco: Never Used   Substance and Sexual Activity    Alcohol use: Not Currently    Drug use: Never    Sexual activity: None     Comment: 3   Lifestyle    Physical activity     Days per week: None     Minutes per session: None    Stress: None   Relationships    Social connections     Talks on phone: None     Gets together: None     Attends Church service: None     Active member of club or organization: None     Attends meetings of clubs or organizations: None     Relationship status: None    Intimate partner violence     Fear of current or ex partner: None     Emotionally abused: None     Physically abused: None     Forced sexual activity: None   Other Topics Concern    None   Social History Narrative    CODE STATUS: Full Code    HEALTH CARE PROXY: her [facility]    AMBULATES: independently    DOMICILED: lives in a private home, without steps inside, lives alone, has 2 dogs, no steps in to home       FAMILY HISTORY:  Family History   Problem Relation Age of Onset    Heart Attack Mother 46        x 2-had bypass    Hypertension Mother     COPD Father     Liver Cancer Paternal Aunt     Lung Cancer Paternal Aunt     Breast Cancer Maternal Aunt         but  of brain cancer    Diabetes Maternal Uncle     Diabetes Maternal Grandmother     Colon Cancer Neg Hx     Colon Polyps Neg Hx     Esophageal Cancer Neg Hx     Rectal Cancer Neg Hx     Stomach Cancer Neg Hx         Current Outpatient Medications   Medication Sig Dispense Refill    gabapentin (NEURONTIN) 100 MG capsule Take 1 capsule by mouth 3 times daily for 120 days.  Intended supply: 90 days 90 capsule 3    pantoprazole (PROTONIX) 40 MG tablet Take 1 tablet by mouth 2 times daily 60 tablet 3    Cyanocobalamin (VITAMIN B 12 PO) Take by mouth      promethazine (PHENERGAN) 25 MG tablet Take 1 tablet by mouth every 6 hours as needed for Nausea 30 tablet 2    sucralfate (CARAFATE) 1 GM tablet Take 1 tablet by mouth 4 times daily 360 tablet 1    lidocaine-prilocaine (EMLA) 2.5-2.5 % cream Apply to port area and cover with plastic wrap one hour prior to use. 1 Tube 1    vitamin E 400 UNIT capsule Take 400 Units by mouth daily      NONFORMULARY daily Tumeric otc      ondansetron (ZOFRAN) 8 MG tablet Take 1 tablet by mouth every 8 hours as needed for Nausea or Vomiting 30 tablet 3    Multiple Vitamins-Minerals (THERAPEUTIC MULTIVITAMIN-MINERALS) tablet Take 1 tablet by mouth daily       No current facility-administered medications for this visit. REVIEW OF SYSTEMS:    Constitutional: no fever, no night sweats, fatigue;   HEENT: no blurring of vision, no double vision, no hearing difficulty, no tinnitus,no ulceration, no dysphagia  Lungs: no cough, no shortness of breath, no wheeze;   CVS: no palpitation, no chest pain, no shortness of breath; bilateral leg edema  GI: abdominal distention no abdominal pain, no nausea , no vomiting, no constipation; melanotic stools  VIKRAM: no dysuria, frequency and urgency, no hematuria, no kidney stones;   Musculoskeletal: no joint pain, swelling , stiffness;   Endocrine: no polyuria, polydypsia, no cold or heat intolerence; Hematology/lymphatic: no easy brusing or bleeding, no hx of clotting disorder; no peripheral adenopathy. Dermatology: no skin rash, no eczema, no pruritis;   Psychiatry: no depression, no anxiety,no panic attacks, no suicide ideation;    Neurology: no syncope, no seizures, no numbness or tingling of hands, numbness or tingling of feet, no paresis;     PHYSICAL EXAM:    Vitals signs:  BP (!) 148/90   Pulse 75   Temp 97.7 °F (36.5 °C)   Resp 18   Wt 138 lb (62.6 kg)   BMI 21.61 kg/m²    Pain scale:  Pain Score:   0 - No pain   CONSTITUTIONAL: Alert, appropriate, no acute distress,   EYES: Non icteric, EOM intact, pupils equal patient    Anemia, unspecified type       #Metastatic pancreatic cancer  Fourth line treatment with modified FOLFOX 7 with 20% dose reduction. Last CA-19-9 = 523->383, 483->785. CT chest abdomen pelvis showed no measurable disease but recurrent ascites. Due to elevation of her cancer markers and recurrent ascites I offer the patient modified FOLFOX 7 regimen with 20% dose reduction  She is also interested in being referred for consideration of clinical trial.  We will make a referral to [facility]. #GI bleed- secondary to erosion at previous Whipple anastomotic site. history of erosion at the anastomotic site with several instances of upper GI bleed in the past.  No further episodes of GI bleed.     #Normocytic anemia- secondary to GI bleed. Hemoglobin 7.9. She had a capsule endoscopy performed by GI that showed no source of bleeding. She had another episode of GI bleed and was admitted to [address] [place] [address] in [place]. She received 3 units PRBCs. #Prognosis-poor prognosis overall. She has had disease progression several lines of treatment before. 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 Cytology was negative. 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 Unfortunately, patient has a history of GI bleed and therefore she has a contraindication for anticoagulation. Status post paracentesis of 4 L. Cytology negative. Plan:   · mFOLFOX7  · CMP   · CBC Mondays and Wednesdays  · CA-19-9 during next visit  · Therapeutic US guided paracentesis every 2 weeks at [facility]  · RTC 2 weeks MD  · Referred to [facility] clinical trials     Follow Up:     Return in about 2 weeks (around 9/7/2020) for appointment with Dr. Franchesca Song in a treatment room. CBC every Mon and Wed       I, Talya Suarez, am scribing for Autumn Salinas MD. Electronically signed by Talya Suarez on 8/24/2020 at 2:50 PM CDT.      I, Dr Filemon Gavin, personally performed the services described in this documentation as scribed by Aleks Noriega MA in my presence and is both accurate and complete. Over 50% of the total visit time of 25 minutes in face to face encounter with the patient, out of which more than 50% of the time was spent in counseling patient or family and coordination of care. Counseling included but was not limited to time spent reviewing labs, imaging studies/ treatment plan and answering questions.

## 2020-08-26 ENCOUNTER — HOSPITAL ENCOUNTER (OUTPATIENT)
Dept: INFUSION THERAPY | Age: 60
Discharge: HOME | End: 2020-08-26
Payer: COMMERCIAL

## 2020-08-26 DIAGNOSIS — C25.9 MALIGNANT NEOPLASM OF PANCREAS, UNSPECIFIED LOCATION OF MALIGNANCY (HCC): Primary | ICD-10-CM

## 2020-08-26 PROCEDURE — 2580000003 HC RX 258: Performed by: INTERNAL MEDICINE

## 2020-08-26 PROCEDURE — 96523 IRRIG DRUG DELIVERY DEVICE: CPT

## 2020-08-26 PROCEDURE — 6360000002 HC RX W HCPCS: Performed by: INTERNAL MEDICINE

## 2020-08-26 RX ORDER — SODIUM CHLORIDE 0.9 % (FLUSH) 0.9 %
10 SYRINGE (ML) INJECTION PRN
Status: DISCONTINUED | OUTPATIENT
Start: 2020-08-26 | End: 2020-08-27 | Stop reason: HOSPADM

## 2020-08-26 RX ORDER — SODIUM CHLORIDE 0.9 % (FLUSH) 0.9 %
10 SYRINGE (ML) INJECTION PRN
Status: CANCELLED | OUTPATIENT
Start: 2020-08-26

## 2020-08-26 RX ORDER — SODIUM CHLORIDE 0.9 % (FLUSH) 0.9 %
20 SYRINGE (ML) INJECTION PRN
Status: DISCONTINUED | OUTPATIENT
Start: 2020-08-26 | End: 2020-08-27 | Stop reason: HOSPADM

## 2020-08-26 RX ORDER — SODIUM CHLORIDE 0.9 % (FLUSH) 0.9 %
20 SYRINGE (ML) INJECTION PRN
Status: CANCELLED | OUTPATIENT
Start: 2020-08-26

## 2020-08-26 RX ORDER — HEPARIN SODIUM (PORCINE) LOCK FLUSH IV SOLN 100 UNIT/ML 100 UNIT/ML
500 SOLUTION INTRAVENOUS PRN
Status: CANCELLED | OUTPATIENT
Start: 2020-08-26

## 2020-08-26 RX ORDER — HEPARIN SODIUM (PORCINE) LOCK FLUSH IV SOLN 100 UNIT/ML 100 UNIT/ML
500 SOLUTION INTRAVENOUS PRN
Status: DISCONTINUED | OUTPATIENT
Start: 2020-08-26 | End: 2020-08-27 | Stop reason: HOSPADM

## 2020-08-26 RX ADMIN — SODIUM CHLORIDE, PRESERVATIVE FREE 10 ML: 5 INJECTION INTRAVENOUS at 13:17

## 2020-08-26 RX ADMIN — HEPARIN 500 UNITS: 100 SYRINGE at 13:17

## 2020-08-27 RX ORDER — PROCHLORPERAZINE MALEATE 10 MG
10 TABLET ORAL EVERY 6 HOURS PRN
Qty: 60 TABLET | Refills: 5 | Status: SHIPPED | OUTPATIENT
Start: 2020-08-27 | End: 2021-03-03 | Stop reason: ALTCHOICE

## 2020-08-27 NOTE — TELEPHONE ENCOUNTER
Pt asked for different nausea med. Signer informed pt that Compazine will be sent in for her. Signer instructed pt that she can't take Phenergan and Compazine at the same time, but she can take Compazine with Zofran. Pt voiced understanding. Script sent to pharmacy.

## 2020-08-31 ENCOUNTER — HOSPITAL ENCOUNTER (OUTPATIENT)
Dept: INFUSION THERAPY | Age: 60
Discharge: HOME | End: 2020-08-31
Payer: COMMERCIAL

## 2020-08-31 DIAGNOSIS — C25.9 MALIGNANT NEOPLASM OF PANCREAS, UNSPECIFIED LOCATION OF MALIGNANCY (HCC): ICD-10-CM

## 2020-08-31 LAB
BASOPHILS ABSOLUTE: 0.03 K/UL (ref 0.01–0.08)
BASOPHILS RELATIVE PERCENT: 0.7 % (ref 0.1–1.2)
EOSINOPHILS ABSOLUTE: 0.12 K/UL (ref 0.04–0.54)
EOSINOPHILS RELATIVE PERCENT: 2.9 % (ref 0.7–7)
HCT VFR BLD CALC: 28.4 % (ref 34.1–44.9)
HEMOGLOBIN: 8.8 G/DL (ref 11.2–15.7)
LYMPHOCYTES ABSOLUTE: 0.8 K/UL (ref 1.18–3.74)
LYMPHOCYTES RELATIVE PERCENT: 19.5 % (ref 19.3–53.1)
MCH RBC QN AUTO: 29.7 PG (ref 25.6–32.2)
MCHC RBC AUTO-ENTMCNC: 31 G/DL (ref 32.3–35.5)
MCV RBC AUTO: 95.9 FL (ref 79.4–94.8)
MONOCYTES ABSOLUTE: 0.46 K/UL (ref 0.24–0.82)
MONOCYTES RELATIVE PERCENT: 11.2 % (ref 4.7–12.5)
NEUTROPHILS ABSOLUTE: 2.7 K/UL (ref 1.56–6.13)
NEUTROPHILS RELATIVE PERCENT: 65.7 % (ref 34–71.1)
PDW BLD-RTO: 16.4 % (ref 11.7–14.4)
PLATELET # BLD: 131 K/UL (ref 182–369)
PMV BLD AUTO: 11.5 FL (ref 7.4–10.4)
RBC # BLD: 2.96 M/UL (ref 3.93–5.22)
WBC # BLD: 4.11 K/UL (ref 3.98–10.04)

## 2020-08-31 PROCEDURE — 85025 COMPLETE CBC W/AUTO DIFF WBC: CPT

## 2020-08-31 RX ORDER — HYDROCODONE BITARTRATE AND ACETAMINOPHEN 7.5; 325 MG/1; MG/1
1 TABLET ORAL EVERY 6 HOURS PRN
Qty: 120 TABLET | Refills: 0 | Status: SHIPPED | OUTPATIENT
Start: 2020-08-31 | End: 2020-09-30

## 2020-09-02 ENCOUNTER — HOSPITAL ENCOUNTER (OUTPATIENT)
Dept: INFUSION THERAPY | Age: 60
Discharge: HOME | End: 2020-09-02
Payer: COMMERCIAL

## 2020-09-02 DIAGNOSIS — C25.9 MALIGNANT NEOPLASM OF PANCREAS, UNSPECIFIED LOCATION OF MALIGNANCY (HCC): ICD-10-CM

## 2020-09-02 LAB
BASOPHILS ABSOLUTE: 0.01 K/UL (ref 0.01–0.08)
BASOPHILS RELATIVE PERCENT: 0.2 % (ref 0.1–1.2)
EOSINOPHILS ABSOLUTE: 0.07 K/UL (ref 0.04–0.54)
EOSINOPHILS RELATIVE PERCENT: 1.3 % (ref 0.7–7)
HCT VFR BLD CALC: 27 % (ref 34.1–44.9)
HEMOGLOBIN: 8.3 G/DL (ref 11.2–15.7)
LYMPHOCYTES ABSOLUTE: 0.88 K/UL (ref 1.18–3.74)
LYMPHOCYTES RELATIVE PERCENT: 16.5 % (ref 19.3–53.1)
MCH RBC QN AUTO: 30 PG (ref 25.6–32.2)
MCHC RBC AUTO-ENTMCNC: 30.7 G/DL (ref 32.3–35.5)
MCV RBC AUTO: 97.5 FL (ref 79.4–94.8)
MONOCYTES ABSOLUTE: 0.59 K/UL (ref 0.24–0.82)
MONOCYTES RELATIVE PERCENT: 11.1 % (ref 4.7–12.5)
NEUTROPHILS ABSOLUTE: 3.78 K/UL (ref 1.56–6.13)
NEUTROPHILS RELATIVE PERCENT: 70.9 % (ref 34–71.1)
PDW BLD-RTO: 16.7 % (ref 11.7–14.4)
PLATELET # BLD: 102 K/UL (ref 182–369)
PMV BLD AUTO: 11.6 FL (ref 7.4–10.4)
RBC # BLD: 2.77 M/UL (ref 3.93–5.22)
WBC # BLD: 5.33 K/UL (ref 3.98–10.04)

## 2020-09-02 PROCEDURE — 85025 COMPLETE CBC W/AUTO DIFF WBC: CPT

## 2020-09-04 ENCOUNTER — HOSPITAL ENCOUNTER (OUTPATIENT)
Dept: ULTRASOUND IMAGING | Age: 60
Discharge: HOME | End: 2020-09-04
Payer: MEDICARE

## 2020-09-04 PROCEDURE — 76705 ECHO EXAM OF ABDOMEN: CPT

## 2020-09-04 NOTE — PROGRESS NOTES
Olivia Quinones   1960 9/8/2020     Chief Complaint   Patient presents with    Pancreatic Cancer      INTERVAL HISTORY/HISTORY OF PRESENT ILLNESS:  The patient is a very pleasant 64years old female who has a diagnosis of pancreatic adenocarcinoma. She initially had localized disease and underwent neoadjuvant chemotherapy followed by surgery at [facility]. Unfortunately, the patient was found to have recurrence of her disease in the abdomen. She has received 2 prior lines of therapy and unfortunately had disease progression. She is currently receiving fourth line treatment with  Irinotecan liposome 70 mg/m² with leucovorin 400 mg/m² and 5-FU 2400 mg/m² over 46 hours every 2 weeks. She has tolerating treatments with complains fatigue. In addition, the patient has recurrent GI bleed due to a anastomotic ulcer on the site of prior surgery. She has had several EGD/colonoscopy and capsule endoscopy. She has been receiving her palliative chemotherapy with complaints of mild diarrhea, nausea vomiting grade 1, fatigue. She has recurrent ascites. She has had paracentesis with removal of a milky appearing ascites on 8/21/2020.   She has been interested in being referred for a clinical trial.    ONCOLOGIC HISTORY:   Diagnosis  · Pancreatic adenocarcinoma, January 2018   · [id number]  · 7/6/2019-extended genetic panel-negative for a deleterious mutation     Treatment summary  · March 2018-Surgical consultation at [address] [place] [address] operable   · 4/3/2018 through 6/4/2018 Neoadjuvant chemotherapy FOLFORINOX ×5 Biweekly cycles   · 4/11/2018-Biliary stent   · August 2018- XRT 30 Gy/Xeloda   · 08/30/3018- Whipple procedure @ [facility]   · Recommended another 5 biweekly cycles of modified FOLFORINOX completed 12/26/2018   · 7/9/2019- 1/22/2020 Gemzar/Abraxane 125 mg/m2 q 14 days, discontinued due to progression of disease  · 3/4/2020- Irinotecan liposome 70 mg/m² with leucovorin 400 mg/m² and 5-FU 2400 mg/m² over 46 hours every 2 weeks.     Tumor marker  · 3/12/4399-WB-13-9->430->370. · 4/30/2018 - CA 19- 9 of 157   · 5/25/20187401-LZ-92-9->32 after 4 cycles. · 08/02/2018- -> 8   · 10/01/2018- CA 19-9 -5   · 10/29/2018-CA 19-9- 7   · 12/3/9262-WJ-15-9-7   · 04/11/2019-CA-19-9- 12   · 05/21/2019- CA 19-9- 41   · 7/2/2019-CA 19 9 = 114  · 7/9/2019- CA-19-9 = 225  · 8/6/2019- CA-19-9 = 171  · 9/3/8666-ER-54-9 = 198  · 9/13/20190350-YM-54-9 = 98 (at [facility] [address])  · 10/29/3964-KK-32-9 =317  · 11/21/2019-CA-19-9 = 183  · 1/23/20203232-RZ-00-9 = 520  · 4/22/2020- CA-19-9 = 940  · 5/13/20207494-WP-87-9 = 129  · 6/1/20200694-MC-78-9 = 523??  · 7/6/2020- CA 19-9- 483  · 8/17/20206533-XW-22-9 = 785        Cancer history  Ms Daniella Kelly was seen in initial oncology consultation on 3/12/2018 referred by Dr. Faustino Lockwood for a diagnosis of pancreatic adenocarcinoma. She was initially evaluated for acute pancreatitis at [facility] on February 2018. Further imaging revealed a pancreatic head mass. Lipase was elevated at 1184 and CA-19-9 elevated 134. The symptoms were going on for several weeks. Weight loss of 10-12 pounds over the last 6 months. · October 2017-CT abdomen without contrast was unremarkable. · 2/2/2018-ultrasound of abdomen showed a pancreatic duct dilation   · 2/02/2018-CT abdomen showed 16 mm low-density lesion in the pancreas at the junction of the head of the body. No intra-abdominal adenopathy. No liver metastasis. · 2/7/2018-the patient underwent EGD/EUS and FNA biopsy of a pancreatic mass by Dr. Faustino Lockwood at [facility] . EUS showed inflammatory changes consistent with a recent history of pancreatitis. Main pancreatic duct was dilated. No concerning peripancreatic adenopathy. No definite mass was seen by a more diffuse hypoechoic area measuring 1.8 x 2.2 cm in the head of the pancreas. Pathology was consistent with a group of markedly atypical cells strongly suspicious for adenocarcinoma. · 2/28/2018-CT pancreatic protocol showed a poorly defined area of decreased enhancement located in the head of the pancreas measuring 1.8 x 1.4 x 1.7 cm with moderate meditation of the pancreatic duct. Well defined sharply marginated low density nodule located in the tail of the pancreas measuring 1.5 x 1.3 x 1.5 cm (IPMN?). · 3/6/2018-CA 19-9 was elevated at 150. Repeat EGD was performed by Dr. Shekhar Guillory due to the inconclusive FNA resulted on 2/7/2018. Pathology FNA was consistent with pancreatic adenocarcinoma. · 3/12/2018-she was first seen by me. No evidence of distant disease. Referral to Surgical oncology. CT chest to complete staging. CA-19-9 430. · 3/16/2018-CT of the chest was unremarkable for intrathoracic metastatic disease   · Surgery consultation at [facility] March 2018- with Dr Pasquale Delvalle. She was not a surgical candidate upfront. Recommended neoadjuvant chemotherapy. · 4/3/2018-started on neoadjuvant chemotherapy with FOLFORINOX. · 4/11/2018-she developed CBD obstruction had a CBD stent placed by Dr. Riddhi Chambers. · 4/3/2018 through 6/4/2018 Neoadjuvant chemotherapy FOLFORINOX ×5 Biweekly cycles   · August 2018- XRT 30 Gy/Xeloda   · 08/30/3018- Whipple procedure at [facility] by Dr. Autumn Thomas  · Recommended another 7 biweekly cycles of modified FOLFORINOX. · 9/5/2018-CT of the chest abdomen pelvis was unremarkable for metastatic disease. · 1/14/2019-CT abdomen and pelvis at [facility] showed no evidence of metastasis in the chest abdomen pelvis. · 5/21/2019-CT chest, abdomen, pelvis at [facility] showed no evidence of metastatic disease   · 5/21/20198123-JG-66-9 = 42   · 06/12/2019- CA-19-9 = 154. Discussed with the patient. We'll also discuss with [facility]. · 7/1/2019-CT chest, abdomen, pelvis showed a soft tissue mass measuring 1.4 x 1.1 cm, not present on prior scan from January 2019, concerning for recurrence. Stable hypodense in the liver, too small to characterize.  Subcentimeter ill-defined area of low attenuation liver may represent an early metastasis. · 7/3/2019-recommended palliative chemotherapy with nab paclitaxel 125mg/m² IV over 30 minutes on day 1 and gemcitabine 600 mg/m² IV over 10mg/m2/min (fixed dose rate) on day 1  · 7/2/2019-CA 19 9 = 114  · 7/6/2019- extended genetic panel negative for a deleterious mutation (invitae)  · 7/9/2019- CA-19-9 = 225  · 8/6/2019- CA-19-9 = 171  · 9/3/2221-AA-83-9 = 198   · 9/13/20196417-AW-01-9 = 98 at [facility]  · 9/13/2019-CT chest abdomen pelvis at [facility] showed a soft tissue thickening in the pancreatic bed with persistent narrowing of the portal SMV confluence.  Superimposed tumor remains a possibility.  The new low-density lesion in the periphery of the liver seen on the prior exam is no longer appreciated and likely represents treatment effect.  Nodular enhancement may represent perfusion change in the region on image 187. · 9/13/2018- she was recommended to continue current treatment with Gemzar/Abraxane  · 11/1/2019- she was hospitalized with GI bleed.  An upper endoscopy showed ulceration at the efferent loop of the Whipple's procedure  · 10/29/8579-WD-15-9 =317  · 11/21/2019-CA-19-9 = 183  · 11/19/2019- CT chest abdomen pelvis showed no evidence of gross disease progression. Improvement of the caliber of what may be a middle colic vein that drains back to the SMV. There is still persistent narrowing of the of the upper SMV at the juncture with the portal vein.  No definite evidence of liver metastases at this time. No definite evidence of pulmonary metastases.  However, question of slight increase in prominence of subtle soft tissue thickening within the right paracolic gutter could be indicative of metastatic disease to peritoneum.   · 12/9/2019- colonoscopy by GI was unremarkable.  This was performed for complaints of maroon-colored stools.   · 1/23/20204296-UP-79-9 = 520  · 1/28/2020- CT chest abdomen pelvis at [facility] showed progressive disease with recurrence at the retroperitoneal just inferior to the pancreaticojejunostomy with vascular involvement and complete occlusion of the portal vein and SMV resulting in worsening bowel edema and developing ascites. This is consistent with disease progression. · 3/4/2020- 4th line therapy Irinotecan liposome 70 mg/m² with leucovorin 400 mg/m² and 5-FU 2400 mg/m² over 46 hours every 2 weeks. · 5/12/202 Ct Chest W Contrast  No acute findings in the chest.  Slight increase in size of LEFT supraclavicular lymph nodes and subcarinal mediastinal lymph node. Recommend special attention on follow-up imaging to evaluate for stability or resolution. Ct Abdomen Pelvis W Iv Contrast   Postoperative change of Whipple with residual stranding and free fluid in the surgical bed. Residual soft tissue nodular prominence in the retroperitoneum adjacent to the portal vein which is compressed and occluded/thrombosed. The splenic vein is not visualized and presumed thrombosed. LEFT upper quadrant perigastric/periesophageal varices. Moderate volume ascites. 4.  No evidence of bowel obstruction. Mild distention of the stomach, which may be secondary to slow flow through the gastrojejunostomy. Correlate clinically. There is a small linear device in the distal esophagus which could represent a hemostasis clip but recommend clinical correlation. · 5/13/2020- CA19.9-129  · 6/1/2020- CA 19.9- 523  · 6/15/2020- CA 19.9- 383  · 6/21/2020- Ct Head Wo Contrast No acute intracranial process. Findings in agreement with the emergent findings from the initial [facility] preliminary report. · 6/21/2020- Ct Abdomen Pelvis W Contrast Prior Whipple procedure with expected pneumobilia. Large volume ascites for patient size. 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 Diffuse wall thickening along the small and large bowel secondary to this venous congestion.  No evidence for arterial ischemia, as the bowel mucosa appears to enhance normally. No evidence of viscus perforation or peritoneal abscess. Of note, the the retroperitoneal structures are quite decompressed as a result of the ascites, with near complete attenuation of the IVC in the mid abdomen. Unsure if this is contributing to any lower extremity edema. If evidence of multiorgan dysfunction, abdominal compartment syndrome could also be considered. The results of this exam were discussed with Dr. Moraima Mirza on 6/21/2020 at 1002 hours. In particular, I wanted to address if there was indication/need for therapeutic paracentesis. This is under consideration. · 6/21/2020- US Guided Paracentesis Ultrasound-guided abdominal paracentesis performed for therapeutic purposes. A 60 cc aspirate was set aside for lab evaluation, if desired. The patient tolerated the proceed well. Cytology was negative for malignant cells. · 7/6/2020 CA19.9- 483. · 8/17/2020 BN15.4-090  · 8/21/2020 CT Chest/Abdomen/Pelvis- No acute intrathoracic abnormality. Hiatal hernia containing ascitic fluid. Questionable wall thickening of the distal esophagus. No pathologic intrathoracic or axillary lymphadenopathy. Stable subcentimeter left supraclavicular/lower jugular chain and subcarinal lymph nodes compared to 5/12/2020. Previous Whipple procedure with associated pneumobilia. Stable diffuse prominence of the intrapancreatic duct within the residual pancreatic body and tail with no discrete pancreatic mass or convincing evidence of solid organ metastasis. Small volume of ascites. Ultrasound-guided paracentesis performed prior to this exam. 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 Multiple collateral vascular structures described above. Diffuse soft tissue anasarca. Trace left pleural effusion. Wall thickening of nondilated small bowel loops and rectosigmoid colon may relate to hypoproteinemia and third spacing of fluid.  Nonspecific inflammatory infectious enterocolitis is a second possibility. Moderate size hiatal hernia. · 2020 Us Guided Paracentesis-Ultrasound-guided abdominal paracentesis performed for diagnostic and therapeutic purposes. The patient tolerated the proceed well.          PAST MEDICAL HISTORY:   Past Medical History:   Diagnosis Date    Acute pancreatitis     Adult BMI <19 kg/sq m     Anemia     Cat esophagus     Biliary obstruction     GERD (gastroesophageal reflux disease)     with Barretts    Hashimoto's thyroiditis     denies takes no med for    Heart murmur     Hypertension     pt denies nor longer takes med for    Low blood sugar     h/o when overweight in the past    MVP (mitral valve prolapse)     Myalgia     Palliative care patient 2020    Pancreatic adenocarcinoma ([facility] [address] 2018    Dr Ginnie Felty    Pancreatic cancer ([facility] [address] 3/30/2018    Right flank pain     RUQ pain           PAST SURGICAL HISTORY:  Past Surgical History:   Procedure Laterality Date    CARDIAC CATHETERIZATION      heart cath     SECTION      x 3    CHOLECYSTECTOMY  1997    COLONOSCOPY  years ago    Steve-Dr Zamora Reading per patient    [facility]    COLONOSCOPY  03/10/2016    Dr Mcfarland-10 yr recall    COLONOSCOPY N/A 2019    Dr Jose Alberto Marie, 5 yr recall    ELBOW SURGERY Right     ENDOMETRIAL ABLATION      HAND SURGERY Right     HERNIA REPAIR      hiatal    HERNIA REPAIR      umbilical    HERNIA REPAIR      PANCREAS SURGERY  2018    Whipple procedure-Dr Maye Perez MD EGD [facility] NEEDLE ASPIR/BIOP ALTERED ANATOMY N/A 2018    Dr Neville Jiménez w/fna-Dilation of main pancreatic duct with diffuse change in the pancreatitis noted, area of concern in the neck of the pancreas-strongly suspicious for adenocarcinoma     MD EGD INTRMURAL NEEDLE ASPIR/BIOP ALTERED ANATOMY N/A 3/6/2018    Dr KVNG Duncan-Pancreatic cancer-Ductal adenocarcinoma-staged [id number] by EUS, pancreatic pseudocyst in the tail the pancreas    AK ERCP DX COLLECTION SPECIMEN BRUSHING/WASHING N/A 4/11/2018    Dr KVNG Atkinson/placement of a self-expanding fully covered 10 mm biliary stent    UPPER GASTROINTESTINAL ENDOSCOPY  years ago    [facility]-Dr Sanders Pac    UPPER GASTROINTESTINAL ENDOSCOPY  2013    Zuniga    UPPER GASTROINTESTINAL ENDOSCOPY N/A 11/1/2019    Dr Omar Sanches post Whipple's procedure with efferent loop ulceration    UPPER GASTROINTESTINAL ENDOSCOPY  12/17/2019    Dr Alice Duncan-Patent G-J Anastomosis, apparent healing ulceration    UPPER GASTROINTESTINAL ENDOSCOPY N/A 2/25/2020    Dr Bobby Donald amount of food retention in the stomach with bile, hiatal hernia, will need repeat    UPPER GASTROINTESTINAL ENDOSCOPY N/A 2/27/2020    Dr Ross Liang erosion/ulceration at the suture line, post whipple surgical changes    UPPER GASTROINTESTINAL ENDOSCOPY N/A 3/9/2020    Dr Ross Liang erosion along the previous whipple suture line    UPPER GASTROINTESTINAL ENDOSCOPY N/A 4/16/2020    Dr KVNG Atkinson/hemostatic clip placement x 1-Allie Peres tear at [facility]-Likely culprit lesion for current presentation    UPPER GASTROINTESTINAL ENDOSCOPY N/A 6/22/2020    Dr Ross Liang erosion along the previous whipple suture line        SOCIAL HISTORY:  Social History     Socioeconomic History    Marital status:      Spouse name: None    Number of children: 3    Years of education: None    Highest education level: None   Occupational History    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[place] [address] Occupation: [employer]    Occupation: [employer]   Social Needs    Financial resource strain: None    Food insecurity     Worry: None     Inability: None    Transportation needs     Medical: None     Non-medical: None   Tobacco Use    Smoking status: Former Smoker     Packs/day: 0.50     Years: 10.00     Pack years: 5.00 Types: Cigarettes     Last attempt to quit: 2019     Years since quittin.8    Smokeless tobacco: Never Used   Substance and Sexual Activity    Alcohol use: Not Currently    Drug use: Never    Sexual activity: None     Comment: 3   Lifestyle    Physical activity     Days per week: None     Minutes per session: None    Stress: None   Relationships    Social connections     Talks on phone: None     Gets together: None     Attends Yarsanism service: None     Active member of club or organization: None     Attends meetings of clubs or organizations: None     Relationship status: None    Intimate partner violence     Fear of current or ex partner: None     Emotionally abused: None     Physically abused: None     Forced sexual activity: None   Other Topics Concern    None   Social History Narrative    CODE STATUS: Full Code    HEALTH CARE PROXY: her daughter, Mrs. Ludivina Cruz, of [place]    AMBULATES: independently    DOMICILED: lives in a private home, without steps inside, lives alone, has 2 dogs, no steps in to home       FAMILY HISTORY:  Family History   Problem Relation Age of Onset    Heart Attack Mother 46        x 2-had bypass    Hypertension Mother     COPD Father     Liver Cancer Paternal Aunt     Lung Cancer Paternal Aunt     Breast Cancer Maternal Aunt         but  of brain cancer    Diabetes Maternal Uncle     Diabetes Maternal Grandmother     Colon Cancer Neg Hx     Colon Polyps Neg Hx     Esophageal Cancer Neg Hx     Rectal Cancer Neg Hx     Stomach Cancer Neg Hx         Current Outpatient Medications   Medication Sig Dispense Refill    HYDROcodone-acetaminophen (NORCO) 7.5-325 MG per tablet Take 1 tablet by mouth every 6 hours as needed for Pain for up to 30 days.  Intended supply: 30 days 120 tablet 0    prochlorperazine (COMPAZINE) 10 MG tablet Take 1 tablet by mouth every 6 hours as needed (nausea) 60 tablet 5    gabapentin (NEURONTIN) 100 MG capsule Take 1 Vitals signs:  BP (!) 146/78   Pulse 70   Temp 98 °F (36.7 °C)   Resp 18   Wt 127 lb (57.6 kg)   SpO2 98%   BMI 19.89 kg/m²    Pain scale:  Pain Score:   0 - No pain   CONSTITUTIONAL: Alert, appropriate, no acute distress,   EYES: Non icteric, EOM intact, pupils equal round and reactive to light and accommodation. ENT: Oral mucus membranes moist, no oral pharyngeal lesions. External inspection of ears and nose are normal.   NECK: Supple, no masses. No palpable thyroid mass    CHEST/LUNGS: CTA bilaterally, normal respiratory effort   CARDIOVASCULAR: RRR, no murmurs. No lower extremity edema   ABDOMEN: soft non-tender, active bowel sounds, no hepatosplenomegaly. No palpable masses. EXTREMITIES: warm, Full ROM of all fours extremities. No focal weakness. SKIN: warm, dry with no rashes or lesions  LYMPH: No cervical, clavicular, axillary, or inguinal lymphadenopathy  NEUROLOGIC: follows commands, non focal.   PSYCH: mood and affect appropriate. Alert and oriented to time and place and person.     Relevant Lab findings/reviewed by me:  Lab Results   Component Value Date     09/08/2020    K 4.2 09/08/2020     09/08/2020    CO2 26 09/08/2020    BUN 6 (L) 09/08/2020    CREATININE 0.6 09/08/2020    GLUCOSE 128 (H) 09/08/2020    CALCIUM 8.0 (LL) 09/08/2020    PROT 5.5 (L) 09/08/2020    LABALBU 2.7 (L) 09/08/2020    BILITOT 0.4 09/08/2020    ALKPHOS 163 (H) 09/08/2020    AST 32 09/08/2020    ALT 25 09/08/2020    LABGLOM >60 09/08/2020    GFRAA >59 08/04/2020    AGRATIO 2.3 (H) 11/21/2019    GLOB 2.8 09/08/2020 8/17/2020:CA19.9-785  Lab Results   Component Value Date    WBC 2.46 (L) 09/08/2020    HGB 7.6 (L) 09/08/2020    HCT 23.4 (LL) 09/08/2020    MCV 91.1 09/08/2020     (L) 09/08/2020     Lab Results   Component Value Date    NEUTROABS 1.52 (L) 09/08/2020     CBC 9/8/20    WBC 2.46  Hgb- 7.6  PLT- 111,000  Neut- 1.52      Relevant Imaging studies/reviewed by me:  · 9/4/20 US guided paracentesis The ultrasound examination of the abdomen is performed and a moderate amount of ascites is seen in the right upper and mid abdomen. The amount of fluid was thought to be insufficient for paracentesis. Paracentesis was not performed. ASSESSMENT    No orders of the defined types were placed in this encounter. Bettye Culp was seen today for pancreatic cancer. Diagnoses and all orders for this visit:    Malignant neoplasm of pancreas, unspecified location of malignancy [facility])    Chemotherapy management, encounter for    Care plan discussed with patient    Anemia, unspecified type    Health care maintenance    Malignant ascites    Gastrointestinal hemorrhage, unspecified gastrointestinal hemorrhage type       #Metastatic pancreatic cancer  Fourth line treatment with modified FOLFOX 7 with 20% dose reduction. Last CA-19-9 = 523->383, 483->785. CT chest abdomen pelvis showed no measurable disease but recurrent ascites. Due to elevation of her cancer markers and recurrent ascites. I offered the patient modified FOLFOX 7 regimen with 20% dose reduction  She is status post cycle #1. CBC today showed significant anemia hemoglobin 7.6. Delay chemotherapy x 1 week  The patient want to delay treatment 1 week. Patient not interested in best supportive care at this time. #GI bleed- secondary to erosion at previous Whipple anastomotic site. history of erosion at the anastomotic site with several instances of upper GI bleed in the past.  No further episodes of GI bleed.     #Normocytic anemia- secondary to GI bleed and chemotherapy. Hemoglobin 7.6. She had a capsule endoscopy performed by GI that showed no source of bleeding. She had another episode of GI bleed and was admitted to [address] [place] [address] in [place]. She received 3 units PRBCs. #Prognosis-poor prognosis overall. She has had disease progression several lines of treatment before. 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   Cytology was negative. 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 Unfortunately, patient has a history of GI bleed and therefore she has a contraindication for anticoagulation. Status post paracentesis of 4 L. Cytology negative. Plan:   · Hold cycle #2 mFOLFOX7 x 1 weeks due to anemia  · CMP   · CBC Mondays and Wednesdays  · CA-19-9 during next visit in 3 weeks  · PRN therapeutic US guided paracentesis every 2 weeks at [facility]  · RTC 3 weeks MD       Follow Up:     Return in about 3 weeks (around 9/29/2020) for treatment and see Magdalena Pederson in [address], [facility]. Data Maral Villafana am scribing for Franchesca Deluna MD. Electronically signed by Therese Villafana on 9/8/2020 at 2:50 PM CDT. I, Dr Deysi Lamb, personally performed the services described in this documentation as scribed by Therese Villafana MA in my presence and is both accurate and complete. Over 50% of the total visit time of 25 minutes in face to face encounter with the patient, out of which more than 50% of the time was spent in counseling patient or family and coordination of care. Counseling included but was not limited to time spent reviewing labs, imaging studies/ treatment plan and answering questions.

## 2020-09-08 ENCOUNTER — HOSPITAL ENCOUNTER (OUTPATIENT)
Dept: INFUSION THERAPY | Age: 60
Discharge: HOME | End: 2020-09-08
Payer: MEDICARE

## 2020-09-08 ENCOUNTER — OFFICE VISIT (OUTPATIENT)
Dept: HEMATOLOGY | Age: 60
End: 2020-09-08
Payer: MEDICARE

## 2020-09-08 VITALS
SYSTOLIC BLOOD PRESSURE: 146 MMHG | HEART RATE: 70 BPM | OXYGEN SATURATION: 98 % | BODY MASS INDEX: 19.89 KG/M2 | WEIGHT: 127 LBS | TEMPERATURE: 98 F | RESPIRATION RATE: 18 BRPM | DIASTOLIC BLOOD PRESSURE: 78 MMHG

## 2020-09-08 DIAGNOSIS — C25.9 MALIGNANT NEOPLASM OF PANCREAS, UNSPECIFIED LOCATION OF MALIGNANCY (HCC): Primary | ICD-10-CM

## 2020-09-08 LAB
ALBUMIN SERPL-MCNC: 2.7 G/DL (ref 3.5–5.2)
ALP BLD-CCNC: 163 U/L (ref 35–104)
ALT SERPL-CCNC: 25 U/L (ref 9–52)
ANION GAP SERPL CALCULATED.3IONS-SCNC: 4 MMOL/L (ref 7–19)
AST SERPL-CCNC: 32 U/L (ref 14–36)
BASOPHILS ABSOLUTE: 0.01 K/UL (ref 0.01–0.08)
BASOPHILS RELATIVE PERCENT: 0.4 % (ref 0.1–1.2)
BILIRUB SERPL-MCNC: 0.4 MG/DL (ref 0.2–1.3)
BUN BLDV-MCNC: 6 MG/DL (ref 7–17)
CALCIUM SERPL-MCNC: 8 MG/DL (ref 8.4–10.2)
CHLORIDE BLD-SCNC: 108 MMOL/L (ref 98–111)
CO2: 26 MMOL/L (ref 22–29)
CREAT SERPL-MCNC: 0.6 MG/DL (ref 0.5–1)
EOSINOPHILS ABSOLUTE: 0.04 K/UL (ref 0.04–0.54)
EOSINOPHILS RELATIVE PERCENT: 1.6 % (ref 0.7–7)
GFR NON-AFRICAN AMERICAN: >60
GLOBULIN: 2.8 G/DL
GLUCOSE BLD-MCNC: 128 MG/DL (ref 74–106)
HCT VFR BLD CALC: 23.4 % (ref 34.1–44.9)
HEMOGLOBIN: 7.6 G/DL (ref 11.2–15.7)
LYMPHOCYTES ABSOLUTE: 0.49 K/UL (ref 1.18–3.74)
LYMPHOCYTES RELATIVE PERCENT: 19.9 % (ref 19.3–53.1)
MCH RBC QN AUTO: 29.6 PG (ref 25.6–32.2)
MCHC RBC AUTO-ENTMCNC: 32.5 G/DL (ref 32.3–35.5)
MCV RBC AUTO: 91.1 FL (ref 79.4–94.8)
MONOCYTES ABSOLUTE: 0.4 K/UL (ref 0.24–0.82)
MONOCYTES RELATIVE PERCENT: 16.3 % (ref 4.7–12.5)
NEUTROPHILS ABSOLUTE: 1.52 K/UL (ref 1.56–6.13)
NEUTROPHILS RELATIVE PERCENT: 61.8 % (ref 34–71.1)
PDW BLD-RTO: 17.1 % (ref 11.7–14.4)
PLATELET # BLD: 111 K/UL (ref 182–369)
PMV BLD AUTO: 12.5 FL (ref 7.4–10.4)
POTASSIUM SERPL-SCNC: 4.2 MMOL/L (ref 3.5–5.1)
RBC # BLD: 2.57 M/UL (ref 3.93–5.22)
SODIUM BLD-SCNC: 138 MMOL/L (ref 137–145)
TOTAL PROTEIN: 5.5 G/DL (ref 6.3–8.2)
WBC # BLD: 2.46 K/UL (ref 3.98–10.04)

## 2020-09-08 PROCEDURE — 96523 IRRIG DRUG DELIVERY DEVICE: CPT

## 2020-09-08 PROCEDURE — 80053 COMPREHEN METABOLIC PANEL: CPT

## 2020-09-08 PROCEDURE — 99214 OFFICE O/P EST MOD 30 MIN: CPT | Performed by: INTERNAL MEDICINE

## 2020-09-08 PROCEDURE — 85025 COMPLETE CBC W/AUTO DIFF WBC: CPT

## 2020-09-08 PROCEDURE — 2580000003 HC RX 258: Performed by: INTERNAL MEDICINE

## 2020-09-08 PROCEDURE — 6360000002 HC RX W HCPCS: Performed by: INTERNAL MEDICINE

## 2020-09-08 RX ORDER — 0.9 % SODIUM CHLORIDE 0.9 %
250 INTRAVENOUS SOLUTION INTRAVENOUS ONCE
Status: CANCELLED | OUTPATIENT
Start: 2020-09-09

## 2020-09-08 RX ORDER — SODIUM CHLORIDE 0.9 % (FLUSH) 0.9 %
10 SYRINGE (ML) INJECTION PRN
Status: DISCONTINUED | OUTPATIENT
Start: 2020-09-08 | End: 2020-09-09 | Stop reason: HOSPADM

## 2020-09-08 RX ORDER — EPINEPHRINE 1 MG/ML
0.3 INJECTION, SOLUTION, CONCENTRATE INTRAVENOUS PRN
Status: CANCELLED | OUTPATIENT
Start: 2020-09-09

## 2020-09-08 RX ORDER — HEPARIN SODIUM (PORCINE) LOCK FLUSH IV SOLN 100 UNIT/ML 100 UNIT/ML
500 SOLUTION INTRAVENOUS PRN
Status: DISCONTINUED | OUTPATIENT
Start: 2020-09-08 | End: 2020-09-09 | Stop reason: HOSPADM

## 2020-09-08 RX ORDER — HEPARIN SODIUM (PORCINE) LOCK FLUSH IV SOLN 100 UNIT/ML 100 UNIT/ML
500 SOLUTION INTRAVENOUS PRN
Status: CANCELLED | OUTPATIENT
Start: 2020-09-08

## 2020-09-08 RX ORDER — SODIUM CHLORIDE 0.9 % (FLUSH) 0.9 %
20 SYRINGE (ML) INJECTION PRN
Status: DISCONTINUED | OUTPATIENT
Start: 2020-09-08 | End: 2020-09-09 | Stop reason: HOSPADM

## 2020-09-08 RX ORDER — SODIUM CHLORIDE 0.9 % (FLUSH) 0.9 %
20 SYRINGE (ML) INJECTION PRN
Status: CANCELLED | OUTPATIENT
Start: 2020-09-09

## 2020-09-08 RX ORDER — SODIUM CHLORIDE 0.9 % (FLUSH) 0.9 %
10 SYRINGE (ML) INJECTION PRN
Status: CANCELLED | OUTPATIENT
Start: 2020-09-08

## 2020-09-08 RX ORDER — SODIUM CHLORIDE 0.9 % (FLUSH) 0.9 %
20 SYRINGE (ML) INJECTION PRN
Status: CANCELLED | OUTPATIENT
Start: 2020-09-08

## 2020-09-08 RX ORDER — FUROSEMIDE 10 MG/ML
20 INJECTION INTRAMUSCULAR; INTRAVENOUS ONCE
Status: CANCELLED | OUTPATIENT
Start: 2020-09-09

## 2020-09-08 RX ORDER — DIPHENHYDRAMINE HYDROCHLORIDE 50 MG/ML
50 INJECTION INTRAMUSCULAR; INTRAVENOUS ONCE
Status: CANCELLED | OUTPATIENT
Start: 2020-09-09

## 2020-09-08 RX ORDER — SODIUM CHLORIDE 9 MG/ML
INJECTION, SOLUTION INTRAVENOUS CONTINUOUS
Status: CANCELLED | OUTPATIENT
Start: 2020-09-09

## 2020-09-08 RX ORDER — METHYLPREDNISOLONE SODIUM SUCCINATE 125 MG/2ML
125 INJECTION, POWDER, LYOPHILIZED, FOR SOLUTION INTRAMUSCULAR; INTRAVENOUS ONCE
Status: CANCELLED | OUTPATIENT
Start: 2020-09-09

## 2020-09-08 RX ADMIN — SODIUM CHLORIDE, PRESERVATIVE FREE 10 ML: 5 INJECTION INTRAVENOUS at 09:52

## 2020-09-08 RX ADMIN — HEPARIN 500 UNITS: 100 SYRINGE at 09:53

## 2020-09-09 ENCOUNTER — HOSPITAL ENCOUNTER (OUTPATIENT)
Dept: INFUSION THERAPY | Age: 60
Setting detail: INFUSION SERIES
Discharge: HOME | End: 2020-09-09
Payer: MEDICARE

## 2020-09-09 VITALS
TEMPERATURE: 97.9 F | DIASTOLIC BLOOD PRESSURE: 82 MMHG | RESPIRATION RATE: 17 BRPM | SYSTOLIC BLOOD PRESSURE: 135 MMHG | HEART RATE: 57 BPM | OXYGEN SATURATION: 100 %

## 2020-09-09 DIAGNOSIS — T45.1X5A ANEMIA ASSOCIATED WITH CHEMOTHERAPY: ICD-10-CM

## 2020-09-09 DIAGNOSIS — D64.81 ANEMIA ASSOCIATED WITH CHEMOTHERAPY: ICD-10-CM

## 2020-09-09 DIAGNOSIS — D64.9 SYMPTOMATIC ANEMIA: Primary | ICD-10-CM

## 2020-09-09 LAB
ABO/RH: NORMAL
ANTIBODY IDENTIFICATION: NORMAL
ANTIBODY SCREEN: NORMAL
BLOOD BANK DISPENSE STATUS: NORMAL
BLOOD BANK DISPENSE STATUS: NORMAL
BLOOD BANK PRODUCT CODE: NORMAL
BLOOD BANK PRODUCT CODE: NORMAL
BPU ID: NORMAL
BPU ID: NORMAL
DESCRIPTION BLOOD BANK: NORMAL
DESCRIPTION BLOOD BANK: NORMAL
E (BIG) ANTIGEN: NORMAL

## 2020-09-09 PROCEDURE — 6360000002 HC RX W HCPCS

## 2020-09-09 PROCEDURE — 86870 RBC ANTIBODY IDENTIFICATION: CPT

## 2020-09-09 PROCEDURE — 36430 TRANSFUSION BLD/BLD COMPNT: CPT

## 2020-09-09 PROCEDURE — 2580000003 HC RX 258: Performed by: INTERNAL MEDICINE

## 2020-09-09 PROCEDURE — 86900 BLOOD TYPING SEROLOGIC ABO: CPT

## 2020-09-09 PROCEDURE — P9016 RBC LEUKOCYTES REDUCED: HCPCS

## 2020-09-09 PROCEDURE — 86850 RBC ANTIBODY SCREEN: CPT

## 2020-09-09 PROCEDURE — 86922 COMPATIBILITY TEST ANTIGLOB: CPT

## 2020-09-09 PROCEDURE — 86905 BLOOD TYPING RBC ANTIGENS: CPT

## 2020-09-09 PROCEDURE — 86901 BLOOD TYPING SEROLOGIC RH(D): CPT

## 2020-09-09 RX ORDER — FUROSEMIDE 10 MG/ML
20 INJECTION INTRAMUSCULAR; INTRAVENOUS ONCE
Status: DISCONTINUED | OUTPATIENT
Start: 2020-09-09 | End: 2020-09-11 | Stop reason: HOSPADM

## 2020-09-09 RX ORDER — FUROSEMIDE 10 MG/ML
20 INJECTION INTRAMUSCULAR; INTRAVENOUS ONCE
Status: CANCELLED | OUTPATIENT
Start: 2020-09-09

## 2020-09-09 RX ORDER — HEPARIN SODIUM (PORCINE) LOCK FLUSH IV SOLN 100 UNIT/ML 100 UNIT/ML
300 SOLUTION INTRAVENOUS PRN
Status: DISCONTINUED | OUTPATIENT
Start: 2020-09-09 | End: 2020-09-11 | Stop reason: HOSPADM

## 2020-09-09 RX ORDER — EPINEPHRINE 1 MG/ML
0.3 INJECTION, SOLUTION, CONCENTRATE INTRAVENOUS PRN
Status: CANCELLED | OUTPATIENT
Start: 2020-09-09

## 2020-09-09 RX ORDER — SODIUM CHLORIDE 0.9 % (FLUSH) 0.9 %
20 SYRINGE (ML) INJECTION PRN
Status: CANCELLED | OUTPATIENT
Start: 2020-09-09

## 2020-09-09 RX ORDER — 0.9 % SODIUM CHLORIDE 0.9 %
250 INTRAVENOUS SOLUTION INTRAVENOUS ONCE
Status: CANCELLED | OUTPATIENT
Start: 2020-09-09

## 2020-09-09 RX ORDER — SODIUM CHLORIDE 9 MG/ML
INJECTION, SOLUTION INTRAVENOUS CONTINUOUS
Status: CANCELLED | OUTPATIENT
Start: 2020-09-09

## 2020-09-09 RX ORDER — 0.9 % SODIUM CHLORIDE 0.9 %
250 INTRAVENOUS SOLUTION INTRAVENOUS ONCE
Status: COMPLETED | OUTPATIENT
Start: 2020-09-09 | End: 2020-09-09

## 2020-09-09 RX ORDER — METHYLPREDNISOLONE SODIUM SUCCINATE 125 MG/2ML
125 INJECTION, POWDER, LYOPHILIZED, FOR SOLUTION INTRAMUSCULAR; INTRAVENOUS ONCE
Status: CANCELLED | OUTPATIENT
Start: 2020-09-09

## 2020-09-09 RX ORDER — SODIUM CHLORIDE 0.9 % (FLUSH) 0.9 %
20 SYRINGE (ML) INJECTION PRN
Status: DISCONTINUED | OUTPATIENT
Start: 2020-09-09 | End: 2020-09-11 | Stop reason: HOSPADM

## 2020-09-09 RX ORDER — DIPHENHYDRAMINE HYDROCHLORIDE 50 MG/ML
50 INJECTION INTRAMUSCULAR; INTRAVENOUS ONCE
Status: CANCELLED | OUTPATIENT
Start: 2020-09-09

## 2020-09-09 RX ORDER — HEPARIN SODIUM (PORCINE) LOCK FLUSH IV SOLN 100 UNIT/ML 100 UNIT/ML
SOLUTION INTRAVENOUS
Status: COMPLETED
Start: 2020-09-09 | End: 2020-09-09

## 2020-09-09 RX ADMIN — SODIUM CHLORIDE 250 ML: 9 INJECTION, SOLUTION INTRAVENOUS at 08:31

## 2020-09-09 RX ADMIN — HEPARIN SODIUM (PORCINE) LOCK FLUSH IV SOLN 100 UNIT/ML 300 UNITS: 100 SOLUTION at 10:23

## 2020-09-09 RX ADMIN — HEPARIN 300 UNITS: 100 SYRINGE at 10:23

## 2020-09-14 ENCOUNTER — HOSPITAL ENCOUNTER (OUTPATIENT)
Dept: INFUSION THERAPY | Age: 60
Discharge: HOME | End: 2020-09-14
Payer: COMMERCIAL

## 2020-09-14 VITALS
SYSTOLIC BLOOD PRESSURE: 126 MMHG | DIASTOLIC BLOOD PRESSURE: 70 MMHG | OXYGEN SATURATION: 99 % | BODY MASS INDEX: 19.11 KG/M2 | HEART RATE: 59 BPM | RESPIRATION RATE: 18 BRPM | TEMPERATURE: 97.7 F | WEIGHT: 122 LBS

## 2020-09-14 DIAGNOSIS — C25.9 MALIGNANT NEOPLASM OF PANCREAS, UNSPECIFIED LOCATION OF MALIGNANCY (HCC): ICD-10-CM

## 2020-09-14 LAB
ALBUMIN SERPL-MCNC: 3 G/DL (ref 3.5–5.2)
ALP BLD-CCNC: 173 U/L (ref 35–104)
ALT SERPL-CCNC: 29 U/L (ref 9–52)
ANION GAP SERPL CALCULATED.3IONS-SCNC: 3 MMOL/L (ref 7–19)
AST SERPL-CCNC: 44 U/L (ref 14–36)
BASOPHILS ABSOLUTE: 0.02 K/UL (ref 0.01–0.08)
BASOPHILS RELATIVE PERCENT: 0.9 % (ref 0.1–1.2)
BILIRUB SERPL-MCNC: 0.3 MG/DL (ref 0.2–1.3)
BUN BLDV-MCNC: 4 MG/DL (ref 7–17)
CALCIUM SERPL-MCNC: 8.1 MG/DL (ref 8.4–10.2)
CHLORIDE BLD-SCNC: 106 MMOL/L (ref 98–111)
CO2: 28 MMOL/L (ref 22–29)
CREAT SERPL-MCNC: 0.6 MG/DL (ref 0.5–1)
EOSINOPHILS ABSOLUTE: 0.04 K/UL (ref 0.04–0.54)
EOSINOPHILS RELATIVE PERCENT: 1.8 % (ref 0.7–7)
GFR NON-AFRICAN AMERICAN: >60
GLOBULIN: 2.9 G/DL
GLUCOSE BLD-MCNC: 101 MG/DL (ref 74–106)
HCT VFR BLD CALC: 28.2 % (ref 34.1–44.9)
HEMOGLOBIN: 9.4 G/DL (ref 11.2–15.7)
LYMPHOCYTES ABSOLUTE: 0.76 K/UL (ref 1.18–3.74)
LYMPHOCYTES RELATIVE PERCENT: 33.6 % (ref 19.3–53.1)
MCH RBC QN AUTO: 30.3 PG (ref 25.6–32.2)
MCHC RBC AUTO-ENTMCNC: 33.3 G/DL (ref 32.3–35.5)
MCV RBC AUTO: 91 FL (ref 79.4–94.8)
MONOCYTES ABSOLUTE: 0.63 K/UL (ref 0.24–0.82)
MONOCYTES RELATIVE PERCENT: 27.9 % (ref 4.7–12.5)
NEUTROPHILS ABSOLUTE: 0.81 K/UL (ref 1.56–6.13)
NEUTROPHILS RELATIVE PERCENT: 35.8 % (ref 34–71.1)
PDW BLD-RTO: 17.1 % (ref 11.7–14.4)
PLATELET # BLD: 188 K/UL (ref 182–369)
PMV BLD AUTO: 11.2 FL (ref 7.4–10.4)
POTASSIUM SERPL-SCNC: 3.9 MMOL/L (ref 3.5–5.1)
RBC # BLD: 3.1 M/UL (ref 3.93–5.22)
SODIUM BLD-SCNC: 137 MMOL/L (ref 137–145)
TOTAL PROTEIN: 5.9 G/DL (ref 6.3–8.2)
WBC # BLD: 2.26 K/UL (ref 3.98–10.04)

## 2020-09-14 PROCEDURE — 80053 COMPREHEN METABOLIC PANEL: CPT

## 2020-09-14 PROCEDURE — 85025 COMPLETE CBC W/AUTO DIFF WBC: CPT

## 2020-09-16 ENCOUNTER — HOSPITAL ENCOUNTER (OUTPATIENT)
Dept: INFUSION THERAPY | Age: 60
Discharge: HOME | End: 2020-09-16
Payer: MEDICARE

## 2020-09-16 DIAGNOSIS — C25.9 MALIGNANT NEOPLASM OF PANCREAS, UNSPECIFIED LOCATION OF MALIGNANCY (HCC): ICD-10-CM

## 2020-09-16 LAB
BASOPHILS ABSOLUTE: 0.04 K/UL (ref 0.01–0.08)
BASOPHILS RELATIVE PERCENT: 1.3 % (ref 0.1–1.2)
EOSINOPHILS ABSOLUTE: 0.06 K/UL (ref 0.04–0.54)
EOSINOPHILS RELATIVE PERCENT: 1.9 % (ref 0.7–7)
HCT VFR BLD CALC: 30.7 % (ref 34.1–44.9)
HEMOGLOBIN: 9.2 G/DL (ref 11.2–15.7)
LYMPHOCYTES ABSOLUTE: 0.8 K/UL (ref 1.18–3.74)
LYMPHOCYTES RELATIVE PERCENT: 25.8 % (ref 19.3–53.1)
MCH RBC QN AUTO: 29.9 PG (ref 25.6–32.2)
MCHC RBC AUTO-ENTMCNC: 30 G/DL (ref 32.3–35.5)
MCV RBC AUTO: 99.7 FL (ref 79.4–94.8)
MONOCYTES ABSOLUTE: 0.73 K/UL (ref 0.24–0.82)
MONOCYTES RELATIVE PERCENT: 23.5 % (ref 4.7–12.5)
NEUTROPHILS ABSOLUTE: 1.47 K/UL (ref 1.56–6.13)
NEUTROPHILS RELATIVE PERCENT: 47.5 % (ref 34–71.1)
PDW BLD-RTO: 17.8 % (ref 11.7–14.4)
PLATELET # BLD: 170 K/UL (ref 182–369)
PMV BLD AUTO: 11 FL (ref 7.4–10.4)
RBC # BLD: 3.08 M/UL (ref 3.93–5.22)
WBC # BLD: 3.1 K/UL (ref 3.98–10.04)

## 2020-09-16 PROCEDURE — 85025 COMPLETE CBC W/AUTO DIFF WBC: CPT

## 2020-09-18 ENCOUNTER — HOSPITAL ENCOUNTER (OUTPATIENT)
Dept: ULTRASOUND IMAGING | Age: 60
Discharge: HOME | End: 2020-09-18
Payer: MEDICARE

## 2020-09-18 PROCEDURE — 76705 ECHO EXAM OF ABDOMEN: CPT

## 2020-09-21 ENCOUNTER — HOSPITAL ENCOUNTER (OUTPATIENT)
Dept: INFUSION THERAPY | Age: 60
Discharge: HOME | End: 2020-09-21
Payer: MEDICARE

## 2020-09-21 VITALS
OXYGEN SATURATION: 97 % | DIASTOLIC BLOOD PRESSURE: 72 MMHG | HEART RATE: 55 BPM | HEIGHT: 67 IN | WEIGHT: 114 LBS | TEMPERATURE: 97.8 F | SYSTOLIC BLOOD PRESSURE: 112 MMHG | BODY MASS INDEX: 17.89 KG/M2

## 2020-09-21 DIAGNOSIS — C25.9 MALIGNANT NEOPLASM OF PANCREAS, UNSPECIFIED LOCATION OF MALIGNANCY (HCC): Primary | ICD-10-CM

## 2020-09-21 LAB
ALBUMIN SERPL-MCNC: 2.9 G/DL (ref 3.5–5.2)
ALP BLD-CCNC: 157 U/L (ref 35–104)
ALT SERPL-CCNC: 29 U/L (ref 9–52)
ANION GAP SERPL CALCULATED.3IONS-SCNC: 5 MMOL/L (ref 7–19)
AST SERPL-CCNC: 44 U/L (ref 14–36)
BASOPHILS ABSOLUTE: 0.03 K/UL (ref 0.01–0.08)
BASOPHILS RELATIVE PERCENT: 0.6 % (ref 0.1–1.2)
BILIRUB SERPL-MCNC: 0.3 MG/DL (ref 0.2–1.3)
BUN BLDV-MCNC: 4 MG/DL (ref 7–17)
CALCIUM SERPL-MCNC: 8.1 MG/DL (ref 8.4–10.2)
CHLORIDE BLD-SCNC: 107 MMOL/L (ref 98–111)
CO2: 27 MMOL/L (ref 22–29)
CREAT SERPL-MCNC: 0.6 MG/DL (ref 0.5–1)
EOSINOPHILS ABSOLUTE: 0.07 K/UL (ref 0.04–0.54)
EOSINOPHILS RELATIVE PERCENT: 1.4 % (ref 0.7–7)
GFR NON-AFRICAN AMERICAN: >60
GLUCOSE BLD-MCNC: 168 MG/DL (ref 74–106)
HCT VFR BLD CALC: 32.6 % (ref 34.1–44.9)
HEMOGLOBIN: 10 G/DL (ref 11.2–15.7)
LYMPHOCYTES ABSOLUTE: 0.9 K/UL (ref 1.18–3.74)
LYMPHOCYTES RELATIVE PERCENT: 17.9 % (ref 19.3–53.1)
MCH RBC QN AUTO: 29.5 PG (ref 25.6–32.2)
MCHC RBC AUTO-ENTMCNC: 30.7 G/DL (ref 32.3–35.5)
MCV RBC AUTO: 96.2 FL (ref 79.4–94.8)
MONOCYTES ABSOLUTE: 0.66 K/UL (ref 0.24–0.82)
MONOCYTES RELATIVE PERCENT: 13.1 % (ref 4.7–12.5)
NEUTROPHILS ABSOLUTE: 3.36 K/UL (ref 1.56–6.13)
NEUTROPHILS RELATIVE PERCENT: 67 % (ref 34–71.1)
PDW BLD-RTO: 18.1 % (ref 11.7–14.4)
PLATELET # BLD: 169 K/UL (ref 182–369)
PMV BLD AUTO: 9.7 FL (ref 7.4–10.4)
POTASSIUM SERPL-SCNC: 3.9 MMOL/L (ref 3.5–5.1)
RBC # BLD: 3.39 M/UL (ref 3.93–5.22)
SODIUM BLD-SCNC: 139 MMOL/L (ref 137–145)
TOTAL PROTEIN: 5.9 G/DL (ref 6.3–8.2)
WBC # BLD: 5.02 K/UL (ref 3.98–10.04)

## 2020-09-21 PROCEDURE — 96367 TX/PROPH/DG ADDL SEQ IV INF: CPT

## 2020-09-21 PROCEDURE — 96415 CHEMO IV INFUSION ADDL HR: CPT

## 2020-09-21 PROCEDURE — 80053 COMPREHEN METABOLIC PANEL: CPT

## 2020-09-21 PROCEDURE — 96375 TX/PRO/DX INJ NEW DRUG ADDON: CPT

## 2020-09-21 PROCEDURE — G0498 CHEMO EXTEND IV INFUS W/PUMP: HCPCS

## 2020-09-21 PROCEDURE — 2580000003 HC RX 258: Performed by: INTERNAL MEDICINE

## 2020-09-21 PROCEDURE — 96368 THER/DIAG CONCURRENT INF: CPT

## 2020-09-21 PROCEDURE — 6360000002 HC RX W HCPCS: Performed by: INTERNAL MEDICINE

## 2020-09-21 PROCEDURE — 85025 COMPLETE CBC W/AUTO DIFF WBC: CPT

## 2020-09-21 PROCEDURE — 96413 CHEMO IV INFUSION 1 HR: CPT

## 2020-09-21 RX ORDER — HEPARIN SODIUM (PORCINE) LOCK FLUSH IV SOLN 100 UNIT/ML 100 UNIT/ML
500 SOLUTION INTRAVENOUS PRN
Status: CANCELLED | OUTPATIENT
Start: 2020-09-21

## 2020-09-21 RX ORDER — EPINEPHRINE 1 MG/ML
0.3 INJECTION, SOLUTION, CONCENTRATE INTRAVENOUS PRN
Status: CANCELLED | OUTPATIENT
Start: 2020-09-21

## 2020-09-21 RX ORDER — PALONOSETRON 0.05 MG/ML
0.25 INJECTION, SOLUTION INTRAVENOUS ONCE
Status: CANCELLED | OUTPATIENT
Start: 2020-09-21

## 2020-09-21 RX ORDER — DEXTROSE MONOHYDRATE 50 MG/ML
INJECTION, SOLUTION INTRAVENOUS ONCE
Status: CANCELLED | OUTPATIENT
Start: 2020-09-21

## 2020-09-21 RX ORDER — DIPHENHYDRAMINE HYDROCHLORIDE 50 MG/ML
50 INJECTION INTRAMUSCULAR; INTRAVENOUS ONCE
Status: CANCELLED | OUTPATIENT
Start: 2020-09-21

## 2020-09-21 RX ORDER — PALONOSETRON 0.05 MG/ML
0.25 INJECTION, SOLUTION INTRAVENOUS ONCE
Status: COMPLETED | OUTPATIENT
Start: 2020-09-21 | End: 2020-09-21

## 2020-09-21 RX ORDER — SODIUM CHLORIDE 9 MG/ML
INJECTION, SOLUTION INTRAVENOUS CONTINUOUS
Status: CANCELLED | OUTPATIENT
Start: 2020-09-21

## 2020-09-21 RX ORDER — SODIUM CHLORIDE 0.9 % (FLUSH) 0.9 %
5 SYRINGE (ML) INJECTION PRN
Status: CANCELLED | OUTPATIENT
Start: 2020-09-21

## 2020-09-21 RX ORDER — DEXAMETHASONE SODIUM PHOSPHATE 10 MG/ML
10 INJECTION, SOLUTION INTRAMUSCULAR; INTRAVENOUS ONCE
Status: COMPLETED | OUTPATIENT
Start: 2020-09-21 | End: 2020-09-21

## 2020-09-21 RX ORDER — SODIUM CHLORIDE 0.9 % (FLUSH) 0.9 %
10 SYRINGE (ML) INJECTION PRN
Status: CANCELLED | OUTPATIENT
Start: 2020-09-21

## 2020-09-21 RX ORDER — SODIUM CHLORIDE 9 MG/ML
INJECTION, SOLUTION INTRAVENOUS ONCE
Status: CANCELLED | OUTPATIENT
Start: 2020-09-21

## 2020-09-21 RX ORDER — METHYLPREDNISOLONE SODIUM SUCCINATE 125 MG/2ML
125 INJECTION, POWDER, LYOPHILIZED, FOR SOLUTION INTRAMUSCULAR; INTRAVENOUS ONCE
Status: CANCELLED | OUTPATIENT
Start: 2020-09-21

## 2020-09-21 RX ADMIN — OXALIPLATIN 110 MG: 5 INJECTION, SOLUTION INTRAVENOUS at 09:21

## 2020-09-21 RX ADMIN — PALONOSETRON 0.25 MG: 0.05 INJECTION, SOLUTION INTRAVENOUS at 09:01

## 2020-09-21 RX ADMIN — LEUCOVORIN CALCIUM 560 MG: 350 INJECTION, POWDER, LYOPHILIZED, FOR SUSPENSION INTRAMUSCULAR; INTRAVENOUS at 09:21

## 2020-09-21 RX ADMIN — FLUOROURACIL 3000 MG: 50 INJECTION, SOLUTION INTRAVENOUS at 11:33

## 2020-09-21 RX ADMIN — DEXAMETHASONE SODIUM PHOSPHATE 10 MG: 10 INJECTION, SOLUTION INTRAMUSCULAR; INTRAVENOUS at 09:01

## 2020-09-21 ASSESSMENT — PAIN SCALES - GENERAL: PAINLEVEL_OUTOF10: 0

## 2020-09-23 ENCOUNTER — HOSPITAL ENCOUNTER (OUTPATIENT)
Dept: INFUSION THERAPY | Age: 60
Discharge: HOME | End: 2020-09-23
Payer: MEDICARE

## 2020-09-23 DIAGNOSIS — C25.9 MALIGNANT NEOPLASM OF PANCREAS, UNSPECIFIED LOCATION OF MALIGNANCY (HCC): Primary | ICD-10-CM

## 2020-09-23 LAB
BASOPHILS ABSOLUTE: 0.02 K/UL (ref 0.01–0.08)
BASOPHILS RELATIVE PERCENT: 0.3 % (ref 0.1–1.2)
EOSINOPHILS ABSOLUTE: 0.09 K/UL (ref 0.04–0.54)
EOSINOPHILS RELATIVE PERCENT: 1.5 % (ref 0.7–7)
HCT VFR BLD CALC: 34.5 % (ref 34.1–44.9)
HEMOGLOBIN: 10.6 G/DL (ref 11.2–15.7)
LYMPHOCYTES ABSOLUTE: 0.66 K/UL (ref 1.18–3.74)
LYMPHOCYTES RELATIVE PERCENT: 10.8 % (ref 19.3–53.1)
MCH RBC QN AUTO: 30.4 PG (ref 25.6–32.2)
MCHC RBC AUTO-ENTMCNC: 30.7 G/DL (ref 32.3–35.5)
MCV RBC AUTO: 98.9 FL (ref 79.4–94.8)
MONOCYTES ABSOLUTE: 0.46 K/UL (ref 0.24–0.82)
MONOCYTES RELATIVE PERCENT: 7.5 % (ref 4.7–12.5)
NEUTROPHILS ABSOLUTE: 4.9 K/UL (ref 1.56–6.13)
NEUTROPHILS RELATIVE PERCENT: 79.9 % (ref 34–71.1)
PDW BLD-RTO: 18.5 % (ref 11.7–14.4)
PLATELET # BLD: 116 K/UL (ref 182–369)
PMV BLD AUTO: 9.2 FL (ref 7.4–10.4)
RBC # BLD: 3.49 M/UL (ref 3.93–5.22)
WBC # BLD: 6.13 K/UL (ref 3.98–10.04)

## 2020-09-23 PROCEDURE — 6360000002 HC RX W HCPCS: Performed by: INTERNAL MEDICINE

## 2020-09-23 PROCEDURE — 96377 APPLICATON ON-BODY INJECTOR: CPT

## 2020-09-23 PROCEDURE — 96523 IRRIG DRUG DELIVERY DEVICE: CPT

## 2020-09-23 PROCEDURE — 2580000003 HC RX 258: Performed by: INTERNAL MEDICINE

## 2020-09-23 PROCEDURE — 85025 COMPLETE CBC W/AUTO DIFF WBC: CPT

## 2020-09-23 RX ORDER — SODIUM CHLORIDE 0.9 % (FLUSH) 0.9 %
20 SYRINGE (ML) INJECTION PRN
Status: DISCONTINUED | OUTPATIENT
Start: 2020-09-23 | End: 2020-09-24 | Stop reason: HOSPADM

## 2020-09-23 RX ORDER — HEPARIN SODIUM (PORCINE) LOCK FLUSH IV SOLN 100 UNIT/ML 100 UNIT/ML
500 SOLUTION INTRAVENOUS PRN
Status: DISCONTINUED | OUTPATIENT
Start: 2020-09-23 | End: 2020-09-24 | Stop reason: HOSPADM

## 2020-09-23 RX ORDER — SODIUM CHLORIDE 0.9 % (FLUSH) 0.9 %
20 SYRINGE (ML) INJECTION PRN
Status: CANCELLED | OUTPATIENT
Start: 2020-09-23

## 2020-09-23 RX ORDER — HEPARIN SODIUM (PORCINE) LOCK FLUSH IV SOLN 100 UNIT/ML 100 UNIT/ML
500 SOLUTION INTRAVENOUS PRN
Status: CANCELLED | OUTPATIENT
Start: 2020-09-23

## 2020-09-23 RX ORDER — SODIUM CHLORIDE 0.9 % (FLUSH) 0.9 %
10 SYRINGE (ML) INJECTION PRN
Status: CANCELLED | OUTPATIENT
Start: 2020-09-23

## 2020-09-23 RX ADMIN — HEPARIN 500 UNITS: 100 SYRINGE at 10:50

## 2020-09-23 RX ADMIN — SODIUM CHLORIDE, PRESERVATIVE FREE 20 ML: 5 INJECTION INTRAVENOUS at 10:50

## 2020-09-23 RX ADMIN — PEGFILGRASTIM 6 MG: KIT SUBCUTANEOUS at 10:50

## 2020-09-28 ENCOUNTER — HOSPITAL ENCOUNTER (OUTPATIENT)
Dept: INFUSION THERAPY | Age: 60
Discharge: HOME | End: 2020-09-28
Payer: COMMERCIAL

## 2020-09-28 DIAGNOSIS — C25.9 MALIGNANT NEOPLASM OF PANCREAS, UNSPECIFIED LOCATION OF MALIGNANCY (HCC): ICD-10-CM

## 2020-09-28 LAB
BASOPHILS ABSOLUTE: 0.03 K/UL (ref 0.01–0.08)
BASOPHILS RELATIVE PERCENT: 0.1 % (ref 0.1–1.2)
EOSINOPHILS ABSOLUTE: 0.15 K/UL (ref 0.04–0.54)
EOSINOPHILS RELATIVE PERCENT: 0.7 % (ref 0.7–7)
HCT VFR BLD CALC: 32.4 % (ref 34.1–44.9)
HEMOGLOBIN: 10 G/DL (ref 11.2–15.7)
LYMPHOCYTES ABSOLUTE: 1.05 K/UL (ref 1.18–3.74)
LYMPHOCYTES RELATIVE PERCENT: 5.2 % (ref 19.3–53.1)
MCH RBC QN AUTO: 30.1 PG (ref 25.6–32.2)
MCHC RBC AUTO-ENTMCNC: 30.9 G/DL (ref 32.3–35.5)
MCV RBC AUTO: 97.6 FL (ref 79.4–94.8)
MONOCYTES ABSOLUTE: 1.52 K/UL (ref 0.24–0.82)
MONOCYTES RELATIVE PERCENT: 7.6 % (ref 4.7–12.5)
NEUTROPHILS ABSOLUTE: 17.28 K/UL (ref 1.56–6.13)
NEUTROPHILS RELATIVE PERCENT: 86.4 % (ref 34–71.1)
PDW BLD-RTO: 18.5 % (ref 11.7–14.4)
PLATELET # BLD: 90 K/UL (ref 182–369)
PMV BLD AUTO: 10.3 FL (ref 7.4–10.4)
RBC # BLD: 3.32 M/UL (ref 3.93–5.22)
WBC # BLD: 20.03 K/UL (ref 3.98–10.04)

## 2020-09-28 PROCEDURE — 85025 COMPLETE CBC W/AUTO DIFF WBC: CPT

## 2020-09-30 ENCOUNTER — HOSPITAL ENCOUNTER (OUTPATIENT)
Dept: INFUSION THERAPY | Age: 60
Discharge: HOME | End: 2020-09-30
Payer: COMMERCIAL

## 2020-09-30 DIAGNOSIS — C25.9 MALIGNANT NEOPLASM OF PANCREAS, UNSPECIFIED LOCATION OF MALIGNANCY (HCC): ICD-10-CM

## 2020-09-30 LAB
BASOPHILS ABSOLUTE: 0.02 K/UL (ref 0.01–0.08)
BASOPHILS RELATIVE PERCENT: 0.2 % (ref 0.1–1.2)
EOSINOPHILS ABSOLUTE: 0.06 K/UL (ref 0.04–0.54)
EOSINOPHILS RELATIVE PERCENT: 0.6 % (ref 0.7–7)
HCT VFR BLD CALC: 30 % (ref 34.1–44.9)
HEMOGLOBIN: 9.5 G/DL (ref 11.2–15.7)
LYMPHOCYTES ABSOLUTE: 0.97 K/UL (ref 1.18–3.74)
LYMPHOCYTES RELATIVE PERCENT: 8.9 % (ref 19.3–53.1)
MCH RBC QN AUTO: 30.1 PG (ref 25.6–32.2)
MCHC RBC AUTO-ENTMCNC: 31.7 G/DL (ref 32.3–35.5)
MCV RBC AUTO: 94.9 FL (ref 79.4–94.8)
MONOCYTES ABSOLUTE: 1.73 K/UL (ref 0.24–0.82)
MONOCYTES RELATIVE PERCENT: 15.9 % (ref 4.7–12.5)
NEUTROPHILS ABSOLUTE: 8.08 K/UL (ref 1.56–6.13)
NEUTROPHILS RELATIVE PERCENT: 74.4 % (ref 34–71.1)
PDW BLD-RTO: 18.7 % (ref 11.7–14.4)
PLATELET # BLD: 77 K/UL (ref 182–369)
PMV BLD AUTO: 10.3 FL (ref 7.4–10.4)
RBC # BLD: 3.16 M/UL (ref 3.93–5.22)
WBC # BLD: 10.86 K/UL (ref 3.98–10.04)

## 2020-09-30 PROCEDURE — 85025 COMPLETE CBC W/AUTO DIFF WBC: CPT

## 2020-10-01 ENCOUNTER — HOSPITAL ENCOUNTER (EMERGENCY)
Age: 60
Discharge: HOME | End: 2020-10-01
Attending: EMERGENCY MEDICINE
Payer: MEDICARE

## 2020-10-01 VITALS
BODY MASS INDEX: 15.7 KG/M2 | RESPIRATION RATE: 17 BRPM | TEMPERATURE: 98.5 F | HEART RATE: 67 BPM | DIASTOLIC BLOOD PRESSURE: 64 MMHG | HEIGHT: 67 IN | OXYGEN SATURATION: 98 % | SYSTOLIC BLOOD PRESSURE: 97 MMHG | WEIGHT: 100 LBS

## 2020-10-01 LAB
ALBUMIN SERPL-MCNC: 2.9 G/DL (ref 3.5–5.2)
ALP BLD-CCNC: 162 U/L (ref 35–104)
ALT SERPL-CCNC: 26 U/L (ref 5–33)
ANION GAP SERPL CALCULATED.3IONS-SCNC: 10 MMOL/L (ref 7–19)
ANISOCYTOSIS: ABNORMAL
AST SERPL-CCNC: 35 U/L (ref 5–32)
ATYPICAL LYMPHOCYTE RELATIVE PERCENT: 1 % (ref 0–8)
BANDED NEUTROPHILS RELATIVE PERCENT: 7 % (ref 0–5)
BASOPHILS ABSOLUTE: 0.1 K/UL (ref 0–0.2)
BASOPHILS RELATIVE PERCENT: 1 % (ref 0–1)
BILIRUB SERPL-MCNC: <0.2 MG/DL (ref 0.2–1.2)
BUN BLDV-MCNC: 8 MG/DL (ref 6–20)
CALCIUM SERPL-MCNC: 8.1 MG/DL (ref 8.6–10)
CHLORIDE BLD-SCNC: 105 MMOL/L (ref 98–111)
CO2: 23 MMOL/L (ref 22–29)
CREAT SERPL-MCNC: 0.8 MG/DL (ref 0.5–0.9)
EOSINOPHILS ABSOLUTE: 0.38 K/UL (ref 0–0.6)
EOSINOPHILS RELATIVE PERCENT: 3 % (ref 0–5)
GFR AFRICAN AMERICAN: >59
GFR NON-AFRICAN AMERICAN: >60
GLUCOSE BLD-MCNC: 146 MG/DL (ref 74–109)
HCT VFR BLD CALC: 23.4 % (ref 37–47)
HEMOGLOBIN: 7.3 G/DL (ref 12–16)
HYPOCHROMIA: ABNORMAL
IMMATURE GRANULOCYTES #: 0.6 K/UL
LYMPHOCYTES ABSOLUTE: 1.4 K/UL (ref 1.1–4.5)
LYMPHOCYTES RELATIVE PERCENT: 10 % (ref 20–40)
MCH RBC QN AUTO: 29.6 PG (ref 27–31)
MCHC RBC AUTO-ENTMCNC: 31.2 G/DL (ref 33–37)
MCV RBC AUTO: 94.7 FL (ref 81–99)
MONOCYTES ABSOLUTE: 1.7 K/UL (ref 0–0.9)
MONOCYTES RELATIVE PERCENT: 13 % (ref 0–10)
NEUTROPHILS ABSOLUTE: 9.1 K/UL (ref 1.5–7.5)
NEUTROPHILS RELATIVE PERCENT: 65 % (ref 50–65)
PDW BLD-RTO: 19.5 % (ref 11.5–14.5)
PLATELET # BLD: 81 K/UL (ref 130–400)
PLATELET SLIDE REVIEW: ABNORMAL
PMV BLD AUTO: 13 FL (ref 9.4–12.3)
POLYCHROMASIA: ABNORMAL
POTASSIUM SERPL-SCNC: 4 MMOL/L (ref 3.5–5)
RBC # BLD: 2.47 M/UL (ref 4.2–5.4)
SODIUM BLD-SCNC: 138 MMOL/L (ref 136–145)
TOTAL PROTEIN: 5.5 G/DL (ref 6.6–8.7)
WBC # BLD: 12.7 K/UL (ref 4.8–10.8)

## 2020-10-01 PROCEDURE — 99999 PR OFFICE/OUTPT VISIT,PROCEDURE ONLY: CPT | Performed by: EMERGENCY MEDICINE

## 2020-10-01 PROCEDURE — 80053 COMPREHEN METABOLIC PANEL: CPT

## 2020-10-01 PROCEDURE — 36415 COLL VENOUS BLD VENIPUNCTURE: CPT

## 2020-10-01 PROCEDURE — 85025 COMPLETE CBC W/AUTO DIFF WBC: CPT

## 2020-10-01 PROCEDURE — 99283 EMERGENCY DEPT VISIT LOW MDM: CPT

## 2020-10-01 PROCEDURE — 99282 EMERGENCY DEPT VISIT SF MDM: CPT

## 2020-10-02 ENCOUNTER — HOSPITAL ENCOUNTER (OUTPATIENT)
Dept: INFUSION THERAPY | Age: 60
Discharge: HOME | End: 2020-10-02
Payer: COMMERCIAL

## 2020-10-02 DIAGNOSIS — C25.9 MALIGNANT NEOPLASM OF PANCREAS, UNSPECIFIED LOCATION OF MALIGNANCY (HCC): ICD-10-CM

## 2020-10-02 LAB
BASOPHILS ABSOLUTE: 0.02 K/UL (ref 0.01–0.08)
BASOPHILS RELATIVE PERCENT: 0.1 % (ref 0.1–1.2)
EOSINOPHILS ABSOLUTE: 0.11 K/UL (ref 0.04–0.54)
EOSINOPHILS RELATIVE PERCENT: 0.8 % (ref 0.7–7)
HCT VFR BLD CALC: 23.7 % (ref 34.1–44.9)
HEMOGLOBIN: 7.2 G/DL (ref 11.2–15.7)
LYMPHOCYTES ABSOLUTE: 1.5 K/UL (ref 1.18–3.74)
LYMPHOCYTES RELATIVE PERCENT: 10.9 % (ref 19.3–53.1)
MCH RBC QN AUTO: 30.1 PG (ref 25.6–32.2)
MCHC RBC AUTO-ENTMCNC: 30.4 G/DL (ref 32.3–35.5)
MCV RBC AUTO: 99.2 FL (ref 79.4–94.8)
MONOCYTES ABSOLUTE: 1.86 K/UL (ref 0.24–0.82)
MONOCYTES RELATIVE PERCENT: 13.5 % (ref 4.7–12.5)
NEUTROPHILS ABSOLUTE: 10.26 K/UL (ref 1.56–6.13)
NEUTROPHILS RELATIVE PERCENT: 74.7 % (ref 34–71.1)
PDW BLD-RTO: 19.8 % (ref 11.7–14.4)
PLATELET # BLD: 59 K/UL (ref 182–369)
PMV BLD AUTO: 9.6 FL (ref 7.4–10.4)
RBC # BLD: 2.39 M/UL (ref 3.93–5.22)
WBC # BLD: 13.75 K/UL (ref 3.98–10.04)

## 2020-10-02 PROCEDURE — 85025 COMPLETE CBC W/AUTO DIFF WBC: CPT

## 2020-10-02 ASSESSMENT — ENCOUNTER SYMPTOMS
COUGH: 0
VOMITING: 1
BLOOD IN STOOL: 0
ABDOMINAL PAIN: 0
SHORTNESS OF BREATH: 0
DIARRHEA: 0

## 2020-10-02 NOTE — ED PROVIDER NOTES
[facility]  eMERGENCY dEPARTMENT eNCOUnter      Pt Name: Christin Houser  MRN: [id number]  [place] 1960  Date of evaluation: 10/1/2020  Provider: Reji Ennis MD    [address] [place] [address]       Chief Complaint   Patient presents with    Hematemesis    Fatigue         HISTORY OF PRESENT ILLNESS   (Location/Symptom, Timing/Onset,Context/Setting, Quality, Duration, Modifying Factors, Severity)  Note limiting factors. Christin Houser is a 64 y.o. female who presents to the emergency department for evaluation regarding episode of vomiting up some blood. Patient reports that she has a prior history of pancreatic cancer and is currently followed by the oncology team.  States she has had issues with GI bleeding previously and is underwent EGD along with colonoscopy. Reports that today she had one episode where she spit up some bright red blood. She has not had any diarrhea like stools or any black or tarry stools. States that she only had one episode of vomiting with some bright red blood and that occurred this afternoon. She denies any abdominal pain, lightheadedness, dizziness upon standing or syncopal events. The symptoms are described as mild in severity and seem to resolve spontaneously. HPI    NursingNotes were reviewed. REVIEW OF SYSTEMS    (2-9 systems for level 4, 10 or more for level 5)     Review of Systems   Constitutional: Positive for fatigue. Negative for fever. Respiratory: Negative for cough and shortness of breath. Cardiovascular: Negative for chest pain and palpitations. Gastrointestinal: Positive for vomiting. Negative for abdominal pain, blood in stool and diarrhea. Neurological: Negative for dizziness, syncope and light-headedness. All other systems reviewed and are negative.            PAST MEDICALHISTORY     Past Medical History:   Diagnosis Date    Acute pancreatitis     Adult BMI <19 kg/sq m     Anemia     Cat esophagus     Biliary obstruction     GERD (ZOFRAN) 8 MG tablet Take 1 tablet by mouth every 8 hours as needed for Nausea or Vomiting, Disp-30 tablet, R-3Normal      Multiple Vitamins-Minerals (THERAPEUTIC MULTIVITAMIN-MINERALS) tablet Take 1 tablet by mouth dailyHistorical Med             ALLERGIES     Codeine; Morphine; Morphine and related; Sulfa antibiotics; Neomycin-bacitracin zn-polymyx; Clarithromycin; Dilaudid [hydromorphone hcl];  Hydromorphone; Loperamide hcl; Loperamide hcl; and Neosporin [neomycin-polymyx-gramicid]    FAMILY HISTORY       Family History   Problem Relation Age of Onset    Heart Attack Mother 46        x 2-had bypass    Hypertension Mother     COPD Father     Liver Cancer Paternal Aunt     Lung Cancer Paternal Aunt     Breast Cancer Maternal Aunt         but  of brain cancer    Diabetes Maternal Uncle     Diabetes Maternal Grandmother     Colon Cancer Neg Hx     Colon Polyps Neg Hx     Esophageal Cancer Neg Hx     Rectal Cancer Neg Hx     Stomach Cancer Neg Hx           SOCIAL HISTORY       Social History     Socioeconomic History    Marital status:      Spouse name: None    Number of children: 3    Years of education: None    Highest education level: None   Occupational History    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[place] [address] Occupation: [employer]    Occupation: [employer]   Social Needs    Financial resource strain: None    Food insecurity     Worry: None     Inability: None    Transportation needs     Medical: None     Non-medical: None   Tobacco Use    Smoking status: Former Smoker     Packs/day: 0.50     Years: 10.00     Pack years: 5.00     Types: Cigarettes     Last attempt to quit: 2019     Years since quittin.9    Smokeless tobacco: Never Used   Substance and Sexual Activity    Alcohol use: Not Currently    Drug use: Never    Sexual activity: None     Comment: 3   Lifestyle    Physical activity     Days per week: None     Minutes per session: None    Stress: None   Relationships    Social connections     Talks on phone: None     Gets together: None     Attends Mu-ism service: None     Active member of club or organization: None     Attends meetings of clubs or organizations: None     Relationship status: None    Intimate partner violence     Fear of current or ex partner: None     Emotionally abused: None     Physically abused: None     Forced sexual activity: None   Other Topics Concern    None   Social History Narrative    CODE STATUS: Full Code    HEALTH CARE PROXY: her daughter, Mrs. Nino Kaufman, of [facility]: independently    DOMICILED: lives in a private home, without steps inside, lives alone, has 2 dogs, no steps in to home       [facility]    (up to 7 for level 4, 8 or more for level 5)     ED Triage Vitals [10/01/20 1827]   BP Temp Temp src Pulse Resp SpO2 Height Weight   97/69 98.5 °F (36.9 °C) -- 73 14 100 % 5' 7\" (1.702 m) 100 lb (45.4 kg)       Physical Exam  Vitals signs and nursing note reviewed. HENT:      Head: Atraumatic. Mouth/Throat:      Mouth: Mucous membranes are moist. Mucous membranes are not dry. Pharynx: No posterior oropharyngeal erythema. Eyes:      General: No scleral icterus. Pupils: Pupils are equal, round, and reactive to light. Neck:      Trachea: No tracheal deviation. Cardiovascular:      Rate and Rhythm: Normal rate and regular rhythm. Pulses: Normal pulses. Heart sounds: Normal heart sounds. No murmur. Pulmonary:      Effort: Pulmonary effort is normal. No respiratory distress. Breath sounds: Normal breath sounds. No stridor. Abdominal:      General: There is no distension. Palpations: Abdomen is soft. Tenderness: There is no abdominal tenderness. There is no guarding. Genitourinary:     Rectum: Guaiac result negative. Skin:     Capillary Refill: Capillary refill takes less than 2 seconds.       Coloration: Skin is not pale. Findings: No rash. Neurological:      General: No focal deficit present. Mental Status: She is alert and oriented to person, place, and time. Psychiatric:         Behavior: Behavior is cooperative. DIAGNOSTIC RESULTS         LABS:  Labs Reviewed   CBC WITH AUTO DIFFERENTIAL - Abnormal; Notable for the following components:       Result Value    WBC 12.7 (*)     RBC 2.47 (*)     Hemoglobin 7.3 (*)     Hematocrit 23.4 (*)     MCHC 31.2 (*)     RDW 19.5 (*)     Platelets 81 (*)     MPV 13.0 (*)     Lymphocytes % 10.0 (*)     Monocytes % 13.0 (*)     Neutrophils Absolute 9.1 (*)     Monocytes Absolute 1.70 (*)     Bands Relative 7 (*)     Anisocytosis 1+ (*)     Polychromasia 1+ (*)     Hypochromia 1+ (*)     All other components within normal limits   COMPREHENSIVE METABOLIC PANEL - Abnormal; Notable for the following components:    Glucose 146 (*)     Calcium 8.1 (*)     Total Protein 5.5 (*)     Alb 2.9 (*)     Alkaline Phosphatase 162 (*)     AST 35 (*)     All other components within normal limits       All other labs were within normal range or not returned as of this dictation. EMERGENCY DEPARTMENT COURSE and DIFFERENTIAL DIAGNOSIS/MDM:   Vitals:    Vitals:    10/01/20 1827 10/01/20 1931 10/01/20 2032   BP: 97/69 104/67 97/64   Pulse: 73 63 67   Resp: 14 16 17   Temp: 98.5 °F (36.9 °C)     SpO2: 100%  98%   Weight: 100 lb (45.4 kg)     Height: 5' 7\" (1.702 m)         MDM    Reassessment    Patient's vital signs have remained stable and she is not experiencing any lightheadedness upon standing. Her hemoglobin is decreased to 7.3, this is down from previous of about 9.0. She is also somewhat thrombocytopenic with platelets of 81. She has not experienced any additional episodes of hematemesis here in the emergency department. Her stool was Hemoccult negative.   I discussed with her hospitalization for transfusion and she stated that she wanted to go home, monitor hemoglobin and follow-up with oncology. Her vital signs are stable I feel that this is reasonable. PROCEDURES:  Unless otherwise noted below, none     Procedures    FINAL IMPRESSION      1. Acute on chronic anemia    2.  H/O pancreatic cancer          DISPOSITION/PLAN   DISPOSITION Decision To Discharge 10/01/2020 10:50:50 PM      PATIENT REFERRED TO:  Gina Duggan MD  [address] [place] [address] [address] [address] [phone]            DISCHARGE MEDICATIONS:  Discharge Medication List as of 10/1/2020 11:19 PM             (Please note that portions of this note were completed with a voice recognition program.  Efforts were made to edit thedictations but occasionally words are mis-transcribed.)    Geno Beckett MD (electronically signed)  Attending Emergency Physician         Geno Beckett MD  10/02/20 [id number]

## 2020-10-02 NOTE — PROGRESS NOTES
Vandana Head   1960  10/5/2020     Chief Complaint   Patient presents with    Pancreatic Cancer      INTERVAL HISTORY/HISTORY OF PRESENT ILLNESS:  The patient is a very pleasant 64years old female who has a diagnosis of pancreatic adenocarcinoma. She initially had localized disease and underwent neoadjuvant chemotherapy followed by surgery at [facility]. Unfortunately, the patient was found to have recurrence of her disease in the abdomen. She has received 2 prior lines of therapy and unfortunately had disease progression. She is currently receiving fourth line treatment with  Irinotecan liposome 70 mg/m² with leucovorin 400 mg/m² and 5-FU 2400 mg/m² over 46 hours every 2 weeks. She has tolerating treatments with complains fatigue. In addition, the patient has recurrent GI bleed due to a anastomotic ulcer on the site of prior surgery. She has had several EGD/colonoscopy and capsule endoscopy. She has been receiving her palliative chemotherapy with complaints of mild diarrhea, nausea vomiting grade 1, fatigue. She has recurrent ascites. She has had paracentesis with removal of a milky appearing ascites on 8/21/2020. She has been interested in being referred for a clinical trial.  Unfortunately, she was not except for clinical trial due to persistent anemia and recurrent GI bleed. She has felt much better lately after started on treatment with FOLFOX. Unfortunately, her weight continues to decline. She has lost 22 pounds over the last 6 weeks. She does report though that she feels good. She denies any overt hematochezia. CBC today did show a significant drop of her hemoglobin over the last 2 weeks. She has not received blood transfusion a while.     ONCOLOGIC HISTORY:   Diagnosis  · Pancreatic adenocarcinoma, January 2018   · [id number]  · 7/6/2019-extended genetic panel-negative for a deleterious mutation     Treatment summary  · March 2018-Surgical consultation at [facility]-not operable   · 4/3/2018 through 6/4/2018 Neoadjuvant chemotherapy FOLFORINOX ×5 Biweekly cycles   · 4/11/2018-Biliary stent   · August 2018- XRT 30 Gy/Xeloda   · 08/30/3018- Whipple procedure @ [facility]   · Recommended another 5 biweekly cycles of modified FOLFORINOX completed 12/26/2018   · 7/9/2019- 1/22/2020 Gemzar/Abraxane 125 mg/m2 q 14 days, discontinued due to progression of disease  · 3/4/2020- Irinotecan liposome 70 mg/m² with leucovorin 400 mg/m² and 5-FU 2400 mg/m² over 46 hours every 2 weeks.     Tumor marker  · 3/12/1141-ND-08-9->430->370. · 4/30/2018 - CA 19- 9 of 157   · 5/25/20186804-OP-79-9->32 after 4 cycles. · 08/02/2018- -> 8   · 10/01/2018- CA 19-9 -5   · 10/29/2018-CA 19-9- 7   · 12/3/1225-OQ-13-9-7   · 04/11/2019-CA-19-9- 12   · 05/21/2019- CA 19-9- 41   · 7/2/2019-CA 19 9 = 114  · 7/9/2019- CA-19-9 = 225  · 8/6/2019- CA-19-9 = 171  · 9/3/5928-YH-34-9 = 198  · 9/13/20197873-KT-12-9 = 98 (at  [address])  · 10/29/3938-SG-13-9 =317  · 11/21/2019-CA-19-9 = 183  · 1/23/20206447-VI-04-9 = 520  · 4/22/2020- CA-19-9 = 940  · 5/13/20209867-RF-12-9 = 129  · 6/1/20208488-FQ-59-9 = 523??  · 7/6/2020- CA 19-9- 483  · 8/17/20201453-VX-73-9 = 785  · 10/5/20207631-YI-97-9 = 1250        Cancer history  Ms Greg Elmore was seen in initial oncology consultation on 3/12/2018 referred by Dr. Sai Clifton for a diagnosis of pancreatic adenocarcinoma. She was initially evaluated for acute pancreatitis at [facility] on February 2018. Further imaging revealed a pancreatic head mass. Lipase was elevated at 1184 and CA-19-9 elevated 134. The symptoms were going on for several weeks. Weight loss of 10-12 pounds over the last 6 months. · October 2017-CT abdomen without contrast was unremarkable. · 2/2/2018-ultrasound of abdomen showed a pancreatic duct dilation   · 2/02/2018-CT abdomen showed 16 mm low-density lesion in the pancreas at the junction of the head of the body.  No intra-abdominal adenopathy. No liver metastasis. · 2/7/2018-the patient underwent EGD/EUS and FNA biopsy of a pancreatic mass by Dr. Max Cole at [facility] . EUS showed inflammatory changes consistent with a recent history of pancreatitis. Main pancreatic duct was dilated. No concerning peripancreatic adenopathy. No definite mass was seen by a more diffuse hypoechoic area measuring 1.8 x 2.2 cm in the head of the pancreas. Pathology was consistent with a group of markedly atypical cells strongly suspicious for adenocarcinoma. · 2/28/2018-CT pancreatic protocol showed a poorly defined area of decreased enhancement located in the head of the pancreas measuring 1.8 x 1.4 x 1.7 cm with moderate meditation of the pancreatic duct. Well defined sharply marginated low density nodule located in the tail of the pancreas measuring 1.5 x 1.3 x 1.5 cm (IPMN?). · 3/6/2018-CA 19-9 was elevated at 150. Repeat EGD was performed by Dr. Chantel Candelaria due to the inconclusive FNA resulted on 2/7/2018. Pathology FNA was consistent with pancreatic adenocarcinoma. · 3/12/2018-she was first seen by me. No evidence of distant disease. Referral to Surgical oncology. CT chest to complete staging. CA-19-9 430. · 3/16/2018-CT of the chest was unremarkable for intrathoracic metastatic disease   · Surgery consultation at [facility] March 2018- with Dr Khanh Carlos. She was not a surgical candidate upfront. Recommended neoadjuvant chemotherapy. · 4/3/2018-started on neoadjuvant chemotherapy with FOLFORINOX. · 4/11/2018-she developed CBD obstruction had a CBD stent placed by Dr. Max Cole. · 4/3/2018 through 6/4/2018 Neoadjuvant chemotherapy FOLFORINOX ×5 Biweekly cycles   · August 2018- XRT 30 Gy/Xeloda   · 08/30/3018- Whipple procedure at [facility] by Dr. Ekaterina Cutler  · Recommended another 7 biweekly cycles of modified FOLFORINOX. · 9/5/2018-CT of the chest abdomen pelvis was unremarkable for metastatic disease.    · 1/14/2019-CT abdomen and pelvis at [facility] showed no evidence of metastasis in the chest abdomen pelvis. · 5/21/2019-CT chest, abdomen, pelvis at [facility] showed no evidence of metastatic disease   · 5/21/20194527-ZL-18-9 = 42   · 06/12/2019- CA-19-9 = 154. Discussed with the patient. We'll also discuss with [facility]. · 7/1/2019-CT chest, abdomen, pelvis showed a soft tissue mass measuring 1.4 x 1.1 cm, not present on prior scan from January 2019, concerning for recurrence. Stable hypodense in the liver, too small to characterize. Subcentimeter ill-defined area of low attenuation liver may represent an early metastasis. · 7/3/2019-recommended palliative chemotherapy with nab paclitaxel 125mg/m² IV over 30 minutes on day 1 and gemcitabine 600 mg/m² IV over 10mg/m2/min (fixed dose rate) on day 1  · 7/2/2019-CA 19 9 = 114  · 7/6/2019- extended genetic panel negative for a deleterious mutation (invitae)  · 7/9/2019- CA-19-9 = 225  · 8/6/2019- CA-19-9 = 171  · 9/3/5951-PQ-81-9 = 198   · 9/13/20190109-FE-31-9 = 98 at [facility]  · 9/13/2019-CT chest abdomen pelvis at [facility] showed a soft tissue thickening in the pancreatic bed with persistent narrowing of the portal SMV confluence.  Superimposed tumor remains a possibility.  The new low-density lesion in the periphery of the liver seen on the prior exam is no longer appreciated and likely represents treatment effect.  Nodular enhancement may represent perfusion change in the region on image 187. · 9/13/2018- she was recommended to continue current treatment with Gemzar/Abraxane  · 11/1/2019- she was hospitalized with GI bleed.  An upper endoscopy showed ulceration at the efferent loop of the Whipple's procedure  · 10/29/3326-DJ-52-9 =317  · 11/21/2019-CA-19-9 = 183  · 11/19/2019- CT chest abdomen pelvis showed no evidence of gross disease progression. Improvement of the caliber of what may be a middle colic vein that drains back to the SMV.  There is still persistent narrowing of the of the upper SMV at the juncture with the portal vein.  No definite evidence of liver metastases at this time. No definite evidence of pulmonary metastases.  However, question of slight increase in prominence of subtle soft tissue thickening within the right paracolic gutter could be indicative of metastatic disease to peritoneum.   · 12/9/2019- colonoscopy by GI was unremarkable.  This was performed for complaints of maroon-colored stools. · 1/23/20200101-JJ-61-9 = 520  · 1/28/2020- CT chest abdomen pelvis at [facility] showed progressive disease with recurrence at the retroperitoneal just inferior to the pancreaticojejunostomy with vascular involvement and complete occlusion of the portal vein and SMV resulting in worsening bowel edema and developing ascites. This is consistent with disease progression. · 3/4/2020- 4th line therapy Irinotecan liposome 70 mg/m² with leucovorin 400 mg/m² and 5-FU 2400 mg/m² over 46 hours every 2 weeks. · 5/12/202 Ct Chest W Contrast  No acute findings in the chest.  Slight increase in size of LEFT supraclavicular lymph nodes and subcarinal mediastinal lymph node. Recommend special attention on follow-up imaging to evaluate for stability or resolution. Ct Abdomen Pelvis W Iv Contrast   Postoperative change of Whipple with residual stranding and free fluid in the surgical bed. Residual soft tissue nodular prominence in the retroperitoneum adjacent to the portal vein which is compressed and occluded/thrombosed. The splenic vein is not visualized and presumed thrombosed. LEFT upper quadrant perigastric/periesophageal varices. Moderate volume ascites. 4.  No evidence of bowel obstruction. Mild distention of the stomach, which may be secondary to slow flow through the gastrojejunostomy. Correlate clinically. There is a small linear device in the distal esophagus which could represent a hemostasis clip but recommend clinical correlation.    · 5/13/2020- AF75.6-320  · 6/1/2020- CA 19.9- 523  · 6/15/2020- CA 19.9- 383  · 6/21/2020- Ct Head Wo Contrast No acute intracranial process. Findings in agreement with the emergent findings from the initial [facility] preliminary report. · 6/21/2020- Ct Abdomen Pelvis W Contrast Prior Whipple procedure with expected pneumobilia. Large volume ascites for patient size. 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 Diffuse wall thickening along the small and large bowel secondary to this venous congestion. No evidence for arterial ischemia, as the bowel mucosa appears to enhance normally. No evidence of viscus perforation or peritoneal abscess. Of note, the the retroperitoneal structures are quite decompressed as a result of the ascites, with near complete attenuation of the IVC in the mid abdomen. Unsure if this is contributing to any lower extremity edema. If evidence of multiorgan dysfunction, abdominal compartment syndrome could also be considered. The results of this exam were discussed with Dr. Reid Mir on 6/21/2020 at 1002 hours. In particular, I wanted to address if there was indication/need for therapeutic paracentesis. This is under consideration. · 6/21/2020- US Guided Paracentesis Ultrasound-guided abdominal paracentesis performed for therapeutic purposes. A 60 cc aspirate was set aside for lab evaluation, if desired. The patient tolerated the proceed well. Cytology was negative for malignant cells. · 7/6/2020 CA19.9- 483. · 8/17/2020 YB95.8-327  · 8/21/2020 CT Chest/Abdomen/Pelvis- No acute intrathoracic abnormality. Hiatal hernia containing ascitic fluid. Questionable wall thickening of the distal esophagus. No pathologic intrathoracic or axillary lymphadenopathy. Stable subcentimeter left supraclavicular/lower jugular chain and subcarinal lymph nodes compared to 5/12/2020. Previous Whipple procedure with associated pneumobilia.  Stable diffuse prominence of the intrapancreatic duct within the residual pancreatic body and tail with no discrete pancreatic mass or convincing evidence of solid organ metastasis. Small volume of ascites. Ultrasound-guided paracentesis performed prior to this exam. 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 Multiple collateral vascular structures described above. Diffuse soft tissue anasarca. Trace left pleural effusion. Wall thickening of nondilated small bowel loops and rectosigmoid colon may relate to hypoproteinemia and third spacing of fluid. Nonspecific inflammatory infectious enterocolitis is a second possibility. Moderate size hiatal hernia. · 2020 Us Guided Paracentesis-Ultrasound-guided abdominal paracentesis performed for diagnostic and therapeutic purposes. The patient tolerated the proceed well. · 2020- FOLFOX palliative treatment. · 10/5/2020- CA-19-9 = 1250.          PAST MEDICAL HISTORY:   Past Medical History:   Diagnosis Date    Acute pancreatitis     Adult BMI <19 kg/sq m     Anemia     Cat esophagus     Biliary obstruction     GERD (gastroesophageal reflux disease)     with Barretts    Hashimoto's thyroiditis     denies takes no med for    Heart murmur     Hypertension     pt denies nor longer takes med for    Low blood sugar     h/o when overweight in the past    MVP (mitral valve prolapse)     Myalgia     Palliative care patient 2020    Pancreatic adenocarcinoma ([address] 2018    Dr Love Baldwin    Pancreatic cancer ([address] 3/30/2018    Right flank pain     RUQ pain           PAST SURGICAL HISTORY:  Past Surgical History:   Procedure Laterality Date    CARDIAC CATHETERIZATION      heart cath     SECTION      x 3    CHOLECYSTECTOMY      COLONOSCOPY  years ago    [facility]-Dr Amy Emerson per patient    COLONOSCOPY  2014    Dr Mitch Santoyo Jose J recall    COLONOSCOPY  03/10/2016    Dr Mcfarland-10 yr recall    COLONOSCOPY N/A 2019    Dr Renae Pérez, 5 yr recall   Radha Brantley ELBOW SURGERY Right     ENDOMETRIAL ABLATION      HAND SURGERY Right     HERNIA REPAIR      hiatal    HERNIA REPAIR      umbilical    HERNIA REPAIR      PANCREAS SURGERY  08/30/2018    Whipple procedure-Dr Elena Ferguson ME EGD [facility] NEEDLE ASPIR/BIOP ALTERED ANATOMY N/A 2/7/2018    Dr Josse Suarez w/fna-Dilation of main pancreatic duct with diffuse change in the pancreatitis noted, area of concern in the neck of the pancreas-strongly suspicious for adenocarcinoma     ME EGD INTRMURAL NEEDLE ASPIR/BIOP ALTERED ANATOMY N/A 3/6/2018    Dr KVNG Duncan-Pancreatic cancer-Ductal adenocarcinoma-staged [id number] by EUS, pancreatic pseudocyst in the tail the pancreas    ME ERCP DX COLLECTION SPECIMEN BRUSHING/WASHING N/A 4/11/2018    Dr KVNG Duncan-w/placement of a self-expanding fully covered 10 mm biliary stent    UPPER GASTROINTESTINAL ENDOSCOPY  years ago    [facility]-Dr Rebecca Mariano    UPPER GASTROINTESTINAL ENDOSCOPY  2013    Zuniga    UPPER GASTROINTESTINAL ENDOSCOPY N/A 11/1/2019    Dr Jorge Carballo post Whipple's procedure with efferent loop ulceration    UPPER GASTROINTESTINAL ENDOSCOPY  12/17/2019    Dr Ellen Duncan-Patent G-J Anastomosis, apparent healing ulceration    UPPER GASTROINTESTINAL ENDOSCOPY N/A 2/25/2020    Dr Cruz Art amount of food retention in the stomach with bile, hiatal hernia, will need repeat    UPPER GASTROINTESTINAL ENDOSCOPY N/A 2/27/2020    Dr Alannah Jimenez erosion/ulceration at the suture line, post whipple surgical changes    UPPER GASTROINTESTINAL ENDOSCOPY N/A 3/9/2020    Dr Alannah Jimneez erosion along the previous whipple suture line    UPPER GASTROINTESTINAL ENDOSCOPY N/A 4/16/2020    Dr KVNG Duncan-w/hemostatic clip placement x 1-Allie Peres tear at [facility]-Likely culprit lesion for current presentation    UPPER GASTROINTESTINAL ENDOSCOPY N/A 6/22/2020    Dr Alannah Jimenez erosion along the previous whipple suture line        SOCIAL HISTORY:  Social History     Socioeconomic History    Marital status:      Spouse name: Not on file    Number of children: 3    Years of education: Not on file    Highest education level: Not on file   Occupational History    Occupation: retired chemical operqator at [employer] Occupation: [employer]    Occupation: [employer]   Social Needs    Financial resource strain: Not on file    Food insecurity     Worry: Not on file     Inability: Not on file   [employer] needs     Medical: Not on file     Non-medical: Not on file   Tobacco Use    Smoking status: Former Smoker     Packs/day: 0.50     Years: 10.00     Pack years: 5.00     Types: Cigarettes     Last attempt to quit: 2019     Years since quittin.9    Smokeless tobacco: Never Used   Substance and Sexual Activity    Alcohol use: Not Currently    Drug use: Never    Sexual activity: Not on file     Comment: 3   Lifestyle    Physical activity     Days per week: Not on file     Minutes per session: Not on file    Stress: Not on file   Relationships    Social connections     Talks on phone: Not on file     Gets together: Not on file     Attends [employer] service: Not on file     Active member of club or organization: Not on file     Attends meetings of clubs or organizations: Not on file     Relationship status: Not on file    Intimate partner violence     Fear of current or ex partner: Not on file     Emotionally abused: Not on file     Physically abused: Not on file     Forced sexual activity: Not on file   Other Topics Concern    Not on file   Social History Narrative    CODE STATUS: Full Code    HEALTH CARE PROXY: her daughter, Mrs. Phong Mcgregor, of [facility]: independently    DOMICILED: lives in a private home, without steps inside, lives alone, has 2 dogs, no steps in to home       FAMILY HISTORY:  Family History   Problem Relation Age of Onset    Heart Attack Mother 46        x 2-had bypass    Hypertension Mother     COPD Father  Liver Cancer Paternal Aunt     Lung Cancer Paternal Aunt     Breast Cancer Maternal Aunt         but  of brain cancer    Diabetes Maternal Uncle     Diabetes Maternal Grandmother     Colon Cancer Neg Hx     Colon Polyps Neg Hx     Esophageal Cancer Neg Hx     Rectal Cancer Neg Hx     Stomach Cancer Neg Hx         Current Outpatient Medications   Medication Sig Dispense Refill    prochlorperazine (COMPAZINE) 10 MG tablet Take 1 tablet by mouth every 6 hours as needed (nausea) 60 tablet 5    gabapentin (NEURONTIN) 100 MG capsule Take 1 capsule by mouth 3 times daily for 120 days. Intended supply: 90 days 90 capsule 3    pantoprazole (PROTONIX) 40 MG tablet Take 1 tablet by mouth 2 times daily 60 tablet 3    Cyanocobalamin (VITAMIN B 12 PO) Take by mouth      promethazine (PHENERGAN) 25 MG tablet Take 1 tablet by mouth every 6 hours as needed for Nausea 30 tablet 2    sucralfate (CARAFATE) 1 GM tablet Take 1 tablet by mouth 4 times daily 360 tablet 1    lidocaine-prilocaine (EMLA) 2.5-2.5 % cream Apply to port area and cover with plastic wrap one hour prior to use. 1 Tube 1    vitamin E 400 UNIT capsule Take 400 Units by mouth daily      NONFORMULARY daily Tumeric otc      ondansetron (ZOFRAN) 8 MG tablet Take 1 tablet by mouth every 8 hours as needed for Nausea or Vomiting 30 tablet 3    Multiple Vitamins-Minerals (THERAPEUTIC MULTIVITAMIN-MINERALS) tablet Take 1 tablet by mouth daily       No current facility-administered medications for this visit.       Facility-Administered Medications Ordered in Other Visits   Medication Dose Route Frequency Provider Last Rate Last Dose    sodium chloride flush 0.9 % injection 20 mL  20 mL Intravenous PRN Mark Mccoy MD        heparin flush 100 UNIT/ML injection 500 Units  500 Units Intracatheter PRN Mark Mccoy MD        sodium chloride flush 0.9 % injection 20 mL  20 mL Intravenous PRN Mark Mccoy MD            REVIEW OF SYSTEMS:    Constitutional: no fever, no night sweats, fatigue;   HEENT: no blurring of vision, no double vision, no hearing difficulty, no tinnitus,no ulceration, no dysphagia  Lungs: no cough, no shortness of breath, no wheeze;   CVS: no palpitation, no chest pain, no shortness of breath;   GI: no abdominal pain, no nausea , no vomiting, no constipation; melanotic stools  VIKRAM: no dysuria, frequency and urgency, no hematuria, no kidney stones;   Musculoskeletal: no joint pain, swelling , stiffness;   Endocrine: no polyuria, polydypsia, no cold or heat intolerence; Hematology/lymphatic: no easy brusing or bleeding, no hx of clotting disorder; no peripheral adenopathy. Dermatology: no skin rash, no eczema, no pruritis;   Psychiatry: no depression, no anxiety,no panic attacks, no suicide ideation; Neurology: no syncope, no seizures, no numbness or tingling of hands, no numbness or tingling of feet, no paresis;     PHYSICAL EXAM:    Vitals signs:  /82   Pulse 76   Temp 97.8 °F (36.6 °C)   Ht 5' 7\" (1.702 m)   Wt 110 lb 3.2 oz (50 kg)   SpO2 100%   BMI 17.26 kg/m²    Pain scale:      CONSTITUTIONAL: Alert, appropriate, no acute distress,   EYES: Non icteric, EOM intact, pupils equal round and reactive to light and accommodation. ENT: Oral mucus membranes moist, no oral pharyngeal lesions. External inspection of ears and nose are normal.   NECK: Supple, no masses. No palpable thyroid mass    CHEST/LUNGS: CTA bilaterally, normal respiratory effort   CARDIOVASCULAR: RRR, no murmurs. No lower extremity edema   ABDOMEN: soft non-tender, active bowel sounds, no hepatosplenomegaly. No palpable masses. EXTREMITIES: warm, Full ROM of all fours extremities. No focal weakness. SKIN: warm, dry with no rashes or lesions  LYMPH: No cervical, clavicular, axillary, or inguinal lymphadenopathy  NEUROLOGIC: follows commands, non focal.   PSYCH: mood and affect appropriate.  Alert and oriented to time and place and person. Relevant Lab findings/reviewed by me:  Lab Results   Component Value Date    WBC 11.20 (H) 10/05/2020    HGB 7.4 (L) 10/05/2020    HCT 25.2 (LL) 10/05/2020    .6 (H) 10/05/2020    PLT 98 (L) 10/05/2020    LABLYMP 2.28 06/01/2012    LYMPHOPCT 11.4 (L) 10/05/2020    RBC 2.48 (L) 10/05/2020    MCH 29.8 10/05/2020    MCHC 29.4 (L) 10/05/2020    RDW 20.6 (H) 10/05/2020     Relevant Imaging studies/reviewed by me:  Us Abdomen Limited    Result Date: 9/18/2020  Trace ascites. Paracentesis not performed. ASSESSMENT    Orders Placed This Encounter   Procedures    Comprehensive Metabolic Panel     Standing Status:   Future     Number of Occurrences:   1     Standing Expiration Date:   10/5/2021    Cancer Antigen 19-9     Standing Status:   Future     Number of Occurrences:   1     Standing Expiration Date:   10/5/2021      Nolvia Devries was seen today for pancreatic cancer. Diagnoses and all orders for this visit:    Malignant neoplasm of pancreas, unspecified location of malignancy ([facility] [address]  -     Comprehensive Metabolic Panel; Future  -     Cancer Antigen 19-9; Future    Chemotherapy management, encounter for    Care plan discussed with patient    Adverse effect of chemotherapy, initial encounter    Anemia associated with chemotherapy    Bilateral leg edema       #Metastatic pancreatic cancer  Fourth line treatment with modified FOLFOX 7 with 20% dose reduction. Last CA-19-9 = 523->383, 483->785->1250. CT chest abdomen pelvis showed no measurable disease but recurrent ascites. Due to elevation of her cancer markers and recurrent ascites. I offered the patient modified FOLFOX 7 regimen with 20% dose reduction  She is status post cycle #2. CBC today showed significant anemia hemoglobin 7.4. Delay chemotherapy x 1 week  The patient want to delay treatment 1 week. Patient not interested in best supportive care at this time. #GI bleed- secondary to erosion at previous Whipple anastomotic site. history of erosion at the anastomotic site with several instances of upper GI bleed in the past.  No further episodes of GI bleed.     #Normocytic anemia- secondary to GI bleed and chemotherapy. Hemoglobin 7.4. She had a capsule endoscopy performed by GI that showed no source of bleeding. She had another episode of GI bleed and was admitted to [address] [place] [address] in [place]. She received 3 units PRBCs. #Prognosis-poor prognosis overall. She has had disease progression several lines of treatment before. 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 Cytology was negative. 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 Unfortunately, patient has a history of GI bleed and therefore she has a contraindication for anticoagulation. Status post paracentesis of 4 L. Cytology negative. Plan:   · Hold cycle #3 mFOLFOX7 x 1 week due to anemia  · CMP, CA 19-9 today  · Transfusion today  · RTC 1 week for FOLFOX7  · CBC Mondays and Wednesdays  · PRN therapeutic US guided paracentesis every 2 weeks at [facility]  · RTC 3 weeks MD     Follow Up:     Return in about 3 weeks (around 10/26/2020) for Appointent with Dr. Josephine Cole in treatment room. Hold tx x1 week  RTC 1 week for treatment  RTC with MD 3 weeks in treatment room     IReji am scribing for Santy Gama MD. Electronically signed by Reji Bermeo on 10/5/2020 at 2:50 PM CDT. I, Dr Loren Phelan, personally performed the services described in this documentation as scribed by Reji Bermeo MA in my presence and is both accurate and complete. Over 50% of the total visit time of 25 minutes in face to face encounter with the patient, out of which more than 50% of the time was spent in counseling patient or family and coordination of care. Counseling included but was not limited to time spent reviewing labs, imaging studies/ treatment plan and answering questions.

## 2020-10-05 ENCOUNTER — HOSPITAL ENCOUNTER (OUTPATIENT)
Dept: INFUSION THERAPY | Age: 60
Setting detail: INFUSION SERIES
Discharge: HOME | End: 2020-10-05
Payer: MEDICARE

## 2020-10-05 ENCOUNTER — HOSPITAL ENCOUNTER (OUTPATIENT)
Dept: INFUSION THERAPY | Age: 60
Discharge: HOME | End: 2020-10-05
Payer: MEDICARE

## 2020-10-05 ENCOUNTER — OFFICE VISIT (OUTPATIENT)
Dept: HEMATOLOGY | Age: 60
End: 2020-10-05
Payer: MEDICARE

## 2020-10-05 VITALS
HEIGHT: 67 IN | TEMPERATURE: 97.8 F | HEART RATE: 76 BPM | WEIGHT: 110.2 LBS | OXYGEN SATURATION: 100 % | SYSTOLIC BLOOD PRESSURE: 118 MMHG | DIASTOLIC BLOOD PRESSURE: 82 MMHG | BODY MASS INDEX: 17.3 KG/M2

## 2020-10-05 VITALS
OXYGEN SATURATION: 100 % | SYSTOLIC BLOOD PRESSURE: 114 MMHG | RESPIRATION RATE: 17 BRPM | HEART RATE: 57 BPM | TEMPERATURE: 98.5 F | DIASTOLIC BLOOD PRESSURE: 72 MMHG

## 2020-10-05 DIAGNOSIS — T45.1X5A ANEMIA ASSOCIATED WITH CHEMOTHERAPY: ICD-10-CM

## 2020-10-05 DIAGNOSIS — D64.9 SYMPTOMATIC ANEMIA: Primary | ICD-10-CM

## 2020-10-05 DIAGNOSIS — C25.9 MALIGNANT NEOPLASM OF PANCREAS, UNSPECIFIED LOCATION OF MALIGNANCY (HCC): Primary | ICD-10-CM

## 2020-10-05 DIAGNOSIS — D64.81 ANEMIA ASSOCIATED WITH CHEMOTHERAPY: ICD-10-CM

## 2020-10-05 LAB
ABO/RH: NORMAL
ALBUMIN SERPL-MCNC: 2.6 G/DL (ref 3.5–5.2)
ALP BLD-CCNC: 162 U/L (ref 35–104)
ALT SERPL-CCNC: 30 U/L (ref 9–52)
ANION GAP SERPL CALCULATED.3IONS-SCNC: 4 MMOL/L (ref 7–19)
ANTIBODY IDENTIFICATION: NORMAL
ANTIBODY SCREEN: NORMAL
AST SERPL-CCNC: 32 U/L (ref 14–36)
BASOPHILS ABSOLUTE: 0.01 K/UL (ref 0.01–0.08)
BASOPHILS RELATIVE PERCENT: 0.1 % (ref 0.1–1.2)
BILIRUB SERPL-MCNC: 0.3 MG/DL (ref 0.2–1.3)
BLOOD BANK DISPENSE STATUS: NORMAL
BLOOD BANK PRODUCT CODE: NORMAL
BPU ID: NORMAL
BUN BLDV-MCNC: 7 MG/DL (ref 7–17)
CA 19-9: 1250 U/ML (ref 0–37)
CALCIUM SERPL-MCNC: 8.1 MG/DL (ref 8.4–10.2)
CHLORIDE BLD-SCNC: 108 MMOL/L (ref 98–111)
CO2: 27 MMOL/L (ref 22–29)
CREAT SERPL-MCNC: 0.6 MG/DL (ref 0.5–1)
DESCRIPTION BLOOD BANK: NORMAL
EOSINOPHILS ABSOLUTE: 0.09 K/UL (ref 0.04–0.54)
EOSINOPHILS RELATIVE PERCENT: 0.8 % (ref 0.7–7)
GFR NON-AFRICAN AMERICAN: >60
GLOBULIN: 2.4 G/DL
GLUCOSE BLD-MCNC: 93 MG/DL (ref 74–106)
HCT VFR BLD CALC: 25.2 % (ref 34.1–44.9)
HEMOGLOBIN: 7.4 G/DL (ref 11.2–15.7)
LYMPHOCYTES ABSOLUTE: 1.28 K/UL (ref 1.18–3.74)
LYMPHOCYTES RELATIVE PERCENT: 11.4 % (ref 19.3–53.1)
MCH RBC QN AUTO: 29.8 PG (ref 25.6–32.2)
MCHC RBC AUTO-ENTMCNC: 29.4 G/DL (ref 32.3–35.5)
MCV RBC AUTO: 101.6 FL (ref 79.4–94.8)
MONOCYTES ABSOLUTE: 1.46 K/UL (ref 0.24–0.82)
MONOCYTES RELATIVE PERCENT: 13 % (ref 4.7–12.5)
NEUTROPHILS ABSOLUTE: 8.36 K/UL (ref 1.56–6.13)
NEUTROPHILS RELATIVE PERCENT: 74.7 % (ref 34–71.1)
PDW BLD-RTO: 20.6 % (ref 11.7–14.4)
PLATELET # BLD: 98 K/UL (ref 182–369)
PMV BLD AUTO: 11.5 FL (ref 7.4–10.4)
POTASSIUM SERPL-SCNC: 3.9 MMOL/L (ref 3.5–5.1)
RBC # BLD: 2.48 M/UL (ref 3.93–5.22)
SODIUM BLD-SCNC: 139 MMOL/L (ref 137–145)
TOTAL PROTEIN: 5 G/DL (ref 6.3–8.2)
WBC # BLD: 11.2 K/UL (ref 3.98–10.04)

## 2020-10-05 PROCEDURE — 86870 RBC ANTIBODY IDENTIFICATION: CPT

## 2020-10-05 PROCEDURE — 86900 BLOOD TYPING SEROLOGIC ABO: CPT

## 2020-10-05 PROCEDURE — 85025 COMPLETE CBC W/AUTO DIFF WBC: CPT

## 2020-10-05 PROCEDURE — 6360000002 HC RX W HCPCS: Performed by: INTERNAL MEDICINE

## 2020-10-05 PROCEDURE — 86301 IMMUNOASSAY TUMOR CA 19-9: CPT

## 2020-10-05 PROCEDURE — 36430 TRANSFUSION BLD/BLD COMPNT: CPT

## 2020-10-05 PROCEDURE — 99211 OFF/OP EST MAY X REQ PHY/QHP: CPT

## 2020-10-05 PROCEDURE — 36591 DRAW BLOOD OFF VENOUS DEVICE: CPT

## 2020-10-05 PROCEDURE — 80053 COMPREHEN METABOLIC PANEL: CPT

## 2020-10-05 PROCEDURE — 86901 BLOOD TYPING SEROLOGIC RH(D): CPT

## 2020-10-05 PROCEDURE — 86922 COMPATIBILITY TEST ANTIGLOB: CPT

## 2020-10-05 PROCEDURE — 86850 RBC ANTIBODY SCREEN: CPT

## 2020-10-05 PROCEDURE — P9016 RBC LEUKOCYTES REDUCED: HCPCS

## 2020-10-05 PROCEDURE — 99214 OFFICE O/P EST MOD 30 MIN: CPT | Performed by: INTERNAL MEDICINE

## 2020-10-05 PROCEDURE — 2580000003 HC RX 258: Performed by: INTERNAL MEDICINE

## 2020-10-05 RX ORDER — HEPARIN SODIUM (PORCINE) LOCK FLUSH IV SOLN 100 UNIT/ML 100 UNIT/ML
500 SOLUTION INTRAVENOUS PRN
Status: DISCONTINUED | OUTPATIENT
Start: 2020-10-05 | End: 2020-10-07 | Stop reason: HOSPADM

## 2020-10-05 RX ORDER — FUROSEMIDE 10 MG/ML
20 INJECTION INTRAMUSCULAR; INTRAVENOUS ONCE
Status: CANCELLED | OUTPATIENT
Start: 2020-10-05

## 2020-10-05 RX ORDER — 0.9 % SODIUM CHLORIDE 0.9 %
250 INTRAVENOUS SOLUTION INTRAVENOUS ONCE
Status: COMPLETED | OUTPATIENT
Start: 2020-10-05 | End: 2020-10-05

## 2020-10-05 RX ORDER — SODIUM CHLORIDE 0.9 % (FLUSH) 0.9 %
20 SYRINGE (ML) INJECTION PRN
Status: CANCELLED | OUTPATIENT
Start: 2020-10-05

## 2020-10-05 RX ORDER — DIPHENHYDRAMINE HYDROCHLORIDE 50 MG/ML
50 INJECTION INTRAMUSCULAR; INTRAVENOUS ONCE
Status: CANCELLED | OUTPATIENT
Start: 2020-10-05

## 2020-10-05 RX ORDER — EPINEPHRINE 1 MG/ML
0.3 INJECTION, SOLUTION, CONCENTRATE INTRAVENOUS PRN
Status: CANCELLED | OUTPATIENT
Start: 2020-10-05

## 2020-10-05 RX ORDER — SODIUM CHLORIDE 9 MG/ML
INJECTION, SOLUTION INTRAVENOUS CONTINUOUS
Status: CANCELLED | OUTPATIENT
Start: 2020-10-05

## 2020-10-05 RX ORDER — HEPARIN SODIUM (PORCINE) LOCK FLUSH IV SOLN 100 UNIT/ML 100 UNIT/ML
500 SOLUTION INTRAVENOUS PRN
Status: CANCELLED | OUTPATIENT
Start: 2020-10-05

## 2020-10-05 RX ORDER — 0.9 % SODIUM CHLORIDE 0.9 %
250 INTRAVENOUS SOLUTION INTRAVENOUS ONCE
Status: CANCELLED | OUTPATIENT
Start: 2020-10-05

## 2020-10-05 RX ORDER — SODIUM CHLORIDE 0.9 % (FLUSH) 0.9 %
10 SYRINGE (ML) INJECTION PRN
Status: CANCELLED | OUTPATIENT
Start: 2020-10-05

## 2020-10-05 RX ORDER — SODIUM CHLORIDE 0.9 % (FLUSH) 0.9 %
20 SYRINGE (ML) INJECTION PRN
Status: DISCONTINUED | OUTPATIENT
Start: 2020-10-05 | End: 2020-10-07 | Stop reason: HOSPADM

## 2020-10-05 RX ORDER — METHYLPREDNISOLONE SODIUM SUCCINATE 125 MG/2ML
125 INJECTION, POWDER, LYOPHILIZED, FOR SOLUTION INTRAMUSCULAR; INTRAVENOUS ONCE
Status: CANCELLED | OUTPATIENT
Start: 2020-10-05

## 2020-10-05 RX ORDER — SODIUM CHLORIDE 0.9 % (FLUSH) 0.9 %
20 SYRINGE (ML) INJECTION PRN
Status: DISCONTINUED | OUTPATIENT
Start: 2020-10-05 | End: 2020-10-06 | Stop reason: HOSPADM

## 2020-10-05 RX ORDER — HEPARIN SODIUM (PORCINE) LOCK FLUSH IV SOLN 100 UNIT/ML 100 UNIT/ML
500 SOLUTION INTRAVENOUS PRN
Status: DISCONTINUED | OUTPATIENT
Start: 2020-10-05 | End: 2020-10-06 | Stop reason: HOSPADM

## 2020-10-05 RX ADMIN — SODIUM CHLORIDE 250 ML: 9 INJECTION, SOLUTION INTRAVENOUS at 10:16

## 2020-10-05 RX ADMIN — HEPARIN 500 UNITS: 100 SYRINGE at 13:55

## 2020-10-06 ENCOUNTER — HOSPITAL ENCOUNTER (OUTPATIENT)
Dept: ULTRASOUND IMAGING | Age: 60
Discharge: HOME | End: 2020-10-06
Payer: MEDICARE

## 2020-10-06 LAB
INR BLD: 1.18 (ref 0.88–1.18)
PROTHROMBIN TIME: 15 SEC (ref 12–14.6)

## 2020-10-06 PROCEDURE — C1729 CATH, DRAINAGE: HCPCS

## 2020-10-06 PROCEDURE — 36415 COLL VENOUS BLD VENIPUNCTURE: CPT

## 2020-10-06 PROCEDURE — 85610 PROTHROMBIN TIME: CPT

## 2020-10-09 ENCOUNTER — HOSPITAL ENCOUNTER (OUTPATIENT)
Dept: INFUSION THERAPY | Age: 60
Discharge: HOME | End: 2020-10-09
Payer: COMMERCIAL

## 2020-10-09 DIAGNOSIS — C25.9 MALIGNANT NEOPLASM OF PANCREAS, UNSPECIFIED LOCATION OF MALIGNANCY (HCC): ICD-10-CM

## 2020-10-09 LAB
BASOPHILS ABSOLUTE: 0.05 K/UL (ref 0.01–0.08)
BASOPHILS RELATIVE PERCENT: 0.5 % (ref 0.1–1.2)
EOSINOPHILS ABSOLUTE: 0.04 K/UL (ref 0.04–0.54)
EOSINOPHILS RELATIVE PERCENT: 0.4 % (ref 0.7–7)
HCT VFR BLD CALC: 28.9 % (ref 34.1–44.9)
HEMOGLOBIN: 8.7 G/DL (ref 11.2–15.7)
LYMPHOCYTES ABSOLUTE: 1.1 K/UL (ref 1.18–3.74)
LYMPHOCYTES RELATIVE PERCENT: 11.3 % (ref 19.3–53.1)
MCH RBC QN AUTO: 29.8 PG (ref 25.6–32.2)
MCHC RBC AUTO-ENTMCNC: 30.1 G/DL (ref 32.3–35.5)
MCV RBC AUTO: 99 FL (ref 79.4–94.8)
MONOCYTES ABSOLUTE: 1.06 K/UL (ref 0.24–0.82)
MONOCYTES RELATIVE PERCENT: 10.9 % (ref 4.7–12.5)
NEUTROPHILS ABSOLUTE: 7.45 K/UL (ref 1.56–6.13)
NEUTROPHILS RELATIVE PERCENT: 76.9 % (ref 34–71.1)
PDW BLD-RTO: 21.2 % (ref 11.7–14.4)
PLATELET # BLD: 141 K/UL (ref 182–369)
PMV BLD AUTO: 10.7 FL (ref 7.4–10.4)
RBC # BLD: 2.92 M/UL (ref 3.93–5.22)
WBC # BLD: 9.7 K/UL (ref 3.98–10.04)

## 2020-10-09 PROCEDURE — 85025 COMPLETE CBC W/AUTO DIFF WBC: CPT

## 2020-10-12 ENCOUNTER — HOSPITAL ENCOUNTER (OUTPATIENT)
Dept: INFUSION THERAPY | Age: 60
Discharge: HOME | End: 2020-10-12
Payer: MEDICARE

## 2020-10-12 VITALS
WEIGHT: 117 LBS | OXYGEN SATURATION: 99 % | DIASTOLIC BLOOD PRESSURE: 60 MMHG | HEIGHT: 67 IN | TEMPERATURE: 97.5 F | BODY MASS INDEX: 18.36 KG/M2 | SYSTOLIC BLOOD PRESSURE: 98 MMHG | HEART RATE: 60 BPM

## 2020-10-12 DIAGNOSIS — C25.9 MALIGNANT NEOPLASM OF PANCREAS, UNSPECIFIED LOCATION OF MALIGNANCY (HCC): Primary | ICD-10-CM

## 2020-10-12 LAB
ALBUMIN SERPL-MCNC: 2.7 G/DL (ref 3.5–5.2)
ALP BLD-CCNC: 152 U/L (ref 35–104)
ALT SERPL-CCNC: 29 U/L (ref 9–52)
ANION GAP SERPL CALCULATED.3IONS-SCNC: 4 MMOL/L (ref 7–19)
AST SERPL-CCNC: 42 U/L (ref 14–36)
BASOPHILS ABSOLUTE: 0.03 K/UL (ref 0.01–0.08)
BASOPHILS RELATIVE PERCENT: 0.5 % (ref 0.1–1.2)
BILIRUB SERPL-MCNC: <0.2 MG/DL (ref 0.2–1.3)
BUN BLDV-MCNC: 7 MG/DL (ref 7–17)
CALCIUM SERPL-MCNC: 7.7 MG/DL (ref 8.4–10.2)
CHLORIDE BLD-SCNC: 107 MMOL/L (ref 98–111)
CO2: 27 MMOL/L (ref 22–29)
CREAT SERPL-MCNC: 0.6 MG/DL (ref 0.5–1)
EOSINOPHILS ABSOLUTE: 0.03 K/UL (ref 0.04–0.54)
EOSINOPHILS RELATIVE PERCENT: 0.5 % (ref 0.7–7)
GFR NON-AFRICAN AMERICAN: >60
GLOBULIN: 2.6 G/DL
GLUCOSE BLD-MCNC: 125 MG/DL (ref 74–106)
HCT VFR BLD CALC: 19.2 % (ref 34.1–44.9)
HEMOGLOBIN: 6.2 G/DL (ref 11.2–15.7)
LYMPHOCYTES ABSOLUTE: 0.84 K/UL (ref 1.18–3.74)
LYMPHOCYTES RELATIVE PERCENT: 12.7 % (ref 19.3–53.1)
MCH RBC QN AUTO: 29.8 PG (ref 25.6–32.2)
MCHC RBC AUTO-ENTMCNC: 32.3 G/DL (ref 32.3–35.5)
MCV RBC AUTO: 92.3 FL (ref 79.4–94.8)
MONOCYTES ABSOLUTE: 0.86 K/UL (ref 0.24–0.82)
MONOCYTES RELATIVE PERCENT: 13 % (ref 4.7–12.5)
NEUTROPHILS ABSOLUTE: 4.84 K/UL (ref 1.56–6.13)
NEUTROPHILS RELATIVE PERCENT: 73.3 % (ref 34–71.1)
PDW BLD-RTO: 20 % (ref 11.7–14.4)
PLATELET # BLD: 159 K/UL (ref 182–369)
PMV BLD AUTO: 10.8 FL (ref 7.4–10.4)
POTASSIUM SERPL-SCNC: 4.1 MMOL/L (ref 3.5–5.1)
RBC # BLD: 2.08 M/UL (ref 3.93–5.22)
SODIUM BLD-SCNC: 138 MMOL/L (ref 137–145)
TOTAL PROTEIN: 5.4 G/DL (ref 6.3–8.2)
WBC # BLD: 6.6 K/UL (ref 3.98–10.04)

## 2020-10-12 PROCEDURE — 2580000003 HC RX 258: Performed by: INTERNAL MEDICINE

## 2020-10-12 PROCEDURE — 80053 COMPREHEN METABOLIC PANEL: CPT

## 2020-10-12 PROCEDURE — 96523 IRRIG DRUG DELIVERY DEVICE: CPT

## 2020-10-12 PROCEDURE — 6360000002 HC RX W HCPCS: Performed by: INTERNAL MEDICINE

## 2020-10-12 PROCEDURE — 85025 COMPLETE CBC W/AUTO DIFF WBC: CPT

## 2020-10-12 RX ORDER — SODIUM CHLORIDE 0.9 % (FLUSH) 0.9 %
20 SYRINGE (ML) INJECTION PRN
Status: DISCONTINUED | OUTPATIENT
Start: 2020-10-12 | End: 2020-10-13 | Stop reason: HOSPADM

## 2020-10-12 RX ORDER — SODIUM CHLORIDE 0.9 % (FLUSH) 0.9 %
10 SYRINGE (ML) INJECTION PRN
Status: CANCELLED | OUTPATIENT
Start: 2020-10-12

## 2020-10-12 RX ORDER — HEPARIN SODIUM (PORCINE) LOCK FLUSH IV SOLN 100 UNIT/ML 100 UNIT/ML
500 SOLUTION INTRAVENOUS PRN
Status: DISCONTINUED | OUTPATIENT
Start: 2020-10-12 | End: 2020-10-13 | Stop reason: HOSPADM

## 2020-10-12 RX ORDER — HEPARIN SODIUM (PORCINE) LOCK FLUSH IV SOLN 100 UNIT/ML 100 UNIT/ML
500 SOLUTION INTRAVENOUS PRN
Status: CANCELLED | OUTPATIENT
Start: 2020-10-12

## 2020-10-12 RX ORDER — SODIUM CHLORIDE 0.9 % (FLUSH) 0.9 %
20 SYRINGE (ML) INJECTION PRN
Status: CANCELLED | OUTPATIENT
Start: 2020-10-12

## 2020-10-12 RX ADMIN — HEPARIN 500 UNITS: 100 SYRINGE at 14:08

## 2020-10-12 RX ADMIN — SODIUM CHLORIDE, PRESERVATIVE FREE 20 ML: 5 INJECTION INTRAVENOUS at 14:09

## 2020-10-13 ENCOUNTER — HOSPITAL ENCOUNTER (OUTPATIENT)
Dept: GENERAL RADIOLOGY | Age: 60
Discharge: HOME | End: 2020-10-13
Payer: MEDICARE

## 2020-10-13 ENCOUNTER — HOSPITAL ENCOUNTER (OUTPATIENT)
Dept: INFUSION THERAPY | Age: 60
Setting detail: INFUSION SERIES
Discharge: HOME | End: 2020-10-13
Payer: MEDICARE

## 2020-10-13 VITALS
TEMPERATURE: 97.9 F | HEART RATE: 56 BPM | DIASTOLIC BLOOD PRESSURE: 71 MMHG | RESPIRATION RATE: 17 BRPM | OXYGEN SATURATION: 100 % | SYSTOLIC BLOOD PRESSURE: 119 MMHG

## 2020-10-13 PROCEDURE — 36430 TRANSFUSION BLD/BLD COMPNT: CPT

## 2020-10-13 PROCEDURE — 2580000003 HC RX 258: Performed by: INTERNAL MEDICINE

## 2020-10-13 PROCEDURE — 86900 BLOOD TYPING SEROLOGIC ABO: CPT

## 2020-10-13 PROCEDURE — 6360000002 HC RX W HCPCS: Performed by: INTERNAL MEDICINE

## 2020-10-13 PROCEDURE — 86850 RBC ANTIBODY SCREEN: CPT

## 2020-10-13 PROCEDURE — P9016 RBC LEUKOCYTES REDUCED: HCPCS

## 2020-10-13 PROCEDURE — 86922 COMPATIBILITY TEST ANTIGLOB: CPT

## 2020-10-13 PROCEDURE — 86901 BLOOD TYPING SEROLOGIC RH(D): CPT

## 2020-10-13 PROCEDURE — 71100 X-RAY EXAM RIBS UNI 2 VIEWS: CPT

## 2020-10-13 RX ORDER — FUROSEMIDE 10 MG/ML
20 INJECTION INTRAMUSCULAR; INTRAVENOUS ONCE
Status: CANCELLED | OUTPATIENT
Start: 2020-10-13

## 2020-10-13 RX ORDER — SODIUM CHLORIDE 9 MG/ML
INJECTION, SOLUTION INTRAVENOUS CONTINUOUS
Status: CANCELLED | OUTPATIENT
Start: 2020-10-13

## 2020-10-13 RX ORDER — METHYLPREDNISOLONE SODIUM SUCCINATE 125 MG/2ML
125 INJECTION, POWDER, LYOPHILIZED, FOR SOLUTION INTRAMUSCULAR; INTRAVENOUS ONCE
Status: CANCELLED | OUTPATIENT
Start: 2020-10-13

## 2020-10-13 RX ORDER — EPINEPHRINE 1 MG/ML
0.3 INJECTION, SOLUTION, CONCENTRATE INTRAVENOUS PRN
Status: CANCELLED | OUTPATIENT
Start: 2020-10-13

## 2020-10-13 RX ORDER — 0.9 % SODIUM CHLORIDE 0.9 %
250 INTRAVENOUS SOLUTION INTRAVENOUS ONCE
Status: COMPLETED | OUTPATIENT
Start: 2020-10-13 | End: 2020-10-13

## 2020-10-13 RX ORDER — HEPARIN SODIUM (PORCINE) LOCK FLUSH IV SOLN 100 UNIT/ML 100 UNIT/ML
300 SOLUTION INTRAVENOUS PRN
Status: DISCONTINUED | OUTPATIENT
Start: 2020-10-13 | End: 2020-10-15 | Stop reason: HOSPADM

## 2020-10-13 RX ORDER — DIPHENHYDRAMINE HYDROCHLORIDE 50 MG/ML
50 INJECTION INTRAMUSCULAR; INTRAVENOUS ONCE
Status: CANCELLED | OUTPATIENT
Start: 2020-10-13

## 2020-10-13 RX ORDER — SODIUM CHLORIDE 0.9 % (FLUSH) 0.9 %
20 SYRINGE (ML) INJECTION PRN
Status: DISCONTINUED | OUTPATIENT
Start: 2020-10-13 | End: 2020-10-15 | Stop reason: HOSPADM

## 2020-10-13 RX ADMIN — SODIUM CHLORIDE, PRESERVATIVE FREE 20 ML: 5 INJECTION INTRAVENOUS at 11:15

## 2020-10-13 RX ADMIN — HEPARIN 300 UNITS: 100 SYRINGE at 11:15

## 2020-10-13 RX ADMIN — SODIUM CHLORIDE 250 ML: 9 INJECTION, SOLUTION INTRAVENOUS at 07:30

## 2020-10-14 ENCOUNTER — HOSPITAL ENCOUNTER (OUTPATIENT)
Dept: INFUSION THERAPY | Age: 60
Discharge: HOME | End: 2020-10-14
Payer: MEDICARE

## 2020-10-14 VITALS
OXYGEN SATURATION: 98 % | WEIGHT: 118.9 LBS | RESPIRATION RATE: 18 BRPM | TEMPERATURE: 97.7 F | BODY MASS INDEX: 18.62 KG/M2 | DIASTOLIC BLOOD PRESSURE: 80 MMHG | SYSTOLIC BLOOD PRESSURE: 148 MMHG | HEART RATE: 70 BPM

## 2020-10-14 DIAGNOSIS — C25.9 MALIGNANT NEOPLASM OF PANCREAS, UNSPECIFIED LOCATION OF MALIGNANCY (HCC): Primary | ICD-10-CM

## 2020-10-14 LAB
BASOPHILS ABSOLUTE: 0.03 K/UL (ref 0.01–0.08)
BASOPHILS RELATIVE PERCENT: 0.4 % (ref 0.1–1.2)
EOSINOPHILS ABSOLUTE: 0.03 K/UL (ref 0.04–0.54)
EOSINOPHILS RELATIVE PERCENT: 0.4 % (ref 0.7–7)
HCT VFR BLD CALC: 26.9 % (ref 34.1–44.9)
HEMOGLOBIN: 9 G/DL (ref 11.2–15.7)
LYMPHOCYTES ABSOLUTE: 0.88 K/UL (ref 1.18–3.74)
LYMPHOCYTES RELATIVE PERCENT: 12.3 % (ref 19.3–53.1)
MCH RBC QN AUTO: 29.9 PG (ref 25.6–32.2)
MCHC RBC AUTO-ENTMCNC: 33.5 G/DL (ref 32.3–35.5)
MCV RBC AUTO: 89.4 FL (ref 79.4–94.8)
MONOCYTES ABSOLUTE: 0.93 K/UL (ref 0.24–0.82)
MONOCYTES RELATIVE PERCENT: 13 % (ref 4.7–12.5)
NEUTROPHILS ABSOLUTE: 5.26 K/UL (ref 1.56–6.13)
NEUTROPHILS RELATIVE PERCENT: 73.9 % (ref 34–71.1)
PDW BLD-RTO: 17.4 % (ref 11.7–14.4)
PLATELET # BLD: 151 K/UL (ref 182–369)
PMV BLD AUTO: 10.5 FL (ref 7.4–10.4)
RBC # BLD: 3.01 M/UL (ref 3.93–5.22)
WBC # BLD: 7.13 K/UL (ref 3.98–10.04)

## 2020-10-14 PROCEDURE — 2580000003 HC RX 258: Performed by: INTERNAL MEDICINE

## 2020-10-14 PROCEDURE — 6360000002 HC RX W HCPCS: Performed by: INTERNAL MEDICINE

## 2020-10-14 PROCEDURE — 96375 TX/PRO/DX INJ NEW DRUG ADDON: CPT

## 2020-10-14 PROCEDURE — 96417 CHEMO IV INFUS EACH ADDL SEQ: CPT

## 2020-10-14 PROCEDURE — 85025 COMPLETE CBC W/AUTO DIFF WBC: CPT

## 2020-10-14 PROCEDURE — 6360000002 HC RX W HCPCS

## 2020-10-14 PROCEDURE — 96413 CHEMO IV INFUSION 1 HR: CPT

## 2020-10-14 PROCEDURE — 2500000003 HC RX 250 WO HCPCS

## 2020-10-14 RX ORDER — METHYLPREDNISOLONE SODIUM SUCCINATE 125 MG/2ML
125 INJECTION, POWDER, LYOPHILIZED, FOR SOLUTION INTRAMUSCULAR; INTRAVENOUS ONCE
Status: CANCELLED | OUTPATIENT
Start: 2020-10-14

## 2020-10-14 RX ORDER — DEXAMETHASONE SODIUM PHOSPHATE 10 MG/ML
10 INJECTION, SOLUTION INTRAMUSCULAR; INTRAVENOUS ONCE
Status: COMPLETED | OUTPATIENT
Start: 2020-10-14 | End: 2020-10-14

## 2020-10-14 RX ORDER — SODIUM CHLORIDE 9 MG/ML
INJECTION, SOLUTION INTRAVENOUS CONTINUOUS
Status: CANCELLED | OUTPATIENT
Start: 2020-10-14

## 2020-10-14 RX ORDER — DIPHENHYDRAMINE HYDROCHLORIDE 50 MG/ML
50 INJECTION INTRAMUSCULAR; INTRAVENOUS ONCE
Status: CANCELLED | OUTPATIENT
Start: 2020-10-14

## 2020-10-14 RX ORDER — SODIUM CHLORIDE 9 MG/ML
INJECTION, SOLUTION INTRAVENOUS ONCE
Status: CANCELLED | OUTPATIENT
Start: 2020-10-14

## 2020-10-14 RX ORDER — METHYLPREDNISOLONE SODIUM SUCCINATE 125 MG/2ML
125 INJECTION, POWDER, LYOPHILIZED, FOR SOLUTION INTRAMUSCULAR; INTRAVENOUS ONCE
Status: DISCONTINUED | OUTPATIENT
Start: 2020-10-14 | End: 2020-10-16 | Stop reason: HOSPADM

## 2020-10-14 RX ORDER — METHYLPREDNISOLONE SODIUM SUCCINATE 125 MG/2ML
INJECTION, POWDER, LYOPHILIZED, FOR SOLUTION INTRAMUSCULAR; INTRAVENOUS
Status: COMPLETED
Start: 2020-10-14 | End: 2020-10-14

## 2020-10-14 RX ORDER — PALONOSETRON 0.05 MG/ML
0.25 INJECTION, SOLUTION INTRAVENOUS ONCE
Status: CANCELLED | OUTPATIENT
Start: 2020-10-14

## 2020-10-14 RX ORDER — SODIUM CHLORIDE 0.9 % (FLUSH) 0.9 %
10 SYRINGE (ML) INJECTION PRN
Status: DISCONTINUED | OUTPATIENT
Start: 2020-10-14 | End: 2020-10-15 | Stop reason: HOSPADM

## 2020-10-14 RX ORDER — HEPARIN SODIUM (PORCINE) LOCK FLUSH IV SOLN 100 UNIT/ML 100 UNIT/ML
500 SOLUTION INTRAVENOUS PRN
Status: DISCONTINUED | OUTPATIENT
Start: 2020-10-14 | End: 2020-10-15 | Stop reason: HOSPADM

## 2020-10-14 RX ORDER — SODIUM CHLORIDE 0.9 % (FLUSH) 0.9 %
5 SYRINGE (ML) INJECTION PRN
Status: CANCELLED | OUTPATIENT
Start: 2020-10-14

## 2020-10-14 RX ORDER — SODIUM CHLORIDE 0.9 % (FLUSH) 0.9 %
10 SYRINGE (ML) INJECTION PRN
Status: CANCELLED | OUTPATIENT
Start: 2020-10-14

## 2020-10-14 RX ORDER — DEXTROSE MONOHYDRATE 50 MG/ML
INJECTION, SOLUTION INTRAVENOUS ONCE
Status: CANCELLED | OUTPATIENT
Start: 2020-10-14

## 2020-10-14 RX ORDER — PALONOSETRON 0.05 MG/ML
0.25 INJECTION, SOLUTION INTRAVENOUS ONCE
Status: COMPLETED | OUTPATIENT
Start: 2020-10-14 | End: 2020-10-14

## 2020-10-14 RX ORDER — IPRATROPIUM BROMIDE AND ALBUTEROL SULFATE 2.5; .5 MG/3ML; MG/3ML
SOLUTION RESPIRATORY (INHALATION)
Status: DISCONTINUED
Start: 2020-10-14 | End: 2020-10-14 | Stop reason: WASHOUT

## 2020-10-14 RX ORDER — EPINEPHRINE 1 MG/ML
0.3 INJECTION, SOLUTION, CONCENTRATE INTRAVENOUS PRN
Status: CANCELLED | OUTPATIENT
Start: 2020-10-14

## 2020-10-14 RX ORDER — DIPHENHYDRAMINE HYDROCHLORIDE 50 MG/ML
INJECTION INTRAMUSCULAR; INTRAVENOUS
Status: COMPLETED
Start: 2020-10-14 | End: 2020-10-14

## 2020-10-14 RX ORDER — DIPHENHYDRAMINE HYDROCHLORIDE 50 MG/ML
50 INJECTION INTRAMUSCULAR; INTRAVENOUS ONCE
Status: DISCONTINUED | OUTPATIENT
Start: 2020-10-14 | End: 2020-10-16 | Stop reason: HOSPADM

## 2020-10-14 RX ORDER — HEPARIN SODIUM (PORCINE) LOCK FLUSH IV SOLN 100 UNIT/ML 100 UNIT/ML
500 SOLUTION INTRAVENOUS PRN
Status: CANCELLED | OUTPATIENT
Start: 2020-10-14

## 2020-10-14 RX ADMIN — OXALIPLATIN 117 MG: 5 INJECTION, SOLUTION INTRAVENOUS at 15:36

## 2020-10-14 RX ADMIN — DIPHENHYDRAMINE HYDROCHLORIDE: 50 INJECTION, SOLUTION INTRAMUSCULAR; INTRAVENOUS at 16:56

## 2020-10-14 RX ADMIN — LEUCOVORIN CALCIUM 560 MG: 350 INJECTION, POWDER, LYOPHILIZED, FOR SUSPENSION INTRAMUSCULAR; INTRAVENOUS at 15:36

## 2020-10-14 RX ADMIN — METHYLPREDNISOLONE SODIUM SUCCINATE: 125 INJECTION, POWDER, FOR SOLUTION INTRAMUSCULAR; INTRAVENOUS at 16:57

## 2020-10-14 RX ADMIN — DEXAMETHASONE SODIUM PHOSPHATE 10 MG: 10 INJECTION, SOLUTION INTRAMUSCULAR; INTRAVENOUS at 15:12

## 2020-10-14 RX ADMIN — FAMOTIDINE: 10 INJECTION, SOLUTION INTRAVENOUS at 16:57

## 2020-10-14 RX ADMIN — PALONOSETRON 0.25 MG: 0.05 INJECTION, SOLUTION INTRAVENOUS at 15:12

## 2020-10-16 ENCOUNTER — HOSPITAL ENCOUNTER (OUTPATIENT)
Dept: ULTRASOUND IMAGING | Age: 60
Discharge: HOME | End: 2020-10-16
Payer: MEDICARE

## 2020-10-16 ENCOUNTER — HOSPITAL ENCOUNTER (OUTPATIENT)
Dept: INFUSION THERAPY | Age: 60
Discharge: HOME | End: 2020-10-16
Payer: COMMERCIAL

## 2020-10-16 DIAGNOSIS — C25.9 MALIGNANT NEOPLASM OF PANCREAS, UNSPECIFIED LOCATION OF MALIGNANCY (HCC): ICD-10-CM

## 2020-10-16 LAB
BASOPHILS ABSOLUTE: 0.02 K/UL (ref 0.01–0.08)
BASOPHILS RELATIVE PERCENT: 0.3 % (ref 0.1–1.2)
EOSINOPHILS ABSOLUTE: 0.04 K/UL (ref 0.04–0.54)
EOSINOPHILS RELATIVE PERCENT: 0.6 % (ref 0.7–7)
HCT VFR BLD CALC: 30 % (ref 34.1–44.9)
HEMOGLOBIN: 9.1 G/DL (ref 11.2–15.7)
LYMPHOCYTES ABSOLUTE: 0.71 K/UL (ref 1.18–3.74)
LYMPHOCYTES RELATIVE PERCENT: 10.3 % (ref 19.3–53.1)
MCH RBC QN AUTO: 29.8 PG (ref 25.6–32.2)
MCHC RBC AUTO-ENTMCNC: 30.3 G/DL (ref 32.3–35.5)
MCV RBC AUTO: 98.4 FL (ref 79.4–94.8)
MONOCYTES ABSOLUTE: 1.02 K/UL (ref 0.24–0.82)
MONOCYTES RELATIVE PERCENT: 14.8 % (ref 4.7–12.5)
NEUTROPHILS ABSOLUTE: 5.08 K/UL (ref 1.56–6.13)
NEUTROPHILS RELATIVE PERCENT: 74 % (ref 34–71.1)
PDW BLD-RTO: 18.1 % (ref 11.7–14.4)
PLATELET # BLD: 135 K/UL (ref 182–369)
PMV BLD AUTO: 9.6 FL (ref 7.4–10.4)
RBC # BLD: 3.05 M/UL (ref 3.93–5.22)
WBC # BLD: 6.87 K/UL (ref 3.98–10.04)

## 2020-10-16 PROCEDURE — 85025 COMPLETE CBC W/AUTO DIFF WBC: CPT

## 2020-10-16 PROCEDURE — 49083 ABD PARACENTESIS W/IMAGING: CPT

## 2020-10-26 ENCOUNTER — APPOINTMENT (OUTPATIENT)
Dept: INFUSION THERAPY | Age: 60
End: 2020-10-26
Payer: MEDICARE

## 2020-10-26 ENCOUNTER — HOSPITAL ENCOUNTER (OUTPATIENT)
Dept: INFUSION THERAPY | Age: 60
Discharge: HOME | End: 2020-10-26
Payer: COMMERCIAL

## 2020-10-26 DIAGNOSIS — C25.9 MALIGNANT NEOPLASM OF PANCREAS, UNSPECIFIED LOCATION OF MALIGNANCY (HCC): ICD-10-CM

## 2020-10-26 LAB
BASOPHILS ABSOLUTE: 0.02 K/UL (ref 0.01–0.08)
BASOPHILS RELATIVE PERCENT: 0.3 % (ref 0.1–1.2)
EOSINOPHILS ABSOLUTE: 0.12 K/UL (ref 0.04–0.54)
EOSINOPHILS RELATIVE PERCENT: 2 % (ref 0.7–7)
HCT VFR BLD CALC: 32.3 % (ref 34.1–44.9)
HEMOGLOBIN: 10.2 G/DL (ref 11.2–15.7)
LYMPHOCYTES ABSOLUTE: 1.25 K/UL (ref 1.18–3.74)
LYMPHOCYTES RELATIVE PERCENT: 20.8 % (ref 19.3–53.1)
MCH RBC QN AUTO: 30.1 PG (ref 25.6–32.2)
MCHC RBC AUTO-ENTMCNC: 31.6 G/DL (ref 32.3–35.5)
MCV RBC AUTO: 95.3 FL (ref 79.4–94.8)
MONOCYTES ABSOLUTE: 0.81 K/UL (ref 0.24–0.82)
MONOCYTES RELATIVE PERCENT: 13.5 % (ref 4.7–12.5)
NEUTROPHILS ABSOLUTE: 3.81 K/UL (ref 1.56–6.13)
NEUTROPHILS RELATIVE PERCENT: 63.4 % (ref 34–71.1)
PDW BLD-RTO: 17.4 % (ref 11.7–14.4)
PLATELET # BLD: 159 K/UL (ref 182–369)
PMV BLD AUTO: 10.1 FL (ref 7.4–10.4)
RBC # BLD: 3.39 M/UL (ref 3.93–5.22)
WBC # BLD: 6.01 K/UL (ref 3.98–10.04)

## 2020-10-26 PROCEDURE — 85025 COMPLETE CBC W/AUTO DIFF WBC: CPT

## 2020-10-28 ENCOUNTER — HOSPITAL ENCOUNTER (OUTPATIENT)
Dept: INFUSION THERAPY | Age: 60
Discharge: HOME | End: 2020-10-28
Payer: COMMERCIAL

## 2020-10-28 DIAGNOSIS — C25.9 MALIGNANT NEOPLASM OF PANCREAS, UNSPECIFIED LOCATION OF MALIGNANCY (HCC): ICD-10-CM

## 2020-10-28 LAB
BASOPHILS ABSOLUTE: 0.03 K/UL (ref 0.01–0.08)
BASOPHILS RELATIVE PERCENT: 0.4 % (ref 0.1–1.2)
EOSINOPHILS ABSOLUTE: 0.13 K/UL (ref 0.04–0.54)
EOSINOPHILS RELATIVE PERCENT: 1.9 % (ref 0.7–7)
HCT VFR BLD CALC: 32.2 % (ref 34.1–44.9)
HEMOGLOBIN: 10.3 G/DL (ref 11.2–15.7)
LYMPHOCYTES ABSOLUTE: 1.23 K/UL (ref 1.18–3.74)
LYMPHOCYTES RELATIVE PERCENT: 18.1 % (ref 19.3–53.1)
MCH RBC QN AUTO: 30 PG (ref 25.6–32.2)
MCHC RBC AUTO-ENTMCNC: 32 G/DL (ref 32.3–35.5)
MCV RBC AUTO: 93.9 FL (ref 79.4–94.8)
MONOCYTES ABSOLUTE: 0.78 K/UL (ref 0.24–0.82)
MONOCYTES RELATIVE PERCENT: 11.5 % (ref 4.7–12.5)
NEUTROPHILS ABSOLUTE: 4.62 K/UL (ref 1.56–6.13)
NEUTROPHILS RELATIVE PERCENT: 68.1 % (ref 34–71.1)
PDW BLD-RTO: 17.4 % (ref 11.7–14.4)
PLATELET # BLD: 139 K/UL (ref 182–369)
PMV BLD AUTO: 10.5 FL (ref 7.4–10.4)
RBC # BLD: 3.43 M/UL (ref 3.93–5.22)
WBC # BLD: 6.79 K/UL (ref 3.98–10.04)

## 2020-10-28 PROCEDURE — 85025 COMPLETE CBC W/AUTO DIFF WBC: CPT

## 2020-10-30 ENCOUNTER — HOSPITAL ENCOUNTER (OUTPATIENT)
Dept: ULTRASOUND IMAGING | Age: 60
Discharge: HOME | End: 2020-10-30
Payer: MEDICARE

## 2020-10-30 PROCEDURE — 76705 ECHO EXAM OF ABDOMEN: CPT

## 2020-10-30 NOTE — PROGRESS NOTES
Opal Marie   1960 11/2/2020     Chief Complaint   Patient presents with    Pancreatic Cancer      INTERVAL HISTORY/HISTORY OF PRESENT ILLNESS:  The patient is a very pleasant 64years old female who has a diagnosis of pancreatic adenocarcinoma. She is now receiving palliative chemotherapy with FOLFOX. She has received several lines of therapy in the past.  She has developed malignant ascites and needs therapeutic paracentesis every 4 to 6 weeks. Her last therapeutic paracentesis was about 2 weeks ago. They drained about 2 L. She has been tolerating FOLFOX with complaints of neuropathy that has been stable. She also has diarrhea on and off. Her weight has been stable as well. She still has a lot of energy and has been doing much of chores at home. She also drove to [facility] a few days ago. She is now due for a repeat CT scans. ONCOLOGIC HISTORY:   Diagnosis  · Pancreatic adenocarcinoma, January 2018   · [id number]  · 7/6/2019-extended genetic panel-negative for a deleterious mutation     Treatment summary  · March 2018-Surgical consultation at [address] [place] [address] operable   · 4/3/2018 through 6/4/2018 Neoadjuvant chemotherapy FOLFORINOX ×5 Biweekly cycles   · 4/11/2018-Biliary stent   · August 2018- XRT 30 Gy/Xeloda   · 08/30/3018- Whipple procedure @ [facility]   · Recommended another 5 biweekly cycles of modified FOLFORINOX completed 12/26/2018   · 7/9/2019- 1/22/2020 Gemzar/Abraxane 125 mg/m2 q 14 days, discontinued due to progression of disease  · 3/4/2020- Irinotecan liposome 70 mg/m² with leucovorin 400 mg/m² and 5-FU 2400 mg/m² over 46 hours every 2 weeks.     Tumor marker  · 3/12/7036-VF-61-9->430->370. · 4/30/2018 - CA 19- 9 of 157   · 5/25/20184422-MN-18-9->32 after 4 cycles.    · 08/02/2018- -> 8   · 10/01/2018- CA 19-9 -5   · 10/29/2018-CA 19-9- 7   · 12/3/8715-HS-70-9-7   · 04/11/2019-CA-19-9- 12   · 05/21/2019- CA 19-9- 41   · 7/2/2019-CA 19 9 = 114  · 7/9/2019- CA-19-9 = 225  · 8/6/2019- CA-19-9 = 171  · 9/3/5955-US-30-9 = 198  · 9/13/20193242-CN-94-9 = 98 (at  [address])  · 10/29/0851-VI-06-9 =317  · 11/21/2019-CA-19-9 = 183  · 1/23/20207653-GB-43-9 = 520  · 4/22/2020- CA-19-9 = 940  · 5/13/20202542-FW-72-9 = 129  · 6/1/20205016-XF-38-9 = 523??  · 7/6/2020- CA 19-9- 483  · 8/17/20200380-FV-67-9 = 785  · 10/5/20205448-PR-73-9 = 1250        Cancer history  Ms Marcella Elder was seen in initial oncology consultation on 3/12/2018 referred by Dr. Libby Pop for a diagnosis of pancreatic adenocarcinoma. She was initially evaluated for acute pancreatitis at [facility] on February 2018. Further imaging revealed a pancreatic head mass. Lipase was elevated at 1184 and CA-19-9 elevated 134. The symptoms were going on for several weeks. Weight loss of 10-12 pounds over the last 6 months. · October 2017-CT abdomen without contrast was unremarkable. · 2/2/2018-ultrasound of abdomen showed a pancreatic duct dilation   · 2/02/2018-CT abdomen showed 16 mm low-density lesion in the pancreas at the junction of the head of the body. No intra-abdominal adenopathy. No liver metastasis. · 2/7/2018-the patient underwent EGD/EUS and FNA biopsy of a pancreatic mass by Dr. Libby Pop at [facility] . EUS showed inflammatory changes consistent with a recent history of pancreatitis. Main pancreatic duct was dilated. No concerning peripancreatic adenopathy. No definite mass was seen by a more diffuse hypoechoic area measuring 1.8 x 2.2 cm in the head of the pancreas. Pathology was consistent with a group of markedly atypical cells strongly suspicious for adenocarcinoma. · 2/28/2018-CT pancreatic protocol showed a poorly defined area of decreased enhancement located in the head of the pancreas measuring 1.8 x 1.4 x 1.7 cm with moderate meditation of the pancreatic duct. Well defined sharply marginated low density nodule located in the tail of the pancreas measuring 1.5 x 1.3 x 1.5 cm (IPMN?). a deleterious mutation (invitae)  · 7/9/2019- CA-19-9 = 225  · 8/6/2019- CA-19-9 = 171  · 9/3/7109-YK-59-9 = 198   · 9/13/20195953-MA-95-9 = 98 at [facility]  · 9/13/2019-CT chest abdomen pelvis at [facility] showed a soft tissue thickening in the pancreatic bed with persistent narrowing of the portal SMV confluence.  Superimposed tumor remains a possibility.  The new low-density lesion in the periphery of the liver seen on the prior exam is no longer appreciated and likely represents treatment effect.  Nodular enhancement may represent perfusion change in the region on image 187. · 9/13/2018- she was recommended to continue current treatment with Gemzar/Abraxane  · 11/1/2019- she was hospitalized with GI bleed.  An upper endoscopy showed ulceration at the efferent loop of the Whipple's procedure  · 10/29/4679-KC-63-9 =317  · 11/21/2019-CA-19-9 = 183  · 11/19/2019- CT chest abdomen pelvis showed no evidence of gross disease progression. Improvement of the caliber of what may be a middle colic vein that drains back to the SMV. There is still persistent narrowing of the of the upper SMV at the juncture with the portal vein.  No definite evidence of liver metastases at this time. No definite evidence of pulmonary metastases.  However, question of slight increase in prominence of subtle soft tissue thickening within the right paracolic gutter could be indicative of metastatic disease to peritoneum.   · 12/9/2019- colonoscopy by GI was unremarkable.  This was performed for complaints of maroon-colored stools. · 1/23/20209752-LM-31-9 = 520  · 1/28/2020- CT chest abdomen pelvis at [facility] showed progressive disease with recurrence at the retroperitoneal just inferior to the pancreaticojejunostomy with vascular involvement and complete occlusion of the portal vein and SMV resulting in worsening bowel edema and developing ascites. This is consistent with disease progression.   · 3/4/2020- 4th line therapy any lower extremity edema. If evidence of multiorgan dysfunction, abdominal compartment syndrome could also be considered. The results of this exam were discussed with Dr. Cathryne Boast on 6/21/2020 at 1002 hours. In particular, I wanted to address if there was indication/need for therapeutic paracentesis. This is under consideration. · 6/21/2020- US Guided Paracentesis Ultrasound-guided abdominal paracentesis performed for therapeutic purposes. A 60 cc aspirate was set aside for lab evaluation, if desired. The patient tolerated the proceed well. Cytology was negative for malignant cells. · 7/6/2020 CA19.9- 483. · 8/17/2020 VW86.9-623  · 8/21/2020 CT Chest/Abdomen/Pelvis- No acute intrathoracic abnormality. Hiatal hernia containing ascitic fluid. Questionable wall thickening of the distal esophagus. No pathologic intrathoracic or axillary lymphadenopathy. Stable subcentimeter left supraclavicular/lower jugular chain and subcarinal lymph nodes compared to 5/12/2020. Previous Whipple procedure with associated pneumobilia. Stable diffuse prominence of the intrapancreatic duct within the residual pancreatic body and tail with no discrete pancreatic mass or convincing evidence of solid organ metastasis. Small volume of ascites. Ultrasound-guided paracentesis performed prior to this exam. 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 Multiple collateral vascular structures described above. Diffuse soft tissue anasarca. Trace left pleural effusion. Wall thickening of nondilated small bowel loops and rectosigmoid colon may relate to hypoproteinemia and third spacing of fluid. Nonspecific inflammatory infectious enterocolitis is a second possibility. Moderate size hiatal hernia. · 8/21/2020 Us Guided Paracentesis-Ultrasound-guided abdominal paracentesis performed for diagnostic and therapeutic purposes. The patient tolerated the proceed well. · 8/24/2020- FOLFOX palliative treatment.   · 10/5/2020- CA-19-9 = 1250.          PAST MEDICAL HISTORY:   Past Medical History:   Diagnosis Date    Acute pancreatitis     Adult BMI <19 kg/sq m     Anemia     Cat esophagus     Biliary obstruction     GERD (gastroesophageal reflux disease)     with Barretts    Hashimoto's thyroiditis     denies takes no med for    Heart murmur     Hypertension     pt denies nor longer takes med for    Low blood sugar     h/o when overweight in the past    MVP (mitral valve prolapse)     Myalgia     Palliative care patient 2020    Pancreatic adenocarcinoma ([facility] [address] 2018    Dr Diana Perry    Pancreatic cancer ([facility] [address] 3/30/2018    Right flank pain     RUQ pain           PAST SURGICAL HISTORY:  Past Surgical History:   Procedure Laterality Date    CARDIAC CATHETERIZATION      heart cath     SECTION      x 3    CHOLECYSTECTOMY  1997    COLONOSCOPY  years ago    [facility]-Dr Merna Mccarthy per patient    COLONOSCOPY  2014    Dr Binh Rose- Tia Yossi recall    COLONOSCOPY  03/10/2016    Dr Mcfarland-10 yr recall    COLONOSCOPY N/A 2019    Dr Reji Roger, 5 yr recall    ELBOW SURGERY Right     ENDOMETRIAL ABLATION      HAND SURGERY Right     HERNIA REPAIR      hiatal    HERNIA REPAIR      umbilical    HERNIA REPAIR      PANCREAS SURGERY  2018    Whipple procedure-Dr Philip Kidd [place] EGD [facility] NEEDLE ASPIR/BIOP ALTERED ANATOMY N/A 2018    Dr Michael Knapp w/fna-Dilation of main pancreatic duct with diffuse change in the pancreatitis noted, area of concern in the neck of the pancreas-strongly suspicious for adenocarcinoma     MI EGD [facility] NEEDLE ASPIR/BIOP ALTERED ANATOMY N/A 3/6/2018    Dr KVNG Duncan-Pancreatic cancer-Ductal adenocarcinoma-staged [id number] by EUS, pancreatic pseudocyst in the tail the pancreas    MI ERCP DX COLLECTION SPECIMEN BRUSHING/WASHING N/A 2018    Dr KVNG Duncan-w/placement of a self-expanding fully covered 10 mm biliary stent    UPPER GASTROINTESTINAL ENDOSCOPY  years ago    [facility]-Dr Reid Decker    UPPER GASTROINTESTINAL ENDOSCOPY  2013    Zuniga    UPPER GASTROINTESTINAL ENDOSCOPY N/A 2019    Dr Jamar Colin post Whipple's procedure with efferent loop ulceration    UPPER GASTROINTESTINAL ENDOSCOPY  2019    Dr Veronica Duncan-Patent G-J Anastomosis, apparent healing ulceration    UPPER GASTROINTESTINAL ENDOSCOPY N/A 2020    Dr Fallon Oliva amount of food retention in the stomach with bile, hiatal hernia, will need repeat    UPPER GASTROINTESTINAL ENDOSCOPY N/A 2020    Dr Dennis Boeck erosion/ulceration at the suture line, post whipple surgical changes    UPPER GASTROINTESTINAL ENDOSCOPY N/A 3/9/2020    Dr Dennis Boeck erosion along the previous whipple suture line    UPPER GASTROINTESTINAL ENDOSCOPY N/A 2020    Dr KVNG Duncan-w/hemostatic clip placement x 1-Allie Peres tear at [facility]-Likely culprit lesion for current presentation    UPPER GASTROINTESTINAL ENDOSCOPY N/A 2020    Dr Dennis Boeck erosion along the previous whipple suture line        SOCIAL HISTORY:  Social History     Socioeconomic History    Marital status:      Spouse name: None    Number of children: 3    Years of education: None    Highest education level: None   Occupational History    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[place] [address] Occupation: [employer]    Occupation: [employer]   Social Needs    Financial resource strain: None    Food insecurity     Worry: None     Inability: None    Transportation needs     Medical: None     Non-medical: None   Tobacco Use    Smoking status: Former Smoker     Packs/day: 0.50     Years: 10.00     Pack years: 5.00     Types: Cigarettes     Last attempt to quit: 2019     Years since quittin.0    Smokeless tobacco: Never Used   Substance and Sexual Activity    Alcohol use: Not Currently    Drug use: Never    Sexual activity: None     Comment: 3   Lifestyle    Physical activity     Days per week: None     Minutes per session: None    Stress: None   Relationships    Social connections     Talks on phone: None     Gets together: None     Attends Buddhist service: None     Active member of club or organization: None     Attends meetings of clubs or organizations: None     Relationship status: None    Intimate partner violence     Fear of current or ex partner: None     Emotionally abused: None     Physically abused: None     Forced sexual activity: None   Other Topics Concern    None   Social History Narrative    CODE STATUS: Full Code    HEALTH CARE PROXY: her daughter, Mrs. Nino Kaufman, of [facility]    AMBULATES: independently    DOMICILED: lives in a private home, without steps inside, lives alone, has 2 dogs, no steps in to home       FAMILY HISTORY:  Family History   Problem Relation Age of Onset    Heart Attack Mother 46        x 2-had bypass    Hypertension Mother     COPD Father     Liver Cancer Paternal Aunt     Lung Cancer Paternal Aunt     Breast Cancer Maternal Aunt         but  of brain cancer    Diabetes Maternal Uncle     Diabetes Maternal Grandmother     Colon Cancer Neg Hx     Colon Polyps Neg Hx     Esophageal Cancer Neg Hx     Rectal Cancer Neg Hx     Stomach Cancer Neg Hx         Current Outpatient Medications   Medication Sig Dispense Refill    prochlorperazine (COMPAZINE) 10 MG tablet Take 1 tablet by mouth every 6 hours as needed (nausea) 60 tablet 5    gabapentin (NEURONTIN) 100 MG capsule Take 1 capsule by mouth 3 times daily for 120 days.  Intended supply: 90 days 90 capsule 3    pantoprazole (PROTONIX) 40 MG tablet Take 1 tablet by mouth 2 times daily 60 tablet 3    Cyanocobalamin (VITAMIN B 12 PO) Take by mouth      promethazine (PHENERGAN) 25 MG tablet Take 1 tablet by mouth every 6 hours as needed for Nausea 30 tablet 2    sucralfate (CARAFATE) 1 GM tablet Take 1 tablet by mouth 4 times daily 360 tablet 1    lidocaine-prilocaine (EMLA) 2.5-2.5 % cream Apply to port area and cover with plastic wrap one hour prior to use. 1 Tube 1    vitamin E 400 UNIT capsule Take 400 Units by mouth daily      NONFORMULARY daily Tumeric otc      ondansetron (ZOFRAN) 8 MG tablet Take 1 tablet by mouth every 8 hours as needed for Nausea or Vomiting 30 tablet 3    Multiple Vitamins-Minerals (THERAPEUTIC MULTIVITAMIN-MINERALS) tablet Take 1 tablet by mouth daily       No current facility-administered medications for this visit. REVIEW OF SYSTEMS:    Constitutional: no fever, no night sweats, fatigue;   HEENT: no blurring of vision, no double vision, no hearing difficulty, no tinnitus,no ulceration, no dysphagia  Lungs: no cough, no shortness of breath, no wheeze;   CVS: no palpitation, no chest pain, no shortness of breath;   GI: no abdominal pain, no nausea , no vomiting, no constipation; diarrhea on and off  VIKRAM: no dysuria, frequency and urgency, no hematuria, no kidney stones;   Musculoskeletal: no joint pain, swelling , stiffness;   Endocrine: no polyuria, polydypsia, no cold or heat intolerence; Hematology/lymphatic: no easy brusing or bleeding, no hx of clotting disorder; no peripheral adenopathy. Dermatology: no skin rash, no eczema, no pruritis;   Psychiatry: no depression, no anxiety,no panic attacks, no suicide ideation; Neurology: no syncope, no seizures, stable neuropathy/lower extremity    PHYSICAL EXAM:    Vitals signs:  /64   Pulse 60   Temp 97.8 °F (36.6 °C)   Ht 5' 7\" (1.702 m)   Wt 110 lb (49.9 kg)   BMI 17.23 kg/m²    Pain scale:  Pain Score:   0 - No pain   CONSTITUTIONAL: Alert, appropriate, no acute distress, chronically-ill appearing. EYES: Non icteric, EOM intact, pupils equal round and reactive to light and accommodation. ENT: Oral mucus membranes moist, no oral pharyngeal lesions. External inspection of ears and nose are normal.   NECK: Supple, no masses.  No palpable thyroid mass CHEST/LUNGS: CTA bilaterally, normal respiratory effort   CARDIOVASCULAR: RRR, no murmurs. No lower extremity edema   ABDOMEN: Abdominal tenderness, no ascites. active bowel sounds, no hepatosplenomegaly. No palpable masses. EXTREMITIES: Mild bilateral lower extremity edema, warm, Full ROM of all fours extremities. No focal weakness. SKIN: warm, dry with no rashes or lesions  LYMPH: No cervical, clavicular, axillary, or inguinal lymphadenopathy  NEUROLOGIC: follows commands, non focal.   PSYCH: mood and affect appropriate. Alert and oriented to time and place and person. Relevant Lab findings/reviewed by me:  Lab Results   Component Value Date    WBC 4.79 11/02/2020    HGB 9.5 (L) 11/02/2020    HCT 28.8 (L) 11/02/2020    MCV 89.7 11/02/2020     (L) 11/02/2020     Lab Results   Component Value Date    NEUTROABS 3.14 11/02/2020       Relevant Imaging studies/reviewed by me:  Us Abdomen Limited    Result Date: 10/30/2020  No ascites. Paracentesis not performed. ASSESSMENT    Orders Placed This Encounter   Procedures    CT CHEST W CONTRAST     Standing Status:   Future     Standing Expiration Date:   1/2/2021    CT ABDOMEN PELVIS W IV CONTRAST Additional Contrast? Oral     Standing Status:   Future     Standing Expiration Date:   12/2/2020     Order Specific Question:   Additional Contrast?     Answer:   Oral    Cancer Antigen 19-9     Standing Status:   Future     Number of Occurrences:   1     Standing Expiration Date:   11/2/2021      Nik Singh was seen today for pancreatic cancer. Diagnoses and all orders for this visit:    Chemotherapy management, encounter for    Adverse effect of chemotherapy, subsequent encounter    Care plan discussed with patient    Malignant ascites    Malignant neoplasm of pancreas, unspecified location of malignancy ([facility] [address]  -     Cancer Antigen 19-9; Future  -     CT CHEST W CONTRAST;  Future  -     CT ABDOMEN PELVIS W IV CONTRAST Additional Contrast? Oral; Future  - Cancer Antigen 19-9       #Metastatic pancreatic cancer  Fourth line treatment with modified FOLFOX 7 with 20% dose reduction. Last CA-19-9 = 523->383, 483->785->1250. Continue FOLFOX today. Repeat CT chest abdomen pelvis and tumor markers. #GI bleed- secondary to erosion at previous Whipple anastomotic site. history of erosion at the anastomotic site with several instances of upper GI bleed in the past.  No further episodes of GI bleed. Last transfusion few weeks ago.     #Normocytic anemia- secondary to GI bleed and chemotherapy. Hemoglobin 9.5 feels less transfusion 3 weeks ago. #Prognosis-poor prognosis overall. She has had disease progression several lines of treatment before. 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 Cytology was negative. Plan:   · Palliative mFOLFOX7 today  · CBC Mondays and Wednesdays  · PRN therapeutic US guided paracentesis every 2 weeks at [facility]  · RTC 3 weeks MD     Follow Up:     Return in about 4 weeks (around 11/30/2020) for treatment and see Nancy Guillory in [address] [place] [address]. Scans prior to next visit     I, Rosario Linares RN, am scribing for Jeanette Marroquin MD. Electronically signed by Rosario Linares RN on 11/2/2020 at 2:50 PM CDT. I, Dr Ana María Fernando, personally performed the services described in this documentation as scribed by Rosario Linares RN in my presence and is both accurate and complete. Over 50% of the total visit time of 25 minutes in face to face encounter with the patient, out of which more than 50% of the time was spent in counseling patient or family and coordination of care. Counseling included but was not limited to time spent reviewing labs, imaging studies/ treatment plan and answering questions.

## 2020-11-02 ENCOUNTER — OFFICE VISIT (OUTPATIENT)
Dept: HEMATOLOGY | Age: 60
End: 2020-11-02
Payer: MEDICARE

## 2020-11-02 ENCOUNTER — HOSPITAL ENCOUNTER (OUTPATIENT)
Dept: INFUSION THERAPY | Age: 60
Discharge: HOME | End: 2020-11-02
Payer: MEDICARE

## 2020-11-02 VITALS
SYSTOLIC BLOOD PRESSURE: 124 MMHG | BODY MASS INDEX: 17.27 KG/M2 | TEMPERATURE: 97.8 F | DIASTOLIC BLOOD PRESSURE: 64 MMHG | HEART RATE: 60 BPM | WEIGHT: 110 LBS | HEIGHT: 67 IN

## 2020-11-02 VITALS — DIASTOLIC BLOOD PRESSURE: 84 MMHG | OXYGEN SATURATION: 98 % | HEART RATE: 63 BPM | SYSTOLIC BLOOD PRESSURE: 138 MMHG

## 2020-11-02 DIAGNOSIS — C25.9 MALIGNANT NEOPLASM OF PANCREAS, UNSPECIFIED LOCATION OF MALIGNANCY (HCC): Primary | ICD-10-CM

## 2020-11-02 LAB
ALBUMIN SERPL-MCNC: 3 G/DL (ref 3.5–5.2)
ALP BLD-CCNC: 400 U/L (ref 35–104)
ALT SERPL-CCNC: 234 U/L (ref 9–52)
ANION GAP SERPL CALCULATED.3IONS-SCNC: 4 MMOL/L (ref 7–19)
AST SERPL-CCNC: 403 U/L (ref 14–36)
BASOPHILS ABSOLUTE: 0.03 K/UL (ref 0.01–0.08)
BASOPHILS RELATIVE PERCENT: 0.6 % (ref 0.1–1.2)
BILIRUB SERPL-MCNC: <0.2 MG/DL (ref 0.2–1.3)
BUN BLDV-MCNC: 5 MG/DL (ref 7–17)
CA 19-9: 3550 U/ML (ref 0–37)
CALCIUM SERPL-MCNC: 8.1 MG/DL (ref 8.4–10.2)
CHLORIDE BLD-SCNC: 106 MMOL/L (ref 98–111)
CO2: 27 MMOL/L (ref 22–29)
CREAT SERPL-MCNC: 0.6 MG/DL (ref 0.5–1)
EOSINOPHILS ABSOLUTE: 0.09 K/UL (ref 0.04–0.54)
EOSINOPHILS RELATIVE PERCENT: 1.9 % (ref 0.7–7)
GFR NON-AFRICAN AMERICAN: >60
GLUCOSE BLD-MCNC: 122 MG/DL (ref 74–106)
HCT VFR BLD CALC: 28.8 % (ref 34.1–44.9)
HEMOGLOBIN: 9.5 G/DL (ref 11.2–15.7)
LYMPHOCYTES ABSOLUTE: 0.86 K/UL (ref 1.18–3.74)
LYMPHOCYTES RELATIVE PERCENT: 18 % (ref 19.3–53.1)
MCH RBC QN AUTO: 29.6 PG (ref 25.6–32.2)
MCHC RBC AUTO-ENTMCNC: 33 G/DL (ref 32.3–35.5)
MCV RBC AUTO: 89.7 FL (ref 79.4–94.8)
MONOCYTES ABSOLUTE: 0.67 K/UL (ref 0.24–0.82)
MONOCYTES RELATIVE PERCENT: 14 % (ref 4.7–12.5)
NEUTROPHILS ABSOLUTE: 3.14 K/UL (ref 1.56–6.13)
NEUTROPHILS RELATIVE PERCENT: 65.5 % (ref 34–71.1)
PDW BLD-RTO: 16.8 % (ref 11.7–14.4)
PLATELET # BLD: 143 K/UL (ref 182–369)
PMV BLD AUTO: 10.2 FL (ref 7.4–10.4)
POTASSIUM SERPL-SCNC: 4.2 MMOL/L (ref 3.5–5.1)
RBC # BLD: 3.21 M/UL (ref 3.93–5.22)
SODIUM BLD-SCNC: 137 MMOL/L (ref 137–145)
TOTAL PROTEIN: 6.3 G/DL (ref 6.3–8.2)
WBC # BLD: 4.79 K/UL (ref 3.98–10.04)

## 2020-11-02 PROCEDURE — 2500000003 HC RX 250 WO HCPCS: Performed by: INTERNAL MEDICINE

## 2020-11-02 PROCEDURE — 99214 OFFICE O/P EST MOD 30 MIN: CPT | Performed by: INTERNAL MEDICINE

## 2020-11-02 PROCEDURE — 96415 CHEMO IV INFUSION ADDL HR: CPT

## 2020-11-02 PROCEDURE — 6360000002 HC RX W HCPCS: Performed by: INTERNAL MEDICINE

## 2020-11-02 PROCEDURE — 2580000003 HC RX 258: Performed by: INTERNAL MEDICINE

## 2020-11-02 PROCEDURE — 80053 COMPREHEN METABOLIC PANEL: CPT

## 2020-11-02 PROCEDURE — 36415 COLL VENOUS BLD VENIPUNCTURE: CPT | Performed by: INTERNAL MEDICINE

## 2020-11-02 PROCEDURE — 86301 IMMUNOASSAY TUMOR CA 19-9: CPT

## 2020-11-02 PROCEDURE — 85025 COMPLETE CBC W/AUTO DIFF WBC: CPT

## 2020-11-02 PROCEDURE — 96367 TX/PROPH/DG ADDL SEQ IV INF: CPT

## 2020-11-02 PROCEDURE — 96413 CHEMO IV INFUSION 1 HR: CPT

## 2020-11-02 PROCEDURE — G0498 CHEMO EXTEND IV INFUS W/PUMP: HCPCS

## 2020-11-02 PROCEDURE — 96368 THER/DIAG CONCURRENT INF: CPT

## 2020-11-02 PROCEDURE — 96375 TX/PRO/DX INJ NEW DRUG ADDON: CPT

## 2020-11-02 RX ORDER — SODIUM CHLORIDE 9 MG/ML
INJECTION, SOLUTION INTRAVENOUS CONTINUOUS
Status: CANCELLED | OUTPATIENT
Start: 2020-11-02

## 2020-11-02 RX ORDER — METHYLPREDNISOLONE SODIUM SUCCINATE 125 MG/2ML
125 INJECTION, POWDER, LYOPHILIZED, FOR SOLUTION INTRAMUSCULAR; INTRAVENOUS ONCE
Status: CANCELLED | OUTPATIENT
Start: 2020-11-02

## 2020-11-02 RX ORDER — SODIUM CHLORIDE 0.9 % (FLUSH) 0.9 %
5 SYRINGE (ML) INJECTION PRN
Status: CANCELLED | OUTPATIENT
Start: 2020-11-02

## 2020-11-02 RX ORDER — EPINEPHRINE 1 MG/ML
0.3 INJECTION, SOLUTION, CONCENTRATE INTRAVENOUS PRN
Status: CANCELLED | OUTPATIENT
Start: 2020-11-02

## 2020-11-02 RX ORDER — DIPHENHYDRAMINE HYDROCHLORIDE 50 MG/ML
50 INJECTION INTRAMUSCULAR; INTRAVENOUS ONCE
Status: CANCELLED | OUTPATIENT
Start: 2020-11-02

## 2020-11-02 RX ORDER — PALONOSETRON 0.05 MG/ML
0.25 INJECTION, SOLUTION INTRAVENOUS ONCE
Status: COMPLETED | OUTPATIENT
Start: 2020-11-02 | End: 2020-11-02

## 2020-11-02 RX ORDER — HEPARIN SODIUM (PORCINE) LOCK FLUSH IV SOLN 100 UNIT/ML 100 UNIT/ML
500 SOLUTION INTRAVENOUS PRN
Status: CANCELLED | OUTPATIENT
Start: 2020-11-02

## 2020-11-02 RX ORDER — DEXAMETHASONE SODIUM PHOSPHATE 10 MG/ML
10 INJECTION, SOLUTION INTRAMUSCULAR; INTRAVENOUS ONCE
Status: COMPLETED | OUTPATIENT
Start: 2020-11-02 | End: 2020-11-02

## 2020-11-02 RX ORDER — PALONOSETRON 0.05 MG/ML
0.25 INJECTION, SOLUTION INTRAVENOUS ONCE
Status: CANCELLED | OUTPATIENT
Start: 2020-11-02

## 2020-11-02 RX ORDER — DEXTROSE MONOHYDRATE 50 MG/ML
INJECTION, SOLUTION INTRAVENOUS ONCE
Status: CANCELLED | OUTPATIENT
Start: 2020-11-02

## 2020-11-02 RX ORDER — SODIUM CHLORIDE 0.9 % (FLUSH) 0.9 %
10 SYRINGE (ML) INJECTION PRN
Status: CANCELLED | OUTPATIENT
Start: 2020-11-02

## 2020-11-02 RX ORDER — METHYLPREDNISOLONE SODIUM SUCCINATE 125 MG/2ML
125 INJECTION, POWDER, LYOPHILIZED, FOR SOLUTION INTRAMUSCULAR; INTRAVENOUS ONCE
Status: COMPLETED | OUTPATIENT
Start: 2020-11-02 | End: 2020-11-02

## 2020-11-02 RX ORDER — SODIUM CHLORIDE 9 MG/ML
INJECTION, SOLUTION INTRAVENOUS ONCE
Status: CANCELLED | OUTPATIENT
Start: 2020-11-02

## 2020-11-02 RX ADMIN — LEUCOVORIN CALCIUM 560 MG: 350 INJECTION, POWDER, LYOPHILIZED, FOR SUSPENSION INTRAMUSCULAR; INTRAVENOUS at 10:05

## 2020-11-02 RX ADMIN — PALONOSETRON 0.25 MG: 0.05 INJECTION, SOLUTION INTRAVENOUS at 09:30

## 2020-11-02 RX ADMIN — DIPHENHYDRAMINE HYDROCHLORIDE: 50 INJECTION, SOLUTION INTRAMUSCULAR; INTRAVENOUS at 09:31

## 2020-11-02 RX ADMIN — DEXAMETHASONE SODIUM PHOSPHATE 10 MG: 10 INJECTION, SOLUTION INTRAMUSCULAR; INTRAVENOUS at 09:31

## 2020-11-02 RX ADMIN — OXALIPLATIN 117 MG: 5 INJECTION, SOLUTION INTRAVENOUS at 10:05

## 2020-11-02 RX ADMIN — FAMOTIDINE 20 MG: 10 INJECTION, SOLUTION INTRAVENOUS at 09:31

## 2020-11-02 RX ADMIN — METHYLPREDNISOLONE SODIUM SUCCINATE 125 MG: 125 INJECTION, POWDER, FOR SOLUTION INTRAMUSCULAR; INTRAVENOUS at 09:31

## 2020-11-02 RX ADMIN — FLUOROURACIL 3300 MG: 50 INJECTION, SOLUTION INTRAVENOUS at 12:42

## 2020-11-02 NOTE — PROGRESS NOTES
Called to pt' room as she was returning from the restroom. Her mouth is tingly and red and she feels like her throat is getting tight, just like last time when she was here for tx. Today she was premedicated with Benadryl, Solumedrol and Famotidine ahead of time. Dr. Joss Mauricio came to assess her and we will monitor her until she returns to baseline and then hook up her 5FU CIP. Her VS are stable. O2 sat 98%. We will d/c the Oxaliplatin from here on out per Dr. Joss Mauricio.

## 2020-11-04 ENCOUNTER — HOSPITAL ENCOUNTER (OUTPATIENT)
Dept: INFUSION THERAPY | Age: 60
Discharge: HOME | End: 2020-11-04
Payer: MEDICARE

## 2020-11-04 DIAGNOSIS — C25.9 MALIGNANT NEOPLASM OF PANCREAS, UNSPECIFIED LOCATION OF MALIGNANCY (HCC): Primary | ICD-10-CM

## 2020-11-04 PROCEDURE — 96377 APPLICATON ON-BODY INJECTOR: CPT

## 2020-11-04 PROCEDURE — 6360000002 HC RX W HCPCS: Performed by: INTERNAL MEDICINE

## 2020-11-04 PROCEDURE — 2580000003 HC RX 258: Performed by: INTERNAL MEDICINE

## 2020-11-04 PROCEDURE — 96523 IRRIG DRUG DELIVERY DEVICE: CPT

## 2020-11-04 RX ORDER — HEPARIN SODIUM (PORCINE) LOCK FLUSH IV SOLN 100 UNIT/ML 100 UNIT/ML
500 SOLUTION INTRAVENOUS PRN
Status: DISCONTINUED | OUTPATIENT
Start: 2020-11-04 | End: 2020-11-05 | Stop reason: HOSPADM

## 2020-11-04 RX ORDER — SODIUM CHLORIDE 0.9 % (FLUSH) 0.9 %
10 SYRINGE (ML) INJECTION PRN
Status: CANCELLED | OUTPATIENT
Start: 2020-11-04

## 2020-11-04 RX ORDER — HEPARIN SODIUM (PORCINE) LOCK FLUSH IV SOLN 100 UNIT/ML 100 UNIT/ML
500 SOLUTION INTRAVENOUS PRN
Status: CANCELLED | OUTPATIENT
Start: 2020-11-04

## 2020-11-04 RX ORDER — SODIUM CHLORIDE 0.9 % (FLUSH) 0.9 %
20 SYRINGE (ML) INJECTION PRN
Status: DISCONTINUED | OUTPATIENT
Start: 2020-11-04 | End: 2020-11-05 | Stop reason: HOSPADM

## 2020-11-04 RX ORDER — SODIUM CHLORIDE 0.9 % (FLUSH) 0.9 %
20 SYRINGE (ML) INJECTION PRN
Status: CANCELLED | OUTPATIENT
Start: 2020-11-04

## 2020-11-04 RX ADMIN — HEPARIN 500 UNITS: 100 SYRINGE at 09:20

## 2020-11-04 RX ADMIN — SODIUM CHLORIDE, PRESERVATIVE FREE 20 ML: 5 INJECTION INTRAVENOUS at 09:20

## 2020-11-09 ENCOUNTER — HOSPITAL ENCOUNTER (OUTPATIENT)
Dept: CT IMAGING | Age: 60
Discharge: HOME | End: 2020-11-09
Payer: COMMERCIAL

## 2020-11-09 ENCOUNTER — HOSPITAL ENCOUNTER (OUTPATIENT)
Dept: INFUSION THERAPY | Age: 60
Discharge: HOME | End: 2020-11-09
Payer: MEDICARE

## 2020-11-09 DIAGNOSIS — C25.9 MALIGNANT NEOPLASM OF PANCREAS, UNSPECIFIED LOCATION OF MALIGNANCY (HCC): ICD-10-CM

## 2020-11-09 LAB
BASOPHILS ABSOLUTE: 0.02 K/UL (ref 0.01–0.08)
BASOPHILS RELATIVE PERCENT: 0.4 % (ref 0.1–1.2)
EOSINOPHILS ABSOLUTE: 0.16 K/UL (ref 0.04–0.54)
EOSINOPHILS RELATIVE PERCENT: 3.5 % (ref 0.7–7)
HCT VFR BLD CALC: 31.5 % (ref 34.1–44.9)
HEMOGLOBIN: 10 G/DL (ref 11.2–15.7)
LYMPHOCYTES ABSOLUTE: 1.1 K/UL (ref 1.18–3.74)
LYMPHOCYTES RELATIVE PERCENT: 24.2 % (ref 19.3–53.1)
MCH RBC QN AUTO: 30 PG (ref 25.6–32.2)
MCHC RBC AUTO-ENTMCNC: 31.7 G/DL (ref 32.3–35.5)
MCV RBC AUTO: 94.6 FL (ref 79.4–94.8)
MONOCYTES ABSOLUTE: 0.48 K/UL (ref 0.24–0.82)
MONOCYTES RELATIVE PERCENT: 10.6 % (ref 4.7–12.5)
NEUTROPHILS ABSOLUTE: 2.78 K/UL (ref 1.56–6.13)
NEUTROPHILS RELATIVE PERCENT: 61.3 % (ref 34–71.1)
PDW BLD-RTO: 17 % (ref 11.7–14.4)
PLATELET # BLD: 106 K/UL (ref 182–369)
PMV BLD AUTO: 10.8 FL (ref 7.4–10.4)
RBC # BLD: 3.33 M/UL (ref 3.93–5.22)
WBC # BLD: 4.54 K/UL (ref 3.98–10.04)

## 2020-11-09 PROCEDURE — 71260 CT THORAX DX C+: CPT

## 2020-11-09 PROCEDURE — 74177 CT ABD & PELVIS W/CONTRAST: CPT

## 2020-11-09 PROCEDURE — 85025 COMPLETE CBC W/AUTO DIFF WBC: CPT

## 2020-11-09 PROCEDURE — 6360000004 HC RX CONTRAST MEDICATION: Performed by: INTERNAL MEDICINE

## 2020-11-09 RX ADMIN — IOPAMIDOL 90 ML: 755 INJECTION, SOLUTION INTRAVENOUS at 14:23

## 2020-11-11 ENCOUNTER — HOSPITAL ENCOUNTER (OUTPATIENT)
Dept: INFUSION THERAPY | Age: 60
Discharge: HOME | End: 2020-11-11
Payer: COMMERCIAL

## 2020-11-11 DIAGNOSIS — C25.9 MALIGNANT NEOPLASM OF PANCREAS, UNSPECIFIED LOCATION OF MALIGNANCY (HCC): ICD-10-CM

## 2020-11-11 LAB
BASOPHILS ABSOLUTE: 0.02 K/UL (ref 0.01–0.08)
BASOPHILS RELATIVE PERCENT: 0.5 % (ref 0.1–1.2)
EOSINOPHILS ABSOLUTE: 0.1 K/UL (ref 0.04–0.54)
EOSINOPHILS RELATIVE PERCENT: 2.5 % (ref 0.7–7)
HCT VFR BLD CALC: 29.6 % (ref 34.1–44.9)
HEMOGLOBIN: 9.2 G/DL (ref 11.2–15.7)
LYMPHOCYTES ABSOLUTE: 0.68 K/UL (ref 1.18–3.74)
LYMPHOCYTES RELATIVE PERCENT: 17.1 % (ref 19.3–53.1)
MCH RBC QN AUTO: 30.2 PG (ref 25.6–32.2)
MCHC RBC AUTO-ENTMCNC: 31.1 G/DL (ref 32.3–35.5)
MCV RBC AUTO: 97 FL (ref 79.4–94.8)
MONOCYTES ABSOLUTE: 0.36 K/UL (ref 0.24–0.82)
MONOCYTES RELATIVE PERCENT: 9 % (ref 4.7–12.5)
NEUTROPHILS ABSOLUTE: 2.82 K/UL (ref 1.56–6.13)
NEUTROPHILS RELATIVE PERCENT: 70.9 % (ref 34–71.1)
PDW BLD-RTO: 18 % (ref 11.7–14.4)
PLATELET # BLD: 93 K/UL (ref 182–369)
PMV BLD AUTO: 11.5 FL (ref 7.4–10.4)
RBC # BLD: 3.05 M/UL (ref 3.93–5.22)
WBC # BLD: 3.98 K/UL (ref 3.98–10.04)

## 2020-11-11 PROCEDURE — 85025 COMPLETE CBC W/AUTO DIFF WBC: CPT

## 2020-11-13 ENCOUNTER — HOSPITAL ENCOUNTER (OUTPATIENT)
Dept: ULTRASOUND IMAGING | Age: 60
Discharge: HOME | End: 2020-11-13
Payer: MEDICARE

## 2020-11-13 LAB
INR BLD: 1.12 (ref 0.88–1.18)
PROTHROMBIN TIME: 14.4 SEC (ref 12–14.6)

## 2020-11-13 PROCEDURE — 85610 PROTHROMBIN TIME: CPT

## 2020-11-13 PROCEDURE — 36415 COLL VENOUS BLD VENIPUNCTURE: CPT

## 2020-11-13 PROCEDURE — C1729 CATH, DRAINAGE: HCPCS

## 2020-11-16 ENCOUNTER — HOSPITAL ENCOUNTER (OUTPATIENT)
Dept: INFUSION THERAPY | Age: 60
Discharge: HOME | End: 2020-11-16
Payer: MEDICARE

## 2020-11-16 VITALS
OXYGEN SATURATION: 98 % | BODY MASS INDEX: 16.19 KG/M2 | HEART RATE: 63 BPM | DIASTOLIC BLOOD PRESSURE: 78 MMHG | TEMPERATURE: 97.8 F | WEIGHT: 103.4 LBS | RESPIRATION RATE: 18 BRPM | SYSTOLIC BLOOD PRESSURE: 142 MMHG

## 2020-11-16 DIAGNOSIS — C25.9 MALIGNANT NEOPLASM OF PANCREAS, UNSPECIFIED LOCATION OF MALIGNANCY (HCC): Primary | ICD-10-CM

## 2020-11-16 LAB
ALBUMIN SERPL-MCNC: 3 G/DL (ref 3.5–5.2)
ALP BLD-CCNC: 583 U/L (ref 35–104)
ALT SERPL-CCNC: 178 U/L (ref 9–52)
ANION GAP SERPL CALCULATED.3IONS-SCNC: 3 MMOL/L (ref 7–19)
AST SERPL-CCNC: 162 U/L (ref 14–36)
BASOPHILS ABSOLUTE: 0.01 K/UL (ref 0.01–0.08)
BASOPHILS RELATIVE PERCENT: 0.3 % (ref 0.1–1.2)
BILIRUB SERPL-MCNC: 1 MG/DL (ref 0.2–1.3)
BUN BLDV-MCNC: 5 MG/DL (ref 7–17)
CALCIUM SERPL-MCNC: 8 MG/DL (ref 8.4–10.2)
CHLORIDE BLD-SCNC: 107 MMOL/L (ref 98–111)
CO2: 28 MMOL/L (ref 22–29)
CREAT SERPL-MCNC: 0.5 MG/DL (ref 0.5–1)
EOSINOPHILS ABSOLUTE: 0.12 K/UL (ref 0.04–0.54)
EOSINOPHILS RELATIVE PERCENT: 4.2 % (ref 0.7–7)
GFR NON-AFRICAN AMERICAN: >60
GLOBULIN: 3.4 G/DL
GLUCOSE BLD-MCNC: 104 MG/DL (ref 74–106)
HCT VFR BLD CALC: 28 % (ref 34.1–44.9)
HEMOGLOBIN: 9.4 G/DL (ref 11.2–15.7)
LYMPHOCYTES ABSOLUTE: 0.64 K/UL (ref 1.18–3.74)
LYMPHOCYTES RELATIVE PERCENT: 22.3 % (ref 19.3–53.1)
MCH RBC QN AUTO: 29.9 PG (ref 25.6–32.2)
MCHC RBC AUTO-ENTMCNC: 33.6 G/DL (ref 32.3–35.5)
MCV RBC AUTO: 89.2 FL (ref 79.4–94.8)
MONOCYTES ABSOLUTE: 0.61 K/UL (ref 0.24–0.82)
MONOCYTES RELATIVE PERCENT: 21.3 % (ref 4.7–12.5)
NEUTROPHILS ABSOLUTE: 1.49 K/UL (ref 1.56–6.13)
NEUTROPHILS RELATIVE PERCENT: 51.9 % (ref 34–71.1)
PDW BLD-RTO: 17.4 % (ref 11.7–14.4)
PLATELET # BLD: 69 K/UL (ref 182–369)
PMV BLD AUTO: 9.8 FL (ref 7.4–10.4)
POTASSIUM SERPL-SCNC: 4 MMOL/L (ref 3.5–5.1)
RBC # BLD: 3.14 M/UL (ref 3.93–5.22)
SODIUM BLD-SCNC: 138 MMOL/L (ref 137–145)
TOTAL PROTEIN: 6.5 G/DL (ref 6.3–8.2)
WBC # BLD: 2.87 K/UL (ref 3.98–10.04)

## 2020-11-16 PROCEDURE — 85025 COMPLETE CBC W/AUTO DIFF WBC: CPT

## 2020-11-16 PROCEDURE — 80053 COMPREHEN METABOLIC PANEL: CPT

## 2020-11-16 PROCEDURE — 96523 IRRIG DRUG DELIVERY DEVICE: CPT

## 2020-11-16 RX ORDER — HEPARIN SODIUM (PORCINE) LOCK FLUSH IV SOLN 100 UNIT/ML 100 UNIT/ML
500 SOLUTION INTRAVENOUS PRN
Status: DISCONTINUED | OUTPATIENT
Start: 2020-11-16 | End: 2020-11-17 | Stop reason: HOSPADM

## 2020-11-16 RX ORDER — SODIUM CHLORIDE 0.9 % (FLUSH) 0.9 %
10 SYRINGE (ML) INJECTION PRN
Status: DISCONTINUED | OUTPATIENT
Start: 2020-11-16 | End: 2020-11-17 | Stop reason: HOSPADM

## 2020-11-16 RX ORDER — SODIUM CHLORIDE 0.9 % (FLUSH) 0.9 %
10 SYRINGE (ML) INJECTION PRN
Status: CANCELLED | OUTPATIENT
Start: 2020-11-16

## 2020-11-16 RX ORDER — SODIUM CHLORIDE 0.9 % (FLUSH) 0.9 %
20 SYRINGE (ML) INJECTION PRN
Status: CANCELLED | OUTPATIENT
Start: 2020-11-16

## 2020-11-16 RX ORDER — HEPARIN SODIUM (PORCINE) LOCK FLUSH IV SOLN 100 UNIT/ML 100 UNIT/ML
500 SOLUTION INTRAVENOUS PRN
Status: CANCELLED | OUTPATIENT
Start: 2020-11-16

## 2020-11-25 NOTE — PROGRESS NOTES
[facility]   1960 11/30/2020     Chief Complaint   Patient presents with    Pancreatic Cancer      INTERVAL HISTORY/HISTORY OF PRESENT ILLNESS:  Marcella Elder is a very pleasant 64years old female who has an unfortunate diagnosis of pancreatic adenocarcinoma. She was diagnosed in January 2018 and underwent neoadjuvant chemotherapy followed by surgery at [facility]. Of note, she had a biliary stent placed in April 2018. She has also received adjuvant radiation and Xeloda. Unfortunately, the patient had disease progression and has now been receiving several lines of palliative chemotherapy over the last few months. Her performance status has steadily been declining over the last few weeks. She has lost more weight. She presented to the clinic today for consideration of further palliative chemotherapy. She was found to be severely jaundiced. Her CMP was performed that showed a bilirubin of 12. She has been weaker over the last few weeks. Her cancer marker has gotten significantly worse. I called the hospitalist group and recommend the patient to be admitted and be assessed by GI. She was admitted to the progressed significantly the patient is a very pleasant 64years old female who has a diagnosis of pancreatic adenocarcinoma. I also recommended palliative care consult.        ONCOLOGIC HISTORY:   Diagnosis  · Pancreatic adenocarcinoma, January 2018   · [id number]  · 7/6/2019-extended genetic panel-negative for a deleterious mutation     Treatment summary  · March 2018-Surgical consultation at [address] [place] [address] operable   · 4/3/2018 through 6/4/2018 Neoadjuvant chemotherapy FOLFORINOX ×5 Biweekly cycles   · 4/11/2018-Biliary stent   · August 2018- XRT 30 Gy/Xeloda   · 08/30/3018- [facility]ipple procedure @ [facility]   · Recommended another 5 biweekly cycles of modified FOLFORINOX completed 12/26/2018   · 7/9/2019- 1/22/2020 Gemzar/Abraxane 125 mg/m2 q 14 days, discontinued due to progression of disease  · 3/4/2020- Irinotecan liposome 70 mg/m² with leucovorin 400 mg/m² and 5-FU 2400 mg/m² over 46 hours every 2 weeks.     Tumor marker  · 3/12/3342-UH-68-9->430->370. · 4/30/2018 - CA 19- 9 of 157   · 5/25/20185854-GR-45-9->32 after 4 cycles. · 08/02/2018- -> 8   · 10/01/2018- CA 19-9 -5   · 10/29/2018-CA 19-9- 7   · 12/3/1377-JW-01-9-7   · 04/11/2019-CA-19-9- 12   · 05/21/2019- CA 19-9- 41   · 7/2/2019-CA 19 9 = 114  · 7/9/2019- CA-19-9 = 225  · 8/6/2019- CA-19-9 = 171  · 9/3/6351-KM-03-9 = 198  · 9/13/20199363-QJ-05-9 = 98 (at  [address])  · 10/29/0834-OL-52-9 =317  · 11/21/2019-CA-19-9 = 183  · 1/23/20204026-VG-65-9 = 520  · 4/22/2020- CA-19-9 = 940  · 5/13/20204557-IJ-11-9 = 129  · 6/1/20205405-GK-54-9 = 523??  · 7/6/2020- CA 19-9- 483  · 8/17/20204819-PG-95-9 = 785  · 10/5/20208886-CQ-10-9 = 1250  · 11/30/2020-CA 19-9 = 77468        Cancer history  Ms Bradly Flores was seen in initial oncology consultation on 3/12/2018 referred by Dr. Yenny Hough for a diagnosis of pancreatic adenocarcinoma. She was initially evaluated for acute pancreatitis at [facility] on February 2018. Further imaging revealed a pancreatic head mass. Lipase was elevated at 1184 and CA-19-9 elevated 134. The symptoms were going on for several weeks. Weight loss of 10-12 pounds over the last 6 months. · October 2017-CT abdomen without contrast was unremarkable. · 2/2/2018-ultrasound of abdomen showed a pancreatic duct dilation   · 2/02/2018-CT abdomen showed 16 mm low-density lesion in the pancreas at the junction of the head of the body. No intra-abdominal adenopathy. No liver metastasis. · 2/7/2018-the patient underwent EGD/EUS and FNA biopsy of a pancreatic mass by Dr. Yenny Hough at [facility] . EUS showed inflammatory changes consistent with a recent history of pancreatitis. Main pancreatic duct was dilated. No concerning peripancreatic adenopathy.  No definite mass was seen by a more diffuse hypoechoic area measuring 1.8 x 2.2 cm in the head of the pancreas. Pathology was consistent with a group of markedly atypical cells strongly suspicious for adenocarcinoma. · 2/28/2018-CT pancreatic protocol showed a poorly defined area of decreased enhancement located in the head of the pancreas measuring 1.8 x 1.4 x 1.7 cm with moderate meditation of the pancreatic duct. Well defined sharply marginated low density nodule located in the tail of the pancreas measuring 1.5 x 1.3 x 1.5 cm (IPMN?). · 3/6/2018-CA 19-9 was elevated at 150. Repeat EGD was performed by Dr. Debra Petty due to the inconclusive FNA resulted on 2/7/2018. Pathology FNA was consistent with pancreatic adenocarcinoma. · 3/12/2018-she was first seen by me. No evidence of distant disease. Referral to Surgical oncology. CT chest to complete staging. CA-19-9 430. · 3/16/2018-CT of the chest was unremarkable for intrathoracic metastatic disease   · Surgery consultation at [facility] March 2018- with Dr Clemente Weber. She was not a surgical candidate upfront. Recommended neoadjuvant chemotherapy. · 4/3/2018-started on neoadjuvant chemotherapy with FOLFORINOX. · 4/11/2018-she developed CBD obstruction had a CBD stent placed by Dr. Mateo Mederos. · 4/3/2018 through 6/4/2018 Neoadjuvant chemotherapy FOLFORINOX ×5 Biweekly cycles   · August 2018- XRT 30 Gy/Xeloda   · 08/30/3018- Whipple procedure at [facility] by Dr. Gabino Harris  · Recommended another 7 biweekly cycles of modified FOLFORINOX. · 9/5/2018-CT of the chest abdomen pelvis was unremarkable for metastatic disease. · 1/14/2019-CT abdomen and pelvis at [facility] showed no evidence of metastasis in the chest abdomen pelvis. · 5/21/2019-CT chest, abdomen, pelvis at [facility] showed no evidence of metastatic disease   · 5/21/20195185-HY-82-9 = 42   · 06/12/2019- CA-19-9 = 154. Discussed with the patient. We'll also discuss with [facility].    · 7/1/2019-CT chest, abdomen, pelvis showed a soft tissue mass measuring 1.4 x 1.1 cm, not present on prior scan from January 2019, concerning for recurrence. Stable hypodense in the liver, too small to characterize. Subcentimeter ill-defined area of low attenuation liver may represent an early metastasis. · 7/3/2019-recommended palliative chemotherapy with nab paclitaxel 125mg/m² IV over 30 minutes on day 1 and gemcitabine 600 mg/m² IV over 10mg/m2/min (fixed dose rate) on day 1  · 7/2/2019-CA 19 9 = 114  · 7/6/2019- extended genetic panel negative for a deleterious mutation (invitae)  · 7/9/2019- CA-19-9 = 225  · 8/6/2019- CA-19-9 = 171  · 9/3/9180-KM-08-9 = 198   · 9/13/20198651-WN-36-9 = 98 at [facility]  · 9/13/2019-CT chest abdomen pelvis at [facility] showed a soft tissue thickening in the pancreatic bed with persistent narrowing of the portal SMV confluence.  Superimposed tumor remains a possibility.  The new low-density lesion in the periphery of the liver seen on the prior exam is no longer appreciated and likely represents treatment effect.  Nodular enhancement may represent perfusion change in the region on image 187. · 9/13/2018- she was recommended to continue current treatment with Gemzar/Abraxane  · 11/1/2019- she was hospitalized with GI bleed.  An upper endoscopy showed ulceration at the efferent loop of the Whipple's procedure  · 10/29/6651-DY-97-9 =317  · 11/21/2019-CA-19-9 = 183  · 11/19/2019- CT chest abdomen pelvis showed no evidence of gross disease progression. Improvement of the caliber of what may be a middle colic vein that drains back to the SMV. There is still persistent narrowing of the of the upper SMV at the juncture with the portal vein.  No definite evidence of liver metastases at this time.  No definite evidence of pulmonary metastases.  However, question of slight increase in prominence of subtle soft tissue thickening within the right paracolic gutter could be indicative of metastatic disease to peritoneum.   · 12/9/2019- colonoscopy by GI was unremarkable.  This was performed for complaints of maroon-colored stools. 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 This is consistent with disease progression. · 3/4/2020- 4th line therapy Irinotecan liposome 70 mg/m² with leucovorin 400 mg/m² and 5-FU 2400 mg/m² over 46 hours every 2 weeks. · 5/12/202 Ct Chest W Contrast  No acute findings in the chest.  Slight increase in size of LEFT supraclavicular lymph nodes and subcarinal mediastinal lymph node. Recommend special attention on follow-up imaging to evaluate for stability or resolution. Ct Abdomen Pelvis W Iv Contrast   Postoperative change of Whipple with residual stranding and free fluid in the surgical bed. Residual soft tissue nodular prominence in the retroperitoneum adjacent to the portal vein which is compressed and occluded/thrombosed. The splenic vein is not visualized and presumed thrombosed. LEFT upper quadrant perigastric/periesophageal varices. Moderate volume ascites. 4.  No evidence of bowel obstruction. Mild distention of the stomach, which may be secondary to slow flow through the gastrojejunostomy. Correlate clinically. There is a small linear device in the distal esophagus which could represent a hemostasis clip but recommend clinical correlation. · 5/13/2020- CA19.9-129  · 6/1/2020- CA 19.9- 523  · 6/15/2020- CA 19.9- 383  · 6/21/2020- Ct Head Wo Contrast No acute intracranial process. Findings in agreement with the emergent findings from the initial [facility] preliminary report. · 6/21/2020- Ct Abdomen Pelvis W Contrast Prior Whipple procedure with expected pneumobilia. Large volume ascites for patient size.  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 Diffuse wall thickening along the small and large bowel secondary to this venous congestion. No evidence for arterial ischemia, as the bowel mucosa appears to enhance normally. No evidence of viscus perforation or peritoneal abscess. Of note, the the retroperitoneal structures are quite decompressed as a result of the ascites, with near complete attenuation of the IVC in the mid abdomen. Unsure if this is contributing to any lower extremity edema. If evidence of multiorgan dysfunction, abdominal compartment syndrome could also be considered. The results of this exam were discussed with Dr. Valeri Ash on 6/21/2020 at 1002 hours. In particular, I wanted to address if there was indication/need for therapeutic paracentesis. This is under consideration. · 6/21/2020- US Guided Paracentesis Ultrasound-guided abdominal paracentesis performed for therapeutic purposes. A 60 cc aspirate was set aside for lab evaluation, if desired. The patient tolerated the proceed well. Cytology was negative for malignant cells. · 7/6/2020 CA19.9- 483. · 8/17/2020 VT86.2-507  · 8/21/2020 CT Chest/Abdomen/Pelvis- No acute intrathoracic abnormality. Hiatal hernia containing ascitic fluid. Questionable wall thickening of the distal esophagus. No pathologic intrathoracic or axillary lymphadenopathy. Stable subcentimeter left supraclavicular/lower jugular chain and subcarinal lymph nodes compared to 5/12/2020. Previous Whipple procedure with associated pneumobilia. Stable diffuse prominence of the intrapancreatic duct within the residual pancreatic body and tail with no discrete pancreatic mass or convincing evidence of solid organ metastasis. Small volume of ascites. Ultrasound-guided paracentesis performed prior to this exam. 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 Multiple collateral vascular structures described above. Diffuse soft tissue anasarca.  Trace left pleural effusion. Wall thickening of nondilated small bowel loops and rectosigmoid colon may relate to hypoproteinemia and third spacing of fluid. Nonspecific inflammatory infectious enterocolitis is a second possibility. Moderate size hiatal hernia. · 8/21/2020 Us Guided Paracentesis-Ultrasound-guided abdominal paracentesis performed for diagnostic and therapeutic purposes. The patient tolerated the proceed well. · 8/24/2020- FOLFOX palliative treatment. · 10/5/2020- CA-19-9 = 1250. · 11/2/20 CA 19-9 3550  · 11/9/20 Ct Chest W Contrast No evidence of metastatic disease in the chest. See separately dictated CT abdomen and pelvis of the same day regarding soft tissue of the proximal pancreas. · 11/9/20 Ct Abdomen Pelvis W Iv Contrast Postsurgical changes of the distal stomach, duodenum and the common bile duct. Ill-defined lobulated soft tissue mass in the area of the head of the pancreas and in the distal part of the gastric remnant may represent postsurgical changes or a mass/recurrent mass. Persistent dilatation of the pancreatic duct in the body and tail of the pancreas. The distal pancreatic duct is not visualized are evaluated (due to prior surgery). Evidence of intrahepatic biliary dilatation and pneumobilia similar to the previous study. Persistent moderate dilatation and thickening of the wall of the descending and transverse duodenum may represent postsurgical changes/edema due to protein losing enteropathy. Moderate amount of abdominal and pelvic ascites which has somewhat improved since the previous study. Persistent thrombosis of the distal superior mesenteric vein and the splenic vein, similar to the previous study. The Marked dilatation and enlargement of the pelvic veins and both ovarian veins, left larger than the right. No thrombosis.    · 11/30/2020-CA-19-9 =98630        PAST MEDICAL HISTORY:   Past Medical History:   Diagnosis Date    Acute pancreatitis     Adult BMI <19 kg/sq m     Anemia     Cat esophagus     Biliary obstruction     GERD (gastroesophageal reflux disease)     with Barretts    Hashimoto's thyroiditis     denies takes no med for    Heart murmur     Hypertension     pt denies nor longer takes med for    Low blood sugar     h/o when overweight in the past    MVP (mitral valve prolapse)     Myalgia     Palliative care patient 2020    Pancreatic adenocarcinoma ([facility] [address] 2018    Dr Shahana Guzmán    Pancreatic cancer ([facility] [address] 3/30/2018    Right flank pain     RUQ pain           PAST SURGICAL HISTORY:  Past Surgical History:   Procedure Laterality Date    CARDIAC CATHETERIZATION      heart cath     SECTION      x 3    CHOLECYSTECTOMY  1997    COLONOSCOPY  years ago    Steve-Dr Leonila Avila per patient    COLONOSCOPY  2014    Dr Charity Tellez- Carylon Scarlet recall    COLONOSCOPY  03/10/2016    Dr Mcfarland-10 yr recall    COLONOSCOPY N/A 2019    Dr Sky Mays, 5 yr recall    ELBOW SURGERY Right     ENDOMETRIAL ABLATION      HAND SURGERY Right     HERNIA REPAIR      hiatal    HERNIA REPAIR      umbilical    HERNIA REPAIR      PANCREAS SURGERY  2018    Whipple procedure-Dr Marianela Roland [place] EGD [facility] NEEDLE ASPIR/BIOP ALTERED ANATOMY N/A 2018    Dr Wayne Winter w/fna-Dilation of main pancreatic duct with diffuse change in the pancreatitis noted, area of concern in the neck of the pancreas-strongly suspicious for adenocarcinoma     GA EGD [facility]TRMURAL NEEDLE ASPIR/BIOP ALTERED ANATOMY N/A 3/6/2018    Dr KVNG Duncan-Pancreatic cancer-Ductal adenocarcinoma-staged [id number] by EUS, pancreatic pseudocyst in the tail the pancreas    GA ERCP DX COLLECTION SPECIMEN BRUSHING/WASHING N/A 2018    Dr KVNG Duncan-w/placement of a self-expanding fully covered 10 mm biliary stent    UPPER GASTROINTESTINAL ENDOSCOPY  years ago    [facility] ENDOSCOPY  2013    Zuniga    UPPER GASTROINTESTINAL ENDOSCOPY N/A 2019 Dr Dayne Lowery post Whipple's procedure with efferent loop ulceration    UPPER GASTROINTESTINAL ENDOSCOPY  2019    Dr Casey Duncan-Patent G-J Anastomosis, apparent healing ulceration    UPPER GASTROINTESTINAL ENDOSCOPY N/A 2020    Dr Daniella Begum amount of food retention in the stomach with bile, hiatal hernia, will need repeat    UPPER GASTROINTESTINAL ENDOSCOPY N/A 2020    Dr Anant Mcmullen erosion/ulceration at the suture line, post whipple surgical changes    UPPER GASTROINTESTINAL ENDOSCOPY N/A 3/9/2020    Dr Anant Mcmullen erosion along the previous whipple suture line    UPPER GASTROINTESTINAL ENDOSCOPY N/A 2020    Dr KVNG Duncan-w/hemostatic clip placement x 1-Allie Peres tear at [facility]-Likely culprit lesion for current presentation    UPPER GASTROINTESTINAL ENDOSCOPY N/A 2020    Dr Anant Mcmullen erosion along the previous whipple suture line        SOCIAL HISTORY:  Social History     Socioeconomic History    Marital status:      Spouse name: Not on file    Number of children: 3    Years of education: Not on file    Highest education level: Not on file   Occupational History    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[place] [address] Occupation: [employer]    Occupation: [employer]   Social Needs    Financial resource strain: Not on file    Food insecurity     Worry: Not on file     Inability: Not on file   [employer] needs     Medical: Not on file     Non-medical: Not on file   Tobacco Use    Smoking status: Former Smoker     Packs/day: 0.50     Years: 10.00     Pack years: 5.00     Types: Cigarettes     Last attempt to quit: 2019     Years since quittin.0    Smokeless tobacco: Never Used   Substance and Sexual Activity    Alcohol use: Not Currently    Drug use: Never    Sexual activity: Not on file     Comment: 3   Lifestyle    Physical activity     Days per week: Not on file     Minutes per session: Not on file    Stress: Not on file   Relationships    Social connections     Talks on phone: Not on file     Gets together: Not on file     Attends Sikhism service: Not on file     Active member of club or organization: Not on file     Attends meetings of clubs or organizations: Not on file     Relationship status: Not on file    Intimate partner violence     Fear of current or ex partner: Not on file     Emotionally abused: Not on file     Physically abused: Not on file     Forced sexual activity: Not on file   Other Topics Concern    Not on file   Social History Narrative    CODE STATUS: Full Code    HEALTH CARE PROXY: her daughter[facility]to [facility]    AMBULATES: independently    DOMICILED: lives in a private home, without steps inside, lives alone, has 2 dogs, no steps in to home       FAMILY HISTORY:  Family History   Problem Relation Age of Onset    Heart Attack Mother 46        x 2-had bypass    Hypertension Mother     COPD Father     Liver Cancer Paternal Aunt     Lung Cancer Paternal Aunt     Breast Cancer Maternal Aunt         but  of brain cancer    Diabetes Maternal Uncle     Diabetes Maternal Grandmother     Colon Cancer Neg Hx     Colon Polyps Neg Hx     Esophageal Cancer Neg Hx     Rectal Cancer Neg Hx     Stomach Cancer Neg Hx         No current facility-administered medications for this visit. No current outpatient medications on file.      Facility-Administered Medications Ordered in Other Visits   Medication Dose Route Frequency Provider Last Rate Last Dose    sodium chloride flush 0.9 % injection 20 mL  20 mL Intravenous PRN Gina Duggan MD   20 mL at 20 1056    heparin flush 100 UNIT/ML injection 500 Units  500 Units Intracatheter PRN Gina Duggan MD   500 Units at 20 1057    sodium chloride flush 0.9 % injection 10 mL  10 mL Intravenous 2 times per day Damon Lebron PA-C        sodium chloride flush 0.9 % injection 10 mL  10 mL Intravenous PRN Katalina Ugalde PA-C        [facility] injection 4 mg  4 mg Intravenous Q6H PRN Katalina Ugalde PA-C        0.9 % sodium chloride infusion   Intravenous Continuous Katalina Ugalde PA-C        gabapentin (NEURONTIN) capsule 100 mg  100 mg Oral TID Katalina Ugalde PA-C        lidocaine (LMX) 4 % cream   Topical PRN Katalina Ugalde PA-C        promethazine (PHENERGAN) injection 25 mg  25 mg Intravenous Q6H PRN Katalina Ugalde PA-C        piperacillin-tazobactam (ZOSYN) 3.375 g in dextrose 5 % 50 mL IVPB extended infusion (mini-bag)  3.375 g Intravenous Q8H Katalina Ugalde PA-C        pantoprazole (PROTONIX) injection 40 mg  40 mg Intravenous BID Katalina Ugalde PA-C        And    sodium chloride (PF) 0.9 % injection 10 mL  10 mL Intravenous BID Katalina Ugalde PA-C            REVIEW OF SYSTEMS:    Constitutional: no fever, no night sweats, fatigue; weight loss, activity change  HEENT: Jaundice no blurring of vision, no double vision, no hearing difficulty, no tinnitus,no ulceration, no dysphagia  Lungs: no cough, no shortness of breath, no wheeze;   CVS: no palpitation, no chest pain, no shortness of breath;   GI: Right upper quadrant abdominal pain, nausea , vomiting, no constipation; diarrhea on and off  VIKRAM: no dysuria, frequency and urgency, no hematuria, no kidney stones;   Musculoskeletal: Back pain, no joint pain, swelling , stiffness;   Endocrine: no polyuria, polydypsia, no cold or heat intolerence; Hematology/lymphatic: no easy brusing or bleeding, no hx of clotting disorder; no peripheral adenopathy. Dermatology: no skin rash, no eczema, no pruritis;   Psychiatry: no depression, no anxiety,no panic attacks, no suicide ideation;    Neurology: no syncope, no seizures, stable neuropathy/lower extremity    PHYSICAL EXAM:    Vitals signs:  /80   Pulse 78   Temp 97.1 °F (36.2 °C) (Infrared)   Wt 100 lb 1.6 oz (45.4 kg)   SpO2 99%   BMI 15.68 kg/m²    Pain scale:      CONSTITUTIONAL: Alert, appropriate, no acute distress, chronically-ill appearing. Jaundice  EYES: Icteric, EOM intact, pupils equal round and reactive to light and accommodation. ENT: Oral mucus membranes moist, no oral pharyngeal lesions. External inspection of ears and nose are normal.   NECK: Supple, no masses. No palpable thyroid mass    CHEST/LUNGS: CTA bilaterally, normal respiratory effort   CARDIOVASCULAR: RRR, no murmurs. No lower extremity edema   ABDOMEN: Right upper abdominal tenderness, no ascites. active bowel sounds, no hepatosplenomegaly. No palpable masses. EXTREMITIES: Mild bilateral lower extremity edema, warm, Full ROM of all fours extremities. No focal weakness. SKIN: warm, dry with no rashes or lesions  LYMPH: No cervical, clavicular, axillary, or inguinal lymphadenopathy  NEUROLOGIC: follows commands, non focal.   PSYCH: mood and affect appropriate. Alert and oriented to time and place and person. Relevant Lab findings/reviewed by me:  CA 19-9 = [id number] on 11/30/2020. Lab Results   Component Value Date    WBC 18.46 (H) 11/30/2020    HGB 9.5 (L) 11/30/2020    HCT 26.3 (LL) 11/30/2020    MCV 83.8 11/30/2020     11/30/2020     Lab Results   Component Value Date    NEUTROABS 16.17 (H) 11/30/2020     Lab Results   Component Value Date     (L) 11/30/2020    K 4.0 11/30/2020    CL 99 11/30/2020    CO2 27 11/30/2020    BUN 9 11/30/2020    CREATININE 0.5 11/30/2020    GLUCOSE 129 (H) 11/30/2020    CALCIUM 8.3 (L) 11/30/2020    PROT 7.3 11/30/2020    LABALBU 3.2 (L) 11/30/2020    BILITOT 12.0 (HH) 11/30/2020    ALKPHOS 1,023 (H) 11/30/2020    AST 87 (H) 11/30/2020    ALT 57 (H) 11/30/2020    LABGLOM >60 11/30/2020    GFRAA >59 10/01/2020    AGRATIO 2.3 (H) 11/21/2019    GLOB 4.1 11/30/2020       Interpretation of labs: Neutrophil leukocytosis, elevated LFTs and alkaline phosphatase, total bilirubin concerning for biliary obstruction.   Normocytic anemia from seen today for pancreatic cancer. Diagnoses and all orders for this visit:    Chemotherapy management, encounter for    Adverse effect of chemotherapy, subsequent encounter    Care plan discussed with patient    Malignant ascites    Malignant neoplasm of pancreas, unspecified location of malignancy ([facility] [address]    Adverse effect of chemotherapy, initial encounter    Anemia associated with chemotherapy    Counseling regarding advanced care planning and goals of care    Jaundice    Severe protein-calorie malnutrition ([facility] [address]    Poor prognosis    Palliative care patient    Coordination of complex care      Jaundice-likely secondary to biliary obstruction. She was seen by GI with plans for MRCP and possible ERCP with stent placement. I agree with palliative stent placement. #Metastatic pancreatic cancer stage IV  Patient has received several lines of therapy. Unfortunately, disease progression after 2 years of treatment. Very poor candidate for further anticancer therapy. #Severe protein calorie malnutrition-severe weight loss and pancreatic cancer. #Recurrent GI bleed- secondary to erosion at previous Whipple anastomotic site. history of erosion at the anastomotic site with several instances of upper GI bleed in the past.  No further episodes of GI bleed. Last transfusion few weeks ago.     #Normocytic anemia- secondary to GI bleed and chemotherapy. Hemoglobin 9.5 feels less transfusion 3 weeks ago. #Prognosis-poor prognosis overall. She has had disease progression several lines of treatment before. 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 Cytology was negative. Plan:   · Hold chemotherapy treatment  · Admit to the hospitalist  · Consult GI  · Consult palliative care to discuss goals of care  · CBC/CMP daily  · Supportive care       Follow Up:     No follow-ups on file.    Data Unavailable     Debbie CORREIA RN, am scribing for Gareth Babin MD. Electronically signed by Debbie Noel RN

## 2020-11-30 ENCOUNTER — HOSPITAL ENCOUNTER (INPATIENT)
Age: 60
LOS: 4 days | Discharge: HOME | DRG: 444 | End: 2020-12-04
Attending: INTERNAL MEDICINE | Admitting: FAMILY MEDICINE
Payer: MEDICARE

## 2020-11-30 ENCOUNTER — APPOINTMENT (OUTPATIENT)
Dept: CT IMAGING | Age: 60
DRG: 444 | End: 2020-11-30
Attending: INTERNAL MEDICINE
Payer: MEDICARE

## 2020-11-30 ENCOUNTER — APPOINTMENT (OUTPATIENT)
Dept: MRI IMAGING | Age: 60
DRG: 444 | End: 2020-11-30
Attending: INTERNAL MEDICINE
Payer: MEDICARE

## 2020-11-30 ENCOUNTER — HOSPITAL ENCOUNTER (OUTPATIENT)
Dept: INFUSION THERAPY | Age: 60
Discharge: HOME | DRG: 444 | End: 2020-11-30
Payer: MEDICARE

## 2020-11-30 ENCOUNTER — OFFICE VISIT (OUTPATIENT)
Dept: HEMATOLOGY | Age: 60
End: 2020-11-30
Payer: MEDICARE

## 2020-11-30 VITALS
SYSTOLIC BLOOD PRESSURE: 120 MMHG | TEMPERATURE: 97.1 F | HEART RATE: 78 BPM | BODY MASS INDEX: 15.68 KG/M2 | WEIGHT: 100.1 LBS | OXYGEN SATURATION: 99 % | DIASTOLIC BLOOD PRESSURE: 80 MMHG

## 2020-11-30 DIAGNOSIS — C25.9 MALIGNANT NEOPLASM OF PANCREAS, UNSPECIFIED LOCATION OF MALIGNANCY (HCC): Primary | ICD-10-CM

## 2020-11-30 PROBLEM — D72.829 LEUKOCYTOSIS: Status: ACTIVE | Noted: 2020-11-30

## 2020-11-30 PROBLEM — K83.1 OBSTRUCTIVE JAUNDICE: Status: ACTIVE | Noted: 2020-11-30

## 2020-11-30 LAB
ALBUMIN SERPL-MCNC: 3.2 G/DL (ref 3.5–5.2)
ALP BLD-CCNC: 1023 U/L (ref 35–104)
ALT SERPL-CCNC: 57 U/L (ref 9–52)
ANION GAP SERPL CALCULATED.3IONS-SCNC: 9 MMOL/L (ref 7–19)
AST SERPL-CCNC: 87 U/L (ref 14–36)
BASOPHILS ABSOLUTE: 0.02 K/UL (ref 0.01–0.08)
BASOPHILS RELATIVE PERCENT: 0.1 % (ref 0.1–1.2)
BILIRUB SERPL-MCNC: 12 MG/DL (ref 0.2–1.3)
BUN BLDV-MCNC: 9 MG/DL (ref 7–17)
CA 19-9: ABNORMAL U/ML (ref 0–37)
CALCIUM SERPL-MCNC: 8.3 MG/DL (ref 8.4–10.2)
CHLORIDE BLD-SCNC: 99 MMOL/L (ref 98–111)
CO2: 27 MMOL/L (ref 22–29)
CREAT SERPL-MCNC: 0.5 MG/DL (ref 0.5–1)
EOSINOPHILS ABSOLUTE: 0.01 K/UL (ref 0.04–0.54)
EOSINOPHILS RELATIVE PERCENT: 0.1 % (ref 0.7–7)
GFR NON-AFRICAN AMERICAN: >60
GLOBULIN: 4.1 G/DL
GLUCOSE BLD-MCNC: 129 MG/DL (ref 74–106)
HCT VFR BLD CALC: 26.3 % (ref 34.1–44.9)
HEMOGLOBIN: 9.5 G/DL (ref 11.2–15.7)
LYMPHOCYTES ABSOLUTE: 0.6 K/UL (ref 1.18–3.74)
LYMPHOCYTES RELATIVE PERCENT: 3.3 % (ref 19.3–53.1)
MCH RBC QN AUTO: 30.3 PG (ref 25.6–32.2)
MCHC RBC AUTO-ENTMCNC: 36.1 G/DL (ref 32.3–35.5)
MCV RBC AUTO: 83.8 FL (ref 79.4–94.8)
MONOCYTES ABSOLUTE: 1.66 K/UL (ref 0.24–0.82)
MONOCYTES RELATIVE PERCENT: 9 % (ref 4.7–12.5)
NEUTROPHILS ABSOLUTE: 16.17 K/UL (ref 1.56–6.13)
NEUTROPHILS RELATIVE PERCENT: 87.5 % (ref 34–71.1)
PDW BLD-RTO: 19.9 % (ref 11.7–14.4)
PLATELET # BLD: 211 K/UL (ref 182–369)
PMV BLD AUTO: 9.6 FL (ref 7.4–10.4)
POTASSIUM SERPL-SCNC: 4 MMOL/L (ref 3.5–5.1)
RBC # BLD: 3.14 M/UL (ref 3.93–5.22)
SODIUM BLD-SCNC: 135 MMOL/L (ref 137–145)
TOTAL PROTEIN: 7.3 G/DL (ref 6.3–8.2)
WBC # BLD: 18.46 K/UL (ref 3.98–10.04)

## 2020-11-30 PROCEDURE — 82977 ASSAY OF GGT: CPT

## 2020-11-30 PROCEDURE — 82105 ALPHA-FETOPROTEIN SERUM: CPT

## 2020-11-30 PROCEDURE — 74183 MRI ABD W/O CNTR FLWD CNTR: CPT

## 2020-11-30 PROCEDURE — 80053 COMPREHEN METABOLIC PANEL: CPT

## 2020-11-30 PROCEDURE — 99221 1ST HOSP IP/OBS SF/LOW 40: CPT | Performed by: INTERNAL MEDICINE

## 2020-11-30 PROCEDURE — 82378 CARCINOEMBRYONIC ANTIGEN: CPT

## 2020-11-30 PROCEDURE — 86301 IMMUNOASSAY TUMOR CA 19-9: CPT

## 2020-11-30 PROCEDURE — 6360000002 HC RX W HCPCS: Performed by: INTERNAL MEDICINE

## 2020-11-30 PROCEDURE — 87806 HIV AG W/HIV1&2 ANTB W/OPTIC: CPT

## 2020-11-30 PROCEDURE — 96523 IRRIG DRUG DELIVERY DEVICE: CPT

## 2020-11-30 PROCEDURE — 2580000003 HC RX 258: Performed by: INTERNAL MEDICINE

## 2020-11-30 PROCEDURE — 6360000002 HC RX W HCPCS: Performed by: PHYSICIAN ASSISTANT

## 2020-11-30 PROCEDURE — A9577 INJ MULTIHANCE: HCPCS | Performed by: INTERNAL MEDICINE

## 2020-11-30 PROCEDURE — C9113 INJ PANTOPRAZOLE SODIUM, VIA: HCPCS | Performed by: PHYSICIAN ASSISTANT

## 2020-11-30 PROCEDURE — 74177 CT ABD & PELVIS W/CONTRAST: CPT

## 2020-11-30 PROCEDURE — 1210000000 HC MED SURG R&B

## 2020-11-30 PROCEDURE — 6360000002 HC RX W HCPCS: Performed by: STUDENT IN AN ORGANIZED HEALTH CARE EDUCATION/TRAINING PROGRAM

## 2020-11-30 PROCEDURE — 6360000004 HC RX CONTRAST MEDICATION: Performed by: INTERNAL MEDICINE

## 2020-11-30 PROCEDURE — 99215 OFFICE O/P EST HI 40 MIN: CPT | Performed by: INTERNAL MEDICINE

## 2020-11-30 PROCEDURE — 2580000003 HC RX 258: Performed by: PHYSICIAN ASSISTANT

## 2020-11-30 PROCEDURE — 85025 COMPLETE CBC W/AUTO DIFF WBC: CPT

## 2020-11-30 PROCEDURE — 80074 ACUTE HEPATITIS PANEL: CPT

## 2020-11-30 PROCEDURE — 83615 LACTATE (LD) (LDH) ENZYME: CPT

## 2020-11-30 RX ORDER — SODIUM CHLORIDE 0.9 % (FLUSH) 0.9 %
20 SYRINGE (ML) INJECTION PRN
Status: CANCELLED | OUTPATIENT
Start: 2020-11-30

## 2020-11-30 RX ORDER — SODIUM CHLORIDE 9 MG/ML
INJECTION, SOLUTION INTRAVENOUS CONTINUOUS
Status: DISCONTINUED | OUTPATIENT
Start: 2020-11-30 | End: 2020-12-02

## 2020-11-30 RX ORDER — GABAPENTIN 100 MG/1
100 CAPSULE ORAL 3 TIMES DAILY
Status: DISCONTINUED | OUTPATIENT
Start: 2020-11-30 | End: 2020-12-01

## 2020-11-30 RX ORDER — PROMETHAZINE HYDROCHLORIDE 25 MG/ML
25 INJECTION, SOLUTION INTRAMUSCULAR; INTRAVENOUS EVERY 6 HOURS PRN
Status: DISCONTINUED | OUTPATIENT
Start: 2020-11-30 | End: 2020-12-04 | Stop reason: HOSPADM

## 2020-11-30 RX ORDER — HEPARIN SODIUM (PORCINE) LOCK FLUSH IV SOLN 100 UNIT/ML 100 UNIT/ML
500 SOLUTION INTRAVENOUS PRN
Status: DISCONTINUED | OUTPATIENT
Start: 2020-11-30 | End: 2020-12-01 | Stop reason: HOSPADM

## 2020-11-30 RX ORDER — HEPARIN SODIUM (PORCINE) LOCK FLUSH IV SOLN 100 UNIT/ML 100 UNIT/ML
500 SOLUTION INTRAVENOUS PRN
Status: CANCELLED | OUTPATIENT
Start: 2020-11-30

## 2020-11-30 RX ORDER — SODIUM CHLORIDE 0.9 % (FLUSH) 0.9 %
10 SYRINGE (ML) INJECTION EVERY 12 HOURS SCHEDULED
Status: DISCONTINUED | OUTPATIENT
Start: 2020-11-30 | End: 2020-12-04 | Stop reason: HOSPADM

## 2020-11-30 RX ORDER — PANTOPRAZOLE SODIUM 40 MG/10ML
40 INJECTION, POWDER, LYOPHILIZED, FOR SOLUTION INTRAVENOUS 2 TIMES DAILY
Status: DISCONTINUED | OUTPATIENT
Start: 2020-11-30 | End: 2020-12-04 | Stop reason: HOSPADM

## 2020-11-30 RX ORDER — SODIUM CHLORIDE 0.9 % (FLUSH) 0.9 %
10 SYRINGE (ML) INJECTION PRN
Status: CANCELLED | OUTPATIENT
Start: 2020-11-30

## 2020-11-30 RX ORDER — SODIUM CHLORIDE 0.9 % (FLUSH) 0.9 %
20 SYRINGE (ML) INJECTION PRN
Status: DISCONTINUED | OUTPATIENT
Start: 2020-11-30 | End: 2020-12-01 | Stop reason: HOSPADM

## 2020-11-30 RX ORDER — SODIUM CHLORIDE 0.9 % (FLUSH) 0.9 %
10 SYRINGE (ML) INJECTION PRN
Status: DISCONTINUED | OUTPATIENT
Start: 2020-11-30 | End: 2020-12-04 | Stop reason: HOSPADM

## 2020-11-30 RX ORDER — LIDOCAINE 40 MG/G
CREAM TOPICAL PRN
Status: DISCONTINUED | OUTPATIENT
Start: 2020-11-30 | End: 2020-12-04 | Stop reason: HOSPADM

## 2020-11-30 RX ORDER — PROMETHAZINE HYDROCHLORIDE 12.5 MG/1
12.5 TABLET ORAL EVERY 6 HOURS PRN
Status: DISCONTINUED | OUTPATIENT
Start: 2020-11-30 | End: 2020-11-30

## 2020-11-30 RX ORDER — SODIUM CHLORIDE 9 MG/ML
10 INJECTION INTRAVENOUS 2 TIMES DAILY
Status: DISCONTINUED | OUTPATIENT
Start: 2020-11-30 | End: 2020-12-04 | Stop reason: HOSPADM

## 2020-11-30 RX ORDER — FENTANYL CITRATE 50 UG/ML
12.5 INJECTION, SOLUTION INTRAMUSCULAR; INTRAVENOUS
Status: DISCONTINUED | OUTPATIENT
Start: 2020-11-30 | End: 2020-12-01

## 2020-11-30 RX ORDER — ONDANSETRON 2 MG/ML
4 INJECTION INTRAMUSCULAR; INTRAVENOUS EVERY 6 HOURS PRN
Status: DISCONTINUED | OUTPATIENT
Start: 2020-11-30 | End: 2020-12-04 | Stop reason: HOSPADM

## 2020-11-30 RX ADMIN — IOPAMIDOL 90 ML: 755 INJECTION, SOLUTION INTRAVENOUS at 18:18

## 2020-11-30 RX ADMIN — GADOBENATE DIMEGLUMINE 10 ML: 529 INJECTION, SOLUTION INTRAVENOUS at 16:43

## 2020-11-30 RX ADMIN — HEPARIN 500 UNITS: 100 SYRINGE at 10:57

## 2020-11-30 RX ADMIN — PIPERACILLIN AND TAZOBACTAM 3.38 G: 3; .375 INJECTION, POWDER, LYOPHILIZED, FOR SOLUTION INTRAVENOUS at 22:28

## 2020-11-30 RX ADMIN — FENTANYL CITRATE 12.5 MCG: 0.05 INJECTION, SOLUTION INTRAMUSCULAR; INTRAVENOUS at 22:28

## 2020-11-30 RX ADMIN — PANTOPRAZOLE SODIUM 40 MG: 40 INJECTION, POWDER, FOR SOLUTION INTRAVENOUS at 20:13

## 2020-11-30 RX ADMIN — SODIUM CHLORIDE, PRESERVATIVE FREE 10 ML: 5 INJECTION INTRAVENOUS at 19:17

## 2020-11-30 RX ADMIN — SODIUM CHLORIDE, PRESERVATIVE FREE 20 ML: 5 INJECTION INTRAVENOUS at 10:56

## 2020-11-30 RX ADMIN — SODIUM CHLORIDE: 9 INJECTION, SOLUTION INTRAVENOUS at 19:18

## 2020-11-30 ASSESSMENT — PAIN DESCRIPTION - ONSET: ONSET: ON-GOING

## 2020-11-30 ASSESSMENT — ENCOUNTER SYMPTOMS
CONSTIPATION: 1
BACK PAIN: 1
NAUSEA: 1
SHORTNESS OF BREATH: 1
ABDOMINAL PAIN: 1
EYE REDNESS: 1
VOMITING: 1

## 2020-11-30 ASSESSMENT — PAIN DESCRIPTION - PROGRESSION: CLINICAL_PROGRESSION: GRADUALLY WORSENING

## 2020-11-30 ASSESSMENT — PAIN DESCRIPTION - PAIN TYPE
TYPE: ACUTE PAIN
TYPE: ACUTE PAIN

## 2020-11-30 ASSESSMENT — PAIN - FUNCTIONAL ASSESSMENT: PAIN_FUNCTIONAL_ASSESSMENT: ACTIVITIES ARE NOT PREVENTED

## 2020-11-30 ASSESSMENT — PAIN DESCRIPTION - DIRECTION: RADIATING_TOWARDS: LOWER ABDOMEN

## 2020-11-30 ASSESSMENT — PAIN DESCRIPTION - LOCATION
LOCATION: ABDOMEN
LOCATION: ABDOMEN

## 2020-11-30 ASSESSMENT — PAIN DESCRIPTION - DESCRIPTORS: DESCRIPTORS: SHARP

## 2020-11-30 ASSESSMENT — PAIN DESCRIPTION - FREQUENCY: FREQUENCY: INTERMITTENT

## 2020-11-30 ASSESSMENT — PAIN DESCRIPTION - ORIENTATION: ORIENTATION: UPPER

## 2020-11-30 ASSESSMENT — PAIN SCALES - GENERAL
PAINLEVEL_OUTOF10: 7
PAINLEVEL_OUTOF10: 3
PAINLEVEL_OUTOF10: 8

## 2020-11-30 NOTE — H&P
[address] - History & Physical      PCP: Dia Black MD    Date of Admission: 2020    Date of Service: 2020    Chief Complaint:  Obstructive Jaundice     History Of Present Illness: The patient is a 64 y.o. female with PMH metastatic pancreatic cancer, GI bleeding 2/2 erosion at previous Whipple anastomotic site, anemia, GERD, thyroiditis, ascites with as needed therapeutic paracentesis who presented to [facility] as a direct admission due to concerns for obstructive jaundice with elevated LFT's, hyperbilirubinemia. She states that she noticed worsening right upper quadrant abdominal pain associated with nausea on Friday, 2020 that has been progressively worsening. She denies vomiting or diarrhea. States a family member noticed yellowing of her eyes and skin which is new. Also notes her urine appears tea colored. Denies fever, body aches or chills. States she is chronically constipated and this is unchanged.  Last oral intake was 0230 this AM.      Past Medical History:        Diagnosis Date    Acute pancreatitis     Adult BMI <19 kg/sq m     Anemia     Cat esophagus     Biliary obstruction     GERD (gastroesophageal reflux disease)     with Barretts    Hashimoto's thyroiditis     denies takes no med for    Heart murmur     Hypertension     pt denies nor longer takes med for    Low blood sugar     h/o when overweight in the past    MVP (mitral valve prolapse)     Myalgia     Palliative care patient 2020    Pancreatic adenocarcinoma ([facility] [address] 2018    Dr Bhaskar Clark    Pancreatic cancer ([facility] [address] 3/30/2018    Right flank pain     RUQ pain      Past Surgical History:        Procedure Laterality Date    CARDIAC CATHETERIZATION      heart cath     SECTION      x 3    CHOLECYSTECTOMY      COLONOSCOPY  years ago    [facility]-Dr Ana María Chawla per patient    COLONOSCOPY  2014    Dr Alena Boyd- Normal 10yr recall    COLONOSCOPY  03/10/2016 Dr Mcfarland-10 yr recall    COLONOSCOPY N/A 12/9/2019    Dr Nelia Tafoya, 5 yr recall    ELBOW SURGERY Right     ENDOMETRIAL ABLATION      HAND SURGERY Right     HERNIA REPAIR      hiatal    HERNIA REPAIR      umbilical    HERNIA REPAIR      PANCREAS SURGERY  08/30/2018    Whipple procedure-Dr Elena Ferguson MA EGD [facility] NEEDLE ASPIR/BIOP ALTERED ANATOMY N/A 2/7/2018    Dr Josse Suarez w/fna-Dilation of main pancreatic duct with diffuse change in the pancreatitis noted, area of concern in the neck of the pancreas-strongly suspicious for adenocarcinoma     MA EGD INTRMURAL NEEDLE ASPIR/BIOP ALTERED ANATOMY N/A 3/6/2018    Dr KVNG Duncan-Pancreatic cancer-Ductal adenocarcinoma-staged [id number] by EUS, pancreatic pseudocyst in the tail the pancreas    MA ERCP DX COLLECTION SPECIMEN BRUSHING/WASHING N/A 4/11/2018    Dr KVNG Duncan-w/placement of a self-expanding fully covered 10 mm biliary stent    UPPER GASTROINTESTINAL ENDOSCOPY  years ago    [facility]-Dr Rebecca Mariano    UPPER GASTROINTESTINAL ENDOSCOPY  2013    Zuniga    UPPER GASTROINTESTINAL ENDOSCOPY N/A 11/1/2019    Dr Jorge Carballo post Whipple's procedure with efferent loop ulceration    UPPER GASTROINTESTINAL ENDOSCOPY  12/17/2019    Dr Ellen Duncan-Patent G-J Anastomosis, apparent healing ulceration    UPPER GASTROINTESTINAL ENDOSCOPY N/A 2/25/2020    Dr Cruz Art amount of food retention in the stomach with bile, hiatal hernia, will need repeat    UPPER GASTROINTESTINAL ENDOSCOPY N/A 2/27/2020    Dr Alannah Jimenez erosion/ulceration at the suture line, post whipple surgical changes    UPPER GASTROINTESTINAL ENDOSCOPY N/A 3/9/2020    Dr Alannah Jimenez erosion along the previous whipple suture line    UPPER GASTROINTESTINAL ENDOSCOPY N/A 4/16/2020    Dr KVNG Duncan-w/hemostatic clip placement x 1-Allie Peres tear at GEJ-Likely culprit lesion for current presentation    UPPER GASTROINTESTINAL ENDOSCOPY N/A 6/22/2020    Dr Alannah Jimenez erosion along the previous ipple suture line     Home Medications:  Prior to Admission medications    Medication Sig Start Date End Date Taking? Authorizing Provider   prochlorperazine (COMPAZINE) 10 MG tablet Take 1 tablet by mouth every 6 hours as needed (nausea) 8/27/20 9/26/20  Ameya Lujan MD   gabapentin (NEURONTIN) 100 MG capsule Take 1 capsule by mouth 3 times daily for 120 days. Intended supply: 90 days 8/17/20 12/15/20  Ameya Lujan MD   pantoprazole (PROTONIX) 40 MG tablet Take 1 tablet by mouth 2 times daily 7/6/20   Ameya Lujan MD   Cyanocobalamin (VITAMIN B 12 PO) Take by mouth    Historical Provider, MD   promethazine (PHENERGAN) 25 MG tablet Take 1 tablet by mouth every 6 hours as needed for Nausea 4/14/20   Ameya Lujan MD   sucralfate (CARAFATE) 1 GM tablet Take 1 tablet by mouth 4 times daily 4/13/20   Ameya Lujan MD   lidocaine-prilocaine (EMLA) 2.5-2.5 % cream Apply to port area and cover with plastic wrap one hour prior to use. 1/22/20   Ameya Lujan MD   vitamin E 400 UNIT capsule Take 400 Units by mouth daily    Historical Provider, MD   NONFORMULARY daily Tumeric otc    Historical Provider, MD   ondansetron (ZOFRAN) 8 MG tablet Take 1 tablet by mouth every 8 hours as needed for Nausea or Vomiting 11/22/19   Ameya Lujan MD   Multiple Vitamins-Minerals (THERAPEUTIC MULTIVITAMIN-MINERALS) tablet Take 1 tablet by mouth daily    Historical Provider, MD     Allergies:    Codeine; Morphine; Morphine and related; Sulfa antibiotics; Neomycin-bacitracin zn-polymyx; Clarithromycin; Dilaudid [hydromorphone hcl]; Hydromorphone; Loperamide hcl; Loperamide hcl; and Neosporin [neomycin-polymyx-gramicid]    Social History:    The patient currently lives at home. Tobacco:   reports that she quit smoking about 12 months ago. Her smoking use included cigarettes. She has a 5.00 pack-year smoking history.  She has never used smokeless tobacco.  Alcohol:   reports previous alcohol use. Illicit Drugs: denies    Family History:      Problem Relation Age of Onset    Heart Attack Mother 46        x 2-had bypass    Hypertension Mother     COPD Father     Liver Cancer Paternal Aunt     Lung Cancer Paternal Aunt     Breast Cancer Maternal Aunt         but  of brain cancer    Diabetes Maternal Uncle     Diabetes Maternal Grandmother     Colon Cancer Neg Hx     Colon Polyps Neg Hx     Esophageal Cancer Neg Hx     Rectal Cancer Neg Hx     Stomach Cancer Neg Hx      Review of Systems:   Constitutional / general:  Denies fever / chills / sweats  Head:  Denies headache / neck stiffness / trauma / visual change  Eyes:  Denies blurry vision / acute visual change or loss / itching / redness  ENT: Denies sore throat / hoarseness / nasal drainage / ear pain  CV:  Denies chest pain / palpitations/ orthopnea   Respiratory:  Denies cough / shortness of breath / sputum / hemoptysis  GI: + nausea /Denies vomiting / +abdominal pain /Denies diarrhea /+ constipation  :  Denies dysuria / hesitancy / urgency / hematuria   Neuro: Denies paralysis / syncope / seizure / dysphagia / headache / paresthesias  Musculoskeletal:  Denies muscle weakness /joint stiffness / pain  Vascular: Denies edema / claudication / varicosities  Heme / endocrine: Denies easy bruising / bleeding / excessive sweating / heat or cold intolerance  Psychiatric:  Denies depression / anxiety / insomnia / mood changes  Skin:  Denies new rashes / lesions / skin hair or nail changes    14 point review of systems is negative except as specifically addressed above. Physical Examination:  There were no vitals taken for this visit. General appearance: alert, lethargic, appears stated age, cooperative and no distress  Head: Normocephalic, without obvious abnormality, atraumatic  Eyes: conjunctivae/corneas clear. PERRL, EOM's intact.    Ears: normal external ears and nose, throat without exudate, scleral icterus   Neck: no adenopathy, no carotid bruit, no JVD, supple, symmetrical, trachea midline   Lungs: clear to auscultation bilaterally,no rales or wheezes, port left upper anterior chest wall  Heart: regular rate and rhythm with occasional extrasystole, S1, S2 normal, systolic murmur  Abdomen:firm, mild/moderate RUQ/epigastric tenderness; minimally distended, normal bowel sounds  Extremities:No lower extremity edema,  No erythema, no tenderness to palpation  Skin: Mild jaundice, texture, turgor normal. No rashes or lesions  Lymphatic: No palpable lymph node enlargment  Neurologic: Alert and oriented X 3, normal strength and tone. Normal symmetric reflexes. Mental status: Alert, oriented, thought content appropriate  Cranial nerves:II-XII Grossly intact Sensory: normal Motor:grossly normal  Psychiatric: Alert and oriented, thought content appropriate, normal insight, mood appropriate    Diagnostic Data:  CBC:  Recent Labs     11/30/20  0932   WBC 18.46*   HGB 9.5*   HCT 26.3*        BMP:  Recent Labs     11/30/20  0932   *   K 4.0   CL 99   CO2 27   BUN 9   CREATININE 0.5   CALCIUM 8.3*     Recent Labs     11/30/20  0932   AST 87*   ALT 57*   BILITOT 12.0*   ALKPHOS 1,023*     CT Abdomen/Pelvis 11/19/2020  Postsurgical changes of the distal stomach, duodenum and   the common bile duct. Ill-defined lobulated soft tissue mass in the area of the head of the   pancreas and in the distal part of the gastric remnant may represent   postsurgical changes or a mass/recurrent mass. Persistent dilatation of the pancreatic duct in the body and tail of   the pancreas. The distal pancreatic duct is not visualized are   evaluated (due to prior surgery). Evidence of intrahepatic biliary   dilatation and pneumobilia similar to the previous study. Persistent moderate dilatation and thickening of the wall of the   descending and transverse duodenum may represent postsurgical   changes/edema due to protein losing enteropathy.

## 2020-12-01 PROBLEM — E43 SEVERE MALNUTRITION (HCC): Chronic | Status: ACTIVE | Noted: 2020-12-01

## 2020-12-01 LAB
ALBUMIN SERPL-MCNC: 2.5 G/DL (ref 3.5–5.2)
ALP BLD-CCNC: 713 U/L (ref 35–104)
ALPHA FETOPROTEIN: 1.3 NG/ML (ref 0–8.3)
ALT SERPL-CCNC: 34 U/L (ref 5–33)
ANION GAP SERPL CALCULATED.3IONS-SCNC: 9 MMOL/L (ref 7–19)
AST SERPL-CCNC: 45 U/L (ref 5–32)
BASOPHILS ABSOLUTE: 0 K/UL (ref 0–0.2)
BASOPHILS RELATIVE PERCENT: 0.2 % (ref 0–1)
BILIRUB SERPL-MCNC: 11.7 MG/DL (ref 0.2–1.2)
BUN BLDV-MCNC: 6 MG/DL (ref 6–20)
CALCIUM SERPL-MCNC: 8.3 MG/DL (ref 8.6–10)
CEA: 4.2 NG/ML (ref 0–4.7)
CHLORIDE BLD-SCNC: 97 MMOL/L (ref 98–111)
CO2: 24 MMOL/L (ref 22–29)
CREAT SERPL-MCNC: 0.2 MG/DL (ref 0.5–0.9)
EOSINOPHILS ABSOLUTE: 0 K/UL (ref 0–0.6)
EOSINOPHILS RELATIVE PERCENT: 0 % (ref 0–5)
GAMMA GLUTAMYL TRANSFERASE: 231 U/L (ref 7–54)
GFR AFRICAN AMERICAN: >59
GFR NON-AFRICAN AMERICAN: >60
GLUCOSE BLD-MCNC: 90 MG/DL (ref 74–109)
HAV IGM SER IA-ACNC: NORMAL
HCT VFR BLD CALC: 24.9 % (ref 37–47)
HEMOGLOBIN: 8.6 G/DL (ref 12–16)
HEPATITIS B CORE IGM ANTIBODY: NORMAL
HEPATITIS B SURFACE ANTIGEN INTERPRETATION: NORMAL
HEPATITIS C ANTIBODY INTERPRETATION: NORMAL
HIV-1 P24 AG: NORMAL
IMMATURE GRANULOCYTES #: 0.2 K/UL
INR BLD: 1.35 (ref 0.88–1.18)
LACTATE DEHYDROGENASE: 157 U/L (ref 91–215)
LYMPHOCYTES ABSOLUTE: 0.8 K/UL (ref 1.1–4.5)
LYMPHOCYTES RELATIVE PERCENT: 5.3 % (ref 20–40)
MCH RBC QN AUTO: 29.6 PG (ref 27–31)
MCHC RBC AUTO-ENTMCNC: 34.5 G/DL (ref 33–37)
MCV RBC AUTO: 85.6 FL (ref 81–99)
MONOCYTES ABSOLUTE: 1.2 K/UL (ref 0–0.9)
MONOCYTES RELATIVE PERCENT: 8 % (ref 0–10)
NEUTROPHILS ABSOLUTE: 12.3 K/UL (ref 1.5–7.5)
NEUTROPHILS RELATIVE PERCENT: 85.4 % (ref 50–65)
PDW BLD-RTO: 21.3 % (ref 11.5–14.5)
PLATELET # BLD: 182 K/UL (ref 130–400)
PMV BLD AUTO: 11.1 FL (ref 9.4–12.3)
POTASSIUM REFLEX MAGNESIUM: 3.8 MMOL/L (ref 3.5–5)
PROTHROMBIN TIME: 16.7 SEC (ref 12–14.6)
RAPID HIV 1&2: NORMAL
RBC # BLD: 2.91 M/UL (ref 4.2–5.4)
SARS-COV-2, NAAT: NOT DETECTED
SODIUM BLD-SCNC: 130 MMOL/L (ref 136–145)
T4 FREE: 1.4 NG/DL (ref 0.93–1.7)
TOTAL PROTEIN: 6.1 G/DL (ref 6.6–8.7)
TSH SERPL DL<=0.05 MIU/L-ACNC: 0.47 UIU/ML (ref 0.27–4.2)
VITAMIN D 25-HYDROXY: 7.5 NG/ML
WBC # BLD: 14.5 K/UL (ref 4.8–10.8)

## 2020-12-01 PROCEDURE — 84443 ASSAY THYROID STIM HORMONE: CPT

## 2020-12-01 PROCEDURE — 82306 VITAMIN D 25 HYDROXY: CPT

## 2020-12-01 PROCEDURE — C9113 INJ PANTOPRAZOLE SODIUM, VIA: HCPCS | Performed by: PHYSICIAN ASSISTANT

## 2020-12-01 PROCEDURE — 1210000000 HC MED SURG R&B

## 2020-12-01 PROCEDURE — 6370000000 HC RX 637 (ALT 250 FOR IP): Performed by: INTERNAL MEDICINE

## 2020-12-01 PROCEDURE — 99223 1ST HOSP IP/OBS HIGH 75: CPT | Performed by: INTERNAL MEDICINE

## 2020-12-01 PROCEDURE — 2580000003 HC RX 258: Performed by: PHYSICIAN ASSISTANT

## 2020-12-01 PROCEDURE — 85610 PROTHROMBIN TIME: CPT

## 2020-12-01 PROCEDURE — 84439 ASSAY OF FREE THYROXINE: CPT

## 2020-12-01 PROCEDURE — U0002 COVID-19 LAB TEST NON-CDC: HCPCS

## 2020-12-01 PROCEDURE — 6360000002 HC RX W HCPCS: Performed by: PHYSICIAN ASSISTANT

## 2020-12-01 PROCEDURE — 99233 SBSQ HOSP IP/OBS HIGH 50: CPT | Performed by: INTERNAL MEDICINE

## 2020-12-01 PROCEDURE — 85025 COMPLETE CBC W/AUTO DIFF WBC: CPT

## 2020-12-01 PROCEDURE — 80053 COMPREHEN METABOLIC PANEL: CPT

## 2020-12-01 PROCEDURE — 99232 SBSQ HOSP IP/OBS MODERATE 35: CPT | Performed by: INTERNAL MEDICINE

## 2020-12-01 PROCEDURE — 6360000002 HC RX W HCPCS: Performed by: STUDENT IN AN ORGANIZED HEALTH CARE EDUCATION/TRAINING PROGRAM

## 2020-12-01 RX ORDER — FENTANYL 25 UG/H
1 PATCH TRANSDERMAL
Status: DISCONTINUED | OUTPATIENT
Start: 2020-12-01 | End: 2020-12-04 | Stop reason: HOSPADM

## 2020-12-01 RX ORDER — GABAPENTIN 300 MG/1
300 CAPSULE ORAL NIGHTLY
Status: DISCONTINUED | OUTPATIENT
Start: 2020-12-01 | End: 2020-12-04 | Stop reason: HOSPADM

## 2020-12-01 RX ORDER — FENTANYL CITRATE 50 UG/ML
25 INJECTION, SOLUTION INTRAMUSCULAR; INTRAVENOUS
Status: DISCONTINUED | OUTPATIENT
Start: 2020-12-01 | End: 2020-12-04 | Stop reason: HOSPADM

## 2020-12-01 RX ORDER — GABAPENTIN 100 MG/1
100 CAPSULE ORAL 2 TIMES DAILY
Status: DISCONTINUED | OUTPATIENT
Start: 2020-12-01 | End: 2020-12-04 | Stop reason: HOSPADM

## 2020-12-01 RX ADMIN — PANTOPRAZOLE SODIUM 40 MG: 40 INJECTION, POWDER, FOR SOLUTION INTRAVENOUS at 08:52

## 2020-12-01 RX ADMIN — PIPERACILLIN AND TAZOBACTAM 3.38 G: 3; .375 INJECTION, POWDER, LYOPHILIZED, FOR SOLUTION INTRAVENOUS at 05:56

## 2020-12-01 RX ADMIN — SODIUM CHLORIDE, PRESERVATIVE FREE 10 ML: 5 INJECTION INTRAVENOUS at 08:58

## 2020-12-01 RX ADMIN — GABAPENTIN 300 MG: 300 CAPSULE ORAL at 21:42

## 2020-12-01 RX ADMIN — FENTANYL CITRATE 12.5 MCG: 0.05 INJECTION, SOLUTION INTRAMUSCULAR; INTRAVENOUS at 05:57

## 2020-12-01 RX ADMIN — ONDANSETRON HYDROCHLORIDE 4 MG: 2 SOLUTION INTRAMUSCULAR; INTRAVENOUS at 08:59

## 2020-12-01 RX ADMIN — GABAPENTIN 100 MG: 100 CAPSULE ORAL at 13:45

## 2020-12-01 RX ADMIN — FENTANYL CITRATE 12.5 MCG: 0.05 INJECTION, SOLUTION INTRAMUSCULAR; INTRAVENOUS at 03:26

## 2020-12-01 RX ADMIN — PIPERACILLIN AND TAZOBACTAM 3.38 G: 3; .375 INJECTION, POWDER, LYOPHILIZED, FOR SOLUTION INTRAVENOUS at 21:42

## 2020-12-01 RX ADMIN — FENTANYL CITRATE 12.5 MCG: 0.05 INJECTION, SOLUTION INTRAMUSCULAR; INTRAVENOUS at 08:59

## 2020-12-01 RX ADMIN — PANTOPRAZOLE SODIUM 40 MG: 40 INJECTION, POWDER, FOR SOLUTION INTRAVENOUS at 21:42

## 2020-12-01 RX ADMIN — SODIUM CHLORIDE, PRESERVATIVE FREE 10 ML: 5 INJECTION INTRAVENOUS at 08:52

## 2020-12-01 RX ADMIN — SODIUM CHLORIDE, PRESERVATIVE FREE 10 ML: 5 INJECTION INTRAVENOUS at 21:42

## 2020-12-01 RX ADMIN — PIPERACILLIN AND TAZOBACTAM 3.38 G: 3; .375 INJECTION, POWDER, LYOPHILIZED, FOR SOLUTION INTRAVENOUS at 16:19

## 2020-12-01 RX ADMIN — FENTANYL CITRATE 12.5 MCG: 0.05 INJECTION, SOLUTION INTRAMUSCULAR; INTRAVENOUS at 00:59

## 2020-12-01 RX ADMIN — SODIUM CHLORIDE, PRESERVATIVE FREE 10 ML: 5 INJECTION INTRAVENOUS at 21:43

## 2020-12-01 ASSESSMENT — PAIN DESCRIPTION - PROGRESSION: CLINICAL_PROGRESSION: GRADUALLY IMPROVING

## 2020-12-01 ASSESSMENT — PAIN DESCRIPTION - ORIENTATION
ORIENTATION: MID

## 2020-12-01 ASSESSMENT — PAIN DESCRIPTION - FREQUENCY: FREQUENCY: INTERMITTENT

## 2020-12-01 ASSESSMENT — ENCOUNTER SYMPTOMS
WHEEZING: 1
STRIDOR: 1
EYE REDNESS: 1
ABDOMINAL PAIN: 1
BACK PAIN: 1

## 2020-12-01 ASSESSMENT — PAIN DESCRIPTION - PAIN TYPE
TYPE: ACUTE PAIN

## 2020-12-01 ASSESSMENT — PAIN SCALES - GENERAL
PAINLEVEL_OUTOF10: 8
PAINLEVEL_OUTOF10: 4
PAINLEVEL_OUTOF10: 7
PAINLEVEL_OUTOF10: 7
PAINLEVEL_OUTOF10: 4
PAINLEVEL_OUTOF10: 7
PAINLEVEL_OUTOF10: 3

## 2020-12-01 ASSESSMENT — PAIN DESCRIPTION - LOCATION
LOCATION: ABDOMEN

## 2020-12-01 ASSESSMENT — PAIN DESCRIPTION - DESCRIPTORS: DESCRIPTORS: SHARP

## 2020-12-01 ASSESSMENT — PAIN DESCRIPTION - ONSET: ONSET: ON-GOING

## 2020-12-01 NOTE — PROGRESS NOTES
[facility] Hospitalists    Patient:  Ktahleen Kelley  YOB: 1960  Date of Service: 12/1/2020  MRN: [id number]   Acct: [de-identified]   Primary Care Physician: Deshawn Wang MD  Advance Directive: Full Code  Admit Date: 11/30/2020       Hospital Day: 1  Portions of this note have been copied forward, however, changed to reflect the most current clinical status of this patient. CHIEF COMPLAINT: Obstructive jaundice    SUBJECTIVE: Ms. Lauren Rg continues to complain of abdominal pain. Denies nausea or vomiting. Would like something to eat. Cumulative Hospital Course: The patient is a 64 y.o. female with PMH metastatic pancreatic cancer, GI bleeding 2/2 erosion at previous Whipple anastomotic site, anemia, GERD, thyroiditis, ascites with as needed therapeutic paracentesis who presented to [facility] as a direct admission due to concerns for obstructive jaundice with elevated LFT's, hyperbilirubinemia. She states that she noticed worsening right upper quadrant abdominal pain associated with nausea on Friday, 11/27/2020 that has been progressively worsening. She denies vomiting or diarrhea. States a family member noticed yellowing of her eyes and skin which is new. Also notes her urine appears tea colored. Denies fever, body aches or chills. States she is chronically constipated and this is unchanged. Patient was admitted to Hospitalist service with consult made to GI as well as Hospice Care. ERCP w/ stent placement unable to be provided at this time with history of Whipple after discussion with GI. Patient consider IR drain placement and hospice care. Review of Systems:   14 point review of systems is negative except as specifically addressed above.     Objective:   VITALS:  /78   Pulse 75   Temp 98.8 °F (37.1 °C) (Temporal)   Resp 18   Ht 5' 7\" (1.702 m)   Wt 102 lb 9.6 oz (46.5 kg)   SpO2 96%   BMI 16.07 kg/m²   24HR INTAKE/OUTPUT:      Intake/Output Summary (Last 24 hours) at 12/1/2020 1544  Last data filed at 12/1/2020 1338  Gross per 24 hour   Intake 1212 ml   Output --   Net 1212 ml     General appearance: 64year old female no acute distress, ill appearing, resting in right lateral decubitus position   Head: Normocephalic, without obvious abnormality, atraumatic  Eyes: conjunctivae/corneas clear. PERRL, EOM's intact. Scleral icterus   Ears: normal external ears and nose, throat without exudate  Neck: no adenopathy, no carotid bruit, no JVD, supple, symmetrical, trachea midline   Lungs: clear to auscultation bilaterally,no rales or wheezes   Heart: regular rate and rhythm with occasional extrasystole, S1, S2 normal, systolic murmur  Abdomen:soft, there is mild/moderate epigastric tenderness; mildly distended, normal bowel sounds no masses, no organomegaly  Extremities: No lower extremity edema,  No erythema, no tenderness to palpation  Skin: Worsening jaundice, texture, turgor normal. No rashes or lesions  Lymphatic: No palpable lymph node enlargment  Neurologic: Alert and oriented X 3, normal strength and tone.  No focal deficits  Psychiatric: Alert and oriented, thought content appropriate, normal insight, mood appropriate    Medications:      sodium chloride 125 mL/hr at 11/30/20 1918      fentaNYL  1 patch Transdermal Q72H    gabapentin  100 mg Oral BID    gabapentin  300 mg Oral Nightly    sodium chloride flush  10 mL Intravenous 2 times per day    piperacillin-tazobactam  3.375 g Intravenous Q8H    pantoprazole  40 mg Intravenous BID    And    sodium chloride (PF)  10 mL Intravenous BID     fentanNYL, sodium chloride flush, [DISCONTINUED] promethazine **OR** ondansetron, lidocaine, promethazine  DIET GENERAL;  Dietary Nutrition Supplements: Standard High Calorie Oral Supplement  Dietary Nutrition Supplements: Frozen Oral Supplement     Lab and other Data:     Recent Labs     11/30/20  0932 12/01/20  0245   WBC 18.46* 14.5*   HGB 9.5* 8.6*    182     Recent Labs     11/30/20  0932 12/01/20 0245   * 130*   K 4.0 3.8   CL 99 97*   CO2 27 24   BUN 9 6   CREATININE 0.5 0.2*   GLUCOSE 129* 90     Recent Labs     11/30/20  0932 12/01/20 0245   AST 87* 45*   ALT 57* 34*   BILITOT 12.0* 11.7*   ALKPHOS 1,023* 713*     INR:   Recent Labs     12/01/20 0245   INR 1.35*     RAD:   Ct Abdomen Pelvis W Iv Contrast Additional Contrast? Oral  1.. The patient is status post Whipple procedure. There is some mild thickening noted at the level of the distal gastrectomy. The gastrojejunostomy is patent. 2. There is evidence of a neoplasm involving the region of the pancreatic head. The pancreatic head likely was resected at the time of the Whipple procedure and this may represent localized recurrence. 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 The SMV and splenic vein just proximal to the confluence to form the portal vein are also occluded. There is some involvement with the celiac axis which appears to show slight improvement as there did appear to be some circumferential narrowing of the proximal segment of the splenic and common hepatic artery on the previous exam which I do not see on the current study. Overall I feel the mass is stable in size from the previous study. There is some progressive biliary dilatation with what appears to be abrupt occlusion of the common duct just below the kathy hepatis. Previously noted pneumobilia is no longer visualized. This would suggest there has been some progressive occlusion/stenosis of the common duct. 3. Moderate constipation. There is some stasis of the small bowel with mild fluid distention with a few air-fluid levels. There is free fluid in the paracolic gutters and pelvis. No evidence of pneumoperitoneum or mechanical bowel obstruction. Signed by Dr Miller Living on 11/30/2020 10:09 PM    Mri Abdomen W Wo Contrast Mrcp  1.  Marked biliary ductal dilatation related to apparent tumor recurrence in the pancreas head region as described above and also detailed on the CT examination performed today. Signed by Dr Jannet Cyr on 12/1/2020 7:40 AM    Assessment/Plan   Principal Problem:    Obstructive jaundice-2/2 tumor recurrence and occlusion of CBD. Stent placement likely to be unsuccessful w/ hx of Whipple procedure. Patient considering hospice care, possible IR drain placement. Hospice care following, providing recommendations for pain control.  Appreciate assistance of consultants on this case     Active Problems:    Malignant neoplasm of pancreas (HCC)-noted, followed as OP by Dr. Adam Mata, receiving palliative chemotherapy with FOLFOX, CA 19-9 25,800       Malignant ascites-noted, serial abdominal exams, order therapeutic paracentesis if needed       Mixed hyperlipidemia-noted, does not take statins for this       Severe protein-calorie malnutrition (HCC)-dietary following       Normocytic anemia-monitor        Leukocytosis-improving    Antibiotic: Zosyn     DVT Prophylaxis: SCD    GI prophylaxis: Protonix     Rito Kaufman PA-C

## 2020-12-01 NOTE — PROGRESS NOTES
GI  - PROGRESS NOTE    Subjective:   Admit Date: 11/30/2020  PCP: Vernon Arredondo MD    Patient being seen for: biliary obstruction, h/o pancreatic CA s/p WHipple in the past    24hr events/Interval History:   Continued abdominal pain, tolerated soft diet  Bili still elevated  Imaging reviewed. DIET GENERAL;  Dietary Nutrition Supplements: Standard High Calorie Oral Supplement  Dietary Nutrition Supplements: Frozen Oral Supplement     Tolerated                  Pain:Moderate  Nausea: Moderate      Medications:  Scheduled Meds:   fentaNYL  1 patch Transdermal Q72H    gabapentin  100 mg Oral BID    gabapentin  300 mg Oral Nightly    sodium chloride flush  10 mL Intravenous 2 times per day    piperacillin-tazobactam  3.375 g Intravenous Q8H    pantoprazole  40 mg Intravenous BID    And    sodium chloride (PF)  10 mL Intravenous BID       Continuous Infusions:   sodium chloride 125 mL/hr at 11/30/20 1918       PRN Meds:.fentanNYL, sodium chloride flush, [DISCONTINUED] promethazine **OR** ondansetron, lidocaine, promethazine      Labs:     Recent Labs     11/30/20 0932 12/01/20  0245   WBC 18.46* 14.5*   RBC 3.14* 2.91*   HGB 9.5* 8.6*   HCT 26.3* 24.9*   MCV 83.8 85.6   MCH 30.3 29.6   MCHC 36.1* 34.5    182     Recent Labs     11/30/20  0932 12/01/20  0245   * 130*   K 4.0 3.8   ANIONGAP 9 9   CL 99 97*   CO2 27 24   BUN 9 6   CREATININE 0.5 0.2*   GLUCOSE 129* 90   CALCIUM 8.3* 8.3*     No results for input(s): MG, PHOS in the last 72 hours. Recent Labs     11/30/20  0932 12/01/20  0245   AST 87* 45*   ALT 57* 34*   BILITOT 12.0* 11.7*   ALKPHOS 1,023* 713*     HgBA1c:  No components found for: HGBA1C  FLP:  No results found for: TRIG, HDL, LDLCALC, LDLDIRECT, LABVLDL  TSH:    Lab Results   Component Value Date    TSH 0.470 12/01/2020     Troponin T: No results for input(s): TROPONINI in the last 72 hours.   INR:   Recent Labs     12/01/20  0245 INR 1.35*       No results for input(s): LIPASE in the last 72 hours. -----------------------------------------------------------------  RAD:       Physical Exam:     Vitals:    12/01/20 0633 12/01/20 0830 12/01/20 1031 12/01/20 1441   BP: 129/73  130/74 134/78   Pulse: 79 78 69 75   Resp: 18  18 18   Temp: 98.3 °F (36.8 °C)  98.6 °F (37 °C) 98.8 °F (37.1 °C)   TempSrc: Temporal  Temporal Temporal   SpO2: 96%  95% 96%   Weight:       Height:         24HR INTAKE/OUTPUT:      Intake/Output Summary (Last 24 hours) at 12/1/2020 1641  Last data filed at 12/1/2020 1338  Gross per 24 hour   Intake 1212 ml   Output --   Net 1212 ml     General appearance: alert and cooperative with exam  Lungs: clear to auscultation bilaterally  Heart: regular rate and rhythm, S1, S2 normal, no murmur, click, rub or gallop  Abdomen: soft, non-tender; bowel sounds normal; no masses,  no organomegaly  Extremities: extremities normal, atraumatic, no cyanosis or edema  Neurologic: No obvious focal neurologic deficits. SKin - jaundice    Impression:       1. Recurrent Pancreatic Adenocarcinoma - s/p multiple lines of chemotherapy  2. Biliary obstruction - most likely 2/2 to above  3. S/p Whipple with gastrojejunostomy formation. 4. Leukocytosis - on zosyn empirically  5. Malnutrition   6. Anemia      Plan:     - I had a long d/w patient today regarding both her goals of care as well as the indications and need for biliary drainage/stenting. I d/w her that unfortunately due to her post surgical anatomy, her anatomy is not conducive to an ERCP with stenting. To relieve her obstruction she undoubtedly require a percutaneous drain/stenting through Interventional radiology. I offered her this option (likely transfer to [facility]) vs palliative care/support only.  She reinforced that she has a few discrete events she is hoping to live long enough to see and does not want to die any quicker than absolutely necessary even though she is aware oncology has no further options in terms of chemotherapy or treatment. - I have discussed her care with Dr Leana Buerger at [facility] who has agreed to see her upon transfer and attempt biliary drain +/- stenting as possible. - I will add Covid Testing given impending procedure/transfer.

## 2020-12-01 NOTE — PROGRESS NOTES
4 Eyes Skin Assessment    Isaura Gravely is being assessed upon: Admission    I agree that I, Johanna Ramirez, along with Candi Phalen, RN (either 2 RN's or 1 LPN and 1 RN) have performed a thorough Head to Toe Skin Assessment on the patient. ALL assessment sites listed below have been assessed. Areas assessed by both nurses:     [x]   Head, Face, and Ears   [x]   Shoulders, Back, and Chest  [x]   Arms, Elbows, and Hands   [x]   Coccyx, Sacrum, and Ischium  [x]   Legs, Feet, and Heels    Does the Patient have Skin Breakdown?  No    Chris Prevention initiated: No  Wound Care Orders initiated: NA    [facility] nurse consulted for Pressure Injury (Stage 3,4, Unstageable, DTI, NWPT, and Complex wounds) and New or Established Ostomies: NA        Primary Nurse eSignature: Georgiana Prado RN on 11/30/2020 at 10:42 PM      Co-Signer eSignature: {Esignature:[id number]}

## 2020-12-01 NOTE — PROGRESS NOTES
Call placed to Dr. Candida Radford office after seeing 's note on patient needing a possible ERCP or EUS, staff at office unaware of this, left message with nursing staff to see If Dr. Candida Radford was going do any procedures on patient. Awaiting any new information. Dr. Candida Radford is currently in facility doing procedures this AM and Dr. Maris Whitt will be here this afternoon.

## 2020-12-01 NOTE — PROGRESS NOTES
Palliative Medicine Consultation        11/30/2020 12:35 PM  Jeremy Patterson MD       Reason for Consult:      [x] Advance Care Planning  [x] Transition of Care Planning  [x]  Psychosocial Support  [x] Symptom Management: pain    Requesting Physician:    Peri Flores MD  CHIEF COMPLAINT:  Abdominal pain,  Obstructive jaundice, pancreatic carcinoma    History Obtained From:  Patient and chart    HISTORY OF PRESENT ILLNESS: Chart and records extensively reviewed. This is a 72-year-old woman who has had pancreatic carcinoma diagnosed in January 2018 with neoadjuvant chemotherapy followed by Whipple procedure subsequent multiple lines of chemotherapy who is now admitted with abdominal pain and evidence of obstructive jaundice with progression of her underlying carcinoma. She was treated at [facility] with neoadjuvant chemotherapy radiation therapy followed by Whipple procedure in August 2018 and has been treated by Dr. Celia Paget subsequently with FOLFIRINOX followed by [facility] and most recently Irinotecan liposome and leucovorin with 5-FU. She recently has progressed with CA 19-9 in July 483 and most recent CA 19-9 of 25,800. MRCP this admission shows marked biliary ductal dilation related to apparent tumor recurrence in the pancreatic head region. CT scan showed current tumor extending towards the kathy hepatis with occlusion of the portal vein superior mesenteric vein and splenic veins evidence of biliary dilation with probable common duct occlusion. .  He has had several episodes of gastrointestinal bleeding felt related to her Whipple anastomosis and requiring transfusion in the recent past.  She has lost per 20 pounds in the last 6-8 weeks. She presented to the hospital now with increasing jaundice with worsening right upper quadrant abdominal pain with nausea over the last several days. Notably she is intolerant or allergic to morphine, Dilaudid, and codeine.   It is somewhat adenocarcinoma-staged [id number] by EUS, pancreatic pseudocyst in the tail the pancreas    [place] ERCP DX COLLECTION SPECIMEN BRUSHING/WASHING N/A 4/11/2018    Dr KVNG Atkinson/placement of a self-expanding fully covered 10 mm biliary stent    UPPER GASTROINTESTINAL ENDOSCOPY  years ago    [address] GASTROINTESTINAL ENDOSCOPY  2013    [place]    UPPER GASTROINTESTINAL ENDOSCOPY N/A 11/1/2019    Dr Greta Kruse post Whipple's procedure with efferent loop ulceration    UPPER GASTROINTESTINAL ENDOSCOPY  12/17/2019    Dr Lazarus Kempf Jones-Patent G-J Anastomosis, apparent healing ulceration    UPPER GASTROINTESTINAL ENDOSCOPY N/A 2/25/2020    Dr Erik Cruz amount of food retention in the stomach with bile, hiatal hernia, will need repeat    UPPER GASTROINTESTINAL ENDOSCOPY N/A 2/27/2020    Dr Chandrika Capellan erosion/ulceration at the suture line, post whipple surgical changes    UPPER GASTROINTESTINAL ENDOSCOPY N/A 3/9/2020    Dr Chandrika Capellan erosion along the previous whipple suture line    UPPER GASTROINTESTINAL ENDOSCOPY N/A 4/16/2020    Dr KVNG Atkinson/hemostatic clip placement x 1-Allie Peres tear at GEJ-Likely culprit lesion for current presentation    UPPER GASTROINTESTINAL ENDOSCOPY N/A 6/22/2020    Dr Chandrika Capellan erosion along the previous whipple suture line       Scheduled Medications:   fentaNYL  1 patch Transdermal Q72H    gabapentin  100 mg Oral BID    gabapentin  300 mg Oral Nightly    sodium chloride flush  10 mL Intravenous 2 times per day    piperacillin-tazobactam  3.375 g Intravenous Q8H    pantoprazole  40 mg Intravenous BID    And    sodium chloride (PF)  10 mL Intravenous BID         Infusions:   sodium chloride 125 mL/hr at 11/30/20 1918         PRN Medications:  fentanNYL, sodium chloride flush, [DISCONTINUED] promethazine **OR** ondansetron, lidocaine, promethazine    Allergies:  Codeine; Morphine; Morphine and related; Sulfa antibiotics;  Neomycin-bacitracin zn-polymyx; Clarithromycin; Dilaudid [hydromorphone hcl];  Hydromorphone; Loperamide hcl; Loperamide hcl; and Neosporin [neomycin-polymyx-gramicid]    Social History:    Social History     Socioeconomic History    Marital status:      Spouse name: Not on file    Number of children: 3    Years of education: Not on file    Highest education level: Not on file   Occupational History    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[place] [address] Occupation: [employer]    Occupation: [employer]   Social Needs    Financial resource strain: Not on file    Food insecurity     Worry: Not on file     Inability: Not on file   [place] needs     Medical: Not on file     Non-medical: Not on file   Tobacco Use    Smoking status: Former Smoker     Packs/day: 0.50     Years: 10.00     Pack years: 5.00     Types: Cigarettes     Last attempt to quit: 2019     Years since quittin.0    Smokeless tobacco: Never Used   Substance and Sexual Activity    Alcohol use: Not Currently    Drug use: Never    Sexual activity: Not on file     Comment: 3   Lifestyle    Physical activity     Days per week: Not on file     Minutes per session: Not on file    Stress: Not on file   Relationships    Social connections     Talks on phone: Not on file     Gets together: Not on file     Attends Faith service: Not on file     Active member of club or organization: Not on file     Attends meetings of clubs or organizations: Not on file     Relationship status: Not on file    Intimate partner violence     Fear of current or ex partner: Not on file     Emotionally abused: Not on file     Physically abused: Not on file     Forced sexual activity: Not on file   Other Topics Concern    Not on file   Social History Narrative    CODE STATUS: Full Code    HEALTH CARE PROXY: her daughter, Mrs. Sharonda Yang, of [facility]: independently    DOMICILED: lives in a private home, without steps inside, lives alone, has 2 dogs, no steps in to home       Family History:   Family History   Problem Relation Age of Onset    Heart Attack Mother 46        x 2-had bypass    Hypertension Mother     COPD Father     Liver Cancer Paternal Aunt     Lung Cancer Paternal Aunt     Breast Cancer Maternal Aunt         but  of brain cancer    Diabetes Maternal Uncle     Diabetes Maternal Grandmother     Colon Cancer Neg Hx     Colon Polyps Neg Hx     Esophageal Cancer Neg Hx     Rectal Cancer Neg Hx     Stomach Cancer Neg Hx        REVIEW OF SYSTEMS/SYMPTOM ASSESSMENT:    Symptom Present Medications Doses   Pain yes Fentanyl 12.5 mg q2    Nausea yes     Constipation yes     SOB      Anxiety      Depression suspected     Appetite poor     Sleep      Fatigue      Performance Status poor       GEN: She has generalized weakness and fatigue and has lost considerable weight as noted   HEENT:  No otalgia, headache, sore throat, vision problems  CV:  Denies chest pain or palpitations. No orthopnea or PND  PULM:  Denies KEEN, dyspnea at rest, wheezing or cough  GI: She has chronic constipation. She has nausea and epigastric pain as outlined above. No recent melena  : No dysuria or urgency. No problems with bladder emptying  MUSK:  Denies myalgia . No arthralgia. SKIN: Jaundice as described. No other rash or nodules. PSY: Denies confusion or hallucinations. NEURO: Focal weakness. No syncope. Otherwise as outlined in HPI or above      Vitals:    /74   Pulse 69   Temp 98.6 °F (37 °C) (Temporal)   Resp 18   Ht 5' 7\" (1.702 m)   Wt 102 lb 9.6 oz (46.5 kg)   SpO2 95%   BMI 16.07 kg/m²     PHYSICAL EXAM:    Gen. : Alert in moderate discomfort changing positions frequently due to pain. HEENT: Normocephalic than temporal wasting. Scleral icterus noted. Neck: No thyromegaly or adenopathy  Cardiac: Regular rate and rhythm without murmur S3 or S4.  No carotid bruits  Pulmonary: Lungs clear with normal Absolute 1.66 (H) 0.24 - 0.82 K/uL    Eosinophils Absolute 0.01 (L) 0.04 - 0.54 K/uL    Basophils Absolute 0.02 0.01 - 0.08 K/uL   Comprehensive Metabolic Panel    Collection Time: 11/30/20  9:32 AM   Result Value Ref Range    Sodium 135 (L) 137 - 145 mmol/L    Potassium 4.0 3.5 - 5.1 mmol/L    Chloride 99 98 - 111 mmol/L    CO2 27 22 - 29 mmol/L    Anion Gap 9 7 - 19 mmol/L    Glucose 129 (H) 74 - 106 mg/dL    BUN 9 7 - 17 mg/dL    CREATININE 0.5 0.5 - 1.0 mg/dL    GFR Non-African American >60 >60    Calcium 8.3 (L) 8.4 - 10.2 mg/dL    Total Protein 7.3 6.3 - 8.2 g/dL    Alb 3.2 (L) 3.5 - 5.2 g/dL    Total Bilirubin 12.0 (HH) 0.2 - 1.3 mg/dL    Alkaline Phosphatase 1,023 (H) 35 - 104 U/L    ALT 57 (H) 9 - 52 U/L    AST 87 (H) 14 - 36 U/L    Globulin 4.1 g/dL   Cancer Antigen 19-9    Collection Time: 11/30/20  9:32 AM   Result Value Ref Range    CA 19-9 76008 (H) 0 - 37 U/mL   Comprehensive Metabolic Panel w/ Reflex to MG    Collection Time: 12/01/20  2:45 AM   Result Value Ref Range    Sodium 130 (L) 136 - 145 mmol/L    Potassium reflex Magnesium 3.8 3.5 - 5.0 mmol/L    Chloride 97 (L) 98 - 111 mmol/L    CO2 24 22 - 29 mmol/L    Anion Gap 9 7 - 19 mmol/L    Glucose 90 74 - 109 mg/dL    BUN 6 6 - 20 mg/dL    CREATININE 0.2 (L) 0.5 - 0.9 mg/dL    GFR Non-African American >60 >60    GFR African American >59 >59    Calcium 8.3 (L) 8.6 - 10.0 mg/dL    Total Protein 6.1 (L) 6.6 - 8.7 g/dL    Alb 2.5 (L) 3.5 - 5.2 g/dL    Total Bilirubin 11.7 (H) 0.2 - 1.2 mg/dL    Alkaline Phosphatase 713 (H) 35 - 104 U/L    ALT 34 (H) 5 - 33 U/L    AST 45 (H) 5 - 32 U/L   TSH without Reflex    Collection Time: 12/01/20  2:45 AM   Result Value Ref Range    TSH 0.470 0.270 - 4.200 uIU/mL   T4, free    Collection Time: 12/01/20  2:45 AM   Result Value Ref Range    T4 Free 1.40 0.93 - 1.70 ng/dL   CBC auto differential    Collection Time: 12/01/20  2:45 AM   Result Value Ref Range    WBC 14.5 (H) 4.8 - 10.8 K/uL    RBC 2.91 (L) 4.20 - 5.40 M/uL    Hemoglobin 8.6 (L) 12.0 - 16.0 g/dL    Hematocrit 24.9 (L) 37.0 - 47.0 %    MCV 85.6 81.0 - 99.0 fL    MCH 29.6 27.0 - 31.0 pg    MCHC 34.5 33.0 - 37.0 g/dL    RDW 21.3 (H) 11.5 - 14.5 %    Platelets 067 083 - 604 K/uL    MPV 11.1 9.4 - 12.3 fL    Neutrophils % 85.4 (H) 50.0 - 65.0 %    Lymphocytes % 5.3 (L) 20.0 - 40.0 %    Monocytes % 8.0 0.0 - 10.0 %    Eosinophils % 0.0 0.0 - 5.0 %    Basophils % 0.2 0.0 - 1.0 %    Neutrophils Absolute 12.3 (H) 1.5 - 7.5 K/uL    Immature Granulocytes # 0.2 K/uL    Lymphocytes Absolute 0.8 (L) 1.1 - 4.5 K/uL    Monocytes Absolute 1.20 (H) 0.00 - 0.90 K/uL    Eosinophils Absolute 0.00 0.00 - 0.60 K/uL    Basophils Absolute 0.00 0.00 - 0.20 K/uL   Protime-INR    Collection Time: 12/01/20  2:45 AM   Result Value Ref Range    Protime 16.7 (H) 12.0 - 14.6 sec    INR 1.35 (H) 0.88 - 1.18   Vitamin D 25 hydroxy    Collection Time: 12/01/20  2:45 AM   Result Value Ref Range    Vit D, 25-Hydroxy 7.5 (L) >=30 ng/mL       CT ABDOMEN PELVIS W IV CONTRAST Additional Contrast? Oral   Final Result   1.. The patient is status post Whipple procedure. There is some mild   thickening noted at the level of the distal gastrectomy. The   gastrojejunostomy is patent. 2. There is evidence of a neoplasm involving the region of the   pancreatic head. The pancreatic head likely was resected at the time   of the Whipple procedure and this may represent localized recurrence. This is an oblong neoplasm extending towards the kathy hepatis with   encasement and occlusion of the portal vein. The SMV and splenic vein   just proximal to the confluence to form the portal vein are also   occluded. There is some involvement with the celiac axis which appears   to show slight improvement as there did appear to be some   circumferential narrowing of the proximal segment of the splenic and   common hepatic artery on the previous exam which I do not see on the   current study.  Overall I feel the mass is stable in size from the   previous study. There is some progressive biliary dilatation with what   appears to be abrupt occlusion of the common duct just below the kathy   hepatis. Previously noted pneumobilia is no longer visualized. This   would suggest there has been some progressive occlusion/stenosis of   the common duct. 3. Moderate constipation. There is some stasis of the small bowel with   mild fluid distention with a few air-fluid levels. There is free fluid   in the paracolic gutters and pelvis. No evidence of pneumoperitoneum   or mechanical bowel obstruction. Signed by Dr Neal Roberto on 11/30/2020 10:09 PM      MRI ABDOMEN W WO CONTRAST MRCP   Final Result   1. Marked biliary ductal dilatation related to apparent tumor   recurrence in the pancreas head region as described above and also   detailed on the CT examination performed today. Signed by Dr Velma Barney on 12/1/2020 7:40 AM          DIAGNOSES:  1. Severe abdominal pain  2. Recurrent locally invasive pancreatic carcinoma with progression despite treatment  3. Obstructive jaundice due to biliary obstruction from tumor  4. Protein calorie malnutrition  5. Multifactorial anemia  6. Allergies or intolerance to morphine Dilaudid and codeine  7. Previous Whipple procedure  8. Poor performance status  9. Chronic constipation - likely to worsen with pain rx    IMPRESSION: He has progressed despite treatment and now does not have options for further chemotherapy per oncology. She now has obstructive jaundice and is going to require stenting if possible. GI is seeing. She is having increasing pain complicated by intolerances to usual narcotic pain medication. Her goal of care is to return home with improved pain control after treatment for her obstructive jaundice      Recommendations:  I had a very lengthy discussion with her about options including treatment of pain through oncology versus hospice care.   She had some resistance to hospice based on experiences with her father who she felt was \"just drugged\". I reviewed with her in detail what hospice could provide and outlined a possible plan of care to treat her pain and improve her functional status for what ever time she has left. Her hospice care will be somewhat complicated by her intolerance to morphine and Dilaudid but we have options. .  I believe her goals of care align well with hospice. She is going to need further discussion and follow-up over the next day or 2 as she is still dealing with the probability of no further treatment and need for stent placement. In the short-term her pain has not been well controlled yet and I would recommend we add a fentanyl patch 25 mcg and increase her intravenous fentanyl to 25 mcg as needed every 2 hours. Would likely be a good candidate for addition of methadone particularly when she is discharged as this may help with potential visceral nerve pain. We would be able to get her a fentanyl sublingual suspension I believe for use as needed   at home as well. I have not used fentanyl via CADD pump at home but I do not think it is out of the question if it seemed appropriate when she worsens. .  Also I would gradually titrate her gabapentin. I did not talk about CODE STATUS nor do an ACP today in part because of the discussion above and my feeling it would be too much for her to consider at this point as well as the fact that she will need to be full code for her stent. These discussions will be forthcoming. Thanks very much for allowing palliative care to see Ms. David Garcia. We will continue to follow closely and provide support.          Time spent with patient and/or family:  30 minutes  Time reviewing records: 20 minutes  Time communicating with staff: 10 minutes    (Please note that portions of this note were completed with a voice recognition program.  Efforts were made to edit the dictations but occasionally words are

## 2020-12-01 NOTE — PROGRESS NOTES
Progress Note  Date:2020       Room:0510/510-01  Patient Burton Miranda     YOB: 1960     Age:59 y.o. Subjective    Subjective:  Symptoms:  She reports malaise, weakness and anorexia. Diet:  NPO. Activity level: Impaired due to weakness. Pain:  She complains of pain that is moderate. She reports pain is unchanged. Pain is partially controlled. Review of Systems   Constitutional: Positive for activity change, appetite change and unexpected weight change. Eyes: Positive for redness. Respiratory: Positive for wheezing and stridor. Cardiovascular: Negative. Gastrointestinal: Positive for abdominal pain and anorexia. Endocrine: Negative. Genitourinary: Negative. Musculoskeletal: Positive for back pain and gait problem. Neurological: Positive for weakness. Hematological: Positive for adenopathy. Bruises/bleeds easily. Psychiatric/Behavioral: Positive for dysphoric mood. Objective         Vitals Last 24 Hours:  TEMPERATURE:  Temp  Av.3 °F (36.8 °C)  Min: 97.4 °F (36.3 °C)  Max: 98.9 °F (37.2 °C)  RESPIRATIONS RANGE: Resp  Av.2  Min: 16  Max: 18  PULSE OXIMETRY RANGE: SpO2  Av.7 %  Min: 95 %  Max: 100 %  PULSE RANGE: Pulse  Av.1  Min: 69  Max: 85  BLOOD PRESSURE RANGE: Systolic (73RAZ), SLO:090 , Min:124 , JNM:678   ; Diastolic (08EAT), ALR:26, Min:69, Max:86    I/O (24Hr): Intake/Output Summary (Last 24 hours) at 2020 1113  Last data filed at 2020 0305  Gross per 24 hour   Intake 972 ml   Output --   Net 972 ml     Objective:  General Appearance:  Ill-appearing. Vital signs: (most recent): Blood pressure 130/74, pulse 69, temperature 98.6 °F (37 °C), temperature source Temporal, resp. rate 18, height 5' 7\" (1.702 m), weight 102 lb 9.6 oz (46.5 kg), SpO2 95 %. Output: Producing urine and producing stool. HEENT: Normal HEENT exam.    Lungs: There are wheezes. Heart: Irregular rhythm.   Positive for murmur. Abdomen: Bowel sounds are normal.     Pupils:  Pupils are equal, round, and reactive to light. Skin:  Warm, dry and pale. Labs/Imaging/Diagnostics    Labs:  CBC:  Recent Labs     11/30/20 0932 12/01/20 0245   WBC 18.46* 14.5*   RBC 3.14* 2.91*   HGB 9.5* 8.6*   HCT 26.3* 24.9*   MCV 83.8 85.6   RDW 19.9* 21.3*    182     CHEMISTRIES:  Recent Labs     11/30/20 0932 12/01/20  0245   * 130*   K 4.0 3.8   CL 99 97*   CO2 27 24   BUN 9 6   CREATININE 0.5 0.2*   GLUCOSE 129* 90     PT/INR:  Recent Labs     12/01/20 0245   PROTIME 16.7*   INR 1.35*     APTT:No results for input(s): APTT in the last 72 hours. LIVER PROFILE:  Recent Labs     11/30/20 0932 12/01/20 0245   AST 87* 45*   ALT 57* 34*   BILITOT 12.0* 11.7*   ALKPHOS 1,023* 713*       Imaging Last 24 Hours:  Ct Abdomen Pelvis W Iv Contrast Additional Contrast? Oral    Result Date: 11/30/2020  EXAMINATION: CT abdomen and pelvis with contrast 11/30/2020 HISTORY: Obstructive jaundice. Pancreatic cancer FINDINGS: Today's exam is compared to previous study of 11/9/2020. Multiple contiguous axial images are obtained from the lung bases to the pubic symphysis per protocol following both IV and oral contrast administration. Reformatted images are obtained in the sagittal and coronal projections from the original data set. Arterial and portal venous phase imaging is obtained through the liver. Lung bases are clear. No evidence of cardiomegaly or pericardial effusion. There is moderate intrahepatic biliary dilatation. This shows some progression from the previous study of 11/9/2020. No evidence of pneumobilia on today's exam. The patient is status post cholecystectomy. No discrete hepatic mass is identified. There is normal enhancement of the hepatic veins. The common duct is noted to be nonvisualized just below the kathy hepatis suggesting stenosis or obstruction.  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 The SMV and splenic vein are thrombosed just proximal to forming the portal vein. The patient is status post Whipple procedure. There is again some thickening at the level of the patient's distal gastric antrectomy. There is irregular soft tissue density associated with the region of the pancreatic head and uncinate process and extending to involve the proximal body of the pancreas. The neoplasm is best demonstrated on axial image 31 of series 4. Also image 47 of series 3. There is an irregular soft tissue mass which extends to insinuate itself between the SMA and aorta and also involves the region of the uncinate process and head of the pancreas. This lesion measures approximate 4.4 cm in transverse dimension by 1.6 cm in anterior to posterior dimension although there is a component of the neoplasm which extends anteriorly between 2 surgical clips near the distal antrectomy site. This is almost contiguous with the thickening noted of the distal gastric antrum. This neoplasm is intimately associated with the proximal aspect of the celiac axis with some encasement of the proximal segment of the splenic artery and common hepatic proper on the previous exam which is not demonstrated on the current study. The size of the neoplasm appears stable from the previous exam. There is some mild irregular thickening and stranding extending into Morison space and the subhepatic space. This is stable from the previous exam. There is a large amount of stool throughout the colon. This results in some stasis with mild fluid distention of several loops of small bowel with scattered air-fluid levels. No evidence of mechanical obstruction. There is some free fluid within the pelvis and paracolic gutters similar in volume and distribution to the previous study. No discrete bony lesions are demonstrated. 1.. The patient is status post Whipple procedure.  There is some mild thickening noted at the level of the distal gastrectomy. The gastrojejunostomy is patent. 2. There is evidence of a neoplasm involving the region of the pancreatic head. The pancreatic head likely was resected at the time of the Whipple procedure and this may represent localized recurrence. 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 The SMV and splenic vein just proximal to the confluence to form the portal vein are also occluded. There is some involvement with the celiac axis which appears to show slight improvement as there did appear to be some circumferential narrowing of the proximal segment of the splenic and common hepatic artery on the previous exam which I do not see on the current study. Overall I feel the mass is stable in size from the previous study. There is some progressive biliary dilatation with what appears to be abrupt occlusion of the common duct just below the kathy hepatis. Previously noted pneumobilia is no longer visualized. This would suggest there has been some progressive occlusion/stenosis of the common duct. 3. Moderate constipation. There is some stasis of the small bowel with mild fluid distention with a few air-fluid levels. There is free fluid in the paracolic gutters and pelvis. No evidence of pneumoperitoneum or mechanical bowel obstruction. Signed by Dr Miller Living on 11/30/2020 10:09 PM    Mri Abdomen W Wo Contrast Mrcp    Result Date: 12/1/2020  EXAMINATION:  MRI ABDOMEN W WO CONTRAST MRCP  12/1/2020 7:13 AM HISTORY: Obstructive jaundice. Pancreas cancer. Status post Whipple. COMPARISON: CT imaging of the abdomen pelvis dated 11/30/2020 and 11/9/2020 TECHNIQUE: Multiplanar MR imaging of the abdomen was performed. Dynamic Gadolinium enhanced imaging obtained. MRCP images performed using a volume rendering technique.  FINDINGS: There is moderate intrahepatic biliary ductal dilatation with dilatation of the common hepatic

## 2020-12-01 NOTE — PROGRESS NOTES
2nd call placed to Dr Annemarie Sousa office with Dr. Vincenzo Dong recommendations for ERCP or EUS, DR ashley in procedures this am staff to get him message awaiting call back at this time

## 2020-12-01 NOTE — CONSULTS
MEDICAL ONCOLOGY CONSULTATION    Pt Name: Eda Stevenson  MRN: [id number]  YOB: 1960  Date of evaluation: 12/1/2020    REASON FOR CONSULTATION:  Pancreatic cancer  REQUESTING PHYSICIAN: Hospitalist    History Obtained From:    patient, electronic medical record    HISTORY OF PRESENT ILLNESS:      The patient is a very pleasant 64years old female who has an unfortunate diagnosis of pancreatic cancer initially diagnosed in January 2018. She underwent neoadjuvant chemotherapy followed by Whipple procedure at [facility]. She received additional adjuvant chemotherapy after her surgery. Unfortunately, she had disease progression and was therefore started again on palliative chemotherapy. She has received several lines of therapy. Over the last few weeks and months she has been steadily declining. She has had several hospitalizations for GI bleed. She presented to clinic yesterday for consideration of further palliative treatment. We have discussed in the past hospice care but she has declined. She was found to be jaundiced yesterday. His CMP revealed a bilirubin of 12. She had complaints of epigastric pain and discomfort. She had some nausea. CBC showed a hemoglobin 8.6, WBC elevated 14.5 and platelet counts 655,964. CMP showed hyponatremia with sodium levels 130, decreased total protein and albumin, elevated alkaline phosphatase and total bilirubin 12.0. A CT of the abdomen was performed and showed a pancreatic head mass likely representing local recurrence and common bile duct dilatation. She was admitted to the hospital service.   GI was consulted.       ONCOLOGIC HISTORY:   Diagnosis  · Pancreatic adenocarcinoma, January 2018   · [id number]  · 7/6/2019-extended genetic panel-negative for a deleterious mutation     Treatment summary  · March 2018-Surgical consultation at [facility] [address] operable   · 4/3/2018 through 6/4/2018 Neoadjuvant chemotherapy FOLFORINOX ×5 Biweekly cycles · 4/11/2018-Biliary stent   · August 2018- XRT 30 Gy/Xeloda   · 08/30/3018- Whipple procedure @ [facility]   · Recommended another 5 biweekly cycles of modified FOLFORINOX completed 12/26/2018   · 7/9/2019- 1/22/2020 Gemzar/Abraxane 125 mg/m2 q 14 days, discontinued due to progression of disease  · 3/4/2020- Irinotecan liposome 70 mg/m² with leucovorin 400 mg/m² and 5-FU 2400 mg/m² over 46 hours every 2 weeks.     Tumor marker  · 3/12/7652-ID-61-9->430->370. · 4/30/2018 - CA 19- 9 of 157   · 5/25/20188585-OQ-56-9->32 after 4 cycles. · 08/02/2018- -> 8   · 10/01/2018- CA 19-9 -5   · 10/29/2018-CA 19-9- 7   · 12/3/6284-YT-64-9-7   · 04/11/2019-CA-19-9- 12   · 05/21/2019- CA 19-9- 41   · 7/2/2019-CA 19 9 = 114  · 7/9/2019- CA-19-9 = 225  · 8/6/2019- CA-19-9 = 171  · 9/3/4611-NW-79-9 = 198  · 9/13/20199709-NG-17-9 = 98 (at  [address])  · 10/29/9992-TA-71-9 =317  · 11/21/2019-CA-19-9 = 183  · 1/23/20202165-ZI-55-9 = 520  · 4/22/2020- CA-19-9 = 940  · 5/13/20206975-MP-25-9 = 129  · 6/1/20208183-NW-74-9 = 523??  · 7/6/2020- CA 19-9- 483  · 8/17/20205320-BN-01-9 = 785  · 10/5/20209122-XA-65-9 = 1250  · 11/30/2020-CA 19-9 = 48058        Cancer history  Ms Ge Bo was seen in initial oncology consultation on 3/12/2018 referred by Dr. Bessie Garsia for a diagnosis of pancreatic adenocarcinoma. She was initially evaluated for acute pancreatitis at [facility] on February 2018. Further imaging revealed a pancreatic head mass. Lipase was elevated at 1184 and CA-19-9 elevated 134. The symptoms were going on for several weeks. Weight loss of 10-12 pounds over the last 6 months. · October 2017-CT abdomen without contrast was unremarkable. · 2/2/2018-ultrasound of abdomen showed a pancreatic duct dilation   · 2/02/2018-CT abdomen showed 16 mm low-density lesion in the pancreas at the junction of the head of the body. No intra-abdominal adenopathy. No liver metastasis.    · 2/7/2018-the patient underwent EGD/EUS and FNA biopsy of a pancreatic mass by Dr. Omar Nageotte at [facility] . EUS showed inflammatory changes consistent with a recent history of pancreatitis. Main pancreatic duct was dilated. No concerning peripancreatic adenopathy. No definite mass was seen by a more diffuse hypoechoic area measuring 1.8 x 2.2 cm in the head of the pancreas. Pathology was consistent with a group of markedly atypical cells strongly suspicious for adenocarcinoma. · 2/28/2018-CT pancreatic protocol showed a poorly defined area of decreased enhancement located in the head of the pancreas measuring 1.8 x 1.4 x 1.7 cm with moderate meditation of the pancreatic duct. Well defined sharply marginated low density nodule located in the tail of the pancreas measuring 1.5 x 1.3 x 1.5 cm (IPMN?). · 3/6/2018-CA 19-9 was elevated at 150. Repeat EGD was performed by Dr. Noah Mcmahan due to the inconclusive FNA resulted on 2/7/2018. Pathology FNA was consistent with pancreatic adenocarcinoma. · 3/12/2018-she was first seen by me. No evidence of distant disease. Referral to Surgical oncology. CT chest to complete staging. CA-19-9 430. · 3/16/2018-CT of the chest was unremarkable for intrathoracic metastatic disease   · Surgery consultation at [facility] March 2018- with Dr Homer Hawley. She was not a surgical candidate upfront. Recommended neoadjuvant chemotherapy. · 4/3/2018-started on neoadjuvant chemotherapy with FOLFORINOX. · 4/11/2018-she developed CBD obstruction had a CBD stent placed by Dr. Omar Nageotte. · 4/3/2018 through 6/4/2018 Neoadjuvant chemotherapy FOLFORINOX ×5 Biweekly cycles   · August 2018- XRT 30 Gy/Xeloda   · 08/30/3018- Whipple procedure at [facility] by Dr. Baylee Steele  · Recommended another 7 biweekly cycles of modified FOLFORINOX. · 9/5/2018-CT of the chest abdomen pelvis was unremarkable for metastatic disease. · 1/14/2019-CT abdomen and pelvis at [facility] showed no evidence of metastasis in the chest abdomen pelvis. · 5/21/2019-CT chest, abdomen, pelvis at [facility] showed no evidence of metastatic disease   · 5/21/20198320-WQ-83-9 = 42   · 06/12/2019- CA-19-9 = 154. Discussed with the patient. We'll also discuss with [facility]. · 7/1/2019-CT chest, abdomen, pelvis showed a soft tissue mass measuring 1.4 x 1.1 cm, not present on prior scan from January 2019, concerning for recurrence. Stable hypodense in the liver, too small to characterize. Subcentimeter ill-defined area of low attenuation liver may represent an early metastasis. · 7/3/2019-recommended palliative chemotherapy with nab paclitaxel 125mg/m² IV over 30 minutes on day 1 and gemcitabine 600 mg/m² IV over 10mg/m2/min (fixed dose rate) on day 1  · 7/2/2019-CA 19 9 = 114  · 7/6/2019- extended genetic panel negative for a deleterious mutation (invitae)  · 7/9/2019- CA-19-9 = 225  · 8/6/2019- CA-19-9 = 171  · 9/3/8583-AC-09-9 = 198   · 9/13/20194938-JI-31-9 = 98 at [facility]  · 9/13/2019-CT chest abdomen pelvis at [facility] showed a soft tissue thickening in the pancreatic bed with persistent narrowing of the portal SMV confluence.  Superimposed tumor remains a possibility.  The new low-density lesion in the periphery of the liver seen on the prior exam is no longer appreciated and likely represents treatment effect.  Nodular enhancement may represent perfusion change in the region on image 187. · 9/13/2018- she was recommended to continue current treatment with Gemzar/Abraxane  · 11/1/2019- she was hospitalized with GI bleed.  An upper endoscopy showed ulceration at the efferent loop of the Whipple's procedure  · 10/29/0491-NG-21-9 =317  · 11/21/2019-CA-19-9 = 183  · 11/19/2019- CT chest abdomen pelvis showed no evidence of gross disease progression. Improvement of the caliber of what may be a middle colic vein that drains back to the SMV.  There is still persistent narrowing of the of the upper SMV at the juncture with the portal vein.  No definite evidence of liver metastases at this time. No definite evidence of pulmonary metastases.  However, question of slight increase in prominence of subtle soft tissue thickening within the right paracolic gutter could be indicative of metastatic disease to peritoneum.   · 12/9/2019- colonoscopy by GI was unremarkable.  This was performed for complaints of maroon-colored stools. · 1/23/20203002-SI-67-9 = 520  · 1/28/2020- CT chest abdomen pelvis at [facility] showed progressive disease with recurrence at the retroperitoneal just inferior to the pancreaticojejunostomy with vascular involvement and complete occlusion of the portal vein and SMV resulting in worsening bowel edema and developing ascites. This is consistent with disease progression. · 3/4/2020- 4th line therapy Irinotecan liposome 70 mg/m² with leucovorin 400 mg/m² and 5-FU 2400 mg/m² over 46 hours every 2 weeks. · 5/12/202 Ct Chest W Contrast  No acute findings in the chest.  Slight increase in size of LEFT supraclavicular lymph nodes and subcarinal mediastinal lymph node. Recommend special attention on follow-up imaging to evaluate for stability or resolution. Ct Abdomen Pelvis W Iv Contrast   Postoperative change of Whipple with residual stranding and free fluid in the surgical bed. Residual soft tissue nodular prominence in the retroperitoneum adjacent to the portal vein which is compressed and occluded/thrombosed. The splenic vein is not visualized and presumed thrombosed. LEFT upper quadrant perigastric/periesophageal varices. Moderate volume ascites. 4.  No evidence of bowel obstruction. Mild distention of the stomach, which may be secondary to slow flow through the gastrojejunostomy. Correlate clinically. There is a small linear device in the distal esophagus which could represent a hemostasis clip but recommend clinical correlation.    · 5/13/2020- CA19.9-129  · 6/1/2020- CA 19.9- 523  · 6/15/2020- CA 19.9- 383  · 6/21/2020- Ct Head Wo Contrast No acute intracranial process. Findings in agreement with the emergent findings from the initial [facility] preliminary report. · 6/21/2020- Ct Abdomen Pelvis W Contrast Prior Whipple procedure with expected pneumobilia. Large volume ascites for patient size. 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 Diffuse wall thickening along the small and large bowel secondary to this venous congestion. No evidence for arterial ischemia, as the bowel mucosa appears to enhance normally. No evidence of viscus perforation or peritoneal abscess. Of note, the the retroperitoneal structures are quite decompressed as a result of the ascites, with near complete attenuation of the IVC in the mid abdomen. Unsure if this is contributing to any lower extremity edema. If evidence of multiorgan dysfunction, abdominal compartment syndrome could also be considered. The results of this exam were discussed with Dr. Fortino Nance on 6/21/2020 at 1002 hours. In particular, I wanted to address if there was indication/need for therapeutic paracentesis. This is under consideration. · 6/21/2020- US Guided Paracentesis Ultrasound-guided abdominal paracentesis performed for therapeutic purposes. A 60 cc aspirate was set aside for lab evaluation, if desired. The patient tolerated the proceed well. Cytology was negative for malignant cells. · 7/6/2020 CA19.9- 483. · 8/17/2020 UB60.0-786  · 8/21/2020 CT Chest/Abdomen/Pelvis- No acute intrathoracic abnormality. Hiatal hernia containing ascitic fluid. Questionable wall thickening of the distal esophagus. No pathologic intrathoracic or axillary lymphadenopathy. Stable subcentimeter left supraclavicular/lower jugular chain and subcarinal lymph nodes compared to 5/12/2020. Previous Whipple procedure with associated pneumobilia.  Stable diffuse prominence of the intrapancreatic duct within the residual pancreatic body and tail with no discrete pancreatic mass or convincing evidence of solid organ metastasis. Small volume of ascites. Ultrasound-guided paracentesis performed prior to this exam. 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 Multiple collateral vascular structures described above. Diffuse soft tissue anasarca. Trace left pleural effusion. Wall thickening of nondilated small bowel loops and rectosigmoid colon may relate to hypoproteinemia and third spacing of fluid. Nonspecific inflammatory infectious enterocolitis is a second possibility. Moderate size hiatal hernia. · 8/21/2020 Us Guided Paracentesis-Ultrasound-guided abdominal paracentesis performed for diagnostic and therapeutic purposes. The patient tolerated the proceed well. · 8/24/2020- FOLFOX palliative treatment. · 10/5/2020- CA-19-9 = 1250. · 11/2/20 CA 19-9 3550  · 11/9/20 Ct Chest W Contrast No evidence of metastatic disease in the chest. See separately dictated CT abdomen and pelvis of the same day regarding soft tissue of the proximal pancreas. · 11/9/20 Ct Abdomen Pelvis W Iv Contrast Postsurgical changes of the distal stomach, duodenum and the common bile duct. Ill-defined lobulated soft tissue mass in the area of the head of the pancreas and in the distal part of the gastric remnant may represent postsurgical changes or a mass/recurrent mass. Persistent dilatation of the pancreatic duct in the body and tail of the pancreas. The distal pancreatic duct is not visualized are evaluated (due to prior surgery). Evidence of intrahepatic biliary dilatation and pneumobilia similar to the previous study. Persistent moderate dilatation and thickening of the wall of the descending and transverse duodenum may represent postsurgical changes/edema due to protein losing enteropathy. Moderate amount of abdominal and pelvic ascites which has somewhat improved since the previous study.  Persistent thrombosis of the distal superior mesenteric vein and the splenic vein, similar to the previous study. The Marked dilatation and enlargement of the pelvic veins and both ovarian veins, left larger than the right. No thrombosis.    · 2020-CA-19-9 =50791    Past Medical History:    Past Medical History:   Diagnosis Date    Acute pancreatitis     Adult BMI <19 kg/sq m     Anemia     Cat esophagus     Biliary obstruction     GERD (gastroesophageal reflux disease)     with Barretts    Hashimoto's thyroiditis     denies takes no med for    Heart murmur     Hypertension     pt denies nor longer takes med for    Low blood sugar     h/o when overweight in the past    MVP (mitral valve prolapse)     Myalgia     Palliative care patient 2020    Pancreatic adenocarcinoma ([facility] [address] 2018    Dr Dmitry Houser    Pancreatic cancer ([facility] [address] 3/30/2018    Right flank pain     RUQ pain        Past Surgical History:    Past Surgical History:   Procedure Laterality Date    CARDIAC CATHETERIZATION      heart cath     SECTION      x 3    CHOLECYSTECTOMY  1997    COLONOSCOPY  years ago    [facility]-Dr Rubie Mcardle per patient    COLONOSCOPY  2014    Dr Reddy Garvin- [place] recall    COLONOSCOPY  03/10/2016    Dr Mcfarland-10 yr recall    COLONOSCOPY N/A 2019    Dr Leopoldo Nims, 5 yr recall    ELBOW SURGERY Right     ENDOMETRIAL ABLATION      HAND SURGERY Right     HERNIA REPAIR      hiatal    HERNIA REPAIR      umbilical    HERNIA REPAIR      PANCREAS SURGERY  2018    Whipple procedure-Dr Rose Marie Tabares [place] EGD [facility] NEEDLE ASPIR/BIOP ALTERED ANATOMY N/A 2018    Dr Bethany Snyder w/fna-Dilation of main pancreatic duct with diffuse change in the pancreatitis noted, area of concern in the neck of the pancreas-strongly suspicious for adenocarcinoma     [place] EGD INTRMURAL NEEDLE ASPIR/BIOP ALTERED ANATOMY N/A 3/6/2018    Dr KVNG Duncan-Pancreatic cancer-Ductal adenocarcinoma-staged [id number] by EUS, pancreatic pseudocyst in the tail the pancreas    [place] ERCP DX COLLECTION SPECIMEN BRUSHING/WASHING N/A 4/11/2018    Dr KVNG Duncan-w/placement of a self-expanding fully covered 10 mm biliary stent    UPPER GASTROINTESTINAL ENDOSCOPY  years ago    [facility]-Dr Mahendra Martínez    UPPER GASTROINTESTINAL ENDOSCOPY  2013    Zuniga    UPPER GASTROINTESTINAL ENDOSCOPY N/A 11/1/2019    Dr Arlyn Murray post Whipple's procedure with efferent loop ulceration    UPPER GASTROINTESTINAL ENDOSCOPY  12/17/2019    Dr Luisa Duncan-Patent G-J Anastomosis, apparent healing ulceration    UPPER GASTROINTESTINAL ENDOSCOPY N/A 2/25/2020    Dr Merlin Mcgowan amount of food retention in the stomach with bile, hiatal hernia, will need repeat    UPPER GASTROINTESTINAL ENDOSCOPY N/A 2/27/2020    Dr Karla Katz erosion/ulceration at the suture line, post whipple surgical changes    UPPER GASTROINTESTINAL ENDOSCOPY N/A 3/9/2020    Dr Karla Katz erosion along the previous whipple suture line    UPPER GASTROINTESTINAL ENDOSCOPY N/A 4/16/2020    Dr KVNG Atkinson/hemostatic clip placement x 1-Allie Peres tear at [facility]-Likely culprit lesion for current presentation    UPPER GASTROINTESTINAL ENDOSCOPY N/A 6/22/2020    Dr Karla Katz erosion along the previous whipple suture line       Social History:    Social History     Socioeconomic History    Marital status:      Spouse name: Not on file    Number of children: 3    Years of education: Not on file    Highest education level: Not on file   Occupational History    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[place] [address] Occupation: [employer]    Occupation: [employer]   Social Needs    Financial resource strain: Not on file    Food insecurity     Worry: Not on file     Inability: Not on file   [employer] needs     Medical: Not on file     Non-medical: Not on file   Tobacco Use    Smoking status: Former Smoker     Packs/day: 0.50     Years: 10.00     Pack years: 5.00     Types: Cigarettes     Last attempt to quit: 11/1/2019 Years since quittin.0    Smokeless tobacco: Never Used   Substance and Sexual Activity    Alcohol use: Not Currently    Drug use: Never    Sexual activity: Not on file     Comment: 3   Lifestyle    Physical activity     Days per week: Not on file     Minutes per session: Not on file    Stress: Not on file   Relationships    Social connections     Talks on phone: Not on file     Gets together: Not on file     Attends Sikh service: Not on file     Active member of club or organization: Not on file     Attends meetings of clubs or organizations: Not on file     Relationship status: Not on file    Intimate partner violence     Fear of current or ex partner: Not on file     Emotionally abused: Not on file     Physically abused: Not on file     Forced sexual activity: Not on file   Other Topics Concern    Not on file   Social History Narrative    CODE STATUS: Full Code    HEALTH CARE PROXY: her daughter, Mrs. Genevieve Tran, of [facility]    AMBULATES: independently    DOMICILED: lives in a private home, without steps inside, lives alone, has 2 dogs, no steps in to home     Family History:   Family History   Problem Relation Age of Onset    Heart Attack Mother 46        x 2-had bypass    Hypertension Mother     COPD Father     Liver Cancer Paternal Aunt     Lung Cancer Paternal Aunt     Breast Cancer Maternal Aunt         but  of brain cancer    Diabetes Maternal Uncle     Diabetes Maternal Grandmother     Colon Cancer Neg Hx     Colon Polyps Neg Hx     Esophageal Cancer Neg Hx     Rectal Cancer Neg Hx     Stomach Cancer Neg Hx        Current Hospital Medications:    Current Facility-Administered Medications: sodium chloride flush 0.9 % injection 10 mL, 10 mL, Intravenous, 2 times per day  sodium chloride flush 0.9 % injection 10 mL, 10 mL, Intravenous, PRN  [DISCONTINUED] promethazine (PHENERGAN) tablet 12.5 mg, 12.5 mg, Oral, Q6H PRN **OR** ondansetron (ZOFRAN) injection 4 mg, 4 mg, Intravenous, Q6H PRN  0.9 % sodium chloride infusion, , Intravenous, Continuous  gabapentin (NEURONTIN) capsule 100 mg, 100 mg, Oral, TID  lidocaine (LMX) 4 % cream, , Topical, PRN  promethazine (PHENERGAN) injection 25 mg, 25 mg, Intravenous, Q6H PRN  piperacillin-tazobactam (ZOSYN) 3.375 g in dextrose 5 % 50 mL IVPB extended infusion (mini-bag), 3.375 g, Intravenous, Q8H  pantoprazole (PROTONIX) injection 40 mg, 40 mg, Intravenous, BID **AND** sodium chloride (PF) 0.9 % injection 10 mL, 10 mL, Intravenous, BID  fentaNYL (SUBLIMAZE) injection 12.5 mcg, 12.5 mcg, Intravenous, Q2H PRN    Allergies:    Allergies   Allergen Reactions    Codeine Shortness Of Breath, Swelling and Rash    Morphine Other (See Comments)     Other reaction(s): abd pain  Severe abd. pain    Morphine And Related Nausea Only    Sulfa Antibiotics Shortness Of Breath and Hives     SOB, rash and itching    Neomycin-Bacitracin Zn-Polymyx Rash    Clarithromycin     Dilaudid [Hydromorphone Hcl] Other (See Comments)     \"completely shut me down\"    Hydromorphone     Loperamide Hcl Other (See Comments)     severe abd. pain    Loperamide Hcl Hives     severe abd. pain    Neosporin [Neomycin-Polymyx-Gramicid] Other (See Comments)     Causes boil         Subjective   REVIEW OF SYSTEMS:   CONSTITUTIONAL: no fever, no night sweats, generalized weakness, weight loss, malaise  HEENT: no blurring of vision, no double vision, no hearing difficulty, no tinnitus, no ulceration, no dysplasia, no epistaxis;  LUNGS: no cough, no hemoptysis, no wheeze,  no shortness of breath;  CARDIOVASCULAR: no palpitation, no chest pain, no shortness of breath;  GI: Epigastric abdominal pain, nausea, no vomiting, no diarrhea, no constipation;  VIKRAM: no dysuria, no hematuria, no frequency or urgency, no nephrolithiasis;  MUSCULOSKELETAL: no joint pain, no swelling, no stiffness;  ENDOCRINE: no polyuria, no polydipsia, no cold or heat intolerance;  HEMATOLOGY: no easy bruising or bleeding, no history of clotting disorder;  DERMATOLOGY: no skin rash, no eczema, no pruritus;  PSYCHIATRY: no depression, no anxiety, no panic attacks, no suicidal ideation, no homicidal ideation;  NEUROLOGY: no syncope, no seizures, no numbness or tingling of hands, no numbness or tingling of feet, no paresis;    Objective   /69   Pulse 78   Temp 97.4 °F (36.3 °C) (Temporal)   Resp 18   Ht 5' 7\" (1.702 m)   Wt 102 lb 9.6 oz (46.5 kg)   SpO2 97%   BMI 16.07 kg/m²     PHYSICAL EXAM:  CONSTITUTIONAL: Alert, appropriate, no acute distress, malnourished, frail-appearing, cachectic  EYES: Non icteric, EOM intact, pupils equal round   ENT: Mucus membranes moist, no oral pharyngeal lesions, external inspection of ears and nose are normal  NECK: Supple, no masses. No palpable thyroid mass  CHEST/LUNGS: CTA bilaterally, normal respiratory effort   CARDIOVASCULAR: RRR, no murmurs. No lower extremity edema  ABDOMEN: Tenderness epigastric area, active bowel sounds, no HSM. No palpable masses  EXTREMITIES: warm, full ROM in all 4 extremities, no focal weakness. SKIN: warm, dry with no rashes or lesions  LYMPH: No cervical, clavicular, axillary, or inguinal lymphadenopathy  NEUROLOGIC: follows commands, non focal   PSYCH: mood and affect appropriate.   Alert and oriented to time, place, person        LABORATORY RESULTS REVIEWED BY ME:  Recent Labs     12/01/20  0245 11/30/20  0932 11/16/20  1058   WBC 14.5* 18.46* 2.87*   HGB 8.6* 9.5* 9.4*   HCT 24.9* 26.3* 28.0*   MCV 85.6 83.8 89.2    211 69*       Lab Results   Component Value Date     (L) 12/01/2020    K 3.8 12/01/2020    CL 97 (L) 12/01/2020    CO2 24 12/01/2020    BUN 6 12/01/2020    CREATININE 0.2 (L) 12/01/2020    GLUCOSE 90 12/01/2020    CALCIUM 8.3 (L) 12/01/2020    PROT 6.1 (L) 12/01/2020    LABALBU 2.5 (L) 12/01/2020    BILITOT 11.7 (H) 12/01/2020    ALKPHOS 713 (H) 12/01/2020    AST 45 (H) 12/01/2020    ALT 34 (H) 12/01/2020 LABGLOM >60 12/01/2020    GFRAA >59 12/01/2020    AGRATIO 2.3 (H) 11/21/2019    GLOB 4.1 11/30/2020       Lab Results   Component Value Date    INR 1.35 (H) 12/01/2020    INR 1.12 11/13/2020    INR 1.18 10/06/2020    PROTIME 16.7 (H) 12/01/2020    PROTIME 14.4 11/13/2020    PROTIME 15.0 (H) 10/06/2020       RADIOLOGY STUDIES REPORT/REVIEWED AND INTERPRETED BY ME:  Ct Chest W Contrast    Result Date: 11/9/2020  EXAM: CT CHEST W CONTRAST -- 11/9/2020 1:12 PM HISTORY: 64 years, Female, malignant neoplasm of the pancreas COMPARISON: 8/21/2020 DLP: 417 mGy cm. Automated exposure control was utilized to minimize patient radiation dose. TECHNIQUE: Enhanced  CT images of the chest obtained with multiplanar reformats. FINDINGS: AIRWAYS/PULMONARY PARENCHYMA: The central airways are midline and patent. No focal pulmonary consolidation. No pleural effusion. No pneumothorax. VASCULATURE: Thoracic aorta is normal in course and caliber. Four-vessel aortic arch. Normal pulmonary artery caliber. No large central pulmonary artery filling defect on this non-CTA exam. CARDIAC:  Cardiac size is normal.  No pericardial effusion. Scattered coronary artery calcifications. MEDIASTINUM: There is no mediastinal or hilar lymphadenopathy by CT size criteria. Esophagus has normal coarse, caliber and wall thickness. EXTRATHORACIC: The visualized portions of the thyroid gland are unremarkable. No thoracic inlet or axillary adenopathy. Left chest port with catheter tip in the lower SVC. INCLUDED UPPER ABDOMEN: Heterogeneous appearance of the liver. Bile duct dilation. Ascites. Varices. Adrenal glands appear within normal limits. See separately dictated CT abdomen and pelvis regarding soft tissue at the proximal pancreas. OSSEOUS: There are mild degenerative changes of the visualized spine. No acute or suspicious bony finding.      1. No evidence of metastatic disease in the chest. 2. See separately dictated CT abdomen and pelvis of the same day regarding soft tissue of the proximal pancreas. Signed by Dr Felicity Cox on 11/9/2020 2:52 PM    Ct Abdomen Pelvis W Iv Contrast Additional Contrast? Oral    Result Date: 11/30/2020  EXAMINATION: CT abdomen and pelvis with contrast 11/30/2020 HISTORY: Obstructive jaundice. Pancreatic cancer FINDINGS: Today's exam is compared to previous study of 11/9/2020. Multiple contiguous axial images are obtained from the lung bases to the pubic symphysis per protocol following both IV and oral contrast administration. Reformatted images are obtained in the sagittal and coronal projections from the original data set. Arterial and portal venous phase imaging is obtained through the liver. Lung bases are clear. No evidence of cardiomegaly or pericardial effusion. There is moderate intrahepatic biliary dilatation. This shows some progression from the previous study of 11/9/2020. No evidence of pneumobilia on today's exam. The patient is status post cholecystectomy. No discrete hepatic mass is identified. There is normal enhancement of the hepatic veins. The common duct is noted to be nonvisualized just below the kathy hepatis suggesting stenosis or obstruction. 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 The SMV and splenic vein are thrombosed just proximal to forming the portal vein. The patient is status post Whipple procedure. There is again some thickening at the level of the patient's distal gastric antrectomy. There is irregular soft tissue density associated with the region of the pancreatic head and uncinate process and extending to involve the proximal body of the pancreas. The neoplasm is best demonstrated on axial image 31 of series 4. Also image 47 of series 3. There is an irregular soft tissue mass which extends to insinuate itself between the SMA and aorta and also involves the region of the uncinate process and head of the pancreas.  This lesion measures approximate 4.4 cm in transverse dimension by 1.6 cm in anterior to posterior dimension although there is a component of the neoplasm which extends anteriorly between 2 surgical clips near the distal antrectomy site. This is almost contiguous with the thickening noted of the distal gastric antrum. This neoplasm is intimately associated with the proximal aspect of the celiac axis with some encasement of the proximal segment of the splenic artery and common hepatic proper on the previous exam which is not demonstrated on the current study. The size of the neoplasm appears stable from the previous exam. There is some mild irregular thickening and stranding extending into Morison space and the subhepatic space. This is stable from the previous exam. There is a large amount of stool throughout the colon. This results in some stasis with mild fluid distention of several loops of small bowel with scattered air-fluid levels. No evidence of mechanical obstruction. There is some free fluid within the pelvis and paracolic gutters similar in volume and distribution to the previous study. No discrete bony lesions are demonstrated. 1.. The patient is status post Whipple procedure. There is some mild thickening noted at the level of the distal gastrectomy. The gastrojejunostomy is patent. 2. There is evidence of a neoplasm involving the region of the pancreatic head. The pancreatic head likely was resected at the time of the Whipple procedure and this may represent localized recurrence. 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 The SMV and splenic vein just proximal to the confluence to form the portal vein are also occluded.  There is some involvement with the celiac axis which appears to show slight improvement as there did appear to be some circumferential narrowing of the proximal segment of the splenic and common hepatic artery on the malignancy    #Physical deconditioning-secondary to malignancy and weight loss    #Hyponatremia-sodium levels 130. #Neutrophil leukocytosis-likely reactive    #Cancer related pain-fentanyl as needed    #Vitamin D deficiency-consider replacement as per hospitalist.      PLAN:  Awaiting further GI recommendations  Await palliative care consult  Continue as needed pain medication. May benefit from long-acting opioid. Continue to monitor CBC  Continue to monitor sodium levels  Continue IV fluid replacement    I have seen, examined and reviewed this patient medication list, appropriate labs and imaging studies. I reviewed relevant medical records and others physicians notes. I discussed the plans of care with the patient. I answered all the questions to the patients satisfaction.    (Please note that portions of this note were completed with a voice recognition program. Efforts were made to edit the dictations but occasionally words are mis-transcribed.)    Leopold Georgis, MD    12/01/20  5:51 AM

## 2020-12-01 NOTE — CONSULTS
Comprehensive Nutrition Assessment    Type and Reason for Visit:  Initial, Positive Nutrition Screen, Consult    Nutrition Recommendations/Plan: Ensure Enlive BID. Magic Cup BID. Avoid highly acidic foods as reported by pt. Update preferences. Nutrition Assessment:  Pt is Severely Malnourished AEB wt loss, severe fat loss, & severe muscle loss. Pt preferences obtained. Will modify ONS orders to promote intake and wt gain. Malnutrition Assessment:  Malnutrition Status:  Severe malnutrition    Context:  Chronic Illness     Findings of the 6 clinical characteristics of malnutrition:  Energy Intake:  Unable to assess  Weight Loss:  7 - 5% over 1 month     Body Fat Loss:  7 - Severe body fat loss Triceps, Fat Overlying Ribs   Muscle Mass Loss:  7 - Severe muscle mass loss Clavicles (pectoralis & deltoids), Thigh (quadraceps)  Fluid Accumulation:  No significant fluid accumulation     Strength:  Not Performed    Estimated Daily Nutrient Needs:  Energy (kcal):  8531-5762 kcals/day; Weight Used for Energy Requirements:  Current(30-35)     Protein (g):  55-69 g/PRO/day; Weight Used for Protein Requirements:  Current(1.2-1.5)        Fluid (ml/day):  0893-9011 mL/day; Method Used for Fluid Requirements:  1 ml/kcal      Current Nutrition Therapies:    Dietary Nutrition Supplements: Standard High Calorie Oral Supplement  DIET GENERAL;     Anthropometric Measures:  · Height: 5' 7\" (170.2 cm)  · Current Body Weight: 102 lb (46.3 kg)    · Ideal Body Weight: 135 lbs  · BMI: 16    · BMI Categories: Underweight (BMI less than 22) age over 72       Nutrition Diagnosis:   · Severe malnutrition related to catabolic illness as evidenced by weight loss, severe loss of subcutaneous fat, severe muscle loss    Nutrition Interventions:   Food and/or Nutrient Delivery:  Continue Current Diet, Modify Oral Nutrition Supplement  Coordination of Nutrition Care:  Continue to monitor while inpatient    Goals:  Pt will consume 50% or more of meals/supplements and have steady wt gain of 1-3#/wk       Nutrition Monitoring and Evaluation:   Food/Nutrient Intake Outcomes:  Food and Nutrient Intake, Supplement Intake  Physical Signs/Symptoms Outcomes:  Biochemical Data, Nutrition Focused Physical Findings, Weight      Electronically signed by Todd Quinteros MS, RD, LD on 12/1/20 at 1:55 PM CST    Contact: [phone]

## 2020-12-02 PROBLEM — Z51.5 PALLIATIVE CARE PATIENT: Status: ACTIVE | Noted: 2020-05-13

## 2020-12-02 LAB
ALBUMIN SERPL-MCNC: 2.2 G/DL (ref 3.5–5.2)
ALP BLD-CCNC: 621 U/L (ref 35–104)
ALT SERPL-CCNC: 26 U/L (ref 5–33)
ANION GAP SERPL CALCULATED.3IONS-SCNC: 7 MMOL/L (ref 7–19)
ANISOCYTOSIS: ABNORMAL
AST SERPL-CCNC: 40 U/L (ref 5–32)
BASOPHILS ABSOLUTE: 0 K/UL (ref 0–0.2)
BASOPHILS RELATIVE PERCENT: 0.2 % (ref 0–1)
BILIRUB SERPL-MCNC: 12.8 MG/DL (ref 0.2–1.2)
BUN BLDV-MCNC: 6 MG/DL (ref 6–20)
CALCIUM SERPL-MCNC: 8 MG/DL (ref 8.6–10)
CHLORIDE BLD-SCNC: 96 MMOL/L (ref 98–111)
CO2: 24 MMOL/L (ref 22–29)
CREAT SERPL-MCNC: 0.5 MG/DL (ref 0.5–0.9)
EOSINOPHILS ABSOLUTE: 0 K/UL (ref 0–0.6)
EOSINOPHILS RELATIVE PERCENT: 0.2 % (ref 0–5)
GFR AFRICAN AMERICAN: >59
GFR NON-AFRICAN AMERICAN: >60
GLUCOSE BLD-MCNC: 100 MG/DL (ref 74–109)
HCT VFR BLD CALC: 22.5 % (ref 37–47)
HEMOGLOBIN: 7.8 G/DL (ref 12–16)
IMMATURE GRANULOCYTES #: 0.1 K/UL
LYMPHOCYTES ABSOLUTE: 0.6 K/UL (ref 1.1–4.5)
LYMPHOCYTES RELATIVE PERCENT: 5.2 % (ref 20–40)
MCH RBC QN AUTO: 30 PG (ref 27–31)
MCHC RBC AUTO-ENTMCNC: 34.7 G/DL (ref 33–37)
MCV RBC AUTO: 86.5 FL (ref 81–99)
MONOCYTES ABSOLUTE: 1.1 K/UL (ref 0–0.9)
MONOCYTES RELATIVE PERCENT: 8.8 % (ref 0–10)
NEUTROPHILS ABSOLUTE: 10.2 K/UL (ref 1.5–7.5)
NEUTROPHILS RELATIVE PERCENT: 84.7 % (ref 50–65)
OVALOCYTES: ABNORMAL
PDW BLD-RTO: 22.1 % (ref 11.5–14.5)
PLATELET # BLD: 160 K/UL (ref 130–400)
PLATELET SLIDE REVIEW: ADEQUATE
PMV BLD AUTO: 10.9 FL (ref 9.4–12.3)
POTASSIUM REFLEX MAGNESIUM: 4 MMOL/L (ref 3.5–5)
RBC # BLD: 2.6 M/UL (ref 4.2–5.4)
SODIUM BLD-SCNC: 127 MMOL/L (ref 136–145)
TARGET CELLS: ABNORMAL
TOTAL PROTEIN: 5.8 G/DL (ref 6.6–8.7)
WBC # BLD: 12 K/UL (ref 4.8–10.8)

## 2020-12-02 PROCEDURE — 1210000000 HC MED SURG R&B

## 2020-12-02 PROCEDURE — 85025 COMPLETE CBC W/AUTO DIFF WBC: CPT

## 2020-12-02 PROCEDURE — 80053 COMPREHEN METABOLIC PANEL: CPT

## 2020-12-02 PROCEDURE — 99232 SBSQ HOSP IP/OBS MODERATE 35: CPT | Performed by: INTERNAL MEDICINE

## 2020-12-02 PROCEDURE — 2580000003 HC RX 258: Performed by: PHYSICIAN ASSISTANT

## 2020-12-02 PROCEDURE — 6360000002 HC RX W HCPCS: Performed by: INTERNAL MEDICINE

## 2020-12-02 PROCEDURE — C9113 INJ PANTOPRAZOLE SODIUM, VIA: HCPCS | Performed by: PHYSICIAN ASSISTANT

## 2020-12-02 PROCEDURE — 6370000000 HC RX 637 (ALT 250 FOR IP): Performed by: INTERNAL MEDICINE

## 2020-12-02 PROCEDURE — 99232 SBSQ HOSP IP/OBS MODERATE 35: CPT | Performed by: NURSE PRACTITIONER

## 2020-12-02 PROCEDURE — 6360000002 HC RX W HCPCS: Performed by: PHYSICIAN ASSISTANT

## 2020-12-02 RX ADMIN — PANTOPRAZOLE SODIUM 40 MG: 40 INJECTION, POWDER, FOR SOLUTION INTRAVENOUS at 08:14

## 2020-12-02 RX ADMIN — GABAPENTIN 100 MG: 100 CAPSULE ORAL at 15:32

## 2020-12-02 RX ADMIN — PIPERACILLIN AND TAZOBACTAM 3.38 G: 3; .375 INJECTION, POWDER, LYOPHILIZED, FOR SOLUTION INTRAVENOUS at 06:02

## 2020-12-02 RX ADMIN — PANTOPRAZOLE SODIUM 40 MG: 40 INJECTION, POWDER, FOR SOLUTION INTRAVENOUS at 19:59

## 2020-12-02 RX ADMIN — FENTANYL CITRATE 25 MCG: 0.05 INJECTION, SOLUTION INTRAMUSCULAR; INTRAVENOUS at 04:22

## 2020-12-02 RX ADMIN — GABAPENTIN 100 MG: 100 CAPSULE ORAL at 06:02

## 2020-12-02 RX ADMIN — FENTANYL CITRATE 25 MCG: 0.05 INJECTION, SOLUTION INTRAMUSCULAR; INTRAVENOUS at 00:25

## 2020-12-02 RX ADMIN — SODIUM CHLORIDE: 9 INJECTION, SOLUTION INTRAVENOUS at 04:21

## 2020-12-02 RX ADMIN — SODIUM CHLORIDE, PRESERVATIVE FREE 10 ML: 5 INJECTION INTRAVENOUS at 08:14

## 2020-12-02 RX ADMIN — SODIUM CHLORIDE, PRESERVATIVE FREE 10 ML: 5 INJECTION INTRAVENOUS at 04:22

## 2020-12-02 RX ADMIN — PIPERACILLIN AND TAZOBACTAM 3.38 G: 3; .375 INJECTION, POWDER, LYOPHILIZED, FOR SOLUTION INTRAVENOUS at 23:22

## 2020-12-02 RX ADMIN — FENTANYL CITRATE 25 MCG: 0.05 INJECTION, SOLUTION INTRAMUSCULAR; INTRAVENOUS at 20:00

## 2020-12-02 RX ADMIN — SODIUM CHLORIDE, PRESERVATIVE FREE 10 ML: 5 INJECTION INTRAVENOUS at 19:59

## 2020-12-02 RX ADMIN — SODIUM CHLORIDE, PRESERVATIVE FREE 10 ML: 5 INJECTION INTRAVENOUS at 20:06

## 2020-12-02 RX ADMIN — PIPERACILLIN AND TAZOBACTAM 3.38 G: 3; .375 INJECTION, POWDER, LYOPHILIZED, FOR SOLUTION INTRAVENOUS at 15:32

## 2020-12-02 RX ADMIN — GABAPENTIN 300 MG: 300 CAPSULE ORAL at 20:00

## 2020-12-02 RX ADMIN — FENTANYL CITRATE 25 MCG: 0.05 INJECTION, SOLUTION INTRAMUSCULAR; INTRAVENOUS at 15:32

## 2020-12-02 ASSESSMENT — PAIN DESCRIPTION - LOCATION
LOCATION: ABDOMEN

## 2020-12-02 ASSESSMENT — PAIN SCALES - GENERAL
PAINLEVEL_OUTOF10: 3
PAINLEVEL_OUTOF10: 7
PAINLEVEL_OUTOF10: 7
PAINLEVEL_OUTOF10: 10
PAINLEVEL_OUTOF10: 7

## 2020-12-02 ASSESSMENT — ENCOUNTER SYMPTOMS
ABDOMINAL PAIN: 1
SHORTNESS OF BREATH: 0
ABDOMINAL DISTENTION: 1
EYE ITCHING: 0
RESPIRATORY NEGATIVE: 1
EYES NEGATIVE: 1
COUGH: 0
NAUSEA: 1
EYE DISCHARGE: 0
SORE THROAT: 0
ROS SKIN COMMENTS: JAUNDICE
TROUBLE SWALLOWING: 0
COLOR CHANGE: 1

## 2020-12-02 ASSESSMENT — PAIN DESCRIPTION - ORIENTATION
ORIENTATION: MID
ORIENTATION: UPPER
ORIENTATION: MID

## 2020-12-02 ASSESSMENT — PAIN DESCRIPTION - PAIN TYPE
TYPE: ACUTE PAIN

## 2020-12-02 ASSESSMENT — PAIN DESCRIPTION - ONSET: ONSET: ON-GOING

## 2020-12-02 ASSESSMENT — PAIN - FUNCTIONAL ASSESSMENT: PAIN_FUNCTIONAL_ASSESSMENT: ACTIVITIES ARE NOT PREVENTED

## 2020-12-02 ASSESSMENT — PAIN DESCRIPTION - DIRECTION: RADIATING_TOWARDS: LOWER ABD

## 2020-12-02 ASSESSMENT — PAIN DESCRIPTION - PROGRESSION: CLINICAL_PROGRESSION: GRADUALLY IMPROVING

## 2020-12-02 ASSESSMENT — PAIN DESCRIPTION - DESCRIPTORS: DESCRIPTORS: SHARP;STABBING

## 2020-12-02 ASSESSMENT — PAIN DESCRIPTION - FREQUENCY: FREQUENCY: INTERMITTENT

## 2020-12-02 NOTE — DISCHARGE SUMMARY
Burnie Lefort  :  1960  MRN:  [id number]    Admit date:  2020  Discharge date:  2020    Discharging Physician:  Naila Stewart MD    Advance Directive: Full Code    Consults: Dr. Fernando Duncan-Gastroenterology     Primary Care Physician:  Bhavana Landin MD    Discharge Diagnoses:    Principal Problem:    Obstructive jaundice  Active Problems:    Malignant neoplasm of pancreas ([facility]    Mixed hyperlipidemia    Severe protein-calorie malnutrition ([facility]    Normocytic anemia    Leukocytosis    Severe malnutrition ([facility]  Resolved Problems:    * No resolved hospital problems. *    Portions of this note have been copied forward, however, changed to reflect the most current clinical status of this patient. Hospital Course: The patient is a 63 y. o. female with PMH metastatic pancreatic cancer, GI bleeding 2/2 erosion at previous Whipple anastomotic site, anemia, GERD, thyroiditis, ascites with as needed therapeutic paracentesis who presented to [facility] as a direct admission due to concerns for obstructive jaundice with elevated LFT's, hyperbilirubinemia. She states that she noticed worsening right upper quadrant abdominal pain associated with nausea on Friday, 2020 that has been progressively worsening. She denies vomiting or diarrhea. States a family member noticed yellowing of her eyes and skin which is new. Also notes her urine appears tea colored. Denies fever, body aches or chills. States she is chronically constipated and this is unchanged. Patient was admitted to Hospitalist service with consult made to GI as well as Hospice Care. ERCP w/ stent placement unable to be provided at this time with history of Whipple after discussion with GI. Patient has agreed to IR drain placement. Dr. Soto Lindo has contacted Dr. Joel Epley at [facility] who has agreed to see patient upon transfer and attempt biliary drain with stenting if possible. At the time of this summary patient is stable for discharge home.  She was also seen by Hospice care this admission who made recommendations for fentanyl patch 25 mcg and continue fentanyl 25 mcg IV every 2 hours as needed further stating she may be a good candidate for addition of methadone. No changes made to home medications at this time given need for transfer to OSH. Current medication list provided below. She has been continued on IV Zosyn for leukocytosis which has improved. COVID-19 negative prior to transfer. Significant Diagnostic Studies:   Ct Abdomen Pelvis W Iv Contrast Additional Contrast? Oral  1.. The patient is status post Whipple procedure. There is some mild thickening noted at the level of the distal gastrectomy. The gastrojejunostomy is patent. 2. There is evidence of a neoplasm involving the region of the pancreatic head. The pancreatic head likely was resected at the time of the Whipple procedure and this may represent localized recurrence. 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 The SMV and splenic vein just proximal to the confluence to form the portal vein are also occluded. There is some involvement with the celiac axis which appears to show slight improvement as there did appear to be some circumferential narrowing of the proximal segment of the splenic and common hepatic artery on the previous exam which I do not see on the current study. Overall I feel the mass is stable in size from the previous study. There is some progressive biliary dilatation with what appears to be abrupt occlusion of the common duct just below the kathy hepatis. Previously noted pneumobilia is no longer visualized. This would suggest there has been some progressive occlusion/stenosis of the common duct. 3. Moderate constipation. There is some stasis of the small bowel with mild fluid distention with a few air-fluid levels. There is free fluid in the paracolic gutters and pelvis.  No evidence of 0.9 % injection 10 mL  10 mL Intravenous 2 times per day Velvet Broad, PA-C   10 mL at 12/01/20 6469    sodium chloride flush 0.9 % injection 10 mL  10 mL Intravenous PRN Velvet Broad, PA-C        ondansetron [facility]) injection 4 mg  4 mg Intravenous Q6H PRN Velvet Broad, PA-C   4 mg at 12/01/20 0859    0.9 % sodium chloride infusion   Intravenous Continuous Velvet Broad, PA-C 125 mL/hr at 11/30/20 1918      lidocaine (LMX) 4 % cream   Topical PRN Velvet Broad, PA-C        promethazine (PHENERGAN) injection 25 mg  25 mg Intravenous Q6H PRN Velvet Broad, PA-C        piperacillin-tazobactam (ZOSYN) 3.375 g in dextrose 5 % 50 mL IVPB extended infusion (mini-bag)  3.375 g Intravenous Q8H Velvet Broad, PA-C 12.5 mL/hr at 12/01/20 1619 3.375 g at 12/01/20 1619    pantoprazole (PROTONIX) injection 40 mg  40 mg Intravenous BID Velvet Broad, PA-C   40 mg at 12/01/20 9806    And    sodium chloride (PF) 0.9 % injection 10 mL  10 mL Intravenous BID Velvet Broad, PA-C   10 mL at 12/01/20 5617     Discharge Instructions: Follow up with admitting provider upon arrival.  Take medications as directed. Resume activity as tolerated. Diet: DIET GENERAL;  Dietary Nutrition Supplements: Standard High Calorie Oral Supplement  Dietary Nutrition Supplements: Frozen Oral Supplement     Disposition: Patient is medically stable and will be discharged to [facility].    Time spent on discharge 50 minutes spent in assessing patient, reviewing medications, discussion with nursing, confirming safe discharge plan and preparation of discharge summary.     Signed:  Janine Medeiros PA-C

## 2020-12-02 NOTE — PROGRESS NOTES
Palliative Care Progress Note  12/2/2020 3:07 PM  Subjective:   Admit Date: 11/30/2020  PCP: Deshawn Wang MD    Chief Complaint: Abdominal Pain    Interval History: No acute overnight events. She continues to be followed by GI, referral made to OSH for percutaneous drain/stenting, has been accepted and is awaiting available bed. Oncology following, no further options for tx. She continues on IV Zosyn for leukocytosis, improved. Fentanyl patch started yesterday, pain improved. Continued discussions regarding goals. Portions of this note have been copied forward, however, changed to reflect the most current clinical status of this patient. Review of Systems   Constitutional: Positive for activity change, fatigue and unexpected weight change. Generalized weakness   HENT: Negative. Eyes: Negative. Respiratory: Negative. Cardiovascular: Negative. Gastrointestinal: Positive for abdominal distention, abdominal pain and nausea. Genitourinary: Negative. Musculoskeletal: Negative. Skin: Positive for color change. Neurological: Negative. Hematological: Negative. Psychiatric/Behavioral: Negative.            DIET GENERAL;  Dietary Nutrition Supplements: Standard High Calorie Oral Supplement  Dietary Nutrition Supplements: Frozen Oral Supplement    Medications:   sodium chloride 125 mL/hr at 12/02/20 0421     Current Facility-Administered Medications   Medication Dose Route Frequency Provider Last Rate Last Dose    fentaNYL (SUBLIMAZE) injection 25 mcg  25 mcg Intravenous Q2H PRN Arlene Henry MD   25 mcg at 12/02/20 0422    fentaNYL (DURAGESIC) 25 MCG/HR 1 patch  1 patch Transdermal Q72H Arlene Henry MD   1 patch at 12/01/20 1345    gabapentin (NEURONTIN) capsule 100 mg  100 mg Oral BID Arlene Henry MD   100 mg at 12/02/20 0602    gabapentin (NEURONTIN) capsule 300 mg  300 mg Oral Nightly Arlene Henry MD   300 mg at 12/01/20 2142    sodium chloride flush 0.9 % injection 10 mL 10 mL Intravenous 2 times per day Taryn Sharpe PA-C   10 mL at 12/01/20 2142    sodium chloride flush 0.9 % injection 10 mL  10 mL Intravenous PRN Taryn Sharpe PA-C   10 mL at 12/02/20 0422    ondansetron (ZOFRAN) injection 4 mg  4 mg Intravenous Q6H PRN Taryn Sharpe PA-C   4 mg at 12/01/20 0859    0.9 % sodium chloride infusion   Intravenous Continuous Taryn Sharpe PA-C 125 mL/hr at 12/02/20 0421      lidocaine (LMX) 4 % cream   Topical PRN Taryn Sharpe PA-C        promethazine (PHENERGAN) injection 25 mg  25 mg Intravenous Q6H PRN Taryn Sharpe PA-C        piperacillin-tazobactam (ZOSYN) 3.375 g in dextrose 5 % 50 mL IVPB extended infusion (mini-bag)  3.375 g Intravenous Q8H Taryn Sharpe PA-C   Stopped at 12/02/20 1015    pantoprazole (PROTONIX) injection 40 mg  40 mg Intravenous BID Taryn Sharpe PA-C   40 mg at 12/02/20 6088    And    sodium chloride (PF) 0.9 % injection 10 mL  10 mL Intravenous BID Taryn Sharpe PA-C   10 mL at 12/02/20 5205        Labs:     Recent Labs     11/30/20  0932 12/01/20  0245 12/02/20 0425   WBC 18.46* 14.5* 12.0*   RBC 3.14* 2.91* 2.60*   HGB 9.5* 8.6* 7.8*   HCT 26.3* 24.9* 22.5*   MCV 83.8 85.6 86.5   MCH 30.3 29.6 30.0   MCHC 36.1* 34.5 34.7    182 160     Recent Labs     11/30/20  0932 12/01/20  0245 12/02/20  0425   * 130* 127*   K 4.0 3.8 4.0   ANIONGAP 9 9 7   CL 99 97* 96*   CO2 27 24 24   BUN 9 6 6   CREATININE 0.5 0.2* 0.5   GLUCOSE 129* 90 100   CALCIUM 8.3* 8.3* 8.0*     No results for input(s): MG, PHOS in the last 72 hours. Recent Labs     11/30/20  0932 12/01/20  0245 12/02/20  0425   AST 87* 45* 40*   ALT 57* 34* 26   BILITOT 12.0* 11.7* 12.8*   ALKPHOS 1,023* 713* 621*     ABGs:No results for input(s): PHART, QBP4ZYE, PO2ART, WGN0EFW, BEART, HGBAE, R0ENSBUL, CARBOXHGBART, 02THERAPY in the last 72 hours. HgBA1c: No results for input(s): LABA1C in the last 72 hours.   FLP:  No results found for: TRIG, HDL, LDLCALC, LDLDIRECT, LABVLDL  TSH:    Lab Results   Component Value Date    TSH 0.470 12/01/2020     Troponin T: No results for input(s): TROPONINI in the last 72 hours. INR:   Recent Labs     12/01/20  0245   INR 1.35*       Objective:   Vitals: /76   Pulse 81   Temp 98.6 °F (37 °C) (Temporal)   Resp 16   Ht 5' 7\" (1.702 m)   Wt 102 lb 14.4 oz (46.7 kg)   SpO2 97%   BMI 16.12 kg/m²   24HR INTAKE/OUTPUT:      Intake/Output Summary (Last 24 hours) at 12/2/2020 1507  Last data filed at 12/2/2020 8831  Gross per 24 hour   Intake 1474 ml   Output --   Net 1474 ml     Physical Exam      General appearance: alert and cooperative with exam,appears ill, cachectic  HEENT: atraumatic, eyes with scleral icterus, conjunctiva and normal lids, pupils and irises normal, external ears and nose are normal, lips normal.  Neck: without masses, lymphadenopathy, supple  Lungs: no increased work of breathing, clear to auscultation bilaterally  Heart: regular rate and rhythm and S1, S2 normal  Abdomen: distended, bowel sounds present, RUQ pain  Genitourinary: No bladder fullness, masses, or tenderness  Extremities: extremities normal,atraumatic, no cyanosis or edema, muscle wasting  Neurologic: No focal neurologic deficits, normal sensation, no tremor, alert and oriented   Psychiatric: Alert and oriented, no recent or remote memory deficits, good judgement, mood and affect appropriate  Skin: warm, dry, jaundiced      Assessment and Plan:   Principal Problem:    Obstructive jaundice  Active Problems:    Malignant neoplasm of pancreas (HCC)    Mixed hyperlipidemia    Severe protein-calorie malnutrition (HCC)    Normocytic anemia    Leukocytosis    Severe malnutrition (HCC)    Palliative care patient  Resolved Problems:    * No resolved hospital problems. *        Visit Summary:  I saw the patient at the bedside with no family present. She is sitting up in bed, alert, hopeful for transfer to OSH soon.   We discussed her current status in regards to her pancreatic cancer and patient appears to be coming to terms with her prognosis. She states that \"God has given me some good years on this earth but knew that it would come to an end at some time and I guess it is now. \"  She states that she understands that referral to OSH for drain/stent may provide some pain relief, possibly prolong her life for a small amount of time. Patient has a goal of seeing her grandchild being born with a due date of January 17. Currently, she denies nausea but does have some RUQ abdominal discomfort, I have asked nursing for as needed fentanyl to assist with pain. She does feel that pain is improved since the initiation of fentanyl patch. Patient does state that PRN fentanyl makes her sleepy and we talked about the need to balance alertness and pain control and how that changes throughout disease progression. We also discussed information from her visit with Dr. Rudolph Mooney, use of methadone and provided education regarding that should she transition to hospice care. Patient states that she plans to speak with her oldest daughter this evening and discuss important life decisions. Emotional support was provided and I offered to answer any questions that may help, patient with no questions at this time and will make decisions after speaking with her family. Palliative care will continue to follow. Recommendations:   1. GOC is to pursue IR perc drain/stent at OSH. Pt plans to discuss options for goals/life decisions with her daughter later this evening. Patient is aware of option to pursue hospice but wants to discuss with family first.     2. Continue Fentanyl 25 mcg patch. Continue to assess use of PRN IV fentanyl for frequency and possible need for increase in patch. Patient currently reports good pain control. Thank you for consulting Palliative Care and allowing us to participate in the care of this patient.        Electronically signed by Lius Ortega Adan Simpler on 12/2/2020 at 3:07 PM    (Please note that portions of this note were completed with a voice recognition program.  Siva Keller made to edit the dictations but occasionally words are mis-transcribed.)

## 2020-12-02 NOTE — PROGRESS NOTES
care this admission who made recommendations for fentanyl patch 25 mcg and continue fentanyl 25 mcg IV every 2 hours as needed further stating she may be a good candidate for addition of methadone. No changes made to home medications at this time given need for transfer to OSH. Current medication list provided below. She has been continued on IV Zosyn for leukocytosis which has improved. COVID-19 negative prior to transfer. Review of Systems:   14 point review of systems is negative except as specifically addressed above. Objective:   VITALS:  /76   Pulse 81   Temp 98.6 °F (37 °C) (Temporal)   Resp 16   Ht 5' 7\" (1.702 m)   Wt 102 lb 14.4 oz (46.7 kg)   SpO2 97%   BMI 16.12 kg/m²   24HR INTAKE/OUTPUT:      Intake/Output Summary (Last 24 hours) at 12/2/2020 1532  Last data filed at 12/2/2020 7473  Gross per 24 hour   Intake 1474 ml   Output --   Net 1474 ml     General appearance: 64year old female no acute distress, ill appearing, sitting on edge of bed without difficulty    Head: Normocephalic, without obvious abnormality, atraumatic  Eyes: conjunctivae/corneas clear. PERRL, EOM's intact. Scleral icterus   Ears: normal external ears and nose, throat without exudate  Neck: no adenopathy, no carotid bruit, no JVD, supple, symmetrical, trachea midline   Lungs: clear throughout,no rales or wheezes   Heart: RRR with occasional extrasystole, S1, S2 normal, systolic murmur  Abdomen:soft, there is mild/moderate epigastric tenderness; non-distended, normal bowel sounds no masses, no organomegaly  Extremities: No lower extremity edema,  No erythema, no tenderness to palpation  Skin: Worsening jaundice, texture, turgor normal. No rashes or lesions  Lymphatic: No palpable lymph node enlargment  Neurologic: Alert and oriented X 3, normal strength and tone.  No focal deficits  Psychiatric: Alert and oriented, thought content appropriate, normal insight, mood appropriate    Medications:      sodium chloride 125 mL/hr at 12/02/20 0421      fentaNYL  1 patch Transdermal Q72H    gabapentin  100 mg Oral BID    gabapentin  300 mg Oral Nightly    sodium chloride flush  10 mL Intravenous 2 times per day    piperacillin-tazobactam  3.375 g Intravenous Q8H    pantoprazole  40 mg Intravenous BID    And    sodium chloride (PF)  10 mL Intravenous BID     fentanNYL, sodium chloride flush, [DISCONTINUED] promethazine **OR** ondansetron, lidocaine, promethazine  DIET GENERAL;  Dietary Nutrition Supplements: Standard High Calorie Oral Supplement  Dietary Nutrition Supplements: Frozen Oral Supplement     Lab and other Data:     Recent Labs     11/30/20  0932 12/01/20  0245 12/02/20  0425   WBC 18.46* 14.5* 12.0*   HGB 9.5* 8.6* 7.8*    182 160     Recent Labs     11/30/20  0932 12/01/20  0245 12/02/20  0425   * 130* 127*   K 4.0 3.8 4.0   CL 99 97* 96*   CO2 27 24 24   BUN 9 6 6   CREATININE 0.5 0.2* 0.5   GLUCOSE 129* 90 100     Recent Labs     11/30/20  0932 12/01/20  0245 12/02/20  0425   AST 87* 45* 40*   ALT 57* 34* 26   BILITOT 12.0* 11.7* 12.8*   ALKPHOS 1,023* 713* 621*     INR:   Recent Labs     12/01/20 0245   INR 1.35*     RAD:   Ct Abdomen Pelvis W Iv Contrast Additional Contrast? Oral  1.. The patient is status post Whipple procedure. There is some mild thickening noted at the level of the distal gastrectomy. The gastrojejunostomy is patent. 2. There is evidence of a neoplasm involving the region of the pancreatic head. The pancreatic head likely was resected at the time of the Whipple procedure and this may represent localized recurrence. 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 The SMV and splenic vein just proximal to the confluence to form the portal vein are also occluded.  There is some involvement with the celiac axis which appears to show slight improvement as there did appear to be some circumferential narrowing of the proximal segment of the splenic and common hepatic artery on the previous exam which I do not see on the current study. Overall I feel the mass is stable in size from the previous study. There is some progressive biliary dilatation with what appears to be abrupt occlusion of the common duct just below the kathy hepatis. Previously noted pneumobilia is no longer visualized. This would suggest there has been some progressive occlusion/stenosis of the common duct. 3. Moderate constipation. There is some stasis of the small bowel with mild fluid distention with a few air-fluid levels. There is free fluid in the paracolic gutters and pelvis. No evidence of pneumoperitoneum or mechanical bowel obstruction. Signed by Dr Leanne Traylor on 11/30/2020 10:09 PM    Mri Abdomen W Wo Contrast Mrcp  1. Marked biliary ductal dilatation related to apparent tumor recurrence in the pancreas head region as described above and also detailed on the CT examination performed today. Signed by Dr Polo Canela on 12/1/2020 7:40 AM    Assessment/Plan   Principal Problem:    Obstructive jaundice-2/2 tumor recurrence and occlusion of CBD. Transfer initiated to tertiary facility for IR to attempt drain/stent placement. Awaiting bed offer. Hospice following, fentanyl ordered which has provided pain relief      Active Problems:    Recurrent pancreatic adenocarcinoma-followed closely as OP by Oncology/Gastroenterology       Malignant ascites-noted, serial abdominal exams, order therapeutic paracentesis if needed, no distention noted on today's exam        Mixed hyperlipidemia-noted, does not take statins for this       Severe protein-calorie malnutrition (HCC)-dietary following       Normocytic anemia-worsening, transfuse if <7.0       Leukocytosis-continued improvement      Hyponatremia, new problem-stop IVF's, monitor    Plan to transfer once bed offer made.      Antibiotic: Zosyn     DVT Prophylaxis: SCD    GI prophylaxis: Protonix     Mazin [place] Elsie Rose PA-C

## 2020-12-02 NOTE — PROGRESS NOTES
Progress Note    Patient name: Marlene Gardiner  Patient : 1960  MR #[id number]  Room: 510    Subjective: abdominal pain, persistent     HISTORY OF PRESENT ILLNESS:  The patient is a very pleasant 64years old female who has an unfortunate diagnosis of pancreatic cancer initially diagnosed in 2018. She underwent neoadjuvant chemotherapy followed by Whipple procedure at [facility]. She received additional adjuvant chemotherapy after her surgery. Unfortunately, she had disease progression and was therefore started again on palliative chemotherapy. She has received several lines of therapy. Over the last few weeks and months she has been steadily declining. She has had several hospitalizations for GI bleed. She presented to clinic yesterday for consideration of further palliative treatment. We have discussed in the past hospice care but she has declined. She was found to be jaundiced yesterday. His CMP revealed a bilirubin of 12. She had complaints of epigastric pain and discomfort. She had some nausea. CBC showed a hemoglobin 8.6, WBC elevated 14.5 and platelet counts 387,499. CMP showed hyponatremia with sodium levels 130, decreased total protein and albumin, elevated alkaline phosphatase and total bilirubin 12.0. A CT of the abdomen was performed and showed a pancreatic head mass likely representing local recurrence and common bile duct dilatation. She was admitted to the hospital service.   GI was consulted.        ONCOLOGIC HISTORY:   Diagnosis  · Pancreatic adenocarcinoma, 2018   · [id number]  · 2019-extended genetic panel-negative for a deleterious mutation     Treatment summary  · 2018-Surgical consultation at [address] [place] [address] Road operable   · 4/3/2018 through 2018 Neoadjuvant chemotherapy FOLFORINOX ×5 Biweekly cycles   · 2018-Biliary stent   · 2018- XRT 30 Gy/Xeloda   · 3018- Whipple procedure @ [facility]   · Recommended another 5 biweekly cycles of modified FOLFORINOX completed 12/26/2018   · 7/9/2019- 1/22/2020 Gemzar/Abraxane 125 mg/m2 q 14 days, discontinued due to progression of disease  · 3/4/2020- Irinotecan liposome 70 mg/m² with leucovorin 400 mg/m² and 5-FU 2400 mg/m² over 46 hours every 2 weeks.     Tumor marker  · 3/12/7405-ZJ-42-9->430->370. · 4/30/2018 - CA 19- 9 of 157   · 5/25/20181517-GH-92-9->32 after 4 cycles. · 08/02/2018- -> 8   · 10/01/2018- CA 19-9 -5   · 10/29/2018-CA 19-9- 7   · 12/3/6015-OG-58-9-7   · 04/11/2019-CA-19-9- 12   · 05/21/2019- CA 19-9- 41   · 7/2/2019-CA 19 9 = 114  · 7/9/2019- CA-19-9 = 225  · 8/6/2019- CA-19-9 = 171  · 9/3/9615-PX-72-9 = 198  · 9/13/20193817-RU-20-9 = 98 (at  [address])  · 10/29/0686-PV-86-9 =317  · 11/21/2019-CA-19-9 = 183  · 1/23/20203570-PQ-16-9 = 520  · 4/22/2020- CA-19-9 = 940  · 5/13/20204041-JB-67-9 = 129  · 6/1/20200251-DX-44-9 = 523??  · 7/6/2020- CA 19-9- 483  · 8/17/20205477-VZ-41-9 = 785  · 10/5/20207833-LT-65-9 = 1250  · 11/30/2020-CA 19-9 = 27141        Cancer history  Ms Taina Ramirez was seen in initial oncology consultation on 3/12/2018 referred by Dr. María Ortega for a diagnosis of pancreatic adenocarcinoma. She was initially evaluated for acute pancreatitis at [facility] on February 2018. Further imaging revealed a pancreatic head mass. Lipase was elevated at 1184 and CA-19-9 elevated 134. The symptoms were going on for several weeks. Weight loss of 10-12 pounds over the last 6 months. · October 2017-CT abdomen without contrast was unremarkable. · 2/2/2018-ultrasound of abdomen showed a pancreatic duct dilation   · 2/02/2018-CT abdomen showed 16 mm low-density lesion in the pancreas at the junction of the head of the body. No intra-abdominal adenopathy. No liver metastasis.    · 2/7/2018-the patient underwent EGD/EUS and FNA biopsy of a pancreatic mass by Dr. María Ortega at [facility] . EUS showed inflammatory changes consistent with a recent history of pancreatitis. Main pancreatic duct was dilated. No concerning peripancreatic adenopathy. No definite mass was seen by a more diffuse hypoechoic area measuring 1.8 x 2.2 cm in the head of the pancreas. Pathology was consistent with a group of markedly atypical cells strongly suspicious for adenocarcinoma. · 2/28/2018-CT pancreatic protocol showed a poorly defined area of decreased enhancement located in the head of the pancreas measuring 1.8 x 1.4 x 1.7 cm with moderate meditation of the pancreatic duct. Well defined sharply marginated low density nodule located in the tail of the pancreas measuring 1.5 x 1.3 x 1.5 cm (IPMN?). · 3/6/2018-CA 19-9 was elevated at 150. Repeat EGD was performed by Dr. Luke Vora due to the inconclusive FNA resulted on 2/7/2018. Pathology FNA was consistent with pancreatic adenocarcinoma. · 3/12/2018-she was first seen by me. No evidence of distant disease. Referral to Surgical oncology. CT chest to complete staging. CA-19-9 430. · 3/16/2018-CT of the chest was unremarkable for intrathoracic metastatic disease   · Surgery consultation at [facility] March 2018- with Dr Janine Aguilar. She was not a surgical candidate upfront. Recommended neoadjuvant chemotherapy. · 4/3/2018-started on neoadjuvant chemotherapy with FOLFORINOX. · 4/11/2018-she developed CBD obstruction had a CBD stent placed by Dr. Libby Pop. · 4/3/2018 through 6/4/2018 Neoadjuvant chemotherapy FOLFORINOX ×5 Biweekly cycles   · August 2018- XRT 30 Gy/Xeloda   · 08/30/3018- Whipple procedure at [facility] by Dr. Trammell Figures  · Recommended another 7 biweekly cycles of modified FOLFORINOX. · 9/5/2018-CT of the chest abdomen pelvis was unremarkable for metastatic disease. · 1/14/2019-CT abdomen and pelvis at [facility] showed no evidence of metastasis in the chest abdomen pelvis. · 5/21/2019-CT chest, abdomen, pelvis at [address] showed no evidence of metastatic disease   · 5/21/20196853-UE-02-9 = 42 · 06/12/2019- CA-19-9 = 154. Discussed with the patient. We'll also discuss with M.D. [facility]. · 7/1/2019-CT chest, abdomen, pelvis showed a soft tissue mass measuring 1.4 x 1.1 cm, not present on prior scan from January 2019, concerning for recurrence. Stable hypodense in the liver, too small to characterize. Subcentimeter ill-defined area of low attenuation liver may represent an early metastasis. · 7/3/2019-recommended palliative chemotherapy with nab paclitaxel 125mg/m² IV over 30 minutes on day 1 and gemcitabine 600 mg/m² IV over 10mg/m2/min (fixed dose rate) on day 1  · 7/2/2019-CA 19 9 = 114  · 7/6/2019- extended genetic panel negative for a deleterious mutation (invitae)  · 7/9/2019- CA-19-9 = 225  · 8/6/2019- CA-19-9 = 171  · 9/3/8283-GR-36-9 = 198   · 9/13/20196037-UL-29-9 = 98 at [facility]  · 9/13/2019-CT chest abdomen pelvis at [facility] showed a soft tissue thickening in the pancreatic bed with persistent narrowing of the portal SMV confluence.  Superimposed tumor remains a possibility.  The new low-density lesion in the periphery of the liver seen on the prior exam is no longer appreciated and likely represents treatment effect.  Nodular enhancement may represent perfusion change in the region on image 187. · 9/13/2018- she was recommended to continue current treatment with Gemzar/Abraxane  · 11/1/2019- she was hospitalized with GI bleed.  An upper endoscopy showed ulceration at the efferent loop of the Whipple's procedure  · 10/29/9386-RL-27-9 =317  · 11/21/2019-CA-19-9 = 183  · 11/19/2019- CT chest abdomen pelvis showed no evidence of gross disease progression. Improvement of the caliber of what may be a middle colic vein that drains back to the SMV. There is still persistent narrowing of the of the upper SMV at the juncture with the portal vein.  No definite evidence of liver metastases at this time.  No definite evidence of pulmonary metastases.  However, question of slight increase in · 6/21/2020- Ct Abdomen Pelvis W Contrast Prior Whipple procedure with expected pneumobilia. Large volume ascites for patient size. 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 Diffuse wall thickening along the small and large bowel secondary to this venous congestion. No evidence for arterial ischemia, as the bowel mucosa appears to enhance normally. No evidence of viscus perforation or peritoneal abscess. Of note, the the retroperitoneal structures are quite decompressed as a result of the ascites, with near complete attenuation of the IVC in the mid abdomen. Unsure if this is contributing to any lower extremity edema. If evidence of multiorgan dysfunction, abdominal compartment syndrome could also be considered. The results of this exam were discussed with Dr. Saadia Godinez on 6/21/2020 at 1002 hours. In particular, I wanted to address if there was indication/need for therapeutic paracentesis. This is under consideration. · 6/21/2020- US Guided Paracentesis Ultrasound-guided abdominal paracentesis performed for therapeutic purposes. A 60 cc aspirate was set aside for lab evaluation, if desired. The patient tolerated the proceed well.  Cytology was negative for malignant cells. · 7/6/2020 CA19.9- 483. · 8/17/2020 CA19.9-785  · 8/21/2020 CT Chest/Abdomen/Pelvis- No acute intrathoracic abnormality.  Hiatal hernia containing ascitic fluid. Questionable wall thickening of the distal esophagus.  No pathologic intrathoracic or axillary lymphadenopathy. Stable subcentimeter left supraclavicular/lower jugular chain and subcarinal lymph nodes compared to 5/12/2020.  Previous Whipple procedure with associated pneumobilia. Stable diffuse prominence of the intrapancreatic duct within the residual pancreatic body and tail with no discrete pancreatic mass or convincing evidence of solid organ metastasis.  Small volume of ascites.  Ultrasound-guided paracentesis performed prior to this exam. 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 Multiple collateral vascular structures described above.  Diffuse soft tissue anasarca. Trace left pleural effusion. Wall thickening of nondilated small bowel loops and rectosigmoid colon may relate to hypoproteinemia and third spacing of fluid. Nonspecific inflammatory infectious enterocolitis is a second possibility. Moderate size hiatal hernia. · 8/21/2020 Us Guided Paracentesis-Ultrasound-guided abdominal paracentesis performed for diagnostic and therapeutic purposes. The patient tolerated the proceed well. · 8/24/2020- FOLFOX palliative treatment. · 10/5/2020- CA-19-9 = 1250. · 11/2/20 CA 19-9 3550  · 11/9/20 Ct Chest W Contrast No evidence of metastatic disease in the chest. See separately dictated CT abdomen and pelvis of the same day regarding soft tissue of the proximal pancreas.   · 11/9/20 Ct Abdomen Pelvis W Iv Contrast Postsurgical changes of the distal stomach, duodenum and the common bile duct. Ill-defined lobulated soft tissue mass in the area of the head of the pancreas and in the distal part of the gastric remnant may represent postsurgical changes or a mass/recurrent mass. Persistent dilatation of the pancreatic duct in the body and tail of the pancreas. The distal pancreatic duct is not visualized are evaluated (due to prior surgery). Evidence of intrahepatic biliary dilatation and pneumobilia similar to the previous study. Persistent moderate dilatation and thickening of the wall of the descending and transverse duodenum may represent postsurgical changes/edema due to protein losing enteropathy. Moderate amount of abdominal and pelvic ascites which has somewhat improved since the previous study. Persistent thrombosis of the distal superior mesenteric vein and the splenic vein, similar to the previous study.  The Marked dilatation and enlargement of the pelvic veins and both ovarian veins, left larger than the right. No thrombosis. · 11/30/2020-CA-19-9 =83258    Subjective   REVIEW OF SYSTEMS:   Review of Systems   Constitutional: Positive for fatigue. HENT: Negative for congestion, sore throat and trouble swallowing. Eyes: Negative for discharge and itching. Respiratory: Negative for cough and shortness of breath. Cardiovascular: Negative for chest pain and leg swelling. Gastrointestinal: Positive for abdominal pain. Endocrine: Negative for polydipsia and polyphagia. Genitourinary: Negative for difficulty urinating and hematuria. Musculoskeletal: Positive for arthralgias. Skin:        jaundice   Neurological: Negative for speech difficulty and headaches. Hematological: Negative for adenopathy. Does not bruise/bleed easily. Psychiatric/Behavioral: Negative for behavioral problems and confusion. Objective   PHYSICAL EXAM:  Physical Exam  Vitals signs reviewed. Constitutional:       General: She is not in acute distress. Appearance: She is well-developed. She is ill-appearing. She is not diaphoretic. HENT:      Head: Normocephalic and atraumatic. Right Ear: External ear normal.      Left Ear: External ear normal.      Nose: Nose normal.      Mouth/Throat:      Mouth: Mucous membranes are moist.   Eyes:      General: Scleral icterus present. Right eye: No discharge. Left eye: No discharge. Conjunctiva/sclera: Conjunctivae normal.   Neck:      Musculoskeletal: Neck supple. Trachea: No tracheal deviation. Cardiovascular:      Rate and Rhythm: Normal rate. Rhythm irregular. Heart sounds: Murmur present. Pulmonary:      Effort: Pulmonary effort is normal. No respiratory distress. Breath sounds: Normal breath sounds. No wheezing or rales. Abdominal:      General: Bowel sounds are normal. There is no distension. Palpations: Abdomen is soft. Tenderness: There is abdominal tenderness. There is no guarding.    Genitourinary:     Comments: Exam deferred  Musculoskeletal:         General: No tenderness or deformity. Comments: Normal ROM all four extremities. Muscle wasting    Lymphadenopathy:      Cervical: No cervical adenopathy. Right cervical: No superficial cervical adenopathy. Left cervical: No superficial cervical adenopathy. Upper Body:      Right upper body: No supraclavicular adenopathy. Left upper body: No supraclavicular adenopathy. Comments: No bulky palpable cervical, clavicular, axillary or inguinal adenopathies on the left or right. Skin:     General: Skin is warm and dry. Coloration: Skin is jaundiced. Findings: No rash. Neurological:      Mental Status: She is alert and oriented to person, place, and time. Comments: follows commands, non-focal   Psychiatric:         Behavior: Behavior normal. Behavior is cooperative. Thought Content: Thought content normal.         Judgment: Judgment normal.      Comments: Alert and oriented to person, place and time.          Vital Signs  /71   Pulse 72   Temp 98.3 °F (36.8 °C) (Temporal)   Resp 16   Ht 5' 7\" (1.702 m)   Wt 102 lb 14.4 oz (46.7 kg)   SpO2 98%   BMI 16.12 kg/m²     Intake/Output Summary (Last 24 hours) at 12/2/2020 0654  Last data filed at 12/2/2020 0346  Gross per 24 hour   Intake 1594 ml   Output --   Net 1594 ml       Labs:  CBC:   Recent Labs     11/30/20  0932 12/01/20  0245 12/02/20  0425   WBC 18.46* 14.5* 12.0*   HGB 9.5* 8.6* 7.8*    182 160     CMP:   Recent Labs     11/30/20  0932 12/01/20  0245 12/02/20  0425   GLUCOSE 129* 90 100   BUN 9 6 6   CREATININE 0.5 0.2*  --    CO2 27 24 24   CALCIUM 8.3* 8.3* 8.0*   ALKPHOS 1,023* 713* 621*   AST 87* 45* 40*   ALT 57* 34* 26     Hepatic:   Recent Labs     11/30/20  0932 12/01/20  0245 12/02/20  0425   AST 87* 45* 40*   ALT 57* 34* 26   BILITOT 12.0* 11.7* 12.8*   ALKPHOS 1,023* 713* 621*     Troponin: No results for input(s): TROPONINI in the last 72 hours.  BNP: No results for input(s): BNP in the last 72 hours. INR:   Recent Labs     12/01/20  0245   INR 1.35*     ABG: No results for input(s): PHART, UGM6TJH, PO2ART, DRU0RHF, E4ENGDUJ, BEART in the last 72 hours. 30 Day lookback of cultures:    Blood Culture Recent: No results for input(s): BC in the last 720 hours. Gram Stain Recent: No results for input(s): LABGRAM in the last 720 hours. Resp Culture Recent: No results for input(s): CULTRESP in the last 720 hours. Body Fluid Recent : No results for input(s): BFCX in the last 720 hours. MRSA Recent : No results for input(s): [address] in the last 720 hours. Urine Culture Recent : No results for input(s): LABURIN in the last 720 hours. Organism Recent : No results for input(s): ORG in the last 720 hours. ASSESSMENT/PLAN:    #Extrabiliary obstruction-secondary to recurrent pancreatic mass  CT abdomen-extrabiliary obstruction secondary to pancreatic head mass. Total bilirubin = 12->11.7->12.8  Alk phos-1023->713->621  Evaluated by GI, plans to transfer to [facility] for attempt biliary drain +/- stenting if possible     #Recurrent pancreatic cancer-very poor prognosis. Ms. Melanie Orta has progressed on several prior lines of therapy. Performance status declined. Palliative care consulted to discuss goals of care. At this point, I believe that the patient is not a candidate for further anticancer therapy. We have discussed hospice in the past but she had declined.     #Severe protein calorie malnutrition severe secondary to no volitional weight loss/pancreatic malignancy according to GLIM criteria. In addition, loss of significant muscle mass-creatinine 0.2, albumin 2.5.     Dietitian following     #Normocytic anemia-like multifactorial secondary to chemotherapy, malignancy, prior GI bleed  Hgb 7.8  She has received several transfusions in the past.  No active bleeding.     #Poor performance status     #Poor prognosis-secondary to advanced malignancy     #Physical deconditioning-secondary to malignancy and weight loss     #Hyponatremia-sodium levels 127     #Neutrophil leukocytosis-likely reactive, WBC 12.0 - improved     #Cancer related pain-fentanyl as needed  Fentanyl patch 25 mcg/hr initiated 12/1/2020     #Vitamin D deficiency-consider replacement as per hospitalist.        PLAN:  plans to transfer to [facility] for attempt biliary drain +/- stenting if possible - agree  palliative care evaluated  Lengthy discussion with patient and her daughter per Dr. Shital Smith. Hospice recommended following palliative biliary drain  Fentanyl Patch 25 mcg  Continue to monitor CBC  Continue to monitor sodium levels  Continue IV fluid replacement  Supportive care    Librado Apley, JORDYN  12/02/20  6:54 AM    Physician's attestation/substantial contribution:  I, Dr Vnai Martinez, independently performed an evaluation on Eduarda Kaplan. I have reviewed relevant medical information/data to include but not limited to medication list, relevant appropriate labs and imaging when applicable. I reviewed other physician's notes, ancillary services and nurses assessment. I have reviewed the above documentation completed by the Nurse Practitioner or Physician Assistant. Please see my additional contributions to the history of present illness, physical examination, and assessment/medical decision-making that reflect my findings and impressions. I discussed essential elements of the care plan with the NP or PA and the patient as well. I answered all the questions to the patient's satisfaction. I concur with above stated. Subjective-no new complaints. Daughter at bedside. Discussed with the patient about hospice. She is contemplative. Objective-cachectic appearing  Assessment/plan:  Advanced pancreatic cancer with recurrent disease  Bile duct obstruction-not amenable to ERCP/stent here.   She will be transferred to [facility] for consideration of percutaneous biliary catheter placement  Poor prognosis-unfortunately, patient is not a candidate for further anticancer therapy. Palliative care has been consulted and Dr. Annita Cogan following. I discussed the patient reiterated my recommendation regarding hospice. She is contemplative. Cancer related pain-Dr. Ko assisting.     Lois Grayson MD

## 2020-12-02 NOTE — PROGRESS NOTES
GI  - PROGRESS NOTE    Subjective:   Admit Date: 11/30/2020  PCP: Tiff Saleem MD    Patient being seen for: biliary obstruction, h/o pancreatic CA s/p WHipple in the past    24hr events/Interval History:   Still with some continued pain complaints, tolerating soft diet currently. Bili still elevated  Imaging reviewed. DIET GENERAL;  Dietary Nutrition Supplements: Standard High Calorie Oral Supplement  Dietary Nutrition Supplements: Frozen Oral Supplement     Tolerated                  Pain:Moderate  Nausea: Moderate      Medications:  Scheduled Meds:   fentaNYL  1 patch Transdermal Q72H    gabapentin  100 mg Oral BID    gabapentin  300 mg Oral Nightly    sodium chloride flush  10 mL Intravenous 2 times per day    piperacillin-tazobactam  3.375 g Intravenous Q8H    pantoprazole  40 mg Intravenous BID    And    sodium chloride (PF)  10 mL Intravenous BID       Continuous Infusions:      PRN Meds:.fentanNYL, sodium chloride flush, [DISCONTINUED] promethazine **OR** ondansetron, lidocaine, promethazine      Labs:     Recent Labs     11/30/20 0932 12/01/20 0245 12/02/20  0425   WBC 18.46* 14.5* 12.0*   RBC 3.14* 2.91* 2.60*   HGB 9.5* 8.6* 7.8*   HCT 26.3* 24.9* 22.5*   MCV 83.8 85.6 86.5   MCH 30.3 29.6 30.0   MCHC 36.1* 34.5 34.7    182 160     Recent Labs     11/30/20 0932 12/01/20 0245 12/02/20  0425   * 130* 127*   K 4.0 3.8 4.0   ANIONGAP 9 9 7   CL 99 97* 96*   CO2 27 24 24   BUN 9 6 6   CREATININE 0.5 0.2* 0.5   GLUCOSE 129* 90 100   CALCIUM 8.3* 8.3* 8.0*     No results for input(s): MG, PHOS in the last 72 hours.   Recent Labs     11/30/20 0932 12/01/20 0245 12/02/20  0425   AST 87* 45* 40*   ALT 57* 34* 26   BILITOT 12.0* 11.7* 12.8*   ALKPHOS 1,023* 713* 621*     HgBA1c:  No components found for: HGBA1C  FLP:  No results found for: TRIG, HDL, LDLCALC, LDLDIRECT, LABVLDL  TSH:    Lab Results   Component Value Date    TSH 0.470 12/01/2020     Troponin T: No results for input(s): TROPONINI in the last 72 hours. INR:   Recent Labs     12/01/20  0245   INR 1.35*       No results for input(s): LIPASE in the last 72 hours. -----------------------------------------------------------------  RAD:       Physical Exam:     Vitals:    12/02/20 0346 12/02/20 0643 12/02/20 1010 12/02/20 1443   BP:  116/71 115/71 124/76   Pulse:  72 73 81   Resp:  16 16 16   Temp:  98.3 °F (36.8 °C) 98.4 °F (36.9 °C) 98.6 °F (37 °C)   TempSrc:  Temporal Temporal Temporal   SpO2:  98% 96% 97%   Weight: 102 lb 14.4 oz (46.7 kg)      Height:         24HR INTAKE/OUTPUT:      Intake/Output Summary (Last 24 hours) at 12/2/2020 1609  Last data filed at 12/2/2020 1603  Gross per 24 hour   Intake 1954 ml   Output 575 ml   Net 1379 ml     General appearance: alert and cooperative with exam  Lungs: clear to auscultation bilaterally  Heart: regular rate and rhythm, S1, S2 normal, no murmur, click, rub or gallop  Abdomen: soft, non-tender; bowel sounds normal; no masses,  no organomegaly  Extremities: extremities normal, atraumatic, no cyanosis or edema  Neurologic: No obvious focal neurologic deficits. SKin - jaundice    Impression:       1. Recurrent Pancreatic Adenocarcinoma - s/p multiple lines of chemotherapy  2. Biliary obstruction - most likely 2/2 to above  3. S/p Whipple with surgical gastrojejunostomy formation -   4. Leukocytosis - on zosyn empirically  5. Malnutrition   6. Anemia      Plan:     - I had a long d/w patient today regarding both her goals of care as well as the indications and need for biliary drainage/stenting. I d/w her that unfortunately due to her post surgical anatomy, her anatomy is not conducive to an ERCP with stenting. To relieve her obstruction she undoubtedly require a percutaneous drain/stenting through Interventional radiology. I offered her this option (likely transfer to [facility]) vs palliative care/support only.  She reinforced that she has a few discrete events she is hoping to live long enough to see and does not want to die any quicker than absolutely necessary even though she is aware oncology has no further options in terms of chemotherapy or treatment. - Patient currently awaiting transfer to [place] for PTC/Biliary Drainage by interventional radiology. She has been accepted by physicians but unfortunately there is not bed availability currently. She is not EMERGENT at this time, so she is awaiting bed opening. If her condition were to change, then [facility] or Yess[facility] Jaime will need to be updated and called for more emergent acceptance and intervention.

## 2020-12-03 LAB
ALBUMIN SERPL-MCNC: 2.2 G/DL (ref 3.5–5.2)
ALP BLD-CCNC: 627 U/L (ref 35–104)
ALT SERPL-CCNC: 25 U/L (ref 5–33)
ANION GAP SERPL CALCULATED.3IONS-SCNC: 6 MMOL/L (ref 7–19)
AST SERPL-CCNC: 44 U/L (ref 5–32)
BASOPHILS ABSOLUTE: 0 K/UL (ref 0–0.2)
BASOPHILS RELATIVE PERCENT: 0.3 % (ref 0–1)
BILIRUB SERPL-MCNC: 13.6 MG/DL (ref 0.2–1.2)
BUN BLDV-MCNC: 7 MG/DL (ref 6–20)
CALCIUM SERPL-MCNC: 8 MG/DL (ref 8.6–10)
CHLORIDE BLD-SCNC: 99 MMOL/L (ref 98–111)
CO2: 26 MMOL/L (ref 22–29)
CREAT SERPL-MCNC: 0.2 MG/DL (ref 0.5–0.9)
EOSINOPHILS ABSOLUTE: 0 K/UL (ref 0–0.6)
EOSINOPHILS RELATIVE PERCENT: 0.2 % (ref 0–5)
GFR AFRICAN AMERICAN: >59
GFR NON-AFRICAN AMERICAN: >60
GLUCOSE BLD-MCNC: 117 MG/DL (ref 74–109)
HCT VFR BLD CALC: 20.9 % (ref 37–47)
HEMOGLOBIN: 7.3 G/DL (ref 12–16)
IMMATURE GRANULOCYTES #: 0.1 K/UL
LYMPHOCYTES ABSOLUTE: 0.5 K/UL (ref 1.1–4.5)
LYMPHOCYTES RELATIVE PERCENT: 5.6 % (ref 20–40)
MCH RBC QN AUTO: 30 PG (ref 27–31)
MCHC RBC AUTO-ENTMCNC: 34.9 G/DL (ref 33–37)
MCV RBC AUTO: 86 FL (ref 81–99)
MONOCYTES ABSOLUTE: 1 K/UL (ref 0–0.9)
MONOCYTES RELATIVE PERCENT: 10 % (ref 0–10)
NEUTROPHILS ABSOLUTE: 8.1 K/UL (ref 1.5–7.5)
NEUTROPHILS RELATIVE PERCENT: 83 % (ref 50–65)
PDW BLD-RTO: 22.3 % (ref 11.5–14.5)
PLATELET # BLD: 158 K/UL (ref 130–400)
PMV BLD AUTO: 11.7 FL (ref 9.4–12.3)
POTASSIUM REFLEX MAGNESIUM: 4 MMOL/L (ref 3.5–5)
RBC # BLD: 2.43 M/UL (ref 4.2–5.4)
SODIUM BLD-SCNC: 131 MMOL/L (ref 136–145)
TOTAL PROTEIN: 5.6 G/DL (ref 6.6–8.7)
WBC # BLD: 9.7 K/UL (ref 4.8–10.8)

## 2020-12-03 PROCEDURE — 6360000002 HC RX W HCPCS: Performed by: PHYSICIAN ASSISTANT

## 2020-12-03 PROCEDURE — 80053 COMPREHEN METABOLIC PANEL: CPT

## 2020-12-03 PROCEDURE — 85025 COMPLETE CBC W/AUTO DIFF WBC: CPT

## 2020-12-03 PROCEDURE — C9113 INJ PANTOPRAZOLE SODIUM, VIA: HCPCS | Performed by: PHYSICIAN ASSISTANT

## 2020-12-03 PROCEDURE — 6370000000 HC RX 637 (ALT 250 FOR IP): Performed by: INTERNAL MEDICINE

## 2020-12-03 PROCEDURE — 6360000002 HC RX W HCPCS: Performed by: INTERNAL MEDICINE

## 2020-12-03 PROCEDURE — 1210000000 HC MED SURG R&B

## 2020-12-03 PROCEDURE — 2580000003 HC RX 258: Performed by: PHYSICIAN ASSISTANT

## 2020-12-03 PROCEDURE — 99231 SBSQ HOSP IP/OBS SF/LOW 25: CPT | Performed by: INTERNAL MEDICINE

## 2020-12-03 RX ADMIN — PIPERACILLIN AND TAZOBACTAM 3.38 G: 3; .375 INJECTION, POWDER, LYOPHILIZED, FOR SOLUTION INTRAVENOUS at 09:53

## 2020-12-03 RX ADMIN — GABAPENTIN 100 MG: 100 CAPSULE ORAL at 14:59

## 2020-12-03 RX ADMIN — FENTANYL CITRATE 25 MCG: 0.05 INJECTION, SOLUTION INTRAMUSCULAR; INTRAVENOUS at 16:04

## 2020-12-03 RX ADMIN — SODIUM CHLORIDE, PRESERVATIVE FREE 10 ML: 5 INJECTION INTRAVENOUS at 09:54

## 2020-12-03 RX ADMIN — FENTANYL CITRATE 25 MCG: 0.05 INJECTION, SOLUTION INTRAMUSCULAR; INTRAVENOUS at 21:37

## 2020-12-03 RX ADMIN — ONDANSETRON HYDROCHLORIDE 4 MG: 2 SOLUTION INTRAMUSCULAR; INTRAVENOUS at 02:57

## 2020-12-03 RX ADMIN — FENTANYL CITRATE 25 MCG: 0.05 INJECTION, SOLUTION INTRAMUSCULAR; INTRAVENOUS at 02:57

## 2020-12-03 RX ADMIN — GABAPENTIN 100 MG: 100 CAPSULE ORAL at 09:54

## 2020-12-03 RX ADMIN — PANTOPRAZOLE SODIUM 40 MG: 40 INJECTION, POWDER, FOR SOLUTION INTRAVENOUS at 09:54

## 2020-12-03 RX ADMIN — PANTOPRAZOLE SODIUM 40 MG: 40 INJECTION, POWDER, FOR SOLUTION INTRAVENOUS at 21:37

## 2020-12-03 RX ADMIN — PIPERACILLIN AND TAZOBACTAM 3.38 G: 3; .375 INJECTION, POWDER, LYOPHILIZED, FOR SOLUTION INTRAVENOUS at 14:59

## 2020-12-03 RX ADMIN — GABAPENTIN 300 MG: 300 CAPSULE ORAL at 21:37

## 2020-12-03 RX ADMIN — SODIUM CHLORIDE, PRESERVATIVE FREE 10 ML: 5 INJECTION INTRAVENOUS at 21:38

## 2020-12-03 ASSESSMENT — PAIN DESCRIPTION - ONSET: ONSET: ON-GOING

## 2020-12-03 ASSESSMENT — ENCOUNTER SYMPTOMS
TROUBLE SWALLOWING: 0
COUGH: 0
EYE ITCHING: 0
ABDOMINAL PAIN: 1
EYE DISCHARGE: 0
ROS SKIN COMMENTS: JAUNDICE
SORE THROAT: 0
SHORTNESS OF BREATH: 0

## 2020-12-03 ASSESSMENT — PAIN - FUNCTIONAL ASSESSMENT: PAIN_FUNCTIONAL_ASSESSMENT: ACTIVITIES ARE NOT PREVENTED

## 2020-12-03 ASSESSMENT — PAIN DESCRIPTION - ORIENTATION: ORIENTATION: MID

## 2020-12-03 ASSESSMENT — PAIN DESCRIPTION - PROGRESSION: CLINICAL_PROGRESSION: GRADUALLY IMPROVING

## 2020-12-03 ASSESSMENT — PAIN DESCRIPTION - FREQUENCY: FREQUENCY: INTERMITTENT

## 2020-12-03 ASSESSMENT — PAIN SCALES - GENERAL
PAINLEVEL_OUTOF10: 9
PAINLEVEL_OUTOF10: 7
PAINLEVEL_OUTOF10: 7

## 2020-12-03 ASSESSMENT — PAIN DESCRIPTION - LOCATION: LOCATION: ABDOMEN

## 2020-12-03 ASSESSMENT — PAIN DESCRIPTION - DESCRIPTORS: DESCRIPTORS: SPASM

## 2020-12-03 ASSESSMENT — PAIN DESCRIPTION - DIRECTION: RADIATING_TOWARDS: LOWER ABD

## 2020-12-03 ASSESSMENT — PAIN DESCRIPTION - PAIN TYPE: TYPE: ACUTE PAIN

## 2020-12-03 NOTE — PROGRESS NOTES
Progress Note    Patient name: Vandana Head  Patient : 1960  MR #[id number]  Room: 510    Subjective: no major changes overnight. Patient up in room, made bed. Awaiting transfer     HISTORY OF PRESENT ILLNESS:  The patient is a very pleasant 64years old female who has an unfortunate diagnosis of pancreatic cancer initially diagnosed in 2018. She underwent neoadjuvant chemotherapy followed by Whipple procedure at [facility]. She received additional adjuvant chemotherapy after her surgery. Unfortunately, she had disease progression and was therefore started again on palliative chemotherapy. She has received several lines of therapy. Over the last few weeks and months she has been steadily declining. She has had several hospitalizations for GI bleed. She presented to clinic yesterday for consideration of further palliative treatment. We have discussed in the past hospice care but she has declined. She was found to be jaundiced yesterday. His CMP revealed a bilirubin of 12. She had complaints of epigastric pain and discomfort. She had some nausea. CBC showed a hemoglobin 8.6, WBC elevated 14.5 and platelet counts 525,035. CMP showed hyponatremia with sodium levels 130, decreased total protein and albumin, elevated alkaline phosphatase and total bilirubin 12.0. A CT of the abdomen was performed and showed a pancreatic head mass likely representing local recurrence and common bile duct dilatation. She was admitted to the hospital service.   GI was consulted.        ONCOLOGIC HISTORY:   Diagnosis  · Pancreatic adenocarcinoma, 2018   · [id number]  · 2019-extended genetic panel-negative for a deleterious mutation     Treatment summary  · 2018-Surgical consultation at [address] [place] [address] Road operable   · 4/3/2018 through 2018 Neoadjuvant chemotherapy FOLFORINOX ×5 Biweekly cycles   · 2018-Biliary stent   · 2018- XRT 30 Gy/Xeloda   · 3018- Whipple procedure @ [facility]   · Recommended another 5 biweekly cycles of modified FOLFORINOX completed 12/26/2018   · 7/9/2019- 1/22/2020 Gemzar/Abraxane 125 mg/m2 q 14 days, discontinued due to progression of disease  · 3/4/2020- Irinotecan liposome 70 mg/m² with leucovorin 400 mg/m² and 5-FU 2400 mg/m² over 46 hours every 2 weeks.     Tumor marker  · 3/12/1184-RR-31-9->430->370. · 4/30/2018 - CA 19- 9 of 157   · 5/25/20182564-TO-45-9->32 after 4 cycles. · 08/02/2018- -> 8   · 10/01/2018- CA 19-9 -5   · 10/29/2018-CA 19-9- 7   · 12/3/5502-KL-58-9-7   · 04/11/2019-CA-19-9- 12   · 05/21/2019- CA 19-9- 41   · 7/2/2019-CA 19 9 = 114  · 7/9/2019- CA-19-9 = 225  · 8/6/2019- CA-19-9 = 171  · 9/3/0358-UL-00-9 = 198  · 9/13/20198100-NI-02-9 = 98 (at  [address])  · 10/29/6604-XR-45-9 =317  · 11/21/2019-CA-19-9 = 183  · 1/23/20204314-MQ-10-9 = 520  · 4/22/2020- CA-19-9 = 940  · 5/13/20203610-SB-32-9 = 129  · 6/1/20200481-DK-82-9 = 523??  · 7/6/2020- CA 19-9- 483  · 8/17/20207939-YN-41-9 = 785  · 10/5/6571-SK-67-9 = 1250  · 11/30/2020-CA 19-9 = 80912        Cancer history  Ms Chase Palm was seen in initial oncology consultation on 3/12/2018 referred by Dr. Monika Espinoza for a diagnosis of pancreatic adenocarcinoma. She was initially evaluated for acute pancreatitis at [facility] on February 2018. Further imaging revealed a pancreatic head mass. Lipase was elevated at 1184 and CA-19-9 elevated 134. The symptoms were going on for several weeks. Weight loss of 10-12 pounds over the last 6 months. · October 2017-CT abdomen without contrast was unremarkable. · 2/2/2018-ultrasound of abdomen showed a pancreatic duct dilation   · 2/02/2018-CT abdomen showed 16 mm low-density lesion in the pancreas at the junction of the head of the body. No intra-abdominal adenopathy. No liver metastasis.    · 2/7/2018-the patient underwent EGD/EUS and FNA biopsy of a pancreatic mass by Dr. Monika Espinoza at [facility] . EUS showed inflammatory changes consistent with a recent history of pancreatitis. Main pancreatic duct was dilated. No concerning peripancreatic adenopathy. No definite mass was seen by a more diffuse hypoechoic area measuring 1.8 x 2.2 cm in the head of the pancreas. Pathology was consistent with a group of markedly atypical cells strongly suspicious for adenocarcinoma. · 2/28/2018-CT pancreatic protocol showed a poorly defined area of decreased enhancement located in the head of the pancreas measuring 1.8 x 1.4 x 1.7 cm with moderate meditation of the pancreatic duct. Well defined sharply marginated low density nodule located in the tail of the pancreas measuring 1.5 x 1.3 x 1.5 cm (IPMN?). · 3/6/2018-CA 19-9 was elevated at 150. Repeat EGD was performed by Dr. Connie Tinsley due to the inconclusive FNA resulted on 2/7/2018. Pathology FNA was consistent with pancreatic adenocarcinoma. · 3/12/2018-she was first seen by me. No evidence of distant disease. Referral to Surgical oncology. CT chest to complete staging. CA-19-9 430. · 3/16/2018-CT of the chest was unremarkable for intrathoracic metastatic disease   · Surgery consultation at [facility] March 2018- with Dr Melanie Moncada. She was not a surgical candidate upfront. Recommended neoadjuvant chemotherapy. · 4/3/2018-started on neoadjuvant chemotherapy with FOLFORINOX. · 4/11/2018-she developed CBD obstruction had a CBD stent placed by Dr. María Ortega. · 4/3/2018 through 6/4/2018 Neoadjuvant chemotherapy FOLFORINOX ×5 Biweekly cycles   · August 2018- XRT 30 Gy/Xeloda   · 08/30/3018- Whipple procedure at [facility] by Dr. Benito Weinstein  · Recommended another 7 biweekly cycles of modified FOLFORINOX. · 9/5/2018-CT of the chest abdomen pelvis was unremarkable for metastatic disease. · 1/14/2019-CT abdomen and pelvis at [facility] showed no evidence of metastasis in the chest abdomen pelvis. · 5/21/2019-CT chest, abdomen, pelvis at [facility] showed no evidence of metastatic disease   · 5/21/20197284-DW-48-9 = 42   · 06/12/2019- CA-19-9 = 154. Discussed with the patient. We'll also discuss with [facility]. · 7/1/2019-CT chest, abdomen, pelvis showed a soft tissue mass measuring 1.4 x 1.1 cm, not present on prior scan from January 2019, concerning for recurrence. Stable hypodense in the liver, too small to characterize. Subcentimeter ill-defined area of low attenuation liver may represent an early metastasis. · 7/3/2019-recommended palliative chemotherapy with nab paclitaxel 125mg/m² IV over 30 minutes on day 1 and gemcitabine 600 mg/m² IV over 10mg/m2/min (fixed dose rate) on day 1  · 7/2/2019-CA 19 9 = 114  · 7/6/2019- extended genetic panel negative for a deleterious mutation (invitae)  · 7/9/2019- CA-19-9 = 225  · 8/6/2019- CA-19-9 = 171  · 9/3/3689-AI-95-9 = 198   · 9/13/20198410-JJ-89-9 = 98 at [facility]  · 9/13/2019-CT chest abdomen pelvis at [facility] showed a soft tissue thickening in the pancreatic bed with persistent narrowing of the portal SMV confluence.  Superimposed tumor remains a possibility.  The new low-density lesion in the periphery of the liver seen on the prior exam is no longer appreciated and likely represents treatment effect.  Nodular enhancement may represent perfusion change in the region on image 187. · 9/13/2018- she was recommended to continue current treatment with Gemzar/Abraxane  · 11/1/2019- she was hospitalized with GI bleed.  An upper endoscopy showed ulceration at the efferent loop of the Whipple's procedure  · 10/29/2417-GX-03-9 =317  · 11/21/2019-CA-19-9 = 183  · 11/19/2019- CT chest abdomen pelvis showed no evidence of gross disease progression. Improvement of the caliber of what may be a middle colic vein that drains back to the SMV. There is still persistent narrowing of the of the upper SMV at the juncture with the portal vein.  No definite evidence of liver metastases at this time.  No definite evidence of pulmonary metastases.  However, [facility] preliminary report. · 6/21/2020- Ct Abdomen Pelvis W Contrast Prior Whipple procedure with expected pneumobilia. Large volume ascites for patient size. 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 Diffuse wall thickening along the small and large bowel secondary to this venous congestion. No evidence for arterial ischemia, as the bowel mucosa appears to enhance normally. No evidence of viscus perforation or peritoneal abscess. Of note, the the retroperitoneal structures are quite decompressed as a result of the ascites, with near complete attenuation of the IVC in the mid abdomen. Unsure if this is contributing to any lower extremity edema. If evidence of multiorgan dysfunction, abdominal compartment syndrome could also be considered. The results of this exam were discussed with Dr. Barbara Hernandez on 6/21/2020 at 1002 hours. In particular, I wanted to address if there was indication/need for therapeutic paracentesis. This is under consideration. · 6/21/2020- US Guided Paracentesis Ultrasound-guided abdominal paracentesis performed for therapeutic purposes. A 60 cc aspirate was set aside for lab evaluation, if desired. The patient tolerated the proceed well.  Cytology was negative for malignant cells. · 7/6/2020 CA19.9- 483. · 8/17/2020 CA19.9-785  · 8/21/2020 CT Chest/Abdomen/Pelvis- No acute intrathoracic abnormality.  Hiatal hernia containing ascitic fluid. Questionable wall thickening of the distal esophagus.  No pathologic intrathoracic or axillary lymphadenopathy. Stable subcentimeter left supraclavicular/lower jugular chain and subcarinal lymph nodes compared to 5/12/2020.  Previous Whipple procedure with associated pneumobilia. Stable diffuse prominence of the intrapancreatic duct within the residual pancreatic body and tail with no discrete pancreatic mass or convincing evidence of solid organ metastasis.  Small volume of ascites.  Ultrasound-guided paracentesis performed prior to this exam. 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 Multiple collateral vascular structures described above.  Diffuse soft tissue anasarca. Trace left pleural effusion. Wall thickening of nondilated small bowel loops and rectosigmoid colon may relate to hypoproteinemia and third spacing of fluid. Nonspecific inflammatory infectious enterocolitis is a second possibility. Moderate size hiatal hernia. · 8/21/2020 Us Guided Paracentesis-Ultrasound-guided abdominal paracentesis performed for diagnostic and therapeutic purposes. The patient tolerated the proceed well. · 8/24/2020- FOLFOX palliative treatment. · 10/5/2020- CA-19-9 = 1250. · 11/2/20 CA 19-9 3550  · 11/9/20 Ct Chest W Contrast No evidence of metastatic disease in the chest. See separately dictated CT abdomen and pelvis of the same day regarding soft tissue of the proximal pancreas.   · 11/9/20 Ct Abdomen Pelvis W Iv Contrast Postsurgical changes of the distal stomach, duodenum and the common bile duct. Ill-defined lobulated soft tissue mass in the area of the head of the pancreas and in the distal part of the gastric remnant may represent postsurgical changes or a mass/recurrent mass. Persistent dilatation of the pancreatic duct in the body and tail of the pancreas. The distal pancreatic duct is not visualized are evaluated (due to prior surgery). Evidence of intrahepatic biliary dilatation and pneumobilia similar to the previous study. Persistent moderate dilatation and thickening of the wall of the descending and transverse duodenum may represent postsurgical changes/edema due to protein losing enteropathy. Moderate amount of abdominal and pelvic ascites which has somewhat improved since the previous study. Persistent thrombosis of the distal superior mesenteric vein and the splenic vein, similar to the previous study.  The Marked dilatation and enlargement of the pelvic veins and both ovarian TROPONINI in the last 72 hours. BNP: No results for input(s): BNP in the last 72 hours. INR:   Recent Labs     12/01/20  0245   INR 1.35*     ABG: No results for input(s): PHART, DHT8RQE, PO2ART, ZEN4EGO, C8PSRMRR, BEART in the last 72 hours. 30 Day lookback of cultures:    Blood Culture Recent: No results for input(s): BC in the last 720 hours. Gram Stain Recent: No results for input(s): LABGRAM in the last 720 hours. Resp Culture Recent: No results for input(s): CULTRESP in the last 720 hours. Body Fluid Recent : No results for input(s): BFCX in the last 720 hours. MRSA Recent : No results for input(s): [facility] in the last 720 hours. Urine Culture Recent : No results for input(s): LABURIN in the last 720 hours. Organism Recent : No results for input(s): ORG in the last 720 hours. ASSESSMENT/PLAN:    #Extrabiliary obstruction-secondary to recurrent pancreatic mass  CT abdomen-extrabiliary obstruction secondary to pancreatic head mass. Total bilirubin = 12->11.7->12.8->13.6  Alk phos-1023->713->621->627  Evaluated by GI, plans to transfer to [facility] for attempt biliary drain +/- stenting if possible     #Recurrent pancreatic cancer-very poor prognosis. Ms. Monroe has progressed on several prior lines of therapy. Performance status declined. Palliative care following. At this point, Dr. Janna Martin believes that the patient is not a candidate for further anticancer therapy. Hospice was discussed in the past but she had declined.     #Severe protein calorie malnutrition severe secondary to no volitional weight loss/pancreatic malignancy according to GLIM criteria. In addition, loss of significant muscle mass-creatinine 0.2, albumin 2.5.     Dietitian following     #Normocytic anemia-like multifactorial secondary to chemotherapy, malignancy, prior GI bleed  Hgb 7.3  She has received several transfusions in the past.    Patient denies melena/hematochezia  Follow      #Poor performance status     #Poor prognosis-secondary to advanced malignancy     #Physical deconditioning-secondary to malignancy and weight loss     #Hyponatremia-sodium level improving, 131     #leukocytosis-improved, WBC 9.7     #Cancer related pain-fentanyl as needed  Fentanyl patch 25 mcg/hr initiated 12/1/2020     #Vitamin D deficiency-consider replacement as per hospitalist.        PLAN:  Awaiting transfer to [place] for attempt biliary drain +/- stenting if possible - agree with plan of care  palliative care following, appreciate assist  Hospice recommended following palliative biliary drain  Continue fentanyl Patch 25 mcg and fentanyl IV PRN  Continue to monitor CBC  Supportive care    Discussed with patient    Sharon Vega, APRN  12/03/20  6:31 AM  Physician's attestation/substantial contribution:  I, Dr Xiao Gu, independently performed an evaluation on Maryanne Ramires. I have reviewed relevant medical information/data to include but not limited to medication list, relevant appropriate labs and imaging when applicable. I reviewed other physician's notes, ancillary services and nurses assessment. I have reviewed the above documentation completed by the Nurse Practitioner or Physician Assistant. Please see my additional contributions to the history of present illness, physical examination, and assessment/medical decision-making that reflect my findings and impressions. I discussed essential elements of the care plan with the NP or PA and the patient as well. I answered all the questions to the patient's satisfaction. I concur with above stated. Subjective-no new complaints morning. She has been very active this morning. Objective-no change  Assessment/plan:  Worsening anemia-patient denies any blood in the stools. We will continue to monitor. Cancer related pain-continue fentanyl patch. Pancreatic cancer-no further chemotherapy. The patient is contemplative regarding hospice.   Biliary obstruction-awaiting palliative biliary drainage    Augustus Chan MD

## 2020-12-04 ENCOUNTER — TELEPHONE (OUTPATIENT)
Dept: GASTROENTEROLOGY | Age: 60
End: 2020-12-04

## 2020-12-04 VITALS
TEMPERATURE: 98.3 F | DIASTOLIC BLOOD PRESSURE: 56 MMHG | HEIGHT: 67 IN | RESPIRATION RATE: 18 BRPM | SYSTOLIC BLOOD PRESSURE: 97 MMHG | BODY MASS INDEX: 16.15 KG/M2 | HEART RATE: 62 BPM | WEIGHT: 102.9 LBS | OXYGEN SATURATION: 100 %

## 2020-12-04 LAB
ALBUMIN SERPL-MCNC: 2.3 G/DL (ref 3.5–5.2)
ALP BLD-CCNC: 829 U/L (ref 35–104)
ALT SERPL-CCNC: 32 U/L (ref 5–33)
ANION GAP SERPL CALCULATED.3IONS-SCNC: 7 MMOL/L (ref 7–19)
ANISOCYTOSIS: ABNORMAL
AST SERPL-CCNC: 66 U/L (ref 5–32)
BASOPHILS ABSOLUTE: 0 K/UL (ref 0–0.2)
BASOPHILS RELATIVE PERCENT: 0.2 % (ref 0–1)
BILIRUB SERPL-MCNC: 15.4 MG/DL (ref 0.2–1.2)
BUN BLDV-MCNC: 7 MG/DL (ref 6–20)
CALCIUM SERPL-MCNC: 8.2 MG/DL (ref 8.6–10)
CHLORIDE BLD-SCNC: 100 MMOL/L (ref 98–111)
CO2: 26 MMOL/L (ref 22–29)
CREAT SERPL-MCNC: 0.5 MG/DL (ref 0.5–0.9)
EOSINOPHILS ABSOLUTE: 0 K/UL (ref 0–0.6)
EOSINOPHILS RELATIVE PERCENT: 0.4 % (ref 0–5)
GFR AFRICAN AMERICAN: >59
GFR NON-AFRICAN AMERICAN: >60
GLUCOSE BLD-MCNC: 98 MG/DL (ref 74–109)
HCT VFR BLD CALC: 22.2 % (ref 37–47)
HEMOGLOBIN: 7.7 G/DL (ref 12–16)
IMMATURE GRANULOCYTES #: 0.1 K/UL
LYMPHOCYTES ABSOLUTE: 0.6 K/UL (ref 1.1–4.5)
LYMPHOCYTES RELATIVE PERCENT: 6.7 % (ref 20–40)
MCH RBC QN AUTO: 29.8 PG (ref 27–31)
MCHC RBC AUTO-ENTMCNC: 34.7 G/DL (ref 33–37)
MCV RBC AUTO: 86 FL (ref 81–99)
MONOCYTES ABSOLUTE: 1.1 K/UL (ref 0–0.9)
MONOCYTES RELATIVE PERCENT: 11.4 % (ref 0–10)
NEUTROPHILS ABSOLUTE: 7.5 K/UL (ref 1.5–7.5)
NEUTROPHILS RELATIVE PERCENT: 80.3 % (ref 50–65)
OVALOCYTES: ABNORMAL
PDW BLD-RTO: 22.7 % (ref 11.5–14.5)
PLATELET # BLD: 160 K/UL (ref 130–400)
PLATELET SLIDE REVIEW: ADEQUATE
PMV BLD AUTO: 10.4 FL (ref 9.4–12.3)
POTASSIUM REFLEX MAGNESIUM: 4.1 MMOL/L (ref 3.5–5)
RBC # BLD: 2.58 M/UL (ref 4.2–5.4)
SARS-COV-2, NAAT: NOT DETECTED
SCHISTOCYTES: ABNORMAL
SODIUM BLD-SCNC: 133 MMOL/L (ref 136–145)
TARGET CELLS: ABNORMAL
TOTAL PROTEIN: 6 G/DL (ref 6.6–8.7)
WBC # BLD: 9.4 K/UL (ref 4.8–10.8)

## 2020-12-04 PROCEDURE — 99233 SBSQ HOSP IP/OBS HIGH 50: CPT | Performed by: NURSE PRACTITIONER

## 2020-12-04 PROCEDURE — 6360000002 HC RX W HCPCS

## 2020-12-04 PROCEDURE — C9113 INJ PANTOPRAZOLE SODIUM, VIA: HCPCS | Performed by: PHYSICIAN ASSISTANT

## 2020-12-04 PROCEDURE — 6360000002 HC RX W HCPCS: Performed by: INTERNAL MEDICINE

## 2020-12-04 PROCEDURE — 99231 SBSQ HOSP IP/OBS SF/LOW 25: CPT | Performed by: INTERNAL MEDICINE

## 2020-12-04 PROCEDURE — U0002 COVID-19 LAB TEST NON-CDC: HCPCS

## 2020-12-04 PROCEDURE — 6360000002 HC RX W HCPCS: Performed by: PHYSICIAN ASSISTANT

## 2020-12-04 PROCEDURE — 6370000000 HC RX 637 (ALT 250 FOR IP): Performed by: INTERNAL MEDICINE

## 2020-12-04 PROCEDURE — 85025 COMPLETE CBC W/AUTO DIFF WBC: CPT

## 2020-12-04 PROCEDURE — 2580000003 HC RX 258: Performed by: PHYSICIAN ASSISTANT

## 2020-12-04 PROCEDURE — 80053 COMPREHEN METABOLIC PANEL: CPT

## 2020-12-04 PROCEDURE — 6370000000 HC RX 637 (ALT 250 FOR IP): Performed by: NURSE PRACTITIONER

## 2020-12-04 RX ORDER — FENTANYL 25 UG/H
1 PATCH TRANSDERMAL
Qty: 10 PATCH | Refills: 0 | Status: SHIPPED | OUTPATIENT
Start: 2020-12-04 | End: 2021-01-04 | Stop reason: SDUPTHER

## 2020-12-04 RX ORDER — FENTANYL 12 UG/H
1 PATCH TRANSDERMAL
Qty: 10 PATCH | Refills: 0 | Status: SHIPPED
Start: 2020-12-04 | End: 2020-12-04 | Stop reason: HOSPADM

## 2020-12-04 RX ORDER — HEPARIN SODIUM (PORCINE) LOCK FLUSH IV SOLN 100 UNIT/ML 100 UNIT/ML
100 SOLUTION INTRAVENOUS PRN
Status: DISCONTINUED | OUTPATIENT
Start: 2020-12-04 | End: 2020-12-04 | Stop reason: HOSPADM

## 2020-12-04 RX ORDER — HEPARIN SODIUM (PORCINE) LOCK FLUSH IV SOLN 100 UNIT/ML 100 UNIT/ML
SOLUTION INTRAVENOUS
Status: COMPLETED
Start: 2020-12-04 | End: 2020-12-04

## 2020-12-04 RX ORDER — HYDROCODONE BITARTRATE AND ACETAMINOPHEN 7.5; 325 MG/1; MG/1
1 TABLET ORAL EVERY 6 HOURS PRN
Status: DISCONTINUED | OUTPATIENT
Start: 2020-12-04 | End: 2020-12-04 | Stop reason: HOSPADM

## 2020-12-04 RX ORDER — HYDROCODONE BITARTRATE AND ACETAMINOPHEN 7.5; 325 MG/1; MG/1
1 TABLET ORAL EVERY 6 HOURS PRN
Qty: 12 TABLET | Refills: 0 | Status: SHIPPED | OUTPATIENT
Start: 2020-12-04 | End: 2020-12-07

## 2020-12-04 RX ORDER — FENTANYL 12 UG/H
1 PATCH TRANSDERMAL
Qty: 1 PATCH | Refills: 0 | Status: SHIPPED | OUTPATIENT
Start: 2020-12-04 | End: 2020-12-05

## 2020-12-04 RX ORDER — FENTANYL 12 UG/H
1 PATCH TRANSDERMAL
Status: DISCONTINUED | OUTPATIENT
Start: 2020-12-04 | End: 2020-12-04 | Stop reason: HOSPADM

## 2020-12-04 RX ORDER — FENTANYL 12 UG/H
1 PATCH TRANSDERMAL
Qty: 10 PATCH | Refills: 0 | Status: SHIPPED | OUTPATIENT
Start: 2020-12-04 | End: 2020-12-04 | Stop reason: HOSPADM

## 2020-12-04 RX ADMIN — PIPERACILLIN AND TAZOBACTAM 3.38 G: 3; .375 INJECTION, POWDER, LYOPHILIZED, FOR SOLUTION INTRAVENOUS at 00:33

## 2020-12-04 RX ADMIN — HEPARIN SODIUM (PORCINE) LOCK FLUSH IV SOLN 100 UNIT/ML 100 UNITS: 100 SOLUTION at 16:51

## 2020-12-04 RX ADMIN — SODIUM CHLORIDE, PRESERVATIVE FREE 10 ML: 5 INJECTION INTRAVENOUS at 08:03

## 2020-12-04 RX ADMIN — FENTANYL CITRATE 25 MCG: 0.05 INJECTION, SOLUTION INTRAMUSCULAR; INTRAVENOUS at 13:30

## 2020-12-04 RX ADMIN — GABAPENTIN 100 MG: 100 CAPSULE ORAL at 04:32

## 2020-12-04 RX ADMIN — PIPERACILLIN AND TAZOBACTAM 3.38 G: 3; .375 INJECTION, POWDER, LYOPHILIZED, FOR SOLUTION INTRAVENOUS at 08:50

## 2020-12-04 RX ADMIN — PANTOPRAZOLE SODIUM 40 MG: 40 INJECTION, POWDER, FOR SOLUTION INTRAVENOUS at 08:03

## 2020-12-04 RX ADMIN — FENTANYL CITRATE 25 MCG: 0.05 INJECTION, SOLUTION INTRAMUSCULAR; INTRAVENOUS at 04:33

## 2020-12-04 RX ADMIN — HEPARIN 100 UNITS: 100 SYRINGE at 16:51

## 2020-12-04 ASSESSMENT — ENCOUNTER SYMPTOMS
COLOR CHANGE: 1
EYE DISCHARGE: 0
EYE ITCHING: 0
SORE THROAT: 0
ABDOMINAL PAIN: 1
ROS SKIN COMMENTS: JAUNDICE
EYES NEGATIVE: 1
SHORTNESS OF BREATH: 0
TROUBLE SWALLOWING: 0
RESPIRATORY NEGATIVE: 1
COUGH: 0
ABDOMINAL DISTENTION: 1

## 2020-12-04 ASSESSMENT — PAIN SCALES - GENERAL
PAINLEVEL_OUTOF10: 8
PAINLEVEL_OUTOF10: 7
PAINLEVEL_OUTOF10: 3
PAINLEVEL_OUTOF10: 8

## 2020-12-04 NOTE — PROGRESS NOTES
Comprehensive Nutrition Assessment    Type and Reason for Visit:  Reassess    Nutrition Assessment:  PO intake has improved. Pt is awaiting transfer to [place]. Continue current POC.     Malnutrition Assessment:  Malnutrition Status:  Severe malnutrition      Estimated Daily Nutrient Needs:  Energy (kcal):  0457-2324 kcals/day; Weight Used for Energy Requirements:  Current(30-35)     Protein (g):  55-69 g/PRO/day; Weight Used for Protein Requirements:  Current(1.2-1.5)        Fluid (ml/day):  9141-2734 mL/day; Method Used for Fluid Requirements:  1 ml/kcal      Current Nutrition Therapies:    DIET GENERAL;  Dietary Nutrition Supplements: Standard High Calorie Oral Supplement  Dietary Nutrition Supplements: Frozen Oral Supplement    Anthropometric Measures:  · Height: 5' 7\" (170.2 cm)  · Current Body Weight: 102 lb (46.3 kg)    · Ideal Body Weight: 135 lbs  · BMI: 16  · BMI Categories: Underweight (BMI less than 22) age over 72       Nutrition Diagnosis:   · Severe malnutrition related to catabolic illness as evidenced by weight loss, severe loss of subcutaneous fat, severe muscle loss    Nutrition Interventions:   Food and/or Nutrient Delivery:  Continue Current Diet, Continue Oral Nutrition Supplement  Coordination of Nutrition Care:  Continue to monitor while inpatient    Goals:  Pt will consume 50% or more of meals/supplements and have steady wt gain of 1-3#/wk       Nutrition Monitoring and Evaluation:   Food/Nutrient Intake Outcomes:  Food and Nutrient Intake, Supplement Intake  Physical Signs/Symptoms Outcomes:  Biochemical Data, Nutrition Focused Physical Findings, Weight     Electronically signed by Fermin Benjamin, MS, RD, LD on 12/4/20 at 2:56 PM CST    Contact: [phone]

## 2020-12-04 NOTE — PROGRESS NOTES
Palliative Care Progress Note  12/4/2020 3:00 PM  Subjective:   Admit Date: 11/30/2020  PCP: Hernandez Betancourt MD    Chief Complaint: Abdominal Pain    Interval History: No acute overnight events. She continues to be followed by GI, referral made to OSH for percutaneous drain/stenting, procedure now scheduled as outpatient. Oncology following, no further options for tx. Fentanyl patch remains at 25 mcg and she is using PRN IV fentanyl 3-4 times daily. Portions of this note have been copied forward, however, changed to reflect the most current clinical status of this patient. Review of Systems   Constitutional: Positive for activity change, fatigue and unexpected weight change. Generalized weakness   HENT: Negative. Eyes: Negative. Respiratory: Negative. Cardiovascular: Negative. Gastrointestinal: Positive for abdominal distention and abdominal pain. Genitourinary: Negative. Musculoskeletal: Negative. Skin: Positive for color change. Neurological: Negative. Hematological: Negative. Psychiatric/Behavioral: Negative.            DIET GENERAL;  Dietary Nutrition Supplements: Standard High Calorie Oral Supplement  Dietary Nutrition Supplements: Frozen Oral Supplement    Medications:    Current Facility-Administered Medications   Medication Dose Route Frequency Provider Last Rate Last Dose    fentaNYL (DURAGESIC) 12 MCG/HR 1 patch  1 patch Transdermal Q72H Letty Sorrel, APRN        fentaNYL (SUBLIMAZE) injection 25 mcg  25 mcg Intravenous Q2H PRN Damon Zimmerman MD   25 mcg at 12/04/20 1330    fentaNYL (DURAGESIC) 25 MCG/HR 1 patch  1 patch Transdermal Q72H Damon Zimmerman MD   1 patch at 12/04/20 1408    gabapentin (NEURONTIN) capsule 100 mg  100 mg Oral BID Damon Zimmerman MD   100 mg at 12/04/20 0432    gabapentin (NEURONTIN) capsule 300 mg  300 mg Oral Nightly Damon Zimmerman MD   300 mg at 12/03/20 2137    sodium chloride flush 0.9 % injection 10 mL  10 mL Intravenous 2 times suggested an increase in her fentanyl patch (addition 12mcg) and patient was very agreeable. She states that she is being discharged home with her stent/drain now scheduled as outpatient, pain would need better controlled over the weekend for her to remain at home and be comfortable. Patient discussed with the palliative care physician as well, reviewed previous medication use with the patient and she reports taking and tolerating Norco 7.5/325 well previously. EMR reviewed and previous script verified, ordered for breakthrough pain. I discussed the change/increase in fentanyl patch, PRN Norco, with nursing staff who is currently working on patient's discharge. Discharge scripts provided. I provided emotional support and encouragement to the patient. Palliative care will continue to follow. Recommendations:   1. GOC is to pursue IR perc drain/stent at OSH. Pt plans to discuss options for goals/life decisions with her daughter. Patient is aware of option to pursue hospice but wants to discuss with [facility].     2. Increase total dose of fentanyl patch to 37 mcg every 72 hrs   Norco 7.5/325 one tablet every 6 hrs for breakthrough pain added, patient reports she has taken this previously and tolerates well. Thank you for consulting Palliative Care and allowing us to participate in the care of this patient.        Electronically signed by JORDYN Olguin on 12/4/2020 at 3:00 PM    (Please note that portions of this note were completed with a voice recognition program.  Aleja Cox made to edit the dictations but occasionally words are mis-transcribed.)

## 2020-12-04 NOTE — DISCHARGE SUMMARY
if possible. However, no beds were available at this time at [address] [place] [address] and [facility] was contacted as well. [facility] agreed to accept patient, however, no beds were available at that facility. At this time, patient has no nausea, vomiting, diarrhea or constipation. Pain is well controlled. She is ambulatory without difficulty and vitals are stable. Dr. Winsome Arciniega was again contacted and will see Ms. David Garcia as an outpatient on Monday for procedure. At this time she is stable for discharge home and has been counseled that if she should develop worsening abdominal pain, nausea or vomiting she should report back to the Hospital for evaluation. A COVID-19 was ordered on day of discharge which was requested by Dr. Brian Morrison office in preparation for procedure on Monday. Patient was also seen by Palliative/Hospice care. Fentanyl patch ordered which has provided adequate pain relief. She is stable for discharge at this time to home.     Significant Diagnostic Studies:   Ct Abdomen Pelvis W Iv Contrast Additional Contrast? Oral  1.. The patient is status post Whipple procedure. There is some mild thickening noted at the level of the distal gastrectomy. The gastrojejunostomy is patent. 2. There is evidence of a neoplasm involving the region of the pancreatic head. The pancreatic head likely was resected at the time of the Whipple procedure and this may represent localized recurrence. 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 The SMV and splenic vein just proximal to the confluence to form the portal vein are also occluded. There is some involvement with the celiac axis which appears to show slight improvement as there did appear to be some circumferential narrowing of the proximal segment of the splenic and common hepatic artery on the previous exam which I do not see on the current study.  Overall I feel the mass is stable in size from the previous study. There is some progressive biliary dilatation with what appears to be abrupt occlusion of the common duct just below the kathy hepatis. Previously noted pneumobilia is no longer visualized. This would suggest there has been some progressive occlusion/stenosis of the common duct. 3. Moderate constipation. There is some stasis of the small bowel with mild fluid distention with a few air-fluid levels. There is free fluid in the paracolic gutters and pelvis. No evidence of pneumoperitoneum or mechanical bowel obstruction. Signed by Dr Yohan Arce on 11/30/2020 10:09 PM    Mri Abdomen W Wo Contrast Mrcp  1. Marked biliary ductal dilatation related to apparent tumor recurrence in the pancreas head region as described above and also detailed on the CT examination performed today. Signed by Dr Lois Deluca on 12/1/2020 7:40 AM    Pertinent Labs:   CBC:   Recent Labs     12/02/20 0425 12/03/20  0430 12/04/20  0710   WBC 12.0* 9.7 9.4   HGB 7.8* 7.3* 7.7*    158 160     BMP:    Recent Labs     12/02/20 0425 12/03/20  0430 12/04/20  0710   * 131* 133*   K 4.0 4.0 4.1   CL 96* 99 100   CO2 24 26 26   BUN 6 7 7   CREATININE 0.5 0.2* 0.5   GLUCOSE 100 117* 98     Physical Exam:  Vital Signs: BP (!) 97/56   Pulse 62   Temp 98.3 °F (36.8 °C) (Temporal)   Resp 18   Ht 5' 7\" (1.702 m)   Wt 102 lb 14.4 oz (46.7 kg)   SpO2 100%   BMI 16.12 kg/m²   General appearance:. Alert and Cooperative   HEENT: Normocephalic. Chest: clear to auscultation bilaterally without wheezes or rhonchi. Cardiac: Normal heart tones with regular rate and rhythm, S1, S2 normal. No murmurs, gallops, or rubs auscultated. Abdomen:soft, non-tender; non-distended hypoactive bowel sounds no masses, no organomegaly. Extremities: No clubbing or cyanosis. No peripheral edema. Peripheral pulses palpable. Neurologic: Grossly intact.     Discharge Medications:       Ajay Westbrook, [facility] Medication Instructions GUTIERREZ:[id number] Printed on:12/04/20 5021   Medication Information                      Cyanocobalamin (VITAMIN B 12 PO)  Take by mouth             fentaNYL (DURAGESIC) 12 MCG/HR  Place 1 patch onto the skin every 3 days for 30 days. Intended supply: 30 days             fentaNYL (DURAGESIC) 25 MCG/HR  Place 1 patch onto the skin every 72 hours for 30 days. gabapentin (NEURONTIN) 100 MG capsule  Take 1 capsule by mouth 3 times daily for 120 days. Intended supply: 90 days             lidocaine-prilocaine (EMLA) 2.5-2.5 % cream  Apply to port area and cover with plastic wrap one hour prior to use. Multiple Vitamins-Minerals (THERAPEUTIC MULTIVITAMIN-MINERALS) tablet  Take 1 tablet by mouth daily             NONFORMULARY  daily Tumeric otc             ondansetron (ZOFRAN) 8 MG tablet  Take 1 tablet by mouth every 8 hours as needed for Nausea or Vomiting             pantoprazole (PROTONIX) 40 MG tablet  Take 1 tablet by mouth 2 times daily             prochlorperazine (COMPAZINE) 10 MG tablet  Take 1 tablet by mouth every 6 hours as needed (nausea)             promethazine (PHENERGAN) 25 MG tablet  Take 1 tablet by mouth every 6 hours as needed for Nausea             sucralfate (CARAFATE) 1 GM tablet  Take 1 tablet by mouth 4 times daily             vitamin E 400 UNIT capsule  Take 400 Units by mouth daily               Discharge Instructions: Follow up with Yobany Kaufman MD in 3-5 days. Follow up with Dr. Zackery Thompson as scheduled on 12/07/2020. Arrive at 11 am and will nothing by mouth after midnight the  Night before. Follow up with Dr. Irene Aden on 12/11/2020. Take medications as directed. Resume activity as tolerated. Diet: DIET GENERAL;  Dietary Nutrition Supplements: Standard High Calorie Oral Supplement  Dietary Nutrition Supplements: Frozen Oral Supplement     Disposition: Patient is medically stable and will be discharged home.     Time spent on discharge 40 minutes spent in assessing patient, reviewing medications, discussion with nursing, confirming safe discharge plan and preparation of discharge summary.     Signed:  Tunde Hamilton PA-C

## 2020-12-04 NOTE — PROGRESS NOTES
GI  - PROGRESS NOTE    Subjective:   Admit Date: 11/30/2020  PCP: Kelli Martin MD    Patient being seen for: biliary obstruction, h/o pancreatic CA s/p WHipple in the past    24hr events/Interval History: Tolerating diet better, walking halls. Patient dressed and sitting on side of bed. Looks improved subjectively from 2 days ago. Bili still elevated  Imaging reviewed. DIET GENERAL;  Dietary Nutrition Supplements: Standard High Calorie Oral Supplement  Dietary Nutrition Supplements: Frozen Oral Supplement     Tolerated                  Pain:Moderate  Nausea: Moderate      Medications:  Scheduled Meds:   fentaNYL  1 patch Transdermal Q72H    gabapentin  100 mg Oral BID    gabapentin  300 mg Oral Nightly    sodium chloride flush  10 mL Intravenous 2 times per day    piperacillin-tazobactam  3.375 g Intravenous Q8H    pantoprazole  40 mg Intravenous BID    And    sodium chloride (PF)  10 mL Intravenous BID       Continuous Infusions:      PRN Meds:.fentanNYL, sodium chloride flush, [DISCONTINUED] promethazine **OR** ondansetron, lidocaine, promethazine      Labs:     Recent Labs     12/02/20 0425 12/03/20 0430 12/04/20  0710   WBC 12.0* 9.7 9.4   RBC 2.60* 2.43* 2.58*   HGB 7.8* 7.3* 7.7*   HCT 22.5* 20.9* 22.2*   MCV 86.5 86.0 86.0   MCH 30.0 30.0 29.8   MCHC 34.7 34.9 34.7    158 160     Recent Labs     12/02/20 0425 12/03/20 0430 12/04/20  0710   * 131* 133*   K 4.0 4.0 4.1   ANIONGAP 7 6* 7   CL 96* 99 100   CO2 24 26 26   BUN 6 7 7   CREATININE 0.5 0.2* 0.5   GLUCOSE 100 117* 98   CALCIUM 8.0* 8.0* 8.2*     No results for input(s): MG, PHOS in the last 72 hours.   Recent Labs     12/02/20 0425 12/03/20 0430 12/04/20  0710   AST 40* 44* 66*   ALT 26 25 32   BILITOT 12.8* 13.6* 15.4*   ALKPHOS 621* 627* 829*     HgBA1c:  No components found for: HGBA1C  FLP:  No results found for: TRIG, HDL, LDLCALC, LDLDIRECT, LABVLDL  TSH:    Lab Results   Component Value Date    TSH 0.470 12/01/2020     Troponin T: No results for input(s): TROPONINI in the last 72 hours. INR:   No results for input(s): INR in the last 72 hours. No results for input(s): LIPASE in the last 72 hours. -----------------------------------------------------------------  RAD:       Physical Exam:     Vitals:    12/03/20 1812 12/04/20 0014 12/04/20 0704 12/04/20 1031   BP: 114/62 115/73 121/73 (!) 97/56   Pulse: 67 76 59 62   Resp: 14 14 18 18   Temp: 98.4 °F (36.9 °C) 97.9 °F (36.6 °C) 96.9 °F (36.1 °C) 98.3 °F (36.8 °C)   TempSrc: Temporal Temporal Temporal Temporal   SpO2: 98% 96% 98% 100%   Weight:       Height:         24HR INTAKE/OUTPUT:      Intake/Output Summary (Last 24 hours) at 12/4/2020 1327  Last data filed at 12/3/2020 1834  Gross per 24 hour   Intake 480 ml   Output --   Net 480 ml     General appearance: alert and cooperative with exam  Lungs: clear to auscultation bilaterally  Heart: regular rate and rhythm, S1, S2 normal, no murmur, click, rub or gallop  Abdomen: soft, non-tender; bowel sounds normal; no masses,  no organomegaly  Extremities: extremities normal, atraumatic, no cyanosis or edema  Neurologic: No obvious focal neurologic deficits. SKin - jaundice    Impression:       1. Recurrent Pancreatic Adenocarcinoma - s/p multiple lines of chemotherapy  2. Biliary obstruction - most likely 2/2 to above - worsening  3. S/p Whipple with surgical gastrojejunostomy formation -   4. Leukocytosis - on zosyn empirically - improved. 5. Malnutrition   6. Anemia - stable. Plan:     - I had a long d/w patient today regarding both her goals of care as well as the indications and need for biliary drainage/stenting. I d/w her that unfortunately due to her post surgical anatomy, her anatomy is not conducive to an ERCP with stenting. To relieve her obstruction she undoubtedly require a percutaneous drain/stenting through Interventional radiology.   I offered her

## 2020-12-04 NOTE — PLAN OF CARE
Nutrition Problem #1: Severe malnutrition  Intervention: Food and/or Nutrient Delivery: Continue Current Diet, Continue Oral Nutrition Supplement  Nutritional Goals: Pt will consume 50% or more of meals/supplements and have steady wt gain of 1-3#/wk

## 2020-12-04 NOTE — PROGRESS NOTES
Progress Note    Patient name: Peggy Martínez  Patient : 1960  MR #[id number]  Room: 510    Subjective: Awaiting transfer to [place] for palliative biliary drain    HISTORY OF PRESENT ILLNESS:  The patient is a very pleasant 64years old female who has an unfortunate diagnosis of pancreatic cancer initially diagnosed in 2018. She underwent neoadjuvant chemotherapy followed by Whipple procedure at [facility]. She received additional adjuvant chemotherapy after her surgery. Unfortunately, she had disease progression and was therefore started again on palliative chemotherapy. She has received several lines of therapy. Over the last few weeks and months she has been steadily declining. She has had several hospitalizations for GI bleed. She presented to clinic yesterday for consideration of further palliative treatment. We have discussed in the past hospice care but she has declined. She was found to be jaundiced yesterday. His CMP revealed a bilirubin of 12. She had complaints of epigastric pain and discomfort. She had some nausea. CBC showed a hemoglobin 8.6, WBC elevated 14.5 and platelet counts 127,095. CMP showed hyponatremia with sodium levels 130, decreased total protein and albumin, elevated alkaline phosphatase and total bilirubin 12.0. A CT of the abdomen was performed and showed a pancreatic head mass likely representing local recurrence and common bile duct dilatation. She was admitted to the hospital service.   GI was consulted.        ONCOLOGIC HISTORY:   Diagnosis  · Pancreatic adenocarcinoma, 2018   · [id number]  · 2019-extended genetic panel-negative for a deleterious mutation     Treatment summary  · 2018-Surgical consultation at [address] [place] [address] operable   · 4/3/2018 through 2018 Neoadjuvant chemotherapy FOLFORINOX ×5 Biweekly cycles   · 2018-Biliary stent   · 2018- XRT 30 Gy/Xeloda   · 3018- Whipple procedure @ [facility]   · Recommended another 5 biweekly cycles of modified FOLFORINOX completed 12/26/2018   · 7/9/2019- 1/22/2020 Gemzar/Abraxane 125 mg/m2 q 14 days, discontinued due to progression of disease  · 3/4/2020- Irinotecan liposome 70 mg/m² with leucovorin 400 mg/m² and 5-FU 2400 mg/m² over 46 hours every 2 weeks.     Tumor marker  · 3/12/1512-JH-41-9->430->370. · 4/30/2018 - CA 19- 9 of 157   · 5/25/20185250-HQ-30-9->32 after 4 cycles. · 08/02/2018- -> 8   · 10/01/2018- CA 19-9 -5   · 10/29/2018-CA 19-9- 7   · 12/3/9988-CK-28-9-7   · 04/11/2019-CA-19-9- 12   · 05/21/2019- CA 19-9- 41   · 7/2/2019-CA 19 9 = 114  · 7/9/2019- CA-19-9 = 225  · 8/6/2019- CA-19-9 = 171  · 9/3/3072-RL-99-9 = 198  · 9/13/20190384-AK-05-9 = 98 (at  [address])  · 10/29/8648-YD-53-9 =317  · 11/21/2019-CA-19-9 = 183  · 1/23/20200696-EW-87-9 = 520  · 4/22/2020- CA-19-9 = 940  · 5/13/20206865-VM-35-9 = 129  · 6/1/20208349-YZ-84-9 = 523??  · 7/6/2020- CA 19-9- 483  · 8/17/20209905-HI-60-9 = 785  · 10/5/20208431-SP-28-9 = 1250  · 11/30/2020-CA 19-9 = 91905        Cancer history  Ms Deidre Deluna was seen in initial oncology consultation on 3/12/2018 referred by Dr. Gabriela Lopez for a diagnosis of pancreatic adenocarcinoma. She was initially evaluated for acute pancreatitis at [facility] on February 2018. Further imaging revealed a pancreatic head mass. Lipase was elevated at 1184 and CA-19-9 elevated 134. The symptoms were going on for several weeks. Weight loss of 10-12 pounds over the last 6 months. · October 2017-CT abdomen without contrast was unremarkable. · 2/2/2018-ultrasound of abdomen showed a pancreatic duct dilation   · 2/02/2018-CT abdomen showed 16 mm low-density lesion in the pancreas at the junction of the head of the body. No intra-abdominal adenopathy. No liver metastasis.    · 2/7/2018-the patient underwent EGD/EUS and FNA biopsy of a pancreatic mass by Dr. Gabriela Lopez at [facility] . EUS showed inflammatory · 5/21/20191987-WN-57-9 = 42   · 06/12/2019- CA-19-9 = 154. Discussed with the patient. We'll also discuss with M.D. [facility]. · 7/1/2019-CT chest, abdomen, pelvis showed a soft tissue mass measuring 1.4 x 1.1 cm, not present on prior scan from January 2019, concerning for recurrence. Stable hypodense in the liver, too small to characterize. Subcentimeter ill-defined area of low attenuation liver may represent an early metastasis. · 7/3/2019-recommended palliative chemotherapy with nab paclitaxel 125mg/m² IV over 30 minutes on day 1 and gemcitabine 600 mg/m² IV over 10mg/m2/min (fixed dose rate) on day 1  · 7/2/2019-CA 19 9 = 114  · 7/6/2019- extended genetic panel negative for a deleterious mutation (invitae)  · 7/9/2019- CA-19-9 = 225  · 8/6/2019- CA-19-9 = 171  · 9/3/0351-SD-42-9 = 198   · 9/13/20195802-GR-73-9 = 98 at [facility]  · 9/13/2019-CT chest abdomen pelvis at [facility] showed a soft tissue thickening in the pancreatic bed with persistent narrowing of the portal SMV confluence.  Superimposed tumor remains a possibility.  The new low-density lesion in the periphery of the liver seen on the prior exam is no longer appreciated and likely represents treatment effect.  Nodular enhancement may represent perfusion change in the region on image 187. · 9/13/2018- she was recommended to continue current treatment with Gemzar/Abraxane  · 11/1/2019- she was hospitalized with GI bleed.  An upper endoscopy showed ulceration at the efferent loop of the Whipple's procedure  · 10/29/7164-PC-95-9 =317  · 11/21/2019-CA-19-9 = 183  · 11/19/2019- CT chest abdomen pelvis showed no evidence of gross disease progression. Improvement of the caliber of what may be a middle colic vein that drains back to the SMV. There is still persistent narrowing of the of the upper SMV at the juncture with the portal vein.  No definite evidence of liver metastases at this time.  No definite evidence of pulmonary metastases.  However, question preliminary report. · 6/21/2020- Ct Abdomen Pelvis W Contrast Prior Whipple procedure with expected pneumobilia. Large volume ascites for patient size. 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 Diffuse wall thickening along the small and large bowel secondary to this venous congestion. No evidence for arterial ischemia, as the bowel mucosa appears to enhance normally. No evidence of viscus perforation or peritoneal abscess. Of note, the the retroperitoneal structures are quite decompressed as a result of the ascites, with near complete attenuation of the IVC in the mid abdomen. Unsure if this is contributing to any lower extremity edema. If evidence of multiorgan dysfunction, abdominal compartment syndrome could also be considered. The results of this exam were discussed with Dr. Tia Liao on 6/21/2020 at 1002 hours. In particular, I wanted to address if there was indication/need for therapeutic paracentesis. This is under consideration. · 6/21/2020- US Guided Paracentesis Ultrasound-guided abdominal paracentesis performed for therapeutic purposes. A 60 cc aspirate was set aside for lab evaluation, if desired. The patient tolerated the proceed well.  Cytology was negative for malignant cells. · 7/6/2020 CA19.9- 483. · 8/17/2020 CA19.9-785  · 8/21/2020 CT Chest/Abdomen/Pelvis- No acute intrathoracic abnormality.  Hiatal hernia containing ascitic fluid. Questionable wall thickening of the distal esophagus.  No pathologic intrathoracic or axillary lymphadenopathy. Stable subcentimeter left supraclavicular/lower jugular chain and subcarinal lymph nodes compared to 5/12/2020.  Previous Whipple procedure with associated pneumobilia. Stable diffuse prominence of the intrapancreatic duct within the residual pancreatic body and tail with no discrete pancreatic mass or convincing evidence of solid organ metastasis.  Small volume of ascites.  Ultrasound-guided paracentesis performed prior to this exam. 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 Multiple collateral vascular structures described above.  Diffuse soft tissue anasarca. Trace left pleural effusion. Wall thickening of nondilated small bowel loops and rectosigmoid colon may relate to hypoproteinemia and third spacing of fluid. Nonspecific inflammatory infectious enterocolitis is a second possibility. Moderate size hiatal hernia. · 8/21/2020 Us Guided Paracentesis-Ultrasound-guided abdominal paracentesis performed for diagnostic and therapeutic purposes. The patient tolerated the proceed well. · 8/24/2020- FOLFOX palliative treatment. · 10/5/2020- CA-19-9 = 1250. · 11/2/20 CA 19-9 3550  · 11/9/20 Ct Chest W Contrast No evidence of metastatic disease in the chest. See separately dictated CT abdomen and pelvis of the same day regarding soft tissue of the proximal pancreas.   · 11/9/20 Ct Abdomen Pelvis W Iv Contrast Postsurgical changes of the distal stomach, duodenum and the common bile duct. Ill-defined lobulated soft tissue mass in the area of the head of the pancreas and in the distal part of the gastric remnant may represent postsurgical changes or a mass/recurrent mass. Persistent dilatation of the pancreatic duct in the body and tail of the pancreas. The distal pancreatic duct is not visualized are evaluated (due to prior surgery). Evidence of intrahepatic biliary dilatation and pneumobilia similar to the previous study. Persistent moderate dilatation and thickening of the wall of the descending and transverse duodenum may represent postsurgical changes/edema due to protein losing enteropathy. Moderate amount of abdominal and pelvic ascites which has somewhat improved since the previous study. Persistent thrombosis of the distal superior mesenteric vein and the splenic vein, similar to the previous study.  The Marked dilatation and enlargement of the pelvic veins and both ovarian Tenderness: There is abdominal tenderness. There is no guarding. Genitourinary:     Comments: Exam deferred  Musculoskeletal:         General: No tenderness or deformity. Comments: Normal ROM all four extremities. Muscle wasting    Lymphadenopathy:      Cervical: No cervical adenopathy. Right cervical: No superficial cervical adenopathy. Left cervical: No superficial cervical adenopathy. Upper Body:      Right upper body: No supraclavicular adenopathy. Left upper body: No supraclavicular adenopathy. Comments: No bulky palpable cervical, clavicular, axillary or inguinal adenopathies on the left or right. Skin:     General: Skin is warm and dry. Coloration: Skin is jaundiced. Findings: No rash. Neurological:      Mental Status: She is alert and oriented to person, place, and time. Comments: follows commands, non-focal   Psychiatric:         Behavior: Behavior normal. Behavior is cooperative. Thought Content: Thought content normal.         Judgment: Judgment normal.      Comments: Alert and oriented to person, place and time. Vital Signs  /73   Pulse 76   Temp 97.9 °F (36.6 °C) (Temporal)   Resp 14   Ht 5' 7\" (1.702 m)   Wt 102 lb 14.4 oz (46.7 kg)   SpO2 96%   BMI 16.12 kg/m²     Intake/Output Summary (Last 24 hours) at 12/4/2020 0630  Last data filed at 12/3/2020 1834  Gross per 24 hour   Intake 1050 ml   Output --   Net 1050 ml     Labs:  CBC:   Recent Labs     12/02/20 0425 12/03/20  0430   WBC 12.0* 9.7   HGB 7.8* 7.3*    158     CMP:   Recent Labs     12/02/20 0425 12/03/20  0430   GLUCOSE 100 117*   BUN 6 7   CREATININE 0.5 0.2*   CO2 24 26   CALCIUM 8.0* 8.0*   ALKPHOS 621* 627*   AST 40* 44*   ALT 26 25     Hepatic:   Recent Labs     12/02/20 0425 12/03/20  0430   AST 40* 44*   ALT 26 25   BILITOT 12.8* 13.6*   ALKPHOS 621* 627*     Troponin: No results for input(s): TROPONINI in the last 72 hours.   BNP: No results for prognosis-secondary to advanced malignancy     #Physical deconditioning-secondary to malignancy and weight loss     #Hyponatremia-sodium level improving, 131 on 12/3/2020  CMP pending this am, follow     #leukocytosis-improved     #Cancer related pain-fentanyl as needed  Fentanyl patch 25 mcg/hr initiated 12/1/2020  Continues fentanyl 25 mcg IV every 2 hours PRN     #Vitamin D deficiency-consider replacement as per hospitalist.        PLAN:  Awaiting transfer to [facility] for attempt biliary drain +/- stenting if possible - agree with plan of care, hopefully transfer today  palliative care following, appreciate assist  Hospice recommended following palliative biliary drain, patient contemplating  Fentanyl Patch 25 mcg and fentanyl 25 mcg IV every 2 hours PRN  CBC pending this am, follow regarding anemia  Supportive care      Discussed with patient  Discussed with RN, Inder Pitts Via Chayito Henderson, APRN  12/04/20  6:30 AM    Physician's attestation/substantial contribution:  I, Dr Melita Christie, independently performed an evaluation on [facility]. I have reviewed relevant medical information/data to include but not limited to medication list, relevant appropriate labs and imaging when applicable. I reviewed other physician's notes, ancillary services and nurses assessment. I have reviewed the above documentation completed by the Nurse Practitioner or Physician Assistant. Please see my additional contributions to the history of present illness, physical examination, and assessment/medical decision-making that reflect my findings and impressions. I discussed essential elements of the care plan with the NP or PA and the patient as well. I answered all the questions to the patient's satisfaction. I concur with above stated. Subjective-no change. Abdominal pain is better  Objective-no change  Assessment/plan:  Advanced pancreatic cancer-not a candidate for further anticancer therapy.   Biliary obstruction-await transfer to tertiary center. Anemia-secondary to GI bleed. Hemoglobin 7.3.     Lorenda Lennox, MD

## 2020-12-04 NOTE — DISCHARGE INSTR - LAB
Patient: Anuja Sheehan 1960   Order:Referral To Dr. Alexey Lockett for outpatient procedure   Dx: Recurrent Pancreatic Adenocarcinoma with obstruction    LOCATION:    [facility] [id number],  [facility]. [id number]    7

## 2020-12-10 ENCOUNTER — TELEPHONE (OUTPATIENT)
Dept: GASTROENTEROLOGY | Age: 60
End: 2020-12-10

## 2020-12-10 NOTE — TELEPHONE ENCOUNTER
I have requested the records from [facility]. I can see that Dr Connie Tinsley sent her there, just want the reports/discharge. Once I receive, I will ask him about it. Records scanned in, will ask Dr Connie Tinsley.

## 2020-12-10 NOTE — PROGRESS NOTES
Burnie Lefort   1960 12/11/2020     Chief Complaint   Patient presents with    Follow-up     cancer    Cancer        INTERVAL HISTORY/HISTORY OF PRESENT ILLNESS:  The patient is a very pleasant 64years old female who has an unfortunate diagnosis of pancreatic cancer initially diagnosed in January 2018.  She underwent neoadjuvant chemotherapy followed by Whipple procedure at [facility]. Lorne Neff received additional adjuvant chemotherapy after her surgery. Unfortunately, she had disease progression and was therefore started again on palliative chemotherapy.  She has received several lines of therapy.  Over the last few weeks and months she has been steadily declining.  She has had several hospitalizations for GI bleed. She was recently found to have biliary obstruction. She was transferred to [facility] and had a percutaneous transhepatic biliary drainage. Her performance status has been steadily declining.     ONCOLOGIC HISTORY:   Diagnosis:  · Pancreatic adenocarcinoma, January 2018   · [id number]  · 7/6/2019-extended genetic panel-negative for a deleterious mutation     Treatment summary:  · March 2018-Surgical consultation at [address] [place] [address] operable   · 4/3/2018 through 6/4/2018 Neoadjuvant chemotherapy FOLFORINOX ×5 Biweekly cycles   · 4/11/2018-Biliary stent   · August 2018- XRT 30 Gy/Xeloda   · 08/30/3018- [facility]ipple procedure @ [facility]   · Recommended another 5 biweekly cycles of modified FOLFORINOX completed 12/26/2018   · 7/9/2019- 1/22/2020 Gemzar/Abraxane 125 mg/m2 q 14 days, discontinued due to progression of disease  · 3/4/2020- Irinotecan liposome 70 mg/m² with leucovorin 400 mg/m² and 5-FU 2400 mg/m² over 46 hours every 2 weeks.     Tumor marker  · 3/12/0337-LJ-08-9->430->370. · 4/30/2018 - CA 19- 9 of 157   · 5/25/20185406-WB-22-9->32 after 4 cycles.    · 08/02/2018- -> 8   · 10/01/2018- CA 19-9 -5   · 10/29/2018-CA 19-9- 7   · 12/3/5617-ZD-49-9-7   · 04/11/2019-CA-19-9- marginated low density nodule located in the tail of the pancreas measuring 1.5 x 1.3 x 1.5 cm (IPMN?). · 3/6/2018-CA 19-9 was elevated at 150. Repeat EGD was performed by Dr. Noah Mcmahan due to the inconclusive FNA resulted on 2/7/2018. Pathology FNA was consistent with pancreatic adenocarcinoma. · 3/12/2018-she was first seen by me. No evidence of distant disease. Referral to Surgical oncology. CT chest to complete staging. CA-19-9 430. · 3/16/2018-CT of the chest was unremarkable for intrathoracic metastatic disease   · Surgery consultation at [place] March 2018- with Dr Homer Hawley. She was not a surgical candidate upfront. Recommended neoadjuvant chemotherapy. · 4/3/2018-started on neoadjuvant chemotherapy with FOLFORINOX. · 4/11/2018-she developed CBD obstruction had a CBD stent placed by Dr. Omar Nageotte. · 4/3/2018 through 6/4/2018 Neoadjuvant chemotherapy FOLFORINOX ×5 Biweekly cycles   · August 2018- XRT 30 Gy/Xeloda   · 08/30/3018- Whipple procedure at [facility] by Dr. Baylee Steele  · Recommended another 7 biweekly cycles of modified FOLFORINOX. · 9/5/2018-CT of the chest abdomen pelvis was unremarkable for metastatic disease. · 1/14/2019-CT abdomen and pelvis at [facility] showed no evidence of metastasis in the chest abdomen pelvis. · 5/21/2019-CT chest, abdomen, pelvis at [facility] showed no evidence of metastatic disease   · 5/21/20192277-PC-05-9 = 42   · 06/12/2019- CA-19-9 = 154. Discussed with the patient. We'll also discuss with [facility]. · 7/1/2019-CT chest, abdomen, pelvis showed a soft tissue mass measuring 1.4 x 1.1 cm, not present on prior scan from January 2019, concerning for recurrence. Stable hypodense in the liver, too small to characterize. Subcentimeter ill-defined area of low attenuation liver may represent an early metastasis.    · 7/3/2019-recommended palliative chemotherapy with nab paclitaxel 125mg/m² IV over 30 minutes on day 1 and gemcitabine 600 mg/m² IV over 10mg/m2/min (fixed dose rate) on day 1  · 7/2/2019-CA 19 9 = 114  · 7/6/2019- extended genetic panel negative for a deleterious mutation (invitae)  · 7/9/2019- CA-19-9 = 225  · 8/6/2019- CA-19-9 = 171  · 9/3/4720-QT-86-9 = 198   · 9/13/20197600-JS-84-9 = 98 at [facility]  · 9/13/2019-CT chest abdomen pelvis at [facility] showed a soft tissue thickening in the pancreatic bed with persistent narrowing of the portal SMV confluence.  Superimposed tumor remains a possibility.  The new low-density lesion in the periphery of the liver seen on the prior exam is no longer appreciated and likely represents treatment effect.  Nodular enhancement may represent perfusion change in the region on image 187. · 9/13/2018- she was recommended to continue current treatment with Gemzar/Abraxane  · 11/1/2019- she was hospitalized with GI bleed.  An upper endoscopy showed ulceration at the efferent loop of the Whipple's procedure  · 10/29/5836-YQ-88-9 =317  · 11/21/2019-CA-19-9 = 183  · 11/19/2019- CT chest abdomen pelvis showed no evidence of gross disease progression. Improvement of the caliber of what may be a middle colic vein that drains back to the SMV. There is still persistent narrowing of the of the upper SMV at the juncture with the portal vein.  No definite evidence of liver metastases at this time. No definite evidence of pulmonary metastases.  However, question of slight increase in prominence of subtle soft tissue thickening within the right paracolic gutter could be indicative of metastatic disease to peritoneum.   · 12/9/2019- colonoscopy by GI was unremarkable.  This was performed for complaints of maroon-colored stools.   · 1/23/20202426-HB-73-9 = 520  · 1/28/2020- CT chest abdomen pelvis at [facility] showed progressive disease with recurrence at the retroperitoneal just inferior to the pancreaticojejunostomy with vascular involvement and complete occlusion of the portal vein and SMV resulting in worsening bowel edema and patient tolerated the proceed well. · 8/24/2020- FOLFOX palliative treatment. · 10/5/2020- CA-19-9 = 1250. · 11/2/20 CA 19-9 3550  · 11/9/20 Ct Chest W Contrast No evidence of metastatic disease in the chest. See separately dictated CT abdomen and pelvis of the same day regarding soft tissue of the proximal pancreas.   · 11/9/20 Ct Abdomen Pelvis W Iv Contrast Postsurgical changes of the distal stomach, duodenum and the common bile duct. Ill-defined lobulated soft tissue mass in the area of the head of the pancreas and in the distal part of the gastric remnant may represent postsurgical changes or a mass/recurrent mass. Persistent dilatation of the pancreatic duct in the body and tail of the pancreas. The distal pancreatic duct is not visualized are evaluated (due to prior surgery). Evidence of intrahepatic biliary dilatation and pneumobilia similar to the previous study. Persistent moderate dilatation and thickening of the wall of the descending and transverse duodenum may represent postsurgical changes/edema due to protein losing enteropathy. Moderate amount of abdominal and pelvic ascites which has somewhat improved since the previous study. Persistent thrombosis of the distal superior mesenteric vein and the splenic vein, similar to the previous study. The Marked dilatation and enlargement of the pelvic veins and both ovarian veins, left larger than the right. No thrombosis. · 11/30/2020-CA-19-9 =65959  · 11/30/2020-Ct Abdomen Pelvis W Contrast The patient is status post Whipple procedure. There is some mild thickening noted at the level of the distal gastrectomy. The gastrojejunostomy is patent. There is evidence of a neoplasm involving the region of the pancreatic head. The pancreatic head likely was resected at the time of the Whipple procedure and this may represent localized recurrence. 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 The SMV and splenic vein just proximal to the confluence to form the portal vein are also occluded. There is some involvement with the celiac axis which appears to show slight improvement as there did appear to be some circumferential narrowing of the proximal segment of the splenic and common hepatic artery on the previous exam which I do not see on the current study. Overall I feel the mass is stable in size from the previous study. There is some progressive biliary dilatation with what appears to be abrupt occlusion of the common duct just below the kathy hepatis. Previously noted pneumobilia is no longer visualized. This would suggest there has been some progressive occlusion/stenosis of the common duct. Moderate constipation. There is some stasis of the small bowel with mild fluid distention with a few air-fluid levels. There is free fluid in the paracolic gutters and pelvis. No evidence of pneumoperitoneum or mechanical bowel obstruction. · 12/1/2020- Mri Abdomen W Wo Contrast Mrcp Marked biliary ductal dilatation related to apparent tumor recurrence in the pancreas head region as described above and also detailed on the CT examination performed today.     PAST MEDICAL HISTORY:   Past Medical History:   Diagnosis Date    Acute pancreatitis     Adult BMI <19 kg/sq m     Anemia     Cat esophagus     Biliary obstruction     GERD (gastroesophageal reflux disease)     with Barretts    Hashimoto's thyroiditis     denies takes no med for    Heart murmur     Hypertension     pt denies nor longer takes med for    Low blood sugar     h/o when overweight in the past    MVP (mitral valve prolapse)     Myalgia     Palliative care patient 05/13/2020    Pancreatic adenocarcinoma ([facility] [address] 01/2018    Dr Mar Fernandez    Pancreatic cancer ([facility] [address] 3/30/2018    Right flank pain     RUQ pain           PAST SURGICAL HISTORY:  Past Surgical History:   Procedure Laterality Date    CARDIAC CATHETERIZATION heart cath     SECTION      x 3    CHOLECYSTECTOMY  1997    COLONOSCOPY  years ago    [facility]-Dr Ragini Foster per patient    COLONOSCOPY  2014    Dr Angie Du- Tereza Sahni recall    COLONOSCOPY  03/10/2016    Dr Mcfarland-10 yr recall    COLONOSCOPY N/A 2019    Dr Emory Solares, 5 yr recall    ELBOW SURGERY Right     ENDOMETRIAL ABLATION      HAND SURGERY Right     HERNIA REPAIR      hiatal    HERNIA REPAIR      umbilical    HERNIA REPAIR      OTHER SURGICAL HISTORY  2020    Dr Carlotta Irwin w/internal metallic 10 mm x 60 mm biliary stent placement w/external biliary drainage    PANCREAS SURGERY  2018    Whipple procedure-Dr Patsy Eldridge [place] EGD [facility] NEEDLE ASPIR/BIOP ALTERED ANATOMY N/A 2018    Dr Vini Ho w/fna-Dilation of main pancreatic duct with diffuse change in the pancreatitis noted, area of concern in the neck of the pancreas-strongly suspicious for adenocarcinoma     [place] EGD [facility] NEEDLE ASPIR/BIOP ALTERED ANATOMY N/A 3/6/2018    Dr KVNG Duncan-Pancreatic cancer-Ductal adenocarcinoma-staged [id number] by EUS, pancreatic pseudocyst in the tail the pancreas    [place] ERCP DX COLLECTION SPECIMEN BRUSHING/WASHING N/A 2018    Dr KVNG Duncan-w/placement of a self-expanding fully covered 10 mm biliary stent    UPPER GASTROINTESTINAL ENDOSCOPY  years ago    [facility]-Dr Lance Jose    UPPER GASTROINTESTINAL ENDOSCOPY      Zuniga    UPPER GASTROINTESTINAL ENDOSCOPY N/A 2019    Dr Tiff Gonzales post Whipple's procedure with efferent loop ulceration    UPPER GASTROINTESTINAL ENDOSCOPY  2019    Dr Lillian Duncan-Patent G-J Anastomosis, apparent healing ulceration    UPPER GASTROINTESTINAL ENDOSCOPY N/A 2020    Dr Kvng Hernandez amount of food retention in the stomach with bile, hiatal hernia, will need repeat    UPPER GASTROINTESTINAL ENDOSCOPY N/A 2020    Dr Jamison erosion/ulceration at the suture line, post whipple surgical changes    UPPER GASTROINTESTINAL ENDOSCOPY N/A 3/9/2020    Dr Grzegorz Tran erosion along the previous whipple suture line    UPPER GASTROINTESTINAL ENDOSCOPY N/A 2020    Dr KVNG Duncan-w/hemostatic clip placement x 1-Allie Peres tear at [facility]-Likely culprit lesion for current presentation    UPPER GASTROINTESTINAL ENDOSCOPY N/A 2020    Dr Grzegorz Tran erosion along the previous whipple suture line        SOCIAL HISTORY:  Social History     Socioeconomic History    Marital status:      Spouse name: None    Number of children: 3    Years of education: None    Highest education level: None   Occupational History    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[place] [address] Occupation: [employer]    Occupation: [employer]   Social Needs    Financial resource strain: None    Food insecurity     Worry: None     Inability: None    Transportation needs     Medical: None     Non-medical: None   Tobacco Use    Smoking status: Former Smoker     Packs/day: 0.50     Years: 10.00     Pack years: 5.00     Types: Cigarettes     Last attempt to quit: 2019     Years since quittin.1    Smokeless tobacco: Never Used   Substance and Sexual Activity    Alcohol use: Not Currently    Drug use: Never    Sexual activity: None     Comment: 3   Lifestyle    Physical activity     Days per week: None     Minutes per session: None    Stress: None   Relationships    Social connections     Talks on phone: None     Gets together: None     Attends Druze service: None     Active member of club or organization: None     Attends meetings of clubs or organizations: None     Relationship status: None    Intimate partner violence     Fear of current or ex partner: None     Emotionally abused: None     Physically abused: None     Forced sexual activity: None   Other Topics Concern    None   Social History Narrative    CODE STATUS: Full Code    HEALTH CARE PROXY: her daughter, Mrs. Maloney Sportsman, of [facility]    AMBULATES: independently    DOMICILED: lives in a private home, without steps inside, lives alone, has 2 dogs, no steps in to home       FAMILY HISTORY:  Family History   Problem Relation Age of Onset    Heart Attack Mother 46        x 2-had bypass    Hypertension Mother     COPD Father     Liver Cancer Paternal Aunt     Lung Cancer Paternal Aunt     Breast Cancer Maternal Aunt         but  of brain cancer    Diabetes Maternal Uncle     Diabetes Maternal Grandmother     Colon Cancer Neg Hx     Colon Polyps Neg Hx     Esophageal Cancer Neg Hx     Rectal Cancer Neg Hx     Stomach Cancer Neg Hx         Current Outpatient Medications   Medication Sig Dispense Refill    fentaNYL (DURAGESIC) 12 MCG/HR Place 1 patch onto the skin every 3 days for 30 days. Intended supply: 30 days 10 patch 0    fentaNYL (DURAGESIC) 25 MCG/HR Place 1 patch onto the skin every 72 hours for 30 days. 10 patch 0    gabapentin (NEURONTIN) 100 MG capsule Take 1 capsule by mouth 3 times daily for 120 days. Intended supply: 90 days 90 capsule 3    pantoprazole (PROTONIX) 40 MG tablet Take 1 tablet by mouth 2 times daily 60 tablet 3    Cyanocobalamin (VITAMIN B 12 PO) Take by mouth      promethazine (PHENERGAN) 25 MG tablet Take 1 tablet by mouth every 6 hours as needed for Nausea 30 tablet 2    sucralfate (CARAFATE) 1 GM tablet Take 1 tablet by mouth 4 times daily 360 tablet 1    lidocaine-prilocaine (EMLA) 2.5-2.5 % cream Apply to port area and cover with plastic wrap one hour prior to use.  1 Tube 1    vitamin E 400 UNIT capsule Take 400 Units by mouth daily      NONFORMULARY daily Tumeric otc      ondansetron (ZOFRAN) 8 MG tablet Take 1 tablet by mouth every 8 hours as needed for Nausea or Vomiting 30 tablet 3    Multiple Vitamins-Minerals (THERAPEUTIC MULTIVITAMIN-MINERALS) tablet Take 1 tablet by mouth daily      prochlorperazine (COMPAZINE) 10 MG tablet Take 1 tablet by mouth every 6 hours as needed (nausea) 60 tablet 5     No current facility-administered medications for this visit. REVIEW OF SYSTEMS:    Constitutional: no fever, no night sweats, fatigue;   HEENT: no blurring of vision, no double vision, no hearing difficulty, no tinnitus,no ulceration, no dysphagia  Lungs: no cough, no shortness of breath, no wheeze;   CVS: no palpitation, no chest pain, no shortness of breath;  GI: no abdominal pain, no nausea , no vomiting, no constipation;   VIKRAM: no dysuria, frequency and urgency, no hematuria, no kidney stones;   Musculoskeletal: no joint pain, swelling , stiffness;   Endocrine: no polyuria, polydypsia, no cold or heat intolerence; Hematology/lymphatic: no easy brusing or bleeding, no hx of clotting disorder; no peripheral adenopathy. Dermatology: no skin rash, no eczema, no pruritis;   Psychiatry: no depression, no anxiety,no panic attacks, no suicide ideation; Neurology: no syncope, no seizures, no numbness or tingling of hands, no numbness or tingling of feet, no paresis;     PHYSICAL EXAM:    Vitals signs:  /88   Pulse 67   Temp 97.3 °F (36.3 °C)   Ht 5' 7\" (1.702 m)   Wt 100 lb 6.4 oz (45.5 kg)   SpO2 99%   BMI 15.72 kg/m²    Pain scale:  Pain Score:   0 - No pain     CONSTITUTIONAL: Alert, appropriate, no acute distress,   EYES: Non icteric, EOM intact, pupils equal round and reactive to light and accommodation. ENT: Oral mucus membranes moist, no oral pharyngeal lesions. External inspection of ears and nose are normal.   NECK: Supple, no masses. No palpable thyroid mass    CHEST/LUNGS: CTA bilaterally, normal respiratory effort   CARDIOVASCULAR: RRR, no murmurs. No lower extremity edema   ABDOMEN: soft non-tender, active bowel sounds, no hepatosplenomegaly. No palpable masses. EXTREMITIES: warm, Full ROM of all fours extremities. No focal weakness.   SKIN: warm, dry with no rashes or lesions  LYMPH: No cervical, clavicular, axillary, or inguinal lymphadenopathy  NEUROLOGIC: follows commands, non focal.   PSYCH: mood and affect appropriate. Alert and oriented to time and place and person. Relevant Lab findings/reviewed by me:  Lab Results   Component Value Date    WBC 9.02 12/11/2020    HGB 8.0 (L) 12/11/2020    HCT 24.6 (LL) 12/11/2020    MCV 95.7 (H) 12/11/2020     (L) 12/11/2020     Lab Results   Component Value Date    NEUTROABS 7.39 (H) 12/11/2020       Relevant Imaging studies/reviewed by me:  Ct Abdomen Pelvis W Iv Contrast Additional Contrast? Oral    Result Date: 11/30/2020  1. . The patient is status post Whipple procedure. There is some mild thickening noted at the level of the distal gastrectomy. The gastrojejunostomy is patent. 2. There is evidence of a neoplasm involving the region of the pancreatic head. The pancreatic head likely was resected at the time of the Whipple procedure and this may represent localized recurrence. 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 The SMV and splenic vein just proximal to the confluence to form the portal vein are also occluded. There is some involvement with the celiac axis which appears to show slight improvement as there did appear to be some circumferential narrowing of the proximal segment of the splenic and common hepatic artery on the previous exam which I do not see on the current study. Overall I feel the mass is stable in size from the previous study. There is some progressive biliary dilatation with what appears to be abrupt occlusion of the common duct just below the kathy hepatis. Previously noted pneumobilia is no longer visualized. This would suggest there has been some progressive occlusion/stenosis of the common duct. 3. Moderate constipation. There is some stasis of the small bowel with mild fluid distention with a few air-fluid levels. There is free fluid in the paracolic gutters and pelvis.  No evidence of pneumoperitoneum or mechanical bowel obstruction. Signed by Dr Mandy Monroe on 11/30/2020 10:09 PM    Mri Abdomen W Wo Contrast Mrcp    Result Date: 12/1/2020  1. Marked biliary ductal dilatation related to apparent tumor recurrence in the pancreas head region as described above and also detailed on the CT examination performed today. Signed by Dr Alma Johnson on 12/1/2020 7:40 AM    ASSESSMENT:    Orders Placed This Encounter   Procedures    Comprehensive Metabolic Panel     Standing Status:   Future     Number of Occurrences:   1     Standing Expiration Date:   12/11/2021      Dixie Reich was seen today for follow-up and cancer. Diagnoses and all orders for this visit:    Malignant neoplasm of pancreas, unspecified location of malignancy ([facility] [address]  -     fentaNYL (DURAGESIC) 12 MCG/HR; Place 1 patch onto the skin every 3 days for 30 days. Intended supply: 30 days  -     Comprehensive Metabolic Panel; Future    Malignant ascites    Care plan discussed with patient    Cancer associated pain  -     fentaNYL (DURAGESIC) 12 MCG/HR; Place 1 patch onto the skin every 3 days for 30 days. Intended supply: 30 days    Counseling regarding advanced care planning and goals of care    Normocytic anemia    Physical deconditioning    Biliary obstruction    Essentially, the patient has advanced pancreatic malignancy status post progression on several lines of therapy. More recently she developed biliary obstruction status post percutaneous transhepatic drainage placement. She will be followed by GI next week. Her performance status has been declining steadily over the last few weeks. She has lost more weight. At this point, she is not a candidate for further anticancer therapy due to poor performance status. She has cancer associated pain has been started on fentanyl 37 mcg. She was provided further refills today. I had a long discussion with her about goals of care.   I have recommended hospice for which she is contemplative at this time. She will discuss with her family and call hospice if she decides to proceed with them. She has a history of anemia due to GI blood loss. Hemoglobin is 8.0 today. She denies any hematochezia, or melena. I reviewed her CBC and CMP. Hemoglobin stable at 8.0. Bilirubin trending down. She is to follow-up with Dr. Micaela Humphries next week. PLAN:  · Hospice recommended - patient contemplating and will call office with decision  · Fentanyl Patch 25 mcg and add Fentanyl 12 mcg IV every 72 hours for now -will give script for Fentanyl 37.5mcg patch for next refill  · Follow-up with Dr. Roxana Rapp for biliary drain care  · Supportive care for now  · CMP today  · CBC today  · RTC with MD by virtual visit 2 weeks-before [place]    Follow Up:     Return in about 2 weeks (around 12/25/2020) for Appointment with Dr. Monroe Herron by VV. VV phone # [phone]     Yonathan Presidio am scribing for Karen Jaquez MD. Electronically signed by Kinza Frost RN on 12/11/2020 at 3:41 PM CST. I, Dr Jeramy Sanchez, personally performed the services described in this documentation as scribed by Kinza Frost RN in my presence and is both accurate and complete. I have seen, examined and reviewed this patient medication list, appropriate labs and imaging studies. I reviewed relevant medical records and others physicians notes. I discussed the plans of care with the patient. I answered all the questions to the patients satisfaction. I have also reviewed the chief complaint (CC) and part of the history (History of Present Illness (HPI), Past Family Social History [facility]), or Review of Systems (ROS) and made changes when appropriated.        (Please note that portions of this note were completed with a voice recognition program. Efforts were made to edit the dictations but occasionally words are mis-transcribed.)   Over 50% of the total visit time of 40 minutes in face to face encounter with the patient, out of which more than 50% of the time was spent in counseling patient or family and coordination of care. Counseling included but was not limited to time spent reviewing labs, imaging studies/ treatment plan and answering questions.

## 2020-12-10 NOTE — TELEPHONE ENCOUNTER
Pt was referred by Dr. Ramona Croft to Dr. Rosalina Talamantes. The pt has a procedure done and was advised to contact Dr. Saima Trevizo to have the drain removed.

## 2020-12-11 ENCOUNTER — OFFICE VISIT (OUTPATIENT)
Dept: HEMATOLOGY | Age: 60
End: 2020-12-11
Payer: COMMERCIAL

## 2020-12-11 ENCOUNTER — HOSPITAL ENCOUNTER (OUTPATIENT)
Dept: INFUSION THERAPY | Age: 60
Discharge: HOME | End: 2020-12-11
Payer: COMMERCIAL

## 2020-12-11 VITALS
OXYGEN SATURATION: 99 % | DIASTOLIC BLOOD PRESSURE: 88 MMHG | TEMPERATURE: 97.3 F | WEIGHT: 100.4 LBS | SYSTOLIC BLOOD PRESSURE: 136 MMHG | HEIGHT: 67 IN | HEART RATE: 67 BPM | BODY MASS INDEX: 15.76 KG/M2

## 2020-12-11 DIAGNOSIS — C25.9 MALIGNANT NEOPLASM OF PANCREAS, UNSPECIFIED LOCATION OF MALIGNANCY (HCC): ICD-10-CM

## 2020-12-11 LAB
ALBUMIN SERPL-MCNC: 3.1 G/DL (ref 3.5–5.2)
ALP BLD-CCNC: 769 U/L (ref 35–104)
ALT SERPL-CCNC: 36 U/L (ref 9–52)
ANION GAP SERPL CALCULATED.3IONS-SCNC: 7 MMOL/L (ref 7–19)
AST SERPL-CCNC: 49 U/L (ref 14–36)
BASOPHILS ABSOLUTE: 0.01 K/UL (ref 0.01–0.08)
BASOPHILS RELATIVE PERCENT: 0.1 % (ref 0.1–1.2)
BILIRUB SERPL-MCNC: 5.8 MG/DL (ref 0.2–1.3)
BUN BLDV-MCNC: 8 MG/DL (ref 7–17)
CALCIUM SERPL-MCNC: 8.7 MG/DL (ref 8.4–10.2)
CHLORIDE BLD-SCNC: 98 MMOL/L (ref 98–111)
CO2: 30 MMOL/L (ref 22–29)
CREAT SERPL-MCNC: 0.6 MG/DL (ref 0.5–1)
EOSINOPHILS ABSOLUTE: 0.05 K/UL (ref 0.04–0.54)
EOSINOPHILS RELATIVE PERCENT: 0.6 % (ref 0.7–7)
GFR NON-AFRICAN AMERICAN: >60
GLOBULIN: 3.8 G/DL
GLUCOSE BLD-MCNC: 137 MG/DL (ref 74–106)
HCT VFR BLD CALC: 24.6 % (ref 34.1–44.9)
HEMOGLOBIN: 8 G/DL (ref 11.2–15.7)
LYMPHOCYTES ABSOLUTE: 0.78 K/UL (ref 1.18–3.74)
LYMPHOCYTES RELATIVE PERCENT: 8.6 % (ref 19.3–53.1)
MCH RBC QN AUTO: 31.1 PG (ref 25.6–32.2)
MCHC RBC AUTO-ENTMCNC: 32.5 G/DL (ref 32.3–35.5)
MCV RBC AUTO: 95.7 FL (ref 79.4–94.8)
MONOCYTES ABSOLUTE: 0.79 K/UL (ref 0.24–0.82)
MONOCYTES RELATIVE PERCENT: 8.8 % (ref 4.7–12.5)
NEUTROPHILS ABSOLUTE: 7.39 K/UL (ref 1.56–6.13)
NEUTROPHILS RELATIVE PERCENT: 81.9 % (ref 34–71.1)
PDW BLD-RTO: 25.4 % (ref 11.7–14.4)
PLATELET # BLD: 175 K/UL (ref 182–369)
PMV BLD AUTO: 10.5 FL (ref 7.4–10.4)
POTASSIUM SERPL-SCNC: 4.1 MMOL/L (ref 3.5–5.1)
RBC # BLD: 2.57 M/UL (ref 3.93–5.22)
SODIUM BLD-SCNC: 135 MMOL/L (ref 137–145)
TOTAL PROTEIN: 7 G/DL (ref 6.3–8.2)
WBC # BLD: 9.02 K/UL (ref 3.98–10.04)

## 2020-12-11 PROCEDURE — 99211 OFF/OP EST MAY X REQ PHY/QHP: CPT

## 2020-12-11 PROCEDURE — 85025 COMPLETE CBC W/AUTO DIFF WBC: CPT

## 2020-12-11 PROCEDURE — 80053 COMPREHEN METABOLIC PANEL: CPT

## 2020-12-11 PROCEDURE — 99214 OFFICE O/P EST MOD 30 MIN: CPT | Performed by: INTERNAL MEDICINE

## 2020-12-11 PROCEDURE — 99401 PREV MED CNSL INDIV APPRX 15: CPT | Performed by: INTERNAL MEDICINE

## 2020-12-11 RX ORDER — FENTANYL 12 UG/H
1 PATCH TRANSDERMAL
Qty: 10 PATCH | Refills: 0 | Status: SHIPPED | OUTPATIENT
Start: 2020-12-11 | End: 2021-01-04 | Stop reason: SDUPTHER

## 2020-12-15 ENCOUNTER — OFFICE VISIT (OUTPATIENT)
Dept: GASTROENTEROLOGY | Age: 60
End: 2020-12-15
Payer: MEDICARE

## 2020-12-15 VITALS
WEIGHT: 98 LBS | DIASTOLIC BLOOD PRESSURE: 88 MMHG | OXYGEN SATURATION: 99 % | HEIGHT: 67 IN | BODY MASS INDEX: 15.38 KG/M2 | SYSTOLIC BLOOD PRESSURE: 132 MMHG | HEART RATE: 66 BPM

## 2020-12-15 DIAGNOSIS — C25.9 MALIGNANT NEOPLASM OF PANCREAS, UNSPECIFIED LOCATION OF MALIGNANCY (HCC): ICD-10-CM

## 2020-12-15 DIAGNOSIS — Z51.5 PALLIATIVE CARE PATIENT: ICD-10-CM

## 2020-12-15 DIAGNOSIS — K83.1 OBSTRUCTIVE JAUNDICE: Primary | ICD-10-CM

## 2020-12-15 PROCEDURE — 99213 OFFICE O/P EST LOW 20 MIN: CPT | Performed by: INTERNAL MEDICINE

## 2020-12-22 NOTE — PROGRESS NOTES
· 3/4/2020- Irinotecan liposome 70 mg/m² with leucovorin 400 mg/m² and 5-FU 2400 mg/m² over 46 hours every 2 weeks.     Tumor marker  · 3/12/2018CA-19-9>430->370. · 4/30/2018 - CA 19- 9 of 157   · 5/25/2018CA-19-9->32 after 4 cycles. · 08/02/2018- -> 8   · 10/01/2018- CA 19-9 -5   · 10/29/2018CA 19-9 7   · 12/3/2018CA-19-97   · 04/11/2019-CA-19-9- 12   · 05/21/2019- CA 19-9- 41   · 7/2/2019CA 19 9 = 114  · 7/9/2019 CA-19-9 = 225  · 8/6/2019 CA-19-9 = 171  · 9/3/2019CA-19-9 = 198  · 9/13/2019CA-19-9 = 98 (at MD [address])  · 10/29/2019CA-19-9 =317  · 11/21/2019CA-19-9 = 183  · 1/23/2020CA-19-9 = 520  · 4/22/2020 CA-19-9 = 940  · 5/13/2020CA-19-9 = 129  · 6/1/2020CA-19-9 = 523??  · 7/6/2020 CA 19-9- 483  · 8/17/2020CA-19-9 = 785  · 10/5/2020CA-19-9 = 1250  · 11/30/2020CA 19-9 = 78606        Cancer history  Ms Sanjay Whitt was seen in initial oncology consultation on 3/12/2018 referred by Dr. Kiko Sanchez for a diagnosis of pancreatic adenocarcinoma. She was initially evaluated for acute pancreatitis at [facility] on February 2018. Further imaging revealed a pancreatic head mass. Lipase was elevated at 1184 and CA-19-9 elevated 134. The symptoms were going on for several weeks. Weight loss of 10-12 pounds over the last 6 months. · October 2017CT abdomen without contrast was unremarkable. · 2/2/2018ultrasound of abdomen showed a pancreatic duct dilation   · 2/02/2018CT abdomen showed 16 mm low-density lesion in the pancreas at the junction of the head of the body. No intra-abdominal adenopathy. No liver metastasis. · 2/7/2018the patient underwent EGD/EUS and FNA biopsy of a pancreatic mass by Dr. Anuradha Monsalve at [facility] . EUS showed inflammatory changes consistent with a recent history of pancreatitis. Main pancreatic duct was dilated. No concerning peripancreatic adenopathy. No definite mass was seen by a more diffuse hypoechoic area measuring 1.8 x 2.2 cm in the head of the pancreas. Pathology was consistent with a group of markedly atypical cells strongly suspicious for adenocarcinoma. · 2/28/2018CT pancreatic protocol showed a poorly defined area of decreased enhancement located in the head of the pancreas measuring 1.8 x 1.4 x 1.7 cm with moderate meditation of the pancreatic duct. Well defined sharply marginated low density nodule located in the tail of the pancreas measuring 1.5 x 1.3 x 1.5 cm (IPMN?). · 3/6/2018CA 19-9 was elevated at 150. Repeat EGD was performed by Dr. Rosalinda Gama due to the inconclusive FNA resulted on 2/7/2018. Pathology FNA was consistent with pancreatic adenocarcinoma. · 3/12/2018she was first seen by me. No evidence of distant disease. Referral to Surgical oncology. CT chest to complete staging. CA-19-9 430. · 3/16/2018CT of the chest was unremarkable for intrathoracic metastatic disease   · Surgery consultation at [address] [place] [address] March 2018- with Dr Joseph Obrien. She was not a surgical candidate upfront. Recommended neoadjuvant chemotherapy. · 4/3/2018started on neoadjuvant chemotherapy with FOLFORINOX. · 4/11/2018she developed CBD obstruction had a CBD stent placed by Dr. Anuradha Monsalve. · 4/3/2018 through 6/4/2018 Neoadjuvant chemotherapy FOLFORINOX ×5 Biweekly cycles   · August 2018- XRT 30 Gy/Xeloda   · 08/30/3018- Whipple procedure at [facility] by Dr. Rashel Shaw  · Recommended another 7 biweekly cycles of modified FOLFORINOX. · 9/5/2018CT of the chest abdomen pelvis was unremarkable for metastatic disease. · 1/14/2019CT abdomen and pelvis at [facility] showed no evidence of metastasis in the chest abdomen pelvis. · 5/21/2019CT chest, abdomen, pelvis at [facility] showed no evidence of metastatic disease   · 5/21/2019CA-19-9 = 42   · 06/12/2019- CA-19-9 = 154. Discussed with the patient. We'll also discuss with [facility]. · 7/1/2019CT chest, abdomen, pelvis showed a soft tissue mass measuring 1.4 x 1.1 cm, not present on prior scan from January 2019, concerning for recurrence. Stable hypodense in the liver, too small to characterize. Subcentimeter ill-defined area of low attenuation liver may represent an early metastasis. · 7/3/2019recommended palliative chemotherapy with nab paclitaxel 125mg/m² IV over 30 minutes on day 1 and gemcitabine 600 mg/m² IV over 10mg/m2/min (fixed dose rate) on day 1  · 7/2/2019CA 19 9 = 114  · 7/6/2019 extended genetic panel negative for a deleterious mutation (invitae)  · 7/9/2019 CA-19-9 = 225  · 8/6/2019 CA-19-9 = 171  · 9/3/2019CA-19-9 = 198   · 9/13/2019CA-19-9 = 98 at [facility]  · 9/13/2019CT chest abdomen pelvis at [facility] showed a soft tissue thickening in the pancreatic bed with persistent narrowing of the portal SMV confluence.  Superimposed tumor remains a possibility.  The new low-density lesion in the periphery of the liver seen on the prior exam is no longer appreciated and likely represents treatment effect.  Nodular enhancement may represent perfusion change in the region on image 187.   · 9/13/2018 she was recommended to continue current treatment with Gemzar/Abraxane  · 11/1/2019 she was hospitalized with GI bleed.  An upper endoscopy showed ulceration at the efferent loop of the Whipple's procedure  · 10/29/2019CA-19-9 =317  · 11/21/2019CA-19-9 = 183 · 11/19/2019 CT chest abdomen pelvis showed no evidence of gross disease progression. Improvement of the caliber of what may be a middle colic vein that drains back to the SMV. There is still persistent narrowing of the of the upper SMV at the juncture with the portal vein.  No definite evidence of liver metastases at this time. No definite evidence of pulmonary metastases.  However, question of slight increase in prominence of subtle soft tissue thickening within the right paracolic gutter could be indicative of metastatic disease to peritoneum.   · 12/9/2019- colonoscopy by GI was unremarkable.  This was performed for complaints of maroon-colored stools. · 1/23/2020CA-19-9 = 520  · 1/28/2020 CT chest abdomen pelvis at [facility] showed progressive disease with recurrence at the retroperitoneal just inferior to the pancreaticojejunostomy with vascular involvement and complete occlusion of the portal vein and SMV resulting in worsening bowel edema and developing ascites. This is consistent with disease progression. · 3/4/2020- 4th line therapy Irinotecan liposome 70 mg/m² with leucovorin 400 mg/m² and 5-FU 2400 mg/m² over 46 hours every 2 weeks. · 8/21/2020 CT Chest/Abdomen/Pelvis- No acute intrathoracic abnormality.  Hiatal hernia containing ascitic fluid. Questionable wall thickening of the distal esophagus.  No pathologic intrathoracic or axillary lymphadenopathy. Stable subcentimeter left supraclavicular/lower jugular chain and subcarinal lymph nodes compared to 5/12/2020.  Previous Whipple procedure with associated pneumobilia. Stable diffuse prominence of the intrapancreatic duct within the residual pancreatic body and tail with no discrete pancreatic mass or convincing evidence of solid organ metastasis.  Small volume of ascites. Ultrasound-guided paracentesis performed prior to this exam. 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 Multiple collateral vascular structures described above.  Diffuse soft tissue anasarca. Trace left pleural effusion. Wall thickening of nondilated small bowel loops and rectosigmoid colon may relate to hypoproteinemia and third spacing of fluid. Nonspecific inflammatory infectious enterocolitis is a second possibility. Moderate size hiatal hernia. · 8/21/2020 Us Guided Paracentesis-Ultrasound-guided abdominal paracentesis performed for diagnostic and therapeutic purposes. The patient tolerated the proceed well. · 8/24/2020 FOLFOX palliative treatment. · 10/5/2020 CA-19-9 = 1250.    · 11/2/20 CA 19-9 3550  · 11/9/20 Ct Chest W Contrast No evidence of metastatic disease in the chest. See separately dictated CT abdomen and pelvis of the same day regarding soft tissue of the proximal pancreas.  · 11/9/20 Ct Abdomen Pelvis W Iv Contrast Postsurgical changes of the distal stomach, duodenum and the common bile duct. Ill-defined lobulated soft tissue mass in the area of the head of the pancreas and in the distal part of the gastric remnant may represent postsurgical changes or a mass/recurrent mass. Persistent dilatation of the pancreatic duct in the body and tail of the pancreas. The distal pancreatic duct is not visualized are evaluated (due to prior surgery). Evidence of intrahepatic biliary dilatation and pneumobilia similar to the previous study. Persistent moderate dilatation and thickening of the wall of the descending and transverse duodenum may represent postsurgical changes/edema due to protein losing enteropathy. Moderate amount of abdominal and pelvic ascites which has somewhat improved since the previous study. Persistent thrombosis of the distal superior mesenteric vein and the splenic vein, similar to the previous study. The Marked dilatation and enlargement of the pelvic veins and both ovarian veins, left larger than the right. No thrombosis.    · 11/30/2020CA-19-9 =67161 · 11/30/2020-Ct Abdomen Pelvis W Contrast The patient is status post Whipple procedure. There is some mild thickening noted at the level of the distal gastrectomy. The gastrojejunostomy is patent. There is evidence of a neoplasm involving the region of the pancreatic head. The pancreatic head likely was resected at the time of the Whipple procedure and this may represent localized recurrence. 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 The SMV and splenic vein just proximal to the confluence to form the portal vein are also occluded. There is some involvement with the celiac axis which appears to show slight improvement as there did appear to be some circumferential narrowing of the proximal segment of the splenic and common hepatic artery on the previous exam which I do not see on the current study. Overall I feel the mass is stable in size from the previous study. There is some progressive biliary dilatation with what appears to be abrupt occlusion of the common duct just below the kathy hepatis. Previously noted pneumobilia is no longer visualized. This would suggest there has been some progressive occlusion/stenosis of the common duct. Moderate constipation. There is some stasis of the small bowel with mild fluid distention with a few air-fluid levels. There is free fluid in the paracolic gutters and pelvis. No evidence of pneumoperitoneum or mechanical bowel obstruction. · 12/1/2020- Mri Abdomen W Wo Contrast Mrcp Marked biliary ductal dilatation related to apparent tumor recurrence in the pancreas head region as described above and also detailed on the CT examination performed today. · 12/24/2020interval progression of her pancreatic cancer. Very poor performance status. Not a candidate for further anticancer therapy. She was also hospice but is contemplative.     PAST MEDICAL HISTORY:   Past Medical History:   Diagnosis Date  Acute pancreatitis     Adult BMI <19 kg/sq m     Anemia     Cat esophagus     Biliary obstruction     GERD (gastroesophageal reflux disease)     with Barretts    Hashimoto's thyroiditis     denies takes no med for    Heart murmur     Hypertension     pt denies nor longer takes med for    Low blood sugar     h/o when overweight in the past    MVP (mitral valve prolapse)     Myalgia     Palliative care patient 2020    Pancreatic adenocarcinoma ([facility] [address] 2018    Dr [address]    Pancreatic cancer ([facility] [address] 3/30/2018    Right flank pain     RUQ pain           PAST SURGICAL HISTORY:  Past Surgical History:   Procedure Laterality Date    CARDIAC CATHETERIZATION      heart cath     SECTION      x 3    CHOLECYSTECTOMY  1997    COLONOSCOPY  years ago    [facility]-Dr Valorie Marlow per patient    [facility]    COLONOSCOPY  03/10/2016    Dr Mcfarland-10 yr recall    COLONOSCOPY N/A 2019    Dr Isabel Frankel, 5 yr recall    ELBOW SURGERY Right     ENDOMETRIAL ABLATION      HAND SURGERY Right     HERNIA REPAIR      hiatal    HERNIA REPAIR      umbilical    HERNIA REPAIR      OTHER SURGICAL HISTORY  2020    Dr Farooq Cavanaugh w/internal metallic 10 mm x 60 mm biliary stent placement w/external biliary drainage    PANCREAS SURGERY  2018    Whipple procedure-Dr Emiliano Campos [place] EGD [facility] NEEDLE ASPIR/BIOP ALTERED ANATOMY N/A 2018    Dr Trinidad Whitman w/fna-Dilation of main pancreatic duct with diffuse change in the pancreatitis noted, area of concern in the neck of the pancreas-strongly suspicious for adenocarcinoma     [place] EGD [facility] NEEDLE ASPIR/BIOP ALTERED ANATOMY N/A 3/6/2018    Dr KVNG Duncan-Pancreatic cancer-Ductal adenocarcinoma-staged [id number] by EUS, pancreatic pseudocyst in the tail the pancreas    [place] ERCP DX COLLECTION SPECIMEN BRUSHING/WASHING N/A 2018 Dr KVNG Duncan-gianluca/placement of a self-expanding fully covered 10 mm biliary stent    UPPER GASTROINTESTINAL ENDOSCOPY  years ago    [facility]-Dr Carlyn Connors    UPPER GASTROINTESTINAL ENDOSCOPY      Zuniga    UPPER GASTROINTESTINAL ENDOSCOPY N/A 2019    Dr Tereso Bautista post Whipple's procedure with efferent loop ulceration    UPPER GASTROINTESTINAL ENDOSCOPY  2019    Dr Shannon Duncan-Patent G-J Anastomosis, apparent healing ulceration    UPPER GASTROINTESTINAL ENDOSCOPY N/A 2020    Dr Feliciano Marshall amount of food retention in the stomach with bile, hiatal hernia, will need repeat    UPPER GASTROINTESTINAL ENDOSCOPY N/A 2020    Dr Mendoza Most erosion/ulceration at the suture line, post whipple surgical changes    UPPER GASTROINTESTINAL ENDOSCOPY N/A 3/9/2020    Dr Mendoza Most erosion along the previous whipple suture line    UPPER GASTROINTESTINAL ENDOSCOPY N/A 2020    Dr KVNG Duncan-gianluca/hemostatic clip placement x 1-Allie Peres tear at [facility]-Likely culprit lesion for current presentation    UPPER GASTROINTESTINAL ENDOSCOPY N/A 2020    Dr Mendoza Most erosion along the previous whipple suture line        SOCIAL HISTORY:  Social History     Socioeconomic History    Marital status:      Spouse name: None    Number of children: 3    Years of education: None    Highest education level: None   Occupational History    Occupation: retired chemical operqator at [employer] Occupation: [employer]    Occupation: [employer]   Social Needs    Financial resource strain: None    Food insecurity     Worry: None     Inability: None    Transportation needs     Medical: None     Non-medical: None   Tobacco Use    Smoking status: Former Smoker     Packs/day: 0.50     Years: 10.00     Pack years: 5.00     Types: Cigarettes     Quit date: 2019     Years since quittin.1    Smokeless tobacco: Never Used   Substance and Sexual Activity  Alcohol use: Not Currently    Drug use: Never    Sexual activity: None     Comment: 3   Lifestyle    Physical activity     Days per week: None     Minutes per session: None    Stress: None   Relationships    Social connections     Talks on phone: None     Gets together: None     Attends Holiness service: None     Active member of club or organization: None     Attends meetings of clubs or organizations: None     Relationship status: None    Intimate partner violence     Fear of current or ex partner: None     Emotionally abused: None     Physically abused: None     Forced sexual activity: None   Other Topics Concern    None   Social History Narrative    CODE STATUS: Full Code    HEALTH CARE PROXY: her daughter, Mrs. Concepción Holland, of [facility] [employer]    AMBULATES: independently    DOMICILED: lives in a private home, without steps inside, lives alone, has 2 dogs, no steps in to home       FAMILY HISTORY:  Family History   Problem Relation Age of Onset    Heart Attack Mother 46        x 2-had bypass    Hypertension Mother     COPD Father     Liver Cancer Paternal Aunt     Lung Cancer Paternal Aunt     Breast Cancer Maternal Aunt         but  of brain cancer    Diabetes Maternal Uncle     Diabetes Maternal Grandmother     Colon Cancer Neg Hx     Colon Polyps Neg Hx     Esophageal Cancer Neg Hx     Rectal Cancer Neg Hx     Stomach Cancer Neg Hx         Current Outpatient Medications   Medication Sig Dispense Refill    ondansetron (ZOFRAN) 8 MG tablet Take 1 tablet by mouth every 8 hours as needed for Nausea or Vomiting 30 tablet 5    fentaNYL (DURAGESIC) 12 MCG/HR Place 1 patch onto the skin every 3 days for 30 days. Intended supply: 30 days 10 patch 0    fentaNYL (DURAGESIC) 25 MCG/HR Place 1 patch onto the skin every 72 hours for 30 days.  10 patch 0    pantoprazole (PROTONIX) 40 MG tablet Take 1 tablet by mouth 2 times daily 60 tablet 3    Cyanocobalamin (VITAMIN B 12 PO) Take by mouth · Fentanyl Patch 25 mcg and add Fentanyl 12 mcg IV every 72 hours for now -will give script for Fentanyl 37.5mcg patch for next refill  · Follow-up with Dr. Anuradha Monsalve for biliary drain care  · Supportive care for now  · CMP today  · CBC today  · RTC with MD by virtual visit 2 weeks-before [place]    Follow Up:     No follow-ups on file. [employer], am scribing for Veto Pollard MD. Electronically signed by Rachel Kerr RN on 12/24/2020 at 5:13 PM CST. I, Dr Zak Allison, personally performed the services described in this documentation as scribed by Rachel Kerr RN in my presence and is both accurate and complete. I have seen, examined and reviewed this patient medication list, appropriate labs and imaging studies. I reviewed relevant medical records and others physicians notes. I discussed the plans of care with the patient. I answered all the questions to the patients satisfaction. I have also reviewed the chief complaint (CC) and part of the history (History of Present Illness (HPI), Past Family Social History [facility]), or Review of Systems (ROS) and made changes when appropriated. (Please note that portions of this note were completed with a voice recognition program. Efforts were made to edit the dictations but occasionally words are mis-transcribed.)   Over 50% of the total visit time of 12 minutes in face to face encounter with the patient, out of which more than 50% of the time was spent in counseling patient or family and coordination of care. Counseling included but was not limited to time spent reviewing labs, imaging studies/ treatment plan and answering questions. 12/23/2020    TELEHEALTH EVALUATION -- Audio/Visual (During [id number] public health emergency)  Ulises Joiner is a 64 y.o. female evaluated via telephone on 12/23/2020.       Consent: She and/or health care decision maker is aware that that she may receive a bill for this telephone service, depending on her insurance coverage, and has provided verbal consent to proceed: Yes      Documentation:  I communicated with the patient and/or health care decision maker about as above. Details of this discussion including any medical advice provided: as above      I affirm this is a Patient Initiated Episode with a Patient who has not had a related appointment within my department in the past 7 days or scheduled within the next 24 hours.     Patient identification was verified at the start of the visit: Yes    Total Time: minutes: 5-10 minutes    Note: not billable if this call serves to triage the patient into an appointment for the relevant [facility] Service

## 2020-12-23 ENCOUNTER — VIRTUAL VISIT (OUTPATIENT)
Dept: HEMATOLOGY | Age: 60
End: 2020-12-23
Payer: MEDICARE

## 2020-12-23 ENCOUNTER — TELEPHONE (OUTPATIENT)
Dept: HEMATOLOGY | Age: 60
End: 2020-12-23

## 2020-12-23 PROCEDURE — 99442 PR PHYS/QHP TELEPHONE EVALUATION 11-20 MIN: CPT | Performed by: INTERNAL MEDICINE

## 2020-12-23 RX ORDER — ONDANSETRON HYDROCHLORIDE 8 MG/1
8 TABLET, FILM COATED ORAL EVERY 8 HOURS PRN
Qty: 30 TABLET | Refills: 5 | Status: SHIPPED | OUTPATIENT
Start: 2020-12-23

## 2020-12-23 NOTE — TELEPHONE ENCOUNTER
Called and got patient registered for telephone visit with Dr Zuly Kaplan and she accepted the insurance disclaimer.

## 2021-01-04 DIAGNOSIS — C25.9 MALIGNANT NEOPLASM OF PANCREAS, UNSPECIFIED LOCATION OF MALIGNANCY (HCC): ICD-10-CM

## 2021-01-04 DIAGNOSIS — G89.3 CANCER ASSOCIATED PAIN: ICD-10-CM

## 2021-01-04 RX ORDER — FENTANYL 12 UG/H
1 PATCH TRANSDERMAL
Qty: 10 PATCH | Refills: 0 | Status: SHIPPED | OUTPATIENT
Start: 2021-01-04 | End: 2021-02-03

## 2021-01-04 RX ORDER — FENTANYL 25 UG/H
1 PATCH TRANSDERMAL
Qty: 10 PATCH | Refills: 0 | Status: SHIPPED | OUTPATIENT
Start: 2021-01-04 | End: 2021-02-03 | Stop reason: SDUPTHER

## 2021-01-11 NOTE — PROGRESS NOTES
Raghu Cruz   1960 1/12/2021     Chief Complaint   Patient presents with    Follow-up     Malignant neoplasm of pancreas, unspecified location of malignancy ([address]        INTERVAL HISTORY/HISTORY OF PRESENT ILLNESS:  The patient is a very pleasant 64years old female who has an unfortunate diagnosis of pancreatic cancer initially diagnosed in January 2018.  She underwent neoadjuvant chemotherapy followed by Whipple procedure at [facility]. Nani Joseph received additional adjuvant chemotherapy after her surgery. Unfortunately, she had disease progression and was therefore started again on palliative chemotherapy.  She has received several lines of therapy.  Over the last few weeks and months she has been steadily declining.  She has had several hospitalizations for GI bleed. She was recently found to have biliary obstruction. She was transferred to [facility] and had a percutaneous transhepatic biliary drainage. Unfortunately, she is not a candidate for further anticancer therapy. She has been offered hospice but has not discussed that yet with her family. She has been weaker overall. She gets pretty fatigued with mild activity. She feels that she has been eating better. She participated in visual fields today on 1/12/2021. She was sleepy. She reported the pain has been under control.   She has not been eating very well and has been quite weak.     ONCOLOGIC HISTORY:   Diagnosis:  · Pancreatic adenocarcinoma, January 2018   · [id number]  · 7/6/2019extended genetic panelnegative for a deleterious mutation     Treatment summary:  · March 2018Surgical consultation at [address] [place] [address] operable   · 4/3/2018 through 6/4/2018 Neoadjuvant chemotherapy FOLFORINOX ×5 Biweekly cycles   · 4/11/2018Biliary stent   · August 2018- XRT 30 Gy/Xeloda   · 08/30/3018- Whipple procedure @ [facility]   · Recommended another 5 biweekly cycles of modified FOLFORINOX completed 12/26/2018 · 7/9/2019 1/22/2020 Gemzar/Abraxane 125 mg/m2 q 14 days, discontinued due to progression of disease  · 3/4/2020- Irinotecan liposome 70 mg/m² with leucovorin 400 mg/m² and 5-FU 2400 mg/m² over 46 hours every 2 weeks.     Tumor marker  · 3/12/2018CA-19-9>430->370. · 4/30/2018 - CA 19- 9 of 157   · 5/25/2018CA-19-9->32 after 4 cycles. · 08/02/2018- -> 8   · 10/01/2018- CA 19-9 -5   · 10/29/2018CA 19-9 7   · 12/3/2018CA-19-97   · 04/11/2019-CA-19-9- 12   · 05/21/2019- CA 19-9- 41   · 7/2/2019CA 19 9 = 114  · 7/9/2019 CA-19-9 = 225  · 8/6/2019 CA-19-9 = 171  · 9/3/2019CA-19-9 = 198  · 9/13/2019CA-19-9 = 98 (at  [address])  · 10/29/2019CA-19-9 =317  · 11/21/2019CA-19-9 = 183  · 1/23/2020CA-19-9 = 520  · 4/22/2020 CA-19-9 = 940  · 5/13/2020CA-19-9 = 129  · 6/1/2020CA-19-9 = 523??  · 7/6/2020 CA 19-9- 483  · 8/17/2020CA-19-9 = 785  · 10/5/2020CA-19-9 = 1250  · 11/30/2020CA 19-9 = 74459        Cancer history  Ms Mauricio Loredo was seen in initial oncology consultation on 3/12/2018 referred by Dr. Quita Squires for a diagnosis of pancreatic adenocarcinoma. She was initially evaluated for acute pancreatitis at [facility] on February 2018. Further imaging revealed a pancreatic head mass. Lipase was elevated at 1184 and CA-19-9 elevated 134. The symptoms were going on for several weeks. Weight loss of 10-12 pounds over the last 6 months. · October 2017CT abdomen without contrast was unremarkable. · 2/2/2018ultrasound of abdomen showed a pancreatic duct dilation   · 2/02/2018CT abdomen showed 16 mm low-density lesion in the pancreas at the junction of the head of the body. No intra-abdominal adenopathy. No liver metastasis. · 2/7/2018the patient underwent EGD/EUS and FNA biopsy of a pancreatic mass by Dr. Zeke Ferrera at [facility] . EUS showed inflammatory changes consistent with a recent history of pancreatitis. Main pancreatic duct was dilated. No concerning peripancreatic adenopathy. No definite mass was seen by a more diffuse hypoechoic area measuring 1.8 x 2.2 cm in the head of the pancreas. Pathology was consistent with a group of markedly atypical cells strongly suspicious for adenocarcinoma. · 2/28/2018CT pancreatic protocol showed a poorly defined area of decreased enhancement located in the head of the pancreas measuring 1.8 x 1.4 x 1.7 cm with moderate meditation of the pancreatic duct. Well defined sharply marginated low density nodule located in the tail of the pancreas measuring 1.5 x 1.3 x 1.5 cm (IPMN?). · 3/6/2018CA 19-9 was elevated at 150. Repeat EGD was performed by Dr. Wilfrid Resendiz due to the inconclusive FNA resulted on 2/7/2018. Pathology FNA was consistent with pancreatic adenocarcinoma. · 3/12/2018she was first seen by me. No evidence of distant disease. Referral to Surgical oncology. CT chest to complete staging. CA-19-9 430. · 3/16/2018CT of the chest was unremarkable for intrathoracic metastatic disease   · Surgery consultation at [facility] March 2018- with Dr Elisabeth Jesus. She was not a surgical candidate upfront. Recommended neoadjuvant chemotherapy. · 4/3/2018started on neoadjuvant chemotherapy with FOLFORINOX. · 4/11/2018she developed CBD obstruction had a CBD stent placed by Dr. Zeke Ferrera. · 4/3/2018 through 6/4/2018 Neoadjuvant chemotherapy FOLFORINOX ×5 Biweekly cycles   · August 2018- XRT 30 Gy/Xeloda   · 08/30/3018- Whipple procedure at [facility] by Dr. Jaden Poe  · Recommended another 7 biweekly cycles of modified FOLFORINOX. · 9/5/2018CT of the chest abdomen pelvis was unremarkable for metastatic disease. · 1/14/2019CT abdomen and pelvis at [facility] showed no evidence of metastasis in the chest abdomen pelvis. · 5/21/2019CT chest, abdomen, pelvis at [facility] showed no evidence of metastatic disease   · 5/21/2019CA-19-9 = 42   · 06/12/2019- CA-19-9 = 154. Discussed with the patient. We'll also discuss with [facility]. · 7/1/2019CT chest, abdomen, pelvis showed a soft tissue mass measuring 1.4 x 1.1 cm, not present on prior scan from January 2019, concerning for recurrence. Stable hypodense in the liver, too small to characterize. Subcentimeter ill-defined area of low attenuation liver may represent an early metastasis. · 7/3/2019recommended palliative chemotherapy with nab paclitaxel 125mg/m² IV over 30 minutes on day 1 and gemcitabine 600 mg/m² IV over 10mg/m2/min (fixed dose rate) on day 1  · 7/2/2019CA 19 9 = 114  · 7/6/2019 extended genetic panel negative for a deleterious mutation (invitae)  · 7/9/2019 CA-19-9 = 225  · 8/6/2019 CA-19-9 = 171  · 9/3/2019CA-19-9 = 198   · 9/13/2019CA-19-9 = 98 at [facility]  · 9/13/2019CT chest abdomen pelvis at [facility] showed a soft tissue thickening in the pancreatic bed with persistent narrowing of the portal SMV confluence.  Superimposed tumor remains a possibility.  The new low-density lesion in the periphery of the liver seen on the prior exam is no longer appreciated and likely represents treatment effect.  Nodular enhancement may represent perfusion change in the region on image 187.   · 9/13/2018 she was recommended to continue current treatment with Gemzar/Abraxane  · 11/1/2019 she was hospitalized with GI bleed.  An upper endoscopy showed ulceration at the efferent loop of the Whipple's procedure  · 10/29/2019CA-19-9 =317  · 11/21/2019CA-19-9 = 183 · 11/19/2019 CT chest abdomen pelvis showed no evidence of gross disease progression. Improvement of the caliber of what may be a middle colic vein that drains back to the SMV. There is still persistent narrowing of the of the upper SMV at the juncture with the portal vein.  No definite evidence of liver metastases at this time. No definite evidence of pulmonary metastases.  However, question of slight increase in prominence of subtle soft tissue thickening within the right paracolic gutter could be indicative of metastatic disease to peritoneum.   · 12/9/2019- colonoscopy by GI was unremarkable.  This was performed for complaints of maroon-colored stools. · 1/23/2020CA-19-9 = 520  · 1/28/2020 CT chest abdomen pelvis at [facility] showed progressive disease with recurrence at the retroperitoneal just inferior to the pancreaticojejunostomy with vascular involvement and complete occlusion of the portal vein and SMV resulting in worsening bowel edema and developing ascites. This is consistent with disease progression. · 3/4/2020- 4th line therapy Irinotecan liposome 70 mg/m² with leucovorin 400 mg/m² and 5-FU 2400 mg/m² over 46 hours every 2 weeks. · 8/21/2020 CT Chest/Abdomen/Pelvis- No acute intrathoracic abnormality.  Hiatal hernia containing ascitic fluid. Questionable wall thickening of the distal esophagus.  No pathologic intrathoracic or axillary lymphadenopathy. Stable subcentimeter left supraclavicular/lower jugular chain and subcarinal lymph nodes compared to 5/12/2020.  Previous Whipple procedure with associated pneumobilia. Stable diffuse prominence of the intrapancreatic duct within the residual pancreatic body and tail with no discrete pancreatic mass or convincing evidence of solid organ metastasis.  Small volume of ascites. Ultrasound-guided paracentesis performed prior to this exam. 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 Multiple collateral vascular structures described above.  Diffuse soft tissue anasarca. Trace left pleural effusion. Wall thickening of nondilated small bowel loops and rectosigmoid colon may relate to hypoproteinemia and third spacing of fluid. Nonspecific inflammatory infectious enterocolitis is a second possibility. Moderate size hiatal hernia. · 8/21/2020 Us Guided Paracentesis-Ultrasound-guided abdominal paracentesis performed for diagnostic and therapeutic purposes. The patient tolerated the proceed well. · 8/24/2020 FOLFOX palliative treatment. · 10/5/2020 CA-19-9 = 1250.    · 11/2/20 CA 19-9 3550  · 11/9/20 Ct Chest W Contrast No evidence of metastatic disease in the chest. See separately dictated CT abdomen and pelvis of the same day regarding soft tissue of the proximal pancreas.  · 11/9/20 Ct Abdomen Pelvis W Iv Contrast Postsurgical changes of the distal stomach, duodenum and the common bile duct. Ill-defined lobulated soft tissue mass in the area of the head of the pancreas and in the distal part of the gastric remnant may represent postsurgical changes or a mass/recurrent mass. Persistent dilatation of the pancreatic duct in the body and tail of the pancreas. The distal pancreatic duct is not visualized are evaluated (due to prior surgery). Evidence of intrahepatic biliary dilatation and pneumobilia similar to the previous study. Persistent moderate dilatation and thickening of the wall of the descending and transverse duodenum may represent postsurgical changes/edema due to protein losing enteropathy. Moderate amount of abdominal and pelvic ascites which has somewhat improved since the previous study. Persistent thrombosis of the distal superior mesenteric vein and the splenic vein, similar to the previous study. The Marked dilatation and enlargement of the pelvic veins and both ovarian veins, left larger than the right. No thrombosis.    · 11/30/2020CA-19-9 =28858 Dr KVNG Duncan-gianluca/placement of a self-expanding fully covered 10 mm biliary stent    UPPER GASTROINTESTINAL ENDOSCOPY  years ago    Steve-Dr Yesenia Serrato    UPPER GASTROINTESTINAL ENDOSCOPY      Zuniga    UPPER GASTROINTESTINAL ENDOSCOPY N/A 2019    Dr Maris Singer post Whipple's procedure with efferent loop ulceration    UPPER GASTROINTESTINAL ENDOSCOPY  2019    Dr Rodríguez Duncan-Patent G-J Anastomosis, apparent healing ulceration    UPPER GASTROINTESTINAL ENDOSCOPY N/A 2020    Dr Shantel Monroe amount of food retention in the stomach with bile, hiatal hernia, will need repeat    UPPER GASTROINTESTINAL ENDOSCOPY N/A 2020    Dr Wilber Conner erosion/ulceration at the suture line, post whipple surgical changes    UPPER GASTROINTESTINAL ENDOSCOPY N/A 3/9/2020    Dr Wilber Conner erosion along the previous whipple suture line    UPPER GASTROINTESTINAL ENDOSCOPY N/A 2020    Dr KVNG Atkinson/hemostatic clip placement x 1-Allie Peres tear at [facility]-Likely culprit lesion for current presentation    UPPER GASTROINTESTINAL ENDOSCOPY N/A 2020    Dr Wilber Conner erosion along the previous whipple suture line        SOCIAL HISTORY:  Social History     Socioeconomic History    Marital status:      Spouse name: None    Number of children: 3    Years of education: None    Highest education level: None   Occupational History    Occupation: retired chemical operqator at [employer] Occupation: [employer]    Occupation: [employer]   Social Needs    Financial resource strain: None    Food insecurity     Worry: None     Inability: None    Transportation needs     Medical: None     Non-medical: None   Tobacco Use    Smoking status: Former Smoker     Packs/day: 0.50     Years: 10.00     Pack years: 5.00     Types: Cigarettes     Quit date: 2019     Years since quittin.2    Smokeless tobacco: Never Used   Substance and Sexual Activity  Alcohol use: Not Currently    Drug use: Never    Sexual activity: None     Comment: 3   Lifestyle    Physical activity     Days per week: None     Minutes per session: None    Stress: None   Relationships    Social connections     Talks on phone: None     Gets together: None     Attends Hinduism service: None     Active member of club or organization: None     Attends meetings of clubs or organizations: None     Relationship status: None    Intimate partner violence     Fear of current or ex partner: None     Emotionally abused: None     Physically abused: None     Forced sexual activity: None   Other Topics Concern    None   Social History Narrative    CODE STATUS: Full Code    HEALTH CARE PROXY: her daughter, [facility] Vick [facility]    AMBULATES: independently    DOMICILED: lives in a private home, without steps inside, lives alone, has 2 dogs, no steps in to home       FAMILY HISTORY:  Family History   Problem Relation Age of Onset    Heart Attack Mother 46        x 2-had bypass    Hypertension Mother     COPD Father     Liver Cancer Paternal Aunt     Lung Cancer Paternal Aunt     Breast Cancer Maternal Aunt         but  of brain cancer    Diabetes Maternal Uncle     Diabetes Maternal Grandmother     Colon Cancer Neg Hx     Colon Polyps Neg Hx     Esophageal Cancer Neg Hx     Rectal Cancer Neg Hx     Stomach Cancer Neg Hx         Current Outpatient Medications   Medication Sig Dispense Refill    pantoprazole (PROTONIX) 40 MG tablet TAKE 1 TABLET TWICE A DAY 60 tablet 5    fentaNYL (DURAGESIC) 25 MCG/HR Place 1 patch onto the skin every 72 hours for 30 days. 10 patch 0    fentaNYL (DURAGESIC) 12 MCG/HR Place 1 patch onto the skin every 3 days for 30 days.  Intended supply: 30 days 10 patch 0    ondansetron (ZOFRAN) 8 MG tablet Take 1 tablet by mouth every 8 hours as needed for Nausea or Vomiting 30 tablet 5  gabapentin (NEURONTIN) 100 MG capsule Take 1 capsule by mouth 3 times daily for 120 days. Intended supply: 90 days 90 capsule 3    Cyanocobalamin (VITAMIN B 12 PO) Take by mouth      promethazine (PHENERGAN) 25 MG tablet Take 1 tablet by mouth every 6 hours as needed for Nausea 30 tablet 2    sucralfate (CARAFATE) 1 GM tablet Take 1 tablet by mouth 4 times daily 360 tablet 1    vitamin E 400 UNIT capsule Take 400 Units by mouth daily      NONFORMULARY daily Tumeric otc      Multiple Vitamins-Minerals (THERAPEUTIC MULTIVITAMIN-MINERALS) tablet Take 1 tablet by mouth daily      prochlorperazine (COMPAZINE) 10 MG tablet Take 1 tablet by mouth every 6 hours as needed (nausea) 60 tablet 5    lidocaine-prilocaine (EMLA) 2.5-2.5 % cream Apply to port area and cover with plastic wrap one hour prior to use. (Patient not taking: Reported on 1/12/2021) 1 Tube 1     No current facility-administered medications for this visit. REVIEW OF SYSTEMS:    Constitutional: no fever, no night sweats, fatigue; decreased activity  HEENT: no blurring of vision, no double vision, no hearing difficulty, no tinnitus,no ulceration, no dysphagia  Lungs: no cough, no shortness of breath, no wheeze;   CVS: no palpitation, no chest pain, no shortness of breath;  GI: Decreased appetite, abdominal pain, no nausea , no vomiting, no constipation;   VIKRAM: no dysuria, frequency and urgency, no hematuria, no kidney stones;   Musculoskeletal: no joint pain, swelling , stiffness;   Endocrine: no polyuria, polydypsia, no cold or heat intolerence; Hematology/lymphatic: no easy brusing or bleeding, no hx of clotting disorder; no peripheral adenopathy. Dermatology: no skin rash, no eczema, no pruritis;   Psychiatry: no depression, no anxiety,no panic attacks, no suicide ideation;    Neurology: no syncope, no seizures, no numbness or tingling of hands, no numbness or tingling of feet, no paresis;     PHYSICAL EXAM:    Vitals signs: There were no vitals taken for this visit. Pain scale:    Relevant Lab findings/reviewed by me:  None    Relevant Imaging studies/reviewed by me:    None  ASSESSMENT:    No orders of the defined types were placed in this encounter. Barbara Hernandez was seen today for follow-up. Diagnoses and all orders for this visit:    Malignant neoplasm of pancreas, unspecified location of malignancy [facility])    Malignant ascites    Care plan discussed with patient    Counseling regarding advanced care planning and goals of care    Physical deconditioning    Cancer associated pain         Essentially, the patient has advanced pancreatic malignancy status post progression on several lines of therapy. More recently she developed biliary obstruction status post percutaneous transhepatic drainage placement. She will be followed by GI next week. Her performance status has been declining steadily over the last few weeks. She has lost more weight. At this point, she is not a candidate for further anticancer therapy due to poor performance status. Discussion of goals of careshe continues to be contemplative regarding hospice, although she is not receiving treatment at this time. Cancer related paincontinue with fentanyl 37.5 mcg. PLAN:  · Supportive care for now  · RTC with MD by virtual visit 4 weeks    Follow Up:     Return in about 4 weeks (around 2/9/2021) for Appointment with Dr. Robert Garcia by VV by phone visit. Data Rema Cesar am scribing for Cholo Das MD. Electronically signed by Hollie Nixon RN on 1/12/2021 at 4:43 PM CST. I, Dr Dayton Favre, personally performed the services described in this documentation as scribed by Hollie Nixon RN in my presence and is both accurate and complete. I have seen, examined and reviewed this patient medication list, appropriate labs and imaging studies. I reviewed relevant medical records and others physicians notes. I discussed the plans of care with the patient. I answered all the questions to the patients satisfaction. I have also reviewed the chief complaint (CC) and part of the history (History of Present Illness (HPI), Past Family Social History [facility]), or Review of Systems (ROS) and made changes when appropriated. (Please note that portions of this note were completed with a voice recognition program. Efforts were made to edit the dictations but occasionally words are mis-transcribed.)     Over 50% of the total visit time of 12 minutes in face to face encounter with the patient, out of which more than 50% of the time was spent in counseling patient or family and coordination of care. Counseling included but was not limited to time spent reviewing labs, imaging studies/ treatment plan and answering questions. 1/12/2021    TELEHEALTH EVALUATION -- Audio/Visual (During [id number] public health emergency)  Ana Franklin is a 64 y.o. female evaluated via telephone on 1/12/2021. Consent:  She and/or health care decision maker is aware that that she may receive a bill for this telephone service, depending on her insurance coverage, and has provided verbal consent to proceed: Yes      Documentation:  I communicated with the patient and/or health care decision maker about as above. Details of this discussion including any medical advice provided: as above      I affirm this is a Patient Initiated Episode with a Patient who has not had a related appointment within my department in the past 7 days or scheduled within the next 24 hours.     Patient identification was verified at the start of the visit: Yes    Total Time:12 min    Note: not billable if this call serves to triage the patient into an appointment for the relevant concern [facility]

## 2021-01-12 ENCOUNTER — VIRTUAL VISIT (OUTPATIENT)
Dept: HEMATOLOGY | Age: 61
End: 2021-01-12
Payer: MEDICARE

## 2021-01-12 ENCOUNTER — TELEPHONE (OUTPATIENT)
Dept: HEMATOLOGY | Age: 61
End: 2021-01-12

## 2021-01-12 DIAGNOSIS — Z71.89 COUNSELING REGARDING ADVANCED CARE PLANNING AND GOALS OF CARE: ICD-10-CM

## 2021-01-12 DIAGNOSIS — Z71.89 CARE PLAN DISCUSSED WITH PATIENT: ICD-10-CM

## 2021-01-12 DIAGNOSIS — R18.0 MALIGNANT ASCITES: ICD-10-CM

## 2021-01-12 DIAGNOSIS — G89.3 CANCER ASSOCIATED PAIN: ICD-10-CM

## 2021-01-12 DIAGNOSIS — C25.9 MALIGNANT NEOPLASM OF PANCREAS, UNSPECIFIED LOCATION OF MALIGNANCY (HCC): Primary | ICD-10-CM

## 2021-01-12 DIAGNOSIS — R53.81 PHYSICAL DECONDITIONING: ICD-10-CM

## 2021-01-12 PROCEDURE — 99442 PR PHYS/QHP TELEPHONE EVALUATION 11-20 MIN: CPT | Performed by: INTERNAL MEDICINE

## 2021-01-25 DIAGNOSIS — R18.0 MALIGNANT ASCITES: Primary | ICD-10-CM

## 2021-02-03 DIAGNOSIS — C25.9 MALIGNANT NEOPLASM OF PANCREAS, UNSPECIFIED LOCATION OF MALIGNANCY (HCC): ICD-10-CM

## 2021-02-03 RX ORDER — FENTANYL 25 UG/H
1 PATCH TRANSDERMAL
Qty: 10 PATCH | Refills: 0 | Status: SHIPPED | OUTPATIENT
Start: 2021-02-03 | End: 2021-03-03 | Stop reason: SDUPTHER

## 2021-02-08 NOTE — PROGRESS NOTES
Juliane Epperson   1960 2/9/2021     Chief Complaint   Patient presents with    Follow-up     Malignant neoplasm of pancreas, unspecified location of malignancy ([address]      INTERVAL HISTORY/HISTORY OF PRESENT ILLNESS:  The patient is a very pleasant 64years old female who has an unfortunate diagnosis of pancreatic cancer initially diagnosed in January 2018.  She underwent neoadjuvant chemotherapy followed by Whipple procedure at [facility]. Luiza Simpson received additional adjuvant chemotherapy after her surgery. Unfortunately, she had disease progression and was therefore started again on palliative chemotherapy.  She has received several lines of therapy.  Over the last few weeks and months she has been steadily declining.  She has had several hospitalizations for GI bleed. She was recently found to have biliary obstruction. She was transferred to [facility] and had a percutaneous transhepatic biliary drainage. Unfortunately, she is not a candidate for further anticancer therapy. She has been offered hospice but has not discussed that yet with her family. She has been weaker overall. She gets pretty fatigued with mild activity. She feels that she has been eating better. This is a virtual visit to follow-up her history of advanced pancreatic cancer. She has been feeling much weaker. She had abdominal distention and therefore underwent a paracentesis with removal of 4 L of fluid. She has been having more pain. She is on fentanyl 37 mcg daily. She takes Tylenol as needed. Not eating well. I offered her hospital stay she declined at this time.   She has been staying with her sister occasionally in [address] [place] [address]:   Diagnosis:  · Pancreatic adenocarcinoma, January 2018   · [id number]  · 7/6/2019extended genetic panelnegative for a deleterious mutation     Treatment summary:  · March 2018Surgical consultation at [facility] operable · 2/02/2018CT abdomen showed 16 mm low-density lesion in the pancreas at the junction of the head of the body. No intra-abdominal adenopathy. No liver metastasis. · 2/7/2018the patient underwent EGD/EUS and FNA biopsy of a pancreatic mass by Dr. Deion Harper at [facility] . EUS showed inflammatory changes consistent with a recent history of pancreatitis. Main pancreatic duct was dilated. No concerning peripancreatic adenopathy. No definite mass was seen by a more diffuse hypoechoic area measuring 1.8 x 2.2 cm in the head of the pancreas. Pathology was consistent with a group of markedly atypical cells strongly suspicious for adenocarcinoma. · 2/28/2018CT pancreatic protocol showed a poorly defined area of decreased enhancement located in the head of the pancreas measuring 1.8 x 1.4 x 1.7 cm with moderate meditation of the pancreatic duct. Well defined sharply marginated low density nodule located in the tail of the pancreas measuring 1.5 x 1.3 x 1.5 cm (IPMN?). · 3/6/2018CA 19-9 was elevated at 150. Repeat EGD was performed by Dr. Annie Muniz due to the inconclusive FNA resulted on 2/7/2018. Pathology FNA was consistent with pancreatic adenocarcinoma. · 3/12/2018she was first seen by me. No evidence of distant disease. Referral to Surgical oncology. CT chest to complete staging. CA-19-9 430. · 3/16/2018CT of the chest was unremarkable for intrathoracic metastatic disease   · Surgery consultation at [facility] March 2018- with Dr Hannah Vides. She was not a surgical candidate upfront. Recommended neoadjuvant chemotherapy. · 4/3/2018started on neoadjuvant chemotherapy with FOLFORINOX. · 4/11/2018she developed CBD obstruction had a CBD stent placed by Dr. Deion Harper.    · 4/3/2018 through 6/4/2018 Neoadjuvant chemotherapy FOLFORINOX ×5 Biweekly cycles   · August 2018- XRT 30 Gy/Xeloda   · 08/30/3018- Whipple procedure at [facility] by Dr. Judi Jones · Recommended another 7 biweekly cycles of modified FOLFORINOX. · 9/5/2018CT of the chest abdomen pelvis was unremarkable for metastatic disease. · 1/14/2019CT abdomen and pelvis at [facility] showed no evidence of metastasis in the chest abdomen pelvis. · 5/21/2019CT chest, abdomen, pelvis at [facility] showed no evidence of metastatic disease   · 5/21/2019CA-19-9 = 42   · 06/12/2019- CA-19-9 = 154. Discussed with the patient. We'll also discuss with [facility]. · 7/1/2019CT chest, abdomen, pelvis showed a soft tissue mass measuring 1.4 x 1.1 cm, not present on prior scan from January 2019, concerning for recurrence. Stable hypodense in the liver, too small to characterize. Subcentimeter ill-defined area of low attenuation liver may represent an early metastasis. · 7/3/2019recommended palliative chemotherapy with nab paclitaxel 125mg/m² IV over 30 minutes on day 1 and gemcitabine 600 mg/m² IV over 10mg/m2/min (fixed dose rate) on day 1  · 7/2/2019CA 19 9 = 114  · 7/6/2019 extended genetic panel negative for a deleterious mutation (invitae)  · 7/9/2019 CA-19-9 = 225  · 8/6/2019 CA-19-9 = 171  · 9/3/2019CA-19-9 = 198   · 9/13/2019CA-19-9 = 98 at [facility]  · 9/13/2019CT chest abdomen pelvis at [facility] showed a soft tissue thickening in the pancreatic bed with persistent narrowing of the portal SMV confluence.  Superimposed tumor remains a possibility.  The new low-density lesion in the periphery of the liver seen on the prior exam is no longer appreciated and likely represents treatment effect.  Nodular enhancement may represent perfusion change in the region on image 187.   · 9/13/2018 she was recommended to continue current treatment with Gemzar/Abraxane  · 11/1/2019 she was hospitalized with GI bleed.  An upper endoscopy showed ulceration at the efferent loop of the Whipple's procedure  · 10/29/2019CA-19-9 =317  · 11/21/2019CA-19-9 = 183 · 11/19/2019 CT chest abdomen pelvis showed no evidence of gross disease progression. Improvement of the caliber of what may be a middle colic vein that drains back to the SMV. There is still persistent narrowing of the of the upper SMV at the juncture with the portal vein.  No definite evidence of liver metastases at this time. No definite evidence of pulmonary metastases.  However, question of slight increase in prominence of subtle soft tissue thickening within the right paracolic gutter could be indicative of metastatic disease to peritoneum.   · 12/9/2019- colonoscopy by GI was unremarkable.  This was performed for complaints of maroon-colored stools. · 1/23/2020CA-19-9 = 520  · 1/28/2020 CT chest abdomen pelvis at [facility] showed progressive disease with recurrence at the retroperitoneal just inferior to the pancreaticojejunostomy with vascular involvement and complete occlusion of the portal vein and SMV resulting in worsening bowel edema and developing ascites. This is consistent with disease progression. · 3/4/2020- 4th line therapy Irinotecan liposome 70 mg/m² with leucovorin 400 mg/m² and 5-FU 2400 mg/m² over 46 hours every 2 weeks. · 6/21/2020- Ct Abdomen Pelvis W Contrast Prior Whipple procedure with expected pneumobilia. Large volume ascites for patient size. 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 Diffuse wall thickening along the small and large bowel secondary to this venous congestion. No evidence for arterial ischemia, as the bowel mucosa appears to enhance normally. No evidence of viscus perforation or peritoneal abscess. Of note, the the retroperitoneal structures are quite decompressed as a result of the ascites, with near complete attenuation of the IVC in the mid abdomen. Unsure if this is contributing to any lower extremity edema. If evidence of multiorgan dysfunction, abdominal compartment syndrome could also be considered. The results of this exam were discussed with Dr. Georgina Kyle on 6/21/2020 at 1002 hours. In particular, I wanted to address if there was indication/need for therapeutic paracentesis. This is under consideration. · 6/21/2020- US Guided Paracentesis Ultrasound-guided abdominal paracentesis performed for therapeutic purposes. A 60 cc aspirate was set aside for lab evaluation, if desired. The patient tolerated the proceed well.  Cytology was negative for malignant cells. · 7/6/2020 CA19.9- 483.   · 8/17/2020 CA19.9-785 · 8/21/2020 CT Chest/Abdomen/Pelvis- No acute intrathoracic abnormality.  Hiatal hernia containing ascitic fluid. Questionable wall thickening of the distal esophagus.  No pathologic intrathoracic or axillary lymphadenopathy. Stable subcentimeter left supraclavicular/lower jugular chain and subcarinal lymph nodes compared to 5/12/2020.  Previous Whipple procedure with associated pneumobilia. Stable diffuse prominence of the intrapancreatic duct within the residual pancreatic body and tail with no discrete pancreatic mass or convincing evidence of solid organ metastasis.  Small volume of ascites. Ultrasound-guided paracentesis performed prior to this exam. 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 Multiple collateral vascular structures described above.  Diffuse soft tissue anasarca. Trace left pleural effusion. Wall thickening of nondilated small bowel loops and rectosigmoid colon may relate to hypoproteinemia and third spacing of fluid. Nonspecific inflammatory infectious enterocolitis is a second possibility. Moderate size hiatal hernia. · 8/21/2020 Us Guided Paracentesis-Ultrasound-guided abdominal paracentesis performed for diagnostic and therapeutic purposes. The patient tolerated the proceed well. · 8/24/2020 FOLFOX palliative treatment. · 10/5/2020 CA-19-9 = 1250.    · 11/2/20 CA 19-9 3550  · 11/9/20 Ct Chest W Contrast No evidence of metastatic disease in the chest. See separately dictated CT abdomen and pelvis of the same day regarding soft tissue of the proximal pancreas.  · 11/9/20 Ct Abdomen Pelvis W Iv Contrast Postsurgical changes of the distal stomach, duodenum and the common bile duct. Ill-defined lobulated soft tissue mass in the area of the head of the pancreas and in the distal part of the gastric remnant may represent postsurgical changes or a mass/recurrent mass. Persistent dilatation of the pancreatic duct in the body and tail of the pancreas. The distal pancreatic duct is not visualized are evaluated (due to prior surgery). Evidence of intrahepatic biliary dilatation and pneumobilia similar to the previous study. Persistent moderate dilatation and thickening of the wall of the descending and transverse duodenum may represent postsurgical changes/edema due to protein losing enteropathy. Moderate amount of abdominal and pelvic ascites which has somewhat improved since the previous study. Persistent thrombosis of the distal superior mesenteric vein and the splenic vein, similar to the previous study. The Marked dilatation and enlargement of the pelvic veins and both ovarian veins, left larger than the right. No thrombosis.    · 11/30/2020CA-19-9 =64660 · 11/30/2020-Ct Abdomen Pelvis W Contrast The patient is status post Whipple procedure. There is some mild thickening noted at the level of the distal gastrectomy. The gastrojejunostomy is patent. There is evidence of a neoplasm involving the region of the pancreatic head. The pancreatic head likely was resected at the time of the Whipple procedure and this may represent localized recurrence. 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 The SMV and splenic vein just proximal to the confluence to form the portal vein are also occluded. There is some involvement with the celiac axis which appears to show slight improvement as there did appear to be some circumferential narrowing of the proximal segment of the splenic and common hepatic artery on the previous exam which I do not see on the current study. Overall I feel the mass is stable in size from the previous study. There is some progressive biliary dilatation with what appears to be abrupt occlusion of the common duct just below the kathy hepatis. Previously noted pneumobilia is no longer visualized. This would suggest there has been some progressive occlusion/stenosis of the common duct. Moderate constipation. There is some stasis of the small bowel with mild fluid distention with a few air-fluid levels. There is free fluid in the paracolic gutters and pelvis. No evidence of pneumoperitoneum or mechanical bowel obstruction. · 12/1/2020- Mri Abdomen W Wo Contrast Mrcp Marked biliary ductal dilatation related to apparent tumor recurrence in the pancreas head region as described above and also detailed on the CT examination performed. · 12/24/2020interval progression of her pancreatic cancer. Very poor performance status. Not a candidate for further anticancer therapy. She was also hospice but is contemplative.     PAST MEDICAL HISTORY:   Past Medical History:   Diagnosis Date  Acute pancreatitis     Adult BMI <19 kg/sq m     Anemia     Cat esophagus     Biliary obstruction     GERD (gastroesophageal reflux disease)     with Barretts    Hashimoto's thyroiditis     denies takes no med for    Heart murmur     Hypertension     pt denies nor longer takes med for    Low blood sugar     h/o when overweight in the past    MVP (mitral valve prolapse)     Myalgia     Palliative care patient 2020    Pancreatic adenocarcinoma ([facility] 2018    Dr Gearldine Angelucci    Pancreatic cancer ([facility] [address] 3/30/2018    Right flank pain     RUQ pain           PAST SURGICAL HISTORY:  Past Surgical History:   Procedure Laterality Date    CARDIAC CATHETERIZATION      heart cath     SECTION      x 3    CHOLECYSTECTOMY  1997    COLONOSCOPY  years ago    [facility]-Dr Clovis Gomes per patient    [facility]    COLONOSCOPY  03/10/2016    Dr Mcfarland-10 yr recall    COLONOSCOPY N/A 2019    Dr Moose Smith, 5 yr recall    ELBOW SURGERY Right     ENDOMETRIAL ABLATION      HAND SURGERY Right     HERNIA REPAIR      hiatal    HERNIA REPAIR      umbilical    HERNIA REPAIR      OTHER SURGICAL HISTORY  2020    Dr Misha Blum w/internal metallic 10 mm x 60 mm biliary stent placement w/external biliary drainage    PANCREAS SURGERY  2018    Whipple procedure-Dr Teressa Hardin [place] EGD [facility] NEEDLE ASPIR/BIOP ALTERED ANATOMY N/A 2018    Dr Andrea Yun w/fna-Dilation of main pancreatic duct with diffuse change in the pancreatitis noted, area of concern in the neck of the pancreas-strongly suspicious for adenocarcinoma     [place] EGD [facility] NEEDLE ASPIR/BIOP ALTERED ANATOMY N/A 3/6/2018    Dr KVNG Duncan-Pancreatic cancer-Ductal adenocarcinoma-staged [id number] by EUS, pancreatic pseudocyst in the tail the pancreas    [place] ERCP DX COLLECTION SPECIMEN BRUSHING/WASHING N/A 2018 Dr KVNG Duncan-w/placement of a self-expanding fully covered 10 mm biliary stent    UPPER GASTROINTESTINAL ENDOSCOPY  years ago    [facility]-Dr Gerri Hart    UPPER GASTROINTESTINAL ENDOSCOPY      Zuniga    UPPER GASTROINTESTINAL ENDOSCOPY N/A 2019    Dr Gala Richards post Whipple's procedure with efferent loop ulceration    UPPER GASTROINTESTINAL ENDOSCOPY  2019    Dr Fleet Apgar Jones-Patent G-J Anastomosis, apparent healing ulceration    UPPER GASTROINTESTINAL ENDOSCOPY N/A 2020    Dr Gilford Saupe amount of food retention in the stomach with bile, hiatal hernia, will need repeat    UPPER GASTROINTESTINAL ENDOSCOPY N/A 2020    Dr Sera Smith erosion/ulceration at the suture line, post whipple surgical changes    UPPER GASTROINTESTINAL ENDOSCOPY N/A 3/9/2020    Dr Sera Smith erosion along the previous whipple suture line    UPPER GASTROINTESTINAL ENDOSCOPY N/A 2020    Dr KVNG Duncan-gianluca/hemostatic clip placement x 1-Allie Peres tear at [facility]-Likely culprit lesion for current presentation    UPPER GASTROINTESTINAL ENDOSCOPY N/A 2020    Dr Sera Smith erosion along the previous whipple suture line        SOCIAL HISTORY:  Social History     Socioeconomic History    Marital status:      Spouse name: None    Number of children: 3    Years of education: None    Highest education level: None   Occupational History    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[place] [address] Occupation: [employer]    Occupation: [employer]   Social Needs    Financial resource strain: None    Food insecurity     Worry: None     Inability: None    Transportation needs     Medical: None     Non-medical: None   Tobacco Use    Smoking status: Former Smoker     Packs/day: 0.50     Years: 10.00     Pack years: 5.00     Types: Cigarettes     Quit date: 2019     Years since quittin.2    Smokeless tobacco: Never Used   Substance and Sexual Activity  Alcohol use: Not Currently    Drug use: Never    Sexual activity: None     Comment: 3   Lifestyle    Physical activity     Days per week: None     Minutes per session: None    Stress: None   Relationships    Social connections     Talks on phone: None     Gets together: None     Attends Mandaeism service: None     Active member of club or organization: None     Attends meetings of clubs or organizations: None     Relationship status: None    Intimate partner violence     Fear of current or ex partner: None     Emotionally abused: None     Physically abused: None     Forced sexual activity: None   Other Topics Concern    None   Social History Narrative    CODE STATUS: Full Code    HEALTH CARE PROXY: her daughter, Mrs. Sherif Castaneda, of [facility]    AMBULATES: independently    DOMICILED: lives in a private home, without steps inside, lives alone, has 2 dogs, no steps in to home       FAMILY HISTORY:  Family History   Problem Relation Age of Onset    Heart Attack Mother 46        x 2-had bypass    Hypertension Mother     COPD Father     Liver Cancer Paternal Aunt     Lung Cancer Paternal Aunt     Breast Cancer Maternal Aunt         but  of brain cancer    Diabetes Maternal Uncle     Diabetes Maternal Grandmother     Colon Cancer Neg Hx     Colon Polyps Neg Hx     Esophageal Cancer Neg Hx     Rectal Cancer Neg Hx     Stomach Cancer Neg Hx         Current Outpatient Medications   Medication Sig Dispense Refill    HYDROcodone-acetaminophen (NORCO) 7.5-325 MG per tablet Take 1 tablet by mouth every 6 hours as needed for Pain for up to 30 days. Intended supply: 30 days 120 tablet 0    fentaNYL (DURAGESIC) 12 MCG/HR Place 1 patch onto the skin every 3 days for 30 days. Intended supply: 30 days 10 patch 0    fentaNYL (DURAGESIC) 25 MCG/HR Place 1 patch onto the skin every 72 hours for 30 days.  10 patch 0    pantoprazole (PROTONIX) 40 MG tablet TAKE 1 TABLET TWICE A DAY 60 tablet 5  ondansetron (ZOFRAN) 8 MG tablet Take 1 tablet by mouth every 8 hours as needed for Nausea or Vomiting 30 tablet 5    gabapentin (NEURONTIN) 100 MG capsule Take 1 capsule by mouth 3 times daily for 120 days. Intended supply: 90 days 90 capsule 3    Cyanocobalamin (VITAMIN B 12 PO) Take by mouth      promethazine (PHENERGAN) 25 MG tablet Take 1 tablet by mouth every 6 hours as needed for Nausea 30 tablet 2    sucralfate (CARAFATE) 1 GM tablet Take 1 tablet by mouth 4 times daily 360 tablet 1    lidocaine-prilocaine (EMLA) 2.5-2.5 % cream Apply to port area and cover with plastic wrap one hour prior to use. 1 Tube 1    vitamin E 400 UNIT capsule Take 400 Units by mouth daily      NONFORMULARY daily Tumeric otc      Multiple Vitamins-Minerals (THERAPEUTIC MULTIVITAMIN-MINERALS) tablet Take 1 tablet by mouth daily      prochlorperazine (COMPAZINE) 10 MG tablet Take 1 tablet by mouth every 6 hours as needed (nausea) 60 tablet 5     No current facility-administered medications for this visit. REVIEW OF SYSTEMS:    Constitutional: no fever, no night sweats, fatigue; decreased activity  HEENT: no blurring of vision, no double vision, no hearing difficulty, no tinnitus,no ulceration, no dysphagia  Lungs: no cough, no shortness of breath, no wheeze;   CVS: no palpitation, no chest pain, no shortness of breath;  GI: Decreased appetite, abdominal pain, abdominal distention, no nausea , no vomiting, no constipation;   VIKRAM: no dysuria, frequency and urgency, no hematuria, no kidney stones;   Musculoskeletal: no joint pain, swelling , stiffness;   Endocrine: no polyuria, polydypsia, no cold or heat intolerence; Hematology/lymphatic: no easy brusing or bleeding, no hx of clotting disorder; no peripheral adenopathy.   Dermatology: no skin rash, no eczema, no pruritis;   Psychiatry: no depression, no anxiety,no panic attacks, no suicide ideation; Neurology: no syncope, no seizures, no numbness or tingling of hands, no numbness or tingling of feet, no paresis;     PHYSICAL EXAM:    Vitals signs: There were no vitals taken for this visit. Pain scale:  COVID VISIT PROTOCOL    Relevant Lab findings/reviewed by me:  None    Relevant Imaging studies/reviewed by me:    None  ASSESSMENT:    No orders of the defined types were placed in this encounter. Teodora Bello was seen today for follow-up. Diagnoses and all orders for this visit:    Malignant neoplasm of pancreas, unspecified location of malignancy ([facility] [address]  -     HYDROcodone-acetaminophen (NORCO) 7.5-325 MG per tablet; Take 1 tablet by mouth every 6 hours as needed for Pain for up to 30 days. Intended supply: 30 days  -     fentaNYL (DURAGESIC) 12 MCG/HR; Place 1 patch onto the skin every 3 days for 30 days. Intended supply: 30 days    Malignant ascites    Care plan discussed with patient    Counseling regarding advanced care planning and goals of care    Physical deconditioning    Cancer associated pain  -     HYDROcodone-acetaminophen (NORCO) 7.5-325 MG per tablet; Take 1 tablet by mouth every 6 hours as needed for Pain for up to 30 days. Intended supply: 30 days  -     fentaNYL (DURAGESIC) 12 MCG/HR; Place 1 patch onto the skin every 3 days for 30 days. Intended supply: 30 days       Advanced pancreatic malignancy  Essentially, the patient has advanced pancreatic malignancy status post progression on several lines of therapy. More recently she developed biliary obstruction status post percutaneous transhepatic drainage placement. She will be followed by GI next week. Her performance status has been declining steadily over the last few weeks. She has lost more weight. At this point, she is not a candidate for further anticancer therapy due to poor performance status. Discussion of goals of careshe continues to be contemplative regarding hospice, although she is not receiving treatment at this time. Cancer related paincontinue with fentanyl 37.5 mcg. PLAN:  · Supportive care for now  · E prescribed fentanyl 12 mcg  · E prescribed Norco  · RTC with MD by virtual visit 3 weeks    Follow Up:     Return in about 3 weeks (around 3/2/2021) for Appointment with Dr. Lisandra Ferguson by Chu Thrasher 1237 PHONE VISIT [phone]. Data Juanpablo Milian am scribing for Mohamud Teague MD. Electronically signed by Aime Card RN on 2/9/2021 at 3:43 PM CST. I, Dr Hebert Blair, personally performed the services described in this documentation as scribed by Aime Card RN in my presence and is both accurate and complete. I have seen, examined and reviewed this patient medication list, appropriate labs and imaging studies. I reviewed relevant medical records and others physicians notes. I discussed the plans of care with the patient. I answered all the questions to the patients satisfaction. I have also reviewed the chief complaint (CC) and part of the history (History of Present Illness (HPI), Past Family Social History [facility]), or Review of Systems (ROS) and made changes when appropriated. (Please note that portions of this note were completed with a voice recognition program. Efforts were made to edit the dictations but occasionally words are mis-transcribed.)       2/9/2021    TELEHEALTH EVALUATION -- Audio/Visual (During [id number] public health emergency)  Carmen Jones is a 64 y.o. female evaluated via telephone on 2/9/2021. Consent:  She and/or health care decision maker is aware that that she may receive a bill for this telephone service, depending on her insurance coverage, and has provided verbal consent to proceed: Yes      Documentation:  I communicated with the patient and/or health care decision maker about as above. Details of this discussion including any medical advice provided: as above      I affirm this is a Patient Initiated Episode with a Patient who has not had a related appointment within my department in the past 7 days or scheduled within the next 24 hours. Patient identification was verified at the start of the visit: Yes    Total Time:20    Note: not billable if this call serves to triage the patient into an appointment for the relevant concern    Services were provided through a video synchronous discussion virtually to substitute for in-person clinic visit. Patient was located at his/her home and provider at [facility] and Hematology office.     Angela Head

## 2021-02-09 ENCOUNTER — VIRTUAL VISIT (OUTPATIENT)
Dept: HEMATOLOGY | Age: 61
End: 2021-02-09
Payer: MEDICARE

## 2021-02-09 ENCOUNTER — TELEPHONE (OUTPATIENT)
Dept: HEMATOLOGY | Age: 61
End: 2021-02-09

## 2021-02-09 ENCOUNTER — HOSPITAL ENCOUNTER (OUTPATIENT)
Dept: ULTRASOUND IMAGING | Age: 61
Discharge: HOME | End: 2021-02-09
Payer: MEDICARE

## 2021-02-09 DIAGNOSIS — C25.9 MALIGNANT NEOPLASM OF PANCREAS, UNSPECIFIED LOCATION OF MALIGNANCY (HCC): ICD-10-CM

## 2021-02-09 DIAGNOSIS — R18.0 MALIGNANT ASCITES: ICD-10-CM

## 2021-02-09 DIAGNOSIS — C25.9 MALIGNANT NEOPLASM OF PANCREAS, UNSPECIFIED LOCATION OF MALIGNANCY (HCC): Primary | ICD-10-CM

## 2021-02-09 DIAGNOSIS — R53.81 PHYSICAL DECONDITIONING: ICD-10-CM

## 2021-02-09 DIAGNOSIS — G89.3 CANCER ASSOCIATED PAIN: ICD-10-CM

## 2021-02-09 DIAGNOSIS — Z71.89 CARE PLAN DISCUSSED WITH PATIENT: ICD-10-CM

## 2021-02-09 DIAGNOSIS — Z71.89 COUNSELING REGARDING ADVANCED CARE PLANNING AND GOALS OF CARE: ICD-10-CM

## 2021-02-09 LAB
APTT: 31.9 SEC (ref 26–36.2)
INR BLD: 1.25 (ref 0.88–1.18)
PLATELET # BLD: 132 K/UL (ref 130–400)
PROTHROMBIN TIME: 15.7 SEC (ref 12–14.6)

## 2021-02-09 PROCEDURE — 99442 PR PHYS/QHP TELEPHONE EVALUATION 11-20 MIN: CPT | Performed by: INTERNAL MEDICINE

## 2021-02-09 PROCEDURE — 85610 PROTHROMBIN TIME: CPT

## 2021-02-09 PROCEDURE — 85730 THROMBOPLASTIN TIME PARTIAL: CPT

## 2021-02-09 PROCEDURE — 49083 ABD PARACENTESIS W/IMAGING: CPT

## 2021-02-09 PROCEDURE — 85049 AUTOMATED PLATELET COUNT: CPT

## 2021-02-09 PROCEDURE — 36415 COLL VENOUS BLD VENIPUNCTURE: CPT

## 2021-02-09 RX ORDER — HYDROCODONE BITARTRATE AND ACETAMINOPHEN 7.5; 325 MG/1; MG/1
1 TABLET ORAL EVERY 6 HOURS PRN
Qty: 120 TABLET | Refills: 0 | Status: SHIPPED | OUTPATIENT
Start: 2021-02-09 | End: 2021-03-03 | Stop reason: SDUPTHER

## 2021-02-09 RX ORDER — FENTANYL 12 UG/H
1 PATCH TRANSDERMAL
Qty: 10 PATCH | Refills: 0 | Status: SHIPPED | OUTPATIENT
Start: 2021-02-09 | End: 2021-03-03 | Stop reason: SDUPTHER

## 2021-02-09 NOTE — TELEPHONE ENCOUNTER
Called and got patient registered for telephone visit with Dr Gabriel Valverde and she accepted the insurance disclaimer.

## 2021-02-11 ASSESSMENT — ENCOUNTER SYMPTOMS
CHEST TIGHTNESS: 0
TROUBLE SWALLOWING: 0
CONSTIPATION: 0
COUGH: 0
RECTAL PAIN: 0
ABDOMINAL PAIN: 0
VOMITING: 0
BLOOD IN STOOL: 0
NAUSEA: 1
DIARRHEA: 0

## 2021-02-11 NOTE — PROGRESS NOTES
'    Carmen Jones  Primary Care Provider Shanthi Villanueva MD  Referral Source: [facility]    Late Note - Date of Encounter/Office Visit - 12/15/20    Carmen Jones is a 64 y.o. Chief Complaint  Chief Complaint   Patient presents with    Cancer         ASSESSMENT/PLAN:  1. Obstructive jaundice  2. Malignant neoplasm of pancreas, unspecified location of malignancy ([address]  3. Palliative care patient    After discussion with Dr. Shital Campos, the patient's previously placed PTC drain was successfully removed without complication. She tolerated this well. A dressing was placed over the wound. All questions were answered. Patient to call with any questions or concerns                      HPI    62-year-old patient well-known to me with a history of pancreatic malignancy. She underwent a Whipple in the past.  Unfortunately she developed recurrent disease more recently has developed biliary obstruction from her recurrent disease requiring a PTC placement in [place]. Her symptoms improved with an internalization of the stent.   I have had discussions with the interventional radiologist who placed the stent who agreed the PTC drain and bag could be removed    Alana Phoenix is followed by Dr Lisandra Ferguson locally for her palliative treatment        Past Medical History:   Diagnosis Date    Acute pancreatitis     Adult BMI <19 kg/sq m     Anemia     Cat esophagus     Biliary obstruction     GERD (gastroesophageal reflux disease)     with Barretts    Hashimoto's thyroiditis     denies takes no med for    Heart murmur     Hypertension     pt denies nor longer takes med for    Low blood sugar     h/o when overweight in the past    MVP (mitral valve prolapse)     Myalgia     Palliative care patient 05/13/2020    Pancreatic adenocarcinoma ([address] 01/2018    Dr Vipul Arteaga    Pancreatic cancer ([address] 3/30/2018    Right flank pain     RUQ pain       Past Surgical History:   Procedure Laterality Date  CARDIAC CATHETERIZATION      heart cath     SECTION      x 3    CHOLECYSTECTOMY  1997    COLONOSCOPY  years ago    [facility]-Dr Erasmo Foster per patient    COLONOSCOPY  2014    Dr Malena Tavarez- Jessika Hallmark recall    COLONOSCOPY  03/10/2016    Dr Mcfarland-10 yr recall    COLONOSCOPY N/A 2019    Dr Romy Torres, 5 yr recall    ELBOW SURGERY Right     ENDOMETRIAL ABLATION      HAND SURGERY Right     HERNIA REPAIR      hiatal    HERNIA REPAIR      umbilical    HERNIA REPAIR      OTHER SURGICAL HISTORY  2020    Dr Sam Butt w/internal metallic 10 mm x 60 mm biliary stent placement w/external biliary drainage    PANCREAS SURGERY  2018    Whipple procedure-Dr Nir Roche HI EGD [facility] NEEDLE ASPIR/BIOP ALTERED ANATOMY N/A 2018    Dr Yazmin Allison w/fna-Dilation of main pancreatic duct with diffuse change in the pancreatitis noted, area of concern in the neck of the pancreas-strongly suspicious for adenocarcinoma     HI EGD INTRMURAL NEEDLE ASPIR/BIOP ALTERED ANATOMY N/A 3/6/2018    Dr KVNG Duncan-Pancreatic cancer-Ductal adenocarcinoma-staged [id number] by EUS, pancreatic pseudocyst in the tail the pancreas    HI ERCP DX COLLECTION SPECIMEN BRUSHING/WASHING N/A 2018    Dr KVNG Duncan-w/placement of a self-expanding fully covered 10 mm biliary stent    UPPER GASTROINTESTINAL ENDOSCOPY  years ago    [facility]-Dr Jimi Pratt    UPPER GASTROINTESTINAL ENDOSCOPY      Zuniga    UPPER GASTROINTESTINAL ENDOSCOPY N/A 2019    Dr Cathy Hanson post Whipple's procedure with efferent loop ulceration    UPPER GASTROINTESTINAL ENDOSCOPY  2019    Dr Tashia Duncan-Patent G-J Anastomosis, apparent healing ulceration    UPPER GASTROINTESTINAL ENDOSCOPY N/A 2020    Dr Xiong Precise amount of food retention in the stomach with bile, hiatal hernia, will need repeat    UPPER GASTROINTESTINAL ENDOSCOPY N/A 2020 Dr Irene Brito erosion/ulceration at the suture line, post whipple surgical changes    UPPER GASTROINTESTINAL ENDOSCOPY N/A 3/9/2020    Dr Irene Brito erosion along the previous whipple suture line    UPPER GASTROINTESTINAL ENDOSCOPY N/A 2020    Dr KVNG Duncan-w/hemostatic clip placement x 1-Allie Peres tear at GEJ-Likely culprit lesion for current presentation    UPPER GASTROINTESTINAL ENDOSCOPY N/A 2020    Dr Irene Brito erosion along the previous whipple suture line      Family History   Problem Relation Age of Onset    Heart Attack Mother 46        x 2-had bypass    Hypertension Mother     COPD Father     Liver Cancer Paternal Aunt     Lung Cancer Paternal Aunt     Breast Cancer Maternal Aunt         but  of brain cancer    Diabetes Maternal Uncle     Diabetes Maternal Grandmother     Colon Cancer Neg Hx     Colon Polyps Neg Hx     Esophageal Cancer Neg Hx     Rectal Cancer Neg Hx     Stomach Cancer Neg Hx       Current Outpatient Medications   Medication Sig Dispense Refill    gabapentin (NEURONTIN) 100 MG capsule Take 1 capsule by mouth 3 times daily for 120 days. Intended supply: 90 days 90 capsule 3    Cyanocobalamin (VITAMIN B 12 PO) Take by mouth      promethazine (PHENERGAN) 25 MG tablet Take 1 tablet by mouth every 6 hours as needed for Nausea 30 tablet 2    sucralfate (CARAFATE) 1 GM tablet Take 1 tablet by mouth 4 times daily 360 tablet 1    lidocaine-prilocaine (EMLA) 2.5-2.5 % cream Apply to port area and cover with plastic wrap one hour prior to use. 1 Tube 1    vitamin E 400 UNIT capsule Take 400 Units by mouth daily      NONFORMULARY daily Tumeric otc      Multiple Vitamins-Minerals (THERAPEUTIC MULTIVITAMIN-MINERALS) tablet Take 1 tablet by mouth daily      HYDROcodone-acetaminophen (NORCO) 7.5-325 MG per tablet Take 1 tablet by mouth every 6 hours as needed for Pain for up to 30 days.  Intended supply: 30 days 120 tablet 0  fentaNYL (DURAGESIC) 12 MCG/HR Place 1 patch onto the skin every 3 days for 30 days. Intended supply: 30 days 10 patch 0    fentaNYL (DURAGESIC) 25 MCG/HR Place 1 patch onto the skin every 72 hours for 30 days. 10 patch 0    pantoprazole (PROTONIX) 40 MG tablet TAKE 1 TABLET TWICE A DAY 60 tablet 5    ondansetron (ZOFRAN) 8 MG tablet Take 1 tablet by mouth every 8 hours as needed for Nausea or Vomiting 30 tablet 5    prochlorperazine (COMPAZINE) 10 MG tablet Take 1 tablet by mouth every 6 hours as needed (nausea) 60 tablet 5     No current facility-administered medications for this visit. Allergies   Allergen Reactions    Codeine Shortness Of Breath, Swelling and Rash    Morphine Other (See Comments)     Other reaction(s): abd pain  Severe abd. pain    Morphine And Related Nausea Only    Sulfa Antibiotics Shortness Of Breath and Hives     SOB, rash and itching    Neomycin-Bacitracin Zn-Polymyx Rash    Clarithromycin     Dilaudid [Hydromorphone Hcl] Other (See Comments)     \"completely shut me down\"    Hydromorphone     Loperamide Hcl Other (See Comments)     severe abd. pain    Loperamide Hcl Hives     severe abd. pain    Neosporin [Neomycin-Polymyx-Gramicid] Other (See Comments)     Causes boil         Review of Systems   Constitutional: Positive for activity change, appetite change, fatigue and unexpected weight change. Negative for fever. HENT: Negative for trouble swallowing. Respiratory: Negative for cough and chest tightness. Cardiovascular: Negative for chest pain and palpitations. Gastrointestinal: Positive for nausea. Negative for abdominal pain, blood in stool, constipation, diarrhea, rectal pain and vomiting. Physical Exam  Constitutional:       General: She is not in acute distress. Appearance: Normal appearance. She is well-developed. Comments: /88 (Site: Left Upper Arm, Position: Sitting, Cuff Size: Medium Adult)   Pulse 66   Ht 5' 7\" (1.702 m)   Wt 98 lb (44.5 kg)   SpO2 99%   BMI 15.35 kg/m²    Cardiovascular:      Rate and Rhythm: Normal rate and regular rhythm. Heart sounds: No murmur. Pulmonary:      Effort: Pulmonary effort is normal. No respiratory distress. Breath sounds: Normal breath sounds. Abdominal:      General: Bowel sounds are normal. There is no distension. Palpations: Abdomen is soft. There is no mass. Tenderness: There is no abdominal tenderness. There is no guarding or rebound. Comments: +drain in place to right flank. Minimal fluid in bag. Skin:     General: Skin is warm and dry. Coloration: Skin is not pale. Neurological:      Mental Status: She is alert and oriented to person, place, and time.          Labs:  Hospital Outpatient Visit on 12/11/2020   Component Date Value Ref Range Status    WBC 12/11/2020 9.02  3.98 - 10.04 K/uL Final    RBC 12/11/2020 2.57* 3.93 - 5.22 M/uL Final    Hemoglobin 12/11/2020 8.0* 11.2 - 15.7 g/dL Final    Hematocrit 12/11/2020 24.6* 34.1 - 44.9 % Final    MCV 12/11/2020 95.7* 79.4 - 94.8 fL Final    MCH 12/11/2020 31.1  25.6 - 32.2 pg Final    MCHC 12/11/2020 32.5  32.3 - 35.5 g/dL Final    RDW 12/11/2020 25.4* 11.7 - 14.4 % Final    Platelets 08/46/4734 175* 182 - 369 K/uL Final    MPV 12/11/2020 10.5* 7.4 - 10.4 fL Final    Neutrophils % 12/11/2020 81.9* 34.0 - 71.1 % Final    Lymphocytes % 12/11/2020 8.6* 19.3 - 53.1 % Final    Monocytes % 12/11/2020 8.8  4.7 - 12.5 % Final    Eosinophils % 12/11/2020 0.6* 0.7 - 7.0 % Final    Basophils % 12/11/2020 0.1  0.1 - 1.2 % Final    Neutrophils Absolute 12/11/2020 7.39* 1.56 - 6.13 K/uL Final    Lymphocytes Absolute 12/11/2020 0.78* 1.18 - 3.74 K/uL Final    Monocytes Absolute 12/11/2020 0.79  0.24 - 0.82 K/uL Final  Eosinophils Absolute 12/11/2020 0.05  0.04 - 0.54 K/uL Final    Basophils Absolute 12/11/2020 0.01  0.01 - 0.08 K/uL Final    Sodium 12/11/2020 135* 137 - 145 mmol/L Final    Potassium 12/11/2020 4.1  3.5 - 5.1 mmol/L Final    Chloride 12/11/2020 98  98 - 111 mmol/L Final    CO2 12/11/2020 30* 22 - 29 mmol/L Final    Anion Gap 12/11/2020 7  7 - 19 mmol/L Final    Glucose 12/11/2020 137* 74 - 106 mg/dL Final    BUN 12/11/2020 8  7 - 17 mg/dL Final    CREATININE 12/11/2020 0.6  0.5 - 1.0 mg/dL Final    GFR Non- 12/11/2020 >60  >60 Final    Calcium 12/11/2020 8.7  8.4 - 10.2 mg/dL Final    Total Protein 12/11/2020 7.0  6.3 - 8.2 g/dL Final    Albumin 12/11/2020 3.1* 3.5 - 5.2 g/dL Final    Total Bilirubin 12/11/2020 5.8* 0.2 - 1.3 mg/dL Final    Alkaline Phosphatase 12/11/2020 769* 35 - 104 U/L Final    ALT 12/11/2020 36  9 - 52 U/L Final    AST 12/11/2020 49* 14 - 36 U/L Final    Globulin 12/11/2020 3.8  g/dL Final   Admission on 11/30/2020, Discharged on 12/04/2020   Component Date Value Ref Range Status    Hep A IgM 11/30/2020 Non-Reactive  Non-reactive Final    Hep B Core Ab, IgM 11/30/2020 Non-Reactive  Non-reactive Final    Hep B S Ag Interp 11/30/2020 Non-Reactive  Non-reactive Final    Hep C Ab Interp 11/30/2020 Non-Reactive   Final    Alpha Fetoprotein 11/30/2020 1.3  0.0 - 8.3 ng/mL Final    CEA 11/30/2020 4.2  0.0 - 4.7 ng/mL Final    GGT 11/30/2020 231* 7 - 54 U/L Final    LD 11/30/2020 157  91 - 215 U/L Final    Rapid HIV 1&2 11/30/2020 Non-reactive  Non-reactive Final    HIV-1 P24 Ag 11/30/2020 Non-reactive  Non-reactive Final    Sodium 12/01/2020 130* 136 - 145 mmol/L Final    Potassium reflex Magnesium 12/01/2020 3.8  3.5 - 5.0 mmol/L Final    Chloride 12/01/2020 97* 98 - 111 mmol/L Final    CO2 12/01/2020 24  22 - 29 mmol/L Final    Anion Gap 12/01/2020 9  7 - 19 mmol/L Final    Glucose 12/01/2020 90  74 - 109 mg/dL Final  BUN 12/01/2020 6  6 - 20 mg/dL Final    CREATININE 12/01/2020 0.2* 0.5 - 0.9 mg/dL Final    GFR Non- 12/01/2020 >60  >60 Final    GFR  12/01/2020 >59  >59 Final    Calcium 12/01/2020 8.3* 8.6 - 10.0 mg/dL Final    Total Protein 12/01/2020 6.1* 6.6 - 8.7 g/dL Final    Albumin 12/01/2020 2.5* 3.5 - 5.2 g/dL Final    Total Bilirubin 12/01/2020 11.7* 0.2 - 1.2 mg/dL Final    Alkaline Phosphatase 12/01/2020 713* 35 - 104 U/L Final    ALT 12/01/2020 34* 5 - 33 U/L Final    AST 12/01/2020 45* 5 - 32 U/L Final    TSH 12/01/2020 0.470  0.270 - 4.200 uIU/mL Final    T4 Free 12/01/2020 1.40  0.93 - 1.70 ng/dL Final    WBC 12/01/2020 14.5* 4.8 - 10.8 K/uL Final    RBC 12/01/2020 2.91* 4.20 - 5.40 M/uL Final    Hemoglobin 12/01/2020 8.6* 12.0 - 16.0 g/dL Final    Hematocrit 12/01/2020 24.9* 37.0 - 47.0 % Final    MCV 12/01/2020 85.6  81.0 - 99.0 fL Final    MCH 12/01/2020 29.6  27.0 - 31.0 pg Final    MCHC 12/01/2020 34.5  33.0 - 37.0 g/dL Final    RDW 12/01/2020 21.3* 11.5 - 14.5 % Final    Platelets 72/98/7642 182  130 - 400 K/uL Final    MPV 12/01/2020 11.1  9.4 - 12.3 fL Final    Neutrophils % 12/01/2020 85.4* 50.0 - 65.0 % Final    Lymphocytes % 12/01/2020 5.3* 20.0 - 40.0 % Final    Monocytes % 12/01/2020 8.0  0.0 - 10.0 % Final    Eosinophils % 12/01/2020 0.0  0.0 - 5.0 % Final    Basophils % 12/01/2020 0.2  0.0 - 1.0 % Final    Neutrophils Absolute 12/01/2020 12.3* 1.5 - 7.5 K/uL Final    Immature Granulocytes # 12/01/2020 0.2  K/uL Final    Lymphocytes Absolute 12/01/2020 0.8* 1.1 - 4.5 K/uL Final    Monocytes Absolute 12/01/2020 1.20* 0.00 - 0.90 K/uL Final    Eosinophils Absolute 12/01/2020 0.00  0.00 - 0.60 K/uL Final    Basophils Absolute 12/01/2020 0.00  0.00 - 0.20 K/uL Final    Protime 12/01/2020 16.7* 12.0 - 14.6 sec Final    INR 12/01/2020 1.35* 0.88 - 1.18 Final    Vit D, 25-Hydroxy 12/01/2020 7.5* >=30 ng/mL Final  SARS-CoV-2, NAAT 12/01/2020 Not Detected  Not Detected Final    Sodium 12/02/2020 127* 136 - 145 mmol/L Final    Potassium reflex Magnesium 12/02/2020 4.0  3.5 - 5.0 mmol/L Final    Chloride 12/02/2020 96* 98 - 111 mmol/L Final    CO2 12/02/2020 24  22 - 29 mmol/L Final    Anion Gap 12/02/2020 7  7 - 19 mmol/L Final    Glucose 12/02/2020 100  74 - 109 mg/dL Final    BUN 12/02/2020 6  6 - 20 mg/dL Final    CREATININE 12/02/2020 0.5  0.5 - 0.9 mg/dL Final    GFR Non- 12/02/2020 >60  >60 Final    GFR  12/02/2020 >59  >59 Final    Calcium 12/02/2020 8.0* 8.6 - 10.0 mg/dL Final    Total Protein 12/02/2020 5.8* 6.6 - 8.7 g/dL Final    Albumin 12/02/2020 2.2* 3.5 - 5.2 g/dL Final    Total Bilirubin 12/02/2020 12.8* 0.2 - 1.2 mg/dL Final    Alkaline Phosphatase 12/02/2020 621* 35 - 104 U/L Final    ALT 12/02/2020 26  5 - 33 U/L Final    AST 12/02/2020 40* 5 - 32 U/L Final    WBC 12/02/2020 12.0* 4.8 - 10.8 K/uL Final    RBC 12/02/2020 2.60* 4.20 - 5.40 M/uL Final    Hemoglobin 12/02/2020 7.8* 12.0 - 16.0 g/dL Final    Hematocrit 12/02/2020 22.5* 37.0 - 47.0 % Final    MCV 12/02/2020 86.5  81.0 - 99.0 fL Final    MCH 12/02/2020 30.0  27.0 - 31.0 pg Final    MCHC 12/02/2020 34.7  33.0 - 37.0 g/dL Final    RDW 12/02/2020 22.1* 11.5 - 14.5 % Final    Platelets 57/76/5523 160  130 - 400 K/uL Final    MPV 12/02/2020 10.9  9.4 - 12.3 fL Final    PLATELET SLIDE REVIEW 12/02/2020 Adequate   Final    Neutrophils % 12/02/2020 84.7* 50.0 - 65.0 % Final    Lymphocytes % 12/02/2020 5.2* 20.0 - 40.0 % Final    Monocytes % 12/02/2020 8.8  0.0 - 10.0 % Final    Eosinophils % 12/02/2020 0.2  0.0 - 5.0 % Final    Basophils % 12/02/2020 0.2  0.0 - 1.0 % Final    Neutrophils Absolute 12/02/2020 10.2* 1.5 - 7.5 K/uL Final    Immature Granulocytes # 12/02/2020 0.1  K/uL Final    Lymphocytes Absolute 12/02/2020 0.6* 1.1 - 4.5 K/uL Final  Monocytes Absolute 12/02/2020 1.10* 0.00 - 0.90 K/uL Final    Eosinophils Absolute 12/02/2020 0.00  0.00 - 0.60 K/uL Final    Basophils Absolute 12/02/2020 0.00  0.00 - 0.20 K/uL Final    Anisocytosis 12/02/2020 2+*  Final    Ovalocytes 12/02/2020 Occasional*  Final    Target Cells 12/02/2020 1+*  Final    Sodium 12/03/2020 131* 136 - 145 mmol/L Final    Potassium reflex Magnesium 12/03/2020 4.0  3.5 - 5.0 mmol/L Final    Chloride 12/03/2020 99  98 - 111 mmol/L Final    CO2 12/03/2020 26  22 - 29 mmol/L Final    Anion Gap 12/03/2020 6* 7 - 19 mmol/L Final    Glucose 12/03/2020 117* 74 - 109 mg/dL Final    BUN 12/03/2020 7  6 - 20 mg/dL Final    CREATININE 12/03/2020 0.2* 0.5 - 0.9 mg/dL Final    GFR Non- 12/03/2020 >60  >60 Final    GFR  12/03/2020 >59  >59 Final    Calcium 12/03/2020 8.0* 8.6 - 10.0 mg/dL Final    Total Protein 12/03/2020 5.6* 6.6 - 8.7 g/dL Final    Albumin 12/03/2020 2.2* 3.5 - 5.2 g/dL Final    Total Bilirubin 12/03/2020 13.6* 0.2 - 1.2 mg/dL Final    Alkaline Phosphatase 12/03/2020 627* 35 - 104 U/L Final    ALT 12/03/2020 25  5 - 33 U/L Final    AST 12/03/2020 44* 5 - 32 U/L Final    WBC 12/03/2020 9.7  4.8 - 10.8 K/uL Final    RBC 12/03/2020 2.43* 4.20 - 5.40 M/uL Final    Hemoglobin 12/03/2020 7.3* 12.0 - 16.0 g/dL Final    Hematocrit 12/03/2020 20.9* 37.0 - 47.0 % Final    MCV 12/03/2020 86.0  81.0 - 99.0 fL Final    MCH 12/03/2020 30.0  27.0 - 31.0 pg Final    MCHC 12/03/2020 34.9  33.0 - 37.0 g/dL Final    RDW 12/03/2020 22.3* 11.5 - 14.5 % Final    Platelets 18/81/5978 158  130 - 400 K/uL Final    MPV 12/03/2020 11.7  9.4 - 12.3 fL Final    Neutrophils % 12/03/2020 83.0* 50.0 - 65.0 % Final    Lymphocytes % 12/03/2020 5.6* 20.0 - 40.0 % Final    Monocytes % 12/03/2020 10.0  0.0 - 10.0 % Final    Eosinophils % 12/03/2020 0.2  0.0 - 5.0 % Final    Basophils % 12/03/2020 0.3  0.0 - 1.0 % Final  Neutrophils Absolute 12/03/2020 8.1* 1.5 - 7.5 K/uL Final    Immature Granulocytes # 12/03/2020 0.1  K/uL Final    Lymphocytes Absolute 12/03/2020 0.5* 1.1 - 4.5 K/uL Final    Monocytes Absolute 12/03/2020 1.00* 0.00 - 0.90 K/uL Final    Eosinophils Absolute 12/03/2020 0.00  0.00 - 0.60 K/uL Final    Basophils Absolute 12/03/2020 0.00  0.00 - 0.20 K/uL Final    Sodium 12/04/2020 133* 136 - 145 mmol/L Final    Potassium reflex Magnesium 12/04/2020 4.1  3.5 - 5.0 mmol/L Final    Chloride 12/04/2020 100  98 - 111 mmol/L Final    CO2 12/04/2020 26  22 - 29 mmol/L Final    Anion Gap 12/04/2020 7  7 - 19 mmol/L Final    Glucose 12/04/2020 98  74 - 109 mg/dL Final    BUN 12/04/2020 7  6 - 20 mg/dL Final    CREATININE 12/04/2020 0.5  0.5 - 0.9 mg/dL Final    GFR Non- 12/04/2020 >60  >60 Final    GFR  12/04/2020 >59  >59 Final    Calcium 12/04/2020 8.2* 8.6 - 10.0 mg/dL Final    Total Protein 12/04/2020 6.0* 6.6 - 8.7 g/dL Final    Albumin 12/04/2020 2.3* 3.5 - 5.2 g/dL Final    Total Bilirubin 12/04/2020 15.4* 0.2 - 1.2 mg/dL Final    Alkaline Phosphatase 12/04/2020 829* 35 - 104 U/L Final    ALT 12/04/2020 32  5 - 33 U/L Final    AST 12/04/2020 66* 5 - 32 U/L Final    WBC 12/04/2020 9.4  4.8 - 10.8 K/uL Final    RBC 12/04/2020 2.58* 4.20 - 5.40 M/uL Final    Hemoglobin 12/04/2020 7.7* 12.0 - 16.0 g/dL Final    Hematocrit 12/04/2020 22.2* 37.0 - 47.0 % Final    MCV 12/04/2020 86.0  81.0 - 99.0 fL Final    MCH 12/04/2020 29.8  27.0 - 31.0 pg Final    MCHC 12/04/2020 34.7  33.0 - 37.0 g/dL Final    RDW 12/04/2020 22.7* 11.5 - 14.5 % Final    Platelets 91/68/8458 160  130 - 400 K/uL Final    MPV 12/04/2020 10.4  9.4 - 12.3 fL Final    PLATELET SLIDE REVIEW 12/04/2020 Adequate   Final    Neutrophils % 12/04/2020 80.3* 50.0 - 65.0 % Final    Lymphocytes % 12/04/2020 6.7* 20.0 - 40.0 % Final    Monocytes % 12/04/2020 11.4* 0.0 - 10.0 % Final  Eosinophils % 12/04/2020 0.4  0.0 - 5.0 % Final    Basophils % 12/04/2020 0.2  0.0 - 1.0 % Final    Neutrophils Absolute 12/04/2020 7.5  1.5 - 7.5 K/uL Final    Immature Granulocytes # 12/04/2020 0.1  K/uL Final    Lymphocytes Absolute 12/04/2020 0.6* 1.1 - 4.5 K/uL Final    Monocytes Absolute 12/04/2020 1.10* 0.00 - 0.90 K/uL Final    Eosinophils Absolute 12/04/2020 0.00  0.00 - 0.60 K/uL Final    Basophils Absolute 12/04/2020 0.00  0.00 - 0.20 K/uL Final    Anisocytosis 12/04/2020 2+*  Final    Schistocytes 12/04/2020 1+*  Final    Ovalocytes 12/04/2020 Occasional*  Final    Target Cells 12/04/2020 1+*  Final    SARS-CoV-2, NAAT 12/04/2020 Not Detected  Not Detected Final   Hospital Outpatient Visit on 11/30/2020   Component Date Value Ref Range Status    WBC 11/30/2020 18.46* 3.98 - 10.04 K/uL Final    RBC 11/30/2020 3.14* 3.93 - 5.22 M/uL Final    Hemoglobin 11/30/2020 9.5* 11.2 - 15.7 g/dL Final    Hematocrit 11/30/2020 26.3* 34.1 - 44.9 % Final    MCV 11/30/2020 83.8  79.4 - 94.8 fL Final    MCH 11/30/2020 30.3  25.6 - 32.2 pg Final    MCHC 11/30/2020 36.1* 32.3 - 35.5 g/dL Final    RDW 11/30/2020 19.9* 11.7 - 14.4 % Final    Platelets 32/25/7387 211  182 - 369 K/uL Final    MPV 11/30/2020 9.6  7.4 - 10.4 fL Final    Neutrophils % 11/30/2020 87.5* 34.0 - 71.1 % Final    Lymphocytes % 11/30/2020 3.3* 19.3 - 53.1 % Final    Monocytes % 11/30/2020 9.0  4.7 - 12.5 % Final    Eosinophils % 11/30/2020 0.1* 0.7 - 7.0 % Final    Basophils % 11/30/2020 0.1  0.1 - 1.2 % Final    Neutrophils Absolute 11/30/2020 16.17* 1.56 - 6.13 K/uL Final    Lymphocytes Absolute 11/30/2020 0.60* 1.18 - 3.74 K/uL Final    Monocytes Absolute 11/30/2020 1.66* 0.24 - 0.82 K/uL Final    Eosinophils Absolute 11/30/2020 0.01* 0.04 - 0.54 K/uL Final    Basophils Absolute 11/30/2020 0.02  0.01 - 0.08 K/uL Final    Sodium 11/30/2020 135* 137 - 145 mmol/L Final  Potassium 11/30/2020 4.0  3.5 - 5.1 mmol/L Final    Chloride 11/30/2020 99  98 - 111 mmol/L Final    CO2 11/30/2020 27  22 - 29 mmol/L Final    Anion Gap 11/30/2020 9  7 - 19 mmol/L Final    Glucose 11/30/2020 129* 74 - 106 mg/dL Final    BUN 11/30/2020 9  7 - 17 mg/dL Final    CREATININE 11/30/2020 0.5  0.5 - 1.0 mg/dL Final    GFR Non- 11/30/2020 >60  >60 Final    Calcium 11/30/2020 8.3* 8.4 - 10.2 mg/dL Final    Total Protein 11/30/2020 7.3  6.3 - 8.2 g/dL Final    Albumin 11/30/2020 3.2* 3.5 - 5.2 g/dL Final    Total Bilirubin 11/30/2020 12.0* 0.2 - 1.3 mg/dL Final    Alkaline Phosphatase 11/30/2020 1,023* 35 - 104 U/L Final    ALT 11/30/2020 57* 9 - 52 U/L Final    AST 11/30/2020 87* 14 - 36 U/L Final    Globulin 11/30/2020 4.1  g/dL Final    CA 19-9 11/30/2020 51247* 0 - 37 U/mL Final   Hospital Outpatient Visit on 11/16/2020   Component Date Value Ref Range Status    WBC 11/16/2020 2.87* 3.98 - 10.04 K/uL Final    RBC 11/16/2020 3.14* 3.93 - 5.22 M/uL Final    Hemoglobin 11/16/2020 9.4* 11.2 - 15.7 g/dL Final    Hematocrit 11/16/2020 28.0* 34.1 - 44.9 % Final    MCV 11/16/2020 89.2  79.4 - 94.8 fL Final    MCH 11/16/2020 29.9  25.6 - 32.2 pg Final    MCHC 11/16/2020 33.6  32.3 - 35.5 g/dL Final    RDW 11/16/2020 17.4* 11.7 - 14.4 % Final    Platelets 32/29/7009 69* 182 - 369 K/uL Final    MPV 11/16/2020 9.8  7.4 - 10.4 fL Final    Neutrophils % 11/16/2020 51.9  34.0 - 71.1 % Final    Lymphocytes % 11/16/2020 22.3  19.3 - 53.1 % Final    Monocytes % 11/16/2020 21.3* 4.7 - 12.5 % Final    Eosinophils % 11/16/2020 4.2  0.7 - 7.0 % Final    Basophils % 11/16/2020 0.3  0.1 - 1.2 % Final    Neutrophils Absolute 11/16/2020 1.49* 1.56 - 6.13 K/uL Final    Lymphocytes Absolute 11/16/2020 0.64* 1.18 - 3.74 K/uL Final    Monocytes Absolute 11/16/2020 0.61  0.24 - 0.82 K/uL Final    Eosinophils Absolute 11/16/2020 0.12  0.04 - 0.54 K/uL Final  Basophils Absolute 11/16/2020 0.01  0.01 - 0.08 K/uL Final    Sodium 11/16/2020 138  137 - 145 mmol/L Final    Potassium 11/16/2020 4.0  3.5 - 5.1 mmol/L Final    Chloride 11/16/2020 107  98 - 111 mmol/L Final    CO2 11/16/2020 28  22 - 29 mmol/L Final    Anion Gap 11/16/2020 3* 7 - 19 mmol/L Final    Glucose 11/16/2020 104  74 - 106 mg/dL Final    BUN 11/16/2020 5* 7 - 17 mg/dL Final    CREATININE 11/16/2020 0.5  0.5 - 1.0 mg/dL Final    GFR Non- 11/16/2020 >60  >60 Final    Calcium 11/16/2020 8.0* 8.4 - 10.2 mg/dL Final    Total Protein 11/16/2020 6.5  6.3 - 8.2 g/dL Final    Albumin 11/16/2020 3.0* 3.5 - 5.2 g/dL Final    Total Bilirubin 11/16/2020 1.0  0.2 - 1.3 mg/dL Final    Alkaline Phosphatase 11/16/2020 583* 35 - 104 U/L Final    ALT 11/16/2020 178* 9 - 52 U/L Final    AST 11/16/2020 162* 14 - 36 U/L Final    Globulin 11/16/2020 3.4  g/dL Final

## 2021-03-01 NOTE — PROGRESS NOTES
Kaiden Orjamie   1960  3/3/2021     Chief Complaint   Patient presents with    Follow-up     Malignant neoplasm of pancreas, unspecified location of malignancy ([address]      INTERVAL HISTORY/HISTORY OF PRESENT ILLNESS:  The patient is a very pleasant 64years old female who has an unfortunate diagnosis of pancreatic cancer initially diagnosed in January 2018.  She underwent neoadjuvant chemotherapy followed by Whipple procedure at [facility]. Digna Yang received additional adjuvant chemotherapy after her surgery. Unfortunately, she had disease progression and was therefore started again on palliative chemotherapy.  She has received several lines of therapy.  Over the last few weeks and months she has been steadily declining.  She has had several hospitalizations for GI bleed. She was recently found to have biliary obstruction. She was transferred to [place] and had a percutaneous transhepatic biliary drainage. Unfortunately, she is not a candidate for further anticancer therapy. She has been offered hospice but has not discussed that yet with her family. She has been weaker overall. She gets pretty fatigued with mild activity. She feels that she has been eating better. This is a virtual visit to follow-up her history of advanced pancreatic cancer. She has been feeling much weaker. She had abdominal distention and therefore underwent a paracentesis with removal of 4 L of fluid. Her pain is mostly under control. She is on fentanyl 37 mcg daily. She takes Tylenol as needed. Not eating well. She feels that she will need another paracentesis soon. She has been staying with her sister in [facility].   We again discussed about hospice today for which they declined.     ONCOLOGIC HISTORY:   Diagnosis:  · Pancreatic adenocarcinoma, January 2018   · [id number]  · 7/6/2019extended genetic panelnegative for a deleterious mutation     Treatment summary:  · March 2018Surgical consultation at [place]-not operable   · 4/3/2018 through 6/4/2018 Neoadjuvant chemotherapy FOLFORINOX ×5 Biweekly cycles   · 4/11/2018Biliary stent   · August 2018- XRT 30 Gy/Xeloda   · 08/30/3018- Whipple procedure @ [facility]   · Recommended another 5 biweekly cycles of modified FOLFORINOX completed 12/26/2018   · 7/9/2019 1/22/2020 Gemzar/Abraxane 125 mg/m2 q 14 days, discontinued due to progression of disease  · 3/4/2020- Irinotecan liposome 70 mg/m² with leucovorin 400 mg/m² and 5-FU 2400 mg/m² over 46 hours every 2 weeks.     Tumor marker  · 3/12/2018CA-19-9>430->370. · 4/30/2018 - CA 19- 9 of 157   · 5/25/2018CA-19-9->32 after 4 cycles. · 08/02/2018- -> 8   · 10/01/2018- CA 19-9 -5   · 10/29/2018CA 19-9 7   · 12/3/2018CA-19-97   · 04/11/2019-CA-19-9- 12   · 05/21/2019- CA 19-9- 41   · 7/2/2019CA 19 9 = 114  · 7/9/2019 CA-19-9 = 225  · 8/6/2019 CA-19-9 = 171  · 9/3/2019CA-19-9 = 198  · 9/13/2019CA-19-9 = 98 (at  [address])  · 10/29/2019CA-19-9 =317  · 11/21/2019CA-19-9 = 183  · 1/23/2020CA-19-9 = 520  · 4/22/2020 CA-19-9 = 940  · 5/13/2020CA-19-9 = 129  · 6/1/2020CA-19-9 = 523??  · 7/6/2020 CA 19-9- 483  · 8/17/2020CA-19-9 = 785  · 10/5/2020CA-19-9 = 1250  · 11/30/2020CA 19-9 = 00211      Cancer history  Ms Aaliyah Charlton was seen in initial oncology consultation on 3/12/2018 referred by Dr. Jeffry Andrade for a diagnosis of pancreatic adenocarcinoma. She was initially evaluated for acute pancreatitis at [facility] on February 2018. Further imaging revealed a pancreatic head mass. Lipase was elevated at 1184 and CA-19-9 elevated 134. The symptoms were going on for several weeks. Weight loss of 10-12 pounds over the last 6 months. · October 2017CT abdomen without contrast was unremarkable.    · 2/2/2018ultrasound of abdomen showed a pancreatic duct dilation   · 2/02/2018CT abdomen showed 16 mm low-density lesion in the pancreas at the junction of the head of the body. No intra-abdominal adenopathy. No liver metastasis. · 2/7/2018the patient underwent EGD/EUS and FNA biopsy of a pancreatic mass by Dr. Roetta Gottron at [facility] . EUS showed inflammatory changes consistent with a recent history of pancreatitis. Main pancreatic duct was dilated. No concerning peripancreatic adenopathy. No definite mass was seen by a more diffuse hypoechoic area measuring 1.8 x 2.2 cm in the head of the pancreas. Pathology was consistent with a group of markedly atypical cells strongly suspicious for adenocarcinoma. · 2/28/2018CT pancreatic protocol showed a poorly defined area of decreased enhancement located in the head of the pancreas measuring 1.8 x 1.4 x 1.7 cm with moderate meditation of the pancreatic duct. Well defined sharply marginated low density nodule located in the tail of the pancreas measuring 1.5 x 1.3 x 1.5 cm (IPMN?). · 3/6/2018CA 19-9 was elevated at 150. Repeat EGD was performed by Dr. Good Askew due to the inconclusive FNA resulted on 2/7/2018. Pathology FNA was consistent with pancreatic adenocarcinoma. · 3/12/2018she was first seen by me. No evidence of distant disease. Referral to Surgical oncology. CT chest to complete staging. CA-19-9 430. · 3/16/2018CT of the chest was unremarkable for intrathoracic metastatic disease   · Surgery consultation at [facility] March 2018- with Dr Roberto Villanueva. She was not a surgical candidate upfront. Recommended neoadjuvant chemotherapy. · 4/3/2018started on neoadjuvant chemotherapy with FOLFORINOX. · 4/11/2018she developed CBD obstruction had a CBD stent placed by Dr. Roetta Gottron. · 4/3/2018 through 6/4/2018 Neoadjuvant chemotherapy FOLFORINOX ×5 Biweekly cycles   · August 2018- XRT 30 Gy/Xeloda   · 08/30/3018- Whipple procedure at [facility] by Dr. Danita Pimentel  · Recommended another 7 biweekly cycles of modified FOLFORINOX. · 9/5/2018CT of the chest abdomen pelvis was unremarkable for metastatic disease.    · 1/14/2019CT abdomen and pelvis at [facility] showed no evidence of metastasis in the chest abdomen pelvis. · 5/21/2019CT chest, abdomen, pelvis at [facility] showed no evidence of metastatic disease   · 5/21/2019CA-19-9 = 42   · 06/12/2019- CA-19-9 = 154. Discussed with the patient. We'll also discuss with [facility]. · 7/1/2019CT chest, abdomen, pelvis showed a soft tissue mass measuring 1.4 x 1.1 cm, not present on prior scan from January 2019, concerning for recurrence. Stable hypodense in the liver, too small to characterize. Subcentimeter ill-defined area of low attenuation liver may represent an early metastasis. · 7/3/2019recommended palliative chemotherapy with nab paclitaxel 125mg/m² IV over 30 minutes on day 1 and gemcitabine 600 mg/m² IV over 10mg/m2/min (fixed dose rate) on day 1  · 7/2/2019CA 19 9 = 114  · 7/6/2019 extended genetic panel negative for a deleterious mutation (invitae)  · 7/9/2019 CA-19-9 = 225  · 8/6/2019 CA-19-9 = 171  · 9/3/2019CA-19-9 = 198   · 9/13/2019CA-19-9 = 98 at [facility]  · 9/13/2019CT chest abdomen pelvis at [facility] showed a soft tissue thickening in the pancreatic bed with persistent narrowing of the portal SMV confluence.  Superimposed tumor remains a possibility.  The new low-density lesion in the periphery of the liver seen on the prior exam is no longer appreciated and likely represents treatment effect.  Nodular enhancement may represent perfusion change in the region on image 187. · 9/13/2018 she was recommended to continue current treatment with Gemzar/Abraxane  · 11/1/2019 she was hospitalized with GI bleed.  An upper endoscopy showed ulceration at the efferent loop of the Whipple's procedure  · 10/29/2019CA-19-9 =317  · 11/21/2019CA-19-9 = 183  · 11/19/2019 CT chest abdomen pelvis showed no evidence of gross disease progression. Improvement of the caliber of what may be a middle colic vein that drains back to the SMV.  There is still persistent 6/5/8089- VK 19.9- 523  · 6/15/2020- CA 19.9- 383  · 6/21/2020- Ct Head Wo Contrast No acute intracranial process. Findings in agreement with the emergent findings from the initial [facility] preliminary report. · 6/21/2020- Ct Abdomen Pelvis W Contrast Prior Whipple procedure with expected pneumobilia. Large volume ascites for patient size. 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 Diffuse wall thickening along the small and large bowel secondary to this venous congestion. No evidence for arterial ischemia, as the bowel mucosa appears to enhance normally. No evidence of viscus perforation or peritoneal abscess. Of note, the the retroperitoneal structures are quite decompressed as a result of the ascites, with near complete attenuation of the IVC in the mid abdomen. Unsure if this is contributing to any lower extremity edema. If evidence of multiorgan dysfunction, abdominal compartment syndrome could also be considered. The results of this exam were discussed with Dr. Mariel Camarillo on 6/21/2020 at 1002 hours. In particular, I wanted to address if there was indication/need for therapeutic paracentesis. This is under consideration. · 6/21/2020- US Guided Paracentesis Ultrasound-guided abdominal paracentesis performed for therapeutic purposes. A 60 cc aspirate was set aside for lab evaluation, if desired. The patient tolerated the proceed well.  Cytology was negative for malignant cells. · 7/6/2020 CA19.9- 483. · 8/17/2020 CA19.9-785  · 8/21/2020 CT Chest/Abdomen/Pelvis- No acute intrathoracic abnormality.  Hiatal hernia containing ascitic fluid. Questionable wall thickening of the distal esophagus.  No pathologic intrathoracic or axillary lymphadenopathy. Stable subcentimeter left supraclavicular/lower jugular chain and subcarinal lymph nodes compared to 5/12/2020.  Previous Whipple procedure with associated pneumobilia.  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 The Marked dilatation and enlargement of the pelvic veins and both ovarian veins, left larger than the right. No thrombosis. · 11/30/2020CA-19-9 =09442  · 11/30/2020-Ct Abdomen Pelvis W Contrast The patient is status post Whipple procedure. There is some mild thickening noted at the level of the distal gastrectomy. The gastrojejunostomy is patent. There is evidence of a neoplasm involving the region of the pancreatic head. The pancreatic head likely was resected at the time of the Whipple procedure and this may represent localized recurrence. 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 The SMV and splenic vein just proximal to the confluence to form the portal vein are also occluded. There is some involvement with the celiac axis which appears to show slight improvement as there did appear to be some circumferential narrowing of the proximal segment of the splenic and common hepatic artery on the previous exam which I do not see on the current study. Overall I feel the mass is stable in size from the previous study. There is some progressive biliary dilatation with what appears to be abrupt occlusion of the common duct just below the kathy hepatis. Previously noted pneumobilia is no longer visualized. This would suggest there has been some progressive occlusion/stenosis of the common duct. Moderate constipation. There is some stasis of the small bowel with mild fluid distention with a few air-fluid levels. There is free fluid in the paracolic gutters and pelvis. No evidence of pneumoperitoneum or mechanical bowel obstruction.   · 12/1/2020- Mri Abdomen W Wo Contrast Mrcp Marked biliary ductal dilatation related to apparent tumor recurrence in the pancreas head region as described above and also detailed on the CT examination performed. · 2020interval progression of her pancreatic cancer. Very poor performance status. Not a candidate for further anticancer therapy. She was also hospice but is contemplative.     PAST MEDICAL HISTORY:   Past Medical History:   Diagnosis Date    Acute pancreatitis     Adult BMI <19 kg/sq m     Anemia     Cat esophagus     Biliary obstruction     GERD (gastroesophageal reflux disease)     with Barretts    Hashimoto's thyroiditis     denies takes no med for    Heart murmur     Hypertension     pt denies nor longer takes med for    Low blood sugar     h/o when overweight in the past    MVP (mitral valve prolapse)     Myalgia     Palliative care patient 2020    Pancreatic adenocarcinoma ([facility] 2018    Dr Kiara Gannon    Pancreatic cancer ([facility] 3/30/2018    Right flank pain     RUQ pain           PAST SURGICAL HISTORY:  Past Surgical History:   Procedure Laterality Date    CARDIAC CATHETERIZATION      heart cath     SECTION      x 3    CHOLECYSTECTOMY      COLONOSCOPY  years ago    [facility]-Dr Rita Parker per patient    COLONOSCOPY  2014    Dr Haile Hancock- Rondal Louder recall    COLONOSCOPY  03/10/2016    Dr Mcfarland-10 yr recall    COLONOSCOPY N/A 2019    Dr Jonathan Falcon, 5 yr recall    ELBOW SURGERY Right     ENDOMETRIAL ABLATION      HAND SURGERY Right     HERNIA REPAIR      hiatal    HERNIA REPAIR      umbilical    HERNIA REPAIR      OTHER SURGICAL HISTORY  2020    Dr Michelle Fuchs w/internal metallic 10 mm x 60 mm biliary stent placement w/external biliary drainage    PANCREAS SURGERY  2018    Whipple procedure-Dr Rafael Jaramillo [place] EGD [facility] NEEDLE ASPIR/BIOP ALTERED ANATOMY N/A 2018    Dr Parveen Avitia w/fna-Dilation of main pancreatic duct with diffuse change in the pancreatitis noted, area of concern in the neck of the pancreas-strongly suspicious for adenocarcinoma     CA EGD INTRMURAL NEEDLE ASPIR/BIOP ALTERED ANATOMY N/A 3/6/2018    Dr KVNG Duncan-Pancreatic cancer-Ductal adenocarcinoma-staged [id number] by EUS, pancreatic pseudocyst in the tail the pancreas    MI ERCP DX COLLECTION SPECIMEN BRUSHING/WASHING N/A 4/11/2018    Dr KVNG Duncan-w/placement of a self-expanding fully covered 10 mm biliary stent    UPPER GASTROINTESTINAL ENDOSCOPY  years ago    Steve-Dr Samara Frost    UPPER GASTROINTESTINAL ENDOSCOPY  2013    Zuniga    UPPER GASTROINTESTINAL ENDOSCOPY N/A 11/1/2019    Dr Svetlana Luong post Whipple's procedure with efferent loop ulceration    UPPER GASTROINTESTINAL ENDOSCOPY  12/17/2019    Dr Escobar Duncan-Patent G-J Anastomosis, apparent healing ulceration    UPPER GASTROINTESTINAL ENDOSCOPY N/A 2/25/2020    Dr Klarissa Odell amount of food retention in the stomach with bile, hiatal hernia, will need repeat    UPPER GASTROINTESTINAL ENDOSCOPY N/A 2/27/2020    Dr Carmenza Hooker erosion/ulceration at the suture line, post whipple surgical changes    UPPER GASTROINTESTINAL ENDOSCOPY N/A 3/9/2020    Dr Carmenza Hooker erosion along the previous whipple suture line    UPPER GASTROINTESTINAL ENDOSCOPY N/A 4/16/2020    Dr KVNG Duncan-w/hemostatic clip placement x 1-Allie Peres tear at GEJ-Likely culprit lesion for current presentation    UPPER GASTROINTESTINAL ENDOSCOPY N/A 6/22/2020    Dr Carmenza Hooker erosion along the previous whipple suture line        SOCIAL HISTORY:  Social History     Socioeconomic History    Marital status:      Spouse name: None    Number of children: 3    Years of education: None    Highest education level: None   Occupational History    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   Social Needs    Financial resource strain: None    Food insecurity     Worry: None     Inability: None    Transportation needs     Medical: None     Non-medical: None   Tobacco Use    Smoking status: Former Smoker Packs/day: 0.50     Years: 10.00     Pack years: 5.00     Types: Cigarettes     Quit date: 2019     Years since quittin.3    Smokeless tobacco: Never Used   Substance and Sexual Activity    Alcohol use: Not Currently    Drug use: Never    Sexual activity: None     Comment: 3   Lifestyle    Physical activity     Days per week: None     Minutes per session: None    Stress: None   Relationships    Social connections     Talks on phone: None     Gets together: None     Attends Sabianist service: None     Active member of club or organization: None     Attends meetings of clubs or organizations: None     Relationship status: None    Intimate partner violence     Fear of current or ex partner: None     Emotionally abused: None     Physically abused: None     Forced sexual activity: None   Other Topics Concern    None   Social History Narrative    CODE STATUS: Full Code    HEALTH CARE PROXY: her daughter, Mrs. Ferny Watson, of [facility]    AMBULATES: independently    DOMICILED: lives in a private home, without steps inside, lives alone, has 2 dogs, no steps in to home       FAMILY HISTORY:  Family History   Problem Relation Age of Onset    Heart Attack Mother 46        x 2-had bypass    Hypertension Mother     COPD Father     Liver Cancer Paternal Aunt     Lung Cancer Paternal Aunt     Breast Cancer Maternal Aunt         but  of brain cancer    Diabetes Maternal Uncle     Diabetes Maternal Grandmother     Colon Cancer Neg Hx     Colon Polyps Neg Hx     Esophageal Cancer Neg Hx     Rectal Cancer Neg Hx     Stomach Cancer Neg Hx         Current Outpatient Medications   Medication Sig Dispense Refill    HYDROcodone-acetaminophen (NORCO) 7.5-325 MG per tablet Take 1 tablet by mouth every 6 hours as needed for Pain for up to 30 days. Intended supply: 30 days 120 tablet 0    fentaNYL (DURAGESIC) 12 MCG/HR Place 1 patch onto the skin every 3 days for 30 days.  Intended supply: 30 days ascites    Malignant neoplasm of pancreas, unspecified location of malignancy ([address]    Cancer associated pain    Care plan discussed with patient    Counseling regarding advanced care planning and goals of care    Physical deconditioning    Nausea    Cachexia ([address]    Therapeutic opioid induced constipation       Advanced pancreatic malignancy  Essentially, the patient has advanced pancreatic malignancy status post progression on several lines of therapy. More recently she developed biliary obstruction status post percutaneous transhepatic drainage placement. She will be followed by GI next week. Her performance status has been declining steadily over the last few weeks. She has lost more weight. She is not a candidate for further anticancer therapy due to poor performance status. Discussion of goals of careshe continues to be contemplative regarding hospice, although she is not receiving treatment at this time. Cancer related paincontinue with fentanyl 37.5 mcg. She was again offered hospice today for which she declined. PLAN:  · Supportive care for now  · E prescribed fentanyl 12 mcg and 25 mcg  · E prescribed Norco 7.5  · RTC with MD by virtual visit 3 weeks    Follow Up:     No follow-ups on file. Data Opal Clark am scribing for Geeta Lerner MD. Electronically signed by Andra Hdz RN on 3/3/2021 at 3:43 PM CST. I, Dr Sree Colon, personally performed the services described in this documentation as scribed by Andra Hdz RN in my presence and is both accurate and complete. I have seen, examined and reviewed this patient medication list, appropriate labs and imaging studies. I reviewed relevant medical records and others physicians notes. I discussed the plans of care with the patient. I answered all the questions to the patients satisfaction.  I have also reviewed the chief complaint (CC) and part of the history (History of Present Illness (HPI), Past Family Social [facility] or Review of Systems (ROS) and made changes when appropriated. (Please note that portions of this note were completed with a voice recognition program. Efforts were made to edit the dictations but occasionally words are mis-transcribed.)       3/3/2021    TELEHEALTH EVALUATION -- Audio/Visual (During [id number] public health emergency)  Juliane Epperson is a 64 y.o. female evaluated via telephone on 3/3/2021. Consent:  She and/or health care decision maker is aware that that she may receive a bill for this telephone service, depending on her insurance coverage, and has provided verbal consent to proceed: Yes      Documentation:  I communicated with the patient and/or health care decision maker about as above. Details of this discussion including any medical advice provided: as above      I affirm this is a Patient Initiated Episode with a Patient who has not had a related appointment within my department in the past 7 days or scheduled within the next 24 hours. Patient identification was verified at the start of the visit: Yes    Total Time:20min    Note: not billable if this call serves to triage the patient into an appointment for the relevant concern    Services were provided through a video synchronous discussion virtually to substitute for in-person clinic visit. Patient was located at his/her home and provider at [facility] and Hematology office.     Madeline Loredo

## 2021-03-03 ENCOUNTER — TELEPHONE (OUTPATIENT)
Dept: HEMATOLOGY | Age: 61
End: 2021-03-03

## 2021-03-03 ENCOUNTER — VIRTUAL VISIT (OUTPATIENT)
Dept: HEMATOLOGY | Age: 61
End: 2021-03-03
Payer: MEDICARE

## 2021-03-03 DIAGNOSIS — T40.2X5A THERAPEUTIC OPIOID INDUCED CONSTIPATION: ICD-10-CM

## 2021-03-03 DIAGNOSIS — Z71.89 COUNSELING REGARDING ADVANCED CARE PLANNING AND GOALS OF CARE: ICD-10-CM

## 2021-03-03 DIAGNOSIS — R18.0 MALIGNANT ASCITES: Primary | ICD-10-CM

## 2021-03-03 DIAGNOSIS — Z71.89 CARE PLAN DISCUSSED WITH PATIENT: ICD-10-CM

## 2021-03-03 DIAGNOSIS — G89.3 CANCER ASSOCIATED PAIN: ICD-10-CM

## 2021-03-03 DIAGNOSIS — R64 CACHEXIA (HCC): ICD-10-CM

## 2021-03-03 DIAGNOSIS — R11.0 NAUSEA: ICD-10-CM

## 2021-03-03 DIAGNOSIS — C25.9 MALIGNANT NEOPLASM OF PANCREAS, UNSPECIFIED LOCATION OF MALIGNANCY (HCC): ICD-10-CM

## 2021-03-03 DIAGNOSIS — K59.03 THERAPEUTIC OPIOID INDUCED CONSTIPATION: ICD-10-CM

## 2021-03-03 DIAGNOSIS — R53.81 PHYSICAL DECONDITIONING: ICD-10-CM

## 2021-03-03 PROCEDURE — 99442 PR PHYS/QHP TELEPHONE EVALUATION 11-20 MIN: CPT | Performed by: INTERNAL MEDICINE

## 2021-03-03 RX ORDER — FENTANYL 12 UG/H
1 PATCH TRANSDERMAL
Qty: 10 PATCH | Refills: 0 | Status: SHIPPED | OUTPATIENT
Start: 2021-03-03 | End: 2021-04-02

## 2021-03-03 RX ORDER — HYDROCODONE BITARTRATE AND ACETAMINOPHEN 7.5; 325 MG/1; MG/1
1 TABLET ORAL EVERY 6 HOURS PRN
Qty: 120 TABLET | Refills: 0 | Status: SHIPPED | OUTPATIENT
Start: 2021-03-03 | End: 2021-04-02

## 2021-03-03 RX ORDER — FENTANYL 25 UG/H
1 PATCH TRANSDERMAL
Qty: 10 PATCH | Refills: 0 | Status: SHIPPED | OUTPATIENT
Start: 2021-03-03 | End: 2021-04-02

## 2021-03-08 DIAGNOSIS — R53.81 PHYSICAL DECONDITIONING: ICD-10-CM

## 2021-03-08 DIAGNOSIS — C25.9 MALIGNANT NEOPLASM OF PANCREAS, UNSPECIFIED LOCATION OF MALIGNANCY (HCC): Primary | ICD-10-CM

## 2021-03-08 DIAGNOSIS — R18.0 MALIGNANT ASCITES: ICD-10-CM

## 2022-01-16 NOTE — PROGRESS NOTES
available. She was also seen by Hospice care this admission who made recommendations for fentanyl patch 25 mcg and continue fentanyl 25 mcg IV every 2 hours as needed further stating she may be a good candidate for addition of methadone. No changes made to home medications at this time given need for transfer to OSH. Current medication list provided below. She has been continued on IV Zosyn for leukocytosis which has improved. COVID-19 negative prior to transfer. Review of Systems:   14 point review of systems is negative except as specifically addressed above. Objective:   VITALS:  /62   Pulse 66   Temp 98.3 °F (36.8 °C) (Temporal)   Resp 18   Ht 5' 7\" (1.702 m)   Wt 102 lb 14.4 oz (46.7 kg)   SpO2 99%   BMI 16.12 kg/m²   24HR INTAKE/OUTPUT:      Intake/Output Summary (Last 24 hours) at 12/3/2020 1452  Last data filed at 12/3/2020 0322  Gross per 24 hour   Intake 1460 ml   Output --   Net 1460 ml     General appearance: 64year old female no acute distress, ill appearing, resting comfortably in bed in right lateral decubitus position    Head: Normocephalic, without obvious abnormality, atraumatic  Eyes: conjunctivae/corneas clear. PERRL, EOM's intact. Scleral icterus   Ears: normal external ears and nose, throat without exudate  Neck: no adenopathy, no carotid bruit, no JVD, supple, symmetrical, trachea midline   Lungs: clear throughout with equal/adequate expansion  Heart: RRR w/ occasional extrasystole , S1, S2 normal, systolic murmur  Abdomen:soft, there is moderate epigastric tenderness; non-distended, normal bowel sounds no masses, no organomegaly  Extremities: No lower extremity edema,  No erythema, no tenderness to palpation  Skin: Jaundiced, texture, turgor normal. No rashes or lesions  Lymphatic: No palpable lymph node enlargment  Neurologic: Alert and oriented X 3, normal strength and tone.  No focal deficits  Psychiatric: Alert and oriented, thought content appropriate, normal insight, mood appropriate    Medications:        fentaNYL  1 patch Transdermal Q72H    gabapentin  100 mg Oral BID    gabapentin  300 mg Oral Nightly    sodium chloride flush  10 mL Intravenous 2 times per day    piperacillin-tazobactam  3.375 g Intravenous Q8H    pantoprazole  40 mg Intravenous BID    And    sodium chloride (PF)  10 mL Intravenous BID     fentanNYL, sodium chloride flush, [DISCONTINUED] promethazine **OR** ondansetron, lidocaine, promethazine  DIET GENERAL;  Dietary Nutrition Supplements: Standard High Calorie Oral Supplement  Dietary Nutrition Supplements: Frozen Oral Supplement     Lab and other Data:     Recent Labs     12/01/20 0245 12/02/20 0425 12/03/20 0430   WBC 14.5* 12.0* 9.7   HGB 8.6* 7.8* 7.3*    160 158     Recent Labs     12/01/20 0245 12/02/20 0425 12/03/20 0430   * 127* 131*   K 3.8 4.0 4.0   CL 97* 96* 99   CO2 24 24 26   BUN 6 6 7   CREATININE 0.2* 0.5 0.2*   GLUCOSE 90 100 117*     Recent Labs     12/01/20 0245 12/02/20 0425 12/03/20  0430   AST 45* 40* 44*   ALT 34* 26 25   BILITOT 11.7* 12.8* 13.6*   ALKPHOS 713* 621* 627*     INR:   Recent Labs     12/01/20 0245   INR 1.35*     RAD:   Ct Abdomen Pelvis W Iv Contrast Additional Contrast? Oral  1.. The patient is status post Whipple procedure. There is some mild thickening noted at the level of the distal gastrectomy. The gastrojejunostomy is patent. 2. There is evidence of a neoplasm involving the region of the pancreatic head. The pancreatic head likely was resected at the time of the Whipple procedure and this may represent localized recurrence. 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 The SMV and splenic vein just proximal to the confluence to form the portal vein are also occluded.  There is some involvement with the celiac axis which appears to show slight improvement as there did appear to be some circumferential narrowing of the proximal segment of the splenic and common hepatic artery on the previous exam which I do not see on the current study. Overall I feel the mass is stable in size from the previous study. There is some progressive biliary dilatation with what appears to be abrupt occlusion of the common duct just below the kathy hepatis. Previously noted pneumobilia is no longer visualized. This would suggest there has been some progressive occlusion/stenosis of the common duct. 3. Moderate constipation. There is some stasis of the small bowel with mild fluid distention with a few air-fluid levels. There is free fluid in the paracolic gutters and pelvis. No evidence of pneumoperitoneum or mechanical bowel obstruction. Signed by Dr Johnny Ryder on 11/30/2020 10:09 PM    Mri Abdomen W Wo Contrast Mrcp  1. Marked biliary ductal dilatation related to apparent tumor recurrence in the pancreas head region as described above and also detailed on the CT examination performed today. Signed by Dr Michaela Nissen on 12/1/2020 7:40 AM    Assessment/Plan   Principal Problem:    Obstructive jaundice-2/2 tumor recurrence and occlusion of CBD. Transfer initiated to tertiary facility for IR to attempt drain/stent placement. Awaiting bed offer. Hospice following, fentanyl ordered which has provided pain relief. Bilirubin more elevated today.  Per transfer center no bed will be available today at [facility]     Active Problems:    Recurrent pancreatic adenocarcinoma-followed closely as OP by Oncology/Gastroenterology, Palliative care recommended        Malignant ascites-noted, serial abdominal exams, order therapeutic paracentesis if needed, no distention noted         Mixed hyperlipidemia-noted, does not take statins for this, stable       Severe protein-calorie malnutrition (HCC)-dietary following, oral intake improving       Normocytic anemia-worsening, transfuse if <7.0, monitor closely        Leukocytosis-resolved, will continue Zosyn for now Hyponatremia-improving    Plan to transfer once bed offer made.      Antibiotic: Zosyn     DVT Prophylaxis: SCD    GI prophylaxis: Protonix     Taryn Sharpe PA-C No

## 2023-03-22 NOTE — ANESTHESIA PRE PROCEDURE
for a total of 8 daily 10/2/19 12/31/19  Keren Peters MD       Current medications:    Current Facility-Administered Medications   Medication Dose Route Frequency Provider Last Rate Last Dose    pantoprazole (PROTONIX) 80 mg in sodium chloride 0.9 % 100 mL infusion  8 mg/hr Intravenous Continuous Kasey Enamorado MD 10 mL/hr at 04/16/20 0606 8 mg/hr at 04/16/20 0606    sodium chloride flush 0.9 % injection 10 mL  10 mL Intravenous 2 times per day Kasey Enamorado MD        sodium chloride flush 0.9 % injection 10 mL  10 mL Intravenous PRN Kasey Enamorado MD        acetaminophen (TYLENOL) tablet 650 mg  650 mg Oral Q4H PRN Kasey Enamorado MD        promethazine (PHENERGAN) tablet 12.5 mg  12.5 mg Oral Q6H PRN Kasey Enamorado MD        Or    ondansetron (ZOFRAN) injection 4 mg  4 mg Intravenous Q6H PRN Kasey Enamorado MD        dextrose 5 % and 0.45 % sodium chloride infusion   Intravenous Continuous Kasey Enamorado MD 75 mL/hr at 04/16/20 0201      sucralfate (CARAFATE) tablet 1 g  1 g Oral 4x Daily Kasey Enamorado MD   1 g at 04/16/20 0756       Allergies:     Allergies   Allergen Reactions    Codeine Shortness Of Breath     SOB and rash    Morphine Other (See Comments)     Other reaction(s): abd pain  Severe abd. pain    Morphine And Related Nausea Only    Sulfa Antibiotics Shortness Of Breath and Hives     SOB, rash and itching    Neomycin-Bacitracin Zn-Polymyx Rash    Clarithromycin     Dilaudid [Hydromorphone Hcl] Other (See Comments)     \"completely shut me down\"    Hydromorphone     Loperamide Hcl Other (See Comments)     severe abd. pain    Loperamide Hcl Hives     severe abd. pain    Neosporin [Neomycin-Polymyx-Gramicid] Other (See Comments)     Causes boil       Problem List:    Patient Active Problem List   Diagnosis Code    Nausea R11.0    Right sided abdominal pain R10.9    Malignant neoplasm of pancreas (HCC) C25.9    Tobacco abuse Z72.0    Hypertension I10    Dyslipidemia Solaraze Counseling:  I discussed with the patient the risks of Solaraze including but not limited to erythema, scaling, itching, weeping, crusting, and pain.

## 2023-07-12 NOTE — CONSULTS
Schneider Lester, DO        promethazine (PHENERGAN) tablet 25 mg  25 mg Oral Q6H PRN Schneider Lester, DO        sucralfate (CARAFATE) tablet 1 g  1 g Oral 4x Daily Schneider Behzad, DO   1 g at 05/13/20 6269    sodium chloride flush 0.9 % injection 10 mL  10 mL Intravenous 2 times per day Schneider Lester, DO   10 mL at 05/13/20 6727    sodium chloride flush 0.9 % injection 10 mL  10 mL Intravenous PRN Schneider Behzad, DO        acetaminophen (TYLENOL) tablet 650 mg  650 mg Oral Q4H PRN Schneider Lester, DO        promethazine (PHENERGAN) tablet 12.5 mg  12.5 mg Oral Q6H PRN Schneider Lester, DO        Or    ondansetron [facility]) injection 4 mg  4 mg Intravenous Q6H PRN Schneider Behzad, DO        0.9 % sodium chloride infusion   Intravenous Continuous Schneider Behzad,  mL/hr at 05/13/20 0542      pantoprazole (PROTONIX) injection 40 mg  40 mg Intravenous Q12H Schneider Lester, DO   40 mg at 05/13/20 6440    And    sodium chloride (PF) 0.9 % injection 10 mL  10 mL Intravenous Q12H Schneider Behzad, DO   10 mL at 05/13/20 7832        Labs:     Recent Labs     05/12/20  1525 05/13/20  0048 05/13/20  0615   WBC 33.3*  --  15.7*   RBC 2.36*  --  3.06*   HGB 7.3* 5.8* 9.3*   HCT 22.8* 18.1* 28.0*   MCV 96.6  --  91.5   MCH 30.9  --  30.4   MCHC 32.0*  --  33.2   *  --  97*     Recent Labs     05/12/20  1525      K 4.2   ANIONGAP 10      CO2 26   BUN 9   CREATININE 0.7   GLUCOSE 160*   CALCIUM 8.0*     No results for input(s): MG, PHOS in the last 72 hours. Recent Labs     05/12/20  1525   AST 15   ALT 12   BILITOT <0.2   ALKPHOS 193*     ABGs:No results for input(s): PHART, TRG5WHW, PO2ART, JES3JMN, BEART, HGBAE, I8DLKRAO, CARBOXHGBART, 02THERAPY in the last 72 hours. HgBA1c: No results for input(s): LABA1C in the last 72 hours.   FLP:  No results found for: TRIG, HDL, LDLCALC, LDLDIRECT, LABVLDL  TSH:  No results found for: TSH  Troponin T: No Low Dose Naltrexone Counseling- I discussed with the patient the potential risks and side effects of low dose naltrexone including but not limited to: more vivid dreams, headaches, nausea, vomiting, abdominal pain, fatigue, dizziness, and anxiety.

## 2024-02-07 NOTE — PROGRESS NOTES
progression. Improvement of the caliber of what may be a middle colic vein that drains back to the SMV. There is still persistent narrowing of the of the upper SMV at the juncture with the portal vein.  No definite evidence of liver metastases at this time. No definite evidence of pulmonary metastases.  However, question of slight increase in prominence of subtle soft tissue thickening within the right paracolic gutter could be indicative of metastatic disease to peritoneum.   · 12/9/2019- colonoscopy by GI was unremarkable.  This was performed for complaints of maroon-colored stools. 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 This is consistent with disease progression. · 3/4/2020- 4th line therapy Irinotecan liposome 70 mg/m² with leucovorin 400 mg/m² and 5-FU 2400 mg/m² over 46 hours every 2 weeks.        Review of Systems   Constitutional: Positive for fatigue. Negative for chills, diaphoresis and fever. HENT: Negative. Negative for congestion, ear pain, hearing loss, nosebleeds, sore throat and tinnitus. Eyes: Negative. Negative for pain, discharge and redness. Respiratory: Negative. Negative for cough, shortness of breath and wheezing. Cardiovascular: Negative. Negative for chest pain, palpitations and leg swelling. Gastrointestinal: Negative. Negative for abdominal pain, blood in stool, constipation, diarrhea, nausea and vomiting. Endocrine: Negative for polydipsia. Genitourinary: Negative for dysuria, flank pain, frequency, hematuria and urgency. Musculoskeletal: Negative. Negative for back pain, myalgias and neck pain. Skin: Negative. Negative for rash. Neurological: Positive for weakness.  Negative for yes

## (undated) DEVICE — ENDOSCOPIC ULTRASOUND FINE NEEDLE BIOPSY (FNB) DEVICE: Brand: ACQUIRE

## (undated) DEVICE — ENDO KIT,LOURDES HOSPITAL: Brand: MEDLINE INDUSTRIES, INC.

## (undated) DEVICE — PAD MINOR UNIVERSAL: Brand: MEDLINE INDUSTRIES, INC.

## (undated) DEVICE — ANTIBACTERIAL UNDYED BRAIDED (POLYGLACTIN 910), SYNTHETIC ABSORBABLE SUTURE: Brand: COATED VICRYL

## (undated) DEVICE — GLV SURG BIOGEL M LTX PF 7 1/2

## (undated) DEVICE — SPHINCTEROTOME: Brand: DREAMTOME™ RX 44

## (undated) DEVICE — SYR LL TP 10ML STRL

## (undated) DEVICE — FORCEPS BX L240CM JAW DIA2.4MM ORNG L CAP W/ NDL DISP RAD

## (undated) DEVICE — ADHS LIQ MASTISOL 2/3ML

## (undated) DEVICE — PAD GRND REM POLYHESIVE A/ DISP

## (undated) DEVICE — ECHOTIP ULTRA, CELIAC PLEXUS NEUROLYSIS NEEDLE: Brand: ECHOTIP

## (undated) DEVICE — 3M™ STERI-STRIP™ REINFORCED ADHESIVE SKIN CLOSURES, R1546, 1/4 IN X 4 IN (6 MM X 100 MM), 10 STRIPS/ENVELOPE: Brand: 3M™ STERI-STRIP™

## (undated) DEVICE — PK TURNOVER RM ADV

## (undated) DEVICE — SYSTEM BX CAP BILI RAP EXCHG CAP LOK DEV COMPATIBLE W/ OLY

## (undated) DEVICE — SPNG GZ 2S 2X2 8PLY STRL PK/2

## (undated) DEVICE — CVR UNIV C/ARM

## (undated) DEVICE — CLIP INT L235CM WRK CHN DIA2.8MM OPN 11MM BRAID CATH ROT BX/10

## (undated) DEVICE — SUT PROLN 2/0 SH 36IN 8523H

## (undated) DEVICE — DRSNG SURESITE WNDW 4X4.5

## (undated) DEVICE — RETRIEVAL BALLOON CATHETER: Brand: EXTRACTOR™ PRO RX

## (undated) DEVICE — APPL CHLORAPREP W/TINT 26ML ORNG